# Patient Record
Sex: FEMALE | Race: WHITE | NOT HISPANIC OR LATINO | Employment: PART TIME | URBAN - METROPOLITAN AREA
[De-identification: names, ages, dates, MRNs, and addresses within clinical notes are randomized per-mention and may not be internally consistent; named-entity substitution may affect disease eponyms.]

---

## 2018-05-07 ENCOUNTER — OFFICE VISIT (OUTPATIENT)
Dept: URGENT CARE | Facility: CLINIC | Age: 63
End: 2018-05-07
Payer: COMMERCIAL

## 2018-05-07 VITALS
HEIGHT: 67 IN | WEIGHT: 162 LBS | TEMPERATURE: 98 F | DIASTOLIC BLOOD PRESSURE: 104 MMHG | OXYGEN SATURATION: 98 % | HEART RATE: 84 BPM | RESPIRATION RATE: 16 BRPM | SYSTOLIC BLOOD PRESSURE: 158 MMHG | BODY MASS INDEX: 25.43 KG/M2

## 2018-05-07 DIAGNOSIS — B00.1 HERPES SIMPLEX LABIALIS: Primary | ICD-10-CM

## 2018-05-07 PROCEDURE — 99203 OFFICE O/P NEW LOW 30 MIN: CPT | Mod: S$GLB,,, | Performed by: NURSE PRACTITIONER

## 2018-05-07 RX ORDER — ACYCLOVIR 50 MG/G
OINTMENT TOPICAL
Qty: 15 G | Refills: 0 | Status: SHIPPED | OUTPATIENT
Start: 2018-05-07 | End: 2019-05-07

## 2018-05-07 RX ORDER — METOPROLOL SUCCINATE 50 MG/1
50 TABLET, EXTENDED RELEASE ORAL
COMMUNITY
Start: 2012-04-18

## 2018-05-07 RX ORDER — ACYCLOVIR 50 MG/G
OINTMENT TOPICAL
COMMUNITY

## 2018-05-07 NOTE — PATIENT INSTRUCTIONS
Understanding Cold Sores  Cold sores are small blisters or sores on the lip or sometimes inside the mouth. Many people get them from time to time. Cold sores usually are not serious, and they usually heal in a week or two. They are caused by 2 related viruses, herpes simplex type 1 and 2. These viruses spread very easily. Many people have one or both of these viruses in their body. More than 4 in every 5 people are infected with herpes simplex type 1. Once you have the virus that causes cold sores, it stays in your body for the rest of your life. But it can be inactive for long periods.  What causes a cold sore?  Cold sores are usually caused by herpes simplex virus type 1. Less often, they are caused by herpes simplex virus type 2. Herpes simplex virus type 2 is the more common cause of genital sores. The herpes viruses can enter the body through a break in the skin such as a scrape. Or they may enter through mucous membranes such as the lips or mouth. Some ways to get the viruses include:  · Kissing someone who has a cold sore  · Sharing a drinking glass, eating utensils, or lip balm with someone who has a cold sore  · Having oral sex with someone who has a cold sore  A  baby can also get the infection at birth.  If you have a herpes virus, you can to pass it along even when you dont have a sore.  Cold sores flare up occasionally. Things that can cause an outbreak include:  · Sun exposure  · Fever  · Stress or exhaustion  · Menstruation  · Skin irritation  · Another unrelated Illness such as pneumonia, urinary infection, or cancer  What are the symptoms of a cold sore?  Symptoms can include:  · A blister-like sore or cluster of sores. These often occur at the edge of the lips but may appear inside the mouth.  · Skin redness around the sores.  · Pain or itching in the area of the outbreak. Often the pain or itching develops 12 to 24 hours before the sore become visible.  · Flu-like symptoms, including  swollen glands, headache, body ache, or fever. These typically occur only at the time of the first infection.  Cold sores may also occur on fingers. They may rarely infect the eyes, a serious possible complication.  Some people have symptoms a day or two before an outbreak. They may feel tenderness, burning, itching, or tingling before a cold sore appears. Cold sores tend to come back in the same area that they first appeared.  How are cold sores treated?  Treatment for cold sores focuses on relieving and shortening symptoms. For people with frequent outbreaks, treatment works to decrease how often future episodes.  Treatments may include:  · Prescription or over-the-counter pain medicines. These can help with discomfort, especially if sores are inside the mouth.  · Antiviral medicines. These may be pills that are taken by mouth or a cream to apply to sores. They may help shorten an outbreak and reduce the severity of symptoms.  They may be used to help prevent future outbreaks if you have disabling recurrent infections.  · Self-care such as extra rest and drinking more fluids. These may help relieve the flu-like symptoms of a first outbreak.  How are cold sores diagnosed?  Your healthcare provider makes the diagnosis mainly by looking at the sores and doing a clinical exam.  This may be confirmed by swab tests or blood tests.  How can I prevent cold sores?  You can help reduce the spread of the herpes viruses that cause cold sores. This can help both you and others avoid getting cold sores. Follow these tips:  · Do not kiss others if you have a cold sore. Also avoid kissing someone with a cold sore.  · Do not share eating utensils, lip balm, razors, or towels with someone who has a cold sore.  · Wash your hands after touching the area of a cold sore. The herpes virus can be carried from your face to your hands when you touch the area of a cold sore. When this happens, wash your hands thoroughly, for at least 20  seconds. When you cant wash with soap and water, use an alcohol-based hand .  · Disinfect things you touch often, such as phones and keyboards.    · If you feel a cold sore coming on, do the same things you would do when a cold sore is present to avoid spreading the virus.  · Use condoms to help prevent passing on the viruses through sex.  What are the possible complications of a cold sore?  Cold sores usually go away by themselves within 2 weeks. For most people cold sores are not serious. The viruses that cause cold sores can cause more serious illness, though. People who have a weak immune system may get more serious infections from herpes viruses. These include people being treated for cancer or who have HIV disease. Babies may also become very ill from a herpes infection.      When should I call my healthcare provider?  Call your healthcare provider right away if you have any of these:  · Fever of 100.4°F (38°C) or higher, or as directed  · Pain that gets worse  · You cannot eat or drink because of painful sores  · Symptoms dont get better within 5 to 7 days  · Blisters spread beyond the mouth or lip to areas on the chest, arms, face, or legs   Date Last Reviewed: 3/28/2016  © 7198-9841 [employer]. [address] [place] [address], [place] [address]. All rights reserved. This information is not intended as a substitute for professional medical care. Always follow your healthcare professional's instructions.

## 2018-05-07 NOTE — PROGRESS NOTES
"Subjective:       Patient ID: Nubia Howell is a 62 y.o. female.    Vitals:  height is 5' 7" (1.702 m) and weight is 73.5 kg (162 lb). Her temporal temperature is 98.1 °F (36.7 °C). Her blood pressure is 158/104 (abnormal) and her pulse is 84. Her respiration is 16 and oxygen saturation is 98%.     Chief Complaint: Herpes Zoster (on her chin)    Pt comes in with complaints of a herpes flare up on her face. She states that this flare up usually happens when she is out in the sun for a long period of time. She states that she has been dealing with this for about 40 years. She usually takes zovirax cream to help with the flare up, but says that she does not have a prescription at this moment. She states that the flare up is slightly itchy, but is starting to get worse.       Review of Systems   Constitution: Negative for chills and fever.   HENT: Negative for sore throat.    Respiratory: Negative for shortness of breath.    Skin: Positive for itching and rash.   Musculoskeletal: Negative for joint pain.   All other systems reviewed and are negative.      Objective:      Physical Exam   Constitutional: She is oriented to person, place, and time. She appears well-developed and well-nourished.   HENT:   Head: Normocephalic and atraumatic. Head is without abrasion, without contusion and without laceration.   Right Ear: External ear normal.   Left Ear: External ear normal.   Nose: Nose normal.   Mouth/Throat: Oropharynx is clear and moist. Oral lesions present.       Eyes: Conjunctivae, EOM and lids are normal. Pupils are equal, round, and reactive to light.   Neck: Trachea normal, full passive range of motion without pain and phonation normal. Neck supple.   Cardiovascular: Normal rate, regular rhythm and normal heart sounds.    Pulmonary/Chest: Effort normal and breath sounds normal. No stridor. No respiratory distress.   Musculoskeletal: Normal range of motion.   Neurological: She is alert and oriented to person, place, " and time.   Skin: Skin is warm, dry and intact. Capillary refill takes less than 2 seconds. No abrasion, no bruising, no burn, no ecchymosis, no laceration, no lesion and no rash noted. No erythema.   Psychiatric: She has a normal mood and affect. Her speech is normal and behavior is normal. Judgment and thought content normal. Cognition and memory are normal.   Nursing note and vitals reviewed.      Assessment:       1. Herpes simplex labialis        Plan:         Herpes simplex labialis  -     acyclovir 5% (ZOVIRAX) 5 % ointment; Apply thin layer to affected area  Dispense: 15 g; Refill: 0      Patient Instructions       Understanding Cold Sores  Cold sores are small blisters or sores on the lip or sometimes inside the mouth. Many people get them from time to time. Cold sores usually are not serious, and they usually heal in a week or two. They are caused by 2 related viruses, herpes simplex type 1 and 2. These viruses spread very easily. Many people have one or both of these viruses in their body. More than 4 in every 5 people are infected with herpes simplex type 1. Once you have the virus that causes cold sores, it stays in your body for the rest of your life. But it can be inactive for long periods.  What causes a cold sore?  Cold sores are usually caused by herpes simplex virus type 1. Less often, they are caused by herpes simplex virus type 2. Herpes simplex virus type 2 is the more common cause of genital sores. The herpes viruses can enter the body through a break in the skin such as a scrape. Or they may enter through mucous membranes such as the lips or mouth. Some ways to get the viruses include:  · Kissing someone who has a cold sore  · Sharing a drinking glass, eating utensils, or lip balm with someone who has a cold sore  · Having oral sex with someone who has a cold sore  A  baby can also get the infection at birth.  If you have a herpes virus, you can to pass it along even when you dont  have a sore.  Cold sores flare up occasionally. Things that can cause an outbreak include:  · Sun exposure  · Fever  · Stress or exhaustion  · Menstruation  · Skin irritation  · Another unrelated Illness such as pneumonia, urinary infection, or cancer  What are the symptoms of a cold sore?  Symptoms can include:  · A blister-like sore or cluster of sores. These often occur at the edge of the lips but may appear inside the mouth.  · Skin redness around the sores.  · Pain or itching in the area of the outbreak. Often the pain or itching develops 12 to 24 hours before the sore become visible.  · Flu-like symptoms, including swollen glands, headache, body ache, or fever. These typically occur only at the time of the first infection.  Cold sores may also occur on fingers. They may rarely infect the eyes, a serious possible complication.  Some people have symptoms a day or two before an outbreak. They may feel tenderness, burning, itching, or tingling before a cold sore appears. Cold sores tend to come back in the same area that they first appeared.  How are cold sores treated?  Treatment for cold sores focuses on relieving and shortening symptoms. For people with frequent outbreaks, treatment works to decrease how often future episodes.  Treatments may include:  · Prescription or over-the-counter pain medicines. These can help with discomfort, especially if sores are inside the mouth.  · Antiviral medicines. These may be pills that are taken by mouth or a cream to apply to sores. They may help shorten an outbreak and reduce the severity of symptoms.  They may be used to help prevent future outbreaks if you have disabling recurrent infections.  · Self-care such as extra rest and drinking more fluids. These may help relieve the flu-like symptoms of a first outbreak.  How are cold sores diagnosed?  Your healthcare provider makes the diagnosis mainly by looking at the sores and doing a clinical exam.  This may be confirmed  by swab tests or blood tests.  How can I prevent cold sores?  You can help reduce the spread of the herpes viruses that cause cold sores. This can help both you and others avoid getting cold sores. Follow these tips:  · Do not kiss others if you have a cold sore. Also avoid kissing someone with a cold sore.  · Do not share eating utensils, lip balm, razors, or towels with someone who has a cold sore.  · Wash your hands after touching the area of a cold sore. The herpes virus can be carried from your face to your hands when you touch the area of a cold sore. When this happens, wash your hands thoroughly, for at least 20 seconds. When you cant wash with soap and water, use an alcohol-based hand .  · Disinfect things you touch often, such as phones and keyboards.    · If you feel a cold sore coming on, do the same things you would do when a cold sore is present to avoid spreading the virus.  · Use condoms to help prevent passing on the viruses through sex.  What are the possible complications of a cold sore?  Cold sores usually go away by themselves within 2 weeks. For most people cold sores are not serious. The viruses that cause cold sores can cause more serious illness, though. People who have a weak immune system may get more serious infections from herpes viruses. These include people being treated for cancer or who have HIV disease. Babies may also become very ill from a herpes infection.      When should I call my healthcare provider?  Call your healthcare provider right away if you have any of these:  · Fever of 100.4°F (38°C) or higher, or as directed  · Pain that gets worse  · You cannot eat or drink because of painful sores  · Symptoms dont get better within 5 to 7 days  · Blisters spread beyond the mouth or lip to areas on the chest, arms, face, or legs   Date Last Reviewed: 3/28/2016  © 5674-4044 [employer]. [address] [place] [address], [place] [address]. All rights reserved. This  information is not intended as a substitute for professional medical care. Always follow your healthcare professional's instructions.

## 2023-05-23 LAB
CHOLESTEROL (MG/DL) IN SER/PLAS: 161 MG/DL (ref 0–199)
CHOLESTEROL IN HDL (MG/DL) IN SER/PLAS: 60.4 MG/DL
CHOLESTEROL/HDL RATIO: 2.7
LDL: 72 MG/DL (ref 0–99)
TRIGLYCERIDE (MG/DL) IN SER/PLAS: 143 MG/DL (ref 0–149)
VLDL: 29 MG/DL (ref 0–40)

## 2023-09-21 ENCOUNTER — OFFICE VISIT (OUTPATIENT)
Dept: PRIMARY CARE | Facility: CLINIC | Age: 68
End: 2023-09-21
Payer: MEDICARE

## 2023-09-21 VITALS
HEART RATE: 73 BPM | HEIGHT: 67 IN | DIASTOLIC BLOOD PRESSURE: 74 MMHG | SYSTOLIC BLOOD PRESSURE: 132 MMHG | WEIGHT: 170 LBS | BODY MASS INDEX: 26.68 KG/M2

## 2023-09-21 DIAGNOSIS — Z13.1 SCREENING FOR DIABETES MELLITUS: ICD-10-CM

## 2023-09-21 DIAGNOSIS — R73.9 HYPERGLYCEMIA: ICD-10-CM

## 2023-09-21 DIAGNOSIS — I10 PRIMARY HYPERTENSION: ICD-10-CM

## 2023-09-21 DIAGNOSIS — Z51.81 MEDICATION MONITORING ENCOUNTER: ICD-10-CM

## 2023-09-21 DIAGNOSIS — E78.2 MIXED HYPERLIPIDEMIA: ICD-10-CM

## 2023-09-21 DIAGNOSIS — Z12.31 SCREENING MAMMOGRAM FOR BREAST CANCER: ICD-10-CM

## 2023-09-21 DIAGNOSIS — M81.0 AGE-RELATED OSTEOPOROSIS WITHOUT CURRENT PATHOLOGICAL FRACTURE: Primary | ICD-10-CM
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
## 2023-09-21 PROBLEM — R10.13 ABDOMINAL PAIN, ACUTE, EPIGASTRIC: Status: ACTIVE | Noted: 2023-09-21

## 2023-09-21 PROBLEM — Z86.010 HISTORY OF COLON POLYPS: Status: ACTIVE | Noted: 2023-09-21

## 2023-09-21 PROBLEM — M62.838 MUSCLE SPASM: Status: RESOLVED | Noted: 2023-09-21 | Resolved: 2023-09-21

## 2023-09-21 PROBLEM — R19.7 DIARRHEA: Status: RESOLVED | Noted: 2023-09-21 | Resolved: 2023-09-21

## 2023-09-21 PROBLEM — Z86.0100 HISTORY OF COLON POLYPS: Status: ACTIVE | Noted: 2023-09-21

## 2023-09-21 PROBLEM — M54.9 BACK PAIN: Status: ACTIVE | Noted: 2023-09-21

## 2023-09-21 PROBLEM — R91.1 LUNG NODULE: Status: ACTIVE | Noted: 2023-09-21

## 2023-09-21 PROBLEM — M54.42 LUMBAGO WITH SCIATICA, LEFT SIDE: Status: RESOLVED | Noted: 2023-09-21 | Resolved: 2023-09-21

## 2023-09-21 PROBLEM — M65.30 TRIGGER FINGER OF RIGHT HAND: Status: ACTIVE | Noted: 2023-09-21

## 2023-09-21 PROBLEM — R10.13 ABDOMINAL PAIN, ACUTE, EPIGASTRIC: Status: RESOLVED | Noted: 2023-09-21 | Resolved: 2023-09-21

## 2023-09-21 PROBLEM — R07.81 RIB PAIN ON RIGHT SIDE: Status: ACTIVE | Noted: 2023-09-21

## 2023-09-21 PROBLEM — M54.42 LUMBAGO WITH SCIATICA, LEFT SIDE: Status: ACTIVE | Noted: 2023-09-21

## 2023-09-21 PROBLEM — E78.5 HYPERLIPIDEMIA: Status: ACTIVE | Noted: 2023-09-21

## 2023-09-21 PROBLEM — S22.080A COMPRESSION FRACTURE OF T11 VERTEBRA (MULTI): Status: ACTIVE | Noted: 2023-09-21

## 2023-09-21 PROBLEM — U07.1 COVID-19: Status: RESOLVED | Noted: 2023-09-21 | Resolved: 2023-09-21
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
## 2023-09-21 PROBLEM — R07.9 CHEST PAIN: Status: ACTIVE | Noted: 2023-09-21

## 2023-09-21 PROBLEM — M47.816 LUMBAR SPONDYLOSIS: Status: ACTIVE | Noted: 2023-09-21

## 2023-09-21 PROBLEM — R19.7 DIARRHEA: Status: ACTIVE | Noted: 2023-09-21

## 2023-09-21 PROBLEM — M62.838 MUSCLE SPASM: Status: ACTIVE | Noted: 2023-09-21

## 2023-09-21 PROBLEM — M54.9 BACK PAIN: Status: RESOLVED | Noted: 2023-09-21 | Resolved: 2023-09-21

## 2023-09-21 PROBLEM — I71.21 THORACIC ASCENDING AORTIC ANEURYSM (CMS-HCC): Status: ACTIVE | Noted: 2023-09-21

## 2023-09-21 PROBLEM — Z95.828 HISTORY OF ASCENDING AORTIC REPLACEMENT: Status: ACTIVE | Noted: 2023-09-21

## 2023-09-21 PROBLEM — U07.1 COVID-19: Status: ACTIVE | Noted: 2023-09-21

## 2023-09-21 PROBLEM — I25.10 CORONARY ATHEROSCLEROSIS: Status: ACTIVE | Noted: 2023-09-21

## 2023-09-21 PROBLEM — M65.30 TRIGGER FINGER OF RIGHT HAND: Status: RESOLVED | Noted: 2023-09-21 | Resolved: 2023-09-21

## 2023-09-21 PROCEDURE — 3078F DIAST BP <80 MM HG: CPT | Performed by: INTERNAL MEDICINE

## 2023-09-21 PROCEDURE — 1159F MED LIST DOCD IN RCRD: CPT | Performed by: INTERNAL MEDICINE

## 2023-09-21 PROCEDURE — 3075F SYST BP GE 130 - 139MM HG: CPT | Performed by: INTERNAL MEDICINE

## 2023-09-21 PROCEDURE — 1036F TOBACCO NON-USER: CPT | Performed by: INTERNAL MEDICINE

## 2023-09-21 PROCEDURE — 1160F RVW MEDS BY RX/DR IN RCRD: CPT | Performed by: INTERNAL MEDICINE

## 2023-09-21 PROCEDURE — 1125F AMNT PAIN NOTED PAIN PRSNT: CPT | Performed by: INTERNAL MEDICINE

## 2023-09-21 PROCEDURE — 99204 OFFICE O/P NEW MOD 45 MIN: CPT | Performed by: INTERNAL MEDICINE

## 2023-09-21 RX ORDER — METOPROLOL SUCCINATE 100 MG/1
100 TABLET, EXTENDED RELEASE ORAL DAILY
COMMUNITY
Start: 2023-08-17 | End: 2024-02-21

## 2023-09-21 RX ORDER — LOSARTAN POTASSIUM 100 MG/1
100 TABLET ORAL DAILY
COMMUNITY
Start: 2023-08-23 | End: 2024-02-21

## 2023-09-21 RX ORDER — ATORVASTATIN CALCIUM 80 MG/1
80 TABLET, FILM COATED ORAL DAILY
COMMUNITY
Start: 2023-09-17

## 2023-09-21 ASSESSMENT — ENCOUNTER SYMPTOMS
PALPITATIONS: 0
OCCASIONAL FEELINGS OF UNSTEADINESS: 0
DEPRESSION: 0
COUGH: 0
FEVER: 0
SHORTNESS OF BREATH: 0
CHILLS: 0
LOSS OF SENSATION IN FEET: 0
POLYDIPSIA: 0

## 2023-09-21 NOTE — PROGRESS NOTES
"Subjective   Patient ID: Brittny Gordon is a 68 y.o. female who presents for Establish Care.    68-year-old female presents today to establish care after a prolonged time without a PCP.  She follows with a specialist for her cardiovascular history which includes a thoracic ascending aortic aneurysm status postsurgical repair.  She is on appropriate medications for management and follows with cardiology at this time.  It has been greater than 1 year since her last mammogram.  It has been greater than 2 years since her last bone density.  She has a history of osteoporosis she was previously on alendronate for couple years but has not been on it for the last 1 year as a result of not having a PCP to follow-up with.  She has completed colon cancer screening and is due in 2028 at the 7-year follow-up.  She is in need of updated screening and maintenance blood work which I have ordered as part of today's visit.  We discussed vaccine recommendations in detail as part of today's encounter and she was provided with detailed knowledge of all available vaccines that she qualifies for and will consider obtaining them from her pharmacy which is her preference.    There are no acute issues at this time that need to be addressed on behalf of the patient.  I advised her to follow-up in 6 months         Review of Systems   Constitutional:  Negative for chills and fever.   Respiratory:  Negative for cough and shortness of breath.    Cardiovascular:  Negative for chest pain and palpitations.   Endocrine: Negative for polydipsia and polyuria.       Objective   /74 (Patient Position: Sitting)   Pulse 73   Ht 1.689 m (5' 6.5\")   Wt 77.1 kg (170 lb)   BMI 27.03 kg/m²     Physical Exam  Constitutional:       Appearance: Normal appearance.   HENT:      Head: Normocephalic and atraumatic.   Eyes:      Extraocular Movements: Extraocular movements intact.      Pupils: Pupils are equal, round, and reactive to light.   Neck:      " Thyroid: No thyroid mass or thyromegaly.      Vascular: No carotid bruit.   Cardiovascular:      Rate and Rhythm: Normal rate and regular rhythm.      Heart sounds: No murmur heard.     No friction rub. No gallop.   Pulmonary:      Effort: No respiratory distress.      Breath sounds: No wheezing, rhonchi or rales.   Musculoskeletal:      Cervical back: Neck supple.      Right lower leg: No edema.      Left lower leg: No edema.   Lymphadenopathy:      Cervical: No cervical adenopathy.   Neurological:      Mental Status: She is alert.         Assessment/Plan   Problem List Items Addressed This Visit       Hyperlipidemia    Relevant Orders    Vitamin D 25-Hydroxy,Total (for eval of Vitamin D levels)    Lipid Panel    CBC    Comprehensive Metabolic Panel    Hemoglobin A1C    Hypertension    Relevant Orders    Vitamin D 25-Hydroxy,Total (for eval of Vitamin D levels)    Lipid Panel    CBC    Comprehensive Metabolic Panel    Hemoglobin A1C    Osteoporosis - Primary    Relevant Orders    XR DEXA bone density    Vitamin D 25-Hydroxy,Total (for eval of Vitamin D levels)    Lipid Panel    CBC    Comprehensive Metabolic Panel    Hemoglobin A1C     Other Visit Diagnoses       Screening mammogram for breast cancer        Relevant Orders    BI mammo bilateral screening tomosynthesis    Medication monitoring encounter        Relevant Orders    Vitamin D 25-Hydroxy,Total (for eval of Vitamin D levels)    Lipid Panel    CBC    Comprehensive Metabolic Panel    Hemoglobin A1C    Screening for diabetes mellitus        Relevant Orders    Vitamin D 25-Hydroxy,Total (for eval of Vitamin D levels)    Lipid Panel    CBC    Comprehensive Metabolic Panel    Hemoglobin A1C    Hyperglycemia        Relevant Orders    Hemoglobin A1C

## 2023-09-21 NOTE — PATIENT INSTRUCTIONS
Please consider the following vaccine updates: Prevnar 20- pneumonia. Abrexvy- RSV, Flu shot, and COVID 19 Booster.

## 2023-09-27 ENCOUNTER — LAB (OUTPATIENT)
Dept: LAB | Facility: LAB | Age: 68
End: 2023-09-27
Payer: MEDICARE

## 2023-09-27 DIAGNOSIS — M81.0 AGE-RELATED OSTEOPOROSIS WITHOUT CURRENT PATHOLOGICAL FRACTURE: ICD-10-CM

## 2023-09-27 DIAGNOSIS — Z13.1 SCREENING FOR DIABETES MELLITUS: ICD-10-CM

## 2023-09-27 DIAGNOSIS — Z51.81 MEDICATION MONITORING ENCOUNTER: ICD-10-CM

## 2023-09-27 DIAGNOSIS — E78.2 MIXED HYPERLIPIDEMIA: ICD-10-CM

## 2023-09-27 DIAGNOSIS — R73.9 HYPERGLYCEMIA: ICD-10-CM

## 2023-09-27 DIAGNOSIS — I10 PRIMARY HYPERTENSION: ICD-10-CM

## 2023-09-27 LAB
ALANINE AMINOTRANSFERASE (SGPT) (U/L) IN SER/PLAS: 25 U/L (ref 7–45)
ALBUMIN (G/DL) IN SER/PLAS: 3.9 G/DL (ref 3.4–5)
ALKALINE PHOSPHATASE (U/L) IN SER/PLAS: 48 U/L (ref 33–136)
ANION GAP IN SER/PLAS: 10 MMOL/L (ref 10–20)
ASPARTATE AMINOTRANSFERASE (SGOT) (U/L) IN SER/PLAS: 22 U/L (ref 9–39)
BILIRUBIN TOTAL (MG/DL) IN SER/PLAS: 0.7 MG/DL (ref 0–1.2)
CALCIDIOL (25 OH VITAMIN D3) (NG/ML) IN SER/PLAS: 52 NG/ML
CALCIUM (MG/DL) IN SER/PLAS: 8.6 MG/DL (ref 8.6–10.3)
CARBON DIOXIDE, TOTAL (MMOL/L) IN SER/PLAS: 27 MMOL/L (ref 21–32)
CHLORIDE (MMOL/L) IN SER/PLAS: 106 MMOL/L (ref 98–107)
CHOLESTEROL (MG/DL) IN SER/PLAS: 180 MG/DL (ref 0–199)
CHOLESTEROL IN HDL (MG/DL) IN SER/PLAS: 63.1 MG/DL
CHOLESTEROL/HDL RATIO: 2.9
CREATININE (MG/DL) IN SER/PLAS: 0.64 MG/DL (ref 0.5–1.05)
ERYTHROCYTE DISTRIBUTION WIDTH (RATIO) BY AUTOMATED COUNT: 12.7 % (ref 11.5–14.5)
ERYTHROCYTE MEAN CORPUSCULAR HEMOGLOBIN CONCENTRATION (G/DL) BY AUTOMATED: 32.3 G/DL (ref 32–36)
ERYTHROCYTE MEAN CORPUSCULAR VOLUME (FL) BY AUTOMATED COUNT: 93 FL (ref 80–100)
ERYTHROCYTES (10*6/UL) IN BLOOD BY AUTOMATED COUNT: 4.58 X10E12/L (ref 4–5.2)
GFR FEMALE: >90 ML/MIN/1.73M2
GLUCOSE (MG/DL) IN SER/PLAS: 97 MG/DL (ref 74–99)
HEMATOCRIT (%) IN BLOOD BY AUTOMATED COUNT: 42.4 % (ref 36–46)
HEMOGLOBIN (G/DL) IN BLOOD: 13.7 G/DL (ref 12–16)
LDL: 91 MG/DL (ref 0–99)
LEUKOCYTES (10*3/UL) IN BLOOD BY AUTOMATED COUNT: 4.7 X10E9/L (ref 4.4–11.3)
PLATELETS (10*3/UL) IN BLOOD AUTOMATED COUNT: 166 X10E9/L (ref 150–450)
POTASSIUM (MMOL/L) IN SER/PLAS: 4 MMOL/L (ref 3.5–5.3)
PROTEIN TOTAL: 6.2 G/DL (ref 6.4–8.2)
SODIUM (MMOL/L) IN SER/PLAS: 139 MMOL/L (ref 136–145)
TRIGLYCERIDE (MG/DL) IN SER/PLAS: 132 MG/DL (ref 0–149)
UREA NITROGEN (MG/DL) IN SER/PLAS: 13 MG/DL (ref 6–23)
VLDL: 26 MG/DL (ref 0–40)

## 2023-09-27 PROCEDURE — 83036 HEMOGLOBIN GLYCOSYLATED A1C: CPT

## 2023-09-27 PROCEDURE — 85027 COMPLETE CBC AUTOMATED: CPT

## 2023-09-27 PROCEDURE — 82306 VITAMIN D 25 HYDROXY: CPT

## 2023-09-27 PROCEDURE — 80053 COMPREHEN METABOLIC PANEL: CPT

## 2023-09-27 PROCEDURE — 80061 LIPID PANEL: CPT

## 2023-09-27 PROCEDURE — 36415 COLL VENOUS BLD VENIPUNCTURE: CPT

## 2023-09-28 LAB
ESTIMATED AVERAGE GLUCOSE FOR HBA1C: 117 MG/DL
HEMOGLOBIN A1C/HEMOGLOBIN TOTAL IN BLOOD: 5.7 %

## 2023-10-31 ENCOUNTER — ANCILLARY PROCEDURE (OUTPATIENT)
Dept: RADIOLOGY | Facility: CLINIC | Age: 68
End: 2023-10-31
Payer: MEDICARE

## 2023-10-31 DIAGNOSIS — M81.0 AGE-RELATED OSTEOPOROSIS WITHOUT CURRENT PATHOLOGICAL FRACTURE: ICD-10-CM

## 2023-10-31 DIAGNOSIS — Z12.31 SCREENING MAMMOGRAM FOR BREAST CANCER: ICD-10-CM

## 2023-10-31 PROCEDURE — 77067 SCR MAMMO BI INCL CAD: CPT

## 2023-10-31 PROCEDURE — 77080 DXA BONE DENSITY AXIAL: CPT

## 2023-10-31 PROCEDURE — 77080 DXA BONE DENSITY AXIAL: CPT | Performed by: RADIOLOGY

## 2023-10-31 PROCEDURE — 77067 SCR MAMMO BI INCL CAD: CPT | Mod: BILATERAL PROCEDURE

## 2023-10-31 PROCEDURE — 77063 BREAST TOMOSYNTHESIS BI: CPT | Mod: BILATERAL PROCEDURE

## 2023-11-02 NOTE — RESULT ENCOUNTER NOTE
Patient bone density shows good results with overall significant improvement in her bone density from prior evaluation.  She has improved from the osteoporotic to the osteopenia range across-the-board.  Based on this information I would advise she continue to take calcium supplements daily and recheck a bone density again in 2 years for monitoring of progression but I do not think we need to start any medication at this time.

## 2023-11-13 ENCOUNTER — APPOINTMENT (OUTPATIENT)
Dept: RADIOLOGY | Facility: CLINIC | Age: 68
End: 2023-11-13
Payer: MEDICARE

## 2023-12-19 ENCOUNTER — OFFICE VISIT (OUTPATIENT)
Dept: CARDIOLOGY | Facility: HOSPITAL | Age: 68
End: 2023-12-19
Payer: MEDICARE

## 2023-12-19 VITALS
WEIGHT: 180 LBS | DIASTOLIC BLOOD PRESSURE: 70 MMHG | HEART RATE: 76 BPM | BODY MASS INDEX: 28.62 KG/M2 | SYSTOLIC BLOOD PRESSURE: 132 MMHG

## 2023-12-19 DIAGNOSIS — R06.09 DYSPNEA ON EXERTION: ICD-10-CM

## 2023-12-19 DIAGNOSIS — R07.9 CHEST PAIN, UNSPECIFIED TYPE: ICD-10-CM

## 2023-12-19 DIAGNOSIS — I10 HYPERTENSION, UNSPECIFIED TYPE: ICD-10-CM

## 2023-12-19 DIAGNOSIS — I25.10 ATHEROSCLEROSIS OF CORONARY ARTERY, UNSPECIFIED VESSEL OR LESION TYPE, UNSPECIFIED WHETHER ANGINA PRESENT, UNSPECIFIED WHETHER NATIVE OR TRANSPLANTED HEART: Primary | ICD-10-CM

## 2023-12-19 DIAGNOSIS — E78.5 HYPERLIPIDEMIA, UNSPECIFIED HYPERLIPIDEMIA TYPE: ICD-10-CM

## 2023-12-19 PROBLEM — M65.341 TRIGGER RING FINGER OF RIGHT HAND: Status: ACTIVE | Noted: 2017-09-29

## 2023-12-19 PROBLEM — M19.90 ARTHRITIS: Status: ACTIVE | Noted: 2023-12-19

## 2023-12-19 PROBLEM — I77.810 ASCENDING AORTA DILATION (CMS-HCC): Status: ACTIVE | Noted: 2023-12-19

## 2023-12-19 PROBLEM — M25.641 STIFFNESS OF RIGHT HAND JOINT: Status: ACTIVE | Noted: 2017-10-30

## 2023-12-19 PROBLEM — M24.541 CONTRACTURE OF FINGER JOINT, RIGHT: Status: ACTIVE | Noted: 2017-09-29

## 2023-12-19 PROBLEM — F41.9 ANXIETY: Status: ACTIVE | Noted: 2023-12-19

## 2023-12-19 PROCEDURE — 93005 ELECTROCARDIOGRAM TRACING: CPT | Performed by: NURSE PRACTITIONER

## 2023-12-19 PROCEDURE — 1036F TOBACCO NON-USER: CPT | Performed by: NURSE PRACTITIONER

## 2023-12-19 PROCEDURE — 3078F DIAST BP <80 MM HG: CPT | Performed by: NURSE PRACTITIONER

## 2023-12-19 PROCEDURE — 3075F SYST BP GE 130 - 139MM HG: CPT | Performed by: NURSE PRACTITIONER

## 2023-12-19 PROCEDURE — 99214 OFFICE O/P EST MOD 30 MIN: CPT | Mod: 25 | Performed by: NURSE PRACTITIONER

## 2023-12-19 PROCEDURE — 1160F RVW MEDS BY RX/DR IN RCRD: CPT | Performed by: NURSE PRACTITIONER

## 2023-12-19 PROCEDURE — 99214 OFFICE O/P EST MOD 30 MIN: CPT | Performed by: NURSE PRACTITIONER

## 2023-12-19 PROCEDURE — 1125F AMNT PAIN NOTED PAIN PRSNT: CPT | Performed by: NURSE PRACTITIONER

## 2023-12-19 PROCEDURE — 1159F MED LIST DOCD IN RCRD: CPT | Performed by: NURSE PRACTITIONER

## 2023-12-19 PROCEDURE — 93010 ELECTROCARDIOGRAM REPORT: CPT | Performed by: INTERNAL MEDICINE

## 2023-12-19 NOTE — PROGRESS NOTES
"Primary Care Physician: Jose Rafael Hart MD  Date of Visit: 12/19/2023  2:30 PM EST  Location of visit: [facility]     Chief Complaint:   Chief Complaint   Patient presents with    Follow-up        HPI / Summary:   Brittny Gordon is a 68 y.o. female presents for followup. Seen in collaboration with Dr. Buckley. She reports one month ago while blowing her leaves she developed an anterior chest tightness. She cannot describe the sensation entirely other than stating \"felt like something was not right.\" Discomfort lasted less than 5 minutes. Does not radiate. No associated symptoms including nausea, vomiting, diaphoresis, or dyspnea. Resolves spontaneously. She stopped blowing leaves and walked around slowly with resolution of symptoms. No further episodes. This was different than her prior chronic chest pain syndrome. She does continue to have a burning sensation in her chest that she feels is indigestion and will take an antacid to relieve symptoms. This has been occurring once a week and has been more noticeable. She has dyspnea on exertion that has been more noticeable when carrying 40 pound bags of salt down her basement stairs. She has been able to walk her dog 1 mile daily without symptoms. She does admit to gaining weight over the past 6 months. Denies orthopnea, pnd, lightheadedness, dizziness, syncope, palpitations, lower extremity edema, or bleeding issues.                Past Medical History:  Past Medical History:   Diagnosis Date    Wedge compression fracture of t11-T12 vertebra, sequela 07/30/2020    Compression fracture of T11 vertebra, sequela        Past Surgical History:  Past Surgical History:   Procedure Laterality Date    OTHER SURGICAL HISTORY  01/28/2013    Thoracic aortic aneurysm repair    OTHER SURGICAL HISTORY  02/23/2021    Colonoscopy    OTHER SURGICAL HISTORY  07/16/2020    Meredith tooth extraction          Social History:   reports that she quit smoking about 10 years ago. Her " smoking use included cigarettes. She has never used smokeless tobacco. She reports current alcohol use. She reports that she does not use drugs.     Family History:  family history includes Lymphoma in her father; Polycythemia in her father.      Allergies:  Allergies   Allergen Reactions    Epinephrine Palpitations and Nausea/vomiting       Outpatient Medications:  Current Outpatient Medications   Medication Instructions    atorvastatin (Lipitor) 80 mg tablet Take 1 tablet (80 mg) by mouth once daily.    losartan (COZAAR) 100 mg, oral, Daily    metoprolol succinate XL (TOPROL-XL) 100 mg, oral, Daily       Physical Exam:  Vitals:    12/19/23 1429   BP: 137/87   BP Location: Right arm   Patient Position: Sitting   Pulse: 76   Weight: 81.6 kg (180 lb)     Wt Readings from Last 5 Encounters:   12/19/23 81.6 kg (180 lb)   09/21/23 77.1 kg (170 lb)   06/20/23 75.3 kg (166 lb)   05/30/23 77.5 kg (170 lb 13.7 oz)   02/21/23 77.1 kg (170 lb 0.3 oz)     Body mass index is 28.62 kg/m².     GENERAL: alert, cooperative, pleasant, in no acute distress  SKIN: warm and dry  NECK: Normal JVD, negative HJR  CARDIAC: Regular rate and rhythm with no rubs, murmurs, or gallops  CHEST: Normal respiratory efforts, lungs clear to auscultation bilaterally.  ABDOMEN: soft, nontender, nondistended  EXTREMITIES: no edema, +2 palpable RP and DP pulses bilaterally       Last Labs:  Recent Labs     09/27/23  1052 11/08/21  1517 08/14/20  0916   WBC 4.7 7.7 6.6   HGB 13.7 14.0 14.2   HCT 42.4 43.5 43.8    222 212   MCV 93 95 92     Recent Labs     09/27/23  1052 09/01/22  1136 03/10/22  0959    137 141   K 4.0 3.6 4.0    101 104   BUN 13 14 14   CREATININE 0.64 0.65 0.65     CMP -  Lab Results   Component Value Date    CALCIUM 8.6 09/27/2023    PROT 6.2 (L) 09/27/2023    ALBUMIN 3.9 09/27/2023    AST 22 09/27/2023    ALT 25 09/27/2023    ALKPHOS 48 09/27/2023    BILITOT 0.7 09/27/2023       LIPID PANEL -   Lab Results    Component Value Date    CHOL 180 09/27/2023    HDL 63.1 09/27/2023    LDLF 91 09/27/2023    TRIG 132 09/27/2023       Lab Results   Component Value Date    HGBA1C 5.7 (A) 09/27/2023       Last Cardiology Tests:  ECG:  Obtained and reviewed EKG normal sinus rhythm HR 72, possible prior anterior infarct    Stress test: 2/6/ 2020  IMPRESSION:  * Normal stress myocardial perfusion imaging without definite  evidence of ischemia or prior infarct.  *Well-maintained left ventricular function with an ejection fraction  of greater than 65%.  *Normal left ventricular size.        Assessment/Plan   Diagnoses and all orders for this visit:  Atherosclerosis of coronary artery, unspecified vessel or lesion type, unspecified whether angina present, unspecified whether native or transplanted heart  -     ECG 12 lead (Clinic Performed)  -     TSH with reflex to Free T4 if abnormal; Future  -     Lipid panel; Future  -     Nuclear Stress Test; Future  Hyperlipidemia, unspecified hyperlipidemia type  -     TSH with reflex to Free T4 if abnormal; Future  -     Lipid panel; Future  Hypertension, unspecified type  -     TSH with reflex to Free T4 if abnormal; Future  Chest pain, unspecified type  -     Nuclear Stress Test; Future  Dyspnea on exertion  -     Nuclear Stress Test; Future  In summary Ms. Gordon is a pleasant 68 year-old white female with a past medical history significant for ascending aortic aneurysm status post replacement of her ascending aorta with resuspension of her aortic valve in 2012 with residual mild aortic root dilation and stable arch aneurysm, hypertension, hyperlipidemia, mild coronary atherosclerosis on CT, and prior tobacco use. She reports one month ago having a vague chest discomfort lasting less than 5 minutes while blowing leaves. Resolved when she stopped blowing leaves and walked around slowly. She also has noted dyspnea on exertion to be more noticeable when carrying 40 pound salt bags down her  stairs. Given the vague chest pain and more noticeable dyspnea on exertion I did order an exercise nuclear stress test to further risk stratify as she has not had a recent ischemic evaluation. I suspect her dyspnea is likely related to weight gain of 14 pounds over the past 6 months. I have ordered a TSH as well. Her recent lipid panel is not at goal. She would like to modify diet, increase physical activity, and lose weight before adding additional medication. I have ordered repeat lipid panel to be done in 3 months. She will continue current cardiovascular medications. She will follow up in 6 months.             Orders:  No orders of the defined types were placed in this encounter.     Followup Appts:  Future Appointments   Date Time Provider Department Center   3/21/2024  1:40 PM Jose Rafael Hart MD [id number] [facility]   5/31/2024  2:00 PM GEA CT 1 [facility]    6/11/2024  3:00 PM Tommy Hammonds MD SCCGEATHOS [facility]           ____________________________________________________________  Kay Mendosa, APRN-CNP  [facility] [employer]  [facility]

## 2023-12-19 NOTE — PATIENT INSTRUCTIONS
Continue current meds  Exercise nuclear stress test  Follow up in 6 months  Check blood work TSH   Encourage weight loss and increase activity  Repeat lipid panel in 3 months

## 2024-01-08 ENCOUNTER — HOSPITAL ENCOUNTER (OUTPATIENT)
Dept: RADIOLOGY | Facility: HOSPITAL | Age: 69
Discharge: HOME | End: 2024-01-08
Payer: MEDICARE

## 2024-01-08 ENCOUNTER — HOSPITAL ENCOUNTER (OUTPATIENT)
Dept: CARDIOLOGY | Facility: HOSPITAL | Age: 69
Discharge: HOME | End: 2024-01-08
Payer: MEDICARE

## 2024-01-08 DIAGNOSIS — I25.10 ATHEROSCLEROSIS OF CORONARY ARTERY, UNSPECIFIED VESSEL OR LESION TYPE, UNSPECIFIED WHETHER ANGINA PRESENT, UNSPECIFIED WHETHER NATIVE OR TRANSPLANTED HEART: ICD-10-CM

## 2024-01-08 DIAGNOSIS — R07.9 CHEST PAIN, UNSPECIFIED TYPE: ICD-10-CM

## 2024-01-08 DIAGNOSIS — R06.09 DYSPNEA ON EXERTION: ICD-10-CM

## 2024-01-08 PROCEDURE — 78452 HT MUSCLE IMAGE SPECT MULT: CPT

## 2024-01-08 PROCEDURE — 3430000001 HC RX 343 DIAGNOSTIC RADIOPHARMACEUTICALS: Performed by: NURSE PRACTITIONER

## 2024-01-08 PROCEDURE — 93016 CV STRESS TEST SUPVJ ONLY: CPT | Performed by: INTERNAL MEDICINE

## 2024-01-08 PROCEDURE — 78452 HT MUSCLE IMAGE SPECT MULT: CPT | Performed by: STUDENT IN AN ORGANIZED HEALTH CARE EDUCATION/TRAINING PROGRAM

## 2024-01-08 PROCEDURE — 93017 CV STRESS TEST TRACING ONLY: CPT

## 2024-01-08 PROCEDURE — A9502 TC99M TETROFOSMIN: HCPCS | Performed by: NURSE PRACTITIONER

## 2024-01-08 PROCEDURE — 93017 CV STRESS TEST TRACING ONLY: CPT | Mod: MUE

## 2024-01-08 RX ADMIN — TETROFOSMIN 10 MILLICURIE: 0.23 INJECTION, POWDER, LYOPHILIZED, FOR SOLUTION INTRAVENOUS at 12:11

## 2024-01-08 RX ADMIN — TETROFOSMIN 30 MILLICURIE: 0.23 INJECTION, POWDER, LYOPHILIZED, FOR SOLUTION INTRAVENOUS at 13:41

## 2024-02-18 LAB
ATRIAL RATE: 72 BPM
P AXIS: 47 DEGREES
P OFFSET: 193 MS
P ONSET: 132 MS
PR INTERVAL: 188 MS
Q ONSET: 226 MS
QRS COUNT: 12 BEATS
QRS DURATION: 78 MS
QT INTERVAL: 402 MS
QTC CALCULATION(BAZETT): 440 MS
QTC FREDERICIA: 427 MS
R AXIS: 41 DEGREES
T AXIS: 77 DEGREES
T OFFSET: 427 MS
VENTRICULAR RATE: 72 BPM

## 2024-02-21 DIAGNOSIS — I25.10 ATHEROSCLEROSIS OF CORONARY ARTERY, UNSPECIFIED VESSEL OR LESION TYPE, UNSPECIFIED WHETHER ANGINA PRESENT, UNSPECIFIED WHETHER NATIVE OR TRANSPLANTED HEART: ICD-10-CM

## 2024-02-21 DIAGNOSIS — I10 ESSENTIAL (PRIMARY) HYPERTENSION: Primary | ICD-10-CM

## 2024-02-21 RX ORDER — LOSARTAN POTASSIUM 100 MG/1
100 TABLET ORAL DAILY
Qty: 90 TABLET | Refills: 3 | Status: SHIPPED | OUTPATIENT
Start: 2024-02-21

## 2024-02-21 RX ORDER — METOPROLOL SUCCINATE 100 MG/1
100 TABLET, EXTENDED RELEASE ORAL DAILY
Qty: 90 TABLET | Refills: 3 | Status: SHIPPED | OUTPATIENT
Start: 2024-02-21

## 2024-03-11 ENCOUNTER — LAB (OUTPATIENT)
Dept: LAB | Facility: LAB | Age: 69
End: 2024-03-11
Payer: MEDICARE

## 2024-03-11 DIAGNOSIS — E78.5 HYPERLIPIDEMIA, UNSPECIFIED HYPERLIPIDEMIA TYPE: ICD-10-CM

## 2024-03-11 DIAGNOSIS — I10 HYPERTENSION, UNSPECIFIED TYPE: ICD-10-CM

## 2024-03-11 DIAGNOSIS — I25.10 ATHEROSCLEROSIS OF CORONARY ARTERY, UNSPECIFIED VESSEL OR LESION TYPE, UNSPECIFIED WHETHER ANGINA PRESENT, UNSPECIFIED WHETHER NATIVE OR TRANSPLANTED HEART: ICD-10-CM

## 2024-03-11 LAB
CHOLEST SERPL-MCNC: 163 MG/DL (ref 0–199)
CHOLESTEROL/HDL RATIO: 2.6
HDLC SERPL-MCNC: 63.4 MG/DL
LDLC SERPL CALC-MCNC: 69 MG/DL
NON HDL CHOLESTEROL: 100 MG/DL (ref 0–149)
TRIGL SERPL-MCNC: 153 MG/DL (ref 0–149)
TSH SERPL-ACNC: 1.38 MIU/L (ref 0.44–3.98)
VLDL: 31 MG/DL (ref 0–40)

## 2024-03-11 PROCEDURE — 36415 COLL VENOUS BLD VENIPUNCTURE: CPT

## 2024-03-11 PROCEDURE — 84443 ASSAY THYROID STIM HORMONE: CPT

## 2024-03-11 PROCEDURE — 80061 LIPID PANEL: CPT

## 2024-03-21 ENCOUNTER — PHARMACY VISIT (OUTPATIENT)
Dept: PHARMACY | Facility: CLINIC | Age: 69
End: 2024-03-21
Payer: COMMERCIAL

## 2024-03-21 ENCOUNTER — OFFICE VISIT (OUTPATIENT)
Dept: PRIMARY CARE | Facility: CLINIC | Age: 69
End: 2024-03-21
Payer: MEDICARE

## 2024-03-21 VITALS
DIASTOLIC BLOOD PRESSURE: 78 MMHG | HEART RATE: 74 BPM | BODY MASS INDEX: 28.27 KG/M2 | SYSTOLIC BLOOD PRESSURE: 128 MMHG | WEIGHT: 177.8 LBS

## 2024-03-21 DIAGNOSIS — R19.7 DIARRHEA IN ADULT PATIENT: ICD-10-CM

## 2024-03-21 DIAGNOSIS — M85.89 OSTEOPENIA OF MULTIPLE SITES: Primary | ICD-10-CM

## 2024-03-21 DIAGNOSIS — M21.611 BUNION OF RIGHT FOOT: ICD-10-CM

## 2024-03-21 DIAGNOSIS — I10 PRIMARY HYPERTENSION: ICD-10-CM

## 2024-03-21 DIAGNOSIS — Z13.1 SCREENING FOR DIABETES MELLITUS: ICD-10-CM

## 2024-03-21 DIAGNOSIS — Z23 NEED FOR PNEUMOCOCCAL 20-VALENT CONJUGATE VACCINATION: ICD-10-CM

## 2024-03-21 DIAGNOSIS — R73.9 HYPERGLYCEMIA: ICD-10-CM

## 2024-03-21 DIAGNOSIS — Z29.11 NEED FOR RSV IMMUNIZATION: ICD-10-CM

## 2024-03-21 DIAGNOSIS — K52.9 CHRONIC DIARRHEA: ICD-10-CM

## 2024-03-21 LAB — POC HEMOGLOBIN A1C: 5.6 % (ref 4.2–6.5)

## 2024-03-21 PROCEDURE — 99214 OFFICE O/P EST MOD 30 MIN: CPT | Performed by: INTERNAL MEDICINE

## 2024-03-21 PROCEDURE — 90677 PCV20 VACCINE IM: CPT | Performed by: INTERNAL MEDICINE

## 2024-03-21 PROCEDURE — 3074F SYST BP LT 130 MM HG: CPT | Performed by: INTERNAL MEDICINE

## 2024-03-21 PROCEDURE — 1036F TOBACCO NON-USER: CPT | Performed by: INTERNAL MEDICINE

## 2024-03-21 PROCEDURE — 1160F RVW MEDS BY RX/DR IN RCRD: CPT | Performed by: INTERNAL MEDICINE

## 2024-03-21 PROCEDURE — 3078F DIAST BP <80 MM HG: CPT | Performed by: INTERNAL MEDICINE

## 2024-03-21 PROCEDURE — 1159F MED LIST DOCD IN RCRD: CPT | Performed by: INTERNAL MEDICINE

## 2024-03-21 PROCEDURE — G0009 ADMIN PNEUMOCOCCAL VACCINE: HCPCS | Performed by: INTERNAL MEDICINE

## 2024-03-21 PROCEDURE — 83036 HEMOGLOBIN GLYCOSYLATED A1C: CPT | Performed by: INTERNAL MEDICINE

## 2024-03-21 PROCEDURE — RXMED WILLOW AMBULATORY MEDICATION CHARGE

## 2024-03-21 PROCEDURE — 1126F AMNT PAIN NOTED NONE PRSNT: CPT | Performed by: INTERNAL MEDICINE

## 2024-03-21 RX ORDER — IBANDRONATE SODIUM 150 MG/1
150 TABLET, FILM COATED ORAL
Qty: 3 TABLET | Refills: 3 | Status: SHIPPED | OUTPATIENT
Start: 2024-03-21 | End: 2025-03-21

## 2024-03-21 RX ORDER — CHOLESTYRAMINE 4 G/9G
1 POWDER, FOR SUSPENSION ORAL
Qty: 60 PACKET | Refills: 3 | Status: SHIPPED | OUTPATIENT
Start: 2024-03-21 | End: 2024-07-19

## 2024-03-21 ASSESSMENT — ENCOUNTER SYMPTOMS
POLYDIPSIA: 0
COUGH: 0
CHILLS: 0
PALPITATIONS: 0
SHORTNESS OF BREATH: 0
FEVER: 0

## 2024-03-21 ASSESSMENT — PAIN SCALES - GENERAL: PAINLEVEL: 0-NO PAIN

## 2024-03-21 NOTE — PROGRESS NOTES
Subjective   Patient ID: Brittny Gordon is a 68 y.o. female who presents for Follow-up (6 month ).    68-year-old female presents today for routine follow-up.  She has a history of osteopenia and osteoporosis chronically since she was in her 40s that she had early onset menopause.  She has been on and off bisphosphonates throughout her life.  She is currently been off of 1 for over 2 years.  We discussed resuming a bisphosphonate for her osteopenia at this time as well as explaining all the other medication options currently available it would be reasonable at this time including Prolia and Evista.  At the conclusion of this discussion we elected to Mirman enrique on bisphosphonate therapy given its long-term benefits and tolerance in the past and a prescription was sent to her preferred pharmacy.    She also has a history of chronic diarrhea reporting it to last for years.  2-3 episodes of bowel movements per day typically with urgency after going and eating a meal.  It is loose stool sometimes watery but always loose stool.  No severe cramping or abdominal pain between meals typically has a warning of cramping at the time of the meal which tells her she is about to have a bowel movement issue.  It is bothersome to the point that she has to plan her day around these things and avoid meals at certain times to prevent issues.  She does live in an area that has exclusively well water.         Review of Systems   Constitutional:  Negative for chills and fever.   Respiratory:  Negative for cough and shortness of breath.    Cardiovascular:  Negative for chest pain and palpitations.   Endocrine: Negative for polydipsia and polyuria.       Objective   /78   Pulse 74   Wt 80.6 kg (177 lb 12.8 oz)   BMI 28.27 kg/m²     Physical Exam  Constitutional:       Appearance: Normal appearance.   HENT:      Head: Normocephalic and atraumatic.   Eyes:      Extraocular Movements: Extraocular movements intact.      Pupils:  Pupils are equal, round, and reactive to light.   Neck:      Thyroid: No thyroid mass or thyromegaly.      Vascular: No carotid bruit.   Cardiovascular:      Rate and Rhythm: Normal rate and regular rhythm.      Heart sounds: No murmur heard.     No friction rub. No gallop.   Pulmonary:      Effort: No respiratory distress.      Breath sounds: No wheezing, rhonchi or rales.   Musculoskeletal:      Cervical back: Neck supple.      Right lower leg: No edema.      Left lower leg: No edema.   Lymphadenopathy:      Cervical: No cervical adenopathy.   Neurological:      Mental Status: She is alert.         Assessment/Plan   Problem List Items Addressed This Visit             ICD-10-CM    Hypertension I10     Other Visit Diagnoses         Codes    Osteopenia of multiple sites    -  Primary M85.89    Relevant Medications    ibandronate (Boniva) 150 mg tablet    Chronic diarrhea     K52.9    Relevant Medications    cholestyramine (Questran) 4 gram packet    Other Relevant Orders    Ova/Para + Giardia/Cryptosporidium Antigen    Lactoferrin, Fecal, Quantitative    Pancreatic Elastase, Fecal    Stool Pathogen Panel, PCR    Referral to Gastroenterology    Screening for diabetes mellitus     Z13.1    Hyperglycemia     R73.9    Relevant Orders    POCT glycosylated hemoglobin (Hb A1C) manually resulted (Completed)    Need for pneumococcal 20-valent conjugate vaccination     Z23    Relevant Orders    Pneumococcal conjugate vaccine, 20-valent (PREVNAR 20)    Need for RSV immunization     Z29.11    Relevant Medications    respiratory syncytial virus, RSV, vaccine, adjuvanted, age 60y+ (Arexvy) 120 mcg/0.5 mL suspension for reconstitution    Bunion of right foot     M21.611    Relevant Orders    Referral to Podiatry    Diarrhea in adult patient     R19.7    Relevant Orders    Stool Pathogen Panel, PCR

## 2024-03-28 ENCOUNTER — LAB (OUTPATIENT)
Dept: LAB | Facility: LAB | Age: 69
End: 2024-03-28
Payer: MEDICARE

## 2024-03-28 DIAGNOSIS — K52.9 CHRONIC DIARRHEA: ICD-10-CM

## 2024-03-28 PROCEDURE — 83630 LACTOFERRIN FECAL (QUAL): CPT

## 2024-03-28 PROCEDURE — 87328 CRYPTOSPORIDIUM AG IA: CPT

## 2024-03-28 PROCEDURE — 82653 EL-1 FECAL QUANTITATIVE: CPT

## 2024-03-28 PROCEDURE — 87329 GIARDIA AG IA: CPT

## 2024-03-28 PROCEDURE — 87506 IADNA-DNA/RNA PROBE TQ 6-11: CPT

## 2024-03-29 LAB

## 2024-03-30 LAB — LACTOFERRIN STL QL IA: NEGATIVE

## 2024-03-31 LAB
CRYPTOSP AG STL QL IA: NEGATIVE
G LAMBLIA AG STL QL IA: NEGATIVE

## 2024-04-01 LAB
ELASTASE PANC STL-MCNT: >800 UG/G
O+P STL MICRO: NEGATIVE

## 2024-04-02 ENCOUNTER — TELEPHONE (OUTPATIENT)
Dept: PRIMARY CARE | Facility: CLINIC | Age: 69
End: 2024-04-02
Payer: MEDICARE

## 2024-04-02 NOTE — TELEPHONE ENCOUNTER
----- Message from Jose Rafael Hart MD sent at 4/1/2024  4:32 PM EDT -----  Stool testing is normal in all regards.  Foot  Is Dacia Adams

## 2024-04-24 ENCOUNTER — HOSPITAL ENCOUNTER (OUTPATIENT)
Dept: RADIOLOGY | Facility: CLINIC | Age: 69
Discharge: HOME | End: 2024-04-24
Payer: MEDICARE

## 2024-04-24 ENCOUNTER — OFFICE VISIT (OUTPATIENT)
Dept: PODIATRY | Facility: CLINIC | Age: 69
End: 2024-04-24
Payer: MEDICARE

## 2024-04-24 DIAGNOSIS — M21.611 BUNION OF RIGHT FOOT: ICD-10-CM

## 2024-04-24 DIAGNOSIS — M79.672 FOOT PAIN, BILATERAL: ICD-10-CM

## 2024-04-24 DIAGNOSIS — M79.671 FOOT PAIN, BILATERAL: ICD-10-CM

## 2024-04-24 DIAGNOSIS — M21.611 BUNION OF RIGHT FOOT: Primary | ICD-10-CM

## 2024-04-24 DIAGNOSIS — M19.079 1ST MTP ARTHRITIS: ICD-10-CM

## 2024-04-24 PROCEDURE — 73630 X-RAY EXAM OF FOOT: CPT | Mod: 50

## 2024-04-24 PROCEDURE — 1159F MED LIST DOCD IN RCRD: CPT | Performed by: PODIATRIST

## 2024-04-24 PROCEDURE — 73630 X-RAY EXAM OF FOOT: CPT | Mod: BILATERAL PROCEDURE | Performed by: RADIOLOGY

## 2024-04-24 PROCEDURE — 1160F RVW MEDS BY RX/DR IN RCRD: CPT | Performed by: PODIATRIST

## 2024-04-24 PROCEDURE — 99204 OFFICE O/P NEW MOD 45 MIN: CPT | Performed by: PODIATRIST

## 2024-04-24 PROCEDURE — 1036F TOBACCO NON-USER: CPT | Performed by: PODIATRIST

## 2024-04-24 NOTE — PROGRESS NOTES
History of Present Illness:   Patient states they are here for foot exam  Has bunions  Would like to consider surgery  Denies trauma  No other pedal complaints        Past Medical History  Past Medical History:   Diagnosis Date    Wedge compression fracture of t11-T12 vertebra, sequela 07/30/2020    Compression fracture of T11 vertebra, sequela       Medications and Allergies have been reviewed.    Review Of Systems:  GENERAL: No weight loss, malaise or fevers.  HEENT: Negative for frequent or significant headaches,   RESPIRATORY: Negative for cough, wheezing or shortness of breath.  CARDIOVASCULAR: Negative for chest pain, leg swelling or palpitations.    Examination of Both Lower Extremities:   Objective:   Vasc: DP and PT pulses are palpable bilateral.  CFT is less than 3 seconds bilateral.  Skin temperature is warm to cool proximal to distal bilateral.      Neuro: Vibratory, light touch and proprioception are intact bilateral.      Derm: Nails 1-5 bilateral are intact.  Skin is supple with normal texture and turgor noted.  Webspaces are clean, dry and intact bilateral.  There are no hyperkeratoses, ulcerations, verruca or other lesions noted.      Ortho: Muscle strength is 5/5 for all pedal groups tested.  Lateral deviation of 1st MPTJ. Decreased ROM 1st MTPJ. NO edema, erythema or ecchymosis noted  1. 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 Foot pain, bilateral  Custom Orthotics    Referral to Orthopaedic Surgery    XR foot 3+ views bilateral        Patient exam and eval  Discussed cons and surgical options  Pt wants to consider surgery  Will place W B xrays in system  Refer to [facility]  Did recommend stiff shoes, shoes that do not twist or bend  Recommend powersteps  Gave rx for custom inserts, list of  vendors  Ok to fu prn  Patient was in agreement to this plan. All questions answered.      Dacia Adams DPM  [phone]  Option 2  Fax: [phone]

## 2024-05-31 ENCOUNTER — APPOINTMENT (OUTPATIENT)
Dept: RADIOLOGY | Facility: HOSPITAL | Age: 69
End: 2024-05-31
Payer: MEDICARE

## 2024-06-07 ENCOUNTER — HOSPITAL ENCOUNTER (OUTPATIENT)
Dept: RADIOLOGY | Facility: HOSPITAL | Age: 69
Discharge: HOME | End: 2024-06-07
Payer: MEDICARE

## 2024-06-07 DIAGNOSIS — R91.1 SOLITARY PULMONARY NODULE: ICD-10-CM

## 2024-06-07 PROCEDURE — 71250 CT THORAX DX C-: CPT | Performed by: STUDENT IN AN ORGANIZED HEALTH CARE EDUCATION/TRAINING PROGRAM

## 2024-06-07 PROCEDURE — 71250 CT THORAX DX C-: CPT

## 2024-06-11 ENCOUNTER — APPOINTMENT (OUTPATIENT)
Dept: SURGERY | Facility: CLINIC | Age: 69
End: 2024-06-11
Payer: MEDICARE

## 2024-06-18 ENCOUNTER — APPOINTMENT (OUTPATIENT)
Dept: CARDIAC SURGERY | Facility: CLINIC | Age: 69
End: 2024-06-18
Payer: MEDICARE

## 2024-06-18 ENCOUNTER — OFFICE VISIT (OUTPATIENT)
Dept: CARDIOLOGY | Facility: HOSPITAL | Age: 69
End: 2024-06-18
Payer: MEDICARE

## 2024-06-18 VITALS
OXYGEN SATURATION: 95 % | BODY MASS INDEX: 27.14 KG/M2 | HEIGHT: 67 IN | WEIGHT: 172.9 LBS | DIASTOLIC BLOOD PRESSURE: 70 MMHG | HEART RATE: 72 BPM | SYSTOLIC BLOOD PRESSURE: 130 MMHG

## 2024-06-18 DIAGNOSIS — Z95.828 HISTORY OF ASCENDING AORTIC REPLACEMENT: ICD-10-CM

## 2024-06-18 DIAGNOSIS — I25.10 ATHEROSCLEROSIS OF CORONARY ARTERY, UNSPECIFIED VESSEL OR LESION TYPE, UNSPECIFIED WHETHER ANGINA PRESENT, UNSPECIFIED WHETHER NATIVE OR TRANSPLANTED HEART: Primary | ICD-10-CM

## 2024-06-18 DIAGNOSIS — E78.5 HYPERLIPIDEMIA, UNSPECIFIED HYPERLIPIDEMIA TYPE: ICD-10-CM

## 2024-06-18 DIAGNOSIS — I10 PRIMARY HYPERTENSION: ICD-10-CM

## 2024-06-18 LAB
ATRIAL RATE: 72 BPM
P AXIS: 17 DEGREES
P OFFSET: 196 MS
P ONSET: 128 MS
PR INTERVAL: 192 MS
Q ONSET: 224 MS
QRS COUNT: 11 BEATS
QRS DURATION: 80 MS
QT INTERVAL: 402 MS
QTC CALCULATION(BAZETT): 440 MS
QTC FREDERICIA: 427 MS
R AXIS: 12 DEGREES
T AXIS: 67 DEGREES
T OFFSET: 425 MS
VENTRICULAR RATE: 72 BPM

## 2024-06-18 PROCEDURE — 93005 ELECTROCARDIOGRAM TRACING: CPT | Performed by: NURSE PRACTITIONER

## 2024-06-18 PROCEDURE — 99214 OFFICE O/P EST MOD 30 MIN: CPT | Performed by: NURSE PRACTITIONER

## 2024-06-18 PROCEDURE — 1160F RVW MEDS BY RX/DR IN RCRD: CPT | Performed by: NURSE PRACTITIONER

## 2024-06-18 PROCEDURE — 3078F DIAST BP <80 MM HG: CPT | Performed by: NURSE PRACTITIONER

## 2024-06-18 PROCEDURE — 3075F SYST BP GE 130 - 139MM HG: CPT | Performed by: NURSE PRACTITIONER

## 2024-06-18 PROCEDURE — G2211 COMPLEX E/M VISIT ADD ON: HCPCS | Performed by: NURSE PRACTITIONER

## 2024-06-18 PROCEDURE — 1036F TOBACCO NON-USER: CPT | Performed by: NURSE PRACTITIONER

## 2024-06-18 PROCEDURE — 1159F MED LIST DOCD IN RCRD: CPT | Performed by: NURSE PRACTITIONER

## 2024-06-18 RX ORDER — EZETIMIBE 10 MG/1
10 TABLET ORAL DAILY
Qty: 90 TABLET | Refills: 3 | Status: SHIPPED | OUTPATIENT
Start: 2024-06-18 | End: 2025-06-18

## 2024-06-18 ASSESSMENT — ENCOUNTER SYMPTOMS
DEPRESSION: 0
OCCASIONAL FEELINGS OF UNSTEADINESS: 0
LOSS OF SENSATION IN FEET: 0

## 2024-06-18 NOTE — PATIENT INSTRUCTIONS
Start Ezetimibe 10 mg once a day  Continue all other cardiovascular medications  Check blood work in 3 months fasting lipid panel  Continue physical activity  Follow up in February with Dr. Buckley

## 2024-06-18 NOTE — PROGRESS NOTES
Primary Care Physician: Jose Rafael Hart MD  Date of Visit: 06/18/2024  2:30 PM EDT  Location of visit: [facility]     Chief Complaint:   Chief Complaint   Patient presents with    Follow-up        HPI / Summary:   Brittny Gordon is a 68 y.o. female presents for followup. Seen in collaboration with Dr. Buckley. She has no particular complaints. She has been working in her garden and more physically active over the past 2 months without chest pain or dyspnea. She had not been active since Friday due to back pain. She has not further chest pain. Denies chest pain, dyspnea, orthopnea, pnd, lightheadedness, dizziness, syncope, palpitations, lower extremity edema, or bleeding issues.                Past Medical History:  Past Medical History:   Diagnosis Date    Wedge compression fracture of t11-T12 vertebra, sequela 07/30/2020    Compression fracture of T11 vertebra, sequela        Past Surgical History:  Past Surgical History:   Procedure Laterality Date    OTHER SURGICAL HISTORY  01/28/2013    Thoracic aortic aneurysm repair    OTHER SURGICAL HISTORY  02/23/2021    Colonoscopy    OTHER SURGICAL HISTORY  07/16/2020    [place] tooth extraction          Social History:   reports that she quit smoking about 11 years ago. Her smoking use included cigarettes. She has never used smokeless tobacco. She reports current alcohol use. She reports that she does not use drugs.     Family History:  family history includes Lymphoma in her father; Polycythemia in her father.      Allergies:  Allergies   Allergen Reactions    Epinephrine Nausea/vomiting and Palpitations       Outpatient Medications:  Current Outpatient Medications   Medication Instructions    atorvastatin (Lipitor) 80 mg tablet Take 1 tablet (80 mg) by mouth once daily.    cholestyramine (Questran) 4 gram packet 4 g, oral, 2 times daily before meals    ibandronate (BONIVA) 150 mg, oral, Every 30 days, Take in morning with full glass of water on an empty stomach.  "No food, drink, meds, or lying down for 60 minutes after.    losartan (COZAAR) 100 mg, oral, Daily    metoprolol succinate XL (TOPROL-XL) 100 mg, oral, Daily       Physical Exam:  Vitals:    06/18/24 1431   BP: (!) 157/100   BP Location: Left arm   Pulse: 72   SpO2: 95%   Weight: 78.4 kg (172 lb 14.4 oz)   Height: 1.702 m (5' 7\")     Wt Readings from Last 5 Encounters:   06/18/24 78.4 kg (172 lb 14.4 oz)   03/21/24 80.6 kg (177 lb 12.8 oz)   12/19/23 81.6 kg (180 lb)   09/21/23 77.1 kg (170 lb)   06/20/23 75.3 kg (166 lb)     Body mass index is 27.08 kg/m².     GENERAL: alert, cooperative, pleasant, in no acute distress  SKIN: warm and dry  NECK: Normal JVD, negative HJR  CARDIAC: Regular rate and rhythm with no rubs, murmurs, or gallops  CHEST: Normal respiratory efforts, lungs clear to auscultation bilaterally.  ABDOMEN: soft, nontender, nondistended  EXTREMITIES: no edema, +2 palpable RP and DP pulses bilaterally       Last Labs:  Recent Labs     09/27/23  1052 11/08/21  1517 08/14/20  0916   WBC 4.7 7.7 6.6   HGB 13.7 14.0 14.2   HCT 42.4 43.5 43.8    222 212   MCV 93 95 92     Recent Labs     09/27/23  1052 09/01/22  1136 03/10/22  0959    137 141   K 4.0 3.6 4.0    101 104   BUN 13 14 14   CREATININE 0.64 0.65 0.65     CMP -  Lab Results   Component Value Date    CALCIUM 8.6 09/27/2023    PROT 6.2 (L) 09/27/2023    ALBUMIN 3.9 09/27/2023    AST 22 09/27/2023    ALT 25 09/27/2023    ALKPHOS 48 09/27/2023    BILITOT 0.7 09/27/2023       LIPID PANEL -   Lab Results   Component Value Date    CHOL 163 03/11/2024    HDL 63.4 03/11/2024    LDLF 91 09/27/2023    TRIG 153 (H) 03/11/2024   LDL 69 3/11/24    Lab Results   Component Value Date    HGBA1C 5.6 03/21/2024       Last Cardiology Tests:  ECG:  Obtained and reviewed EKG normal sinus rhythm HR 72, possible prior anterior infarct    Nuclear exercise stress test 1/8/24  Summary:   1. Adequate level of stress achieved.   2. No electrocardiographic " evidence for ischemia at a maximal workload.   3. Nuclear image results are reported separately.    IMPRESSION:  1.  Negative myocardial perfusion study without evidence of inducible  myocardial ischemia or prior infarction.  2. The left ventricle is normal in size.  3. Normal LV wall motion with a post-stress LV EF estimated greater  than 65%.    Stress test: 2/6/ 2020  IMPRESSION:  * Normal stress myocardial perfusion imaging without definite  evidence of ischemia or prior infarct.  *Well-maintained left ventricular function with an ejection fraction  of greater than 65%.  *Normal left ventricular size.    CT of chest 6/7/24  Narrative & Impression   Interpreted By:  Marshal Dorman,   STUDY:  CT CHEST WO IV CONTRAST;  6/7/2024 2:03 pm      INDICATION:  Follow-up lung nodules.      COMPARISON:  CT chest 05/15/2023.      ACCESSION NUMBER(S):  [id number]      ORDERING CLINICIAN:  ELTON DOUGLAS      TECHNIQUE:  Helical data acquisition of the chest was obtained  without IV  contrast material.  Images were reformatted in axial, coronal, and  sagittal planes.      FINDINGS:  LUNGS AND AIRWAYS:  The trachea and central airways are patent. No endobronchial lesion.      Interval increase in size of a spiculated nodule in the right upper  lobe posteriorly measuring 1.1 x 0.7 cm (image 78 of series 205),  previously 0.8 x 0.5 cm on CT 05/15/2023, and less than 0.5 cm in  both dimensions on more remote priors.      There are multiple new pulmonary nodules measuring 0.4 cm or smaller  within both lower lobes. Most notably this includes:      A 0.3 cm nodule in the left lower lobe posterolaterally is new  compared to prior CT (image 156). A 0.4 cm nodule in the left lower  lobe posteromedially (image 137).  0.4 cm nodule in the left lower lobe (image 222).  0.4 cm nodule in the right lower lobe posteromedially (image 211).  A 0.4 cm nodule in the right lower lobe near the major fissure (image  123).      A 1.4 x 1.4  cm subpleural nodule in the left upper lobe  apicoposterior segment is similar compared to prior CT (image 68),  and did not demonstrate suspicious features on FDG PET-CT in 2019.      No focal consolidation, pleural effusion, or pneumothorax.      MEDIASTINUM AND ZEN, LOWER NECK AND AXILLA:  The visualized thyroid gland is within normal limits.      No evidence of thoracic lymphadenopathy by CT criteria. Multiple  prominent yet non-enlarged mediastinal lymph nodes are similar  compared to prior studies, most likely benign/reactive.      Esophagus appears within normal limits as seen.      HEART AND VESSELS:  Patient is status post replacement of the ascending aorta. Similar  dilatation the distal ascending/proximal arch measuring up to 4.5 cm  (see saved PACS image). Remainder of the aortic arch and proximal to  mid descending thoracic aorta are ectatic and demonstrate severe  atherosclerotic calcifications.      Main pulmonary artery and its branches are normal in caliber.      Mild coronary artery calcifications are seen. The study is not  optimized for evaluation of coronary arteries.      The cardiac chambers are not enlarged. Similar calcifications within  the right atrial appendage compared to multiple prior studies.      No evidence of pericardial effusion.      UPPER ABDOMEN:  The visualized subdiaphragmatic structures demonstrate no remarkable  findings.      CHEST WALL AND OSSEOUS STRUCTURES:  Status post median sternotomy. No suspicious osseous lesions or acute  osseous abnormality. Similar mild height loss of the T7 vertebral  body centrally, T11 vertebral body anteriorly, T12 vertebral body  centrally, and L1 vertebral body anteriorly compared to prior studies  which is either degenerative or sequela of prior remote compression  fracture.      IMPRESSION:  1.  Interval increase in size of a spiculated 1.1 cm nodule in the  right upper lobe, concerning for primary lung cancer. Further  evaluation with  FDG PET-CT or tissue sampling is recommended.  2. Multiple new nodules in both lower lobes measuring 0.4 cm or  smaller, nonspecific and possibly benign given multiplicity although  with metastatic disease not excluded. Attention on follow-up studies  is recommended.  3. Status post replacement of the proximal ascending aorta. Similar  dilatation of the distal ascending/proximal arch measuring up to 4.5  cm.  4. Multiple additional chronic/incidental findings are similar  compared to prior studies as detailed above.       Assessment/Plan   Brittny was seen today for follow-up.  Diagnoses and all orders for this visit:  Atherosclerosis of coronary artery, unspecified vessel or lesion type, unspecified whether angina present, unspecified whether native or transplanted heart (Primary)  -     ECG 12 Lead  -     ezetimibe (Zetia) 10 mg tablet; Take 1 tablet (10 mg) by mouth once daily.  -     Lipid Panel; Future  Hyperlipidemia, unspecified hyperlipidemia type  -     ezetimibe (Zetia) 10 mg tablet; Take 1 tablet (10 mg) by mouth once daily.  -     Lipid Panel; Future  Primary hypertension  History of ascending aortic replacement      In summary Ms. Gordon is a pleasant 68 year-old white female with a past medical history significant for ascending aortic aneurysm status post replacement of her ascending aorta with resuspension of her aortic valve in 2012 with residual mild aortic root dilation and stable arch aneurysm, hypertension, hyperlipidemia, mild coronary atherosclerosis on CT, and prior tobacco use. No further chest pain. She had been physically active working in her garden without chest pain or dyspnea. Her blood pressure is acceptable. Given her severe atherosclerosis of aorta and mild coronary atherosclerosis I did add Ezetimibe 10 mg once a day. I have ordered lipid panel in 3 months. I have encouraged her to continue physical activity once back pain improves. She will follow up with thoracic surgery regarding  CT results next week. She will continue current cardiovascular medications. She will follow up in 6 months.             Orders:  Orders Placed This Encounter   Procedures    ECG 12 Lead      Followup Appts:  Future Appointments   Date Time Provider Department Center   6/25/2024 11:30 AM Tommy Hammonds MD [id number] [facility]   6/27/2024  1:00 PM Emerald Carter MD PhD [id number] [facility]   8/13/2024  1:40 PM Jose Rafael Block DO [id number] [facility]           ____________________________________________________________  BOWEN Bhardwaj-CNP  [facility]  [facility]

## 2024-06-25 ENCOUNTER — OFFICE VISIT (OUTPATIENT)
Dept: CARDIAC SURGERY | Facility: CLINIC | Age: 69
End: 2024-06-25
Payer: MEDICARE

## 2024-06-25 VITALS
OXYGEN SATURATION: 95 % | HEART RATE: 66 BPM | BODY MASS INDEX: 27.36 KG/M2 | RESPIRATION RATE: 18 BRPM | SYSTOLIC BLOOD PRESSURE: 165 MMHG | DIASTOLIC BLOOD PRESSURE: 96 MMHG | WEIGHT: 174.31 LBS | HEIGHT: 67 IN

## 2024-06-25 DIAGNOSIS — D38.1 NEOPLASM OF UNCERTAIN BEHAVIOR OF LUNG: ICD-10-CM

## 2024-06-25 DIAGNOSIS — R91.8 LUNG NODULES: Primary | ICD-10-CM

## 2024-06-25 PROCEDURE — 1125F AMNT PAIN NOTED PAIN PRSNT: CPT | Performed by: THORACIC SURGERY (CARDIOTHORACIC VASCULAR SURGERY)

## 2024-06-25 PROCEDURE — 1036F TOBACCO NON-USER: CPT | Performed by: THORACIC SURGERY (CARDIOTHORACIC VASCULAR SURGERY)

## 2024-06-25 PROCEDURE — 1159F MED LIST DOCD IN RCRD: CPT | Performed by: THORACIC SURGERY (CARDIOTHORACIC VASCULAR SURGERY)

## 2024-06-25 PROCEDURE — 3080F DIAST BP >= 90 MM HG: CPT | Performed by: THORACIC SURGERY (CARDIOTHORACIC VASCULAR SURGERY)

## 2024-06-25 PROCEDURE — 99215 OFFICE O/P EST HI 40 MIN: CPT | Performed by: THORACIC SURGERY (CARDIOTHORACIC VASCULAR SURGERY)

## 2024-06-25 PROCEDURE — 3077F SYST BP >= 140 MM HG: CPT | Performed by: THORACIC SURGERY (CARDIOTHORACIC VASCULAR SURGERY)

## 2024-06-25 ASSESSMENT — ENCOUNTER SYMPTOMS
ABDOMINAL PAIN: 0
DIAPHORESIS: 0
CONSTIPATION: 0
ENDOCRINE NEGATIVE: 1
CHILLS: 0
CHEST TIGHTNESS: 0
PSYCHIATRIC NEGATIVE: 1
MUSCULOSKELETAL NEGATIVE: 1
FEVER: 0
LOSS OF SENSATION IN FEET: 0
NAUSEA: 0
CHOKING: 0
OCCASIONAL FEELINGS OF UNSTEADINESS: 0
COUGH: 0
VOMITING: 0
DEPRESSION: 0
SHORTNESS OF BREATH: 0
FATIGUE: 0
UNEXPECTED WEIGHT CHANGE: 0
DIARRHEA: 0
EYES NEGATIVE: 1
WHEEZING: 0
STRIDOR: 0
ABDOMINAL DISTENTION: 0
HEMATOLOGIC/LYMPHATIC NEGATIVE: 1
NEUROLOGICAL NEGATIVE: 1
ALLERGIC/IMMUNOLOGIC NEGATIVE: 1
PALPITATIONS: 0
APPETITE CHANGE: 0

## 2024-06-25 ASSESSMENT — LIFESTYLE VARIABLES
HOW MANY STANDARD DRINKS CONTAINING ALCOHOL DO YOU HAVE ON A TYPICAL DAY: 1 OR 2
HOW OFTEN DO YOU HAVE SIX OR MORE DRINKS ON ONE OCCASION: NEVER
SKIP TO QUESTIONS 9-10: 1
HOW OFTEN DO YOU HAVE A DRINK CONTAINING ALCOHOL: MONTHLY OR LESS
AUDIT-C TOTAL SCORE: 1

## 2024-06-25 ASSESSMENT — PATIENT HEALTH QUESTIONNAIRE - PHQ9
1. LITTLE INTEREST OR PLEASURE IN DOING THINGS: NOT AT ALL
2. FEELING DOWN, DEPRESSED OR HOPELESS: NOT AT ALL
SUM OF ALL RESPONSES TO PHQ9 QUESTIONS 1 AND 2: 0

## 2024-06-25 ASSESSMENT — PAIN SCALES - GENERAL: PAINLEVEL: 4

## 2024-06-25 NOTE — PROGRESS NOTES
Subjective   Patient ID: Brittny Gordon is a 68 y.o. female who presents for Follow-up (12 month).  HPI    Ms. Brittny Gordon is a 64 year old female referred by Dr. Gayle for a left upper lobe lung nodule.  She states she felt something in her left chest in the spring of . She went with her cardiologist in 2018 and obtain a chest CT that showed the left upper lobe lung nodule. I bronchoscopy and transbronchial biopsy were performed upper negative for malignancy at [facility]. She consulted Dr. Ursula Gayle [facility]. She ordered a chest CT was performed on 2019 that shows a left upper lobe lung nodule that measures 0.9 cm that is right next to the thoracic aorta. I can see another apical 4 mm round nodule in the left upper lobe. A PET/CT was ordered and performed on 2019 that showed mild hypermetabolic uptake with an SUV of 1.4 in the left upper lobe lung nodule. There is no evidence of abnormal hypermetabolic uptake anywhere else. Pulmonary function tests from 2019 shows an FVC of 3.11 L, and an FEV1 of 2.36 L we do DLCL with 98%. She is here for evaluation of the left upper lobe lung nodule. At the moment she is inserted asymptomatic.  Her sister  of an aortic dissection rupture. She smoked from 17 to 51 years of age, half-pack per day. Past medical history in  she underwent an trans-sternal aortic aneurysm trans-sternal repair. She has no allergies no transfusions and no other surgical history.  Today I reviewed her chest CT performed 2020 that shows a stable URMILA lung nodule and postop changes after the aortic arch repair. Stable calcifications.  I will see her for follow up in 6 months with a chest CT to assess stability.      Follow-up appointment 2021  She is asymptomatic. Her chest CT performed 2021 shows a stable appearance compared to her previous imaging. She has a stable 11 x 14 mm left upper lobe nodule  adjacent to the aorta.   I will see her for follow-up in 6 months with a chest CT.      Follow up appointment 8/17/21  She is again asymptomatic. Her chest CT performed 8/3/21 shows a stable appearance compared to her previous imaging. She has a stable 11 x 14 mm left upper lobe nodule adjacent to the aorta.   I will see her for follow-up in 6 months with a chest CT. She has no additional concerns today.      Follow-up appointment February 15, 2022  She is still asymptomatic.  Her chest CT performed February 8, 2022 and compared to prior on August 3, 2021 shows a stable 14 mm left upper lobe lung nodule. I will see her for follow-up in 9 months with a chest CT.     Update 11/8/2022  She has been doing well no issues. CT scan showing stable lung nodule. We will meet again in 6 months with CT chest.     Update 5/30/2023  She has been doing well CT scan showing stable lung nodules. They have been stable over time so we will move to a CAT scan every year.     Update 6/25/2024  Doing well no big issues.  Her new CT scan shows stable nodules but 1 nodule in the right upper lobe increasing in size.  I discussion with her about the options.  I think due to this change in the nodule obtaining a PET scan will help us characterize it better.  I will see her again with results.    Review of Systems   Constitutional:  Negative for appetite change, chills, diaphoresis, fatigue, fever and unexpected weight change.   HENT: Negative.     Eyes: Negative.    Respiratory:  Negative for cough, choking, chest tightness, shortness of breath, wheezing and stridor.    Cardiovascular:  Negative for chest pain, palpitations and leg swelling.   Gastrointestinal:  Negative for abdominal distention, abdominal pain, constipation, diarrhea, nausea and vomiting.   Endocrine: Negative.    Genitourinary: Negative.    Musculoskeletal: Negative.    Skin: Negative.    Allergic/Immunologic: Negative.    Neurological: Negative.    Hematological: Negative.     Psychiatric/Behavioral: Negative.     All other systems reviewed and are negative.      Objective   Physical Exam  Constitutional:       Appearance: Normal appearance.   HENT:      Head: Normocephalic and atraumatic.      Nose: Nose normal.      Mouth/Throat:      Mouth: Mucous membranes are moist.      Pharynx: Oropharynx is clear.   Eyes:      Extraocular Movements: Extraocular movements intact.      Conjunctiva/sclera: Conjunctivae normal.      Pupils: Pupils are equal, round, and reactive to light.   Cardiovascular:      Rate and Rhythm: Normal rate and regular rhythm.      Pulses: Normal pulses.      Heart sounds: Normal heart sounds.   Pulmonary:      Effort: Pulmonary effort is normal. No respiratory distress.      Breath sounds: Normal breath sounds. No stridor. No wheezing, rhonchi or rales.   Chest:      Chest wall: No tenderness.   Abdominal:      General: Abdomen is flat. Bowel sounds are normal.      Palpations: Abdomen is soft.   Musculoskeletal:         General: Normal range of motion.      Cervical back: Normal range of motion and neck supple.   Skin:     General: Skin is warm and dry.      Capillary Refill: Capillary refill takes less than 2 seconds.   Neurological:      General: No focal deficit present.      Mental Status: She is alert and oriented to person, place, and time.         Assessment/Plan   Diagnoses and all orders for this visit:  Lung nodules  Obtain PET scan follow-up with results.       Tommy Hammonds MD 06/25/24 12:05 PM

## 2024-06-27 ENCOUNTER — APPOINTMENT (OUTPATIENT)
Dept: PAIN MEDICINE | Facility: CLINIC | Age: 69
End: 2024-06-27
Payer: MEDICARE

## 2024-06-28 ENCOUNTER — HOSPITAL ENCOUNTER (OUTPATIENT)
Dept: RADIOLOGY | Facility: HOSPITAL | Age: 69
Discharge: HOME | End: 2024-06-28
Payer: MEDICARE

## 2024-06-28 ENCOUNTER — APPOINTMENT (OUTPATIENT)
Dept: PAIN MEDICINE | Facility: HOSPITAL | Age: 69
End: 2024-06-28
Payer: MEDICARE

## 2024-06-28 DIAGNOSIS — M62.838 MUSCLE SPASM: ICD-10-CM

## 2024-06-28 DIAGNOSIS — M47.817 LUMBOSACRAL SPONDYLOSIS WITHOUT MYELOPATHY: Primary | ICD-10-CM

## 2024-06-28 DIAGNOSIS — M47.817 LUMBOSACRAL SPONDYLOSIS WITHOUT MYELOPATHY: ICD-10-CM

## 2024-06-28 PROCEDURE — 99213 OFFICE O/P EST LOW 20 MIN: CPT | Performed by: PAIN MEDICINE

## 2024-06-28 PROCEDURE — 72120 X-RAY BEND ONLY L-S SPINE: CPT

## 2024-06-28 RX ORDER — TIZANIDINE 2 MG/1
2 TABLET ORAL NIGHTLY PRN
Qty: 30 TABLET | Refills: 0 | Status: SHIPPED | OUTPATIENT
Start: 2024-06-28 | End: 2024-07-28

## 2024-06-28 NOTE — PROGRESS NOTES
History Of Present Illness  Brittny Gordon is a 68 y.o. female presenting as a new patient visit for back pain.  Patient was previously a patient of Dr. Carter's most recently seen in clinic in 2022 for L3-L4-L5 RFA.  Patient had gotten 1 year of relief RFA done in 2021, however, patient stated that may have achieved no relief with this most recent RFA.  Patient attempted to treat her pain with at home exercise regimen, which patient states improved her back pain for some time.  However, about 2 weeks ago patient was bending over and noticed a tearing pain to her mid and lower back.  She has attempted to treat this pain with ice and Aleve which provided her with some pain relief.  Patient describes the pain as a sharp pain beginning in her mid and lower back with radiation into her bilateral gluteus. She states that the pain is worse with activity such as walking and that she has difficulty bending over at the waist. She denies any weakness, numbness, or tingling. She states that she has had urinary frequency over the last few weeks with rare incontinence. She denies bowel incontinence or saddle anesthesia. Patient states that she has completed physical therapy in the past and that she diligently follows a home exercise program.     Patient had an MRI lumbar spine in 2021 which demonstrate a posterior disc bulge at L2-L3 with mild bilateral neural foraminal narrowing at L2-L3 and L3-L4 without spinal canal stenosis. There is also varying levels of bilateral facet arthrosis at L2-L3 and L3-L5.      Past Medical History  Past Medical History:   Diagnosis Date    [place] compression fracture of t11-T12 vertebra, sequela 07/30/2020    Compression fracture of T11 vertebra, sequela       Surgical History  Past Surgical History:   Procedure Laterality Date    OTHER SURGICAL HISTORY  01/28/2013    Thoracic aortic aneurysm repair    OTHER SURGICAL HISTORY  02/23/2021    Colonoscopy    OTHER SURGICAL HISTORY  07/16/2020     [place] tooth extraction        Social History  She reports that she quit smoking about 11 years ago. Her smoking use included cigarettes. She has never used smokeless tobacco. She reports current alcohol use. She reports that she does not use drugs.    Family History  Family History   Problem Relation Name Age of Onset    Lymphoma Father      Polycythemia Father      Breast cancer Neg Hx      Colon cancer Neg Hx          Allergies  Epinephrine    Review of Systems   13 point ROS done and negative except for the above.   Physical Exam    PHYSICAL EXAM  Vitals signs reviewed  Constitutional:    General: Not in acute distress   Appearance: Normal appearance. Not ill-appearing.  HENT:   Head: Normocephalic and atraumatic  Eyes:   Conjunctiva/sclera normal  Cardiovascular:  No jugular venous distention bilaterally  No gross edema in lower extremities  Pulmonary:   Effort: No respiratory distress  Abdominal:  Abdomen appears nondistended  Musculoskeletal:   Moves all extremities equally. Negative bilateral straight leg raise. Negative Gaenslen's and thigh thrust bilaterally. Positive LEISA's bilaterally. Unable to reproduce pain upon palpation of paraspinal muscles or over SI joints bilaterally.  Skin:   General: Skin is warm and dry  Neurological:   General: No focal deficit present  Psychiatric:    Mood and Affect: Mood normal    Behavior: Behavior normal    Last Recorded Vitals  There were no vitals taken for this visit.    Relevant Results        ASSESSMENT:  Assessment/Plan   Diagnoses and all orders for this visit:  Muscle spasm  Lumbosacral spondylosis without myelopathy  -     XR lumbar spine 4+ views w flexion extension; Future      Brittny Gordon is a 68 y.o. female presenting as a new patient visit for back pain.  Patient's primary complaint is back pain throughout the lumbar spine with radiation into the gluteus. MRI lumbar spine in 2021 demonstrated a posterior disc bulge at L2-L3 with mild bilateral neural  "foraminal narrowing at L2-L3 and L3-L4 without spinal canal stenosis. There is also varying levels of bilateral facet arthrosis at L2-L3 and L3-L5. Physical exam was negative for signs of lumbosacral nerve root irritation, sacroiliitis, or facet joint pain. Given these findings and the \"tearing\" pain that came after flexing her back, patient's pain is likely myofascial in nature. Recommend that patient continue taking aleve, but will also prescribe Tizanidine for concern for muscle spasm. In addition, given radiation of back pain into gluteus, will order lumbar XR to rule out spondylolisthesis. Patient was instructed to return to clinic if she has any worsening of her pain or new or concerning symptoms.    PLAN:  - Lumbar XR to rule out spondylolisthesis  - Tizanidine 2mg at bedtime  - Follow-up in clinic as needed    The plan was discussed with the patient and they were invited to contact us back anytime with any questions or concerns and follow-up with us in the office as needed.    Hilario Lee MD  Anesthesiology PGY-1      "

## 2024-07-01 DIAGNOSIS — S32.000A LUMBAR COMPRESSION FRACTURE, CLOSED, INITIAL ENCOUNTER (MULTI): ICD-10-CM

## 2024-07-02 ENCOUNTER — HOSPITAL ENCOUNTER (OUTPATIENT)
Dept: RADIOLOGY | Facility: CLINIC | Age: 69
Discharge: HOME | End: 2024-07-02
Payer: MEDICARE

## 2024-07-02 DIAGNOSIS — S32.000A LUMBAR COMPRESSION FRACTURE, CLOSED, INITIAL ENCOUNTER (MULTI): ICD-10-CM

## 2024-07-02 PROCEDURE — 72131 CT LUMBAR SPINE W/O DYE: CPT

## 2024-07-02 PROCEDURE — 72131 CT LUMBAR SPINE W/O DYE: CPT | Performed by: RADIOLOGY

## 2024-07-05 ENCOUNTER — OFFICE VISIT (OUTPATIENT)
Dept: PAIN MEDICINE | Facility: HOSPITAL | Age: 69
End: 2024-07-05
Payer: MEDICARE

## 2024-07-05 DIAGNOSIS — S22.080A COMPRESSION FRACTURE OF T11 VERTEBRA, INITIAL ENCOUNTER (MULTI): ICD-10-CM

## 2024-07-05 DIAGNOSIS — S32.030A CLOSED COMPRESSION FRACTURE OF L3 LUMBAR VERTEBRA, INITIAL ENCOUNTER (MULTI): Primary | ICD-10-CM

## 2024-07-05 PROCEDURE — 99213 OFFICE O/P EST LOW 20 MIN: CPT | Performed by: PAIN MEDICINE

## 2024-07-05 NOTE — PROGRESS NOTES
"Subjective   Patient ID: Brittny Gordon is a 68 y.o. female with history of osteoporosis presents for follow-up visit regarding her CT scan.      HPI:   Pt is a 68 year old female with acute on chronic back pain who has had prior RFA of her lumbar spine (L3-4-5). Acutely, she had a tearing sensation in her back in June and CT scan showed evidence of an acute superior endplate compression fracture at L3.     Pt states her pain is a 6/10, compared to a 9/10 previously, and it hurts when she \"moves\". She has been careful not to bend or lift anything heavy. She has been taking tizanidine nightly and aspirin prn. She still endorses pain that radiates to both buttocks (L > R). She would like formal PT as she has tried it in the past but it was not intensive enough for her and she feels she is ill-equipped to do it on her own.    Her PCP recently restarted her bisphosphonate and she takes Vit D as well.    Physical Therapy: The patient has not done physical therapy within the past six months  Other Conservative Measures she has tried: Heating Pad and Ice  Classes of medications tried in the past: Acetaminophen, NSAIDs, and Muscle Relaxants    Review of Systems   13-point ROS done and negative except for HPI.     Current Outpatient Medications   Medication Instructions    atorvastatin (Lipitor) 80 mg tablet Take 1 tablet (80 mg) by mouth once daily.    cholestyramine (Questran) 4 gram packet 4 g, oral, 2 times daily before meals    ezetimibe (ZETIA) 10 mg, oral, Daily    ibandronate (BONIVA) 150 mg, oral, Every 30 days, Take in morning with full glass of water on an empty stomach. No food, drink, meds, or lying down for 60 minutes after.    losartan (COZAAR) 100 mg, oral, Daily    metoprolol succinate XL (TOPROL-XL) 100 mg, oral, Daily    tiZANidine (ZANAFLEX) 2 mg, oral, Nightly PRN       Past Medical History:   Diagnosis Date    Wedge compression fracture of t11-T12 vertebra, sequela 07/30/2020    Compression fracture " "of T11 vertebra, sequela        Past Surgical History:   Procedure Laterality Date    OTHER SURGICAL HISTORY  01/28/2013    Thoracic aortic aneurysm repair    OTHER SURGICAL HISTORY  02/23/2021    Colonoscopy    OTHER SURGICAL HISTORY  07/16/2020    [place] tooth extraction        Family History   Problem Relation Name Age of Onset    Lymphoma Father      Polycythemia Father      Breast cancer Neg Hx      Colon cancer Neg Hx          Allergies   Allergen Reactions    Epinephrine Nausea/vomiting and Palpitations        Objective     There were no vitals filed for this visit.     Physical Exam  General: NAD, well groomed, well nourished  Eyes: Non-icteric sclera, EOMI  Ears, Nose, Mouth, and Throat: External ears and nose appear to be without deformity or rash. No lesions or masses noted. Hearing is grossly intact.   Neck: Trachea midline  Respiratory: Nonlabored breathing   Cardiovascular: no peripheral edema   Skin: No rashes or open lesions/ulcers identified on skin.    Back:   Palpation: Mild  tenderness to palpation over left lumbar paraspinous muscles.     Cranial nerves grossly intact.   Strength 5/5 and symmetric plantar/dorsiflexion   Sensation: Normal to light touch throughout, pinprick  intact throughout.    Gait: normal  Assistive Devices used: none    Psychiatric: Alert, orientation to person, place, and time. Cooperative.    Imaging personally reviewed and independently interpreted:   CT lumbar spine (7/24): \"Diffusely decreased bone mineralization, mildly limiting assessment. There is suggestion of acute L3 osteoporotic compression fracture  involving the superior endplate with superimposed central Schmorl's node deformity component as well resulting in up to 40-50% vertebral body height loss centrally with trace osseous retropulsion. There is  trace retrolisthesis of L2 on L3 with slight subluxation of the left-greater-than-right facet joints. No evidence of spinal canal stenosis or large epidural " "hematoma.  Additionally, there is age-indeterminate, possibly acute, mild anterior superior endplate compression deformity of L1. Suspected subtle, possibly acute, impaction injury involving the posterior aspect of the superior endplate of L4 with no measurable height loss. Additional chronic osteoporotic appearing compression fractures of  the remainder of the thoracic and lumbar spine as detailed above.\"    Assessment/Plan   68 year old female with PMHx of osteoporosis and chronic back pain here for acute flare of back pain corroborated by CT scan with evidence of an acute compression fracture at L3 and chronic, fan fractures at T11, L1, and L2.    Plan:  Ordered back brace. Pt educated on how to facilitate her healing process, I.e no bending, jumping, heavy lifting   Vit D and Calcium supplementation  Pt to f/up in two to four weeks and obtain f/up x-rays at that time      Follow up:  2-4 weeks    The patient was invited to contact us back anytime with any questions or concerns and follow-up with us in the office as needed.     Diagnoses and all orders for this visit:  Compression fracture of T11 vertebra, initial encounter (Multi)  -     The brace should be worn whenever you are up and out of bed or not supported in a chair.  -     Back brace      This note was generated with the aid of dictation software, there may be typos despite my attempts at proofreading.    "

## 2024-07-11 ENCOUNTER — HOSPITAL ENCOUNTER (OUTPATIENT)
Dept: RADIOLOGY | Facility: HOSPITAL | Age: 69
Discharge: HOME | End: 2024-07-11
Payer: MEDICARE

## 2024-07-11 DIAGNOSIS — D38.1 NEOPLASM OF UNCERTAIN BEHAVIOR OF LUNG: ICD-10-CM

## 2024-07-11 PROCEDURE — 78815 PET IMAGE W/CT SKULL-THIGH: CPT | Mod: PI

## 2024-07-11 PROCEDURE — 3430000001 HC RX 343 DIAGNOSTIC RADIOPHARMACEUTICALS: Performed by: THORACIC SURGERY (CARDIOTHORACIC VASCULAR SURGERY)

## 2024-07-11 PROCEDURE — A9552 F18 FDG: HCPCS | Performed by: THORACIC SURGERY (CARDIOTHORACIC VASCULAR SURGERY)

## 2024-07-11 RX ORDER — FLUDEOXYGLUCOSE F 18 200 MCI/ML
12.3 INJECTION, SOLUTION INTRAVENOUS
Status: COMPLETED | OUTPATIENT
Start: 2024-07-11 | End: 2024-07-11

## 2024-07-16 ENCOUNTER — OFFICE VISIT (OUTPATIENT)
Dept: CARDIAC SURGERY | Facility: CLINIC | Age: 69
End: 2024-07-16
Payer: MEDICARE

## 2024-07-16 VITALS
RESPIRATION RATE: 18 BRPM | DIASTOLIC BLOOD PRESSURE: 96 MMHG | BODY MASS INDEX: 27.31 KG/M2 | OXYGEN SATURATION: 97 % | HEIGHT: 67 IN | HEART RATE: 80 BPM | SYSTOLIC BLOOD PRESSURE: 166 MMHG | WEIGHT: 174 LBS

## 2024-07-16 DIAGNOSIS — R91.1 LUNG NODULE: ICD-10-CM

## 2024-07-16 DIAGNOSIS — R91.1 LUNG NODULE: Primary | ICD-10-CM

## 2024-07-16 PROCEDURE — 3077F SYST BP >= 140 MM HG: CPT | Performed by: THORACIC SURGERY (CARDIOTHORACIC VASCULAR SURGERY)

## 2024-07-16 PROCEDURE — 1036F TOBACCO NON-USER: CPT | Performed by: THORACIC SURGERY (CARDIOTHORACIC VASCULAR SURGERY)

## 2024-07-16 PROCEDURE — 1126F AMNT PAIN NOTED NONE PRSNT: CPT | Performed by: THORACIC SURGERY (CARDIOTHORACIC VASCULAR SURGERY)

## 2024-07-16 PROCEDURE — 3080F DIAST BP >= 90 MM HG: CPT | Performed by: THORACIC SURGERY (CARDIOTHORACIC VASCULAR SURGERY)

## 2024-07-16 PROCEDURE — 1159F MED LIST DOCD IN RCRD: CPT | Performed by: THORACIC SURGERY (CARDIOTHORACIC VASCULAR SURGERY)

## 2024-07-16 PROCEDURE — 99215 OFFICE O/P EST HI 40 MIN: CPT | Performed by: THORACIC SURGERY (CARDIOTHORACIC VASCULAR SURGERY)

## 2024-07-16 ASSESSMENT — ENCOUNTER SYMPTOMS
FATIGUE: 0
MUSCULOSKELETAL NEGATIVE: 1
SHORTNESS OF BREATH: 0
DEPRESSION: 0
CHEST TIGHTNESS: 0
CHOKING: 0
STRIDOR: 0
LOSS OF SENSATION IN FEET: 0
VOMITING: 0
ABDOMINAL DISTENTION: 0
CHILLS: 0
ENDOCRINE NEGATIVE: 1
APPETITE CHANGE: 0
PSYCHIATRIC NEGATIVE: 1
NEUROLOGICAL NEGATIVE: 1
OCCASIONAL FEELINGS OF UNSTEADINESS: 0
UNEXPECTED WEIGHT CHANGE: 0
COUGH: 0
PALPITATIONS: 0
CONSTIPATION: 0
NAUSEA: 0
FEVER: 0
WHEEZING: 0
HEMATOLOGIC/LYMPHATIC NEGATIVE: 1
ABDOMINAL PAIN: 0
ALLERGIC/IMMUNOLOGIC NEGATIVE: 1
DIAPHORESIS: 0
EYES NEGATIVE: 1
DIARRHEA: 0

## 2024-07-16 ASSESSMENT — PATIENT HEALTH QUESTIONNAIRE - PHQ9
SUM OF ALL RESPONSES TO PHQ9 QUESTIONS 1 AND 2: 0
1. LITTLE INTEREST OR PLEASURE IN DOING THINGS: NOT AT ALL
2. FEELING DOWN, DEPRESSED OR HOPELESS: NOT AT ALL

## 2024-07-16 ASSESSMENT — LIFESTYLE VARIABLES
HOW MANY STANDARD DRINKS CONTAINING ALCOHOL DO YOU HAVE ON A TYPICAL DAY: 1 OR 2
HOW OFTEN DO YOU HAVE SIX OR MORE DRINKS ON ONE OCCASION: NEVER
AUDIT-C TOTAL SCORE: 1
SKIP TO QUESTIONS 9-10: 1
HOW OFTEN DO YOU HAVE A DRINK CONTAINING ALCOHOL: MONTHLY OR LESS

## 2024-07-16 ASSESSMENT — PAIN SCALES - GENERAL: PAINLEVEL: 0-NO PAIN

## 2024-07-16 NOTE — PATIENT INSTRUCTIONS
"New Consults:  Medical Oncology Dr. Camila Chaudhary   [place] [facility] [address] [facility] [place] [address] [phone]  Radiation Oncology Dr. Nagi Ospina  [place] [address] [facility] 3 [phone]  Interventional Pulmonology at [facility] for lung biopsy (also known as \"EBUS + Navigational Bronchoscopy\")    All three will contact you but if you do not hear from them by Friday 7/19/24 please call our office at [phone]  "

## 2024-07-16 NOTE — PROGRESS NOTES
Subjective   Patient ID: Brittny Gordon is a 68 y.o. female who presents for Follow-up (PET results).  HPI    Ms. Brittny Gordon is a 64 year old female referred by Dr. Gayle for a left upper lobe lung nodule.  She states she felt something in her left chest in the spring of . She went with her cardiologist in 2018 and obtain a chest CT that showed the left upper lobe lung nodule. I bronchoscopy and transbronchial biopsy were performed upper negative for malignancy at [facility]. She consulted Dr. Ursula Gayle [facility]. She ordered a chest CT was performed on 2019 that shows a left upper lobe lung nodule that measures 0.9 cm that is right next to the thoracic aorta. I can see another apical 4 mm round nodule in the left upper lobe. A PET/CT was ordered and performed on 2019 that showed mild hypermetabolic uptake with an SUV of 1.4 in the left upper lobe lung nodule. There is no evidence of abnormal hypermetabolic uptake anywhere else. Pulmonary function tests from 2019 shows an FVC of 3.11 L, and an FEV1 of 2.36 L we do DLCL with 98%. She is here for evaluation of the left upper lobe lung nodule. At the moment she is inserted asymptomatic.  Her sister  of an aortic dissection rupture. She smoked from 17 to 51 years of age, half-pack per day. Past medical history in  she underwent an trans-sternal aortic aneurysm trans-sternal repair. She has no allergies no transfusions and no other surgical history.  Today I reviewed her chest CT performed 2020 that shows a stable URMILA lung nodule and postop changes after the aortic arch repair. Stable calcifications.  I will see her for follow up in 6 months with a chest CT to assess stability.      Follow-up appointment 2021  She is asymptomatic. Her chest CT performed 2021 shows a stable appearance compared to her previous imaging. She has a stable 11 x 14 mm left upper lobe nodule  adjacent to the aorta.   I will see her for follow-up in 6 months with a chest CT.      Follow up appointment 8/17/21  She is again asymptomatic. Her chest CT performed 8/3/21 shows a stable appearance compared to her previous imaging. She has a stable 11 x 14 mm left upper lobe nodule adjacent to the aorta.   I will see her for follow-up in 6 months with a chest CT. She has no additional concerns today.      Follow-up appointment February 15, 2022  She is still asymptomatic.  Her chest CT performed February 8, 2022 and compared to prior on August 3, 2021 shows a stable 14 mm left upper lobe lung nodule. I will see her for follow-up in 9 months with a chest CT.     Update 11/8/2022  She has been doing well no issues. CT scan showing stable lung nodule. We will meet again in 6 months with CT chest.     Update 5/30/2023  She has been doing well CT scan showing stable lung nodules. They have been stable over time so we will move to a CAT scan every year.     Update 6/25/2024  Doing well no big issues.  Her new CT scan shows stable nodules but 1 nodule in the right upper lobe increasing in size.  I discussion with her about the options.  I think due to this change in the nodule obtaining a PET scan will help us characterize it better.  I will see her again with results.    Update 7/16/2024  She underwent a PET scan which shows that these new right upper lobe nodule had an SUV max of 5.  Fortunately there is also FDG avid mediastinal and hilar nodes suspicious for primary lung neoplasm.  There are also some bone lesions suspicious for metastases.  All the other lung nodules are stable.  Hay referred to interventional pulmonology for EBUS biopsy of this mediastinal lymph nodes and navigational bronchoscopy of this right upper lobe lung nodule.  Meanwhile we will refer her as well to medical oncology and radiation oncology.    Review of Systems   Constitutional:  Negative for appetite change, chills, diaphoresis,  fatigue, fever and unexpected weight change.   HENT: Negative.     Eyes: Negative.    Respiratory:  Negative for cough, choking, chest tightness, shortness of breath, wheezing and stridor.    Cardiovascular:  Negative for chest pain, palpitations and leg swelling.   Gastrointestinal:  Negative for abdominal distention, abdominal pain, constipation, diarrhea, nausea and vomiting.   Endocrine: Negative.    Genitourinary: Negative.    Musculoskeletal: Negative.    Skin: Negative.    Allergic/Immunologic: Negative.    Neurological: Negative.    Hematological: Negative.    Psychiatric/Behavioral: Negative.     All other systems reviewed and are negative.      Objective   Physical Exam  Constitutional:       Appearance: Normal appearance.   HENT:      Head: Normocephalic and atraumatic.      Nose: Nose normal.      Mouth/Throat:      Mouth: Mucous membranes are moist.      Pharynx: Oropharynx is clear.   Eyes:      Extraocular Movements: Extraocular movements intact.      Conjunctiva/sclera: Conjunctivae normal.      Pupils: Pupils are equal, round, and reactive to light.   Cardiovascular:      Rate and Rhythm: Normal rate and regular rhythm.      Pulses: Normal pulses.      Heart sounds: Normal heart sounds.   Pulmonary:      Effort: Pulmonary effort is normal. No respiratory distress.      Breath sounds: Normal breath sounds. No stridor. No wheezing, rhonchi or rales.   Chest:      Chest wall: No tenderness.   Abdominal:      General: Abdomen is flat. Bowel sounds are normal.      Palpations: Abdomen is soft.   Musculoskeletal:         General: Normal range of motion.      Cervical back: Normal range of motion and neck supple.   Skin:     General: Skin is warm and dry.      Capillary Refill: Capillary refill takes less than 2 seconds.   Neurological:      General: No focal deficit present.      Mental Status: She is alert and oriented to person, place, and time.         Assessment/Plan   Diagnoses and all orders for  this visit:  Lung nodules  Referral to interventional pulmonology for EBUS biopsy of mediastinal lymph nodes and navigational bronchoscopy biopsy of right upper lobe lung nodule referral to medical oncology and radiation oncology.       Tommy Hammonds MD 07/16/24 3:01 PM

## 2024-07-18 DIAGNOSIS — Z01.818 PRE-OP TESTING: ICD-10-CM

## 2024-07-18 DIAGNOSIS — R91.1 LUNG NODULE: Primary | ICD-10-CM

## 2024-07-18 NOTE — PROGRESS NOTES
Bronchoscopy Scheduling Request    Pre-bronchoscopy visit: New patient visit with Bronchoscopy group provider  Please schedule procedure: Next available    Cytology on-site:  Yes  Location:  [facility]  Performing physician:  Advanced diagnostic bronchoscopist  Referring physician:  Tommy Barajas MD, Jose Rafael Hart MD  Indication:  RUL Nodule, Abnormal PET, history of smoking  Sedation / Anesthesia:  GA  Procedure:  TBNA, TBBx, Navigational bronchoscopy, Radial EBUS, Staging EBUS  Time:  Tier 3  Fluorscopy:   Yes  Imaging needed:  CT Jacob - same day as procedure  Labs:  CBC, BMP  Meds:  None  Special Considerations:  PAT, Cardiology clearance  Reviewed by:  Hansa Gong MD

## 2024-07-19 ENCOUNTER — OFFICE VISIT (OUTPATIENT)
Dept: HEMATOLOGY/ONCOLOGY | Facility: HOSPITAL | Age: 69
End: 2024-07-19
Payer: MEDICARE

## 2024-07-19 ENCOUNTER — LAB (OUTPATIENT)
Dept: LAB | Facility: HOSPITAL | Age: 69
End: 2024-07-19
Payer: MEDICARE

## 2024-07-19 ENCOUNTER — HOSPITAL ENCOUNTER (OUTPATIENT)
Dept: RADIOLOGY | Facility: CLINIC | Age: 69
Discharge: HOME | End: 2024-07-19
Payer: MEDICARE

## 2024-07-19 VITALS
HEART RATE: 78 BPM | RESPIRATION RATE: 15 BRPM | BODY MASS INDEX: 21.49 KG/M2 | OXYGEN SATURATION: 95 % | DIASTOLIC BLOOD PRESSURE: 84 MMHG | SYSTOLIC BLOOD PRESSURE: 148 MMHG | TEMPERATURE: 97.2 F | WEIGHT: 137.2 LBS

## 2024-07-19 DIAGNOSIS — R91.1 LUNG NODULE: ICD-10-CM

## 2024-07-19 DIAGNOSIS — Z01.818 PRE-OP TESTING: ICD-10-CM

## 2024-07-19 DIAGNOSIS — C34.91 PRIMARY MALIGNANT NEOPLASM OF RIGHT LUNG METASTATIC TO OTHER SITE (MULTI): Primary | ICD-10-CM

## 2024-07-19 DIAGNOSIS — C34.91 PRIMARY MALIGNANT NEOPLASM OF RIGHT LUNG METASTATIC TO OTHER SITE (MULTI): ICD-10-CM

## 2024-07-19 LAB
ANION GAP SERPL CALC-SCNC: 16 MMOL/L (ref 10–20)
BUN SERPL-MCNC: 10 MG/DL (ref 6–23)
CALCIUM SERPL-MCNC: 9.5 MG/DL (ref 8.6–10.3)
CHLORIDE SERPL-SCNC: 103 MMOL/L (ref 98–107)
CO2 SERPL-SCNC: 25 MMOL/L (ref 21–32)
CREAT SERPL-MCNC: 0.62 MG/DL (ref 0.5–1.05)
EGFRCR SERPLBLD CKD-EPI 2021: >90 ML/MIN/1.73M*2
ERYTHROCYTE [DISTWIDTH] IN BLOOD BY AUTOMATED COUNT: 12.9 % (ref 11.5–14.5)
GLUCOSE SERPL-MCNC: 101 MG/DL (ref 74–99)
HCT VFR BLD AUTO: 45.7 % (ref 36–46)
HGB BLD-MCNC: 15.3 G/DL (ref 12–16)
MCH RBC QN AUTO: 29.9 PG (ref 26–34)
MCHC RBC AUTO-ENTMCNC: 33.5 G/DL (ref 32–36)
MCV RBC AUTO: 89 FL (ref 80–100)
NRBC BLD-RTO: 0 /100 WBCS (ref 0–0)
PLATELET # BLD AUTO: 200 X10*3/UL (ref 150–450)
POTASSIUM SERPL-SCNC: 3.8 MMOL/L (ref 3.5–5.3)
RBC # BLD AUTO: 5.11 X10*6/UL (ref 4–5.2)
SODIUM SERPL-SCNC: 140 MMOL/L (ref 136–145)
WBC # BLD AUTO: 7.6 X10*3/UL (ref 4.4–11.3)

## 2024-07-19 PROCEDURE — 3077F SYST BP >= 140 MM HG: CPT | Performed by: STUDENT IN AN ORGANIZED HEALTH CARE EDUCATION/TRAINING PROGRAM

## 2024-07-19 PROCEDURE — A9575 INJ GADOTERATE MEGLUMI 0.1ML: HCPCS | Performed by: STUDENT IN AN ORGANIZED HEALTH CARE EDUCATION/TRAINING PROGRAM

## 2024-07-19 PROCEDURE — 85027 COMPLETE CBC AUTOMATED: CPT

## 2024-07-19 PROCEDURE — 99215 OFFICE O/P EST HI 40 MIN: CPT | Performed by: STUDENT IN AN ORGANIZED HEALTH CARE EDUCATION/TRAINING PROGRAM

## 2024-07-19 PROCEDURE — 3079F DIAST BP 80-89 MM HG: CPT | Performed by: STUDENT IN AN ORGANIZED HEALTH CARE EDUCATION/TRAINING PROGRAM

## 2024-07-19 PROCEDURE — 1126F AMNT PAIN NOTED NONE PRSNT: CPT | Performed by: STUDENT IN AN ORGANIZED HEALTH CARE EDUCATION/TRAINING PROGRAM

## 2024-07-19 PROCEDURE — 36415 COLL VENOUS BLD VENIPUNCTURE: CPT

## 2024-07-19 PROCEDURE — 1159F MED LIST DOCD IN RCRD: CPT | Performed by: STUDENT IN AN ORGANIZED HEALTH CARE EDUCATION/TRAINING PROGRAM

## 2024-07-19 PROCEDURE — 2550000001 HC RX 255 CONTRASTS: Performed by: STUDENT IN AN ORGANIZED HEALTH CARE EDUCATION/TRAINING PROGRAM

## 2024-07-19 PROCEDURE — 70553 MRI BRAIN STEM W/O & W/DYE: CPT

## 2024-07-19 PROCEDURE — 99205 OFFICE O/P NEW HI 60 MIN: CPT | Performed by: STUDENT IN AN ORGANIZED HEALTH CARE EDUCATION/TRAINING PROGRAM

## 2024-07-19 PROCEDURE — 82374 ASSAY BLOOD CARBON DIOXIDE: CPT

## 2024-07-19 RX ORDER — GADOTERATE MEGLUMINE 376.9 MG/ML
13 INJECTION INTRAVENOUS
Status: COMPLETED | OUTPATIENT
Start: 2024-07-19 | End: 2024-07-19

## 2024-07-19 ASSESSMENT — PAIN SCALES - GENERAL: PAINLEVEL: 0-NO PAIN

## 2024-07-19 NOTE — PROGRESS NOTES
Subjective:   Patient Name: Brittny Gordon    : 1955     MRN: [id number]     Age: 68 y.o.     Gender: female  Referring Provider:   Dr. Naun Hammonds (Thoracic Surgery)    CC: Management of newly diagnosed elie lung cancer    Presenting History (2024): At time of initial presentation a 68 y.o. female, former smoker (started at age 17, 0.5 pack per day, quitted at age 56) with a past medical history of HTN, HLD and osteoporosis, aortic aneurysm s/p repairment in  referred for management of newly diagnosed lung cancer.     2019: CT chest (+): Noncalcified nodule in the left upper lobe adjacent to the aortic arch, nodule measuring 1.3 x 1.3 x 0.8cm.    2019: Pet/CT: 1. Mild hypermetabolic tracer activity corresponding to the known posteromedial left upper lobe lung nodule.    2021: CT chest (-): stable URMILA nodule 1.1x1.0cm. Again stable in 2021, 2022, 2022.     However on the CT chest on 2023 the URMILA nodule measured 1.6x1.4cm, which has been growing slowly compared to before, There is also a irregular shape solid-appearing nodule in the RUL measuring up to 0.8cm.     2024: CT chest (-) showed interval increase of the RUL nodule measuring 1.1cm. Multiple new nodules in both lower lobes measuring 0.4cm or smaller.     2024: Pet/CT: Enlarging FDG avid 1.1 cm spiculated nodule in the right upper lobe, as well as FDG avid mediastinal and bilateral hilar nodes is suspicious for a primary lung neoplasm with teresa metastases. Mid FDG avid left upper lobe paravertebral nodule, grossly stable in comparison to prior CT in 2019, favored to be benign. Multiple FDG avid bone lesions throughout the axial and appendicular skeleton, likely representing widespread bone metastases. FDG avidity within the right ribs as well as lower thoracic and lumbar vertebral bodies as described above, could represent pathologic fractures in the setting of metastatic disease.    She  presents for a consultation with her brother. She has been having back pain in June when she was doing gardening. She was then found to have  acute L3 osteoporotic compression fracture. She is still having back pain and she could not bend. No numbness or weakness of the lower extremities. No urinary or fecal incontinence. No CP or cough. Her appetite has been low since June. No significant weight loss. No n/v. She has been having intermittent diarrhea for 5 years. No HA. No vision change.     PMHx: HTN, HLD, and osteoporosis  Family history: father had lymphoma. Bother and sister passed away at early 40s. Mother had skin cancer (rare type but unknown type); brother has prostate cancer.   Shx: Occasional drinker. CEPA.        Treatment Summary:  - N/A    Interval History (7/19/2024):  As above.  Initial Consultation.      ROS:  Patient denies the below review of systems, except for changes as noted in the interval history.    General:  Fatigue, anorexia, fevers, sweats, chills, heat/cold intolerance, weight changes.  HEENT:  Icterus, congestion, sore throat, rhinorrhea, taste change, voice changes.  Neurology:  Headache, dizziness, vision changes, hearing changes, other motor/sensory loss, gait instability, neuropathies.  Pulmonology:  Cough, wheezing, hemoptysis, dyspnea at rest, dyspnea on exertion, pleuritic chest pain.  Cardiology:  Chest pain, palpitations, orthopnea, paroxysmal nocturnal dyspnea.  Gastroenterology:  Nausea, vomiting, reflux symptoms, abdominal pain, constipation, diarrhea, melena, bright red blood per rectum.  Genitourinary:  Dysuria, polyuria, urine color change, urine smell change, hematuria.   Musculoskeletal:  Arthralgias, myalgias, joint swelling, focal weakness.  Skin:  Rash, pruritis, jaundice, petechiae, nail changes, skin nodules/growth.  Psychology:  Anxiety, depression, suicidal ideation, homicidal ideation.  Heme:  Easy bleeding or bruising.  Others:   All other systems reviewed and  are negative.    Medical History:   Past Medical History:   Diagnosis Date    Wedge compression fracture of t11-T12 vertebra, sequela 07/30/2020    Compression fracture of T11 vertebra, sequela       Surgical History:   Past Surgical History:   Procedure Laterality Date    OTHER SURGICAL HISTORY  01/28/2013    Thoracic aortic aneurysm repair    OTHER SURGICAL HISTORY  02/23/2021    Colonoscopy    OTHER SURGICAL HISTORY  07/16/2020    [place] tooth extraction       Family History:  Family History   Problem Relation Name Age of Onset    Lymphoma Father      Polycythemia Father      Breast cancer Neg Hx      Colon cancer Neg Hx         Allergies:  Allergies   Allergen Reactions    Epinephrine Nausea/vomiting and Palpitations       Meds (Current):    Current Outpatient Medications:     atorvastatin (Lipitor) 80 mg tablet, Take 1 tablet (80 mg) by mouth once daily., Disp: , Rfl:     cholestyramine (Questran) 4 gram packet, Take 1 packet (4 g) by mouth 2 times a day before meals. (Patient not taking: Reported on 6/25/2024), Disp: 60 packet, Rfl: 3    ezetimibe (Zetia) 10 mg tablet, Take 1 tablet (10 mg) by mouth once daily., Disp: 90 tablet, Rfl: 3    ibandronate (Boniva) 150 mg tablet, Take 1 tablet (150 mg) by mouth every 30 (thirty) days. Take in morning with full glass of water on an empty stomach. No food, drink, meds, or lying down for 60 minutes after., Disp: 3 tablet, Rfl: 3    losartan (Cozaar) 100 mg tablet, TAKE 1 TABLET BY MOUTH EVERY DAY, Disp: 90 tablet, Rfl: 3    metoprolol succinate XL (Toprol-XL) 100 mg 24 hr tablet, TAKE 1 TABLET BY MOUTH EVERY DAY, Disp: 90 tablet, Rfl: 3    tiZANidine (Zanaflex) 2 mg tablet, Take 1 tablet (2 mg) by mouth as needed at bedtime for muscle spasms., Disp: 30 tablet, Rfl: 0    Pain Assessment:  Aches in the back     Performance Status (ECOG): 0         ECOG Definition  0 Fully active; no performance restrictions.  1 Strenuous physical activity restricted; fully ambulatory and  able to carry out light work.  2 Capable of all self-care but unable to carry out any work activities. Up and about >50% of waking hours.  3 Capable of only limited self-care; confined to bed or chair >50% of waking hours.  4 Completely disabled; cannot carry out any self-care; totally confined to bed or chair.      Exam:  There were no vitals taken for this visit.  General: Well appearing, no acute distress  Neurology: Alert and oriented x3, CN II-XII grossly intact  Psychology: Affect appropriate  Eyes: PERRL, EOMI  ENT: Mucus membranes moist and intact, no ulcers  Lymphatics: No palpable adenopathy  Neck: Supple, no masses  Respiratory: Lungs clear bilaterally, no wheezes, no rales, no rhonchi  Cardiovascular: Normal rate and rhythm, no murmur  Abdomen: Soft, non-tender, non-distended, normoactive, no hepatosplenomegaly, no ascites  Musculoskeletal: Normal gait and stance  Extremities: No clubbing, no cyanosis, no edema  Skin: No rash  Genitourinary: Deferred  Rectal: Deferred   Pelvic: Deferred  Breasts: Deferred    Labs:             Assessment/Plan:   68 y.o. female with past medical history as stated referred for management of newly diagnosed lung cancer    The patient's records and imaging have been reviewed in detail.  I have discussed with the patient the natural history of their disease at length.  The following has also been discussed with the patient:    # Cancer:  Working stage IV ([id number]) lung cancer. We discussed that this likely represents a metastatic lung cancer. We will need tissue diagnosis to determine the treatment plan. Will also order Brain MRI to complete staging. She is meeting radiation oncology next week. Will also refer her to IR to get bone biopsy.      - Interval Imagin2024: Pet/CT: Enlarging FDG avid 1.1 cm spiculated nodule in the right upper lobe, as well as FDG avid mediastinal and bilateral hilar nodes is suspicious for a primary lung neoplasm with teresa metastases.  Mid FDG avid left upper lobe paravertebral nodule, grossly stable in comparison to prior CT in 2019, favored to be benign. Multiple FDG avid bone lesions throughout the axial and appendicular skeleton, likely representing widespread bone metastases. FDG avidity within the right ribs as well as lower thoracic and lumbar vertebral bodies as described above, could represent pathologic fractures in the setting of metastatic disease. Brain MRI NOW!    # Clinical Trials:  None currently.     # Access: Peripheral veins.    # Pain: Aches in back and L hip - not requiring any pain mets    # Bone Health: Diffuse bone lesion, likely metastases. Pending biopsy.     # Psychosocial: Coping well, no acute issues.  Good support from family & friends.  Social work support offered.    # GI/CINV: No acute issues.  Eating and voiding well.  Nutrition consult offered.    # Smoking: N/A, former smoker.    # Fertility: N/A.  Oncofertility support available as needed.      # Heme/ESAs: N/A.    # GOC: Patient is aware of palliative intent goals of care.    # Language: English.    # Interdisciplinary Patient/Family Education Record:  Learner:  Patient.  Learning Needs Reviewed:  Treatments, Medications, Disease Process, Diagnostic Tests.  Barriers: None.  Teaching Method: Discussion, Handout(s).  Comprehension:  Verbalized Understanding.  Follow-up Plan:  Return to office as per MD.    # Dispo: RTC in 3 weeks.

## 2024-07-22 NOTE — PROGRESS NOTES
Patient: Brittny Gordon    [id number]  : 1955 -- AGE 68 y.o.    Provider: PARVIN Alvares     Location [facility]   Service Date: 2024       Department of Medicine  Division of Pulmonary, Critical Care, and Sleep Medicine       [facility]  New Visit Note    Virtual or Telephone Consent  A telephone visit (audio only) between the patient (at the originating site) and the provider (at the distant site) was utilized to provide this telehealth service.   Verbal consent was requested and obtained from Brittny Gordon on this date, 24 for a telehealth visit.     HISTORY OF PRESENT ILLNESS     The patient's referring provider is: Tommy Barrios*    PCP: Dr. Jose Rafael Walker  Med Onc: Dr. Camila Chaudhary  Rad Onc: Dr. Nagi Ospina  Thoracic: Dr. Barajas  Cardiology: Dr. Buckley    HISTORY OF PRESENT ILLNESS   Brittny Gordon is a 68 y.o. female who presents to a [facility] for an evaluation with concerns of lung nodule.  I have independently interviewed and examined the patient in the office and reviewed available records.    Current History  Patient presents to pulmonary clinic with concern for right upper lobe lung nodule.  Most recent CT chest from 2024 shows interval increase in size of a spiculated 1.1 cm nodule in the right upper lobe concerning for primary lung cancer.  A PET/CT was completed on 2024 which showed an FDG avid 1.1 cm spiculated nodule right upper lobe as well as FDG avid mediastinal and bilateral hilar lymph nodes suspicious for primary lung neoplasm with teresa metastasis.  Patient was referred to interventional pulmonology for further assessment and tissue biopsy.    On today's visit, the patient reports no SOB at rest or HINSON. Denies chronic cough.  No hemoptysis. Denies wheezing. No current or prior inhaler use. No fever, sweats, chills. Has lost some weight;  about 5 lbs (decreased appetite). Denies orthopnea. No lower leg swelling. Denies CP, palpitations. Occasional heartburn. Denies rhinitis/sinus issues; will use Zyrtec PRN.     Denies premature birth. No childhood pulmonary issues growing up. No pulmonary hospitalizations. Has never been on home oxygen therapy before.    Has never completed a sleep study before. Occasional snoring. No notable fatigue.      Prior Notes & History   Med Onc Note 7/19/24  Presenting History (07/19/2024): At time of initial presentation a 68 y.o. female, former smoker (started at age 17, 0.5 pack per day, quitted at age 56) with a past medical history of HTN, HLD and osteoporosis, aortic aneurysm s/p repairment in 2012 referred for management of newly diagnosed lung cancer.      09/11/2019: CT chest (+): Noncalcified nodule in the left upper lobe adjacent to the aortic arch, nodule measuring 1.3 x 1.3 x 0.8cm.     09/30/2019: Pet/CT: 1. Mild hypermetabolic tracer activity corresponding to the known posteromedial left upper lobe lung nodule.     01/28/2021: CT chest (-): stable URMILA nodule 1.1x1.0cm. Again stable in 8/2021, 02/2022, 11/2022.      However on the CT chest on 5/16/2023 the URMILA nodule measured 1.6x1.4cm, which has been growing slowly compared to before, There is also a irregular shape solid-appearing nodule in the RUL measuring up to 0.8cm.      6/4/2024: CT chest (-) showed interval increase of the RUL nodule measuring 1.1cm. Multiple new nodules in both lower lobes measuring 0.4cm or smaller.      7/11/2024: Pet/CT: Enlarging FDG avid 1.1 cm spiculated nodule in the right upper lobe, as well as FDG avid mediastinal and bilateral hilar nodes is suspicious for a primary lung neoplasm with teresa metastases. Mid FDG avid left upper lobe paravertebral nodule, grossly stable in comparison to prior CT in 2019, favored to be benign. Multiple FDG avid bone lesions throughout the axial and appendicular skeleton, likely representing  widespread bone metastases. FDG avidity within the right ribs as well as lower thoracic and lumbar vertebral bodies as described above, could represent pathologic fractures in the setting of metastatic disease.     She presents for a consultation with her brother. She has been having back pain in June when she was doing gardening. She was then found to have  acute L3 osteoporotic compression fracture. She is still having back pain and she could not bend. No numbness or weakness of the lower extremities. No urinary or fecal incontinence. No CP or cough. Her appetite has been low since June. No significant weight loss. No n/v. She has been having intermittent diarrhea for 5 years. No HA. No vision change.      PMHx: HTN, HLD, and osteoporosis  Family history: father had lymphoma. Bother and sister passed away at early 40s. Mother had skin cancer (rare type but unknown type); brother has prostate cancer.   Shx: Occasional drinker. CEPA.      REVIEW OF SYSTEMS     REVIEW OF SYSTEMS  Review of Systems   Constitutional:  Positive for appetite change. Negative for activity change, chills, fatigue, fever and unexpected weight change.   HENT:  Negative for congestion, postnasal drip, rhinorrhea, sinus pressure, sinus pain, sneezing, sore throat, trouble swallowing and voice change.         Denies throat clearing   Eyes:  Negative for redness and itching.   Respiratory:  Negative for cough, chest tightness, shortness of breath, wheezing and stridor.    Cardiovascular:  Negative for chest pain, palpitations and leg swelling.        Denies orthopnea   Gastrointestinal:  Negative for abdominal pain, diarrhea, nausea and vomiting.        Denies acid reflux   Musculoskeletal:  Positive for back pain. Negative for arthralgias, joint swelling and myalgias.   Skin:  Negative for rash.   Allergic/Immunologic: Negative for immunocompromised state.   Neurological:  Negative for dizziness, tremors, weakness and headaches.    Hematological:  Does not bruise/bleed easily.   Psychiatric/Behavioral:  Negative for agitation and sleep disturbance. The patient is nervous/anxious.         +depression   All other systems reviewed and are negative.      ALLERGIES AND MEDICATIONS     ALLERGIES  Allergies   Allergen Reactions    Epinephrine Nausea/vomiting and Palpitations       MEDICATIONS  Current Outpatient Medications   Medication Sig Dispense Refill    atorvastatin (Lipitor) 80 mg tablet Take 1 tablet (80 mg) by mouth once daily.      cholecalciferol (Vitamin D3) 25 MCG (1000 UT) tablet Take 1 tablet (1,000 Units) by mouth once daily.      ibandronate (Boniva) 150 mg tablet Take 1 tablet (150 mg) by mouth every 30 (thirty) days. Take in morning with full glass of water on an empty stomach. No food, drink, meds, or lying down for 60 minutes after. 3 tablet 3    losartan (Cozaar) 100 mg tablet TAKE 1 TABLET BY MOUTH EVERY DAY 90 tablet 3    metoprolol succinate XL (Toprol-XL) 100 mg 24 hr tablet TAKE 1 TABLET BY MOUTH EVERY DAY 90 tablet 3    tiZANidine (Zanaflex) 2 mg tablet Take 1 tablet (2 mg) by mouth as needed at bedtime for muscle spasms. 30 tablet 0    vit A/vit C/vit E/zinc/copper (PRESERVISION AREDS ORAL) Take 1 tablet by mouth early in the morning..      cholestyramine (Questran) 4 gram packet Take 1 packet (4 g) by mouth 2 times a day before meals. (Patient not taking: Reported on 6/25/2024) 60 packet 3     No current facility-administered medications for this visit.       PAST HISTORY     PAST MEDICAL HISTORY  She  has a past medical history of Hypercholesteremia, Hypertension, and Wedge compression fracture of t11-T12 vertebra, sequela (07/30/2020).    PAST SURGICAL HISTORY  Past Surgical History:   Procedure Laterality Date    OTHER SURGICAL HISTORY  01/28/2013    Thoracic aortic aneurysm repair    OTHER SURGICAL HISTORY  02/23/2021    Colonoscopy    OTHER SURGICAL HISTORY  07/16/2020    Lyons tooth extraction    RADIOFREQUENCY  ABLATION      L3,4,5       IMMUNIZATION HISTORY  Immunization History   Administered Date(s) Administered    Flu vaccine (IIV4), preservative free *Check age/dose* 09/08/2020    Flu vaccine, quadrivalent, high-dose, preservative free, age 65y+ (FLUZONE) 09/30/2022    Influenza, Seasonal, Quadrivalent, Adjuvanted 11/09/2021, 09/24/2023    Influenza, seasonal, injectable 09/08/2020    Moderna SARS-CoV-2 Vaccination 03/10/2021, 04/08/2021    Pfizer COVID-19 vaccine, Fall 2023, 12 years and older, (30mcg/0.3mL) 09/24/2023    Pfizer COVID-19 vaccine, bivalent, age 12 years and older (30 mcg/0.3 mL) 09/30/2022    Pfizer Purple Cap SARS-CoV-2 11/09/2021    Pneumococcal conjugate vaccine, 20-valent (PREVNAR 20) 03/21/2024    RSV, 60 Years And Older (AREXVY) 03/21/2024    Zoster vaccine, recombinant, adult (SHINGRIX) 03/29/2023, 07/11/2023       SOCIAL HISTORY  She  reports that she quit smoking about 11 years ago. Her smoking use included cigarettes. She started smoking about 51 years ago. She has a 20 pack-year smoking history. She has never used smokeless tobacco. She reports current alcohol use. She reports that she does not currently use drugs.     FAMILY HISTORY  Family History   Problem Relation Name Age of Onset    Lymphoma Father      Polycythemia Father      Breast cancer Neg Hx      Colon cancer Neg Hx         PHYSICAL EXAM       PREVIOUS WEIGHTS:  Wt Readings from Last 3 Encounters:   07/25/24 64.5 kg (142 lb 4.9 oz)   07/19/24 62.2 kg (137 lb 3.2 oz)   07/16/24 78.9 kg (174 lb)       Physical Exam  No physical exam performed; virtual visit.    RESULTS/DATA     Pulmonary Function Test Results     No PFT on record    Chest Radiograph     No results found for this or any previous visit from the past 365 days.      Chest CT Scan   PET CT  7/11/24: IMPRESSION: 1. Enlarging FDG avid 1.1 cm spiculated nodule in the right upper lobe, as well as FDG avid mediastinal and bilateral hilar nodes is suspicious for a primary  lung neoplasm with teresa metastases 2. Mild FDG avid left upper lobe paravertebral nodule, grossly stable in comparison to prior CT in 2019, favored to be benign. 3. Multiple FDG avid bone lesions throughout the axial and appendicular skeleton, likely representing widespread bone metastases. FDG avidity within the right ribs as well as lower thoracic and lumbar vertebral bodies as described above, could represent pathologic fractures in the setting of metastatic disease.    CT Chest  6/7/24: IMPRESSION: 1. Interval increase in size of a spiculated 1.1 cm nodule in the right upper lobe, concerning for primary lung cancer. Further evaluation with FDG PET-CT or tissue sampling is recommended. 2. Multiple new nodules in both lower lobes measuring 0.4 cm or smaller, nonspecific and possibly benign given multiplicity although with metastatic disease not excluded. Attention on follow-up studies is recommended. 3. Status post replacement of the proximal ascending aorta. Similar dilatation of the distal ascending/proximal arch measuring up to 4.5 cm. 4. Multiple additional chronic/incidental findings are similar compared to prior studies as detailed above.  5/15/23: Irregular shape solid nodules involving the bilateral upper lobes are overall stable since July 2020. No thoracic lymphadenopathy is demonstrated today. No new nodule or mass on today's exam.  11/8/22: IMPRESSION: 1. Solid 1.2 cm left upper lobe pulmonary nodule is stable dating back to CT chest 09/09/2019. 2. Postoperative changes compatible with proximal ascending thoracic aorta repair with unchanged aortic root and distal ascending thoracic aorta aneurysmal dilatation.  Other studies in the EMR      Echocardiogram & Cardiac Studies   Cardiac Stress Test  1/8/24: IMPRESSION: 1. Negative myocardial perfusion study without evidence of inducible myocardial ischemia or prior infarction. 2. The left ventricle is normal in size. 3. Normal LV wall motion with a  "post-stress LV EF estimated greater than 65%.    Labwork & Pathology   Complete Blood Count  Lab Results   Component Value Date    WBC 7.6 07/19/2024    HGB 15.3 07/19/2024    HCT 45.7 07/19/2024    MCV 89 07/19/2024     07/19/2024       Peripheral Eosinophil Count:   Eosinophils Absolute (x10E9/L)   Date Value   11/08/2021 0.21   09/09/2019 0.25       Immunocap IgE  No results found for: \"ICIGE\"    Bronchoscopy & Pathology/Cultures       ASSESSMENT/PLAN     Ms. Gordon is a 68 y.o. female; was referred to the [facility] for evaluation of lung nodule.    Problem List and Orders  Diagnoses and all orders for this visit:  Lung nodule  -     Referral to Interventional Pulmonology      Assessment and Plan / Recommendations:  Patient's visit was converted to a virtual visit.    1. RUL lung nodule: Most recent CT chest from 6/7/2024 shows interval increase in size of a spiculated 1.1 cm nodule in the right upper lobe concerning for primary lung cancer.  A PET/CT was completed on 7/11/2024 which showed an FDG avid 1.1 cm spiculated nodule right upper lobe as well as FDG avid mediastinal and bilateral hilar lymph nodes suspicious for primary lung neoplasm with teresa metastasis.  Patient was referred to interventional pulmonology for further assessment and tissue biopsy.  - Plan for bronchoscopy with biopsy. TBNA, TBBx, Navigational bronchoscopy, Radial EBUS, Staging EBUS. CT navigational prior.  - Lab work prior to procedure; completed 7/19/24   - Not on any anticoagulation   - Special Considerations:  PAT (PAT scheduled 7/31/24), Cardiology clearance ([facility] has clearance form sent out; awaiting response at time of this appointment)    I explained the procedure to the patient. We discussed that the bronchoscopy will be performed by a member of the Interventional Pulmonary Team; depending on scheduling and provider availability. We also discussed that the IP providers function " as a team and not infrequently may have to fill in for one another if there are emergent issues that need attention at the same time. The patient / family expressed understanding and agreed to proceed. All questions were answered.     Patient's visit was converted to a virtual visit. Spoke with the patient via phone for 11 minutes.    Prep: 10 minutes  Phone/Video: 11 minutes  Total: 21 minutes

## 2024-07-25 ENCOUNTER — TELEMEDICINE (OUTPATIENT)
Dept: PULMONOLOGY | Facility: HOSPITAL | Age: 69
End: 2024-07-25
Payer: MEDICARE

## 2024-07-25 ENCOUNTER — HOSPITAL ENCOUNTER (OUTPATIENT)
Dept: RADIATION ONCOLOGY | Facility: CLINIC | Age: 69
Setting detail: RADIATION/ONCOLOGY SERIES
Discharge: HOME | End: 2024-07-25
Payer: MEDICARE

## 2024-07-25 VITALS
BODY MASS INDEX: 22.29 KG/M2 | OXYGEN SATURATION: 94 % | HEART RATE: 76 BPM | RESPIRATION RATE: 16 BRPM | DIASTOLIC BLOOD PRESSURE: 91 MMHG | WEIGHT: 142.31 LBS | SYSTOLIC BLOOD PRESSURE: 145 MMHG | TEMPERATURE: 97.9 F

## 2024-07-25 DIAGNOSIS — R91.1 LUNG NODULE: ICD-10-CM

## 2024-07-25 DIAGNOSIS — R91.1 LUNG NODULE: Primary | ICD-10-CM

## 2024-07-25 DIAGNOSIS — M54.9 SPINE PAIN: Primary | ICD-10-CM

## 2024-07-25 PROCEDURE — 99205 OFFICE O/P NEW HI 60 MIN: CPT | Performed by: STUDENT IN AN ORGANIZED HEALTH CARE EDUCATION/TRAINING PROGRAM

## 2024-07-25 PROCEDURE — 99215 OFFICE O/P EST HI 40 MIN: CPT | Performed by: STUDENT IN AN ORGANIZED HEALTH CARE EDUCATION/TRAINING PROGRAM

## 2024-07-25 PROCEDURE — 99443 PR PHYS/QHP TELEPHONE EVALUATION 21-30 MIN: CPT | Performed by: NURSE PRACTITIONER

## 2024-07-25 PROCEDURE — 1036F TOBACCO NON-USER: CPT | Performed by: NURSE PRACTITIONER

## 2024-07-25 PROCEDURE — 1160F RVW MEDS BY RX/DR IN RCRD: CPT | Performed by: NURSE PRACTITIONER

## 2024-07-25 PROCEDURE — 1159F MED LIST DOCD IN RCRD: CPT | Performed by: NURSE PRACTITIONER

## 2024-07-25 RX ORDER — CHOLECALCIFEROL (VITAMIN D3) 25 MCG
1000 TABLET ORAL DAILY
COMMUNITY

## 2024-07-25 RX ORDER — MELOXICAM 7.5 MG/1
7.5 TABLET ORAL DAILY
Qty: 30 TABLET | Refills: 1 | Status: SHIPPED | OUTPATIENT
Start: 2024-07-25 | End: 2024-09-23

## 2024-07-25 SDOH — ECONOMIC STABILITY: FOOD INSECURITY: WITHIN THE PAST 12 MONTHS, THE FOOD YOU BOUGHT JUST DIDN'T LAST AND YOU DIDN'T HAVE MONEY TO GET MORE.: NEVER TRUE

## 2024-07-25 SDOH — ECONOMIC STABILITY: INCOME INSECURITY: IN THE LAST 12 MONTHS, WAS THERE A TIME WHEN YOU WERE NOT ABLE TO PAY THE MORTGAGE OR RENT ON TIME?: NO

## 2024-07-25 SDOH — ECONOMIC STABILITY: TRANSPORTATION INSECURITY
IN THE PAST 12 MONTHS, HAS THE LACK OF TRANSPORTATION KEPT YOU FROM MEDICAL APPOINTMENTS OR FROM GETTING MEDICATIONS?: NO

## 2024-07-25 SDOH — ECONOMIC STABILITY: TRANSPORTATION INSECURITY
IN THE PAST 12 MONTHS, HAS LACK OF TRANSPORTATION KEPT YOU FROM MEETINGS, WORK, OR FROM GETTING THINGS NEEDED FOR DAILY LIVING?: NO

## 2024-07-25 SDOH — ECONOMIC STABILITY: FOOD INSECURITY: WITHIN THE PAST 12 MONTHS, YOU WORRIED THAT YOUR FOOD WOULD RUN OUT BEFORE YOU GOT MONEY TO BUY MORE.: NEVER TRUE

## 2024-07-25 ASSESSMENT — ENCOUNTER SYMPTOMS
SINUS PRESSURE: 0
HEADACHES: 0
RHINORRHEA: 0
OCCASIONAL FEELINGS OF UNSTEADINESS: 0
WHEEZING: 0
COUGH: 0
BRUISES/BLEEDS EASILY: 0
EYE REDNESS: 0
SINUS PAIN: 0
ABDOMINAL PAIN: 0
LOSS OF SENSATION IN FEET: 0
CHEST TIGHTNESS: 0
FEVER: 0
PALPITATIONS: 0
BACK PAIN: 1
MYALGIAS: 0
APPETITE CHANGE: 1
VOICE CHANGE: 0
ARTHRALGIAS: 0
SHORTNESS OF BREATH: 0
UNEXPECTED WEIGHT CHANGE: 0
TREMORS: 0
VOMITING: 0
SLEEP DISTURBANCE: 0
ROS GI COMMENTS: DENIES ACID REFLUX
STRIDOR: 0
FATIGUE: 0
DEPRESSION: 0
TROUBLE SWALLOWING: 0
JOINT SWELLING: 0
ACTIVITY CHANGE: 0
DIARRHEA: 0
SORE THROAT: 0
NAUSEA: 0
NERVOUS/ANXIOUS: 1
AGITATION: 0
WEAKNESS: 0
CHILLS: 0
EYE ITCHING: 0
DIZZINESS: 0

## 2024-07-25 ASSESSMENT — LIFESTYLE VARIABLES
HOW OFTEN DO YOU HAVE SIX OR MORE DRINKS ON ONE OCCASION: NEVER
HOW MANY STANDARD DRINKS CONTAINING ALCOHOL DO YOU HAVE ON A TYPICAL DAY: 1 OR 2
AUDIT-C TOTAL SCORE: 2
HOW OFTEN DO YOU HAVE A DRINK CONTAINING ALCOHOL: 2-4 TIMES A MONTH
SKIP TO QUESTIONS 9-10: 1

## 2024-07-25 ASSESSMENT — PATIENT HEALTH QUESTIONNAIRE - PHQ9
SUM OF ALL RESPONSES TO PHQ9 QUESTIONS 1 & 2: 2
10. IF YOU CHECKED OFF ANY PROBLEMS, HOW DIFFICULT HAVE THESE PROBLEMS MADE IT FOR YOU TO DO YOUR WORK, TAKE CARE OF THINGS AT HOME, OR GET ALONG WITH OTHER PEOPLE: NOT DIFFICULT AT ALL
2. FEELING DOWN, DEPRESSED OR HOPELESS: SEVERAL DAYS
1. LITTLE INTEREST OR PLEASURE IN DOING THINGS: SEVERAL DAYS

## 2024-07-25 ASSESSMENT — SOCIAL DETERMINANTS OF HEALTH (SDOH)
WITHIN THE LAST YEAR, HAVE YOU BEEN AFRAID OF YOUR PARTNER OR EX-PARTNER?: NO
HOW HARD IS IT FOR YOU TO PAY FOR THE VERY BASICS LIKE FOOD, HOUSING, MEDICAL CARE, AND HEATING?: NOT HARD AT ALL
WITHIN THE LAST YEAR, HAVE YOU BEEN HUMILIATED OR EMOTIONALLY ABUSED IN OTHER WAYS BY YOUR PARTNER OR EX-PARTNER?: NO
WITHIN THE LAST YEAR, HAVE YOU BEEN KICKED, HIT, SLAPPED, OR OTHERWISE PHYSICALLY HURT BY YOUR PARTNER OR EX-PARTNER?: NO

## 2024-07-25 ASSESSMENT — COLUMBIA-SUICIDE SEVERITY RATING SCALE - C-SSRS
6. HAVE YOU EVER DONE ANYTHING, STARTED TO DO ANYTHING, OR PREPARED TO DO ANYTHING TO END YOUR LIFE?: NO
1. IN THE PAST MONTH, HAVE YOU WISHED YOU WERE DEAD OR WISHED YOU COULD GO TO SLEEP AND NOT WAKE UP?: NO
2. HAVE YOU ACTUALLY HAD ANY THOUGHTS OF KILLING YOURSELF?: NO

## 2024-07-25 ASSESSMENT — PAIN DESCRIPTION - DESCRIPTORS: DESCRIPTORS: ACHING;DULL

## 2024-07-25 ASSESSMENT — PAIN SCALES - GENERAL
PAINLEVEL_OUTOF10: 6
PAINLEVEL: 6

## 2024-07-25 ASSESSMENT — PAIN - FUNCTIONAL ASSESSMENT: PAIN_FUNCTIONAL_ASSESSMENT: 0-10

## 2024-07-25 NOTE — PROGRESS NOTES
Radiation Oncology Nursing Note    Prior Radiotherapy:  No  Radiation Treatments       No radiation treatments to show. (Treatments may have been administered in another system.)          Current Systemic Treatment:  No     Presence of Pacemaker or ICD:  No    History of Autoimmune or Connective Tissue Disorders:  No    Pain: The patient's current pain level was assessed.  They report currently having a pain of 6 out of 10.  They feel their pain is not under control with the use of pain medications.    Review of Systems:  Review of Systems - Oncology

## 2024-07-25 NOTE — PROGRESS NOTES
Radiation Oncology Outpatient Consult    Patient Name:  Brittny Gordon  MRN:  [id number]  :  1955    Referring Provider: Tommy Barrios*  Primary Care Provider: Jose Rafael Hart MD  Care Team: Patient Care Team:  Jose Rafael Hart MD as PCP - General (Internal Medicine)  Jose Rafael Hart MD as PCP - [facility] Attributed Provider  Camila Chaudhary MD MPH as Consulting Physician (Hematology and Oncology)    Date of Service: 2024     SUBJECTIVE  History of Present Illness:  Brittny Gordon is a 68 y.o. female who was referred by Tommy Barrios*, for a consultation to the [facility] Department of Radiation Oncology.  She is presenting for evaluation and management of suspicious findings concerning for metastatic lung cancer.     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 Gordon is a female with a history of aortic aneurysm replacement in  and was followed since 2019 with a left upper lobe nodule.  The patient had CT chest on 2019 which showed a noncalcified nodule in the left upper lobe adjacent to the aortic arch measuring 1.3 x 1.3 x 0.8 cm.  PET/CT completed on 2019 showed mild hypermetabolic activity.  The patient has had stable CT chest imaging until 2023.  A CT chest without contrast on 5/15/2023 showed increase in the size of the right upper lobe measuring 1.1 cm, new pulmonary nodules involving the left lower lobe, and right lower lobe.  There was noted to be vertebral compression fractures of multiple thoracic and lumbar vertebral body.  The patient underwent PET/CT on 2024 which revealed left upper lobe hypermetabolic region, spiculated right upper lobe lesion with max SUV 5.0, no avidity of subcentimeter nodules in the lower lobes and mediastinal and hilar lymphadenopathy with hypermetabolic right paratracheal lymph node with max SUV 5.0, right  hilar lymph node with hypermetabolic activity max SUV 5.0 and left hilar lymph node hypermetabolic with max SUV 4.0.  There were numerous osseous hypermetabolic lesions of the axial and appendicular skeleton.  The patient underwent brain MRI with and without contrast on 2024 which showed a enhancing nodule abutting the left lateral pontomedullary junction favoring saccular aneurysm arising likely from AICA.    The patient has met with thoracic surgery and medical oncology.  The patient is scheduled for EBUS/interventional navigational bronchoscopy on 2024 for tissue diagnosis.  Medical oncology is planning to refer the patient for bone biopsy to confirm metastatic disease.    The patient notes pain in her mid lumbar spine and bilateral hips with radiation to the front.    Prior Radiotherapy:  No radiation treatments to show. (Treatments may have been administered in another system.)       Past Medical History:    Past Medical History:   Diagnosis Date    Hypercholesteremia     Hypertension     [place] compression fracture of t11-T12 vertebra, sequela 2020    Compression fracture of T11 vertebra, sequela        Past Surgical History:    Past Surgical History:   Procedure Laterality Date    OTHER SURGICAL HISTORY  2013    Thoracic aortic aneurysm repair    OTHER SURGICAL HISTORY  2021    Colonoscopy    OTHER SURGICAL HISTORY  2020    [place] tooth extraction    RADIOFREQUENCY ABLATION      L3,4,5        Family History:  Cancer-related family history includes Lymphoma in her father. There is no history of Breast cancer or Colon cancer.    Social History:    Social History     Tobacco Use    Smoking status: Former     Current packs/day: 0.00     Average packs/day: 0.5 packs/day for 40.0 years (20.0 ttl pk-yrs)     Types: Cigarettes     Start date:      Quit date: 2013     Years since quittin.5    Smokeless tobacco: Never   Vaping Use    Vaping status: Never Used   Substance Use  Topics    Alcohol use: Yes     Comment: social    Drug use: Not Currently       Allergies:    Allergies   Allergen Reactions    Epinephrine Nausea/vomiting and Palpitations        Medications:    Current Outpatient Medications:     atorvastatin (Lipitor) 80 mg tablet, Take 1 tablet (80 mg) by mouth once daily., Disp: , Rfl:     cholecalciferol (Vitamin D3) 25 MCG (1000 UT) tablet, Take 1 tablet (1,000 Units) by mouth once daily., Disp: , Rfl:     cholestyramine (Questran) 4 gram packet, Take 1 packet (4 g) by mouth 2 times a day before meals. (Patient not taking: Reported on 6/25/2024), Disp: 60 packet, Rfl: 3    ibandronate (Boniva) 150 mg tablet, Take 1 tablet (150 mg) by mouth every 30 (thirty) days. Take in morning with full glass of water on an empty stomach. No food, drink, meds, or lying down for 60 minutes after., Disp: 3 tablet, Rfl: 3    losartan (Cozaar) 100 mg tablet, TAKE 1 TABLET BY MOUTH EVERY DAY, Disp: 90 tablet, Rfl: 3    meloxicam (Mobic) 7.5 mg tablet, Take 1 tablet (7.5 mg) by mouth once daily., Disp: 30 tablet, Rfl: 1    metoprolol succinate XL (Toprol-XL) 100 mg 24 hr tablet, TAKE 1 TABLET BY MOUTH EVERY DAY, Disp: 90 tablet, Rfl: 3    tiZANidine (Zanaflex) 2 mg tablet, Take 1 tablet (2 mg) by mouth as needed at bedtime for muscle spasms., Disp: 30 tablet, Rfl: 0    vit A/vit C/vit E/zinc/copper (PRESERVISION AREDS ORAL), Take 1 tablet by mouth early in the morning.., Disp: , Rfl:       Performance Status:  The Karnofsky performance scale today is 80, Normal activity with effort; some signs or symptoms of disease (ECOG equivalent 1).        OBJECTIVE  BP (!) 145/91 (BP Location: Left arm, Patient Position: Sitting, BP Cuff Size: Adult long)   Pulse 76   Temp 36.6 °C (97.9 °F) (Temporal)   Resp 16   Wt 64.5 kg (142 lb 4.9 oz)   SpO2 94%   BMI 22.29 kg/m²    Physical Exam  Constitutional:       Appearance: Normal appearance.   Eyes:      Extraocular Movements: Extraocular movements intact.    Pulmonary:      Effort: Pulmonary effort is normal.   Musculoskeletal:         General: Normal range of motion.   Neurological:      General: No focal deficit present.      Mental Status: She is alert.          Laboratory Review:  There are no laboratory contraindications to radiation therapy.    The pertinent lab results were reviewed and discussed with the patient.  Notably,     7/19/2024-BMP: Glucose 101 (H), creatinine 0.62  7/19/2024-CBC: Within normal limits    Pathology Review:  The pertinent pathology results were reviewed and discussed with the patient.  Notably,     8/5/2024 -navigational bronchoscopy/EBUS: Pending    Disease Associated Genomics:  Pending    Disease Tumor Markers:  xF Liquid Biopsy - 105 Genes: pending     Imaging:  The pertinent imaging results were reviewed and discussed with the patient.  Notably,     7/19/2024-MRI brain with and without contrast: There is a 7 x 6 mm enhancing nodule cranial to the left vertebral artery and abutting the left lateral pontomedullary junction, imaging favoring saccular aneurysm of AICA.  No evidence of additional abnormal intraparenchymal metastatic disease.    7/11/2024-PET/CT: No evidence of cervical lymphadenopathy.  Stable left upper lobe paravertebral nodule measuring 1.4 cm with max SUV 3.3.  Spiculated right upper lobe nodule measuring 1.1 cm with max SUV 5.0.  Additional subcentimeter nodules within the lower lobes demonstrate no avidity.  There are noted mediastinal and hilar lymph nodes with hypermetabolic activity including right lower paratracheal lymph node with max SUV 5.0, right hilar lymph node with max SUV 5.0 and left hilar lymph node with max SUV 4.0.  Noted prior median sternotomy and ascending aorta replacement with aneurysmal dilatation of the ascending thoracic aorta of 4.5 cm.  No evidence of abdominal pelvic hypermetabolic lymphadenopathy.  There are multiple hypermetabolic bone lesions throughout the axial and appendicular  skeleton including left clavicle with max SUV 3.8, distal left clavicle with max SUV 9.4, right fifth posterior rib with max SUV 6.1, right posterior 11th rib with max SUV 4.6, T12 vertebral body with max SUV 9.4, L1 vertebral body with max SUV 9.7, L3 vertebral body with max SUV 10.7, anterior L4 vertebral body with max SUV 6.9 and bilateral hypermetabolic lesions within the iliac bones with max SUV 7 and 6 to the left and right respectively.  Lytic lesion within the left inferior pubic ramus with max SUV 7.7, lytic lesion of the proximal right femur with max SUV 9.1 and avidity of the scar now representing postsurgical changes.    6/7/2024-CT chest without contrast: Interval increase of spiculated nodule in the right upper lobe measuring 1.1 cm previously 0.8 cm, multiple new pulmonary nodules including 3 mm nodule in the left lower lobe which is new, 4 mm nodule in the left lower lobe, 4 mm nodule in the left lower lobe, 4 mm nodule on the right lower lobe posterior medially and 4 mm nodule in the left lower lobe near the major fissure.  A 1.4 cm subpleural nodule in the left upper lobe similar to prior CT.  Status post median sternotomy and ascending aorta replacement, height loss of T7 vertebral body, T11 anteriorly, T12 centrally and L4 new compared to prior studies.    5/15/2023-CT chest without contrast: Multifocal bilateral linear atelectasis, solid-appearing nodule of the posterior right upper lobe measuring 8 mm previously measuring 6 mm in 2020, irregular nodule of the left upper lobe measuring 1.6 cm, anterior medial left upper lobe 5 mm nodule stable from 2020.  Calcified granuloma of the posterior lateral left upper lobe.  Stable postsurgical changes of the ascending thoracic aorta.  No evidence of mediastinal lymphadenopathy.     Prior Systemic Therapies:  None    Prior Surgeries:  None    Prior Radiation Therapy:   None    ASSESSMENT:   Brittny Gordon is a 68 y.o. female with suspected metastatic  right upper lobe lung cancer to the bone, cT1b cN3 cM1c.      I discussed with the patient regarding her clinical presentation, imaging and additional workup required to confirm diagnosis of suspected right upper lobe lung cancer with metastases to the hilum, mediastinum and bone.  I discussed with the patient regarding the suspicious PET findings that correlate to her low back pain and hip pain.  I discussed PET findings of concerning PET uptake in the right femur; however, the patient denies any functional pain with ambulation.    I discussed the role of palliative radiation therapy to sites of pain or possible compromise of neurological function or stability of bone.  I discussed with the patient regarding treatment of radiation therapy over the course of 1 to 2 weeks.  The patient is currently not taking any medications for her pain, and states ibuprofen has not helped.  I discussed alternative treatments for her pain while undergoing biopsy including initiation of prescription NSAID with possibility of progression to tramadol or oxycodone if pain persist.    The patient will undergo EBUS/navigation bronchoscopy for tissue biopsy.  Medical oncology is planning to refer the patient for bone biopsy to prove metastatic disease.  I discussed with the patient regarding the importance of continued medical oncology follow-up and systemic therapy dictated to histology and tumor genomic profiling.    The patient was found to have incidental saccular aneurysm on MRI brain with contrast.  The patient will be recommended for follow-up MRI brain in 6 months to document stability.  If the saccular aneurysms continues to grow, the patient will be referred to neurosurgery for discussion of options for aneurysm management.    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    Nagi Ospina MD  [facility] [place]  [facility] clinical  - Department of Radiation Oncology  Phone: [phone]  Fax: [phone]  Epic secure chat preferred / Pager [phone]    Note: This was transcribed using Dragon voice recognition software. Attempts were made to correct any errors; however, errors or omissions may be present.     NCCN Guidelines were applicable to guide this patients treatment plan.

## 2024-07-25 NOTE — PROGRESS NOTES
Patient seen for consult for possible lung cancer with bone mets. Patient reports back and hip pain for which she sees pain management. Compression fracture of T11 vertebra found 7.5.24 for which she is wearing a back brace. According to patient a possible left lung nodule was first identified in 2017 with [employer]. Patient released by Dr. Ospina without plan for treatment at this time, will return for follow up on 8.12.24.

## 2024-07-27 LAB
DNA RANGE(S) EXAMINED NAR: NORMAL
GENE DIS ANL INTERP-IMP: POSITIVE
GENE DIS ASSESSED: NORMAL
REASON FOR STUDY: NORMAL
TEMPUS BLOOD TUMOR MUTATIONAL BURDEN: 4.3 M/MB
TEMPUS LCA: NORMAL
TEMPUS MSI NOTE: NORMAL
TEMPUS PORTAL: NORMAL
TEMPUS TREATMENT IMPLICATIONS NOTE: NORMAL
TEMPUS TRIALCOUNT: 3
TEMPUS TRIALMATCHES1: NORMAL
TEMPUS TRIALMATCHES2: NORMAL
TEMPUS TRIALMATCHES3: NORMAL

## 2024-07-29 ENCOUNTER — TELEPHONE (OUTPATIENT)
Dept: ADMISSION | Facility: HOSPITAL | Age: 69
End: 2024-07-29
Payer: MEDICARE

## 2024-07-29 DIAGNOSIS — M62.838 MUSCLE SPASM: ICD-10-CM

## 2024-07-29 DIAGNOSIS — M47.817 LUMBOSACRAL SPONDYLOSIS WITHOUT MYELOPATHY: ICD-10-CM

## 2024-07-29 RX ORDER — TIZANIDINE 2 MG/1
2 TABLET ORAL NIGHTLY PRN
Qty: 30 TABLET | Refills: 0 | Status: SHIPPED | OUTPATIENT
Start: 2024-07-29 | End: 2024-08-02 | Stop reason: HOSPADM

## 2024-07-29 NOTE — TELEPHONE ENCOUNTER
Patient states she went to the ED last night for severe back pain.due to a compresssion fracture.  She says she was given norco but she wanted to make the office aware because she was only supplied three days worth. Patient has not yet picked up her prescription yet and therefore does not know if it is fully effective. Informed patient to call us for a refill if needed or if medication is ineffective and not helping with pain. Patient said labs and imaging can be requested from [facility] in [place]. Verbalized understanding and agreed to plan of care.

## 2024-07-31 ENCOUNTER — NURSE TRIAGE (OUTPATIENT)
Dept: HEMATOLOGY/ONCOLOGY | Facility: HOSPITAL | Age: 69
End: 2024-07-31
Payer: MEDICARE

## 2024-07-31 ENCOUNTER — APPOINTMENT (OUTPATIENT)
Dept: RADIOLOGY | Facility: HOSPITAL | Age: 69
End: 2024-07-31
Payer: MEDICARE

## 2024-07-31 ENCOUNTER — HOSPITAL ENCOUNTER (OUTPATIENT)
Facility: HOSPITAL | Age: 69
Setting detail: OBSERVATION
Discharge: HOME | End: 2024-08-02
Attending: INTERNAL MEDICINE | Admitting: INTERNAL MEDICINE
Payer: MEDICARE

## 2024-07-31 ENCOUNTER — APPOINTMENT (OUTPATIENT)
Dept: CARDIOLOGY | Facility: HOSPITAL | Age: 69
End: 2024-07-31
Payer: MEDICARE

## 2024-07-31 DIAGNOSIS — R52 INTRACTABLE PAIN: ICD-10-CM

## 2024-07-31 DIAGNOSIS — S22.080A COMPRESSION FRACTURE OF T11 VERTEBRA, INITIAL ENCOUNTER (MULTI): ICD-10-CM

## 2024-07-31 DIAGNOSIS — M54.50 BACK PAIN AT L4-L5 LEVEL: ICD-10-CM

## 2024-07-31 DIAGNOSIS — S32.000A COMPRESSION FRACTURE OF LUMBAR VERTEBRA, UNSPECIFIED LUMBAR VERTEBRAL LEVEL, INITIAL ENCOUNTER (MULTI): Primary | ICD-10-CM

## 2024-07-31 DIAGNOSIS — R91.1 LUNG NODULE: Primary | ICD-10-CM

## 2024-07-31 LAB
ALBUMIN SERPL BCP-MCNC: 4.1 G/DL (ref 3.4–5)
ALP SERPL-CCNC: 83 U/L (ref 33–136)
ALT SERPL W P-5'-P-CCNC: 22 U/L (ref 7–45)
ANION GAP SERPL CALC-SCNC: 12 MMOL/L (ref 10–20)
APPEARANCE UR: CLEAR
AST SERPL W P-5'-P-CCNC: 20 U/L (ref 9–39)
BASOPHILS # BLD AUTO: 0.04 X10*3/UL (ref 0–0.1)
BASOPHILS NFR BLD AUTO: 0.5 %
BILIRUB SERPL-MCNC: 0.7 MG/DL (ref 0–1.2)
BILIRUB UR STRIP.AUTO-MCNC: NEGATIVE MG/DL
BUN SERPL-MCNC: 11 MG/DL (ref 6–23)
CALCIUM SERPL-MCNC: 9.7 MG/DL (ref 8.6–10.3)
CHLORIDE SERPL-SCNC: 99 MMOL/L (ref 98–107)
CO2 SERPL-SCNC: 29 MMOL/L (ref 21–32)
COLOR UR: COLORLESS
CREAT SERPL-MCNC: 0.8 MG/DL (ref 0.5–1.05)
EGFRCR SERPLBLD CKD-EPI 2021: 81 ML/MIN/1.73M*2
EOSINOPHIL # BLD AUTO: 0.16 X10*3/UL (ref 0–0.7)
EOSINOPHIL NFR BLD AUTO: 2 %
ERYTHROCYTE [DISTWIDTH] IN BLOOD BY AUTOMATED COUNT: 12.4 % (ref 11.5–14.5)
GLUCOSE SERPL-MCNC: 103 MG/DL (ref 74–99)
GLUCOSE UR STRIP.AUTO-MCNC: NORMAL MG/DL
HCT VFR BLD AUTO: 43.2 % (ref 36–46)
HGB BLD-MCNC: 14.5 G/DL (ref 12–16)
IMM GRANULOCYTES # BLD AUTO: 0.02 X10*3/UL (ref 0–0.7)
IMM GRANULOCYTES NFR BLD AUTO: 0.3 % (ref 0–0.9)
KETONES UR STRIP.AUTO-MCNC: NEGATIVE MG/DL
LEUKOCYTE ESTERASE UR QL STRIP.AUTO: NEGATIVE
LIPASE SERPL-CCNC: 15 U/L (ref 9–82)
LYMPHOCYTES # BLD AUTO: 1.53 X10*3/UL (ref 1.2–4.8)
LYMPHOCYTES NFR BLD AUTO: 19.6 %
MCH RBC QN AUTO: 30.4 PG (ref 26–34)
MCHC RBC AUTO-ENTMCNC: 33.6 G/DL (ref 32–36)
MCV RBC AUTO: 91 FL (ref 80–100)
MONOCYTES # BLD AUTO: 0.63 X10*3/UL (ref 0.1–1)
MONOCYTES NFR BLD AUTO: 8.1 %
NEUTROPHILS # BLD AUTO: 5.43 X10*3/UL (ref 1.2–7.7)
NEUTROPHILS NFR BLD AUTO: 69.5 %
NITRITE UR QL STRIP.AUTO: NEGATIVE
NRBC BLD-RTO: 0 /100 WBCS (ref 0–0)
PH UR STRIP.AUTO: 6 [PH]
PLATELET # BLD AUTO: 193 X10*3/UL (ref 150–450)
POTASSIUM SERPL-SCNC: 3.9 MMOL/L (ref 3.5–5.3)
PROT SERPL-MCNC: 7.1 G/DL (ref 6.4–8.2)
PROT UR STRIP.AUTO-MCNC: NEGATIVE MG/DL
RBC # BLD AUTO: 4.77 X10*6/UL (ref 4–5.2)
RBC # UR STRIP.AUTO: ABNORMAL /UL
RBC #/AREA URNS AUTO: NORMAL /HPF
SODIUM SERPL-SCNC: 136 MMOL/L (ref 136–145)
SP GR UR STRIP.AUTO: 1
UROBILINOGEN UR STRIP.AUTO-MCNC: NORMAL MG/DL
WBC # BLD AUTO: 7.8 X10*3/UL (ref 4.4–11.3)
WBC #/AREA URNS AUTO: NORMAL /HPF

## 2024-07-31 PROCEDURE — 81001 URINALYSIS AUTO W/SCOPE: CPT | Performed by: PHYSICIAN ASSISTANT

## 2024-07-31 PROCEDURE — 80053 COMPREHEN METABOLIC PANEL: CPT | Performed by: INTERNAL MEDICINE

## 2024-07-31 PROCEDURE — G0378 HOSPITAL OBSERVATION PER HR: HCPCS

## 2024-07-31 PROCEDURE — 85025 COMPLETE CBC W/AUTO DIFF WBC: CPT | Performed by: INTERNAL MEDICINE

## 2024-07-31 PROCEDURE — 74177 CT ABD & PELVIS W/CONTRAST: CPT

## 2024-07-31 PROCEDURE — 2500000004 HC RX 250 GENERAL PHARMACY W/ HCPCS (ALT 636 FOR OP/ED): Performed by: STUDENT IN AN ORGANIZED HEALTH CARE EDUCATION/TRAINING PROGRAM

## 2024-07-31 PROCEDURE — 96374 THER/PROPH/DIAG INJ IV PUSH: CPT | Mod: 59

## 2024-07-31 PROCEDURE — 72131 CT LUMBAR SPINE W/O DYE: CPT | Performed by: RADIOLOGY

## 2024-07-31 PROCEDURE — 72131 CT LUMBAR SPINE W/O DYE: CPT

## 2024-07-31 PROCEDURE — 2500000004 HC RX 250 GENERAL PHARMACY W/ HCPCS (ALT 636 FOR OP/ED): Performed by: NURSE PRACTITIONER

## 2024-07-31 PROCEDURE — 96376 TX/PRO/DX INJ SAME DRUG ADON: CPT

## 2024-07-31 PROCEDURE — 2550000001 HC RX 255 CONTRASTS: Performed by: STUDENT IN AN ORGANIZED HEALTH CARE EDUCATION/TRAINING PROGRAM

## 2024-07-31 PROCEDURE — 96375 TX/PRO/DX INJ NEW DRUG ADDON: CPT

## 2024-07-31 PROCEDURE — 93005 ELECTROCARDIOGRAM TRACING: CPT

## 2024-07-31 PROCEDURE — 2500000001 HC RX 250 WO HCPCS SELF ADMINISTERED DRUGS (ALT 637 FOR MEDICARE OP): Performed by: NURSE PRACTITIONER

## 2024-07-31 PROCEDURE — 36415 COLL VENOUS BLD VENIPUNCTURE: CPT | Performed by: PHYSICIAN ASSISTANT

## 2024-07-31 PROCEDURE — 99285 EMERGENCY DEPT VISIT HI MDM: CPT

## 2024-07-31 PROCEDURE — 85025 COMPLETE CBC W/AUTO DIFF WBC: CPT | Performed by: PHYSICIAN ASSISTANT

## 2024-07-31 PROCEDURE — 87086 URINE CULTURE/COLONY COUNT: CPT | Mod: AHULAB | Performed by: PHYSICIAN ASSISTANT

## 2024-07-31 PROCEDURE — 99222 1ST HOSP IP/OBS MODERATE 55: CPT | Performed by: NURSE PRACTITIONER

## 2024-07-31 PROCEDURE — 81001 URINALYSIS AUTO W/SCOPE: CPT | Performed by: INTERNAL MEDICINE

## 2024-07-31 PROCEDURE — 83690 ASSAY OF LIPASE: CPT | Performed by: PHYSICIAN ASSISTANT

## 2024-07-31 PROCEDURE — 80053 COMPREHEN METABOLIC PANEL: CPT | Performed by: PHYSICIAN ASSISTANT

## 2024-07-31 PROCEDURE — 74177 CT ABD & PELVIS W/CONTRAST: CPT | Performed by: RADIOLOGY

## 2024-07-31 RX ORDER — ACETAMINOPHEN 325 MG/1
650 TABLET ORAL EVERY 4 HOURS PRN
Status: DISCONTINUED | OUTPATIENT
Start: 2024-07-31 | End: 2024-08-02 | Stop reason: HOSPADM

## 2024-07-31 RX ORDER — ENOXAPARIN SODIUM 100 MG/ML
40 INJECTION SUBCUTANEOUS EVERY 24 HOURS
Status: DISCONTINUED | OUTPATIENT
Start: 2024-07-31 | End: 2024-08-02 | Stop reason: HOSPADM

## 2024-07-31 RX ORDER — METHOCARBAMOL 500 MG/1
500 TABLET, FILM COATED ORAL EVERY 8 HOURS PRN
Status: DISCONTINUED | OUTPATIENT
Start: 2024-07-31 | End: 2024-08-01

## 2024-07-31 RX ORDER — ONDANSETRON HYDROCHLORIDE 2 MG/ML
4 INJECTION, SOLUTION INTRAVENOUS EVERY 8 HOURS PRN
Status: DISCONTINUED | OUTPATIENT
Start: 2024-07-31 | End: 2024-08-02 | Stop reason: HOSPADM

## 2024-07-31 RX ORDER — ORPHENADRINE CITRATE 30 MG/ML
30 INJECTION INTRAMUSCULAR; INTRAVENOUS ONCE
Status: COMPLETED | OUTPATIENT
Start: 2024-07-31 | End: 2024-07-31

## 2024-07-31 RX ORDER — ONDANSETRON 4 MG/1
4 TABLET, FILM COATED ORAL EVERY 8 HOURS PRN
Status: DISCONTINUED | OUTPATIENT
Start: 2024-07-31 | End: 2024-08-02 | Stop reason: HOSPADM

## 2024-07-31 RX ORDER — LOSARTAN POTASSIUM 50 MG/1
100 TABLET ORAL DAILY
Status: DISCONTINUED | OUTPATIENT
Start: 2024-07-31 | End: 2024-08-02 | Stop reason: HOSPADM

## 2024-07-31 RX ORDER — TRAMADOL HYDROCHLORIDE 50 MG/1
50 TABLET ORAL EVERY 8 HOURS PRN
Status: DISCONTINUED | OUTPATIENT
Start: 2024-07-31 | End: 2024-08-01

## 2024-07-31 RX ORDER — MORPHINE SULFATE 4 MG/ML
4 INJECTION, SOLUTION INTRAMUSCULAR; INTRAVENOUS ONCE
Status: COMPLETED | OUTPATIENT
Start: 2024-07-31 | End: 2024-07-31

## 2024-07-31 RX ORDER — ACETAMINOPHEN 325 MG/1
650 TABLET ORAL ONCE
Status: COMPLETED | OUTPATIENT
Start: 2024-07-31 | End: 2024-07-31

## 2024-07-31 RX ORDER — HYDROMORPHONE HYDROCHLORIDE 0.2 MG/ML
0.2 INJECTION INTRAMUSCULAR; INTRAVENOUS; SUBCUTANEOUS EVERY 4 HOURS PRN
Status: DISCONTINUED | OUTPATIENT
Start: 2024-07-31 | End: 2024-08-01

## 2024-07-31 RX ORDER — CETIRIZINE HYDROCHLORIDE 10 MG/1
10 TABLET ORAL DAILY PRN
COMMUNITY

## 2024-07-31 RX ORDER — ATORVASTATIN CALCIUM 80 MG/1
80 TABLET, FILM COATED ORAL DAILY
Status: DISCONTINUED | OUTPATIENT
Start: 2024-07-31 | End: 2024-08-02 | Stop reason: HOSPADM

## 2024-07-31 RX ORDER — ACETAMINOPHEN 160 MG/5ML
650 SOLUTION ORAL EVERY 4 HOURS PRN
Status: DISCONTINUED | OUTPATIENT
Start: 2024-07-31 | End: 2024-08-02 | Stop reason: HOSPADM

## 2024-07-31 RX ORDER — POLYETHYLENE GLYCOL 3350 17 G/17G
17 POWDER, FOR SOLUTION ORAL DAILY PRN
Status: DISCONTINUED | OUTPATIENT
Start: 2024-07-31 | End: 2024-08-02 | Stop reason: HOSPADM

## 2024-07-31 RX ORDER — PANTOPRAZOLE SODIUM 40 MG/1
40 TABLET, DELAYED RELEASE ORAL
Status: DISCONTINUED | OUTPATIENT
Start: 2024-08-01 | End: 2024-08-02 | Stop reason: HOSPADM

## 2024-07-31 RX ORDER — ONDANSETRON HYDROCHLORIDE 2 MG/ML
4 INJECTION, SOLUTION INTRAVENOUS ONCE
Status: COMPLETED | OUTPATIENT
Start: 2024-07-31 | End: 2024-07-31

## 2024-07-31 RX ORDER — METOPROLOL SUCCINATE 50 MG/1
100 TABLET, EXTENDED RELEASE ORAL DAILY
Status: DISCONTINUED | OUTPATIENT
Start: 2024-07-31 | End: 2024-08-02 | Stop reason: HOSPADM

## 2024-07-31 RX ORDER — ACETAMINOPHEN 650 MG/1
650 SUPPOSITORY RECTAL EVERY 4 HOURS PRN
Status: DISCONTINUED | OUTPATIENT
Start: 2024-07-31 | End: 2024-08-02 | Stop reason: HOSPADM

## 2024-07-31 RX ORDER — DOCUSATE SODIUM 100 MG/1
100 CAPSULE, LIQUID FILLED ORAL 2 TIMES DAILY
Status: DISCONTINUED | OUTPATIENT
Start: 2024-07-31 | End: 2024-08-02 | Stop reason: HOSPADM

## 2024-07-31 SDOH — SOCIAL STABILITY: SOCIAL INSECURITY: ABUSE: ADULT

## 2024-07-31 SDOH — SOCIAL STABILITY: SOCIAL INSECURITY: HAS ANYONE EVER THREATENED TO HURT YOUR FAMILY OR YOUR PETS?: NO

## 2024-07-31 SDOH — SOCIAL STABILITY: SOCIAL INSECURITY: DO YOU FEEL UNSAFE GOING BACK TO THE PLACE WHERE YOU ARE LIVING?: NO

## 2024-07-31 SDOH — SOCIAL STABILITY: SOCIAL INSECURITY: DO YOU FEEL ANYONE HAS EXPLOITED OR TAKEN ADVANTAGE OF YOU FINANCIALLY OR OF YOUR PERSONAL PROPERTY?: NO

## 2024-07-31 SDOH — SOCIAL STABILITY: SOCIAL INSECURITY: HAVE YOU HAD THOUGHTS OF HARMING ANYONE ELSE?: NO

## 2024-07-31 SDOH — SOCIAL STABILITY: SOCIAL INSECURITY: HAVE YOU HAD ANY THOUGHTS OF HARMING ANYONE ELSE?: NO

## 2024-07-31 SDOH — SOCIAL STABILITY: SOCIAL INSECURITY: ARE YOU OR HAVE YOU BEEN THREATENED OR ABUSED PHYSICALLY, EMOTIONALLY, OR SEXUALLY BY ANYONE?: NO

## 2024-07-31 SDOH — SOCIAL STABILITY: SOCIAL INSECURITY: ARE THERE ANY APPARENT SIGNS OF INJURIES/BEHAVIORS THAT COULD BE RELATED TO ABUSE/NEGLECT?: NO

## 2024-07-31 SDOH — SOCIAL STABILITY: SOCIAL INSECURITY: DOES ANYONE TRY TO KEEP YOU FROM HAVING/CONTACTING OTHER FRIENDS OR DOING THINGS OUTSIDE YOUR HOME?: NO

## 2024-07-31 SDOH — SOCIAL STABILITY: SOCIAL INSECURITY: WERE YOU ABLE TO COMPLETE ALL THE BEHAVIORAL HEALTH SCREENINGS?: YES

## 2024-07-31 ASSESSMENT — PAIN DESCRIPTION - ORIENTATION: ORIENTATION: LEFT;LOWER

## 2024-07-31 ASSESSMENT — ACTIVITIES OF DAILY LIVING (ADL)
PATIENT'S MEMORY ADEQUATE TO SAFELY COMPLETE DAILY ACTIVITIES?: YES
LACK_OF_TRANSPORTATION: NO
DRESSING YOURSELF: INDEPENDENT
WALKS IN HOME: NEEDS ASSISTANCE
JUDGMENT_ADEQUATE_SAFELY_COMPLETE_DAILY_ACTIVITIES: YES
HEARING - LEFT EAR: FUNCTIONAL
HEARING - RIGHT EAR: FUNCTIONAL
TOILETING: NEEDS ASSISTANCE
ADEQUATE_TO_COMPLETE_ADL: YES
GROOMING: INDEPENDENT
FEEDING YOURSELF: INDEPENDENT
BATHING: NEEDS ASSISTANCE

## 2024-07-31 ASSESSMENT — LIFESTYLE VARIABLES
HOW OFTEN DO YOU HAVE A DRINK CONTAINING ALCOHOL: MONTHLY OR LESS
AUDIT-C TOTAL SCORE: 1
HOW MANY STANDARD DRINKS CONTAINING ALCOHOL DO YOU HAVE ON A TYPICAL DAY: 1 OR 2
SKIP TO QUESTIONS 9-10: 1
AUDIT-C TOTAL SCORE: 1
HOW OFTEN DO YOU HAVE 6 OR MORE DRINKS ON ONE OCCASION: NEVER

## 2024-07-31 ASSESSMENT — COLUMBIA-SUICIDE SEVERITY RATING SCALE - C-SSRS
1. IN THE PAST MONTH, HAVE YOU WISHED YOU WERE DEAD OR WISHED YOU COULD GO TO SLEEP AND NOT WAKE UP?: NO
6. HAVE YOU EVER DONE ANYTHING, STARTED TO DO ANYTHING, OR PREPARED TO DO ANYTHING TO END YOUR LIFE?: NO
2. HAVE YOU ACTUALLY HAD ANY THOUGHTS OF KILLING YOURSELF?: NO

## 2024-07-31 ASSESSMENT — COGNITIVE AND FUNCTIONAL STATUS - GENERAL
MOBILITY SCORE: 18
WALKING IN HOSPITAL ROOM: A LITTLE
TURNING FROM BACK TO SIDE WHILE IN FLAT BAD: A LITTLE
PERSONAL GROOMING: A LITTLE
CLIMB 3 TO 5 STEPS WITH RAILING: A LITTLE
TOILETING: A LITTLE
HELP NEEDED FOR BATHING: A LITTLE
DRESSING REGULAR UPPER BODY CLOTHING: A LITTLE
STANDING UP FROM CHAIR USING ARMS: A LITTLE
MOVING FROM LYING ON BACK TO SITTING ON SIDE OF FLAT BED WITH BEDRAILS: A LITTLE
MOVING TO AND FROM BED TO CHAIR: A LITTLE
PATIENT BASELINE BEDBOUND: NO
EATING MEALS: A LITTLE
DRESSING REGULAR LOWER BODY CLOTHING: A LITTLE
DAILY ACTIVITIY SCORE: 18

## 2024-07-31 ASSESSMENT — PAIN SCALES - GENERAL
PAINLEVEL_OUTOF10: 8
PAINLEVEL_OUTOF10: 6
PAINLEVEL_OUTOF10: 7
PAINLEVEL_OUTOF10: 9
PAINLEVEL_OUTOF10: 8
PAINLEVEL_OUTOF10: 7

## 2024-07-31 ASSESSMENT — PAIN - FUNCTIONAL ASSESSMENT
PAIN_FUNCTIONAL_ASSESSMENT: 0-10

## 2024-07-31 ASSESSMENT — ENCOUNTER SYMPTOMS
BACK PAIN: 1
APPETITE CHANGE: 1

## 2024-07-31 ASSESSMENT — PATIENT HEALTH QUESTIONNAIRE - PHQ9
2. FEELING DOWN, DEPRESSED OR HOPELESS: NOT AT ALL
SUM OF ALL RESPONSES TO PHQ9 QUESTIONS 1 & 2: 0
1. LITTLE INTEREST OR PLEASURE IN DOING THINGS: NOT AT ALL

## 2024-07-31 ASSESSMENT — PAIN DESCRIPTION - PAIN TYPE: TYPE: CHRONIC PAIN

## 2024-07-31 ASSESSMENT — PAIN SCALES - WONG BAKER: WONGBAKER_NUMERICALRESPONSE: HURTS LITTLE MORE

## 2024-07-31 ASSESSMENT — PAIN DESCRIPTION - LOCATION
LOCATION: BACK
LOCATION: BACK

## 2024-07-31 NOTE — TELEPHONE ENCOUNTER
"Additional Information   Commented on: What else do you want to tell me about this problem?     Brittny calls today to report severe lower back pain that she rates currently as a \"15/10.\" She went to [facility] ED two days ago with this pain and was told that she has compression fractures and discharged to home with Norco. Pt states that the Norco helps somewhat with the pain but she also does not like how it makes her feel. She reports the pain as sharp and stabbing, non radiating and constant. Without pain medication it is a \"30/10\" and after taking Norco it is a \"15/10.\" She denies leg weakness, bowel or bladder incontinence, fever/chills, SOB or chest pain. She notes that she is still waiting to receive an official lung cancer with bone metastasis diagnosis and has a bronchoscopy scheduled for 8/5. She has met with both Dr. Chaudhary and Bhavesh Malloy (Radiation Oncology).    Protocols used: Pain    Patient states that she feels that \"something is very wrong\" and wants further workup at a  Facility. Pt asks if she can go to [facility] ED. Advised that she should go to the ED with pain this severe and unrelenting. She endorses increased difficulty walking. She will get a ride to [facility] this morning. Understanding verbalized with teach back.    [facility] ED notified via EMR.    Message sent to Dr. Chaudhary and Dr. Ospina.  "

## 2024-07-31 NOTE — PROGRESS NOTES
Pharmacy Medication History Review    Per patient . Was given Norco but made her sick and caused constipation . Doesn't want to take.     Brittny Gordon is a 68 y.o. female admitted for Back pain at L4-L5 level. Pharmacy reviewed the patient's [id number] medications and allergies for accuracy.    The list below reflectives the updated PTA list. Please review each medication in order reconciliation for additional clarification and justification.       The list below reflectives the updated allergy list. Please review each documented allergy for additional clarification and justification.  Allergies  Reviewed by Dea Newman PA-C on 7/31/2024        Severity Reactions Comments    Epinephrine Low Nausea/vomiting, Palpitations             Below are additional concerns with the patient's PTA list.  Prior to Admission Medications   Prescriptions Last Dose Informant   atorvastatin (Lipitor) 80 mg tablet 7/30/2024    Sig: Take 1 tablet (80 mg) by mouth once daily.   cetirizine (ZyrTEC) 10 mg tablet     Sig: Take 1 tablet (10 mg) by mouth once daily as needed for allergies.   cholecalciferol (Vitamin D3) 25 MCG (1000 UT) tablet 7/30/2024    Sig: Take 1 tablet (1,000 Units) by mouth once daily.              ibandronate (Boniva) 150 mg tablet 7/15/2024    Sig: Take 1 tablet (150 mg) by mouth every 30 (thirty) days. Take in morning with full glass of water on an empty stomach. No food, drink, meds, or lying down for 60 minutes after.   losartan (Cozaar) 100 mg tablet 7/30/2024    Sig: TAKE 1 TABLET BY MOUTH EVERY DAY              metoprolol succinate XL (Toprol-XL) 100 mg 24 hr tablet 7/30/2024    Sig: TAKE 1 TABLET BY MOUTH EVERY DAY   tiZANidine (Zanaflex) 2 mg tablet     Sig: TAKE 1 TABLET (2 MG) BY MOUTH AS NEEDED AT BEDTIME FOR MUSCLE SPASMS.   vit A/vit C/vit E/zinc/copper (PRESERVISION AREDS ORAL) 7/30/2024    Sig: Take 1 tablet by mouth early in the morning..      Facility-Administered Medications: None         Lrona Parmar

## 2024-07-31 NOTE — ED TRIAGE NOTES
Patient is a cancer patient who is not yet in treatment who presents to ED with pain. Patients back hurts badly, was seen recently given norco for pain is not tolerating meds well. Patient had x ray lower back and CT abdomen that were negative on 7/29. Is looking for a reason for the pain.

## 2024-07-31 NOTE — ED TRIAGE NOTES
TRIAGE NOTE   I saw the patient as the Clinician in Triage and performed a brief history and physical exam, established acuity, and ordered appropriate tests to develop basic plan of care. Patient will be seen by an [facility], resident and/or physician who will independently evaluate the patient. Please see subsequent provider notes for further details and disposition.     Brief HPI: In brief, Brittny Gordon is a 68 y.o. female that presents for left lateral abdominal wall pain that started on Sunday with no known injury.  Patient admits that she has diffuse bone cancer that is still undergoing workup before treatment is initiated.  She states she has no known cancer in this particular area.  She noticed the pain on Sunday and ultimately went to the ER on 7/29 and had a CT abdomen and pelvis which was negative.  She was given Norco to go home with but states that she is not tolerating this that she is very nauseous and generally feels unwell.  She also still has severe pain with no acute changes in symptoms.    Focused Physical exam:   Tender to palpation over the left mid abdomen in the mid axillary line with no overlying skin changes, crepitus, or deformity.  Not significantly tender over ribs.  Not worse with deep inspiration.  Vital signs normal.    Plan/MDM:   Patient CT scan showed diverticulosis with some mild distention but no diverticulitis.  Will plan to repeat today since pain has not improved as well as basic labs.    Please see subsequent provider note for further details and disposition     Dea Newman PA-C

## 2024-07-31 NOTE — ED PROVIDER NOTES
HPI   Chief Complaint   Patient presents with    Back Pain     Lower back pain prior injury, pain is on left side       Patient is a 68-year-old female with pertinent past medical history of metastatic cancer to the bone with multiple lumbar compression fractures and areas of bony insufficiency in the pelvis who presents with worsening back pain.  Patient states that she initially went to an outside emergency department, had completed x-rays which did not show any worsening and was provided Norco.  Since then she has been having worsening of her pain despite the Norco and has been taking it which has caused her to be constipated and is now having some abdominal discomfort.  She denies any numbness tingling or weakness, denies any change in her urination or bowel movements, notes she has not had any bladder or bowel incontinence.  Denies any inability to walk secondary to weakness.  She is having significant pain in her lower back though.  Denies any pain radiating into her chest, or into her lower legs.  Denies any sciatic type symptoms.  Denies any recent falls or trauma.  Denies any fevers, chills, cough congestion or rhinorrhea.  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    Wedge compression fracture of t11-T12 vertebra, sequela 07/30/2020    Compression fracture of T11 vertebra, sequela     Past Surgical History:   Procedure Laterality Date    ARTERIAL ANEURYSM REPAIR      Thoracic aortic aneurysm repair    COLONOSCOPY      RADIOFREQUENCY ABLATION      L3,4,5    WISDOM TOOTH EXTRACTION       Family History   Problem Relation Name Age of Onset    Lymphoma Father      Polycythemia Father      Breast cancer Neg Hx       Colon cancer Neg Hx       Social History     Tobacco Use    Smoking status: Former     Current packs/day: 0.00     Average packs/day: 0.5 packs/day for 40.0 years (20.0 ttl pk-yrs)     Types: Cigarettes     Start date:      Quit date: 2013     Years since quittin.5    Smokeless tobacco: Never   Vaping Use    Vaping status: Never Used   Substance Use Topics    Alcohol use: Yes     Comment: social    Drug use: Not Currently       Physical Exam   ED Triage Vitals   Temperature Heart Rate Respirations BP   24 0952 24 0952 24 0952 24 0952   37 °C (98.6 °F) 76 16 151/89      Pulse Ox Temp src Heart Rate Source Patient Position   24 0952 -- 24 1300 --   98 %  Monitor       BP Location FiO2 (%)     24 09 --     Left arm        Physical Exam  Vitals and nursing note reviewed.   Constitutional:       Appearance: She is not ill-appearing.   HENT:      Head: Atraumatic.      Nose: Nose normal.      Mouth/Throat:      Mouth: Mucous membranes are moist.      Pharynx: Oropharynx is clear.   Eyes:      Pupils: Pupils are equal, round, and reactive to light.   Cardiovascular:      Rate and Rhythm: Normal rate and regular rhythm.      Pulses: Normal pulses.   Pulmonary:      Effort: Pulmonary effort is normal.   Abdominal:      General: Abdomen is flat.      Palpations: Abdomen is soft.      Tenderness: There is no abdominal tenderness.   Musculoskeletal:         General: Tenderness (Appears uncomfortable midline lumbar spine as well as along the right SI joint and pelvis brim) present. No deformity.      Right lower leg: No edema.      Left lower leg: No edema.   Skin:     General: Skin is warm and dry.      Capillary Refill: Capillary refill takes less than 2 seconds.      Findings: No bruising.   Neurological:      General: No focal deficit present.      Mental Status: She is alert and oriented to person, place, and time.      Sensory: No sensory deficit.      Motor: No  weakness.           ED Course & MDM   ED Course as of 07/31/24 2012 Wed Jul 31, 2024   0793 Patient is a 68-year-old female with pertinent past medical history of metastatic cancer to the bone with multiple lumbar compression fractures and areas of bony insufficiency in the pelvis who presents with worsening back pain.  Patient states that she initially went to an outside emergency department, had completed x-rays which did not show any worsening and was provided Norco.  Since then she has been having worsening of her pain despite the Norco and has been taking it which has caused her to be constipated and is now having some abdominal discomfort.  She denies any numbness tingling or weakness, denies any change in her urination or bowel movements, notes she has not had any bladder or bowel incontinence.  Denies any inability to walk secondary to weakness.  She is having significant pain in her lower back though.  Denies any pain radiating into her chest, or into her lower legs.  Denies any sciatic type symptoms.  Denies any recent falls or trauma.  Denies any fevers, chills, cough congestion or rhinorrhea.  Denies any dysuria or hematuria    On exam, patient is resting comfortably no acute distress.  She is tender in the midline lumbar spine as well as along the left iliac crest.  Negative straight leg raise bilaterally, full strength and sensation to bilateral lower extremities.  Abdomen protuberant but otherwise soft and nontender.  Heart lungs otherwise clear to auscultation.  Not tachypneic or tachycardic.    Differential diagnosis includes but not limited to likely insufficiency fractures related to her metastatic disease process.  Could also consider nephrolithiasis with resulting hydro, UTI, pyelonephritis especially with radiation to the left side of her flank.  Will complete CT imaging to assess for worsening compression fractures and above pathology.  Will also complete basic blood work including  urinalysis to assess for underlying infection/Reese/ureterolithiasis. [KK]   1456 CT abdomen pelvis w IV contrast [KK]   1456 CT lumbar spine wo IV contrast  IMPRESSION:  2.6 cm cystic possible metastatic implant in the left lower quadrant.  Small soft tissue implant in the right lower quadrant unchanged from  PET-CT 07/11/2024.      Slight heterogeneity of both kidneys may be related to phase of  contrast however pyelonephritis in the proper clinical setting could  have a similar appearance.      Too small to characterize focal hypodensities in both kidneys and the  liver.      Sclerotic presumed metastatic lesions in both iliac bones. Multilevel  lumbar vertebral compression deformities and heterogeneity including  areas of sclerosis similar to 07/02/2024. More complete evaluation  for metastatic disease and pathologic fracture using MRI as  clinically warranted.      Nonspecific small subcutaneous nodule in the left lower back.      Additional findings as described above.   [KK]   1457 Leukocyte Esterase, Urine: NEGATIVE [KK]   1457 Nitrite, Urine: NEGATIVE [KK]   1457 WBC, Urine: 1-5 [KK]   1457 CT imaging showing no new acute fractures, suspect this is likely related to her compression deformities from previous causing worsening pain and medication nonadherence secondary to side effects [KK]   1533 Patient notes improvement of her symptoms and her back pain with medications, notes that the pain returned after being moved to and from the CAT scan her, she is concerned about going home at this time secondary to the pain.  With her CT imaging at her baseline, suspect this is just chronic pain from her compression deformities.  Will reach out in regards to possible TLSO bracing. [KK]   1622 Discussed the case with the observation team, Dr. Wild accepted the patient for observation for pain management [KK]      ED Course User Index  [KK] Sohail Guerrier,          Diagnoses as of 07/31/24 2012   Compression  fracture of lumbar vertebra, unspecified lumbar vertebral level, initial encounter (Multi)   Intractable pain                       Bartlett Coma Scale Score: 15                        Medical Decision Making  Patient presented with back pain with known metastatic cancer.  Intra-abdominal pathology as well as worsening metastases or insufficiency fractures completed.  Workup largely unremarkable.  Patient requiring opiates for pain management.  Did not realize she had TLSO bracing.  Due to her pain that is difficult to control outpatient and her nonadherence with TLSO bracing, patient admitted to observation for further pain management and TLSO fitting and bracing as indicated.        Procedure  Procedures     Sohail Guerrier DO  07/31/24 2012

## 2024-07-31 NOTE — H&P
History Of Present Illness  Brittny Gordon is a 68 y.o. female presenting with Back pain. She does have known compression fracture of T11 to which she saw pain management on 7/5/24 and was recommended a TSLO brace, conservative therapy was recommended. She denies actually receiving the TSLO brace. She endorses having mild back pain over the past 3-4 years. However in June he was gardening when she believes she developed the compression fracture. It was then during that time they diagnosed the lung cancer. Ever since Sunday she reports she has been having worsening back pain and severe muscle spasms. She reports she was given norco however she has not been taking it as it has made her very nauseous. She has been having a decreased appetite due to the nausea. Due to worsening pain and muscle spasms she presented to the ED.    PMHX: HTN, HLD, aortic aneurysm s/p repairment in 2012, newly diagnosed lung cancer with teresa mets and suspected bone mets, CAD.     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 Her smoking use included cigarettes. She started smoking about 51 years ago. She has a 20 pack-year smoking history. She has never used smokeless tobacco. She reports current alcohol use. She reports that she does not currently use drugs.    Family  "History  Family History   Problem Relation Name Age of Onset    Lymphoma Father      Polycythemia Father      Breast cancer Neg Hx      Colon cancer Neg Hx          Allergies  Epinephrine    Review of Systems   Constitutional:  Positive for appetite change.   Musculoskeletal:  Positive for back pain.   All other systems reviewed and are negative.       Physical Exam  Constitutional:       Appearance: Normal appearance.   Cardiovascular:      Rate and Rhythm: Normal rate and regular rhythm.      Pulses: Normal pulses.      Heart sounds: Normal heart sounds. No murmur heard.     No gallop.   Pulmonary:      Effort: Pulmonary effort is normal. No respiratory distress.      Breath sounds: Normal breath sounds. No wheezing or rhonchi.   Abdominal:      General: Abdomen is flat. Bowel sounds are normal. There is no distension.      Palpations: Abdomen is soft.      Tenderness: There is no abdominal tenderness. There is no guarding.   Musculoskeletal:         General: Normal range of motion.   Skin:     General: Skin is warm.      Capillary Refill: Capillary refill takes less than 2 seconds.   Neurological:      Mental Status: She is alert and oriented to person, place, and time.   Psychiatric:         Mood and Affect: Mood normal.         Thought Content: Thought content normal.         Judgment: Judgment normal.          Last Recorded Vitals  Blood pressure 139/86, pulse 70, temperature 37 °C (98.6 °F), resp. rate 15, height 1.676 m (5' 6\"), weight 77.1 kg (170 lb), SpO2 94%.    Relevant Results  Scheduled medications    Continuous medications    PRN medications    Results for orders placed or performed during the hospital encounter of 07/31/24 (from the past 24 hour(s))   CBC and Auto Differential   Result Value Ref Range    WBC 7.8 4.4 - 11.3 x10*3/uL    nRBC 0.0 0.0 - 0.0 /100 WBCs    RBC 4.77 4.00 - 5.20 x10*6/uL    Hemoglobin 14.5 12.0 - 16.0 g/dL    Hematocrit 43.2 36.0 - 46.0 %    MCV 91 80 - 100 fL    MCH 30.4 " 26.0 - 34.0 pg    MCHC 33.6 32.0 - 36.0 g/dL    RDW 12.4 11.5 - 14.5 %    Platelets 193 150 - 450 x10*3/uL    Neutrophils % 69.5 40.0 - 80.0 %    Immature Granulocytes %, Automated 0.3 0.0 - 0.9 %    Lymphocytes % 19.6 13.0 - 44.0 %    Monocytes % 8.1 2.0 - 10.0 %    Eosinophils % 2.0 0.0 - 6.0 %    Basophils % 0.5 0.0 - 2.0 %    Neutrophils Absolute 5.43 1.20 - 7.70 x10*3/uL    Immature Granulocytes Absolute, Automated 0.02 0.00 - 0.70 x10*3/uL    Lymphocytes Absolute 1.53 1.20 - 4.80 x10*3/uL    Monocytes Absolute 0.63 0.10 - 1.00 x10*3/uL    Eosinophils Absolute 0.16 0.00 - 0.70 x10*3/uL    Basophils Absolute 0.04 0.00 - 0.10 x10*3/uL   Comprehensive metabolic panel   Result Value Ref Range    Glucose 103 (H) 74 - 99 mg/dL    Sodium 136 136 - 145 mmol/L    Potassium 3.9 3.5 - 5.3 mmol/L    Chloride 99 98 - 107 mmol/L    Bicarbonate 29 21 - 32 mmol/L    Anion Gap 12 10 - 20 mmol/L    Urea Nitrogen 11 6 - 23 mg/dL    Creatinine 0.80 0.50 - 1.05 mg/dL    eGFR 81 >60 mL/min/1.73m*2    Calcium 9.7 8.6 - 10.3 mg/dL    Albumin 4.1 3.4 - 5.0 g/dL    Alkaline Phosphatase 83 33 - 136 U/L    Total Protein 7.1 6.4 - 8.2 g/dL    AST 20 9 - 39 U/L    Bilirubin, Total 0.7 0.0 - 1.2 mg/dL    ALT 22 7 - 45 U/L   Lipase   Result Value Ref Range    Lipase 15 9 - 82 U/L   Urinalysis with Reflex Culture and Microscopic   Result Value Ref Range    Color, Urine Colorless (N) Light-Yellow, Yellow, Dark-Yellow    Appearance, Urine Clear Clear    Specific Gravity, Urine 1.003 (N) 1.005 - 1.035    pH, Urine 6.0 5.0, 5.5, 6.0, 6.5, 7.0, 7.5, 8.0    Protein, Urine NEGATIVE NEGATIVE, 10 (TRACE), 20 (TRACE) mg/dL    Glucose, Urine Normal Normal mg/dL    Blood, Urine 0.03 (TRACE) (A) NEGATIVE    Ketones, Urine NEGATIVE NEGATIVE mg/dL    Bilirubin, Urine NEGATIVE NEGATIVE    Urobilinogen, Urine Normal Normal mg/dL    Nitrite, Urine NEGATIVE NEGATIVE    Leukocyte Esterase, Urine NEGATIVE NEGATIVE   Urinalysis Microscopic   Result Value Ref Range     WBC, Urine 1-5 1-5, NONE /HPF    RBC, Urine NONE NONE, 1-2, 3-5 /HPF     \CT abdomen pelvis w IV contrast    Result Date: 7/31/2024  Interpreted By:  Ruthie Thacker, STUDY: CT ABDOMEN PELVIS W IV CONTRAST; CT LUMBAR SPINE WO IV CONTRAST; 7/31/2024 2:24 pm   INDICATION: Signs/Symptoms:left sided abdominal pain; Signs/Symptoms:back pain hx of metastatic ca to bone.   COMPARISON: PET-CT 07/11/2024, CT lumbar spine 07/02/2024   ACCESSION NUMBER(S): [id number]; [id number]   ORDERING CLINICIAN: CATARINO TRIANA   TECHNIQUE: CT of the abdomen and pelvis was performed. Sagittal and coronal reconstructions were generated. Axial, sagittal and coronal images of the lumbar spine were generated. 75 ML; N/A Omnipaque 350; N/A intravenous contrast given for the exam.   FINDINGS:     ABDOMINAL ORGANS:   LIVER: 2 subcentimeter hypodense lesions in the right lobe.   GALL BLADDER AND BILIARY TREE: No calcified gallstone. No significant biliary dilatation.   SPLEEN: No focal lesion   PANCREAS: No focal lesion   ADRENALS: No adrenal mass   KIDNEYS AND URETERS: Slight heterogeneity of both kidneys may be related to phase of contrast. More focal/defined subcentimeter hypodensities in the left midpole and right inferior pole. No hydronephrosis.   BOWEL: No abnormally dilated large or small bowel loops. Moderate fecal debris in the right colon. Distal colon diverticulosis.   PERITONEUM, RETROPERITONEUM, NODES: No significant free fluid. No free air. No significant retroperitoneal lymphadenopathy. Slightly increased definition of 11 mm nodule in the right lower quadrant image 92/164. 2.6 x 2.3 cm low-density/cystic lesion contiguous with decompressed bowel loops in the left lower quadrant not definitively seen on the previous exam.   VESSELS:  Moderate atherosclerotic calcifications. No abdominal aortic aneurysm.   PELVIS: Urinary bladder is partially distended without focal wall thickening. The uterus is grossly normal in  contour. Mild soft tissue fullness and small dense calcification or surgical material in the left adnexa.   ABDOMINAL WALL: No sizable abdominal wall hernia.   BONES: Ill-defined sclerotic lesions in both iliac bones. Lumbar spine findings detailed below.   LOWER CHEST: 7 mm subcutaneous nodule in the left lower back image 19/164. Linear and dependent densities in both lung bases, left-greater-than-right.   LUMBAR SPINE:   ALIGNMENT: No significant spondylolisthesis.   VERTEBRAE AND DISC SPACES: Diffuse osteopenia. No acute compression deformity or acute displaced fracture. No newly apparent lytic or blastic lesion. Multiple findings similar to the prior exam include the following. Mild inferior endplate compression at T11. Superior endplate compression deformity with sclerotic margins/Schmorl's node at T12. Focal mild sclerosis at the superior endplate of L1. Mild compression deformity and smooth superior endplate depression with adjacent faint sclerosis at L2. Smooth superior L3 endplate compression deformity with adjacent heterogeneous and sclerosis. Subcentimeter sclerotic lesion in the posterior body of L3. Faint sclerotic lesion in the anterior body of L4.       2.6 cm cystic possible metastatic implant in the left lower quadrant. Small soft tissue implant in the right lower quadrant unchanged from PET-CT 07/11/2024.   Slight heterogeneity of both kidneys may be related to phase of contrast however pyelonephritis in the proper clinical setting could have a similar appearance.   Too small to characterize focal hypodensities in both kidneys and the liver.   Sclerotic presumed metastatic lesions in both iliac bones. Multilevel lumbar vertebral compression deformities and heterogeneity including areas of sclerosis similar to 07/02/2024. More complete evaluation for metastatic disease and pathologic fracture using MRI as clinically warranted.   Nonspecific small subcutaneous nodule in the left lower back.    Additional findings as described above.   MACRO: None.   Signed by: Ruthie Thacker 7/31/2024 2:51 PM Dictation workstation:   [id number]    CT lumbar spine wo IV contrast    Result Date: 7/31/2024  Interpreted By:  Ruthie Thacker, STUDY: CT ABDOMEN PELVIS W IV CONTRAST; CT LUMBAR SPINE WO IV CONTRAST; 7/31/2024 2:24 pm   INDICATION: Signs/Symptoms:left sided abdominal pain; Signs/Symptoms:back pain hx of metastatic ca to bone.   COMPARISON: PET-CT 07/11/2024, CT lumbar spine 07/02/2024   ACCESSION NUMBER(S): [id number]; [id number]   ORDERING CLINICIAN: CATARINO TRIANA   TECHNIQUE: CT of the abdomen and pelvis was performed. Sagittal and coronal reconstructions were generated. Axial, sagittal and coronal images of the lumbar spine were generated. 75 ML; N/A Omnipaque 350; N/A intravenous contrast given for the exam.   FINDINGS:     ABDOMINAL ORGANS:   LIVER: 2 subcentimeter hypodense lesions in the right lobe.   GALL BLADDER AND BILIARY TREE: No calcified gallstone. No significant biliary dilatation.   SPLEEN: No focal lesion   PANCREAS: No focal lesion   ADRENALS: No adrenal mass   KIDNEYS AND URETERS: Slight heterogeneity of both kidneys may be related to phase of contrast. More focal/defined subcentimeter hypodensities in the left midpole and right inferior pole. No hydronephrosis.   BOWEL: No abnormally dilated large or small bowel loops. Moderate fecal debris in the right colon. Distal colon diverticulosis.   PERITONEUM, RETROPERITONEUM, NODES: No significant free fluid. No free air. No significant retroperitoneal lymphadenopathy. Slightly increased definition of 11 mm nodule in the right lower quadrant image 92/164. 2.6 x 2.3 cm low-density/cystic lesion contiguous with decompressed bowel loops in the left lower quadrant not definitively seen on the previous exam.   VESSELS:  Moderate atherosclerotic calcifications. No abdominal aortic aneurysm.   PELVIS: Urinary bladder is partially distended  without focal wall thickening. The uterus is grossly normal in contour. Mild soft tissue fullness and small dense calcification or surgical material in the left adnexa.   ABDOMINAL WALL: No sizable abdominal wall hernia.   BONES: Ill-defined sclerotic lesions in both iliac bones. Lumbar spine findings detailed below.   LOWER CHEST: 7 mm subcutaneous nodule in the left lower back image 19/164. Linear and dependent densities in both lung bases, left-greater-than-right.   LUMBAR SPINE:   ALIGNMENT: No significant spondylolisthesis.   VERTEBRAE AND DISC SPACES: Diffuse osteopenia. No acute compression deformity or acute displaced fracture. No newly apparent lytic or blastic lesion. Multiple findings similar to the prior exam include the following. Mild inferior endplate compression at T11. Superior endplate compression deformity with sclerotic margins/Schmorl's node at T12. Focal mild sclerosis at the superior endplate of L1. Mild compression deformity and smooth superior endplate depression with adjacent faint sclerosis at L2. Smooth superior L3 endplate compression deformity with adjacent heterogeneous and sclerosis. Subcentimeter sclerotic lesion in the posterior body of L3. Faint sclerotic lesion in the anterior body of L4.       2.6 cm cystic possible metastatic implant in the left lower quadrant. Small soft tissue implant in the right lower quadrant unchanged from PET-CT 07/11/2024.   Slight heterogeneity of both kidneys may be related to phase of contrast however pyelonephritis in the proper clinical setting could have a similar appearance.   Too small to characterize focal hypodensities in both kidneys and the liver.   Sclerotic presumed metastatic lesions in both iliac bones. Multilevel lumbar vertebral compression deformities and heterogeneity including areas of sclerosis similar to 07/02/2024. More complete evaluation for metastatic disease and pathologic fracture using MRI as clinically warranted.    Nonspecific small subcutaneous nodule in the left lower back.   Additional findings as described above.   MACRO: None.   Signed by: Ruthie Thacker 7/31/2024 2:51 PM Dictation workstation:   [id number]    CT abdomen pelvis wo IV contrast    Result Date: 7/29/2024  * * *Final Report* * * DATE OF EXAM: Jul 29 2024  8:01AM   [facility]   [id number]  -  CT ABD/PEL WO IVCON  / ACCESSION #  [id number] PROCEDURE REASON: Nephrolithiasis, uncomplicated      * * * * Physician Interpretation * * * *  EXAMINATION:  CT ABDOMEN AND PELVIS WITHOUT IV CONTRAST CLINICAL HISTORY: Nephrolithiasis, uncomplicated. TECHNIQUE: Non-IV contrast imaging of the abdomen and pelvis was performed using standard technique, scanning from just above the dome of the diaphragm to the symphysis pubis.  Unenhanced imaging is limited for the evaluation of some intra-abdominal and pelvic pathology. MQ:  CTAPWO_3 Contrast: IV: None : ml of CT Radiation dose: Integrated Dose-length product (DLP) for this visit =   501.55 mGy*cm.  CT Dose Reduction Employed: Automated exposure control(AEC) and iterative recon COMPARISON: None. RESULT: Abdomen / Pelvis: Liver: Indeterminate 8 mm hypodensity inferiorly in the right lobe of the liver. Biliary: No calcified gallstones or biliary ductal dilatation. Spleen: No splenomegaly. Pancreas: Unremarkable. Adrenals: No mass. Kidneys: No calculus, hydronephrosis or finding to suggest a cyst or mass in the unenhanced kidney. GI Tract: The cecum is air-filled and distended with air and fecal material. No evidence of obstruction..  Diverticulosis. Lymph Nodes: No lymphadenopathy. Mesentery/peritoneum: No ascites. Retroperitoneum: No mass. Vasculature: Diffuse atherosclerotic vascular calcification. No aneurysm. Pelvis: No mass or ascites. Bones/Soft Tissues: The bones are osteopenic. Multiple compression deformities are noted with associated Schmorl's node formation and including T11-L3. For the most part, these appear chronic. The L3  fracture is probably subacute. The maximum height loss centrally is about 30%. There is no retropulsion. Multilevel degenerative disc disease with slight retrolisthesis of L2 on L3. Multilevel spinous process degenerative change. Multiple vertebral body hemangiomas. Nonspecific sclerotic lesions within the posterior ilium bilaterally measuring 2.7 cm on the left side and 1.0 cm on the right side. There is no associated soft tissue mass. Lower thorax: Minimal dependent atelectasis. Small hiatal hernia. Localizer images: No additional findings.    IMPRESSION: No acute intra-abdominal findings. No renal or ureteral calculi. Diverticulosis without evidence of diverticulitis. The cecum extends into the right midabdomen and is moderately distended with air and fecal material but no evidence of obstruction. Multiple lower thoracic and lumbar spine compression deformities with associated Schmorl's node formation. The L3 compression deformity is probably subacute. There is no retropulsion. Nonspecific sclerotic lesions within the posterior ilium bilaterally possibly areas of bone infarction. If there is clinical concern for metastatic disease consider MRI examination for further evaluation.. : ALYX   Transcribe Date/Time: Jul 29 2024  8:08A Dictated by : KATIA AMBRIZ MD This examination was interpreted and the report reviewed and electronically signed by: KATIA AMBRIZ MD on Jul 29 2024  8:28AM  EST    XR lumbar spine 2-3 views    Result Date: 7/29/2024  * * *Final Report* * * DATE OF EXAM: Jul 29 2024  4:17AM   [facility]   [id number]  -  XR LUMBAR 3V AP/LAT/L5-S1  / ACCESSION #  [id number] PROCEDURE REASON: Fracture      * * * * Physician Interpretation * * * *  EXAMINATION:  XR LUMBAR 3V AP/LAT/L5-S1 CLINICAL HISTORY: Fracture, Back pain Technique:   XR LUMBAR 3V AP/LAT/L5-S1 Comparison: None. RESULT: Dilated air-filled loops of bowel in the RIGHT upper abdomen concerning for cecal bascule.  CT could be performed to  further evaluate if clinically indicated.  Overall paucity of bowel gas throughout the remainder of the abdomen.    IMPRESSION: See result. : PSCGEOVANI   Transcribe Date/Time: Jul 29 2024  4:44A Dictated by : FOREIGN CUMMINGS MD This examination was interpreted and the report reviewed and electronically signed by: FOREIGN CUMMINGS MD on Jul 29 2024  4:58AM  EST    MR brain w and wo IV contrast    Result Date: 7/19/2024  Interpreted By:  Jacki Gray, STUDY: MR BRAIN W AND WO IV CONTRAST;  7/19/2024 7:50 pm   INDICATION: Signs/Symptoms:newly diagnosed lung cancer to r/o brain mets.   COMPARISON: None.   ACCESSION NUMBER(S): [id number]   ORDERING CLINICIAN: BARB GAMBOA   TECHNIQUE: Axial T2, FLAIR, DWI, gradient echo T2 and sagittal and coronal T1 weighted images of brain were acquired. Post contrast T1 weighted images were acquired after administration of 13 ML Dotarem gadolinium based intravenous contrast.   FINDINGS: CSF Spaces: The ventricles, sulci and basal cisterns enlarged, concordant with parenchymal volume loss.   Parenchyma: There is no diffusion restriction abnormality to suggest acute infarct.  Prominent perivascular spaces as well as probable remote lacunar infarcts. Mild-to-moderate non-specific white matter changes present. Patchy white-matter changes present within the regi. Generalized parenchymal volume loss present. No susceptibility artifact noted on gradient echo imaging. There is no mass effect or midline shift.   There is a 7 by 6 mm homogeneous enhancing nodule, cranial to the left intradural vertebral artery with arterial phase opacifications which abuts the left lateral pontomedullary junction. Otherwise no abnormal parenchymal   Paranasal Sinuses and Mastoids: Mild mucosal thickening in the ethmoid air cells and polyp or mucous retention cyst in the left maxillary sinus.       7 mm avidly enhancing extra-axial appearing nodule along the left pontine medullary junction adjacent to the  left vertebral artery. 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 Clinical correlation and attention on follow-up suggested. No additional abnormal enhancement identified.   Nonspecific white matter changes, remote lacunar infarcts and parenchymal volume loss.   MACRO: None   Signed by: Jacki Gray 7/19/2024 9:41 PM Dictation workstation:   [id number]    NM PET CT lung CA initial diagnosis    Result Date: 7/13/2024  Interpreted By:  Renay Lizarraga and Bera Kaustav STUDY: NM PET CT LUNG CA  INITIAL DIAGNOSIS;  7/11/2024 10:57 am   INDICATION: Signs/Symptoms:lung nodule.   COMPARISON: CT chest without contrast on 06/07/2024, 05/15/2023 PET-CT on 09/30/2019   ACCESSION NUMBER(S): [id number]   ORDERING CLINICIAN: ELTON DOUGLAS   TECHNIQUE: DIVISION OF NUCLEAR MEDICINE POSITRON EMISSION TOMOGRAPHY (PET-CT)   The patient received an intravenous dose of 12.3 mCi of Fluorine-18 fluorodeoxyglucose (FDG).  Positron emission tomographic (PET) images from mid thigh to skull base were then acquired after a one hour delay. Also acquired was a contemporaneous low dose non-contrast CT scan performed for attenuation correction of PET images and anatomic localization.  The PET and CT images were digitally fused for display.  All images were acquired on a combined PET-CT scanner unit. Some areas of FDG accumulation may be described in standardized uptake value (SUV) units.   CODING:   Subsequent Treatment Strategy (PS)   CALIBRATION: Dose Injection-to-Scan Interval (mins): 58 min Mediastinal bloodpool SUV (normal 1.5-2.5): 3.4 Blood glucose: 101 mg/dL   FINDINGS: HEAD AND NECK: *No evidence of focal FDG avid lesion in the partially visualized brain parenchyma, noting that evaluation is limited because of the expected physiologic diffuse FDG uptake in the brain. *No FDG avid cervical lymphadenopathy is present. * No paranasal sinus diease. * Thyroid gland is unremarkable.    CHEST: *There has been stable size of a left upper lobe paravertebral nodule measuring up to 1.4 cm, with mild FDG avidity with SUV max of 3.3. A 1.1 cm spiculated nodule within the right upper lobe has been enlarging, which previously measured up to 0.8 cm on CT from 2023 and demonstrates FDG avidity with SUV max of 5.0. additional subcentimeter nodules especially within the lower lobes do not demonstrate FDG avidity. *There are FDG avid mediastinal and hilar lymph nodes. For instance, right lower paratracheal lymph node with SUV max of 5.0, right hilar lymph node with SUV max of 5.0, left hilar lymph node with SUV max of 4.0. *Prior median sternotomy. *Patient is status post ascending aorta replacement. Aneurysmal dilatation of the ascending thoracic aorta measuring up to 4.5 cm is similar to prior.     ABDOMEN AND PELVIS: *No FDG avid lymphadenopathy. *Bilateral adrenal glands are unremarkable. *Physiologic radiotracer uptake is present in the liver and spleen with excretion into the bowel loops and the genitourinary tract.   MUSCULOSKELETAL: There are multiple FDG avid bone lesions throughout axial and appendicular skeleton, for example,  FDG avid focus within the proximal left clavicle with SUV max of 3.8, distal left clavicle with SUV max of 9.4 right 5th posterior rib with SUV max of 6.1, corresponding to lytic lesion, right posterior 11th rib with SUV max of 4.6, corresponding to heterogeneous lytic sclerotic lesion. There is focal FDG avidity within the T12 vertebral body with SUV max of 9.4, rightward aspect of the L1 vertebral body with SUV max of 9.7, L3 vertebral body extending to the right lamina with SUV max of 10.7, anterior aspect of the L4 vertebral body with SUV max of 6.9. There are bilateral hypermetabolic lesions within the iliac bones, corresponding to sclerotic lesions with SUV max of 7 on the left and 6 on the right. There is a lytic lesion within the left inferior pubic ramus with SUV max  of 7.7, lytic lesion within the proximal right femur with SUV max of 9.1. Mild FDG avidity within the sternum, likely from median sternotomy.       1. Enlarging FDG avid 1.1 cm spiculated nodule in the right upper lobe, as well as FDG avid mediastinal and bilateral hilar nodes is suspicious for a primary lung neoplasm with teresa metastases 2. Mild FDG avid left upper lobe paravertebral nodule, grossly stable in comparison to prior CT in 2019, favored to be benign. 3. Multiple FDG avid bone lesions throughout the axial and appendicular skeleton, likely representing widespread bone metastases. FDG avidity within the right ribs as well as lower thoracic and lumbar vertebral bodies as described above, could represent pathologic fractures in the setting of metastatic disease.   I personally reviewed the image(s) / study and agree with the findings and interpretation as stated. This study was interpreted at [facility].   Signed by: Renay Lizarraga 7/13/2024 2:05 PM Dictation workstation:   [id number]    CT lumbar spine wo IV contrast    Result Date: 7/2/2024  Interpreted By:  Mor Ludwig, STUDY: CT LUMBAR SPINE WO IV CONTRAST  7/2/2024 8:17 am   INDICATION: Signs/Symptoms:vertebral compression fracture   COMPARISON: Lumbar spine radiographs dated 06/20/2024. MRI lumbar spine dated 08/09/2021.   ACCESSION NUMBER(S): [id number]   ORDERING CLINICIAN: GERARD MAHONEY   TECHNIQUE: Axial CT images of the lumbar spine are obtained. Axial, coronal and sagittal reconstructions are provided for review.   FINDINGS: Normal segmentation. Suspected rudimentary rib arising from the leftward aspect of L1.   Diffusely decreased bone mineralization. There are multiple remote osteoporotic appearing compression fractures of the thoracic and lumbar spine.   There is anterior wedging and mild height loss of T11 estimated at up to 35% with no osseous retropulsion. There is mild anterior wedging of T12 estimated  at 25% with no osseous retropulsion. There is also Schmorl's node deformity involving the superior endplate of T12.   There is age-indeterminate, possibly acute, anterior superior endplate compression deformity of L1 with element of sclerosis versus trabecular impaction with minimal height loss and no osseous retropulsion.   There is remote appearing compression fracture of L2 as well as moderate to large Schmorl's node deformity involving the superior endplate with mild height loss and anterior wedging without osseous retropulsion.   There is suspected acute superior endplate compression fracture deformity of L3 with fragmentation and irregularity as well as possible central Schmorl's node deformity versus superior endplate depression. Centrally there is up to 40-50% vertebral body height loss. There is trace osseous retropulsion (1-2 mm).   There is possible subtle impaction injury involving the posterior aspect of the superior endplate of L4 (series 205, image 58) with mild irregularity and sclerosis. There is otherwise no significant height loss or traumatic abnormality of L4 apparent.   The L5 vertebral body is normal in height with no definite fracture component.   Suspected mild remote sacral insufficiency fracture with transverse component extending across S3.   Estimated 2 mm retrolisthesis of L2 on L3. There is slight subluxation of the left-greater-than-right facet joints. Alignment is otherwise maintained. Aforementioned multilevel Schmorl's node deformities. Mild degenerative disc height loss at L2-L3.   There is no CT apparent spinal canal stenosis or large epidural hematoma. Disc bulge and ligamentum flavum thickening is most pronounced at the L3-L4 level. There is mild-to-moderate neural foraminal narrowing involving the left-greater-than-right neural foramina at the L2-L3 level as well as mild left-greater-than-right neural foraminal narrowing suggested at L3-L4.   Element of mild fatty  atrophy/infiltration of the posterior paraspinal musculature. Severe atherosclerotic vascular calcification with normal caliber abdominal aorta. Colonic diverticulosis.       Diffusely decreased bone mineralization, mildly limiting assessment. There is suggestion of acute L3 osteoporotic compression fracture involving the superior endplate with superimposed central Schmorl's node deformity component as well resulting in up to 40-50% vertebral body height loss centrally with trace osseous retropulsion. There is trace retrolisthesis of L2 on L3 with slight subluxation of the left-greater-than-right facet joints. No evidence of spinal canal stenosis or large epidural hematoma.   Additionally, there is age-indeterminate, possibly acute, mild anterior superior endplate compression deformity of L1.   Suspected subtle, possibly acute, impaction injury involving the posterior aspect of the superior endplate of L4 with no measurable height loss.   Additional chronic osteoporotic appearing compression fractures of the remainder of the thoracic and lumbar spine as detailed above.   MACRO: None   Signed by: Mor Ludwig 7/2/2024 2:15 PM Dictation workstation:   [id number]        Assessment/Plan   Principal Problem:    Back pain at L4-L5 level    Brittny Gordon is a 68 y.o. female presenting with Back pain. She does have known compression fracture of T11 to which she saw pain management on 7/5/24 and was recommended a TSLO brace, conservative therapy was recommended. She denies actually receiving the TSLO brace. She endorses having mild back pain over the past 3-4 years. However in June he was gardening when she believes she developed the compression fracture. It was then during that time they diagnosed the lung cancer. Ever since Sunday she reports she has been having worsening back pain and severe muscle spasms. She reports she was given norco however she has not been taking it as it has made her very nauseous. She has  been having a decreased appetite due to the nausea. Due to worsening pain and muscle spasms she presented to the ED.    PMHX: HTN, HLD, aortic aneurysm s/p repairment in 2012, newly diagnosed lung cancer with teresa mets and suspected bone mets, CAD.     Back pain  -2/2 to bone mets  -saw radiation oncology 7/25/24: If pain persist despite medical management and radiation therapy, structural relief with the form of kyphoplasty/vertebroplasty may benefit given vertebral compression   -was Rx norco on 7/29/24 but reports it has not been helping but also causing nausea  -trial ultram and robaxin, dilaudid for breakthrough   -consider pain management consult  -CT showing mets that were seen on previous imaging-> not new  -labs unremarkable  -UA neg   -call in AM for TSLO brace     HTN/HLD  -resume home meds    DVT prophylaxis:  Lovenox, SCD    DC plan:  DC home when medically stable      I spent 45 minutes in the professional and overall care of this patient.      Mag Mosqueda, BOWEN-CNP

## 2024-08-01 ENCOUNTER — PRE-ADMISSION TESTING (OUTPATIENT)
Dept: PREADMISSION TESTING | Facility: HOSPITAL | Age: 69
End: 2024-08-01
Payer: MEDICARE

## 2024-08-01 LAB
ANION GAP SERPL CALC-SCNC: 11 MMOL/L (ref 10–20)
ATRIAL RATE: 74 BPM
BACTERIA UR CULT: NORMAL
BASOPHILS # BLD AUTO: 0.04 X10*3/UL (ref 0–0.1)
BASOPHILS NFR BLD AUTO: 0.5 %
BUN SERPL-MCNC: 14 MG/DL (ref 6–23)
CALCIUM SERPL-MCNC: 9.1 MG/DL (ref 8.6–10.3)
CHLORIDE SERPL-SCNC: 102 MMOL/L (ref 98–107)
CO2 SERPL-SCNC: 31 MMOL/L (ref 21–32)
CREAT SERPL-MCNC: 0.78 MG/DL (ref 0.5–1.05)
EGFRCR SERPLBLD CKD-EPI 2021: 83 ML/MIN/1.73M*2
EOSINOPHIL # BLD AUTO: 0.33 X10*3/UL (ref 0–0.7)
EOSINOPHIL NFR BLD AUTO: 4 %
ERYTHROCYTE [DISTWIDTH] IN BLOOD BY AUTOMATED COUNT: 12.4 % (ref 11.5–14.5)
GLUCOSE SERPL-MCNC: 102 MG/DL (ref 74–99)
HCT VFR BLD AUTO: 41.6 % (ref 36–46)
HGB BLD-MCNC: 13.5 G/DL (ref 12–16)
IMM GRANULOCYTES # BLD AUTO: 0.04 X10*3/UL (ref 0–0.7)
IMM GRANULOCYTES NFR BLD AUTO: 0.5 % (ref 0–0.9)
LYMPHOCYTES # BLD AUTO: 2.49 X10*3/UL (ref 1.2–4.8)
LYMPHOCYTES NFR BLD AUTO: 30.5 %
MCH RBC QN AUTO: 29.6 PG (ref 26–34)
MCHC RBC AUTO-ENTMCNC: 32.5 G/DL (ref 32–36)
MCV RBC AUTO: 91 FL (ref 80–100)
MONOCYTES # BLD AUTO: 0.96 X10*3/UL (ref 0.1–1)
MONOCYTES NFR BLD AUTO: 11.8 %
NEUTROPHILS # BLD AUTO: 4.31 X10*3/UL (ref 1.2–7.7)
NEUTROPHILS NFR BLD AUTO: 52.7 %
NRBC BLD-RTO: 0 /100 WBCS (ref 0–0)
P AXIS: 54 DEGREES
P OFFSET: 199 MS
P ONSET: 137 MS
PLATELET # BLD AUTO: 189 X10*3/UL (ref 150–450)
POTASSIUM SERPL-SCNC: 4.5 MMOL/L (ref 3.5–5.3)
PR INTERVAL: 176 MS
Q ONSET: 225 MS
QRS COUNT: 12 BEATS
QRS DURATION: 82 MS
QT INTERVAL: 380 MS
QTC CALCULATION(BAZETT): 421 MS
QTC FREDERICIA: 407 MS
R AXIS: 53 DEGREES
RBC # BLD AUTO: 4.56 X10*6/UL (ref 4–5.2)
SODIUM SERPL-SCNC: 139 MMOL/L (ref 136–145)
T AXIS: 93 DEGREES
T OFFSET: 415 MS
VENTRICULAR RATE: 74 BPM
WBC # BLD AUTO: 8.2 X10*3/UL (ref 4.4–11.3)

## 2024-08-01 PROCEDURE — 96376 TX/PRO/DX INJ SAME DRUG ADON: CPT

## 2024-08-01 PROCEDURE — G0378 HOSPITAL OBSERVATION PER HR: HCPCS

## 2024-08-01 PROCEDURE — 2500000004 HC RX 250 GENERAL PHARMACY W/ HCPCS (ALT 636 FOR OP/ED): Performed by: NURSE PRACTITIONER

## 2024-08-01 PROCEDURE — 99232 SBSQ HOSP IP/OBS MODERATE 35: CPT | Performed by: NURSE PRACTITIONER

## 2024-08-01 PROCEDURE — 82565 ASSAY OF CREATININE: CPT | Performed by: NURSE PRACTITIONER

## 2024-08-01 PROCEDURE — 96372 THER/PROPH/DIAG INJ SC/IM: CPT | Performed by: NURSE PRACTITIONER

## 2024-08-01 PROCEDURE — 2500000001 HC RX 250 WO HCPCS SELF ADMINISTERED DRUGS (ALT 637 FOR MEDICARE OP): Performed by: NURSE PRACTITIONER

## 2024-08-01 PROCEDURE — 85025 COMPLETE CBC W/AUTO DIFF WBC: CPT | Performed by: NURSE PRACTITIONER

## 2024-08-01 PROCEDURE — 2500000001 HC RX 250 WO HCPCS SELF ADMINISTERED DRUGS (ALT 637 FOR MEDICARE OP): Performed by: INTERNAL MEDICINE

## 2024-08-01 PROCEDURE — 2500000005 HC RX 250 GENERAL PHARMACY W/O HCPCS: Performed by: HOSPITALIST

## 2024-08-01 PROCEDURE — 2500000005 HC RX 250 GENERAL PHARMACY W/O HCPCS: Performed by: NURSE PRACTITIONER

## 2024-08-01 PROCEDURE — 36415 COLL VENOUS BLD VENIPUNCTURE: CPT | Performed by: NURSE PRACTITIONER

## 2024-08-01 RX ORDER — ACETAMINOPHEN AND CODEINE PHOSPHATE 300; 30 MG/1; MG/1
1 TABLET ORAL EVERY 8 HOURS PRN
Status: DISCONTINUED | OUTPATIENT
Start: 2024-08-01 | End: 2024-08-02 | Stop reason: HOSPADM

## 2024-08-01 RX ORDER — CYCLOBENZAPRINE HCL 5 MG
5 TABLET ORAL ONCE
Status: DISCONTINUED | OUTPATIENT
Start: 2024-08-01 | End: 2024-08-01

## 2024-08-01 RX ORDER — CYCLOBENZAPRINE HCL 5 MG
5 TABLET ORAL 3 TIMES DAILY
Status: DISCONTINUED | OUTPATIENT
Start: 2024-08-01 | End: 2024-08-01

## 2024-08-01 RX ORDER — ACETAMINOPHEN AND CODEINE PHOSPHATE 300; 30 MG/1; MG/1
1 TABLET ORAL EVERY 4 HOURS PRN
Status: DISCONTINUED | OUTPATIENT
Start: 2024-08-01 | End: 2024-08-01

## 2024-08-01 RX ORDER — CYCLOBENZAPRINE HCL 5 MG
5 TABLET ORAL 3 TIMES DAILY PRN
Status: DISCONTINUED | OUTPATIENT
Start: 2024-08-01 | End: 2024-08-02 | Stop reason: HOSPADM

## 2024-08-01 RX ORDER — OXYCODONE HYDROCHLORIDE 5 MG/1
5 TABLET ORAL EVERY 6 HOURS PRN
Status: DISCONTINUED | OUTPATIENT
Start: 2024-08-01 | End: 2024-08-01

## 2024-08-01 ASSESSMENT — PAIN SCALES - PAIN ASSESSMENT IN ADVANCED DEMENTIA (PAINAD): TOTALSCORE: MEDICATION (SEE MAR);COLD APPLIED

## 2024-08-01 ASSESSMENT — COGNITIVE AND FUNCTIONAL STATUS - GENERAL
MOBILITY SCORE: 20
DAILY ACTIVITIY SCORE: 20
HELP NEEDED FOR BATHING: A LITTLE
CLIMB 3 TO 5 STEPS WITH RAILING: A LITTLE
DRESSING REGULAR LOWER BODY CLOTHING: A LITTLE
DRESSING REGULAR UPPER BODY CLOTHING: A LITTLE
TOILETING: A LITTLE
STANDING UP FROM CHAIR USING ARMS: A LITTLE
WALKING IN HOSPITAL ROOM: A LITTLE
MOVING TO AND FROM BED TO CHAIR: A LITTLE

## 2024-08-01 ASSESSMENT — PAIN SCALES - GENERAL
PAINLEVEL_OUTOF10: 10 - WORST POSSIBLE PAIN
PAINLEVEL_OUTOF10: 7
PAINLEVEL_OUTOF10: 10 - WORST POSSIBLE PAIN
PAINLEVEL_OUTOF10: 7
PAINLEVEL_OUTOF10: 6
PAINLEVEL_OUTOF10: 8
PAINLEVEL_OUTOF10: 0 - NO PAIN
PAINLEVEL_OUTOF10: 6
PAINLEVEL_OUTOF10: 10 - WORST POSSIBLE PAIN

## 2024-08-01 ASSESSMENT — PAIN - FUNCTIONAL ASSESSMENT
PAIN_FUNCTIONAL_ASSESSMENT: 0-10

## 2024-08-01 ASSESSMENT — PAIN DESCRIPTION - LOCATION
LOCATION: BACK
LOCATION: BACK

## 2024-08-01 ASSESSMENT — PAIN SCALES - WONG BAKER: WONGBAKER_NUMERICALRESPONSE: HURTS LITTLE MORE

## 2024-08-01 ASSESSMENT — PAIN DESCRIPTION - ORIENTATION: ORIENTATION: LEFT;LOWER

## 2024-08-01 NOTE — CARE PLAN
The patient's goals for the shift include  get better with pain meds    The clinical goals for the shift include to decrease back pain    Over the shift, the patient did not make progress toward the following goals. Barriers to progression include NA. Recommendations to address these barriers include NA.

## 2024-08-01 NOTE — PROGRESS NOTES
08/01/24 1521   Discharge Planning   Type of Residence Private residence     Met with patient at bedside--patient was supposed to have preadmission testing for a bronchoscopy on Monday but missed it since she is admitted.  Patient was told in the ER by a man that she could definitely get that testing done while she was in obs.  Patient is not sure what testing is needed but thinks its cardiology clearance.  Our cardiology dept said they do not do cardiac clearance for outpatient procedures.    Since it was promised in the ER, we are trying to get the patient the testing [facility] needs.  Mag LUDWIG and I went to the [facility] to see if they could help.  They called [facility] but was not able to get a lot of information.  They were able to give us the name of the medical director to contact.  VANI Hathaway is attempting to get a hold of them.  I updated patient with the information we have learned so far.  There is a cardiac clearance note in her chart from a NP.  I gave patient a copy of that note.  Patient is pretty upset that she was promised  something and we have not been able to deliver.

## 2024-08-01 NOTE — CARE PLAN
The patient's goals for the shift include      Problem: Pain - Adult  Goal: Verbalizes/displays adequate comfort level or baseline comfort level  Outcome: Progressing     Problem: Discharge Planning  Goal: Discharge to home or other facility with appropriate resources  Outcome: Progressing       The clinical goals for the shift include    Problem: Safety - Adult  Goal: Free from fall injury  Outcome: Progressing     Problem: Chronic Conditions and Co-morbidities  Goal: Patient's chronic conditions and co-morbidity symptoms are monitored and maintained or improved  Outcome: Progressing     Problem: Pain  Goal: Free from opioid side effects throughout the shift  Outcome: Progressing     Problem: Pain  Goal: Free from acute confusion related to pain meds throughout the shift  Outcome: Progressing

## 2024-08-01 NOTE — PROGRESS NOTES
"Brittny Gordon is a 68 y.o. female on day 0 of admission presenting with Back pain at L4-L5 level.    Subjective   Awake in bed. Still endorsing pain. Reports the ultram did not touch her pain.        Objective     Physical Exam  Constitutional:       Appearance: Normal appearance.   Cardiovascular:      Rate and Rhythm: Normal rate and regular rhythm.      Pulses: Normal pulses.      Heart sounds: Normal heart sounds. No murmur heard.     No gallop.   Pulmonary:      Effort: Pulmonary effort is normal. No respiratory distress.      Breath sounds: Normal breath sounds. No wheezing or rhonchi.   Abdominal:      General: Abdomen is flat. Bowel sounds are normal. There is no distension.      Palpations: Abdomen is soft.      Tenderness: There is no abdominal tenderness. There is no guarding.   Musculoskeletal:         General: Normal range of motion.   Skin:     General: Skin is warm.      Capillary Refill: Capillary refill takes less than 2 seconds.   Neurological:      Mental Status: She is alert and oriented to person, place, and time.   Psychiatric:         Mood and Affect: Mood normal.         Thought Content: Thought content normal.         Judgment: Judgment normal.         Last Recorded Vitals  Blood pressure 118/72, pulse 63, temperature 36 °C (96.8 °F), temperature source Temporal, resp. rate 16, height 1.676 m (5' 6\"), weight 77.1 kg (170 lb), SpO2 90%.  Intake/Output last 3 Shifts:  No intake/output data recorded.    Relevant Results  Scheduled medications  atorvastatin, 80 mg, oral, Daily  docusate sodium, 100 mg, oral, BID  enoxaparin, 40 mg, subcutaneous, q24h  losartan, 100 mg, oral, Daily  metoprolol succinate XL, 100 mg, oral, Daily  pantoprazole, 40 mg, oral, Daily before breakfast      Continuous medications     PRN medications  PRN medications: acetaminophen **OR** acetaminophen **OR** acetaminophen, HYDROmorphone, lubricating eye drops, methocarbamol, ondansetron **OR** ondansetron, polyethylene " glycol, traMADol    Results for orders placed or performed during the hospital encounter of 07/31/24 (from the past 24 hour(s))   ECG 12 Lead   Result Value Ref Range    Ventricular Rate 74 BPM    Atrial Rate 74 BPM    AZ Interval 176 ms    QRS Duration 82 ms    QT Interval 380 ms    QTC Calculation(Bazett) 421 ms    P Axis 54 degrees    R Axis 53 degrees    T Axis 93 degrees    QRS Count 12 beats    Q Onset 225 ms    P Onset 137 ms    P Offset 199 ms    T Offset 415 ms    QTC Fredericia 407 ms   CBC and Auto Differential   Result Value Ref Range    WBC 7.8 4.4 - 11.3 x10*3/uL    nRBC 0.0 0.0 - 0.0 /100 WBCs    RBC 4.77 4.00 - 5.20 x10*6/uL    Hemoglobin 14.5 12.0 - 16.0 g/dL    Hematocrit 43.2 36.0 - 46.0 %    MCV 91 80 - 100 fL    MCH 30.4 26.0 - 34.0 pg    MCHC 33.6 32.0 - 36.0 g/dL    RDW 12.4 11.5 - 14.5 %    Platelets 193 150 - 450 x10*3/uL    Neutrophils % 69.5 40.0 - 80.0 %    Immature Granulocytes %, Automated 0.3 0.0 - 0.9 %    Lymphocytes % 19.6 13.0 - 44.0 %    Monocytes % 8.1 2.0 - 10.0 %    Eosinophils % 2.0 0.0 - 6.0 %    Basophils % 0.5 0.0 - 2.0 %    Neutrophils Absolute 5.43 1.20 - 7.70 x10*3/uL    Immature Granulocytes Absolute, Automated 0.02 0.00 - 0.70 x10*3/uL    Lymphocytes Absolute 1.53 1.20 - 4.80 x10*3/uL    Monocytes Absolute 0.63 0.10 - 1.00 x10*3/uL    Eosinophils Absolute 0.16 0.00 - 0.70 x10*3/uL    Basophils Absolute 0.04 0.00 - 0.10 x10*3/uL   Comprehensive metabolic panel   Result Value Ref Range    Glucose 103 (H) 74 - 99 mg/dL    Sodium 136 136 - 145 mmol/L    Potassium 3.9 3.5 - 5.3 mmol/L    Chloride 99 98 - 107 mmol/L    Bicarbonate 29 21 - 32 mmol/L    Anion Gap 12 10 - 20 mmol/L    Urea Nitrogen 11 6 - 23 mg/dL    Creatinine 0.80 0.50 - 1.05 mg/dL    eGFR 81 >60 mL/min/1.73m*2    Calcium 9.7 8.6 - 10.3 mg/dL    Albumin 4.1 3.4 - 5.0 g/dL    Alkaline Phosphatase 83 33 - 136 U/L    Total Protein 7.1 6.4 - 8.2 g/dL    AST 20 9 - 39 U/L    Bilirubin, Total 0.7 0.0 - 1.2 mg/dL     ALT 22 7 - 45 U/L   Lipase   Result Value Ref Range    Lipase 15 9 - 82 U/L   Urinalysis with Reflex Culture and Microscopic   Result Value Ref Range    Color, Urine Colorless (N) Light-Yellow, Yellow, Dark-Yellow    Appearance, Urine Clear Clear    Specific Gravity, Urine 1.003 (N) 1.005 - 1.035    pH, Urine 6.0 5.0, 5.5, 6.0, 6.5, 7.0, 7.5, 8.0    Protein, Urine NEGATIVE NEGATIVE, 10 (TRACE), 20 (TRACE) mg/dL    Glucose, Urine Normal Normal mg/dL    Blood, Urine 0.03 (TRACE) (A) NEGATIVE    Ketones, Urine NEGATIVE NEGATIVE mg/dL    Bilirubin, Urine NEGATIVE NEGATIVE    Urobilinogen, Urine Normal Normal mg/dL    Nitrite, Urine NEGATIVE NEGATIVE    Leukocyte Esterase, Urine NEGATIVE NEGATIVE   Urinalysis Microscopic   Result Value Ref Range    WBC, Urine 1-5 1-5, NONE /HPF    RBC, Urine NONE NONE, 1-2, 3-5 /HPF   Basic metabolic panel   Result Value Ref Range    Glucose 102 (H) 74 - 99 mg/dL    Sodium 139 136 - 145 mmol/L    Potassium 4.5 3.5 - 5.3 mmol/L    Chloride 102 98 - 107 mmol/L    Bicarbonate 31 21 - 32 mmol/L    Anion Gap 11 10 - 20 mmol/L    Urea Nitrogen 14 6 - 23 mg/dL    Creatinine 0.78 0.50 - 1.05 mg/dL    eGFR 83 >60 mL/min/1.73m*2    Calcium 9.1 8.6 - 10.3 mg/dL   CBC and Auto Differential   Result Value Ref Range    WBC 8.2 4.4 - 11.3 x10*3/uL    nRBC 0.0 0.0 - 0.0 /100 WBCs    RBC 4.56 4.00 - 5.20 x10*6/uL    Hemoglobin 13.5 12.0 - 16.0 g/dL    Hematocrit 41.6 36.0 - 46.0 %    MCV 91 80 - 100 fL    MCH 29.6 26.0 - 34.0 pg    MCHC 32.5 32.0 - 36.0 g/dL    RDW 12.4 11.5 - 14.5 %    Platelets 189 150 - 450 x10*3/uL    Neutrophils % 52.7 40.0 - 80.0 %    Immature Granulocytes %, Automated 0.5 0.0 - 0.9 %    Lymphocytes % 30.5 13.0 - 44.0 %    Monocytes % 11.8 2.0 - 10.0 %    Eosinophils % 4.0 0.0 - 6.0 %    Basophils % 0.5 0.0 - 2.0 %    Neutrophils Absolute 4.31 1.20 - 7.70 x10*3/uL    Immature Granulocytes Absolute, Automated 0.04 0.00 - 0.70 x10*3/uL    Lymphocytes Absolute 2.49 1.20 - 4.80  x10*3/uL    Monocytes Absolute 0.96 0.10 - 1.00 x10*3/uL    Eosinophils Absolute 0.33 0.00 - 0.70 x10*3/uL    Basophils Absolute 0.04 0.00 - 0.10 x10*3/uL     ECG 12 Lead    Result Date: 8/1/2024  Normal sinus rhythm Nonspecific ST and T wave abnormality Abnormal ECG When compared with ECG of 18-JUN-2024 14:30, No significant change was found    CT abdomen pelvis w IV contrast    Result Date: 7/31/2024  Interpreted By:  Ruthie Thacker, STUDY: CT ABDOMEN PELVIS W IV CONTRAST; CT LUMBAR SPINE WO IV CONTRAST; 7/31/2024 2:24 pm   INDICATION: Signs/Symptoms:left sided abdominal pain; Signs/Symptoms:back pain hx of metastatic ca to bone.   COMPARISON: PET-CT 07/11/2024, CT lumbar spine 07/02/2024   ACCESSION NUMBER(S): [id number]; [id number]   ORDERING CLINICIAN: CATARINO TRIANA   TECHNIQUE: CT of the abdomen and pelvis was performed. Sagittal and coronal reconstructions were generated. Axial, sagittal and coronal images of the lumbar spine were generated. 75 ML; N/A Omnipaque 350; N/A intravenous contrast given for the exam.   FINDINGS:     ABDOMINAL ORGANS:   LIVER: 2 subcentimeter hypodense lesions in the right lobe.   GALL BLADDER AND BILIARY TREE: No calcified gallstone. No significant biliary dilatation.   SPLEEN: No focal lesion   PANCREAS: No focal lesion   ADRENALS: No adrenal mass   KIDNEYS AND URETERS: Slight heterogeneity of both kidneys may be related to phase of contrast. More focal/defined subcentimeter hypodensities in the left midpole and right inferior pole. No hydronephrosis.   BOWEL: No abnormally dilated large or small bowel loops. Moderate fecal debris in the right colon. Distal colon diverticulosis.   PERITONEUM, RETROPERITONEUM, NODES: No significant free fluid. No free air. No significant retroperitoneal lymphadenopathy. Slightly increased definition of 11 mm nodule in the right lower quadrant image 92/164. 2.6 x 2.3 cm low-density/cystic lesion contiguous with decompressed bowel  loops in the left lower quadrant not definitively seen on the previous exam.   VESSELS:  Moderate atherosclerotic calcifications. No abdominal aortic aneurysm.   PELVIS: Urinary bladder is partially distended without focal wall thickening. The uterus is grossly normal in contour. Mild soft tissue fullness and small dense calcification or surgical material in the left adnexa.   ABDOMINAL WALL: No sizable abdominal wall hernia.   BONES: Ill-defined sclerotic lesions in both iliac bones. Lumbar spine findings detailed below.   LOWER CHEST: 7 mm subcutaneous nodule in the left lower back image 19/164. Linear and dependent densities in both lung bases, left-greater-than-right.   LUMBAR SPINE:   ALIGNMENT: No significant spondylolisthesis.   VERTEBRAE AND DISC SPACES: Diffuse osteopenia. No acute compression deformity or acute displaced fracture. No newly apparent lytic or blastic lesion. Multiple findings similar to the prior exam include the following. Mild inferior endplate compression at T11. Superior endplate compression deformity with sclerotic margins/Schmorl's node at T12. Focal mild sclerosis at the superior endplate of L1. Mild compression deformity and smooth superior endplate depression with adjacent faint sclerosis at L2. Smooth superior L3 endplate compression deformity with adjacent heterogeneous and sclerosis. Subcentimeter sclerotic lesion in the posterior body of L3. Faint sclerotic lesion in the anterior body of L4.       2.6 cm cystic possible metastatic implant in the left lower quadrant. Small soft tissue implant in the right lower quadrant unchanged from PET-CT 07/11/2024.   Slight heterogeneity of both kidneys may be related to phase of contrast however pyelonephritis in the proper clinical setting could have a similar appearance.   Too small to characterize focal hypodensities in both kidneys and the liver.   Sclerotic presumed metastatic lesions in both iliac bones. Multilevel lumbar vertebral  compression deformities and heterogeneity including areas of sclerosis similar to 07/02/2024. More complete evaluation for metastatic disease and pathologic fracture using MRI as clinically warranted.   Nonspecific small subcutaneous nodule in the left lower back.   Additional findings as described above.   MACRO: None.   Signed by: Ruthie Thacker 7/31/2024 2:51 PM Dictation workstation:   [id number]    CT lumbar spine wo IV contrast    Result Date: 7/31/2024  Interpreted By:  Ruthie Thacker, STUDY: CT ABDOMEN PELVIS W IV CONTRAST; CT LUMBAR SPINE WO IV CONTRAST; 7/31/2024 2:24 pm   INDICATION: Signs/Symptoms:left sided abdominal pain; Signs/Symptoms:back pain hx of metastatic ca to bone.   COMPARISON: PET-CT 07/11/2024, CT lumbar spine 07/02/2024   ACCESSION NUMBER(S): [id number]; [id number]   ORDERING CLINICIAN: CATARINO TRIANA   TECHNIQUE: CT of the abdomen and pelvis was performed. Sagittal and coronal reconstructions were generated. Axial, sagittal and coronal images of the lumbar spine were generated. 75 ML; N/A Omnipaque 350; N/A intravenous contrast given for the exam.   FINDINGS:     ABDOMINAL ORGANS:   LIVER: 2 subcentimeter hypodense lesions in the right lobe.   GALL BLADDER AND BILIARY TREE: No calcified gallstone. No significant biliary dilatation.   SPLEEN: No focal lesion   PANCREAS: No focal lesion   ADRENALS: No adrenal mass   KIDNEYS AND URETERS: Slight heterogeneity of both kidneys may be related to phase of contrast. More focal/defined subcentimeter hypodensities in the left midpole and right inferior pole. No hydronephrosis.   BOWEL: No abnormally dilated large or small bowel loops. Moderate fecal debris in the right colon. Distal colon diverticulosis.   PERITONEUM, RETROPERITONEUM, NODES: No significant free fluid. No free air. No significant retroperitoneal lymphadenopathy. Slightly increased definition of 11 mm nodule in the right lower quadrant image 92/164. 2.6 x 2.3 cm  low-density/cystic lesion contiguous with decompressed bowel loops in the left lower quadrant not definitively seen on the previous exam.   VESSELS:  Moderate atherosclerotic calcifications. No abdominal aortic aneurysm.   PELVIS: Urinary bladder is partially distended without focal wall thickening. The uterus is grossly normal in contour. Mild soft tissue fullness and small dense calcification or surgical material in the left adnexa.   ABDOMINAL WALL: No sizable abdominal wall hernia.   BONES: Ill-defined sclerotic lesions in both iliac bones. Lumbar spine findings detailed below.   LOWER CHEST: 7 mm subcutaneous nodule in the left lower back image 19/164. Linear and dependent densities in both lung bases, left-greater-than-right.   LUMBAR SPINE:   ALIGNMENT: No significant spondylolisthesis.   VERTEBRAE AND DISC SPACES: Diffuse osteopenia. No acute compression deformity or acute displaced fracture. No newly apparent lytic or blastic lesion. Multiple findings similar to the prior exam include the following. Mild inferior endplate compression at T11. Superior endplate compression deformity with sclerotic margins/Schmorl's node at T12. Focal mild sclerosis at the superior endplate of L1. Mild compression deformity and smooth superior endplate depression with adjacent faint sclerosis at L2. Smooth superior L3 endplate compression deformity with adjacent heterogeneous and sclerosis. Subcentimeter sclerotic lesion in the posterior body of L3. Faint sclerotic lesion in the anterior body of L4.       2.6 cm cystic possible metastatic implant in the left lower quadrant. Small soft tissue implant in the right lower quadrant unchanged from PET-CT 07/11/2024.   Slight heterogeneity of both kidneys may be related to phase of contrast however pyelonephritis in the proper clinical setting could have a similar appearance.   Too small to characterize focal hypodensities in both kidneys and the liver.   Sclerotic presumed  metastatic lesions in both iliac bones. Multilevel lumbar vertebral compression deformities and heterogeneity including areas of sclerosis similar to 07/02/2024. More complete evaluation for metastatic disease and pathologic fracture using MRI as clinically warranted.   Nonspecific small subcutaneous nodule in the left lower back.   Additional findings as described above.   MACRO: None.   Signed by: Ruthie Thacker 7/31/2024 2:51 PM Dictation workstation:   [id number]    CT abdomen pelvis wo IV contrast    Result Date: 7/29/2024  * * *Final Report* * * DATE OF EXAM: Jul 29 2024  8:01AM   [facility]   [id number]  -  CT ABD/PEL WO IVCON  / ACCESSION #  [id number] PROCEDURE REASON: Nephrolithiasis, uncomplicated      * * * * Physician Interpretation * * * *  EXAMINATION:  CT ABDOMEN AND PELVIS WITHOUT IV CONTRAST CLINICAL HISTORY: Nephrolithiasis, uncomplicated. TECHNIQUE: Non-IV contrast imaging of the abdomen and pelvis was performed using standard technique, scanning from just above the dome of the diaphragm to the symphysis pubis.  Unenhanced imaging is limited for the evaluation of some intra-abdominal and pelvic pathology. MQ:  CTAPWO_3 Contrast: IV: None : ml of CT Radiation dose: Integrated Dose-length product (DLP) for this visit =   501.55 mGy*cm.  CT Dose Reduction Employed: Automated exposure control(AEC) and iterative recon COMPARISON: None. RESULT: Abdomen / Pelvis: Liver: Indeterminate 8 mm hypodensity inferiorly in the right lobe of the liver. Biliary: No calcified gallstones or biliary ductal dilatation. Spleen: No splenomegaly. Pancreas: Unremarkable. Adrenals: No mass. Kidneys: No calculus, hydronephrosis or finding to suggest a cyst or mass in the unenhanced kidney. GI Tract: The cecum is air-filled and distended with air and fecal material. No evidence of obstruction..  Diverticulosis. Lymph Nodes: No lymphadenopathy. Mesentery/peritoneum: No ascites. Retroperitoneum: No mass. Vasculature: Diffuse atherosclerotic  vascular calcification. No aneurysm. Pelvis: No mass or ascites. Bones/Soft Tissues: The bones are osteopenic. Multiple compression deformities are noted with associated Schmorl's node formation and including T11-L3. For the most part, these appear chronic. The L3 fracture is probably subacute. The maximum height loss centrally is about 30%. There is no retropulsion. Multilevel degenerative disc disease with slight retrolisthesis of L2 on L3. Multilevel spinous process degenerative change. Multiple vertebral body hemangiomas. Nonspecific sclerotic lesions within the posterior ilium bilaterally measuring 2.7 cm on the left side and 1.0 cm on the right side. There is no associated soft tissue mass. Lower thorax: Minimal dependent atelectasis. Small hiatal hernia. Localizer images: No additional findings.    IMPRESSION: No acute intra-abdominal findings. No renal or ureteral calculi. Diverticulosis without evidence of diverticulitis. The cecum extends into the right midabdomen and is moderately distended with air and fecal material but no evidence of obstruction. Multiple lower thoracic and lumbar spine compression deformities with associated Schmorl's node formation. The L3 compression deformity is probably subacute. There is no retropulsion. Nonspecific sclerotic lesions within the posterior ilium bilaterally possibly areas of bone infarction. If there is clinical concern for metastatic disease consider MRI examination for further evaluation.. : ALYX   Transcribe Date/Time: Jul 29 2024  8:08A Dictated by : KATIA AMBRIZ MD This examination was interpreted and the report reviewed and electronically signed by: KATIA AMBRIZ MD on Jul 29 2024  8:28AM  EST    XR lumbar spine 2-3 views    Result Date: 7/29/2024  * * *Final Report* * * DATE OF EXAM: Jul 29 2024  4:17AM   [facility]   [id number]  -  XR LUMBAR 3V AP/LAT/L5-S1  / ACCESSION #  [id number] PROCEDURE REASON: Fracture      * * * * Physician Interpretation * *  * *  EXAMINATION:  XR LUMBAR 3V AP/LAT/L5-S1 CLINICAL HISTORY: Fracture, Back pain Technique:   XR LUMBAR 3V AP/LAT/L5-S1 Comparison: None. RESULT: Dilated air-filled loops of bowel in the RIGHT upper abdomen concerning for cecal bascule.  CT could be performed to further evaluate if clinically indicated.  Overall paucity of bowel gas throughout the remainder of the abdomen.    IMPRESSION: See result. : PSCB   Transcribe Date/Time: Jul 29 2024  4:44A Dictated by : FOREIGN CUMMINGS MD This examination was interpreted and the report reviewed and electronically signed by: FOREIGN CUMMINGS MD on Jul 29 2024  4:58AM  EST    MR brain w and wo IV contrast    Result Date: 7/19/2024  Interpreted By:  Jacki Gray, STUDY: MR BRAIN W AND WO IV CONTRAST;  7/19/2024 7:50 pm   INDICATION: Signs/Symptoms:newly diagnosed lung cancer to r/o brain mets.   COMPARISON: None.   ACCESSION NUMBER(S): [id number]   ORDERING CLINICIAN: BARB GAMBOA   TECHNIQUE: Axial T2, FLAIR, DWI, gradient echo T2 and sagittal and coronal T1 weighted images of brain were acquired. Post contrast T1 weighted images were acquired after administration of 13 ML Dotarem gadolinium based intravenous contrast.   FINDINGS: CSF Spaces: The ventricles, sulci and basal cisterns enlarged, concordant with parenchymal volume loss.   Parenchyma: There is no diffusion restriction abnormality to suggest acute infarct.  Prominent perivascular spaces as well as probable remote lacunar infarcts. Mild-to-moderate non-specific white matter changes present. Patchy white-matter changes present within the regi. Generalized parenchymal volume loss present. No susceptibility artifact noted on gradient echo imaging. There is no mass effect or midline shift.   There is a 7 by 6 mm homogeneous enhancing nodule, cranial to the left intradural vertebral artery with arterial phase opacifications which abuts the left lateral pontomedullary junction. Otherwise no abnormal  parenchymal   Paranasal Sinuses and Mastoids: Mild mucosal thickening in the ethmoid air cells and polyp or mucous retention cyst in the left maxillary sinus.       7 mm avidly enhancing extra-axial appearing nodule along the left pontine medullary junction adjacent to the left vertebral artery. 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 Clinical correlation and attention on follow-up suggested. No additional abnormal enhancement identified.   Nonspecific white matter changes, remote lacunar infarcts and parenchymal volume loss.   MACRO: None   Signed by: Jacki Gray 7/19/2024 9:41 PM Dictation workstation:   [id number]    NM PET CT lung CA initial diagnosis    Result Date: 7/13/2024  Interpreted By:  Renay Lizarraga and Bera Kaustav STUDY: NM PET CT LUNG CA  INITIAL DIAGNOSIS;  7/11/2024 10:57 am   INDICATION: Signs/Symptoms:lung nodule.   COMPARISON: CT chest without contrast on 06/07/2024, 05/15/2023 PET-CT on 09/30/2019   ACCESSION NUMBER(S): [id number]   ORDERING CLINICIAN: ELTON DOUGLAS   TECHNIQUE: DIVISION OF NUCLEAR MEDICINE POSITRON EMISSION TOMOGRAPHY (PET-CT)   The patient received an intravenous dose of 12.3 mCi of Fluorine-18 fluorodeoxyglucose (FDG).  Positron emission tomographic (PET) images from mid thigh to skull base were then acquired after a one hour delay. Also acquired was a contemporaneous low dose non-contrast CT scan performed for attenuation correction of PET images and anatomic localization.  The PET and CT images were digitally fused for display.  All images were acquired on a combined PET-CT scanner unit. Some areas of FDG accumulation may be described in standardized uptake value (SUV) units.   CODING:   Subsequent Treatment Strategy (PS)   CALIBRATION: Dose Injection-to-Scan Interval (mins): 58 min Mediastinal bloodpool SUV (normal 1.5-2.5): 3.4 Blood glucose: 101 mg/dL   FINDINGS: HEAD AND NECK: *No evidence of focal FDG avid  lesion in the partially visualized brain parenchyma, noting that evaluation is limited because of the expected physiologic diffuse FDG uptake in the brain. *No FDG avid cervical lymphadenopathy is present. * No paranasal sinus diease. * Thyroid gland is unremarkable.   CHEST: *There has been stable size of a left upper lobe paravertebral nodule measuring up to 1.4 cm, with mild FDG avidity with SUV max of 3.3. A 1.1 cm spiculated nodule within the right upper lobe has been enlarging, which previously measured up to 0.8 cm on CT from 2023 and demonstrates FDG avidity with SUV max of 5.0. additional subcentimeter nodules especially within the lower lobes do not demonstrate FDG avidity. *There are FDG avid mediastinal and hilar lymph nodes. For instance, right lower paratracheal lymph node with SUV max of 5.0, right hilar lymph node with SUV max of 5.0, left hilar lymph node with SUV max of 4.0. *Prior median sternotomy. *Patient is status post ascending aorta replacement. Aneurysmal dilatation of the ascending thoracic aorta measuring up to 4.5 cm is similar to prior.     ABDOMEN AND PELVIS: *No FDG avid lymphadenopathy. *Bilateral adrenal glands are unremarkable. *Physiologic radiotracer uptake is present in the liver and spleen with excretion into the bowel loops and the genitourinary tract.   MUSCULOSKELETAL: There are multiple FDG avid bone lesions throughout axial and appendicular skeleton, for example,  FDG avid focus within the proximal left clavicle with SUV max of 3.8, distal left clavicle with SUV max of 9.4 right 5th posterior rib with SUV max of 6.1, corresponding to lytic lesion, right posterior 11th rib with SUV max of 4.6, corresponding to heterogeneous lytic sclerotic lesion. There is focal FDG avidity within the T12 vertebral body with SUV max of 9.4, rightward aspect of the L1 vertebral body with SUV max of 9.7, L3 vertebral body extending to the right lamina with SUV max of 10.7, anterior aspect of  the L4 vertebral body with SUV max of 6.9. There are bilateral hypermetabolic lesions within the iliac bones, corresponding to sclerotic lesions with SUV max of 7 on the left and 6 on the right. There is a lytic lesion within the left inferior pubic ramus with SUV max of 7.7, lytic lesion within the proximal right femur with SUV max of 9.1. Mild FDG avidity within the sternum, likely from median sternotomy.       1. Enlarging FDG avid 1.1 cm spiculated nodule in the right upper lobe, as well as FDG avid mediastinal and bilateral hilar nodes is suspicious for a primary lung neoplasm with teresa metastases 2. Mild FDG avid left upper lobe paravertebral nodule, grossly stable in comparison to prior CT in 2019, favored to be benign. 3. Multiple FDG avid bone lesions throughout the axial and appendicular skeleton, likely representing widespread bone metastases. FDG avidity within the right ribs as well as lower thoracic and lumbar vertebral bodies as described above, could represent pathologic fractures in the setting of metastatic disease.   I personally reviewed the image(s) / study and agree with the findings and interpretation as stated. This study was interpreted at [facility].   Signed by: Renay Lizarraga 7/13/2024 2:05 PM Dictation workstation:   [id number]    CT lumbar spine wo IV contrast    Result Date: 7/2/2024  Interpreted By:  Mor Ludwig, STUDY: CT LUMBAR SPINE WO IV CONTRAST  7/2/2024 8:17 am   INDICATION: Signs/Symptoms:vertebral compression fracture   COMPARISON: Lumbar spine radiographs dated 06/20/2024. MRI lumbar spine dated 08/09/2021.   ACCESSION NUMBER(S): [id number]   ORDERING CLINICIAN: GERARD MAHONEY   TECHNIQUE: Axial CT images of the lumbar spine are obtained. Axial, coronal and sagittal reconstructions are provided for review.   FINDINGS: Normal segmentation. Suspected rudimentary rib arising from the leftward aspect of L1.   Diffusely decreased bone  mineralization. There are multiple remote osteoporotic appearing compression fractures of the thoracic and lumbar spine.   There is anterior wedging and mild height loss of T11 estimated at up to 35% with no osseous retropulsion. There is mild anterior wedging of T12 estimated at 25% with no osseous retropulsion. There is also Schmorl's node deformity involving the superior endplate of T12.   There is age-indeterminate, possibly acute, anterior superior endplate compression deformity of L1 with element of sclerosis versus trabecular impaction with minimal height loss and no osseous retropulsion.   There is remote appearing compression fracture of L2 as well as moderate to large Schmorl's node deformity involving the superior endplate with mild height loss and anterior wedging without osseous retropulsion.   There is suspected acute superior endplate compression fracture deformity of L3 with fragmentation and irregularity as well as possible central Schmorl's node deformity versus superior endplate depression. Centrally there is up to 40-50% vertebral body height loss. There is trace osseous retropulsion (1-2 mm).   There is possible subtle impaction injury involving the posterior aspect of the superior endplate of L4 (series 205, image 58) with mild irregularity and sclerosis. There is otherwise no significant height loss or traumatic abnormality of L4 apparent.   The L5 vertebral body is normal in height with no definite fracture component.   Suspected mild remote sacral insufficiency fracture with transverse component extending across S3.   Estimated 2 mm retrolisthesis of L2 on L3. There is slight subluxation of the left-greater-than-right facet joints. Alignment is otherwise maintained. Aforementioned multilevel Schmorl's node deformities. Mild degenerative disc height loss at L2-L3.   There is no CT apparent spinal canal stenosis or large epidural hematoma. Disc bulge and ligamentum flavum thickening is most  pronounced at the L3-L4 level. There is mild-to-moderate neural foraminal narrowing involving the left-greater-than-right neural foramina at the L2-L3 level as well as mild left-greater-than-right neural foraminal narrowing suggested at L3-L4.   Element of mild fatty atrophy/infiltration of the posterior paraspinal musculature. Severe atherosclerotic vascular calcification with normal caliber abdominal aorta. Colonic diverticulosis.       Diffusely decreased bone mineralization, mildly limiting assessment. There is suggestion of acute L3 osteoporotic compression fracture involving the superior endplate with superimposed central Schmorl's node deformity component as well resulting in up to 40-50% vertebral body height loss centrally with trace osseous retropulsion. There is trace retrolisthesis of L2 on L3 with slight subluxation of the left-greater-than-right facet joints. No evidence of spinal canal stenosis or large epidural hematoma.   Additionally, there is age-indeterminate, possibly acute, mild anterior superior endplate compression deformity of L1.   Suspected subtle, possibly acute, impaction injury involving the posterior aspect of the superior endplate of L4 with no measurable height loss.   Additional chronic osteoporotic appearing compression fractures of the remainder of the thoracic and lumbar spine as detailed above.   MACRO: None   Signed by: Mor Ludwig 7/2/2024 2:15 PM Dictation workstation:   [id number]                Assessment/Plan   Principal Problem:    Back pain at L4-L5 level    Brittny Gordon is a 68 y.o. female presenting with Back pain. She does have known compression fracture of T11 to which she saw pain management on 7/5/24 and was recommended a TSLO brace, conservative therapy was recommended. She denies actually receiving the TSLO brace. She endorses having mild back pain over the past 3-4 years. However in June he was gardening when she believes she developed the compression  fracture. It was then during that time they diagnosed the lung cancer. Ever since Sunday she reports she has been having worsening back pain and severe muscle spasms. She reports she was given norco however she has not been taking it as it has made her very nauseous. She has been having a decreased appetite due to the nausea. Due to worsening pain and muscle spasms she presented to the ED.     PMHX: HTN, HLD, aortic aneurysm s/p repairment in 2012, newly diagnosed lung cancer with teresa mets and suspected bone mets, CAD.    Back pain  -2/2 to bone mets  -saw radiation oncology 7/25/24: If pain persist despite medical management and radiation therapy, structural relief with the form of kyphoplasty/vertebroplasty may benefit given vertebral compression   -was Rx norco on 7/29/24 but reports it has not been helping but also causing nausea  -consider pain management consult  -CT showing mets that were seen on previous imaging-> not new  -labs unremarkable  -UA neg   -did reach out to pain management who originally recommended the TSLO brace to clarify if they will like hard or soft brace. Did call and place order  -declining to try norco again due to nausea. Reports no relief from ultram and robaxin. Will try tylenol with codeine. If tylenol with codeine doesn't work, unsure what else to give her as the norco makes her nauseous she likely will not tolerate oxycodone. Reports no relief with ultram. Can consider adding gabapentin      HTN/HLD  -resume home meds     DVT prophylaxis:  Lovenox, SCD     DC plan:  DC home when medically stable    Labs/Testing reviewed    Interdisciplinary team rounding completed with hospitalist, nurse, TCC    NP discussed plan and lab/testing results with Dr. Wild       1600 Patient anxious that she will not be able to go to [facility] on Monday due to missing APT appt. Did speak to PAT team here at [facility] who called [facility] PAT who unfortunately was not able to assist on what is needed proceed  with PAT. Did reach out to Dr. Stein- As long as she is cleared by cardiology she should be able to proceed with bronch on Monday-> there is an uploaded form signed by cardiology under media tab from 7/26/24 clearing her from cardiac standpoint.          1630 seen at bedside with Dr. iWld. She has bee seen ambulating the halls of the unit independently with her friend with her back brace on with a steady gait. She has not had any pain meds since 4 am this morning. However she is stating she cannot be discharged as she is In excruciating pain and is requesting morphine. She declined the tylenol with codeine. Attempted to explain to patient that we need to figure out PO regimen for discharge as cannot just be on IV morphine as we need to figure out regimen for DC that will be therapeutic for patient. Also she has not gotten pain meds in over 12 hours so she should take oral meds as they will last longer. She reports she gets nauseous with the norco and is declining to try again. She is stating both the ultram and robaxin did not touch her pain and is declining to continue to take. However she has not tried the tylenol with codeine yet and declined to take it earlier. Again it has been over 12 hours and she has not gotten anything, encouraged to take PO pain meds. She is still requesting IV morphine and declining discharge tonight. After further discussion with patient, self, and Dr. Wild she is agreeable to try the tylenol with codeine.     1830 stating the tylenol with codeine did nothing for her pain. Will switch to oxycodone and see how she does    I spent 45 minutes in the professional and overall care of this patient.      Mag Mosqueda, APRN-CNP

## 2024-08-02 ENCOUNTER — PHARMACY VISIT (OUTPATIENT)
Dept: PHARMACY | Facility: CLINIC | Age: 69
End: 2024-08-02
Payer: COMMERCIAL

## 2024-08-02 VITALS
HEART RATE: 63 BPM | BODY MASS INDEX: 27.32 KG/M2 | RESPIRATION RATE: 18 BRPM | TEMPERATURE: 98.1 F | DIASTOLIC BLOOD PRESSURE: 67 MMHG | WEIGHT: 170 LBS | HEIGHT: 66 IN | OXYGEN SATURATION: 93 % | SYSTOLIC BLOOD PRESSURE: 118 MMHG

## 2024-08-02 LAB
ANION GAP SERPL CALC-SCNC: 12 MMOL/L (ref 10–20)
BASOPHILS # BLD AUTO: 0.03 X10*3/UL (ref 0–0.1)
BASOPHILS NFR BLD AUTO: 0.4 %
BUN SERPL-MCNC: 11 MG/DL (ref 6–23)
CALCIUM SERPL-MCNC: 8.8 MG/DL (ref 8.6–10.3)
CHLORIDE SERPL-SCNC: 101 MMOL/L (ref 98–107)
CO2 SERPL-SCNC: 29 MMOL/L (ref 21–32)
CREAT SERPL-MCNC: 0.74 MG/DL (ref 0.5–1.05)
EGFRCR SERPLBLD CKD-EPI 2021: 89 ML/MIN/1.73M*2
EOSINOPHIL # BLD AUTO: 0.29 X10*3/UL (ref 0–0.7)
EOSINOPHIL NFR BLD AUTO: 4 %
ERYTHROCYTE [DISTWIDTH] IN BLOOD BY AUTOMATED COUNT: 12.5 % (ref 11.5–14.5)
GLUCOSE SERPL-MCNC: 85 MG/DL (ref 74–99)
HCT VFR BLD AUTO: 41.5 % (ref 36–46)
HGB BLD-MCNC: 13.6 G/DL (ref 12–16)
IMM GRANULOCYTES # BLD AUTO: 0.02 X10*3/UL (ref 0–0.7)
IMM GRANULOCYTES NFR BLD AUTO: 0.3 % (ref 0–0.9)
LYMPHOCYTES # BLD AUTO: 2.76 X10*3/UL (ref 1.2–4.8)
LYMPHOCYTES NFR BLD AUTO: 38.1 %
MCH RBC QN AUTO: 30.2 PG (ref 26–34)
MCHC RBC AUTO-ENTMCNC: 32.8 G/DL (ref 32–36)
MCV RBC AUTO: 92 FL (ref 80–100)
MONOCYTES # BLD AUTO: 0.82 X10*3/UL (ref 0.1–1)
MONOCYTES NFR BLD AUTO: 11.3 %
NEUTROPHILS # BLD AUTO: 3.33 X10*3/UL (ref 1.2–7.7)
NEUTROPHILS NFR BLD AUTO: 45.9 %
NRBC BLD-RTO: 0 /100 WBCS (ref 0–0)
PLATELET # BLD AUTO: 182 X10*3/UL (ref 150–450)
POTASSIUM SERPL-SCNC: 3.7 MMOL/L (ref 3.5–5.3)
RBC # BLD AUTO: 4.51 X10*6/UL (ref 4–5.2)
SODIUM SERPL-SCNC: 138 MMOL/L (ref 136–145)
WBC # BLD AUTO: 7.3 X10*3/UL (ref 4.4–11.3)

## 2024-08-02 PROCEDURE — RXMED WILLOW AMBULATORY MEDICATION CHARGE

## 2024-08-02 PROCEDURE — 85025 COMPLETE CBC W/AUTO DIFF WBC: CPT | Performed by: NURSE PRACTITIONER

## 2024-08-02 PROCEDURE — 2500000001 HC RX 250 WO HCPCS SELF ADMINISTERED DRUGS (ALT 637 FOR MEDICARE OP): Performed by: NURSE PRACTITIONER

## 2024-08-02 PROCEDURE — 2500000001 HC RX 250 WO HCPCS SELF ADMINISTERED DRUGS (ALT 637 FOR MEDICARE OP): Performed by: INTERNAL MEDICINE

## 2024-08-02 PROCEDURE — 96376 TX/PRO/DX INJ SAME DRUG ADON: CPT

## 2024-08-02 PROCEDURE — 2500000004 HC RX 250 GENERAL PHARMACY W/ HCPCS (ALT 636 FOR OP/ED): Performed by: NURSE PRACTITIONER

## 2024-08-02 PROCEDURE — G0378 HOSPITAL OBSERVATION PER HR: HCPCS

## 2024-08-02 PROCEDURE — 36415 COLL VENOUS BLD VENIPUNCTURE: CPT | Performed by: NURSE PRACTITIONER

## 2024-08-02 PROCEDURE — 84132 ASSAY OF SERUM POTASSIUM: CPT | Performed by: NURSE PRACTITIONER

## 2024-08-02 PROCEDURE — 99239 HOSP IP/OBS DSCHRG MGMT >30: CPT | Performed by: INTERNAL MEDICINE

## 2024-08-02 RX ORDER — ACETAMINOPHEN AND CODEINE PHOSPHATE 300; 30 MG/1; MG/1
1 TABLET ORAL EVERY 8 HOURS PRN
Qty: 18 TABLET | Refills: 0 | Status: SHIPPED | OUTPATIENT
Start: 2024-08-02 | End: 2024-08-02

## 2024-08-02 RX ORDER — DOCUSATE SODIUM 100 MG/1
100 CAPSULE, LIQUID FILLED ORAL 2 TIMES DAILY
Qty: 60 CAPSULE | Refills: 0 | Status: SHIPPED | OUTPATIENT
Start: 2024-08-02 | End: 2024-09-01

## 2024-08-02 RX ORDER — CYCLOBENZAPRINE HCL 5 MG
5 TABLET ORAL 3 TIMES DAILY PRN
Qty: 20 TABLET | Refills: 0 | Status: SHIPPED | OUTPATIENT
Start: 2024-08-02 | End: 2024-08-02

## 2024-08-02 RX ORDER — BISACODYL 5 MG
5 TABLET, DELAYED RELEASE (ENTERIC COATED) ORAL DAILY PRN
Qty: 30 TABLET | Refills: 0 | Status: SHIPPED | OUTPATIENT
Start: 2024-08-02

## 2024-08-02 RX ORDER — ONDANSETRON HYDROCHLORIDE 8 MG/1
8 TABLET, FILM COATED ORAL EVERY 8 HOURS PRN
Qty: 20 TABLET | Refills: 0 | Status: SHIPPED | OUTPATIENT
Start: 2024-08-02 | End: 2024-08-02

## 2024-08-02 RX ORDER — POLYETHYLENE GLYCOL 3350 17 G/17G
17 POWDER, FOR SOLUTION ORAL DAILY
Qty: 238 G | Refills: 0 | Status: SHIPPED | OUTPATIENT
Start: 2024-08-02

## 2024-08-02 RX ORDER — CYCLOBENZAPRINE HCL 5 MG
5 TABLET ORAL 3 TIMES DAILY PRN
Qty: 20 TABLET | Refills: 0 | Status: SHIPPED | OUTPATIENT
Start: 2024-08-02 | End: 2024-08-06 | Stop reason: SDUPTHER

## 2024-08-02 RX ORDER — ONDANSETRON HYDROCHLORIDE 8 MG/1
8 TABLET, FILM COATED ORAL EVERY 8 HOURS PRN
Qty: 20 TABLET | Refills: 0 | Status: SHIPPED | OUTPATIENT
Start: 2024-08-02 | End: 2024-08-06 | Stop reason: SDUPTHER

## 2024-08-02 RX ORDER — ACETAMINOPHEN AND CODEINE PHOSPHATE 300; 30 MG/1; MG/1
1 TABLET ORAL EVERY 8 HOURS PRN
Qty: 18 TABLET | Refills: 0 | Status: SHIPPED | OUTPATIENT
Start: 2024-08-02 | End: 2024-08-06 | Stop reason: SDUPTHER

## 2024-08-02 ASSESSMENT — COGNITIVE AND FUNCTIONAL STATUS - GENERAL
CLIMB 3 TO 5 STEPS WITH RAILING: A LITTLE
MOBILITY SCORE: 23
DRESSING REGULAR LOWER BODY CLOTHING: A LITTLE
DAILY ACTIVITIY SCORE: 22
DRESSING REGULAR UPPER BODY CLOTHING: A LITTLE

## 2024-08-02 ASSESSMENT — PAIN SCALES - GENERAL
PAINLEVEL_OUTOF10: 5 - MODERATE PAIN
PAINLEVEL_OUTOF10: 3
PAINLEVEL_OUTOF10: 8
PAINLEVEL_OUTOF10: 8

## 2024-08-02 ASSESSMENT — PAIN DESCRIPTION - LOCATION: LOCATION: BACK

## 2024-08-02 ASSESSMENT — PAIN SCALES - PAIN ASSESSMENT IN ADVANCED DEMENTIA (PAINAD): TOTALSCORE: MEDICATION (SEE MAR);COLD APPLIED

## 2024-08-02 NOTE — CARE PLAN
The clinical goals for the shift include decrease level of pain to at least 3/10    Problem: Pain - Adult  Goal: Verbalizes/displays adequate comfort level or baseline comfort level  Outcome: Progressing     Problem: Safety - Adult  Goal: Free from fall injury  Outcome: Progressing     Problem: Discharge Planning  Goal: Discharge to home or other facility with appropriate resources  Outcome: Progressing     Problem: Chronic Conditions and Co-morbidities  Goal: Patient's chronic conditions and co-morbidity symptoms are monitored and maintained or improved  Outcome: Progressing     Problem: Pain  Goal: Takes deep breaths with improved pain control throughout the shift  Outcome: Progressing  Goal: Turns in bed with improved pain control throughout the shift  Outcome: Progressing  Goal: Walks with improved pain control throughout the shift  Outcome: Progressing  Goal: Performs ADL's with improved pain control throughout shift  Outcome: Progressing  Goal: Participates in PT with improved pain control throughout the shift  Outcome: Progressing  Goal: Free from opioid side effects throughout the shift  Outcome: Progressing  Goal: Free from acute confusion related to pain meds throughout the shift  Outcome: Progressing

## 2024-08-02 NOTE — CARE PLAN
The patient's goals for the shift include      Problem: Pain - Adult  Goal: Verbalizes/displays adequate comfort level or baseline comfort level  Outcome: Progressing     Problem: Discharge Planning  Goal: Discharge to home or other facility with appropriate resources  Outcome: Progressing       The clinical goals for the shift include    Problem: Safety - Adult  Goal: Free from fall injury  Outcome: Progressing     Problem: Chronic Conditions and Co-morbidities  Goal: Patient's chronic conditions and co-morbidity symptoms are monitored and maintained or improved  Outcome: Progressing     Problem: Pain  Goal: Performs ADL's with improved pain control throughout shift  Outcome: Progressing     Problem: Pain  Goal: Free from opioid side effects throughout the shift  Outcome: Progressing     Problem: Pain  Goal: Free from acute confusion related to pain meds throughout the shift  Outcome: Progressing

## 2024-08-02 NOTE — DISCHARGE SUMMARY
"Admitting Provider: Mamadou Wild MD  Discharge Provider: Mamadou Wild MD  Primary Care Physician at Discharge: Camila Chaudhary MD [phone]   Admission Date: 7/31/2024     Discharge Date: 8/2/2024  Current Planned Discharge Disposition: home    Discharge Diagnoses  Principal Problem:    Back pain at L4-L5 level  Active Problems:    Compression fracture of T11 vertebra (Multi)    Lung nodule      Hospital Course  68 yoF with uncontrolled back pain.    Pain likely related to her cancer. She was trialed with several pain pills. She felt the codeine worked well. Discharged home in stable condition.       Test Results Pending At Discharge  Pending Labs       Order Current Status    Extra Urine Gray Tube Collected (07/31/24 1012)    Extra Urine Gray Tube Collected (07/31/24 1220)    Urinalysis with Reflex Culture and Microscopic In process    Urinalysis with Reflex Culture and Microscopic In process            Pertinent Physical Exam At Time of Discharge  /63 (BP Location: Left arm, Patient Position: Lying)   Pulse 62   Temp 36.3 °C (97.3 °F) (Temporal)   Resp 18   Ht 1.676 m (5' 6\")   Wt 77.1 kg (170 lb)   SpO2 91%   BMI 27.44 kg/m²   Physical Exam  Cardiovascular:      Rate and Rhythm: Normal rate and regular rhythm.      Heart sounds: Normal heart sounds.   Pulmonary:      Breath sounds: Normal breath sounds.   Abdominal:      General: Bowel sounds are normal.      Palpations: Abdomen is soft.   Musculoskeletal:         General: Normal range of motion.   Neurological:      General: No focal deficit present.      Mental Status: She is alert and oriented to person, place, and time.   Psychiatric:         Mood and Affect: Mood normal.         Home Medications     Medication List      START taking these medications     acetaminophen-codeine 300-30 mg tablet; Commonly known as: Tylenol w/   Codeine #3; Take 1 tablet by mouth every 8 hours if needed for severe pain   (7 - 10) for up to 6 days.   " cyclobenzaprine 5 mg tablet; Commonly known as: Flexeril; Take 1 tablet   (5 mg) by mouth 3 times a day as needed for muscle spasms.   ondansetron 8 mg tablet; Commonly known as: Zofran; Take 1 tablet (8 mg)   by mouth every 8 hours if needed for nausea or vomiting.     CONTINUE taking these medications     atorvastatin 80 mg tablet; Commonly known as: Lipitor   cetirizine 10 mg tablet; Commonly known as: ZyrTEC   ibandronate 150 mg tablet; Commonly known as: Boniva; Take 1 tablet (150   mg) by mouth every 30 (thirty) days. Take in morning with full glass of   water on an empty stomach. No food, drink, meds, or lying down for 60   minutes after.   losartan 100 mg tablet; Commonly known as: Cozaar; TAKE 1 TABLET BY   MOUTH EVERY DAY   metoprolol succinate  mg 24 hr tablet; Commonly known as:   Toprol-XL; TAKE 1 TABLET BY MOUTH EVERY DAY   PRESERVISION AREDS ORAL   Vitamin D3 25 MCG (1000 UT) tablet; Generic drug: cholecalciferol     STOP taking these medications     cholestyramine 4 gram packet; Commonly known as: Questran   meloxicam 7.5 mg tablet; Commonly known as: Mobic   tiZANidine 2 mg tablet; Commonly known as: Zanaflex       Outpatient Follow-Up  Future Appointments   Date Time Provider Department Center   8/5/2024  7:00 AM [facility]   8/5/2024  8:30 AM Romulo Stein MD [id number]   8/9/2024  8:40 AM Camila Chaudhary MD MPH [id number] [facility]   8/12/2024  3:30 PM Nagi Ospina MD [id number]   2/18/2025  2:40 PM Cayden Buckley MD [address]       Mamadou Wild MD    I spent more than 30 min coordinating this patient's discharge.

## 2024-08-02 NOTE — NURSING NOTE
Discharge instructions provided using teach back method. Pt's health related  risk factors discussed with pt. pt educated to look for any worsening sign and symptoms. Pt educated to seek medical attention if experience any medical emergency. Pt aware to follow up with outpatient clinics as scheduled. Home going meds reviewed with pt. Pt verbalized understanding of disposition and discharge instructions. All questions answered to patient's satisfaction and within nursing scope of practice. Vitals stable, IV removed. Pt states she would like miralax and colace for her bowels at AL and that she isn't comfortable applying her TSLO brace.  Reached out to PT for assistance in teaching application of brace.  They need PT/OT orders placed.  Alerted Dr Wild via secure chat regarding needs for orders for medications being ordered and sent to [facility] and also need for PT/OT orders.

## 2024-08-02 NOTE — PROGRESS NOTES
Physical Therapy                 Therapy Communication Note    Patient Name: Brittny Gordon  MRN: [id number]  Today's Date: 8/2/2024     Discipline: Physical Therapy    Comment: Called down to observation room 120 to educate patient on donning/doffing TLSO brace. Pt educated on the above with demonstration and teach back. Pt verablizes understanding of education. Pt up in room ambulating independently without a device. No further skilled acute PT needs are identified at this time. Will plan to discharge PT orders.

## 2024-08-05 ENCOUNTER — ANESTHESIA EVENT (OUTPATIENT)
Dept: GASTROENTEROLOGY | Facility: HOSPITAL | Age: 69
End: 2024-08-05
Payer: MEDICARE

## 2024-08-05 ENCOUNTER — HOSPITAL ENCOUNTER (OUTPATIENT)
Dept: GASTROENTEROLOGY | Facility: HOSPITAL | Age: 69
Discharge: HOME | End: 2024-08-05
Payer: MEDICARE

## 2024-08-05 ENCOUNTER — ANESTHESIA (OUTPATIENT)
Dept: GASTROENTEROLOGY | Facility: HOSPITAL | Age: 69
End: 2024-08-05
Payer: MEDICARE

## 2024-08-05 ENCOUNTER — HOSPITAL ENCOUNTER (OUTPATIENT)
Dept: RADIOLOGY | Facility: HOSPITAL | Age: 69
Discharge: HOME | End: 2024-08-05
Payer: MEDICARE

## 2024-08-05 VITALS
BODY MASS INDEX: 27.32 KG/M2 | SYSTOLIC BLOOD PRESSURE: 149 MMHG | HEIGHT: 66 IN | DIASTOLIC BLOOD PRESSURE: 88 MMHG | TEMPERATURE: 98.1 F | HEART RATE: 79 BPM | OXYGEN SATURATION: 96 % | RESPIRATION RATE: 19 BRPM | WEIGHT: 170 LBS

## 2024-08-05 DIAGNOSIS — R91.1 LUNG NODULE: ICD-10-CM

## 2024-08-05 PROCEDURE — 31653 BRONCH EBUS SAMPLNG 3/> NODE: CPT | Performed by: INTERNAL MEDICINE

## 2024-08-05 PROCEDURE — 31623 DX BRONCHOSCOPE/BRUSH: CPT | Performed by: INTERNAL MEDICINE

## 2024-08-05 PROCEDURE — 7100000009 HC PHASE TWO TIME - INITIAL BASE CHARGE

## 2024-08-05 PROCEDURE — A31622 PR BRONCHOSCOPY,DIAGNOSTIC

## 2024-08-05 PROCEDURE — 2500000001 HC RX 250 WO HCPCS SELF ADMINISTERED DRUGS (ALT 637 FOR MEDICARE OP): Performed by: ANESTHESIOLOGY

## 2024-08-05 PROCEDURE — 3700000002 HC GENERAL ANESTHESIA TIME - EACH INCREMENTAL 1 MINUTE

## 2024-08-05 PROCEDURE — 31628 BRONCHOSCOPY/LUNG BX EACH: CPT | Performed by: INTERNAL MEDICINE

## 2024-08-05 PROCEDURE — 2720000007 HC OR 272 NO HCPCS

## 2024-08-05 PROCEDURE — 2500000004 HC RX 250 GENERAL PHARMACY W/ HCPCS (ALT 636 FOR OP/ED): Performed by: ANESTHESIOLOGY

## 2024-08-05 PROCEDURE — 7100000010 HC PHASE TWO TIME - EACH INCREMENTAL 1 MINUTE

## 2024-08-05 PROCEDURE — 7100000001 HC RECOVERY ROOM TIME - INITIAL BASE CHARGE

## 2024-08-05 PROCEDURE — 31654 BRONCH EBUS IVNTJ PERPH LES: CPT | Performed by: INTERNAL MEDICINE

## 2024-08-05 PROCEDURE — 7100000002 HC RECOVERY ROOM TIME - EACH INCREMENTAL 1 MINUTE

## 2024-08-05 PROCEDURE — 2500000005 HC RX 250 GENERAL PHARMACY W/O HCPCS

## 2024-08-05 PROCEDURE — 2500000004 HC RX 250 GENERAL PHARMACY W/ HCPCS (ALT 636 FOR OP/ED)

## 2024-08-05 PROCEDURE — 71250 CT THORAX DX C-: CPT

## 2024-08-05 PROCEDURE — 71250 CT THORAX DX C-: CPT | Performed by: RADIOLOGY

## 2024-08-05 PROCEDURE — A31622 PR BRONCHOSCOPY,DIAGNOSTIC: Performed by: ANESTHESIOLOGY

## 2024-08-05 PROCEDURE — 3700000001 HC GENERAL ANESTHESIA TIME - INITIAL BASE CHARGE

## 2024-08-05 PROCEDURE — 31629 BRONCHOSCOPY/NEEDLE BX EACH: CPT | Performed by: INTERNAL MEDICINE

## 2024-08-05 RX ORDER — DIPHENHYDRAMINE HYDROCHLORIDE 50 MG/ML
12.5 INJECTION INTRAMUSCULAR; INTRAVENOUS ONCE AS NEEDED
Status: DISCONTINUED | OUTPATIENT
Start: 2024-08-05 | End: 2024-08-06 | Stop reason: HOSPADM

## 2024-08-05 RX ORDER — METOCLOPRAMIDE HYDROCHLORIDE 5 MG/ML
10 INJECTION INTRAMUSCULAR; INTRAVENOUS ONCE AS NEEDED
Status: DISCONTINUED | OUTPATIENT
Start: 2024-08-05 | End: 2024-08-06 | Stop reason: HOSPADM

## 2024-08-05 RX ORDER — PHENYLEPHRINE HYDROCHLORIDE 10 MG/ML
INJECTION INTRAVENOUS AS NEEDED
Status: DISCONTINUED | OUTPATIENT
Start: 2024-08-05 | End: 2024-08-05

## 2024-08-05 RX ORDER — ONDANSETRON HYDROCHLORIDE 2 MG/ML
INJECTION, SOLUTION INTRAVENOUS AS NEEDED
Status: DISCONTINUED | OUTPATIENT
Start: 2024-08-05 | End: 2024-08-05

## 2024-08-05 RX ORDER — ALBUTEROL SULFATE 0.83 MG/ML
2.5 SOLUTION RESPIRATORY (INHALATION) ONCE AS NEEDED
Status: DISCONTINUED | OUTPATIENT
Start: 2024-08-05 | End: 2024-08-06 | Stop reason: HOSPADM

## 2024-08-05 RX ORDER — PROPOFOL 10 MG/ML
INJECTION, EMULSION INTRAVENOUS AS NEEDED
Status: DISCONTINUED | OUTPATIENT
Start: 2024-08-05 | End: 2024-08-05

## 2024-08-05 RX ORDER — LIDOCAINE HCL/PF 100 MG/5ML
SYRINGE (ML) INTRAVENOUS AS NEEDED
Status: DISCONTINUED | OUTPATIENT
Start: 2024-08-05 | End: 2024-08-05

## 2024-08-05 RX ORDER — ACETAMINOPHEN 325 MG/1
650 TABLET ORAL EVERY 4 HOURS PRN
Status: DISCONTINUED | OUTPATIENT
Start: 2024-08-05 | End: 2024-08-06 | Stop reason: HOSPADM

## 2024-08-05 RX ORDER — SODIUM CHLORIDE, SODIUM LACTATE, POTASSIUM CHLORIDE, CALCIUM CHLORIDE 600; 310; 30; 20 MG/100ML; MG/100ML; MG/100ML; MG/100ML
100 INJECTION, SOLUTION INTRAVENOUS CONTINUOUS
Status: DISCONTINUED | OUTPATIENT
Start: 2024-08-05 | End: 2024-08-06 | Stop reason: HOSPADM

## 2024-08-05 RX ORDER — HYDRALAZINE HYDROCHLORIDE 20 MG/ML
5 INJECTION INTRAMUSCULAR; INTRAVENOUS EVERY 30 MIN PRN
Status: DISCONTINUED | OUTPATIENT
Start: 2024-08-05 | End: 2024-08-06 | Stop reason: HOSPADM

## 2024-08-05 RX ORDER — LABETALOL HYDROCHLORIDE 5 MG/ML
5 INJECTION, SOLUTION INTRAVENOUS ONCE AS NEEDED
Status: DISCONTINUED | OUTPATIENT
Start: 2024-08-05 | End: 2024-08-06 | Stop reason: HOSPADM

## 2024-08-05 RX ORDER — HYDROMORPHONE HYDROCHLORIDE 1 MG/ML
0.5 INJECTION, SOLUTION INTRAMUSCULAR; INTRAVENOUS; SUBCUTANEOUS EVERY 5 MIN PRN
Status: DISCONTINUED | OUTPATIENT
Start: 2024-08-05 | End: 2024-08-06 | Stop reason: HOSPADM

## 2024-08-05 RX ORDER — HYDROMORPHONE HYDROCHLORIDE 1 MG/ML
0.2 INJECTION, SOLUTION INTRAMUSCULAR; INTRAVENOUS; SUBCUTANEOUS EVERY 5 MIN PRN
Status: DISCONTINUED | OUTPATIENT
Start: 2024-08-05 | End: 2024-08-06 | Stop reason: HOSPADM

## 2024-08-05 RX ORDER — OXYCODONE HYDROCHLORIDE 5 MG/1
10 TABLET ORAL EVERY 4 HOURS PRN
Status: DISCONTINUED | OUTPATIENT
Start: 2024-08-05 | End: 2024-08-06 | Stop reason: HOSPADM

## 2024-08-05 RX ORDER — OXYCODONE HYDROCHLORIDE 5 MG/1
5 TABLET ORAL EVERY 4 HOURS PRN
Status: DISCONTINUED | OUTPATIENT
Start: 2024-08-05 | End: 2024-08-06 | Stop reason: HOSPADM

## 2024-08-05 RX ORDER — ROCURONIUM BROMIDE 10 MG/ML
INJECTION, SOLUTION INTRAVENOUS AS NEEDED
Status: DISCONTINUED | OUTPATIENT
Start: 2024-08-05 | End: 2024-08-05

## 2024-08-05 RX ORDER — LIDOCAINE HYDROCHLORIDE 10 MG/ML
0.1 INJECTION, SOLUTION EPIDURAL; INFILTRATION; INTRACAUDAL; PERINEURAL ONCE
Status: DISCONTINUED | OUTPATIENT
Start: 2024-08-05 | End: 2024-08-06 | Stop reason: HOSPADM

## 2024-08-05 RX ORDER — MIDAZOLAM HYDROCHLORIDE 1 MG/ML
INJECTION INTRAMUSCULAR; INTRAVENOUS AS NEEDED
Status: DISCONTINUED | OUTPATIENT
Start: 2024-08-05 | End: 2024-08-05

## 2024-08-05 RX ORDER — ONDANSETRON HYDROCHLORIDE 2 MG/ML
4 INJECTION, SOLUTION INTRAVENOUS ONCE AS NEEDED
Status: DISCONTINUED | OUTPATIENT
Start: 2024-08-05 | End: 2024-08-06 | Stop reason: HOSPADM

## 2024-08-05 RX ORDER — METHOCARBAMOL 100 MG/ML
1000 INJECTION, SOLUTION INTRAMUSCULAR; INTRAVENOUS ONCE
Status: COMPLETED | OUTPATIENT
Start: 2024-08-05 | End: 2024-08-05

## 2024-08-05 SDOH — HEALTH STABILITY: MENTAL HEALTH: CURRENT SMOKER: 0

## 2024-08-05 ASSESSMENT — COLUMBIA-SUICIDE SEVERITY RATING SCALE - C-SSRS
6. HAVE YOU EVER DONE ANYTHING, STARTED TO DO ANYTHING, OR PREPARED TO DO ANYTHING TO END YOUR LIFE?: NO
2. HAVE YOU ACTUALLY HAD ANY THOUGHTS OF KILLING YOURSELF?: NO
1. IN THE PAST MONTH, HAVE YOU WISHED YOU WERE DEAD OR WISHED YOU COULD GO TO SLEEP AND NOT WAKE UP?: NO

## 2024-08-05 ASSESSMENT — PAIN SCALES - GENERAL
PAINLEVEL_OUTOF10: 8
PAINLEVEL_OUTOF10: 5 - MODERATE PAIN
PAINLEVEL_OUTOF10: 0 - NO PAIN
PAINLEVEL_OUTOF10: 8
PAINLEVEL_OUTOF10: 8
PAINLEVEL_OUTOF10: 5 - MODERATE PAIN
PAINLEVEL_OUTOF10: 8

## 2024-08-05 ASSESSMENT — PAIN - FUNCTIONAL ASSESSMENT
PAIN_FUNCTIONAL_ASSESSMENT: 0-10

## 2024-08-05 NOTE — ANESTHESIA PREPROCEDURE EVALUATION
Patient: Brittny Gordon    Procedure Information       Date/Time: 08/05/24 0830    Scheduled providers: Romulo Stein MD    Procedure: BRONCHOSCOPY    Location: [facility]        Indication for surgery: pre-syncope, familial h/o sudden cardiac death/ aor dissection w/ 4.9cm Asc Aortic aneurysm  PMHx: Asc aortic dilation, HTN, 20 pk yr tobacco (quit 2/12), arthritis, anxiety, ETOH (18 beers/wk).   Preop: LVEF: 65%, ECG: SR 60 Cors: LDL: 107 Cards: Plana  Surgery: 3/12/2012 : Replacement of the ascending aorta, tailoring of the sinotubular junction, and re-suspension of the aortic valve. OR/CVICU: uneventful RNF POD: 1  Important events:   Post-op echo: S/P ascending aorta replacement,tailoring of the ST junction, and re-suspension AV done 03/12/2012. Trivial AI.  Postop CTA: Interval replacement of the ascending aorta with the supracoronary graft, which appears intact.      Relevant Problems   Anesthesia (within normal limits)   (-) Family history of malignant hyperthermia      Cardiac   (+) Chest pain   (+) Coronary atherosclerosis   (+) Hyperlipidemia   (+) Hypertension   (+) Rib pain on right side   (+) Thoracic ascending aortic aneurysm (CMS-HCC)      Pulmonary  Lung nodule       Neuro   (+) Anxiety      GI (within normal limits)      /Renal (within normal limits)      Liver (within normal limits)      Endocrine (within normal limits)      Hematology (within normal limits)      Musculoskeletal   (+) Lumbar spondylosis      HEENT (within normal limits)     Stress test 2024    Impression   1.  Negative myocardial perfusion study without evidence of inducible  myocardial ischemia or prior infarction.  2. The left ventricle is normal in size.  3. Normal LV wall motion with a post-stress LV EF estimated greater  than 65%.       Chemistry    Lab Results   Component Value Date/Time     08/02/2024 0418    K 3.7 08/02/2024 0418     08/02/2024 0418    CO2 29 08/02/2024 0418    BUN 11  "08/02/2024 0418    CREATININE 0.74 08/02/2024 0418    Lab Results   Component Value Date/Time    CALCIUM 8.8 08/02/2024 0418    ALKPHOS 83 07/31/2024 1012    AST 20 07/31/2024 1012    ALT 22 07/31/2024 1012    BILITOT 0.7 07/31/2024 1012          Lab Results   Component Value Date/Time    WBC 7.3 08/02/2024 0417    HGB 13.6 08/02/2024 0417    HCT 41.5 08/02/2024 0417     08/02/2024 0417     No results found for: \"PROTIME\", \"PTT\", \"INR\"  Encounter Date: 07/31/24   ECG 12 Lead   Result Value    Ventricular Rate 74    Atrial Rate 74    AZ Interval 176    QRS Duration 82    QT Interval 380    QTC Calculation(Bazett) 421    P Axis 54    R Axis 53    T Axis 93    QRS Count 12    Q Onset 225    P Onset 137    P Offset 199    T Offset 415    QTC Fredericia 407    Narrative    Normal sinus rhythm  Nonspecific ST and T wave abnormality  Abnormal ECG  When compared with ECG of 18-JUN-2024 14:30,  No significant change was found  See ED provider note for full interpretation and clinical correlation  Confirmed by Celina Guillory ([id number]) on 8/1/2024 10:23:57 AM     No results found for this or any previous visit from the past 1095 days.   Physical Exam    Airway  Mallampati: II  TM distance: >3 FB  Neck ROM: full     Cardiovascular    Dental    Pulmonary    Abdominal            Anesthesia Plan    History of general anesthesia?: yes  History of complications of general anesthesia?: no    ASA 3     general     The patient is not a current smoker.    Anesthetic plan and risks discussed with patient.  Use of blood products discussed with patient who consented to blood products.    Plan discussed with CRNA and attending.      "

## 2024-08-05 NOTE — ANESTHESIA PROCEDURE NOTES
Airway  Date/Time: 8/5/2024 8:28 AM  Urgency: elective    Airway not difficult    Staffing  Performed: CRNA   Authorized by: Ritu Middleton MD    Performed by: BOWEN Churchill-ALYSA  Patient location during procedure: OR    Indications and Patient Condition  Indications for airway management: anesthesia  Spontaneous Ventilation: absent  Sedation level: deep  Preoxygenated: yes  Patient position: sniffing  Mask difficulty assessment: 1 - vent by mask  Planned trial extubation    Final Airway Details  Final airway type: supraglottic airway      Successful airway: Supraglottic airway: igel.  Size 4     Number of attempts at approach: 1

## 2024-08-05 NOTE — ANESTHESIA PROCEDURE NOTES
Airway  Date/Time: 8/5/2024 9:40 AM  Urgency: elective      Staffing  Performed: CRNA   Authorized by: Ritu Middleton MD    Performed by: BOWEN Churchill-CRNA  Patient location during procedure: OR    Indications and Patient Condition  Indications for airway management: anesthesia  Spontaneous Ventilation: absent  Sedation level: deep  Preoxygenated: yes  Patient position: sniffing  Mask difficulty assessment: 0 - not attempted    Final Airway Details  Final airway type: endotracheal airway      Successful airway: ETT  Cuffed: yes   Successful intubation technique: direct laryngoscopy  Endotracheal tube insertion site: oral  Blade: Brenda  Blade size: #4  ETT size (mm): 8.5  Cormack-Lehane Classification: grade I - full view of glottis  Placement verified by: bronchoscopy and capnometry   Measured from: lips  ETT to lips (cm): 23  Number of attempts at approach: 1    Additional Comments  Requested by surgeon.

## 2024-08-05 NOTE — Clinical Note
Refer to anesthesia flowsheets for vitals,medication administration  and assessment intraoperative.

## 2024-08-05 NOTE — DISCHARGE INSTRUCTIONS
The anesthetics, sedatives or narcotics which were given to you today will be acting in your body for the next 24 hours, so you might feel a little sleepy or groggy. This feeling should slowly wear off.   Carefully read and follow the instructions below:   You received sedation today.   Do not drive or operate machinery or power tools of any kind.   No alcoholic beverages today, not even beer or wine.   No over the counter medications that contain alcohol or may cause drowsiness.   Do not make important decisions or sign legal documents.     Do not use Aspirin containing products or non-steroidal medications for the next 24 hours.  (Examples of these types of medications include: Advil, Aleve, Ecotrin, Ibuprofen, Motrin or Naprosyn.  This list is not all-inclusive.  Check with your physician or pharmacist before resuming these medications.)  Tylenol, cough medicine, cough drops or throat lozenges may be used when you are allowed to resume eating and drinking.     Call your physician if any of these symptoms occur:   High fever over 101 degrees or chills (a low grade fever is common for 24 hours)   Rash or hives   Persistent nausea or vomiting   Inability to urinate within 8 hours after the procedure  Go directly to the emergency room if you notice any of the following:   Shortness of breath   Chest pain  Coughing up large amounts of bright red blood greater than a teaspoonful of blood clots (about a teaspoonful for the next 24-48 hours is normal, especially if you had a biopsy)  Resume all normal medications unless directed otherwise by your doctor.       Follow up with your referring physician as previously scheduled.    If you experience any problems or have any questions following discharge, please call:   Before 5 pm: [phone]   After 5pm and on weekends: [phone] / [phone] and ask for the Pulmonary Fellow on-call (Pager Number: [phone])

## 2024-08-05 NOTE — LETTER
Dear, Brittny Gordon       7/19/2024                                                                                        INFORMATION FOR YOUR PROCEDURE    INSTRUCTIONS MUST BE FOLLOWED OR YOU RUN THE RISK OF YOUR CASE BEING CANCELED    Information is attached to this e-mail for your upcoming procedure. (Look for the paperclip in your email to access this information)  Lab requisitions (if needed), are also included as well as procedural instructions.       You are scheduled for your Bronchoscopy on  8/5/24 , with Dr. Romulo Stein [facility] [address] [place] [address]    07:00 AM    - CT  Scan  [facility]   2nd Floor Radiology  [address]    When finished with the CT scan,   please make your way to [facility] Room 1300.  This is right around the corner from [facility].  Located on the first floor.  There are information desks there for your convenience and if you have questions.    Check In will be at  7:30  AM.  This allows us to prepare you for the actual procedure.                                        Bronchoscopy   Location:  [address] [facility]                                         Bronchoscopy / Endoscopy Suite    NPO (No food or drink) after midnight the night before your procedure. This includes coffee, water, and soda, hard candy, gum or mints.  If taking your morning medications, a small sip of water is allowed to get the medication down.    For your safety, you must have a responsible, adult  accompany you to your procedure.  You will not be permitted to drive yourself home if you have received any type of anesthesia or sedation.    Automated calls about your upcoming scheduled appointments can be quite confusing for patients.    The times may vary depending on what you have scheduled for procedure day. Follow the time/s I have given you on your information sheet so there is no confusion.  Be aware you may receive  conflicting times from different departments.      Blood work will need to be done prior to your procedure, preferably at a  Facility.  There are no restrictions for testing. I have attached a requisition for you.    PAT (Pre Admission Testing) Will need to be done prior to your procedure.  They will reach out to you to schedule your appointment directly    Our Nurse Practitioner, Michael Burt CNP, will call you on 7/25/24, @2:20 PM    This is a phone visit to go over your medical history prior to your procedure, and is a necessary part of your medical workup.  The patient must be present at this visit.      Please reach out to me with any questions you may have  Arleen  [phone] or Dimitry @ [phone]    Have a nice day!    Arleen Loredo    Bronchoscopy   Interventional Pulmonology    MD Chava Bernabe MD Sameer Avasarala, MD Catalina Teba, MD Andrew Dunatchik, MD      [facility]  Pulmonary, Critical Care and Sleep Medicine  [address] [place] [address] [phone]  - [phone]  [email or web address]

## 2024-08-05 NOTE — ANESTHESIA POSTPROCEDURE EVALUATION
Patient: Brittny Gordon    Procedure Summary       Date: 08/05/24 Room / Location: [facility]    Anesthesia Start: 0821 Anesthesia Stop: 1049    Procedure: BRONCHOSCOPY Diagnosis: Lung nodule    Scheduled Providers: Romulo Stein MD Responsible Provider: Ritu Middleton MD    Anesthesia Type: general ASA Status: 3            Anesthesia Type: general    Vitals Value Taken Time   /86 08/05/24 1105   Temp 36.7 °C (98.1 °F) 08/05/24 1049   Pulse 78 08/05/24 1105   Resp 17 08/05/24 1105   SpO2 93 % 08/05/24 1105       Anesthesia Post Evaluation    Patient location during evaluation: PACU  Patient participation: complete - patient participated  Level of consciousness: awake and awake and alert  Pain management: adequate  Airway patency: patent  Cardiovascular status: hemodynamically stable  Respiratory status: acceptable  Hydration status: acceptable  Postoperative Nausea and Vomiting: none    No notable events documented.

## 2024-08-06 ENCOUNTER — NURSE TRIAGE (OUTPATIENT)
Dept: ADMISSION | Facility: HOSPITAL | Age: 69
End: 2024-08-06
Payer: MEDICARE

## 2024-08-06 DIAGNOSIS — S22.080A COMPRESSION FRACTURE OF T11 VERTEBRA, INITIAL ENCOUNTER (MULTI): ICD-10-CM

## 2024-08-06 DIAGNOSIS — S32.000A COMPRESSION FRACTURE OF LUMBAR VERTEBRA, UNSPECIFIED LUMBAR VERTEBRAL LEVEL, INITIAL ENCOUNTER (MULTI): ICD-10-CM

## 2024-08-06 RX ORDER — ONDANSETRON HYDROCHLORIDE 8 MG/1
8 TABLET, FILM COATED ORAL EVERY 8 HOURS PRN
Qty: 20 TABLET | Refills: 0 | Status: SHIPPED | OUTPATIENT
Start: 2024-08-06

## 2024-08-06 RX ORDER — CYCLOBENZAPRINE HCL 5 MG
5 TABLET ORAL 3 TIMES DAILY PRN
Qty: 20 TABLET | Refills: 0 | Status: SHIPPED | OUTPATIENT
Start: 2024-08-06

## 2024-08-06 RX ORDER — ACETAMINOPHEN AND CODEINE PHOSPHATE 300; 30 MG/1; MG/1
1 TABLET ORAL EVERY 8 HOURS PRN
Qty: 18 TABLET | Refills: 0 | Status: SHIPPED | OUTPATIENT
Start: 2024-08-06 | End: 2024-08-13

## 2024-08-06 NOTE — ADDENDUM NOTE
Encounter addended by: Lynnette Au RN on: 8/6/2024 10:18 AM   Actions taken: Contacts section saved, Flowsheet accepted

## 2024-08-06 NOTE — TELEPHONE ENCOUNTER
The patient called in with multiple complaints, states that she went to ER at the end of July (a few times) for pain flare ups with she was found to have multiple compression fractures. She was placed on different medications through her stay. She currently takes Tylenol #3 (every 8 hours), Flexeril in between, and Zofran in the morning. Also taking Miralax, docusate sodium, and Dulcolax as prescribed.  She also is wearing a back brace    She reports not having a bowel movement since 7/27. States that she is passing gas, denies heavy bloating however starting to get uncomfortable and losing her appetite. She reports urinating OK, did not want to report this severe constipation because she did not want this to interfere with her bronchoscopy procedure.    Needs to get refills of Tylenol #3, Flexeril, Zofran   Also needs to know what to do about the severe constipation    She has a follow up Friday 8/9.      Additional Information   Commented on: When was your last bowel movement?     July 27th    Protocols used: Constipation

## 2024-08-06 NOTE — TELEPHONE ENCOUNTER
The patient made aware of the recommendation per Dr. Chaudhary to try two scoops of miralax BID and can also try milk of magnesium OTC to assist with the constipation. She also is aware of her medications being refilled, will call us back if constipation worsens or she cannot resolve it in the next day or so. Encouraged to increase oral hydration. Denied further questions, aware that the Tylenol #3 will be refilled once Dr Chaudhary gets to a computer.

## 2024-08-07 ENCOUNTER — TELEPHONE (OUTPATIENT)
Dept: HEMATOLOGY/ONCOLOGY | Facility: HOSPITAL | Age: 69
End: 2024-08-07
Payer: MEDICARE

## 2024-08-07 NOTE — TELEPHONE ENCOUNTER
Pt left a voicemail on the RN triage line that she has two pharmacies on file and is trying to locate some recent prescriptions.    Call returned, no answer.  Left message on pt's identified voicemail informing her that 3 prescriptions were sent to [facility] in [place] yesterday.

## 2024-08-08 ENCOUNTER — OFFICE VISIT (OUTPATIENT)
Dept: PRIMARY CARE | Facility: CLINIC | Age: 69
End: 2024-08-08
Payer: MEDICARE

## 2024-08-08 VITALS
SYSTOLIC BLOOD PRESSURE: 119 MMHG | BODY MASS INDEX: 26.5 KG/M2 | WEIGHT: 164.2 LBS | DIASTOLIC BLOOD PRESSURE: 67 MMHG | HEART RATE: 67 BPM

## 2024-08-08 DIAGNOSIS — G89.3 CANCER ASSOCIATED PAIN: ICD-10-CM

## 2024-08-08 DIAGNOSIS — Z79.891 CHRONIC PRESCRIPTION OPIATE USE: ICD-10-CM

## 2024-08-08 DIAGNOSIS — K59.03 DRUG-INDUCED CONSTIPATION: ICD-10-CM

## 2024-08-08 DIAGNOSIS — K59.03 DRUG-INDUCED CONSTIPATION: Primary | ICD-10-CM

## 2024-08-08 PROCEDURE — 1036F TOBACCO NON-USER: CPT | Performed by: INTERNAL MEDICINE

## 2024-08-08 PROCEDURE — 3078F DIAST BP <80 MM HG: CPT | Performed by: INTERNAL MEDICINE

## 2024-08-08 PROCEDURE — 1125F AMNT PAIN NOTED PAIN PRSNT: CPT | Performed by: INTERNAL MEDICINE

## 2024-08-08 PROCEDURE — 3074F SYST BP LT 130 MM HG: CPT | Performed by: INTERNAL MEDICINE

## 2024-08-08 PROCEDURE — 1159F MED LIST DOCD IN RCRD: CPT | Performed by: INTERNAL MEDICINE

## 2024-08-08 PROCEDURE — 1160F RVW MEDS BY RX/DR IN RCRD: CPT | Performed by: INTERNAL MEDICINE

## 2024-08-08 PROCEDURE — 99214 OFFICE O/P EST MOD 30 MIN: CPT | Performed by: INTERNAL MEDICINE

## 2024-08-08 RX ORDER — HYDROCODONE BITARTRATE AND ACETAMINOPHEN 5; 325 MG/1; MG/1
TABLET ORAL
COMMUNITY
Start: 2024-07-29 | End: 2024-08-08 | Stop reason: ALTCHOICE

## 2024-08-08 ASSESSMENT — ENCOUNTER SYMPTOMS
CHILLS: 0
SHORTNESS OF BREATH: 0
FEVER: 0
POLYDIPSIA: 0
COUGH: 0
PALPITATIONS: 0

## 2024-08-08 ASSESSMENT — PAIN SCALES - GENERAL: PAINLEVEL: 8

## 2024-08-08 NOTE — PROGRESS NOTES
Subjective   Patient ID: Brittny Gordon is a 68 y.o. female who presents for Follow-up.    Pt with recent onset constipation approximately June 27 to the time of hospitalization for unmanageable pain.  She was diagnosed this year with metastatic cancer and is currently experiencing complications from metastasis to bone with compression fractures and chronic pain.  During her initial inpatient visit she was given IV pain medications transition to oral and has maintained on oral medication at first with Vicodin and now Tylenol 3 with codeine.  Since initiating these medications inpatient and then transition to outpatient she has experienced consistent symptoms of constipation.  Stool softener such as docusate did nothing for her.  She transition to MiraLAX and in typical dosing for MiraLAX once a day did nothing for her.  Recently she has increased MiraLAX to 4 times a day and only started to experience mild watery bowel movements.  She continues to experience complications of abdominal discomfort and bloating.  Her last bowel movement until the last 24 hours with mild movement was 1 week ago.  She has been advised about senna and has used it as directed but the addition of the senna has not created a consistent control over her bowel habits and constipation still persists.  At this time she has also completed a CT abdomen pelvis that did not reflect changes consistent with obstructive constipation.  Evidence supports a diagnosis of pain medication induced constipation failure of stimulant therapy by senna, and multiple stool softeners by themselves and in combination.  Advised the patient transition to Movantik assisted by MiraLAX.          Review of Systems   Constitutional:  Negative for chills and fever.   Respiratory:  Negative for cough and shortness of breath.    Cardiovascular:  Negative for chest pain and palpitations.   Endocrine: Negative for polydipsia and polyuria.       Objective   /67   Pulse  67   Wt 74.5 kg (164 lb 3.2 oz)   BMI 26.50 kg/m²     Physical Exam  Constitutional:       Appearance: Normal appearance.   HENT:      Head: Normocephalic and atraumatic.   Eyes:      Extraocular Movements: Extraocular movements intact.      Pupils: Pupils are equal, round, and reactive to light.   Neck:      Thyroid: No thyroid mass or thyromegaly.      Vascular: No carotid bruit.   Cardiovascular:      Rate and Rhythm: Normal rate and regular rhythm.      Heart sounds: No murmur heard.     No friction rub. No gallop.   Pulmonary:      Effort: No respiratory distress.      Breath sounds: No wheezing, rhonchi or rales.   Musculoskeletal:      Cervical back: Neck supple.      Right lower leg: No edema.      Left lower leg: No edema.   Lymphadenopathy:      Cervical: No cervical adenopathy.   Neurological:      Mental Status: She is alert.         Assessment/Plan   Problem List Items Addressed This Visit    None  Visit Diagnoses         Codes    Drug-induced constipation    -  Primary K59.03    Relevant Medications    naloxegol oxalate (Movantik) 12.5 mg tablet    Cancer associated pain     G89.3    Relevant Medications    naloxegol oxalate (Movantik) 12.5 mg tablet    Chronic prescription opiate use     Z79.891

## 2024-08-08 NOTE — PATIENT INSTRUCTIONS
OK to use miralax with the movantik, advise once daily use to start which can be adjusted if needed.     Until movantik is available, use senna every evening with miralax 2-3 times a day.

## 2024-08-08 NOTE — PROGRESS NOTES
Subjective:   Patient Name: Brittny Gordon    : 1955     MRN: [id number]     Age: 68 y.o.     Gender: female  Referring Provider:   Dr. Naun Hammonds (Thoracic Surgery)    CC: Management of newly diagnosed likley lung cancer. Liquid biopsy at diagnosis 2024 positive for KRAS G12V, TP53 M243T, JAK2 and KEAP 1, PDL-1 TPS 55%    Presenting History (2024): At time of initial presentation a 68 y.o. female, former smoker (started at age 17, 0.5 pack per day, quitted at age 56) with a past medical history of HTN, HLD and osteoporosis, aortic aneurysm s/p repairment in  referred for management of newly diagnosed lung cancer.     2019: CT chest (+): Noncalcified nodule in the left upper lobe adjacent to the aortic arch, nodule measuring 1.3 x 1.3 x 0.8cm.    2019: Pet/CT: 1. Mild hypermetabolic tracer activity corresponding to the known posteromedial left upper lobe lung nodule.    2021: CT chest (-): stable URMILA nodule 1.1x1.0cm. Again stable in 2021, 2022, 2022.     However on the CT chest on 2023 the URMILA nodule measured 1.6x1.4cm, which has been growing slowly compared to before, There is also a irregular shape solid-appearing nodule in the RUL measuring up to 0.8cm.     2024: CT chest (-) showed interval increase of the RUL nodule measuring 1.1cm. Multiple new nodules in both lower lobes measuring 0.4cm or smaller.     2024: Pet/CT: Enlarging FDG avid 1.1 cm spiculated nodule in the right upper lobe, as well as FDG avid mediastinal and bilateral hilar nodes is suspicious for a primary lung neoplasm with teresa metastases. Mid FDG avid left upper lobe paravertebral nodule, grossly stable in comparison to prior CT in 2019, favored to be benign. Multiple FDG avid bone lesions throughout the axial and appendicular skeleton, likely representing widespread bone metastases. FDG avidity within the right ribs as well as lower thoracic and lumbar vertebral bodies as  described above, could represent pathologic fractures in the setting of metastatic disease.      7/19/2024: MRI brain (+/-):  7 mm avidly enhancing extra-axial appearing nodule along the left pontine medullary junction adjacent to the left vertebral artery. 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 Clinical correlation and attention on follow-up suggested. No additional abnormal enhancement identified.    8/5/2024: Underwent EBUS with [facility]: path showed non small cell carcinoma. IHC showed positive for AE1/AE3, CK7 and negative for TTF1, p40 and INSM1. PDL-1 TPS 55%. Additional immunostain show the tumor cells are negative for TRPS1, PAX8, and CDX2. SMARCA4 immunostain shows retained nuclear expression. these findings argue against breast, GYN, and colorectal primaries, respectively. Overall, findings could be compatible with a lung primary non-small cell carcinoma in appropriate clinical and radiologic settings.     She presents for a consultation with her brother. She has been having back pain in June when she was doing gardening. She was then found to have  acute L3 osteoporotic compression fracture. She is still having back pain and she could not bend. No numbness or weakness of the lower extremities. No urinary or fecal incontinence. No CP or cough. Her appetite has been low since June. No significant weight loss. No n/v. She has been having intermittent diarrhea for 5 years. No HA. No vision change.     PMHx: HTN, HLD, and osteoporosis  Family history: father had lymphoma. Bother and sister passed away at early 40s. Mother had skin cancer (rare type but unknown type); brother has prostate cancer.   Shx: Occasional drinker. CEPA.        Treatment Summary:  - N/A    Interval History (8/8/2024):  She presents for a follow up visit accompanied by her brother. She continues to have worsening back pain, band like. She has upcoming CT sim and palliative RT to the spine next  week with Dr. Ospina. Otherwise she does not have any other pain, CP, SOB, fever, chills, n/v.       ROS:  Patient denies the below review of systems, except for changes as noted in the interval history.    General:  Fatigue, anorexia, fevers, sweats, chills, heat/cold intolerance, weight changes.  HEENT:  Icterus, congestion, sore throat, rhinorrhea, taste change, voice changes.  Neurology:  Headache, dizziness, vision changes, hearing changes, other motor/sensory loss, gait instability, neuropathies.  Pulmonology:  Cough, wheezing, hemoptysis, dyspnea at rest, dyspnea on exertion, pleuritic chest pain.  Cardiology:  Chest pain, palpitations, orthopnea, paroxysmal nocturnal dyspnea.  Gastroenterology:  Nausea, vomiting, reflux symptoms, abdominal pain, constipation, diarrhea, melena, bright red blood per rectum.  Genitourinary:  Dysuria, polyuria, urine color change, urine smell change, hematuria.   Musculoskeletal:  Arthralgias, myalgias, joint swelling, focal weakness.  Skin:  Rash, pruritis, jaundice, petechiae, nail changes, skin nodules/growth.  Psychology:  Anxiety, depression, suicidal ideation, homicidal ideation.  Heme:  Easy bleeding or bruising.  Others:   All other systems reviewed and are negative.    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    Wedge compression fracture of t11-T12 vertebra, sequela 07/30/2020    Compression fracture of T11 vertebra, sequela       Surgical History:   Past Surgical History:   Procedure Laterality Date    ARTERIAL ANEURYSM REPAIR      Thoracic aortic aneurysm repair    COLONOSCOPY      RADIOFREQUENCY ABLATION      L3,4,5    WISDOM TOOTH EXTRACTION         Family History:  Family History   Problem Relation Name Age of  Onset    Lymphoma Father      Polycythemia Father      Breast cancer Neg Hx      Colon cancer Neg Hx         Allergies:  Allergies   Allergen Reactions    Epinephrine Nausea/vomiting and Palpitations       Meds (Current):    Current Outpatient Medications:     acetaminophen-codeine (Tylenol w/ Codeine #3) 300-30 mg tablet, Take 1 tablet by mouth every 8 hours if needed for severe pain (7 - 10) for up to 7 days., Disp: 18 tablet, Rfl: 0    atorvastatin (Lipitor) 80 mg tablet, Take 1 tablet (80 mg) by mouth once daily., Disp: , Rfl:     bisacodyl (Dulcolax) 5 mg EC tablet, Take 1 tablet (5 mg) by mouth once daily as needed for constipation. Do not crush, chew, or split., Disp: 30 tablet, Rfl: 0    cetirizine (ZyrTEC) 10 mg tablet, Take 1 tablet (10 mg) by mouth once daily as needed for allergies., Disp: , Rfl:     cholecalciferol (Vitamin D3) 25 MCG (1000 UT) tablet, Take 1 tablet (1,000 Units) by mouth once daily., Disp: , Rfl:     cyclobenzaprine (Flexeril) 5 mg tablet, Take 1 tablet (5 mg) by mouth 3 times a day as needed for muscle spasms., Disp: 20 tablet, Rfl: 0    docusate sodium (Colace) 100 mg capsule, Take 1 capsule (100 mg) by mouth 2 times a day., Disp: 60 capsule, Rfl: 0    ibandronate (Boniva) 150 mg tablet, Take 1 tablet (150 mg) by mouth every 30 (thirty) days. Take in morning with full glass of water on an empty stomach. No food, drink, meds, or lying down for 60 minutes after., Disp: 3 tablet, Rfl: 3    losartan (Cozaar) 100 mg tablet, TAKE 1 TABLET BY MOUTH EVERY DAY, Disp: 90 tablet, Rfl: 3    metoprolol succinate XL (Toprol-XL) 100 mg 24 hr tablet, TAKE 1 TABLET BY MOUTH EVERY DAY, Disp: 90 tablet, Rfl: 3    naloxegol oxalate (Movantik) 12.5 mg tablet, Take 25 mg by mouth once daily., Disp: 30 tablet, Rfl: 1    ondansetron (Zofran) 8 mg tablet, Take 1 tablet (8 mg) by mouth every 8 hours if needed for nausea or vomiting., Disp: 20 tablet, Rfl: 0    polyethylene glycol (Glycolax, Miralax) 17  gram/dose powder, Mix 17 g of powder with 8 oz of water and drink by mouth once daily., Disp: 238 g, Rfl: 0    vit A/vit C/vit E/zinc/copper (PRESERVISION AREDS ORAL), Take 1 tablet by mouth early in the morning.., Disp: , Rfl:     Pain Assessment:  Aches in the back     Performance Status (ECOG): 0         ECOG Definition  0 Fully active; no performance restrictions.  1 Strenuous physical activity restricted; fully ambulatory and able to carry out light work.  2 Capable of all self-care but unable to carry out any work activities. Up and about >50% of waking hours.  3 Capable of only limited self-care; confined to bed or chair >50% of waking hours.  4 Completely disabled; cannot carry out any self-care; totally confined to bed or chair.      Exam:  There were no vitals taken for this visit.  General: Well appearing, no acute distress  Neurology: Alert and oriented x3, CN II-XII grossly intact  Psychology: Affect appropriate  Eyes: PERRL, EOMI  ENT: Mucus membranes moist and intact, no ulcers  Lymphatics: No palpable adenopathy  Neck: Supple, no masses  Respiratory: Lungs clear bilaterally, no wheezes, no rales, no rhonchi  Cardiovascular: Normal rate and rhythm, no murmur  Abdomen: Soft, non-tender, non-distended, normoactive, no hepatosplenomegaly, no ascites  Musculoskeletal: Normal gait and stance  Extremities: No clubbing, no cyanosis, no edema  Skin: No rash  Genitourinary: Deferred  Rectal: Deferred   Pelvic: Deferred  Breasts: Deferred    Labs:  Results from last 7 days   Lab Units 08/02/24  0417   WBC AUTO x10*3/uL 7.3   HEMOGLOBIN g/dL 13.6   HEMATOCRIT % 41.5   PLATELETS AUTO x10*3/uL 182   NEUTROS ABS x10*3/uL 3.33   LYMPHS ABS AUTO x10*3/uL 2.76   MONOS ABS AUTO x10*3/uL 0.82   EOS ABS AUTO x10*3/uL 0.29   NEUTROS PCT AUTO % 45.9   LYMPHS PCT AUTO % 38.1   MONOS PCT AUTO % 11.3   EOS PCT AUTO % 4.0      Results from last 7 days   Lab Units 08/02/24  0418   GLUCOSE mg/dL 85   SODIUM mmol/L 138    POTASSIUM mmol/L 3.7   CHLORIDE mmol/L 101   CO2 mmol/L 29   BUN mg/dL 11   CREATININE mg/dL 0.74   EGFR mL/min/1.73m*2 89   CALCIUM mg/dL 8.8       Assessment/Plan:   68 y.o. female with past medical history as stated referred for management of newly diagnosed lung cancer    The patient's records and imaging have been reviewed in detail.  I have discussed with the patient the natural history of their disease at length.  The following has also been discussed with the patient:    # Cancer:  Working stage IV ([id number]) cancer likely lung primary. IHC positive for CK7 but negative for TTF1 or p40. Liquid biopsy at diagnosis showed  KRAS G12V, TP53 M243T, JAK2 and KEAP 1. Reached out to pathology - will need more stains to r/o breast primary. PD-L1 55%. We d We discussed that this likely represents a metastatic lung cancer. We discussed treatment options with either standard care single agent pembrolizumab+/- carboplatin/paclitaxel vs clinical trial options. She is scheduled to start palliatve RT to spine lesions. Will refer her to phase I and lung trial. Awaiting for additional IHC stains and tissue NGS.      - Interval Imagin2024: Pet/CT: Enlarging FDG avid 1.1 cm spiculated nodule in the right upper lobe, as well as FDG avid mediastinal and bilateral hilar nodes is suspicious for a primary lung neoplasm with teresa metastases. Mid FDG avid left upper lobe paravertebral nodule, grossly stable in comparison to prior CT in 2019, favored to be benign. Multiple FDG avid bone lesions throughout the axial and appendicular skeleton, likely representing widespread bone metastases. FDG avidity within the right ribs as well as lower thoracic and lumbar vertebral bodies as described above, could represent pathologic fractures in the setting of metastatic disease. 2024: MRI brain (+/-):  7 mm avidly enhancing extra-axial appearing nodule along the left pontine medullary junction adjacent to the left vertebral  artery. 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 Clinical correlation and attention on follow-up suggested. No additional abnormal enhancement identified.      # Clinical Trials:  None currently.     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 Also referred to NSG.     # Access: Peripheral veins.    # Pain: Aches in back and L hip - not requiring any pain mets. Refilled her pain meds today.    # Bone Health: Diffuse bone lesion, likely metastases. Pending palliative RT.     # Psychosocial: Coping well, no acute issues.  Good support from family & friends.  Social work support offered.    # GI/CINV: No acute issues.  Eating and voiding well.  Nutrition consult offered.    # Smoking: N/A, former smoker.    # Fertility: N/A.  Oncofertility support available as needed.      # Heme/ESAs: N/A.    # GOC: Patient is aware of palliative intent goals of care.    # Language: English.    # Interdisciplinary Patient/Family Education Record:  Learner:  Patient.  Learning Needs Reviewed:  Treatments, Medications, Disease Process, Diagnostic Tests.  Barriers: None.  Teaching Method: Discussion, Handout(s).  Comprehension:  Verbalized Understanding.  Follow-up Plan:  Return to office as per MD.    # Dispo: RTC in 1-2 weeks to start treatment.

## 2024-08-09 ENCOUNTER — OFFICE VISIT (OUTPATIENT)
Dept: HEMATOLOGY/ONCOLOGY | Facility: HOSPITAL | Age: 69
End: 2024-08-09
Payer: MEDICARE

## 2024-08-09 VITALS
BODY MASS INDEX: 26.97 KG/M2 | OXYGEN SATURATION: 98 % | RESPIRATION RATE: 18 BRPM | HEART RATE: 66 BPM | DIASTOLIC BLOOD PRESSURE: 79 MMHG | TEMPERATURE: 96.6 F | WEIGHT: 167.11 LBS | SYSTOLIC BLOOD PRESSURE: 175 MMHG

## 2024-08-09 DIAGNOSIS — C79.51 MALIGNANT NEOPLASM OF LUNG METASTATIC TO BONE (MULTI): ICD-10-CM

## 2024-08-09 DIAGNOSIS — C34.90 MALIGNANT NEOPLASM OF LUNG METASTATIC TO BONE (MULTI): ICD-10-CM

## 2024-08-09 DIAGNOSIS — G89.3 CANCER RELATED PAIN: ICD-10-CM

## 2024-08-09 DIAGNOSIS — C34.91 PRIMARY MALIGNANT NEOPLASM OF RIGHT LUNG METASTATIC TO OTHER SITE (MULTI): Primary | ICD-10-CM

## 2024-08-09 LAB
LAB AP ASR DISCLAIMER: NORMAL
LAB AP ASR DISCLAIMER: NORMAL
LABORATORY COMMENT REPORT: NORMAL
PATH REPORT.ADDENDUM SPEC: NORMAL
PATH REPORT.ADDENDUM SPEC: NORMAL
PATH REPORT.COMMENTS IMP SPEC: NORMAL
PATH REPORT.FINAL DX SPEC: NORMAL
PATH REPORT.FINAL DX SPEC: NORMAL
PATH REPORT.GROSS SPEC: NORMAL
PATH REPORT.GROSS SPEC: NORMAL
PATH REPORT.INTRAOP OBS SPEC DOC: NORMAL
PATH REPORT.TOTAL CANCER: NORMAL
PATH REPORT.TOTAL CANCER: NORMAL

## 2024-08-09 PROCEDURE — 3078F DIAST BP <80 MM HG: CPT | Performed by: STUDENT IN AN ORGANIZED HEALTH CARE EDUCATION/TRAINING PROGRAM

## 2024-08-09 PROCEDURE — 99215 OFFICE O/P EST HI 40 MIN: CPT | Performed by: STUDENT IN AN ORGANIZED HEALTH CARE EDUCATION/TRAINING PROGRAM

## 2024-08-09 PROCEDURE — 1125F AMNT PAIN NOTED PAIN PRSNT: CPT | Performed by: STUDENT IN AN ORGANIZED HEALTH CARE EDUCATION/TRAINING PROGRAM

## 2024-08-09 PROCEDURE — 3077F SYST BP >= 140 MM HG: CPT | Performed by: STUDENT IN AN ORGANIZED HEALTH CARE EDUCATION/TRAINING PROGRAM

## 2024-08-09 PROCEDURE — 1159F MED LIST DOCD IN RCRD: CPT | Performed by: STUDENT IN AN ORGANIZED HEALTH CARE EDUCATION/TRAINING PROGRAM

## 2024-08-09 RX ORDER — HEPARIN SODIUM,PORCINE/PF 10 UNIT/ML
50 SYRINGE (ML) INTRAVENOUS AS NEEDED
OUTPATIENT
Start: 2024-08-09

## 2024-08-09 RX ORDER — HEPARIN 100 UNIT/ML
500 SYRINGE INTRAVENOUS AS NEEDED
OUTPATIENT
Start: 2024-08-09

## 2024-08-09 ASSESSMENT — PAIN SCALES - GENERAL: PAINLEVEL: 10-WORST PAIN EVER

## 2024-08-12 ENCOUNTER — HOSPITAL ENCOUNTER (INPATIENT)
Facility: HOSPITAL | Age: 69
DRG: 948 | End: 2024-08-12
Attending: EMERGENCY MEDICINE | Admitting: INTERNAL MEDICINE
Payer: MEDICARE

## 2024-08-12 ENCOUNTER — APPOINTMENT (OUTPATIENT)
Dept: RADIOLOGY | Facility: HOSPITAL | Age: 69
DRG: 948 | End: 2024-08-12
Payer: MEDICARE

## 2024-08-12 ENCOUNTER — APPOINTMENT (OUTPATIENT)
Dept: RADIATION ONCOLOGY | Facility: CLINIC | Age: 69
End: 2024-08-12
Payer: MEDICARE

## 2024-08-12 ENCOUNTER — NURSE TRIAGE (OUTPATIENT)
Dept: ADMISSION | Facility: HOSPITAL | Age: 69
End: 2024-08-12
Payer: MEDICARE

## 2024-08-12 DIAGNOSIS — M54.50 BACK PAIN AT L4-L5 LEVEL: ICD-10-CM

## 2024-08-12 DIAGNOSIS — S22.080A COMPRESSION FRACTURE OF T11 VERTEBRA, INITIAL ENCOUNTER (MULTI): ICD-10-CM

## 2024-08-12 DIAGNOSIS — C34.91 PRIMARY MALIGNANT NEOPLASM OF RIGHT LUNG METASTATIC TO OTHER SITE (MULTI): ICD-10-CM

## 2024-08-12 DIAGNOSIS — S22.000A COMPRESSION FRACTURE OF BODY OF THORACIC VERTEBRA (MULTI): Primary | ICD-10-CM

## 2024-08-12 LAB
ALBUMIN SERPL BCP-MCNC: 3.9 G/DL (ref 3.4–5)
ALP SERPL-CCNC: 68 U/L (ref 33–136)
ALT SERPL W P-5'-P-CCNC: 18 U/L (ref 7–45)
ANION GAP SERPL CALC-SCNC: 13 MMOL/L (ref 10–20)
AST SERPL W P-5'-P-CCNC: 20 U/L (ref 9–39)
BASOPHILS # BLD AUTO: 0.04 X10*3/UL (ref 0–0.1)
BASOPHILS NFR BLD AUTO: 0.4 %
BILIRUB SERPL-MCNC: 0.6 MG/DL (ref 0–1.2)
BUN SERPL-MCNC: 8 MG/DL (ref 6–23)
CALCIUM SERPL-MCNC: 9.6 MG/DL (ref 8.6–10.6)
CHLORIDE SERPL-SCNC: 97 MMOL/L (ref 98–107)
CO2 SERPL-SCNC: 30 MMOL/L (ref 21–32)
CREAT SERPL-MCNC: 0.81 MG/DL (ref 0.5–1.05)
EGFRCR SERPLBLD CKD-EPI 2021: 79 ML/MIN/1.73M*2
EOSINOPHIL # BLD AUTO: 0.28 X10*3/UL (ref 0–0.7)
EOSINOPHIL NFR BLD AUTO: 3.1 %
ERYTHROCYTE [DISTWIDTH] IN BLOOD BY AUTOMATED COUNT: 12.5 % (ref 11.5–14.5)
GLUCOSE SERPL-MCNC: 89 MG/DL (ref 74–99)
HCT VFR BLD AUTO: 40.4 % (ref 36–46)
HGB BLD-MCNC: 13.7 G/DL (ref 12–16)
IMM GRANULOCYTES # BLD AUTO: 0.03 X10*3/UL (ref 0–0.7)
IMM GRANULOCYTES NFR BLD AUTO: 0.3 % (ref 0–0.9)
LYMPHOCYTES # BLD AUTO: 2.43 X10*3/UL (ref 1.2–4.8)
LYMPHOCYTES NFR BLD AUTO: 27.2 %
MCH RBC QN AUTO: 29.7 PG (ref 26–34)
MCHC RBC AUTO-ENTMCNC: 33.9 G/DL (ref 32–36)
MCV RBC AUTO: 88 FL (ref 80–100)
MONOCYTES # BLD AUTO: 0.83 X10*3/UL (ref 0.1–1)
MONOCYTES NFR BLD AUTO: 9.3 %
NEUTROPHILS # BLD AUTO: 5.32 X10*3/UL (ref 1.2–7.7)
NEUTROPHILS NFR BLD AUTO: 59.7 %
NRBC BLD-RTO: 0 /100 WBCS (ref 0–0)
PLATELET # BLD AUTO: 178 X10*3/UL (ref 150–450)
POTASSIUM SERPL-SCNC: 3.9 MMOL/L (ref 3.5–5.3)
PROT SERPL-MCNC: 6.7 G/DL (ref 6.4–8.2)
RBC # BLD AUTO: 4.61 X10*6/UL (ref 4–5.2)
SODIUM SERPL-SCNC: 136 MMOL/L (ref 136–145)
WBC # BLD AUTO: 8.9 X10*3/UL (ref 4.4–11.3)

## 2024-08-12 PROCEDURE — 80053 COMPREHEN METABOLIC PANEL: CPT | Performed by: EMERGENCY MEDICINE

## 2024-08-12 PROCEDURE — 99223 1ST HOSP IP/OBS HIGH 75: CPT | Performed by: INTERNAL MEDICINE

## 2024-08-12 PROCEDURE — 36415 COLL VENOUS BLD VENIPUNCTURE: CPT | Performed by: EMERGENCY MEDICINE

## 2024-08-12 PROCEDURE — 99285 EMERGENCY DEPT VISIT HI MDM: CPT | Performed by: EMERGENCY MEDICINE

## 2024-08-12 PROCEDURE — 85025 COMPLETE CBC W/AUTO DIFF WBC: CPT | Performed by: EMERGENCY MEDICINE

## 2024-08-12 PROCEDURE — 96374 THER/PROPH/DIAG INJ IV PUSH: CPT

## 2024-08-12 PROCEDURE — A9575 INJ GADOTERATE MEGLUMI 0.1ML: HCPCS | Performed by: EMERGENCY MEDICINE

## 2024-08-12 PROCEDURE — 2550000001 HC RX 255 CONTRASTS: Performed by: EMERGENCY MEDICINE

## 2024-08-12 PROCEDURE — 72156 MRI NECK SPINE W/O & W/DYE: CPT | Performed by: STUDENT IN AN ORGANIZED HEALTH CARE EDUCATION/TRAINING PROGRAM

## 2024-08-12 PROCEDURE — 72158 MRI LUMBAR SPINE W/O & W/DYE: CPT | Performed by: STUDENT IN AN ORGANIZED HEALTH CARE EDUCATION/TRAINING PROGRAM

## 2024-08-12 PROCEDURE — 2500000004 HC RX 250 GENERAL PHARMACY W/ HCPCS (ALT 636 FOR OP/ED)

## 2024-08-12 PROCEDURE — 72157 MRI CHEST SPINE W/O & W/DYE: CPT | Performed by: STUDENT IN AN ORGANIZED HEALTH CARE EDUCATION/TRAINING PROGRAM

## 2024-08-12 PROCEDURE — 99285 EMERGENCY DEPT VISIT HI MDM: CPT | Mod: 25

## 2024-08-12 PROCEDURE — 72156 MRI NECK SPINE W/O & W/DYE: CPT

## 2024-08-12 RX ORDER — HYDROMORPHONE HYDROCHLORIDE 1 MG/ML
0.2 INJECTION, SOLUTION INTRAMUSCULAR; INTRAVENOUS; SUBCUTANEOUS ONCE
Status: COMPLETED | OUTPATIENT
Start: 2024-08-12 | End: 2024-08-12

## 2024-08-12 RX ORDER — GADOTERATE MEGLUMINE 376.9 MG/ML
14 INJECTION INTRAVENOUS
Status: COMPLETED | OUTPATIENT
Start: 2024-08-12 | End: 2024-08-12

## 2024-08-12 ASSESSMENT — COGNITIVE AND FUNCTIONAL STATUS - GENERAL
DAILY ACTIVITIY SCORE: 24
MOBILITY SCORE: 23
CLIMB 3 TO 5 STEPS WITH RAILING: A LITTLE

## 2024-08-12 ASSESSMENT — PAIN SCALES - GENERAL
PAINLEVEL_OUTOF10: 5 - MODERATE PAIN
PAINLEVEL_OUTOF10: 8
PAINLEVEL_OUTOF10: 6

## 2024-08-12 ASSESSMENT — LIFESTYLE VARIABLES
HAVE YOU EVER FELT YOU SHOULD CUT DOWN ON YOUR DRINKING: NO
HAVE PEOPLE ANNOYED YOU BY CRITICIZING YOUR DRINKING: NO
EVER HAD A DRINK FIRST THING IN THE MORNING TO STEADY YOUR NERVES TO GET RID OF A HANGOVER: NO
TOTAL SCORE: 0
EVER FELT BAD OR GUILTY ABOUT YOUR DRINKING: NO

## 2024-08-12 ASSESSMENT — PAIN DESCRIPTION - LOCATION: LOCATION: BACK

## 2024-08-12 ASSESSMENT — PAIN - FUNCTIONAL ASSESSMENT
PAIN_FUNCTIONAL_ASSESSMENT: 0-10

## 2024-08-12 ASSESSMENT — PAIN DESCRIPTION - ORIENTATION: ORIENTATION: LOWER;LEFT

## 2024-08-12 NOTE — TELEPHONE ENCOUNTER
I called Brittny back and Dr. Chaudhary did confirm she needs to go to [facility]. Brittny agreed to plan and I will notify [facility] of her estimated arrival time.

## 2024-08-12 NOTE — TELEPHONE ENCOUNTER
Received a secure chat back from Dr. Chaudhary and would like patient to go to [facility] ER if can and may need an MRI and possibly surgery after seeing MRI results. I told Brittny Chaudhary's suggestion and she said she will talk to her cousin about a ride and I will call her back in a few minutes.

## 2024-08-12 NOTE — TELEPHONE ENCOUNTER
I called Brittny back and she can get to ER at 2:30 today with a ride at main [place]. I will confirm with Dr. Chaudhary this is good or something else is needed. Secured chat Dr. Chaudhary with update.

## 2024-08-12 NOTE — TELEPHONE ENCOUNTER
Patient called to update  team she is currently at [facility]

## 2024-08-12 NOTE — TELEPHONE ENCOUNTER
Additional Information   Are there daily activities that you're having trouble with such as getting dressed or making meals?     Hard to do daily activities due to pain when she walks.   Commented on: Where is the pain? Is there more than one place where you're having pain?     Lower back pain. Standing pain is 9/10. Laying down 7/10. This pain is worsening pain but same areas. Most pain left lower back but Dr. Chaudhary thinks referred pain. Hard to stand up for more than a few minutes. She also said the Tylenol with Codeine upsets her stomach but denies any vomiting. She is taking Zofran as scheduled.   Commented on: When did these problems start?     Same area of pain she has had but seems to worsen. Denies any weakness.   Commented on: How long have they been going on?     June 24. Couple weeks pain is more constant and worsening.   Commented on: What helps these problems?     Tylenol and Codeine it takes edge off and only for a few hours. Ice helps.   Commented on: What makes these problems worse?     Stand or walk pain is worse.   Commented on: Is there a time of day when the pain is better or worse?     Better at night when laying down.   Commented on: Do you have any of these problems? (Assess for signs/symptoms of spinal cord compression)     Denies all of these symptoms.   Commented on: Review EMR for h/o immunotherapy. If positive, ask if patient is having these symptoms (they may be signs of immune-related hepatitis or colitis):     Pt has some stomach discomfort. She is trying to eat when takes pain meds but since pain is bad she isn't eating as much.    Protocols used: Pain

## 2024-08-12 NOTE — TELEPHONE ENCOUNTER
Brittny called and said she has had lower back pain that radiates to the left side lower back area. She said Dr. Chaudhary is aware of the pain. She said this is a continued problem but 8/10 pain if walks. Laying down helps with pain and ice. She said the pain seems to be worsening and can only walk a few minutes at a time. Denies any weakness or pain anywhere else. She said she is taking Tylenol with Codeine but makes her stomach upset since isn't eating as much. She wanted to know if there is some other RX that can be placed instead of this for pain? She is taking Zofran as prescribed. She denies any vomiting, fever, chills, SOB, chest pain. If another RX can be placed for pain can you place it through [facility] in [place]. Messaged team and secured chat team.

## 2024-08-13 ENCOUNTER — HOSPITAL ENCOUNTER (OUTPATIENT)
Dept: RADIOLOGY | Facility: EXTERNAL LOCATION | Age: 69
Discharge: HOME | End: 2024-08-13

## 2024-08-13 ENCOUNTER — TELEPHONE (OUTPATIENT)
Dept: RADIATION ONCOLOGY | Facility: CLINIC | Age: 69
End: 2024-08-13
Payer: MEDICARE

## 2024-08-13 ENCOUNTER — APPOINTMENT (OUTPATIENT)
Dept: GASTROENTEROLOGY | Facility: CLINIC | Age: 69
End: 2024-08-13
Payer: MEDICARE

## 2024-08-13 ENCOUNTER — HOSPITAL ENCOUNTER (OUTPATIENT)
Dept: RADIATION ONCOLOGY | Facility: HOSPITAL | Age: 69
Setting detail: RADIATION/ONCOLOGY SERIES
Discharge: HOME | End: 2024-08-13
Payer: MEDICARE

## 2024-08-13 DIAGNOSIS — C79.51 METASTATIC BONE TUMOR (MULTI): Primary | ICD-10-CM

## 2024-08-13 DIAGNOSIS — C79.51 METASTATIC BONE TUMOR (MULTI): ICD-10-CM

## 2024-08-13 PROBLEM — S22.000A COMPRESSION FRACTURE OF BODY OF THORACIC VERTEBRA (MULTI): Status: ACTIVE | Noted: 2024-08-13

## 2024-08-13 LAB
ALBUMIN SERPL BCP-MCNC: 3.4 G/DL (ref 3.4–5)
ANION GAP SERPL CALC-SCNC: 13 MMOL/L (ref 10–20)
BASOPHILS # BLD AUTO: 0.03 X10*3/UL (ref 0–0.1)
BASOPHILS NFR BLD AUTO: 0.4 %
BUN SERPL-MCNC: 7 MG/DL (ref 6–23)
CALCIUM SERPL-MCNC: 8.9 MG/DL (ref 8.6–10.6)
CHLORIDE SERPL-SCNC: 100 MMOL/L (ref 98–107)
CO2 SERPL-SCNC: 30 MMOL/L (ref 21–32)
CREAT SERPL-MCNC: 0.71 MG/DL (ref 0.5–1.05)
EGFRCR SERPLBLD CKD-EPI 2021: >90 ML/MIN/1.73M*2
EOSINOPHIL # BLD AUTO: 0.31 X10*3/UL (ref 0–0.7)
EOSINOPHIL NFR BLD AUTO: 3.9 %
ERYTHROCYTE [DISTWIDTH] IN BLOOD BY AUTOMATED COUNT: 12.5 % (ref 11.5–14.5)
GLUCOSE SERPL-MCNC: 87 MG/DL (ref 74–99)
HCT VFR BLD AUTO: 38.7 % (ref 36–46)
HGB BLD-MCNC: 13.2 G/DL (ref 12–16)
IMM GRANULOCYTES # BLD AUTO: 0.05 X10*3/UL (ref 0–0.7)
IMM GRANULOCYTES NFR BLD AUTO: 0.6 % (ref 0–0.9)
LYMPHOCYTES # BLD AUTO: 2.41 X10*3/UL (ref 1.2–4.8)
LYMPHOCYTES NFR BLD AUTO: 30.2 %
MAGNESIUM SERPL-MCNC: 1.8 MG/DL (ref 1.6–2.4)
MCH RBC QN AUTO: 29.2 PG (ref 26–34)
MCHC RBC AUTO-ENTMCNC: 34.1 G/DL (ref 32–36)
MCV RBC AUTO: 86 FL (ref 80–100)
MONOCYTES # BLD AUTO: 0.84 X10*3/UL (ref 0.1–1)
MONOCYTES NFR BLD AUTO: 10.5 %
NEUTROPHILS # BLD AUTO: 4.33 X10*3/UL (ref 1.2–7.7)
NEUTROPHILS NFR BLD AUTO: 54.4 %
NRBC BLD-RTO: 0 /100 WBCS (ref 0–0)
PHOSPHATE SERPL-MCNC: 4 MG/DL (ref 2.5–4.9)
PLATELET # BLD AUTO: 193 X10*3/UL (ref 150–450)
POTASSIUM SERPL-SCNC: 3.8 MMOL/L (ref 3.5–5.3)
RBC # BLD AUTO: 4.52 X10*6/UL (ref 4–5.2)
SODIUM SERPL-SCNC: 139 MMOL/L (ref 136–145)
WBC # BLD AUTO: 8 X10*3/UL (ref 4.4–11.3)

## 2024-08-13 PROCEDURE — 2500000001 HC RX 250 WO HCPCS SELF ADMINISTERED DRUGS (ALT 637 FOR MEDICARE OP)

## 2024-08-13 PROCEDURE — 2500000004 HC RX 250 GENERAL PHARMACY W/ HCPCS (ALT 636 FOR OP/ED)

## 2024-08-13 PROCEDURE — 83735 ASSAY OF MAGNESIUM: CPT

## 2024-08-13 PROCEDURE — 99233 SBSQ HOSP IP/OBS HIGH 50: CPT

## 2024-08-13 PROCEDURE — 85025 COMPLETE CBC W/AUTO DIFF WBC: CPT

## 2024-08-13 PROCEDURE — 99223 1ST HOSP IP/OBS HIGH 75: CPT | Performed by: NURSE PRACTITIONER

## 2024-08-13 PROCEDURE — 99221 1ST HOSP IP/OBS SF/LOW 40: CPT | Performed by: STUDENT IN AN ORGANIZED HEALTH CARE EDUCATION/TRAINING PROGRAM

## 2024-08-13 PROCEDURE — 1170000001 HC PRIVATE ONCOLOGY ROOM DAILY

## 2024-08-13 PROCEDURE — 99222 1ST HOSP IP/OBS MODERATE 55: CPT | Performed by: PHYSICIAN ASSISTANT

## 2024-08-13 PROCEDURE — 82565 ASSAY OF CREATININE: CPT

## 2024-08-13 PROCEDURE — 36415 COLL VENOUS BLD VENIPUNCTURE: CPT

## 2024-08-13 RX ORDER — ACETAMINOPHEN 650 MG/1
650 SUPPOSITORY RECTAL EVERY 4 HOURS PRN
Status: DISCONTINUED | OUTPATIENT
Start: 2024-08-13 | End: 2024-08-13

## 2024-08-13 RX ORDER — LOSARTAN POTASSIUM 100 MG/1
100 TABLET ORAL DAILY
Status: DISCONTINUED | OUTPATIENT
Start: 2024-08-13 | End: 2024-08-19 | Stop reason: HOSPADM

## 2024-08-13 RX ORDER — IBUPROFEN 400 MG/1
400 TABLET ORAL EVERY 4 HOURS PRN
Status: DISCONTINUED | OUTPATIENT
Start: 2024-08-13 | End: 2024-08-13

## 2024-08-13 RX ORDER — BISACODYL 5 MG
5 TABLET, DELAYED RELEASE (ENTERIC COATED) ORAL DAILY PRN
Status: DISCONTINUED | OUTPATIENT
Start: 2024-08-13 | End: 2024-08-19 | Stop reason: HOSPADM

## 2024-08-13 RX ORDER — CYCLOBENZAPRINE HCL 10 MG
5 TABLET ORAL EVERY 8 HOURS
Status: DISCONTINUED | OUTPATIENT
Start: 2024-08-13 | End: 2024-08-14

## 2024-08-13 RX ORDER — CALCITONIN SALMON 200 [IU]/.09ML
1 SPRAY, METERED NASAL DAILY
Status: DISCONTINUED | OUTPATIENT
Start: 2024-08-13 | End: 2024-08-19 | Stop reason: HOSPADM

## 2024-08-13 RX ORDER — DEXAMETHASONE 4 MG/1
4 TABLET ORAL EVERY MORNING
Status: DISCONTINUED | OUTPATIENT
Start: 2024-08-14 | End: 2024-08-14

## 2024-08-13 RX ORDER — GABAPENTIN 300 MG/1
300 CAPSULE ORAL NIGHTLY
Status: DISCONTINUED | OUTPATIENT
Start: 2024-08-13 | End: 2024-08-19 | Stop reason: HOSPADM

## 2024-08-13 RX ORDER — HYDROMORPHONE HYDROCHLORIDE 1 MG/ML
0.2 INJECTION, SOLUTION INTRAMUSCULAR; INTRAVENOUS; SUBCUTANEOUS EVERY 4 HOURS PRN
Status: DISCONTINUED | OUTPATIENT
Start: 2024-08-13 | End: 2024-08-13

## 2024-08-13 RX ORDER — ACETAMINOPHEN 160 MG/5ML
650 SOLUTION ORAL EVERY 4 HOURS PRN
Status: DISCONTINUED | OUTPATIENT
Start: 2024-08-13 | End: 2024-08-13

## 2024-08-13 RX ORDER — OXYCODONE HYDROCHLORIDE 5 MG/1
5 TABLET ORAL EVERY 6 HOURS PRN
Status: DISCONTINUED | OUTPATIENT
Start: 2024-08-13 | End: 2024-08-14

## 2024-08-13 RX ORDER — ONDANSETRON HYDROCHLORIDE 8 MG/1
8 TABLET, FILM COATED ORAL EVERY 8 HOURS PRN
Status: DISCONTINUED | OUTPATIENT
Start: 2024-08-13 | End: 2024-08-19 | Stop reason: HOSPADM

## 2024-08-13 RX ORDER — HYDROMORPHONE HYDROCHLORIDE 1 MG/ML
0.4 INJECTION, SOLUTION INTRAMUSCULAR; INTRAVENOUS; SUBCUTANEOUS EVERY 4 HOURS PRN
Status: DISCONTINUED | OUTPATIENT
Start: 2024-08-13 | End: 2024-08-19 | Stop reason: HOSPADM

## 2024-08-13 RX ORDER — POLYETHYLENE GLYCOL 3350 17 G/17G
17 POWDER, FOR SOLUTION ORAL DAILY
Status: DISCONTINUED | OUTPATIENT
Start: 2024-08-13 | End: 2024-08-19 | Stop reason: HOSPADM

## 2024-08-13 RX ORDER — CETIRIZINE HYDROCHLORIDE 10 MG/1
10 TABLET ORAL DAILY PRN
Status: DISCONTINUED | OUTPATIENT
Start: 2024-08-13 | End: 2024-08-19 | Stop reason: HOSPADM

## 2024-08-13 RX ORDER — HYDROMORPHONE HYDROCHLORIDE 1 MG/ML
0.4 INJECTION, SOLUTION INTRAMUSCULAR; INTRAVENOUS; SUBCUTANEOUS EVERY 4 HOURS PRN
Status: DISCONTINUED | OUTPATIENT
Start: 2024-08-13 | End: 2024-08-13

## 2024-08-13 RX ORDER — DOCUSATE SODIUM 100 MG/1
100 CAPSULE, LIQUID FILLED ORAL 2 TIMES DAILY
Status: DISCONTINUED | OUTPATIENT
Start: 2024-08-13 | End: 2024-08-19 | Stop reason: HOSPADM

## 2024-08-13 RX ORDER — METOPROLOL SUCCINATE 100 MG/1
100 TABLET, EXTENDED RELEASE ORAL DAILY
Status: DISCONTINUED | OUTPATIENT
Start: 2024-08-13 | End: 2024-08-19 | Stop reason: HOSPADM

## 2024-08-13 RX ORDER — ATORVASTATIN CALCIUM 80 MG/1
80 TABLET, FILM COATED ORAL DAILY
Status: DISCONTINUED | OUTPATIENT
Start: 2024-08-13 | End: 2024-08-19 | Stop reason: HOSPADM

## 2024-08-13 RX ORDER — CHOLECALCIFEROL (VITAMIN D3) 25 MCG
1000 TABLET ORAL DAILY
Status: DISCONTINUED | OUTPATIENT
Start: 2024-08-13 | End: 2024-08-19 | Stop reason: HOSPADM

## 2024-08-13 RX ORDER — CYCLOBENZAPRINE HCL 10 MG
5 TABLET ORAL 3 TIMES DAILY PRN
Status: DISCONTINUED | OUTPATIENT
Start: 2024-08-13 | End: 2024-08-13

## 2024-08-13 RX ORDER — ACETAMINOPHEN 325 MG/1
650 TABLET ORAL EVERY 4 HOURS PRN
Status: DISCONTINUED | OUTPATIENT
Start: 2024-08-13 | End: 2024-08-13

## 2024-08-13 RX ORDER — IBUPROFEN 400 MG/1
400 TABLET ORAL EVERY 4 HOURS PRN
Status: DISCONTINUED | OUTPATIENT
Start: 2024-08-13 | End: 2024-08-17

## 2024-08-13 RX ORDER — ENOXAPARIN SODIUM 100 MG/ML
40 INJECTION SUBCUTANEOUS EVERY 24 HOURS
Status: DISCONTINUED | OUTPATIENT
Start: 2024-08-13 | End: 2024-08-19 | Stop reason: HOSPADM

## 2024-08-13 ASSESSMENT — PAIN SCALES - GENERAL
PAINLEVEL_OUTOF10: 5 - MODERATE PAIN
PAINLEVEL_OUTOF10: 8

## 2024-08-13 ASSESSMENT — PAIN DESCRIPTION - ORIENTATION: ORIENTATION: LEFT;LOWER

## 2024-08-13 ASSESSMENT — PAIN - FUNCTIONAL ASSESSMENT
PAIN_FUNCTIONAL_ASSESSMENT: 0-10
PAIN_FUNCTIONAL_ASSESSMENT: 0-10

## 2024-08-13 ASSESSMENT — PAIN DESCRIPTION - LOCATION: LOCATION: BACK

## 2024-08-13 NOTE — PROGRESS NOTES
Pharmacy Admission Order Reconciliation Review    Brittny Gordon is a 69 y.o. female admitted for Compression fracture of body of thoracic vertebra (Multi). Pharmacy reviewed the patient's unreconciled admission medications.    Prior to admission medications that were reviewed and acted on by the pharmacist include:  Icy Hot  These medications have been reconciled.     Any other unreconcilied medications have been addressed and will be ordered or held by the patient's medical team. Medications addressed by the pharmacist may be added or changed by the patient's medical team at any time.    Chhaya Ruiz, PharmD  Transitions of Care Pharmacist  [facility]  Please reach out via Secure Chat for questions

## 2024-08-13 NOTE — PROGRESS NOTES
Pharmacy Medication History Review    Brittny Gordon is a 69 y.o. female admitted for Compression fracture of body of thoracic vertebra (Multi). Pharmacy reviewed the patient's [id number] medications and allergies for accuracy.    Medications ADDED:  Icy Hot  Medications CHANGED:  None  Medications REMOVED:   None    The list below reflects the updated PTA list.   Prior to Admission Medications   Prescriptions Last Dose Informant   acetaminophen-codeine (Tylenol w/ Codeine #3) 300-30 mg tablet 8/12/2024 Self   Sig: Take 1 tablet by mouth every 8 hours if needed for severe pain (7 - 10) for up to 7 days.   atorvastatin (Lipitor) 80 mg tablet  Self   Sig: Take 1 tablet (80 mg) by mouth once daily.   bisacodyl (Dulcolax) 5 mg EC tablet  Self   Sig: Take 1 tablet (5 mg) by mouth once daily as needed for constipation. Do not crush, chew, or split.   cetirizine (ZyrTEC) 10 mg tablet  Self   Sig: Take 1 tablet (10 mg) by mouth once daily as needed for allergies.   cholecalciferol (Vitamin D3) 25 MCG (1000 UT) tablet  Self   Sig: Take 1 tablet (1,000 Units) by mouth once daily.   cyclobenzaprine (Flexeril) 5 mg tablet  Self   Sig: Take 1 tablet (5 mg) by mouth 3 times a day as needed for muscle spasms.   docusate sodium (Colace) 100 mg capsule  Self   Sig: Take 1 capsule (100 mg) by mouth 2 times a day.   ibandronate (Boniva) 150 mg tablet  Self   Sig: Take 1 tablet (150 mg) by mouth every 30 (thirty) days. Take in morning with full glass of water on an empty stomach. No food, drink, meds, or lying down for 60 minutes after.   losartan (Cozaar) 100 mg tablet  Self   Sig: TAKE 1 TABLET BY MOUTH EVERY DAY   menthol (ICY HOT ADVANCED RELIEF PATCH TOP)  Self   Sig: Apply 1 patch topically every 12 hours if needed.   metoprolol succinate XL (Toprol-XL) 100 mg 24 hr tablet  Self   Sig: TAKE 1 TABLET BY MOUTH EVERY DAY   naloxegol oxalate (Movantik) 25 mg Not taking Self   Sig: Take 1 tablet (25 mg) by mouth once daily.  "  ondansetron (Zofran) 8 mg tablet  Self   Sig: Take 1 tablet (8 mg) by mouth every 8 hours if needed for nausea or vomiting.   polyethylene glycol (Glycolax, Miralax) 17 gram/dose powder  Self   Sig: Mix 17 g of powder with 8 oz of water and drink by mouth once daily.   vit A/vit C/vit E/zinc/copper (PRESERVISION AREDS ORAL)  Self   Sig: Take 1 tablet by mouth early in the morning..      Facility-Administered Medications: None        The list below reflects the updated allergy list. Please review each documented allergy for additional clarification and justification.  Allergies  Reviewed by Maria T Rodríguez on 8/13/2024        Severity Reactions Comments    Epinephrine Low Nausea/vomiting, Palpitations             Patient accepts M2B at discharge.     Sources:   Patient interview  Dispense history  OARRS    Additional Comments:  Patient is a moderate historian. Patient was able to recall some medications but had to look at her MyChart to verify most medications.  Patient reports that she does not like any medications with acetaminophen as it causes stomach upset and does not work well for her pain.  Patient reports not taking Movantik due to price which she quoted it is about $400.        MARIA T RODRÍGUEZ  PGY-1 Pharmacy Resident, [facility]   08/13/24     Secure Chat preferred   If no response call [phone] or [employer] \"Med Rec\"   "

## 2024-08-13 NOTE — PROGRESS NOTES
Daily Progress Note  Brittny Gordon is a 69 y.o. female on day 0 of admission presenting with Compression fracture of body of thoracic vertebra (Multi).    Brittny Gordon is a 69 y.o. female with a past history of presumed stage IV metastatic lung cancer presenting with severe back pain in the setting of acute T12 compression fracture secondary to bony mets.     Subjective   Ms. Gordon is reporting pain in her hips, flank and back. She noticed about a month prior while gardening. She received biopsy via EBUS 8/5 which revealed NSCLC. She has osseous mets in spine previously known L3 compression fracture and is presenting with new T12 compression fracture. She notes nausea with codeine in the past and describes her current IV dilaudid regimen to assist with her pain.      Objective     Physical Exam  Constitutional:       Appearance: Normal appearance.   HENT:      Mouth/Throat:      Mouth: Mucous membranes are moist.      Pharynx: Oropharynx is clear.   Eyes:      Pupils: Pupils are equal, round, and reactive to light.   Cardiovascular:      Rate and Rhythm: Normal rate and regular rhythm.      Heart sounds: Normal heart sounds.   Pulmonary:      Effort: Pulmonary effort is normal. No respiratory distress.      Breath sounds: Normal breath sounds. No wheezing.   Abdominal:      General: Abdomen is flat. There is no distension.      Palpations: Abdomen is soft.      Tenderness: There is no abdominal tenderness.   Musculoskeletal:      Right lower leg: No edema.      Left lower leg: No edema.   Skin:     General: Skin is warm and dry.   Neurological:      General: No focal deficit present.      Mental Status: She is alert and oriented to person, place, and time. Mental status is at baseline.      Sensory: No sensory deficit.      Motor: No weakness.   Psychiatric:         Mood and Affect: Mood normal.         Behavior: Behavior normal.     Last Recorded Vitals  Blood pressure 120/77, pulse 67, temperature 36.8  "°C (98.2 °F), temperature source Oral, resp. rate 16, height 1.702 m (5' 7\"), weight 74.8 kg (165 lb), SpO2 95%.  Intake/Output last 3 Shifts:  No intake/output data recorded.    Relevant Results  Scheduled medications  atorvastatin, 80 mg, oral, Daily  calcitonin (salmon), 1 spray, One Nostril, Daily  cholecalciferol, 1,000 Units, oral, Daily  docusate sodium, 100 mg, oral, BID  enoxaparin, 40 mg, subcutaneous, q24h  losartan, 100 mg, oral, Daily  metoprolol succinate XL, 100 mg, oral, Daily  polyethylene glycol, 17 g, oral, Daily    Continuous medications     PRN medications  PRN medications: bisacodyl, cetirizine, cyclobenzaprine, HYDROmorphone, ibuprofen, ondansetron  Results for orders placed or performed during the hospital encounter of 08/12/24 (from the past 24 hour(s))   CBC and Auto Differential   Result Value Ref Range    WBC 8.9 4.4 - 11.3 x10*3/uL    nRBC 0.0 0.0 - 0.0 /100 WBCs    RBC 4.61 4.00 - 5.20 x10*6/uL    Hemoglobin 13.7 12.0 - 16.0 g/dL    Hematocrit 40.4 36.0 - 46.0 %    MCV 88 80 - 100 fL    MCH 29.7 26.0 - 34.0 pg    MCHC 33.9 32.0 - 36.0 g/dL    RDW 12.5 11.5 - 14.5 %    Platelets 178 150 - 450 x10*3/uL    Neutrophils % 59.7 40.0 - 80.0 %    Immature Granulocytes %, Automated 0.3 0.0 - 0.9 %    Lymphocytes % 27.2 13.0 - 44.0 %    Monocytes % 9.3 2.0 - 10.0 %    Eosinophils % 3.1 0.0 - 6.0 %    Basophils % 0.4 0.0 - 2.0 %    Neutrophils Absolute 5.32 1.20 - 7.70 x10*3/uL    Immature Granulocytes Absolute, Automated 0.03 0.00 - 0.70 x10*3/uL    Lymphocytes Absolute 2.43 1.20 - 4.80 x10*3/uL    Monocytes Absolute 0.83 0.10 - 1.00 x10*3/uL    Eosinophils Absolute 0.28 0.00 - 0.70 x10*3/uL    Basophils Absolute 0.04 0.00 - 0.10 x10*3/uL   Comprehensive metabolic panel   Result Value Ref Range    Glucose 89 74 - 99 mg/dL    Sodium 136 136 - 145 mmol/L    Potassium 3.9 3.5 - 5.3 mmol/L    Chloride 97 (L) 98 - 107 mmol/L    Bicarbonate 30 21 - 32 mmol/L    Anion Gap 13 10 - 20 mmol/L    Urea " Nitrogen 8 6 - 23 mg/dL    Creatinine 0.81 0.50 - 1.05 mg/dL    eGFR 79 >60 mL/min/1.73m*2    Calcium 9.6 8.6 - 10.6 mg/dL    Albumin 3.9 3.4 - 5.0 g/dL    Alkaline Phosphatase 68 33 - 136 U/L    Total Protein 6.7 6.4 - 8.2 g/dL    AST 20 9 - 39 U/L    Bilirubin, Total 0.6 0.0 - 1.2 mg/dL    ALT 18 7 - 45 U/L   CBC and Auto Differential   Result Value Ref Range    WBC 8.0 4.4 - 11.3 x10*3/uL    nRBC 0.0 0.0 - 0.0 /100 WBCs    RBC 4.52 4.00 - 5.20 x10*6/uL    Hemoglobin 13.2 12.0 - 16.0 g/dL    Hematocrit 38.7 36.0 - 46.0 %    MCV 86 80 - 100 fL    MCH 29.2 26.0 - 34.0 pg    MCHC 34.1 32.0 - 36.0 g/dL    RDW 12.5 11.5 - 14.5 %    Platelets 193 150 - 450 x10*3/uL    Neutrophils % 54.4 40.0 - 80.0 %    Immature Granulocytes %, Automated 0.6 0.0 - 0.9 %    Lymphocytes % 30.2 13.0 - 44.0 %    Monocytes % 10.5 2.0 - 10.0 %    Eosinophils % 3.9 0.0 - 6.0 %    Basophils % 0.4 0.0 - 2.0 %    Neutrophils Absolute 4.33 1.20 - 7.70 x10*3/uL    Immature Granulocytes Absolute, Automated 0.05 0.00 - 0.70 x10*3/uL    Lymphocytes Absolute 2.41 1.20 - 4.80 x10*3/uL    Monocytes Absolute 0.84 0.10 - 1.00 x10*3/uL    Eosinophils Absolute 0.31 0.00 - 0.70 x10*3/uL    Basophils Absolute 0.03 0.00 - 0.10 x10*3/uL   Renal Function Panel   Result Value Ref Range    Glucose 87 74 - 99 mg/dL    Sodium 139 136 - 145 mmol/L    Potassium 3.8 3.5 - 5.3 mmol/L    Chloride 100 98 - 107 mmol/L    Bicarbonate 30 21 - 32 mmol/L    Anion Gap 13 10 - 20 mmol/L    Urea Nitrogen 7 6 - 23 mg/dL    Creatinine 0.71 0.50 - 1.05 mg/dL    eGFR >90 >60 mL/min/1.73m*2    Calcium 8.9 8.6 - 10.6 mg/dL    Phosphorus 4.0 2.5 - 4.9 mg/dL    Albumin 3.4 3.4 - 5.0 g/dL   Magnesium   Result Value Ref Range    Magnesium 1.80 1.60 - 2.40 mg/dL     MR spine complete cancer cord compression limited w and wo iv contrast    Result Date: 8/12/2024  1.  Acute to early subacute pathologic compression fracture of T12 vertebral body, with upwards of 50% height loss and slight bony  retropulsion posterior into the spinal canal with some effacement of the anterior subarachnoid space, but without evidence of significant spinal canal stenosis. There appears to be mild increase in the height loss of T12 along the superior endplate compared to prior CT of the lumbar spine on 07/31/2024 and CT chest dated 08/05/2024. Likely late subacute or early chronic pathologic compression fractures also present along the superior endplate of L3 with mild associated STIR hyperintense edema. 2. Multilevel degenerative changes of the cervical, thoracic and lumbar spine with mild spinal canal narrowing present at the cervical spine due to combination of disc osteophyte complexes, ligamentum flavum thickening hypertrophic facet changes, without evidence of high-grade spinal canal stenosis or cord compression. Varying degrees of neural foraminal stenosis are detailed above. 3. Several additional pathologic metastatic lesions are present in the thoracic and lumbar spine and in the pelvis, some of which are described above. 4. T2 hyperintense, enhancing lesion is present in the lungs, posterior to the aortic arch, corresponding to pulmonary nodule is seen on previous CT of the chest dated 08/05/2024. 5. Bilateral pleural effusions, slightly greater on the right.   MACRO: None   Signed by: Perlita Williamson 8/12/2024 10:17 PM Dictation workstation:   [id number]    Bronchoscopy Tier 3; Navigational, w EBUS, w Transbronch Bx    Result Date: 8/5/2024   Guided bronchoscopy with radial probe EBUS to localize the peripheral RUL lesion was performed. A transbronchial needle aspiration was performed in the RUL. A transbronchial brushing was performed in the RUL. Rapid On-Site Evaluation (JONO): Preliminary cytology of the lesion in the RUL was suggestive of atypical cell suspicious for malignancy (final results are pending). A transbronchial biopsy was performed in the RUL. Linear EBUS was used to evaluate the lymph node  station 4R, 10R, 11Rs and EBUS-TBNA was performed. Rapid On-Site Evaluation (JONO): Preliminary cytology was suggestive of lymphoid tissue in node level 4R, 10R, 11Rs (final results are pending). RECOMMENDATION: The patient will be observed post-procedure, until all discharge criteria are met. Await cytology, histopathology results. Follow-up with referring physician.    CT chest wo IV contrast for [facility] planning    Result Date: 8/5/2024  1.  Again seen spiculated right upper lobe nodule measuring 1.1 x 0.8 cm corresponding to hypermetabolic lesion seen on recent PET-CT and concerning for primary lung malignancy. Consider correlation with tissue sampling. 2. Dominant posteromedial left upper lobe nodule measuring 1.4 x 1.4 cm, stable compared to multiple priors. This demonstrate mild metabolic activity on PET-CT. Continued attention on follow-up study is recommended. 3. Additional chronic findings as described above   MACRO: None   Signed by: Tami Delatorre 8/5/2024 8:21 AM Dictation workstation:   [id number]    CT abdomen pelvis w IV contrast    Result Date: 7/31/2024  2.6 cm cystic possible metastatic implant in the left lower quadrant. Small soft tissue implant in the right lower quadrant unchanged from PET-CT 07/11/2024.   Slight heterogeneity of both kidneys may be related to phase of contrast however pyelonephritis in the proper clinical setting could have a similar appearance.   Too small to characterize focal hypodensities in both kidneys and the liver.   Sclerotic presumed metastatic lesions in both iliac bones. Multilevel lumbar vertebral compression deformities and heterogeneity including areas of sclerosis similar to 07/02/2024. More complete evaluation for metastatic disease and pathologic fracture using MRI as clinically warranted.   Nonspecific small subcutaneous nodule in the left lower back.   Additional findings as described above.   MACRO: None.   Signed by: Ruthie Thacker 7/31/2024  2:51 PM Dictation workstation:   [id number]    CT lumbar spine wo IV contrast    Result Date: 7/31/2024  2.6 cm cystic possible metastatic implant in the left lower quadrant. Small soft tissue implant in the right lower quadrant unchanged from PET-CT 07/11/2024.   Slight heterogeneity of both kidneys may be related to phase of contrast however pyelonephritis in the proper clinical setting could have a similar appearance.   Too small to characterize focal hypodensities in both kidneys and the liver.   Sclerotic presumed metastatic lesions in both iliac bones. Multilevel lumbar vertebral compression deformities and heterogeneity including areas of sclerosis similar to 07/02/2024. More complete evaluation for metastatic disease and pathologic fracture using MRI as clinically warranted.   Nonspecific small subcutaneous nodule in the left lower back.   Additional findings as described above.   MACRO: None.   Signed by: Ruthie Thacker 7/31/2024 2:51 PM Dictation workstation:   [id number]    CT abdomen pelvis wo IV contrast    Result Date: 7/29/2024  IMPRESSION: No acute intra-abdominal findings. No renal or ureteral calculi. Diverticulosis without evidence of diverticulitis. The cecum extends into the right midabdomen and is moderately distended with air and fecal material but no evidence of obstruction. Multiple lower thoracic and lumbar spine compression deformities with associated Schmorl's node formation. The L3 compression deformity is probably subacute. There is no retropulsion. Nonspecific sclerotic lesions within the posterior ilium bilaterally possibly areas of bone infarction. If there is clinical concern for metastatic disease consider MRI examination for further evaluation.. : ALYX   Transcribe Date/Time: Jul 29 2024  8:08A Dictated by : KATIA AMBRIZ MD This examination was interpreted and the report reviewed and electronically signed by: KATIA AMBRIZ MD on Jul 29 2024  8:28AM  EST    XR  lumbar spine 2-3 views    Result Date: 7/29/2024  IMPRESSION: See result. : PSCB   Transcribe Date/Time: Jul 29 2024  4:44A Dictated by : FOREIGN CUMMINGS MD This examination was interpreted and the report reviewed and electronically signed by: FOREIGN CUMMINGS MD on Jul 29 2024  4:58AM  EST    MR brain w and wo IV contrast    Result Date: 7/19/2024  7 mm avidly enhancing extra-axial appearing nodule along the left pontine medullary junction adjacent to the left vertebral artery. 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 Clinical correlation and attention on follow-up suggested. No additional abnormal enhancement identified.   Nonspecific white matter changes, remote lacunar infarcts and parenchymal volume loss.   MACRO: None   Signed by: Jacki Gray 7/19/2024 9:41 PM Dictation workstation:   [id number]        Assessment/Plan   Principal Problem:    Compression fracture of body of thoracic vertebra (Multi)    Brittny Gordon is a 69 y.o. female with a past history of presumed stage IV metastatic lung cancer presenting with severe back pain in the setting of acute T12 compression fracture secondary to bony mets.     Updates 8/13  - Rad onc consulted - CT sim 8/13  - Neurosurgery consulted regarding new T12 compression fracture  - Supportive onc consulted for assistance with pain regimen      #T12 compression fracture  ::In the setting of bony mets from lung cancer  ::PET scan on 7/11/2024 showing multiple avid bone lesions throughout axial and appendicular skeletons  ::Did not find relief from tramadol, did not tolerate acetaminophen-codeine or acetaminophen-hydrocodone due to nausea  Plan:  -Ibuprofen, flexeril, Dilaudid PRN  -Calcitonin 200mg nasal spray daily  -Rad onc, supportive onc, and NSGY consulted  -Per Rad Onc note on 7/25, if pain persists despite medical management and radiation therapy, structural relief with the form of  kyphoplasty/vertebroplasty may benefit given vertebral compression  -On ibandronate 150mg monthly, holding here  -Bowel regimen with Miralax, docusate, bisacodyl PRN. Naloxegel is non-formulary, holding here     #NSCLC, stage IV  ::RUL primary with bony metastases seen on PET scan  ::Was scheduled for palliative RT for spine lesions and referral for clinical trial  ::Still awaiting full path results when last seen  ::Follows with Dr. Camila Chaudhary as an outpatient  Plan:  -Rad Onc consulted, plan for CT sim 8/13  -Minimal pleural effusions seen on MRI, patient asymptomatic, no acute interventions     #Hypertension  #Hyperlipidemia  #Ascending aortic aneurysm s/p repair  ::Open repair in 2012, has not needed redo operations  -C/w losartan 100mg daily, Toprol 100mg daily  -C/w atorvastatin 80mg daily     Code status: FULL CODE  DVT ppx: Lovenox  GI ppx: None  Oxygen: RA  Abx: None  Access: PIV  NOK: Lance (brother) [phone]

## 2024-08-13 NOTE — CONSULTS
SUPPORTIVE AND PALLIATIVE ONCOLOGY CONSULT    Inpatient consult to [facility] Adult Supportive Oncology  Consult performed by: Jamilah Magdaleno, APRN-CNP, DNP  Consult ordered by: Sb Viramontes MD      SERVICE DATE: 2024      PALLIATIVE MEDICINE OUTPATIENT PROVIDER:  None  CURRENT ATTENDING PROVIDER: Sb Viramontes MD;Дмитрий*     Medical Oncologist: Camila Chaudhary MD MPH   Radiation Oncologist: No care team member to display  Primary Physician: Camila Chaudhary  [phone]    REASON FOR CONSULT/CHIEF CONSULT COMPLAINT: pain management    Subjective   HISTORY OF PRESENT ILLNESS: Brittny Gordon is a 69 y.o. female diagnosed with likely metastatic NSCLC (lymph nodes, bone with pathologic fracture of L2 L3) s/p bronch/EBUS  not yet started on systemic therapy pending final pathology, plan for palliative RT to spine. PMH significant for HTN, HLD, osteporosis, AAA s/p repair, former smoker. Admitted 2024 from ED for further evaluation and management of worsening lumbar pain and new thoracic pain, imaging shows new pathologic fracture T12. Neurosurgery and Radiation Oncology consulted. Supportive and Palliative Oncology is consulted for pain management.     Recently prescribed Acetaminophen/Hydrocodone and Tylenol with Codeine did not tolerate due to dyspepsia she believes was from Acetaminophen and does not want to take it again. Was recently taking Meloxicam as well.  Pain is severe and uncontrolled, affecting her quality of life and mobility.  Reports Hydromorphone 0.4 mg IV provided good relief. Flexeril has also been helpful. Discussed pain management medications.  Transport arrive to take patient to CT simulation in Rad Onc at the end of my visit.    Pain Assessment:  Onset:  Months  Location:  lower and mid back, left ribs  Duration: Constant and intermittent  Characteristics:   Ratin   Descriptors: sharp and stabbing   Aggravating: movement, walking, bending, sitting, and standing    Relieving: Analgesics and  Modifying activity   Intolerances:Brittny Gordon is allergic to epinephrine.   Personal Pain Goal: 4    Interference with Function: Very Much  Including: walking, activity, sleep, mood, and enjoyment of life   Coping Strategies: relaxation   Emotional Response: None   Barriers to Pain Management: Fear of addiction    Opioid Use  Past 24 h opioid use:   Hydromorphone 0.2-0.4 mg IV x 3 doses = 1 mg = 12.5 OME  Total 24h OME use:  12.5    Note: OME calculations based on equianalgesic table below. Please note this table is based on best available evidence but conversions are still approximate. These are NOT opioid DOSES for individual patient use; this is equivalency information.  Drug Parenteral Enteral   Morphine 10 25   Oxycodone N/A 20   Hydromorphone 2 5   Fentanyl 0.15 N/A   Tramadol N/A 120   Citation: Darnell RIVAS. Demystifying opioid conversion calculations: A guide for effective dosing, Second edition. [place]: American Society of Health-System Pharmacists, 2018.    OARRS/PDMP reviewed 8/13/24 no aberrant behavior noted.    Symptom Assessment:  Pain:very much   Headache: none  Dizziness:none  Difficulty sleeping: somewhat  Worrying: a little  Anxiety: none  Depression: none  Pain in mouth/swallowing: none  Dry mouth: none  Taste changes: none  Shortness of breath: a little  Lack of appetite: somewhat   Nausea: none  Vomiting: none  Constipation: a little  Diarrhea: none  Sore muscles: somewhat  Numbness or tingling in hands/feet/other: none  Weight loss: a little  Other: none        Information obtained from: chart review, interview of patient, discussion with RN, and discussion with primary team  ______________________________________________________________________     Oncology History   Primary malignant neoplasm of right lung metastatic to other site (Multi)   8/9/2024 Initial Diagnosis    Primary malignant neoplasm of right lung metastatic to other site (Multi)     8/23/2024 -  Chemotherapy     Pembrolizumab + PACLitaxel / CARBOplatin, 21 Day Cycles         Past Medical History:   Diagnosis Date    Hypercholesteremia     Hypertension     Lung nodule seen on imaging study     lung nodules concerning for metastatic lung cancer to the bone    Saccular aneurysm ([facility])     Saccular aneurysm of left AICA, 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, 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    Wedge compression fracture of t11-T12 vertebra, sequela 07/30/2020    Compression fracture of T11 vertebra, sequela     Past Surgical History:   Procedure Laterality Date    ARTERIAL ANEURYSM REPAIR      Thoracic aortic aneurysm repair    COLONOSCOPY      RADIOFREQUENCY ABLATION      L3,4,5    WISDOM TOOTH EXTRACTION       Family History   Problem Relation Name Age of Onset    Lymphoma Father      Polycythemia Father      Breast cancer Neg Hx      Colon cancer Neg Hx          SOCIAL HISTORY:  Marital Status single and Support system Brother Lance   Social History:  reports that she quit smoking about 11 years ago. Her smoking use included cigarettes. She started smoking about 51 years ago. She has a 20 pack-year smoking history. She has never used smokeless tobacco. She reports current alcohol use. She reports that she does not currently use drugs.      REVIEW OF SYSTEMS:  Review of systems negative unless noted in HPI.       Objective       Lab Results   Component Value Date    WBC 8.0 08/13/2024    HGB 13.2 08/13/2024    HCT 38.7 08/13/2024    MCV 86 08/13/2024     08/13/2024      Lab Results   Component Value Date    GLUCOSE 87 08/13/2024    CALCIUM 8.9 08/13/2024     08/13/2024    K 3.8 08/13/2024    CO2 30 08/13/2024     08/13/2024    BUN 7 08/13/2024    CREATININE 0.71 08/13/2024     Lab Results   Component Value Date    ALT 18 08/12/2024    AST 20 08/12/2024    ALKPHOS 68 08/12/2024    BILITOT 0.6 08/12/2024     Estimated Creatinine Clearance: 80.1 mL/min (by C-G formula based on SCr of 0.71 mg/dL).     Encounter Date: 07/31/24   ECG 12 Lead    Result Value    Ventricular Rate 74    Atrial Rate 74    MA Interval 176    QRS Duration 82    QT Interval 380    QTC Calculation(Bazett) 421    P Axis 54    R Axis 53    T Axis 93    QRS Count 12    Q Onset 225    P Onset 137    P Offset 199    T Offset 415    QTC Fredericia 407    Narrative    Normal sinus rhythm  Nonspecific ST and T wave abnormality  Abnormal ECG  When compared with ECG of 18-JUN-2024 14:30,  No significant change was found  See ED provider note for full interpretation and clinical correlation  Confirmed by Celina Guillory ([id number]) on 8/1/2024 10:23:57 AM     Wt Readings from Last 5 Encounters:   08/12/24 74.8 kg (165 lb)   08/09/24 75.8 kg (167 lb 1.7 oz)   08/08/24 74.5 kg (164 lb 3.2 oz)   08/05/24 77.1 kg (170 lb)   07/31/24 77.1 kg (170 lb)       Current Outpatient Medications   Medication Instructions    acetaminophen-codeine (Tylenol w/ Codeine #3) 300-30 mg tablet 1 tablet, oral, Every 8 hours PRN    atorvastatin (Lipitor) 80 mg tablet Take 1 tablet (80 mg) by mouth once daily.    bisacodyl (DULCOLAX) 5 mg, oral, Daily PRN, Do not crush, chew, or split.    cetirizine (ZYRTEC) 10 mg, oral, Daily PRN    cholecalciferol (VITAMIN D3) 1,000 Units, oral, Daily    cyclobenzaprine (FLEXERIL) 5 mg, oral, 3 times daily PRN    docusate sodium (COLACE) 100 mg, oral, 2 times daily    ibandronate (BONIVA) 150 mg, oral, Every 30 days, Take in morning with full glass of water on an empty stomach. No food, drink, meds, or lying down for 60 minutes after.    losartan (COZAAR) 100 mg, oral, Daily    metoprolol succinate XL (TOPROL-XL) 100 mg, oral, Daily    naloxegol oxalate (MOVANTIK) 25 mg, oral, Daily    ondansetron (ZOFRAN) 8 mg, oral, Every 8 hours PRN    polyethylene glycol (Glycolax, Miralax) 17 gram/dose powder Mix 17 g of powder with 8 oz of water and drink by mouth once daily.    vit A/vit C/vit E/zinc/copper (PRESERVISION AREDS ORAL) 1 tablet, oral, Daily     Scheduled medications   atorvastatin,  80 mg, oral, Daily  calcitonin (salmon), 1 spray, One Nostril, Daily  cholecalciferol, 1,000 Units, oral, Daily  docusate sodium, 100 mg, oral, BID  enoxaparin, 40 mg, subcutaneous, q24h  losartan, 100 mg, oral, Daily  metoprolol succinate XL, 100 mg, oral, Daily  polyethylene glycol, 17 g, oral, Daily      Continuous medications     PRN medications  bisacodyl, 5 mg, Daily PRN  cetirizine, 10 mg, Daily PRN  cyclobenzaprine, 5 mg, TID PRN  HYDROmorphone, 0.4 mg, q4h PRN  ibuprofen, 400 mg, q4h PRN  ondansetron, 8 mg, q8h PRN         Allergies:   Allergies   Allergen Reactions    Epinephrine Nausea/vomiting and Palpitations                PHYSICAL EXAMINATION:  Vital Signs:   Vital signs reviewed  Vitals:    08/13/24 0750   BP: 154/89   Pulse: 63   Resp: 16   Temp:    SpO2: (!) 93%     Pain Score: 5 - Moderate pain     Physical Exam  Vitals reviewed.   Constitutional:       General: She is not in acute distress.  HENT:      Head: Normocephalic and atraumatic.      Mouth/Throat:      Mouth: Mucous membranes are moist.   Eyes:      Conjunctiva/sclera: Conjunctivae normal.   Pulmonary:      Effort: Pulmonary effort is normal. No respiratory distress.   Abdominal:      General: There is no distension.   Musculoskeletal:         General: Normal range of motion.   Neurological:      General: No focal deficit present.      Mental Status: She is alert and oriented to person, place, and time.   Psychiatric:         Mood and Affect: Mood normal.         Behavior: Behavior normal.         Thought Content: Thought content normal.         Judgment: Judgment normal.         ASSESSMENT/PLAN:  Brittny Gordon is a 69 y.o. female diagnosed with likely metastatic NSCLC (lymph nodes, bone with pathologic fracture of L2 L3) s/p bronch/EBUS 8/5 not yet started on systemic therapy pending final pathology, plan for palliative RT to spine. PMH significant for HTN, HLD, osteporosis, AAA s/p repair, former smoker. Admitted 8/12/2024 from ED  for further evaluation and management of worsening lumbar pain and new thoracic pain, imaging shows new pathologic fracture T12. Neurosurgery and Radiation Oncology consulted. Supportive and Palliative Oncology is consulted for pain management.     Cancer Related Pain:  Mid-lower back, rib pain related to metastatic NSCLC with bone mets, compression fractures L2 L3 T12  Pain is: cancer related pain  Type: somatic and neuropathic  Pain control: sub-optimally controlled  Home regimen:  Muscle Relaxers Flexeril TID and Tylenol with Codeine  Intolerances/previously tried: Acetaminophen  Personalized pain goal: 4  Total OME usage for the past 24 hours:  12.5  Recommend a 5 day course of Dexamethasone 4 mg PO qAM x 5 days, recommend to give a one time dose of 4 mg IV today   Start oxycodone 5 mg PO q4h prn moderate to severe pain  Change Dilaudid 0.4 mg IV to q4h prn breakthrough pain  Change Flexeril to 5 mg PO q8h scheduled  Start gabapentin 300 mg PO qhs   Continue to monitor pain scores and administer PRN medications as appropriate  Continue/initiate nonpharmacologic pain management strategies including ice/heat therapy, distraction techniques, deep breathing/relaxation techniques, calming music, and repositioning  Continue to monitor for signs of opioid efficacy (pain scores, improved functionality) and toxicity (pinpoint pupils, excess sedation/drowsiness/confusion, respiratory depression, etc.)    Nausea:  At risk for nausea with vomiting related to opioids   Home regimen:  Ondansetron   Denies  Continue Ondansetron 8 mg PO q8h prn nausea first line     Constipation  At risk for constipation related to opioids, currently not constipated  Usual bowel pattern: every day  Home regimen: Miralax 17 gm daily and Bisacodyl 5 mg daily  LBM 8/12  Monitor BM frequency, adjust regimen as needed  Goal to have BM without straining q48-72h  Continue Bisacodyl 5 mg PO daily  Continue Miralax 17 gm PO daily     Sleeping  Difficulty:  Impaired sleep related to pain  Home regimen:  none  Pain management as above    Decreased appetite:  Appetite loss related to malignancy and pain  Weight loss 10 pounds  Home regimen:  none  Pain management as above    Medical Decision Making/Goals of Care/Advance Care Planning:  Patient's current clinical condition, including diagnosis, prognosis, and management plan, and goals of care were discussed.   Life limiting disease: metastatic malignancy  Family: Supportive brother  Performance status: Major  limitations due to pain  Joys/meaning/strength: [place]  Understanding of health: Demonstrates good understanding of disease process, understands plan for RT, that her cancer is incurable  Information:Wants full disclosure  Prognosis: cancer treatment is palliative intent  Goals: symptom control and cancer directed therapy Pt understands her cancer is incurable, she is hoping to have her symptoms better managed so that she can participate in life and have as much normalcy as possible.   Worries and fears now and future: ongoing symptoms and having a poor quality of life    Minimum acceptable outcome/QOL:  independence in iADLs  Code status discussion:  Full    Advance Directives  Existence of Advance Directives:Unknown  Decision maker: Surrogate decision maker is brother Lance  Code Status: Full code    Introduction to Supportive and Palliative Oncology:  Spoke with patient at bedside  Introduced the role and philosophy of Supportive and Palliative oncology in the evaluation and management of symptoms during cancer treatment  Palliative care was introduced as a service for patients with serious illness to help with symptoms, assist with goals of care conversations, navigate complex decision making, improve quality of life for patients, and provide support both patients and families.  Patient seemed to appreciate the extra layer of support.     Supportive Interventions: Interventions: SPO  Spiritual Care: offered    Disposition:  Please  start the process of having prior authorization with meds to beds deliver medications to patient prior to discharge via [facility] pharmacy. Prescriptions will need to be sent 48-72 hours prior to discharge so that a prior authorization can be completed.     Discharge date: unknown pending acute issues  Will assess if patient needs an appointment with Outpatient Supportive Oncology as appropriate      Signature and billing:  Thank you for allowing us to participate in the care of this patient. Recommendations will be communicated back to the consulting service by way of shared electronic medical record or face-to-face.    Medical complexity was high level due to due to complexity of problems, extensive data review, and high risk of management/treatment.      DATA   Diagnostic tests and information reviewed for today's visit:  Conversation with primary team, Conversation with RN, Most recent labs and imaging results, Most recent EKG, Medications       Plan of Care discussed with: Provider and Patient    Thank you for asking Supportive and Palliative Oncology to assist with care of this patient.  Recommendations will be communicated back to the consulting service by way of shared electronic medical record/secure chat/email or face-to-face.   We will continue to follow.  Please contact us for additional questions or concerns.      SIGNATURE: Jamilah Magdaleno, APRN-CNP, DNP  PAGER/CONTACT:  Contact information:  Supportive and Palliative Oncology  Monday-Friday 8 AM-5 PM  Epic Secure chat or pager [phone].  After hours and weekends:  pager [phone]

## 2024-08-13 NOTE — H&P
"Subjective    History Of Present Illness  Brittny Gordon is a 69 y.o. female with a past history of HTN, HLD, ascending aortic aneurysm s/p repair, presumed stage IV metastatic lung cancer presenting with Compression fracture of body of thoracic vertebra (Multi)    Patient has had back pain for months related to suspected bony mets and L3 compression fracture in the setting of presumed metastatic lung cancer. She states that over the past two weeks the pain has been worsening. She describes knifing pain in the bilateral hips and flanks as well as multiple areas of her back. She states she has twinges in her legs occasionally but denies shooting pains into her legs. Denies fevers, chills, numbness or weakness of the lower extremities, denies bowel/bladder incontinence. She had been started on acetaminophen-codeine for her pain but she was not able to tolerate this as the medication made her nauseous. She presented to the ED due to the persistence of her pain.    Upon arrival to the ED, vitals were WNL. MRI of the spine performed to rule out cord compression. MRI showed acute to subacute compression fracture of T12 as well as the known fracture of L3, no evidence of acute cord compression. Patient given one dose of Dilaudid 0.2mg which she states \"took the edge off\" but failed to truly relieve her pain. She says this wore off before the MRI was completed.    She states that currently when she is lying in bed her pain is at a 7/10 which is tolerable, however she is bed bound due to pain currently. She states the minimum she would like is to be able to ambulate to go to the bathroom herself. She states she has had cousins at home who have been helping her as her pain is worsening.     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    Home " Medications  Current Outpatient Medications   Medication Instructions    acetaminophen-codeine (Tylenol w/ Codeine #3) 300-30 mg tablet 1 tablet, oral, Every 8 hours PRN    atorvastatin (Lipitor) 80 mg tablet Take 1 tablet (80 mg) by mouth once daily.    bisacodyl (DULCOLAX) 5 mg, oral, Daily PRN, Do not crush, chew, or split.    cetirizine (ZYRTEC) 10 mg, oral, Daily PRN    cholecalciferol (VITAMIN D3) 1,000 Units, oral, Daily    cyclobenzaprine (FLEXERIL) 5 mg, oral, 3 times daily PRN    docusate sodium (COLACE) 100 mg, oral, 2 times daily    ibandronate (BONIVA) 150 mg, oral, Every 30 days, Take in morning with full glass of water on an empty stomach. No food, drink, meds, or lying down for 60 minutes after.    losartan (COZAAR) 100 mg, oral, Daily    metoprolol succinate XL (TOPROL-XL) 100 mg, oral, Daily    naloxegol oxalate (MOVANTIK) 25 mg, oral, Daily    ondansetron (ZOFRAN) 8 mg, oral, Every 8 hours PRN    polyethylene glycol (Glycolax, Miralax) 17 gram/dose powder Mix 17 g of powder with 8 oz of water and drink by mouth once daily.    vit A/vit C/vit E/zinc/copper (PRESERVISION AREDS ORAL) 1 tablet, oral, Daily        Surgical History   has a past surgical history that includes Radiofrequency ablation; Colonoscopy; Selmer tooth extraction; and Arterial aneurysm repair.     Social History   reports that she quit smoking about 11 years ago. Her smoking use included cigarettes. She started smoking about 51 years ago. She has a 20 pack-year smoking history. She has never used smokeless tobacco. She reports current alcohol use. She reports that she does not currently use drugs.    Family History  Family History   Problem Relation Name Age of Onset    Lymphoma Father      Polycythemia Father      Breast cancer Neg Hx      Colon cancer Neg Hx          Allergies  Epinephrine          Objective     Last Recorded Vitals  Blood pressure 118/72, pulse 76, temperature 36.8 °C (98.2 °F), temperature source Oral, resp.  "rate 14, height 1.702 m (5' 7\"), weight 74.8 kg (165 lb), SpO2 95%.    Physical Exam  Constitutional:       General: She is not in acute distress.     Appearance: Normal appearance.   HENT:      Head: Normocephalic and atraumatic.   Eyes:      Extraocular Movements: Extraocular movements intact.      Conjunctiva/sclera: Conjunctivae normal.      Pupils: Pupils are equal, round, and reactive to light.   Cardiovascular:      Rate and Rhythm: Normal rate and regular rhythm.      Pulses: Normal pulses.      Heart sounds: Normal heart sounds.   Pulmonary:      Effort: Pulmonary effort is normal.      Comments: Reduced breath sounds in the bases  Abdominal:      General: Abdomen is flat. There is no distension.      Palpations: Abdomen is soft.   Musculoskeletal:      Right lower leg: No edema.      Left lower leg: No edema.   Skin:     General: Skin is warm and dry.   Neurological:      General: No focal deficit present.      Mental Status: She is alert and oriented to person, place, and time.         Relevant Results  Results from last 7 days   Lab Units 08/12/24  1623   WBC AUTO x10*3/uL 8.9   HEMOGLOBIN g/dL 13.7   HEMATOCRIT % 40.4   PLATELETS AUTO x10*3/uL 178       Results from last 7 days   Lab Units 08/12/24  1623   SODIUM mmol/L 136   POTASSIUM mmol/L 3.9   CHLORIDE mmol/L 97*   CO2 mmol/L 30   BUN mg/dL 8   CREATININE mg/dL 0.81   CALCIUM mg/dL 9.6   PROTEIN TOTAL g/dL 6.7   BILIRUBIN TOTAL mg/dL 0.6   ALK PHOS U/L 68   ALT U/L 18   AST U/L 20   GLUCOSE mg/dL 89         MR spine complete cancer cord compression limited w and wo iv contrast    Result Date: 8/12/2024     1.  Acute to early subacute pathologic compression fracture of T12 vertebral body, with upwards of 50% height loss and slight bony retropulsion posterior into the spinal canal with some effacement of the anterior subarachnoid space, but without evidence of significant spinal canal stenosis. There appears to be mild increase in the height loss of " T12 along the superior endplate compared to prior CT of the lumbar spine on 07/31/2024 and CT chest dated 08/05/2024. Likely late subacute or early chronic pathologic compression fractures also present along the superior endplate of L3 with mild associated STIR hyperintense edema. 2. Multilevel degenerative changes of the cervical, thoracic and lumbar spine with mild spinal canal narrowing present at the cervical spine due to combination of disc osteophyte complexes, ligamentum flavum thickening hypertrophic facet changes, without evidence of high-grade spinal canal stenosis or cord compression. Varying degrees of neural foraminal stenosis are detailed above. 3. Several additional pathologic metastatic lesions are present in the thoracic and lumbar spine and in the pelvis, some of which are described above. 4. T2 hyperintense, enhancing lesion is present in the lungs, posterior to the aortic arch, corresponding to pulmonary nodule is seen on previous CT of the chest dated 08/05/2024. 5. Bilateral pleural effusions, slightly greater on the right.     Bronchoscopy Tier 3; Navigational, w EBUS, w Transbronch Bx    Result Date: 8/5/2024     Guided bronchoscopy with radial probe EBUS to localize the peripheral RUL lesion was performed. A transbronchial needle aspiration was performed in the RUL. A transbronchial brushing was performed in the RUL. Rapid On-Site Evaluation (JONO): Preliminary cytology of the lesion in the RUL was suggestive of atypical cell suspicious for malignancy (final results are pending). A transbronchial biopsy was performed in the RUL. Linear EBUS was used to evaluate the lymph node station 4R, 10R, 11Rs and EBUS-TBNA was performed. Rapid On-Site Evaluation (JONO): Preliminary cytology was suggestive of lymphoid tissue in node level 4R, 10R, 11Rs (final results are pending). RECOMMENDATION: The patient will be observed post-procedure, until all discharge criteria are met. Await cytology,  histopathology results. Follow-up with referring physician.    CT chest wo IV contrast for [facility] planning    Result Date: 8/5/2024    1.  Again seen spiculated right upper lobe nodule measuring 1.1 x 0.8 cm corresponding to hypermetabolic lesion seen on recent PET-CT and concerning for primary lung malignancy. Consider correlation with tissue sampling. 2. Dominant posteromedial left upper lobe nodule measuring 1.4 x 1.4 cm, stable compared to multiple priors. This demonstrate mild metabolic activity on PET-CT. Continued attention on follow-up study is recommended. 3. Additional chronic findings as described above    MR brain w and wo IV contrast    Result Date: 7/19/2024     7 mm avidly enhancing extra-axial appearing nodule along the left pontine medullary junction adjacent to the left vertebral artery. 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 Clinical correlation and attention on follow-up suggested. No additional abnormal enhancement identified.   Nonspecific white matter changes, remote lacunar infarcts and parenchymal volume loss.     Oncologic history:    09/11/2019: CT chest (+): Noncalcified nodule in the left upper lobe adjacent to the aortic arch, nodule measuring 1.3 x 1.3 x 0.8cm.     09/30/2019: Pet/CT: 1. Mild hypermetabolic tracer activity corresponding to the known posteromedial left upper lobe lung nodule.     01/28/2021: CT chest (-): stable URMILA nodule 1.1x1.0cm. Again stable in 8/2021, 02/2022, 11/2022.      However on the CT chest on 5/16/2023 the URMILA nodule measured 1.6x1.4cm, which has been growing slowly compared to before, There is also a irregular shape solid-appearing nodule in the RUL measuring up to 0.8cm.      6/4/2024: CT chest (-) showed interval increase of the RUL nodule measuring 1.1cm. Multiple new nodules in both lower lobes measuring 0.4cm or smaller.      7/11/2024: Pet/CT: Enlarging FDG avid 1.1 cm spiculated nodule in the right  upper lobe, as well as FDG avid mediastinal and bilateral hilar nodes is suspicious for a primary lung neoplasm with teresa metastases. Mid FDG avid left upper lobe paravertebral nodule, grossly stable in comparison to prior CT in 2019, favored to be benign. Multiple FDG avid bone lesions throughout the axial and appendicular skeleton, likely representing widespread bone metastases. FDG avidity within the right ribs as well as lower thoracic and lumbar vertebral bodies as described above, could represent pathologic fractures in the setting of metastatic disease.     8/5/2024: Underwent EBUS with FNA: path showed non small cell carcinoma. IHC showed positive for AE1/AE3, CK7 and negative for TTF1, p40 and INSM1. PDL-1 TPS 55%. Additional immunostain show the tumor cells are negative for TRPS1, PAX8, and CDX2. SMARCA4 immunostain shows retained nuclear expression. these findings argue against breast, GYN, and colorectal primaries, respectively. Overall, findings could be compatible with a lung primary non-small cell carcinoma in appropriate clinical and radiologic settings.     8/9/2024: Discussed treatment options with either standard care single agent pembrolizumab+/- carboplatin/paclitaxel vs clinical trial options. She is scheduled to start palliatve RT to spine lesions. Will refer her to phase I and lung trial. Awaiting for additional IHC stains and tissue NGS.            Assessment/Plan   Brittny Gordon is a 69 y.o. female with a past history of presumed stage IV metastatic lung cancer presenting with severe back pain in the setting of acute T12 compression fracture secondary to bony mets. Will treat her pain and she would benefit from Supportive Onc and Rad Onc consults.    #T12 compression fracture  ::In the setting of bony mets from lung cancer  ::PET scan on 7/11/2024 showing multiple avid bone lesions throughout axial and appendicular skeletons  ::Did not find relief from tramadol, did not tolerate  acetaminophen-codeine or acetaminophen-hydrocodone due to nausea  Plan:  -Ibuprofen, flexeril, Dilaudid PRN  -Calcitonin 200mg nasal spray daily  -Supportive Onc consult in AM  -Consider Rad Onc consult in AM as well  -Per Rad Onc note on 7/25, if pain persists despite medical management and radiation therapy, structural relief with the form of kyphoplasty/vertebroplasty may benefit given vertebral compression  -On ibandronate 150mg monthly, holding here  -Bowel regimen with Miralax, docusate, bisacodyl PRN. Naloxegel is non-formulary, holding here    #NSCLC, stage IV  ::RUL primary with bony metastases seen on PET scan  ::Was scheduled for palliative RT for spine lesions and referral for clinical trial  ::Still awaiting full path results when last seen  ::Follows with Dr. Camila Chaudhary as an outpatient  Plan:  -Rad Onc consult as above  -Minimal pleural effusions seen on MRI, patient asymptomatic, no acute interventions    #Hypertension  #Hyperlipidemia  #Ascending aortic aneurysm s/p repair  ::Open repair in 2012, has not needed redo operations  -C/w losartan 100mg daily, Toprol 100mg daily  -C/w atorvastatin 80mg daily    Code status: FULL CODE  DVT ppx: Lovenox  GI ppx: None  Oxygen: RA  Abx: None  Access: PIV  NOK: Lance (brother) [phone]          Kenton Albert MD  PGY-2 Internal Medicine    Patient to be staffed with attending in the morning

## 2024-08-13 NOTE — CONSULTS
Radiation Oncology Inpatient Consult    Patient Name:  Brittny Gordon  MRN:  [id number]  :  1955    Referring Provider: Dr. Kwadwo MD  Primary Care Provider: Camila Chaudhary MD MPH  Care Team: Patient Care Team:  Camila Chaudhary MD MPH as PCP - General (Hematology and Oncology)  Jose Rafael Hart MD [facility] PCP [facility]  Camila Chaudhary MD MPH as Consulting Physician (Hematology and Oncology)    Date of Service: 2024    SUBJECTIVE  History of Present Illness:  This is a 68-year-old female with a history of pulmonary nodules, who was recently diagnosed with metastatic NSCLC, after presenting with progressive back pain and pathologic fracture of L2 and L3.  PET CT  showed an enlarging, avid, 1.1 cm RUL nodule, avid b/l mediastinal lymph nodes and multiple avid bone lesions within the right ribs and lower thoracic and lumbar vertebral bodies.  She was evaluated by Dr. Ospina on  with tentative plans to proceed with palliative radiotherapy to the lumbar spine, pending pathologic diagnosis.  Bronchoscopy with EBUS and biopsy were completed on .  The patient presents to the ED with progressive lumbar and new thoracic back pain.  Spine MRI  demonstrates acute to early subacute pathologic compression fracture of T12 vertebral body without evidence of significant spinal canal stenosis.  Several additional pathologic metastatic lesions are present within the thoracic and lumbar spine.  The patient has been offered admission for pain management.  Radiation oncology has been asked to assist with care coordination.    Today, the patient is found to be resting comfortably in the emergency department.  Her pain is notably improved with rest and IV Dilaudid.  She denies experiencing any neurologic changes.  No weakness, numbness or paresthesia.  No changes in bowel or urinary function.    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 There is no history of Breast cancer or Colon cancer.    Social History:    Social History     Tobacco Use    Smoking status: Former     Current packs/day: 0.00     Average packs/day: 0.5 packs/day for 40.0 years (20.0 ttl pk-yrs)     Types: Cigarettes     Start date:      Quit date:      Years since quittin.6    Smokeless tobacco: Never   Vaping Use    Vaping status: Never Used   Substance Use Topics    Alcohol use: Yes     Comment: social    Drug use: Not Currently       Allergies:    Allergies   Allergen Reactions    Epinephrine Nausea/vomiting and Palpitations        Medications:    Current Facility-Administered Medications:     atorvastatin (Lipitor) tablet 80 mg, 80 mg, oral, Daily, Kenton Albert MD, 80 mg at 24 08    bisacodyl (Dulcolax) EC tablet 5 mg, 5 mg, oral, Daily PRN, Kenton Albert MD    calcitonin (salmon) (Miacalcin) nasal spray 1 spray, 1 spray, One Nostril, Daily, Kenton Albert MD, 1 spray at 24 08    cetirizine (ZyrTEC) tablet 10 mg, 10 mg, oral, Daily PRN, Kenton Albert MD    cholecalciferol (Vitamin D-3) tablet 1,000 Units, 1,000 Units, oral, Daily, Kenton Albert MD, 1,000 Units at 24 0826    cyclobenzaprine  (Flexeril) tablet 5 mg, 5 mg, oral, q8h, Rusty Lowry MD, 5 mg at 08/13/24 1605    [START ON 8/14/2024] dexAMETHasone (Decadron) tablet 4 mg, 4 mg, oral, q AM, Rusty Lowry MD    docusate sodium (Colace) capsule 100 mg, 100 mg, oral, BID, Kenton Albert MD, 100 mg at 08/13/24 0826    enoxaparin (Lovenox) syringe 40 mg, 40 mg, subcutaneous, q24h, Kenton Albert MD, 40 mg at 08/13/24 0135    gabapentin (Neurontin) capsule 300 mg, 300 mg, oral, Nightly, Rusty Lowry MD    HYDROmorphone (Dilaudid) injection 0.4 mg, 0.4 mg, intravenous, q4h PRN, Rusty Lowry MD    ibuprofen tablet 400 mg, 400 mg, oral, q4h PRN, Kenton Albert MD, 400 mg at 08/13/24 1611    losartan (Cozaar) tablet 100 mg, 100 mg, oral, Daily, Kenton Albert MD, 100 mg at 08/13/24 0826    methyl salicylate-menthol (Bengay) 15-10 % greaseless cream 1 Application, 1 Application, Topical, TID PRN, Rusty Lowry MD    metoprolol succinate XL (Toprol-XL) 24 hr tablet 100 mg, 100 mg, oral, Daily, Kenton Albert MD, 100 mg at 08/13/24 0826    ondansetron (Zofran) tablet 8 mg, 8 mg, oral, q8h PRN, Kenton Albert MD    oxyCODONE (Roxicodone) immediate release tablet 5 mg, 5 mg, oral, q6h PRN, Rusty Lowry MD    polyethylene glycol (Glycolax, Miralax) packet 17 g, 17 g, oral, Daily, Kenton Albert MD    Current Outpatient Medications:     acetaminophen-codeine (Tylenol w/ Codeine #3) 300-30 mg tablet, Take 1 tablet by mouth every 8 hours if needed for severe pain (7 - 10) for up to 7 days., Disp: 18 tablet, Rfl: 0    atorvastatin (Lipitor) 80 mg tablet, Take 1 tablet (80 mg) by mouth once daily., Disp: , Rfl:     bisacodyl (Dulcolax) 5 mg EC tablet, Take 1 tablet (5 mg) by mouth once daily as needed for constipation. Do not crush, chew, or split., Disp: 30 tablet, Rfl: 0    cetirizine (ZyrTEC) 10 mg tablet, Take 1 tablet (10 mg) by mouth once daily as needed for allergies., Disp: , Rfl:     cholecalciferol (Vitamin D3) 25 MCG (1000  "UT) tablet, Take 1 tablet (1,000 Units) by mouth once daily., Disp: , Rfl:     cyclobenzaprine (Flexeril) 5 mg tablet, Take 1 tablet (5 mg) by mouth 3 times a day as needed for muscle spasms., Disp: 20 tablet, Rfl: 0    docusate sodium (Colace) 100 mg capsule, Take 1 capsule (100 mg) by mouth 2 times a day., Disp: 60 capsule, Rfl: 0    ibandronate (Boniva) 150 mg tablet, Take 1 tablet (150 mg) by mouth every 30 (thirty) days. Take in morning with full glass of water on an empty stomach. No food, drink, meds, or lying down for 60 minutes after., Disp: 3 tablet, Rfl: 3    losartan (Cozaar) 100 mg tablet, TAKE 1 TABLET BY MOUTH EVERY DAY, Disp: 90 tablet, Rfl: 3    menthol (ICY HOT ADVANCED RELIEF PATCH TOP), Apply 1 patch topically every 12 hours if needed., Disp: , Rfl:     metoprolol succinate XL (Toprol-XL) 100 mg 24 hr tablet, TAKE 1 TABLET BY MOUTH EVERY DAY, Disp: 90 tablet, Rfl: 3    naloxegol oxalate (Movantik) 25 mg, Take 1 tablet (25 mg) by mouth once daily., Disp: 30 tablet, Rfl: 3    ondansetron (Zofran) 8 mg tablet, Take 1 tablet (8 mg) by mouth every 8 hours if needed for nausea or vomiting., Disp: 20 tablet, Rfl: 0    polyethylene glycol (Glycolax, Miralax) 17 gram/dose powder, Mix 17 g of powder with 8 oz of water and drink by mouth once daily., Disp: 238 g, Rfl: 0    vit A/vit C/vit E/zinc/copper (PRESERVISION AREDS ORAL), Take 1 tablet by mouth early in the morning.., Disp: , Rfl:       Review of Systems:    The patient denies having chest discomfort or difficulty with breathing.  No headaches or vision changes.  No syncope or palpitation.  No abnormal bleeding.    Performance Status:  The Karnofsky performance scale today is 80, Normal activity with effort; some signs or symptoms of disease (ECOG equivalent 1).        OBJECTIVE  Physical Exam:  /76   Pulse 73   Temp 36.4 °C (97.5 °F)   Resp 18   Ht 1.702 m (5' 7\")   Wt 74.8 kg (165 lb)   SpO2 97%   BMI 25.84 kg/m²    General: awake, " alert, resting comfortably, well developed and well nourished in appearance  HEENT: pupils equal and round, no scleral icterus  Pulmonary: Breathing comfortably at rest   Cardiac: regular rate  Abdomen: soft, nondistended, non tender to palpation   EXT: no peripheral edema, no asymmetry noted  MSK: mild TTP in the lower thoracic and lumbar spine  Neuro: A&O x 3, cranial nerves II through XII grossly intact, sensation and strength intact  Psych: Normal affect     Laboratory Review:    08/13/24 0843  CBC and Auto Differential  Collected: 08/13/24 0747  Final result  Specimen: Blood, Venous    WBC 8.0 x10*3/uL Immature Granulocytes %, Automated 0.6 %    nRBC 0.0 /100 WBCs Lymphocytes % 30.2 %   RBC 4.52 x10*6/uL Monocytes % 10.5 %   Hemoglobin 13.2 g/dL Eosinophils % 3.9 %   Hematocrit 38.7 % Basophils % 0.4 %   MCV 86 fL Neutrophils Absolute 4.33 x10*3/uL    MCH 29.2 pg Immature Granulocytes Absolute, Automated 0.05 x10*3/uL   MCHC 34.1 g/dL Lymphocytes Absolute 2.41 x10*3/uL   RDW 12.5 % Monocytes Absolute 0.84 x10*3/uL   Platelets 193 x10*3/uL Eosinophils Absolute 0.31 x10*3/uL   Neutrophils % 54.4 % Basophils Absolute 0.03 x10*3/uL        8/12/24 1738  Comprehensive metabolic panel  Collected: 08/12/24 1623  Final result  Specimen: Blood, Venous    Glucose 89 mg/dL eGFR 79 mL/min/1.73m*2    Sodium 136 mmol/L Calcium 9.6 mg/dL   Potassium 3.9 mmol/L  Albumin 3.9 g/dL   Chloride 97 Low  mmol/L Alkaline Phosphatase 68 U/L   Bicarbonate 30 mmol/L Total Protein 6.7 g/dL   Anion Gap 13 mmol/L AST 20 U/L    Urea Nitrogen 8 mg/dL Bilirubin, Total 0.6 mg/dL   Creatinine 0.81 mg/dL ALT 18 U/L             Pathology Review:   Surgical Pathology Exam: [id number]  Order: [id number]   Collected 8/5/2024 10:25       Status: Edited Result - FINAL       Visible to patient: Yes (not seen)       Dx: Lung nodule    0 Result Notes      Component    FINAL DIAGNOSIS      Right lung, upper lobe, transbronchial biopsy:  -- Small fragment  of non small cell carcinoma; see note.  -- Immunohistochemical stains performed on formalin-fixed, paraffin embedded sections demonstrate neoplastic cells to be positive for AE1/AE3 and CK7, and negative for TTF1 (8G7G3 clone), p40 and INSM1.     Note: Additional immunostain for PD-L1 will be performed and reported in an addendum. Next generation sequencing (NGS) will be performed and reported separately.        Electronically signed by Ayah Wells MD on 8/9/2024 at 0844        By the signature on this report, the individual or group listed as making the Final Interpretation/Diagnosis certifies that they have reviewed this case.    Addendum   PD-L1 22C3  by Immunohistochemistry with Interpretation, pembrolizumab  (KEYTRUDA)     Block used:  A1     Interpretation: High Expression     Tumor Proportion Score (TPS): 55%            Imaging:       MRI SPINE 8/12  IMPRESSION:  1.  Acute to early subacute pathologic compression fracture of T12  vertebral body, with upwards of 50% height loss and slight bony  retropulsion posterior into the spinal canal with some effacement of  the anterior subarachnoid space, but without evidence of significant  spinal canal stenosis. There appears to be mild increase in the  height loss of T12 along the superior endplate compared to prior CT  of the lumbar spine on 07/31/2024 and CT chest dated 08/05/2024.  Likely late subacute or early chronic pathologic compression  fractures also present along the superior endplate of L3 with mild  associated STIR hyperintense edema.  2. Multilevel degenerative changes of the cervical, thoracic and  lumbar spine with mild spinal canal narrowing present at the cervical  spine due to combination of disc osteophyte complexes, ligamentum  flavum thickening hypertrophic facet changes, without evidence of  high-grade spinal canal stenosis or cord compression. Varying degrees  of neural foraminal stenosis are detailed above.  3. Several additional  pathologic metastatic lesions are present in  the thoracic and lumbar spine and in the pelvis, some of which are  described above.  4. T2 hyperintense, enhancing lesion is present in the lungs,  posterior to the aortic arch, corresponding to pulmonary nodule is  seen on previous CT of the chest dated 08/05/2024.  5. Bilateral pleural effusions, slightly greater on the right.    ASSESSMENT:  This is a 68-year-old female with a history of pulmonary nodules, who was recently diagnosed with metastatic NSCLC, after presenting with progressive back pain and pathologic fracture of L2 and L3.  PET CT 7/11 showed an enlarging, avid, 1.1 cm RUL nodule, avid b/l mediastinal lymph nodes and multiple avid bone lesions within the right ribs and lower thoracic and lumbar vertebral bodies.  She was evaluated by Dr. Ospina on 7/25 with tentative plans to proceed with palliative radiotherapy to the lumbar spine, pending pathologic diagnosis.  Bronchoscopy with EBUS and biopsy were completed on 8/5.  The patient presents to the ED with progressive lumbar and new thoracic back pain.  Spine MRI 8/12 demonstrates acute to early subacute pathologic compression fracture of T12 vertebral body without evidence of significant spinal canal stenosis.  Several additional pathologic metastatic lesions are present within the thoracic and lumbar spine.  The patient has been offered admission for pain management.  Radiation oncology has been asked to assist with care coordination.    PLAN:    The patient was discussed with my attending physicians, Dr. Ospina at  [place], and Dr. Tapia at [facility].    Imaging results were reviewed with the patient, along with diagnosis and indications for palliative radiotherapy.      We recommend proceeding with CT simulation for palliative RT to thoracic and lumbar metastases, to assist with pain management.  The associated risks and benefits were discussed.  The patient has provided informed consent to  proceed.    -Palliative RT to thoracic and lumbar metastases, 30Gy in 10 fractions  -CT SIM 8/13, and possibly begin treatment 8/14 as an inpatient vs. in follow up at [facility], pending dispo        I spent 60 minutes in the professional and overall care of this patient.

## 2024-08-13 NOTE — CONSULTS
Date of Service:  8/13/2024 Attending Provider:  Theo Burkett MD     Reason for Consultation:  Brittny Gordon is being seen today for a consult requested by Theo Burkett MD for compression fx.      Subjective   History of Present Illness:  Brittny is a 69 y.o. female with h/o HTN, HLD, ascending aortic aneurysm s/p 2012 repair, stage IV lung cancer s/p EBUS 8/5/24 path non-small cell, osteoporosis p/w back painx1, MRI C/T/LS T12 pathologic fx w min height loss, mild retropulsion w/o compression, L3 compression fx     Patient noticed back pain back in June while doing a lot of gardening. She attributed this pain to old age. Over the past two weeks she noticed worsening back and L side pain. She states the lung nodules were found during her AAA repair work up and new R lesion was seen on annual CT chest. She recently got a bronchoscopy at the beginning of the month and was told likely lung cancer. Denies dizziness, confusion, headache, double vision, blurry vision, n/v, numbness, weakness, tingling, imbalance.     Review of Systems   10 point ROS is obtained and negative except the ones mentioned in the HPI    Objective     Vitals:  Vitals:    08/13/24 2029   BP: (!) 166/95   Pulse: 66   Resp: 18   Temp: 36.1 °C (97 °F)   SpO2: 96%         Exam:  Constitutional: No acute distress  Resp: breathing comfortably  Cardio: well perfused  GI: nondistended  MSK: full range of motion  Neuro: No acute distress, A&Ox3  Cranial Nerves II-XII: PERRL, EOMI, Face symmetric, Facial SILT, Palate/Tongue midline and symmetric, shoulder shrugs symmetric, hearing intact to finger rubs bilaterally  Motor:   RUE D5 B5 T5 HG/IO 5  RLE HF5 KE5 DF/PF 5  LUE D5 B5 T5 HG/IO 5  LLE HF5 KE5 DF/PF 5  B/l ferris, no clonus  Sensation: SILT throughout all extremities  Psych: appropriate  Skin: no obvious lesions    Medical History  Past Medical History:   Diagnosis Date    Hypercholesteremia     Hypertension     Lung nodule seen on  imaging study     lung nodules concerning for metastatic lung cancer to the bone    Saccular aneurysm (HHS-HCC)     Saccular aneurysm of left AICA, 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, 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    Wedge compression fracture of t11-T12 vertebra, sequela 07/30/2020    Compression fracture of T11 vertebra, sequela       Surgical History  Past Surgical History:   Procedure Laterality Date    ARTERIAL ANEURYSM REPAIR      Thoracic aortic aneurysm repair    COLONOSCOPY      RADIOFREQUENCY ABLATION      L3,4,5    WISDOM TOOTH EXTRACTION          Medications  Current Outpatient Medications   Medication Instructions    acetaminophen-codeine (Tylenol w/ Codeine #3) 300-30 mg tablet 1 tablet, oral, Every 8 hours PRN    atorvastatin (Lipitor) 80 mg tablet Take 1 tablet (80 mg) by mouth once daily.    bisacodyl (DULCOLAX) 5 mg, oral, Daily PRN, Do not crush, chew, or split.    cetirizine (ZYRTEC) 10 mg, oral, Daily PRN    cholecalciferol (VITAMIN D3) 1,000 Units, oral, Daily    cyclobenzaprine (FLEXERIL) 5 mg, oral, 3 times daily PRN    docusate sodium (COLACE) 100 mg, oral, 2 times daily    ibandronate (BONIVA) 150 mg, oral, Every 30 days, Take in morning with full glass of water on an empty stomach. No food, drink, meds, or lying down for 60 minutes after.    losartan (COZAAR) 100 mg, oral, Daily    menthol (ICY HOT ADVANCED RELIEF PATCH TOP) 1 patch, topical (top), Every 12 hours PRN    metoprolol succinate XL (TOPROL-XL) 100 mg, oral, Daily    naloxegol oxalate (MOVANTIK) 25 mg, oral, Daily    ondansetron (ZOFRAN) 8 mg, oral, Every 8 hours PRN    polyethylene glycol (Glycolax, Miralax) 17 gram/dose powder Mix 17 g of powder with 8 oz of water and drink by mouth once daily.    vit A/vit C/vit E/zinc/copper (PRESERVISION AREDS ORAL) 1 tablet, oral, Daily        Diagnostic Results:    Lab Results   Component Value Date    WBC 8.0 08/13/2024    HGB 13.2 08/13/2024    HCT 38.7 08/13/2024    MCV 86 08/13/2024     08/13/2024     Lab  "Results   Component Value Date    CREATININE 0.71 08/13/2024    BUN 7 08/13/2024     08/13/2024    K 3.8 08/13/2024     08/13/2024    CO2 30 08/13/2024     No results found for: \"INR\", \"PROTIME\"    === 08/13/24 ===    RAD ONC CT SIM IMAGES ONLY  === 08/12/24 ===    MR SPINE COMPLETE CANCER CORD COMPRESSION LIMITED W WO IV CONTRAST    - Impression -  1.  Acute to early subacute pathologic compression fracture of T12  vertebral body, with upwards of 50% height loss and slight bony  retropulsion posterior into the spinal canal with some effacement of  the anterior subarachnoid space, but without evidence of significant  spinal canal stenosis. There appears to be mild increase in the  height loss of T12 along the superior endplate compared to prior CT  of the lumbar spine on 07/31/2024 and CT chest dated 08/05/2024.  Likely late subacute or early chronic pathologic compression  fractures also present along the superior endplate of L3 with mild  associated STIR hyperintense edema.  2. Multilevel degenerative changes of the cervical, thoracic and  lumbar spine with mild spinal canal narrowing present at the cervical  spine due to combination of disc osteophyte complexes, ligamentum  flavum thickening hypertrophic facet changes, without evidence of  high-grade spinal canal stenosis or cord compression. Varying degrees  of neural foraminal stenosis are detailed above.  3. Several additional pathologic metastatic lesions are present in  the thoracic and lumbar spine and in the pelvis, some of which are  described above.  4. T2 hyperintense, enhancing lesion is present in the lungs,  posterior to the aortic arch, corresponding to pulmonary nodule is  seen on previous CT of the chest dated 08/05/2024.  5. Bilateral pleural effusions, slightly greater on the right.    MACRO:  None    Signed by: Perlita Williamson 8/12/2024 10:17 PM  Dictation workstation:   [id number]      Assessment/Plan   Assessment:  Brittny is " a 69 y.o. female w/ h/o HTN, HLD, ascending aortic aneurysm s/p 2012 repair, stage IV lung cancer s/p EBUS 8/5/24 path non-small cell, osteoporosis p/w back painx1, MRI C/T/LS T12 pathologic fx w min height loss, mild retropulsion w/o compression, L3 compression fx     Plan:  Heme onc primary  No acute neurosurgical intervention  Recommend radiation oncology evaluation  Recommend brace for comfort, no need for T/LS precautions, patient full strength on exam   Please document prognosis  Can consider MRI brain w/wo contrast and CT CAP to complete metastatic work up if c/f worsening metastatic burden  Dex per primary    Gen Estrada MD  Plan not finalized until note signed by attending      Staff Note    Pathologic Fractures but no cord compression, JACKIE ~ 50%     Would recommend pain control  Rad onc for urgent radiation evaluation  No surgery  Brace can be ordered for comfort  Needs PT/OT eval  Decadron may be helpful for pain control        I saw and evaluated the patient.  I personally obtained the key and critical portions of the history and physical exam or was physically present for key and critical portions performed by the Resident/Fellow. I reviewed the documentation and discussed the patient with the Resident/Fellow.  I agree with the Resident/Fellow’s medical decision making as documented in the note.     I have reviewed all prior documentation and reviewed the electronic medical record since admission. I have personally have reviewed all advanced imaging not just the reports and used my interpretation as documented as the relevant findings. I have reviewed the risks and benefits of all treatment recommendations listed in this note with the patient and family. I spent a total of 45 minutes in service to this patient's care during this date of service.      Remi Carey MD, [facility]  Spine , [facility]  Jean Wolf and Nathalia Wolf Chair in Spinal  Neurosurgery  Neurosurgery , [facility] and [facility]  Complex Spine Surgery Fellowship Director   of Neurological Surgery  [employer]  Office: [phone]  Fax: [phone]

## 2024-08-13 NOTE — PROGRESS NOTES
Emergency Medicine Transition of Care Note.    I received Brittny Gordon in signout from Dr. Hunter.  Please see the previous ED provider note for all HPI, PE and MDM up to the time of signout at 1900. This is in addition to the primary record.    In brief Brittny Gordon is an 69 y.o. female with a past medical history of metastatic lung cancer hypertension hyperlipidemia who presents today for worsening of back pain.  Patient's workup significant for MRI C/T/L-spine with concern for cord compression.  Patient was noted to have new bony metastasis.  Patient presentation was discussed with oncology team.    Chief Complaint   Patient presents with    Back Pain     At the time of signout we were awaiting: Admission to oncology    ED Course as of 08/15/24 2143   Mon Aug 12, 2024   2017 ED Attending Documentation: This patient was seen by the resident physician.  I have seen and examined the patient, agree with the workup, evaluation, management and diagnosis. I reviewed and edited the above documentation where necessary. On my evaluation of the patient: 69-year-old female with metastatic lung cancer who presents for an MRI.  The patient does not have any focal neurologic deficits but does have some wraparound pain in the T6-T8 region.  5 out of 5 strength in the bilateral lower extremities but back pain in the setting of cancer so we will obtain an MRI she was sent in for.    Sb Viramontes MD  EM Attending Physician   [RG]      ED Course User Index  [RG] Sb Viramontes MD         Diagnoses as of 08/15/24 2143   Compression fracture of body of thoracic vertebra (Multi)       Medical Decision Making  Oncology accepted the patient.  Discussed ED findings and admission with the patient.  Patient stated understanding and agreement with the plan.  All questions were answered.    Final diagnoses:   [S22.000A] Compression fracture of body of thoracic vertebra (Multi)           Procedure  Procedures    Patient  presentation and plan discussed with attending physician.    Hilario Davis MD

## 2024-08-13 NOTE — TELEPHONE ENCOUNTER
RN called patient about CT/Simulation appointment 8.14.24 at [facility], patient advised she is being admitted downtown and they plan to begin RT while she is inpatient. Dr. Ospina aware.

## 2024-08-14 ENCOUNTER — APPOINTMENT (OUTPATIENT)
Dept: RADIATION ONCOLOGY | Facility: CLINIC | Age: 69
End: 2024-08-14
Payer: MEDICARE

## 2024-08-14 DIAGNOSIS — S22.080A COMPRESSION FRACTURE OF T11 VERTEBRA, INITIAL ENCOUNTER (MULTI): ICD-10-CM

## 2024-08-14 DIAGNOSIS — C34.91 PRIMARY MALIGNANT NEOPLASM OF RIGHT LUNG METASTATIC TO OTHER SITE (MULTI): Primary | ICD-10-CM

## 2024-08-14 LAB
ALBUMIN SERPL BCP-MCNC: 3.5 G/DL (ref 3.4–5)
ANION GAP SERPL CALC-SCNC: 14 MMOL/L (ref 10–20)
BASOPHILS # BLD AUTO: 0.05 X10*3/UL (ref 0–0.1)
BASOPHILS NFR BLD AUTO: 0.6 %
BUN SERPL-MCNC: 12 MG/DL (ref 6–23)
CALCIUM SERPL-MCNC: 8.9 MG/DL (ref 8.6–10.6)
CHLORIDE SERPL-SCNC: 103 MMOL/L (ref 98–107)
CO2 SERPL-SCNC: 27 MMOL/L (ref 21–32)
CREAT SERPL-MCNC: 0.66 MG/DL (ref 0.5–1.05)
EGFRCR SERPLBLD CKD-EPI 2021: >90 ML/MIN/1.73M*2
EOSINOPHIL # BLD AUTO: 0.27 X10*3/UL (ref 0–0.7)
EOSINOPHIL NFR BLD AUTO: 3.2 %
ERYTHROCYTE [DISTWIDTH] IN BLOOD BY AUTOMATED COUNT: 12.8 % (ref 11.5–14.5)
GLUCOSE SERPL-MCNC: 88 MG/DL (ref 74–99)
HCT VFR BLD AUTO: 41.9 % (ref 36–46)
HGB BLD-MCNC: 13.6 G/DL (ref 12–16)
IMM GRANULOCYTES # BLD AUTO: 0.03 X10*3/UL (ref 0–0.7)
IMM GRANULOCYTES NFR BLD AUTO: 0.4 % (ref 0–0.9)
LYMPHOCYTES # BLD AUTO: 2.11 X10*3/UL (ref 1.2–4.8)
LYMPHOCYTES NFR BLD AUTO: 25.1 %
MCH RBC QN AUTO: 29.2 PG (ref 26–34)
MCHC RBC AUTO-ENTMCNC: 32.5 G/DL (ref 32–36)
MCV RBC AUTO: 90 FL (ref 80–100)
MONOCYTES # BLD AUTO: 0.94 X10*3/UL (ref 0.1–1)
MONOCYTES NFR BLD AUTO: 11.2 %
NEUTROPHILS # BLD AUTO: 5.01 X10*3/UL (ref 1.2–7.7)
NEUTROPHILS NFR BLD AUTO: 59.5 %
NRBC BLD-RTO: 0 /100 WBCS (ref 0–0)
PHOSPHATE SERPL-MCNC: 3.5 MG/DL (ref 2.5–4.9)
PLATELET # BLD AUTO: 194 X10*3/UL (ref 150–450)
POTASSIUM SERPL-SCNC: 3.6 MMOL/L (ref 3.5–5.3)
RBC # BLD AUTO: 4.66 X10*6/UL (ref 4–5.2)
SODIUM SERPL-SCNC: 140 MMOL/L (ref 136–145)
WBC # BLD AUTO: 8.4 X10*3/UL (ref 4.4–11.3)

## 2024-08-14 PROCEDURE — 97165 OT EVAL LOW COMPLEX 30 MIN: CPT | Mod: GO

## 2024-08-14 PROCEDURE — 85025 COMPLETE CBC W/AUTO DIFF WBC: CPT

## 2024-08-14 PROCEDURE — 2500000004 HC RX 250 GENERAL PHARMACY W/ HCPCS (ALT 636 FOR OP/ED)

## 2024-08-14 PROCEDURE — 2500000001 HC RX 250 WO HCPCS SELF ADMINISTERED DRUGS (ALT 637 FOR MEDICARE OP)

## 2024-08-14 PROCEDURE — 36415 COLL VENOUS BLD VENIPUNCTURE: CPT

## 2024-08-14 PROCEDURE — 97535 SELF CARE MNGMENT TRAINING: CPT | Mod: GO

## 2024-08-14 PROCEDURE — 99233 SBSQ HOSP IP/OBS HIGH 50: CPT | Performed by: NURSE PRACTITIONER

## 2024-08-14 PROCEDURE — 97161 PT EVAL LOW COMPLEX 20 MIN: CPT | Mod: GP | Performed by: STUDENT IN AN ORGANIZED HEALTH CARE EDUCATION/TRAINING PROGRAM

## 2024-08-14 PROCEDURE — 97530 THERAPEUTIC ACTIVITIES: CPT | Mod: GO

## 2024-08-14 PROCEDURE — 84100 ASSAY OF PHOSPHORUS: CPT

## 2024-08-14 PROCEDURE — 99233 SBSQ HOSP IP/OBS HIGH 50: CPT | Performed by: INTERNAL MEDICINE

## 2024-08-14 PROCEDURE — 1170000001 HC PRIVATE ONCOLOGY ROOM DAILY

## 2024-08-14 PROCEDURE — 2500000002 HC RX 250 W HCPCS SELF ADMINISTERED DRUGS (ALT 637 FOR MEDICARE OP, ALT 636 FOR OP/ED)

## 2024-08-14 RX ORDER — OXYCODONE HYDROCHLORIDE 10 MG/1
10 TABLET ORAL EVERY 4 HOURS PRN
Status: DISCONTINUED | OUTPATIENT
Start: 2024-08-14 | End: 2024-08-19

## 2024-08-14 RX ORDER — FAMOTIDINE 20 MG/1
20 TABLET, FILM COATED ORAL 2 TIMES DAILY
Status: DISCONTINUED | OUTPATIENT
Start: 2024-08-14 | End: 2024-08-19 | Stop reason: HOSPADM

## 2024-08-14 RX ORDER — IBUPROFEN 400 MG/1
400 TABLET ORAL EVERY 4 HOURS PRN
Status: CANCELLED | OUTPATIENT
Start: 2024-08-14

## 2024-08-14 RX ORDER — DEXAMETHASONE 4 MG/1
4 TABLET ORAL EVERY MORNING
Status: COMPLETED | OUTPATIENT
Start: 2024-08-15 | End: 2024-08-18

## 2024-08-14 RX ORDER — OXYCODONE HYDROCHLORIDE 5 MG/1
5 TABLET ORAL EVERY 4 HOURS PRN
Status: DISCONTINUED | OUTPATIENT
Start: 2024-08-14 | End: 2024-08-17

## 2024-08-14 RX ORDER — CYCLOBENZAPRINE HCL 10 MG
10 TABLET ORAL EVERY 8 HOURS
Status: DISCONTINUED | OUTPATIENT
Start: 2024-08-14 | End: 2024-08-19 | Stop reason: HOSPADM

## 2024-08-14 RX ORDER — POTASSIUM CHLORIDE 20 MEQ/1
40 TABLET, EXTENDED RELEASE ORAL ONCE
Status: COMPLETED | OUTPATIENT
Start: 2024-08-14 | End: 2024-08-14

## 2024-08-14 SDOH — ECONOMIC STABILITY: INCOME INSECURITY: HOW HARD IS IT FOR YOU TO PAY FOR THE VERY BASICS LIKE FOOD, HOUSING, MEDICAL CARE, AND HEATING?: NOT VERY HARD

## 2024-08-14 SDOH — SOCIAL STABILITY: SOCIAL INSECURITY: HAVE YOU HAD THOUGHTS OF HARMING ANYONE ELSE?: NO

## 2024-08-14 SDOH — ECONOMIC STABILITY: INCOME INSECURITY: IN THE LAST 12 MONTHS, WAS THERE A TIME WHEN YOU WERE NOT ABLE TO PAY THE MORTGAGE OR RENT ON TIME?: NO

## 2024-08-14 SDOH — SOCIAL STABILITY: SOCIAL INSECURITY: DO YOU FEEL ANYONE HAS EXPLOITED OR TAKEN ADVANTAGE OF YOU FINANCIALLY OR OF YOUR PERSONAL PROPERTY?: NO

## 2024-08-14 SDOH — SOCIAL STABILITY: SOCIAL INSECURITY: WERE YOU ABLE TO COMPLETE ALL THE BEHAVIORAL HEALTH SCREENINGS?: YES

## 2024-08-14 SDOH — SOCIAL STABILITY: SOCIAL INSECURITY: DOES ANYONE TRY TO KEEP YOU FROM HAVING/CONTACTING OTHER FRIENDS OR DOING THINGS OUTSIDE YOUR HOME?: NO

## 2024-08-14 SDOH — ECONOMIC STABILITY: HOUSING INSECURITY: IN THE PAST 12 MONTHS, HOW MANY TIMES HAVE YOU MOVED WHERE YOU WERE LIVING?: 1

## 2024-08-14 SDOH — ECONOMIC STABILITY: HOUSING INSECURITY: AT ANY TIME IN THE PAST 12 MONTHS, WERE YOU HOMELESS OR LIVING IN A SHELTER (INCLUDING NOW)?: NO

## 2024-08-14 SDOH — SOCIAL STABILITY: SOCIAL INSECURITY: HAVE YOU HAD ANY THOUGHTS OF HARMING ANYONE ELSE?: NO

## 2024-08-14 SDOH — SOCIAL STABILITY: SOCIAL INSECURITY: ARE THERE ANY APPARENT SIGNS OF INJURIES/BEHAVIORS THAT COULD BE RELATED TO ABUSE/NEGLECT?: NO

## 2024-08-14 SDOH — SOCIAL STABILITY: SOCIAL INSECURITY: HAS ANYONE EVER THREATENED TO HURT YOUR FAMILY OR YOUR PETS?: NO

## 2024-08-14 SDOH — SOCIAL STABILITY: SOCIAL INSECURITY: ARE YOU OR HAVE YOU BEEN THREATENED OR ABUSED PHYSICALLY, EMOTIONALLY, OR SEXUALLY BY ANYONE?: NO

## 2024-08-14 SDOH — SOCIAL STABILITY: SOCIAL INSECURITY: ABUSE: ADULT

## 2024-08-14 SDOH — SOCIAL STABILITY: SOCIAL INSECURITY: DO YOU FEEL UNSAFE GOING BACK TO THE PLACE WHERE YOU ARE LIVING?: NO

## 2024-08-14 SDOH — HEALTH STABILITY: PHYSICAL HEALTH: ON AVERAGE, HOW MANY DAYS PER WEEK DO YOU ENGAGE IN MODERATE TO STRENUOUS EXERCISE (LIKE A BRISK WALK)?: 0 DAYS

## 2024-08-14 ASSESSMENT — PAIN - FUNCTIONAL ASSESSMENT
PAIN_FUNCTIONAL_ASSESSMENT: 0-10

## 2024-08-14 ASSESSMENT — ACTIVITIES OF DAILY LIVING (ADL)
HOME_MANAGEMENT_TIME_ENTRY: 10
HEARING - RIGHT EAR: FUNCTIONAL
ADEQUATE_TO_COMPLETE_ADL: YES
GROOMING: INDEPENDENT
WALKS IN HOME: INDEPENDENT
PATIENT'S MEMORY ADEQUATE TO SAFELY COMPLETE DAILY ACTIVITIES?: YES
JUDGMENT_ADEQUATE_SAFELY_COMPLETE_DAILY_ACTIVITIES: YES
HEARING - LEFT EAR: FUNCTIONAL
BATHING_ASSISTANCE: MODIFIED INDEPENDENT (DEVICE)
TOILETING: NEEDS ASSISTANCE
ADL_ASSISTANCE: INDEPENDENT
FEEDING YOURSELF: INDEPENDENT
BATHING: NEEDS ASSISTANCE
DRESSING YOURSELF: INDEPENDENT

## 2024-08-14 ASSESSMENT — COGNITIVE AND FUNCTIONAL STATUS - GENERAL
MOBILITY SCORE: 24
DAILY ACTIVITIY SCORE: 24
DAILY ACTIVITIY SCORE: 24
MOBILITY SCORE: 23
DAILY ACTIVITIY SCORE: 24
CLIMB 3 TO 5 STEPS WITH RAILING: A LITTLE
MOBILITY SCORE: 22
MOBILITY SCORE: 22
DAILY ACTIVITIY SCORE: 24
CLIMB 3 TO 5 STEPS WITH RAILING: A LITTLE
STANDING UP FROM CHAIR USING ARMS: A LITTLE
STANDING UP FROM CHAIR USING ARMS: A LITTLE
DAILY ACTIVITIY SCORE: 24
MOBILITY SCORE: 24
CLIMB 3 TO 5 STEPS WITH RAILING: A LITTLE
PATIENT BASELINE BEDBOUND: NO

## 2024-08-14 ASSESSMENT — LIFESTYLE VARIABLES
HOW OFTEN DO YOU HAVE 6 OR MORE DRINKS ON ONE OCCASION: NEVER
SKIP TO QUESTIONS 9-10: 1
HOW MANY STANDARD DRINKS CONTAINING ALCOHOL DO YOU HAVE ON A TYPICAL DAY: 1 OR 2
AUDIT-C TOTAL SCORE: 1
HOW OFTEN DO YOU HAVE A DRINK CONTAINING ALCOHOL: MONTHLY OR LESS
AUDIT-C TOTAL SCORE: 1

## 2024-08-14 ASSESSMENT — PAIN SCALES - GENERAL
PAINLEVEL_OUTOF10: 6
PAINLEVEL_OUTOF10: 8
PAINLEVEL_OUTOF10: 7
PAINLEVEL_OUTOF10: 5 - MODERATE PAIN
PAINLEVEL_OUTOF10: 7
PAINLEVEL_OUTOF10: 5 - MODERATE PAIN
PAINLEVEL_OUTOF10: 3
PAINLEVEL_OUTOF10: 7
PAINLEVEL_OUTOF10: 7
PAINLEVEL_OUTOF10: 8

## 2024-08-14 ASSESSMENT — PATIENT HEALTH QUESTIONNAIRE - PHQ9
1. LITTLE INTEREST OR PLEASURE IN DOING THINGS: NOT AT ALL
SUM OF ALL RESPONSES TO PHQ9 QUESTIONS 1 & 2: 0
2. FEELING DOWN, DEPRESSED OR HOPELESS: NOT AT ALL

## 2024-08-14 ASSESSMENT — PAIN DESCRIPTION - DESCRIPTORS
DESCRIPTORS: ACHING

## 2024-08-14 ASSESSMENT — PAIN DESCRIPTION - ORIENTATION: ORIENTATION: LOWER

## 2024-08-14 ASSESSMENT — PAIN DESCRIPTION - LOCATION: LOCATION: BACK

## 2024-08-14 NOTE — PROGRESS NOTES
"Daily Progress Note  Brittny Gordon is a 69 y.o. female on day 1 of admission presenting with Compression fracture of body of thoracic vertebra (Multi).    Brittny Gordon is a 69 y.o. female with a past history of presumed stage IV metastatic lung cancer presenting with severe back pain in the setting of acute T12 compression fracture secondary to bony mets.     Subjective   Overnight: No acute events  Ms. Gordon is feeling some improvement in her pain, but it is still not fully controlled. She notes difficulty with laying flat for long times as she had difficulty with pain during her CT sim yesterday. She denies other symptoms     Objective     Physical Exam  Constitutional:       Appearance: Normal appearance.   Cardiovascular:      Rate and Rhythm: Normal rate and regular rhythm.      Pulses: Normal pulses.      Heart sounds: Normal heart sounds.   Pulmonary:      Effort: Pulmonary effort is normal. No respiratory distress.      Breath sounds: Normal breath sounds.   Abdominal:      General: Abdomen is flat. There is no distension.      Palpations: Abdomen is soft.      Tenderness: There is no abdominal tenderness.   Musculoskeletal:      Right lower leg: No edema.      Left lower leg: No edema.   Skin:     General: Skin is warm and dry.   Neurological:      Mental Status: She is alert. Mental status is at baseline.      Sensory: No sensory deficit.      Motor: No weakness.   Psychiatric:         Mood and Affect: Mood normal.         Behavior: Behavior normal.       Last Recorded Vitals  Blood pressure 118/72, pulse 66, temperature 36.1 °C (97 °F), temperature source Temporal, resp. rate 18, height 1.702 m (5' 7\"), weight 74.8 kg (165 lb), SpO2 92%.  Intake/Output last 3 Shifts:  No intake/output data recorded.    Relevant Results  Scheduled medications  atorvastatin, 80 mg, oral, Daily  calcitonin (salmon), 1 spray, One Nostril, Daily  cholecalciferol, 1,000 Units, oral, Daily  cyclobenzaprine, 10 mg, " oral, q8h  [START ON 8/15/2024] dexAMETHasone, 4 mg, oral, q AM  docusate sodium, 100 mg, oral, BID  enoxaparin, 40 mg, subcutaneous, q24h  famotidine, 20 mg, oral, BID  gabapentin, 300 mg, oral, Nightly  losartan, 100 mg, oral, Daily  metoprolol succinate XL, 100 mg, oral, Daily  polyethylene glycol, 17 g, oral, Daily    Continuous medications     PRN medications  PRN medications: bisacodyl, cetirizine, HYDROmorphone, ibuprofen, methyl salicylate-menthol, ondansetron, oxyCODONE, oxyCODONE        Assessment/Plan   Principal Problem:    Compression fracture of body of thoracic vertebra (Multi)    Brittny Gordon is a 69 y.o. female with a past history of presumed stage IV metastatic lung cancer presenting with severe back pain in the setting of acute T12 compression fracture secondary to bony mets.      Updates 8/14  - Pain improved but regimen still requires further modification  - Supportive oncology recommended updated regimen of oxycodone 5, oxycodone 10 PRN for moderate and severe, flexeril 10 Q8h, dexamethasone 4 QAM    #T12 compression fracture  ::In the setting of bony mets from lung cancer  ::PET scan on 7/11/2024 showing multiple avid bone lesions throughout axial and appendicular skeletons  ::Did not find relief from tramadol, did not tolerate acetaminophen-codeine or acetaminophen-hydrocodone due to nausea  Plan:  -oxycodone, flexeril, Dilaudid PRN  -Calcitonin 200mg nasal spray daily  -Rad onc, supportive onc, and NSGY consulted  -Per Rad Onc note on 7/25, if pain persists despite medical management and radiation therapy, structural relief with the form of kyphoplasty/vertebroplasty may benefit given vertebral compression  -On ibandronate 150mg monthly, holding here  -Bowel regimen with Miralax, docusate, bisacodyl PRN. Naloxegel is non-formulary, holding here     #NSCLC, stage IV  ::RUL primary with bony metastases seen on PET scan  ::Was scheduled for palliative RT for spine lesions and referral for  clinical trial  ::Still awaiting full path results when last seen  ::Follows with Dr. Camila Chaudhary as an outpatient  Plan:  -Rad Onc consulted, plan for CT sim 8/13  -Minimal pleural effusions seen on MRI, patient asymptomatic, no acute interventions     #Hypertension  #Hyperlipidemia  #Ascending aortic aneurysm s/p repair  ::Open repair in 2012, has not needed redo operations  -C/w losartan 100mg daily, Toprol 100mg daily  -C/w atorvastatin 80mg daily     Code status: FULL CODE  DVT ppx: Lovenox  GI ppx: None  Oxygen: RA  Abx: None  Access: PIV  NOK: Lance (brother) [phone]

## 2024-08-14 NOTE — PROGRESS NOTES
"  SUPPORTIVE AND PALLIATIVE ONCOLOGY INPATIENT FOLLOW-UP      SERVICE DATE: 24     SUBJECTIVE:  Interval Events:  Admitted to [facility]  Gabapentin, Oxycodone prn, and scheduled Flexeril started yesterday, Dexamethasone 4 mg PO started this morning  CT sim for palliative RT done yesterday.    No acute surgical intervention per Neurosurgery, recommend TLS brace for comfort.    Pt reports CT sim was excruciating despite Hydromorphone 0.4 mg IV prior.   Pain improved but suboptimally controlled. Oxycodone 5 mg only \"takes the edge off\" and not lasting the 6 hours as ordered, she needed IV Hydromorphone for breakthrough pain. Reports very happy with gabapentin, she slept very well.     Reviewed her Advance directives she recently completed.    Pain Assessment:  Location:  lower and mid back, left ribs  Duration: Constant  Characteristics:   Ratin   Descriptors: cramping, sharp, and stabbing   Aggravating: movement, walking, bending, and lying down    Relieving: Analgesics  , Positioning, and Modifying activity   Interference with Function: Very Much    Opioid Use  Past 24 h prn opioid use: Hydromorphone 0.4 mg IV x 2 doses = 0.8 mg = 10 OME  Oxycodone IR 5 mg PO x 2 doses = 10 mg = 12.5 OME  Total 24h OME use:  22.5    Note: OME calculations based on equianalgesic table below. Please note this table is based on best available evidence but conversions are still approximate. These are NOT opioid DOSES for individual patient use; this is equivalency information.  Drug Parenteral Enteral   Morphine 10 25   Oxycodone N/A 20   Hydromorphone 2 5   Fentanyl 0.15 N/A   Tramadol N/A 120   Citation: Darnell RIVAS. Demystifying opioid conversion calculations: A guide for effective dosing, Second edition. [place]: [employer] of Health-System Pharmacists, 2018.    Symptom Assessment:  Nausea none  Constipation none LBM     Information obtained from: chart review, interview of patient, discussion with RN, and " discussion with primary team  ______________________________________________________________________        OBJECTIVE:    Lab Results   Component Value Date    WBC 8.4 08/14/2024    HGB 13.6 08/14/2024    HCT 41.9 08/14/2024    MCV 90 08/14/2024     08/14/2024      Lab Results   Component Value Date    GLUCOSE 88 08/14/2024    CALCIUM 8.9 08/14/2024     08/14/2024    K 3.6 08/14/2024    CO2 27 08/14/2024     08/14/2024    BUN 12 08/14/2024    CREATININE 0.66 08/14/2024     Lab Results   Component Value Date    ALT 18 08/12/2024    AST 20 08/12/2024    ALKPHOS 68 08/12/2024    BILITOT 0.6 08/12/2024     Estimated Creatinine Clearance: 86.2 mL/min (by C-G formula based on SCr of 0.66 mg/dL).     Scheduled medications  atorvastatin, 80 mg, oral, Daily  calcitonin (salmon), 1 spray, One Nostril, Daily  cholecalciferol, 1,000 Units, oral, Daily  cyclobenzaprine, 5 mg, oral, q8h  dexAMETHasone, 4 mg, oral, q AM  docusate sodium, 100 mg, oral, BID  enoxaparin, 40 mg, subcutaneous, q24h  gabapentin, 300 mg, oral, Nightly  losartan, 100 mg, oral, Daily  metoprolol succinate XL, 100 mg, oral, Daily  polyethylene glycol, 17 g, oral, Daily  potassium chloride CR, 40 mEq, oral, Once      Continuous medications     PRN medications  bisacodyl, 5 mg, Daily PRN  cetirizine, 10 mg, Daily PRN  HYDROmorphone, 0.4 mg, q4h PRN  ibuprofen, 400 mg, q4h PRN  methyl salicylate-menthol, 1 Application, TID PRN  ondansetron, 8 mg, q8h PRN  oxyCODONE, 5 mg, q6h PRN      }     PHYSICAL EXAMINATION:    Vital Signs:   Vital signs reviewed  Visit Vitals  /79   Pulse 65   Temp 36.7 °C (98.1 °F) (Temporal)   Resp 18        0-10 (Numeric) Pain Score: 8       Physical Exam  Vitals reviewed.   Constitutional:       General: She is not in acute distress.     Comments: Discomfort with movement   HENT:      Head: Normocephalic.      Mouth/Throat:      Mouth: Mucous membranes are moist.   Eyes:      Conjunctiva/sclera: Conjunctivae  normal.   Pulmonary:      Effort: Pulmonary effort is normal. No respiratory distress.   Abdominal:      General: There is no distension.   Neurological:      Mental Status: She is alert and oriented to person, place, and time.   Psychiatric:         Mood and Affect: Mood normal.         Behavior: Behavior normal.         Thought Content: Thought content normal.         Cognition and Memory: Cognition normal.         Judgment: Judgment normal.          ASSESSMENT/PLAN:  Brittny Gordon is a 69 y.o. female diagnosed with likely metastatic NSCLC (lymph nodes, bone with pathologic fracture of L2 L3) s/p bronch/EBUS 8/5 not yet started on systemic therapy pending final pathology, plan for palliative RT to spine. PMH significant for HTN, HLD, osteporosis, AAA s/p repair, former smoker. Admitted 8/12/2024 from ED for further evaluation and management of worsening lumbar pain and new thoracic pain, imaging shows new pathologic fracture T12. Neurosurgery and Radiation Oncology consulted. Supportive and Palliative Oncology is consulted for pain management.      Cancer Related Pain:  Mid-lower back, rib pain related to metastatic NSCLC with bone mets, compression fractures L2 L3 T12  Pain is: cancer related pain  Type: somatic and neuropathic  Pain control: sub-optimally controlled  Home regimen:  Muscle Relaxers Flexeril TID and Tylenol with Codeine  Intolerances/previously tried: Acetaminophen  Personalized pain goal: 4  Total OME usage for the past 24 hours:  22.5  Recommend a 5 day course of Dexamethasone 4 mg PO qAM x 5 days 8/14-8/18  Change oxycodone 5 mg PO to q4h prn moderate pain  Start Oxycodone IR 10 mg PO q4 h prn severe pain   Continue Dilaudid 0.4 mg IV to q4h prn breakthrough pain  Start Hydromorphone 1 mg IV once daily prior to RT. Discussed needing to find PO opioid dose that will enable her to tolerate RT once discharged  Increase Flexeril to 10 mg PO q8h scheduled  Continue gabapentin 300 mg PO qhs  (started )  Recommend holding Ibuprofen while on dexamethasone due to risk for GI upset  Reports she has a TLS brace made recently and it was too painful to use due to sensitivity of her muscles and skin, discussed retrying once pain improves with RT  Continue to monitor pain scores and administer PRN medications as appropriate  Continue/initiate nonpharmacologic pain management strategies including ice/heat therapy, distraction techniques, deep breathing/relaxation techniques, calming music, and repositioning  Continue to monitor for signs of opioid efficacy (pain scores, improved functionality) and toxicity (pinpoint pupils, excess sedation/drowsiness/confusion, respiratory depression, etc.)     Nausea:  At risk for nausea with vomiting related to opioids   Home regimen:  Ondansetron   Denies  Continue Ondansetron 8 mg PO q8h prn nausea first line   Start Famotidine 20 mg daily while on dexamethasone     Constipation  At risk for constipation related to opioids, currently not constipated  Usual bowel pattern: every day  Home regimen: Miralax 17 gm daily and Bisacodyl 5 mg daily  LBM   Monitor BM frequency, adjust regimen as needed  Goal to have BM without straining q48-72h  Continue Bisacodyl 5 mg PO daily prn  Continue Miralax 17 gm PO daily      Sleeping Difficulty:  Impaired sleep related to pain, improved with addition of gabapentin  Home regimen:  none  Pain management as above     Decreased appetite:  Appetite loss related to malignancy and pain  Weight loss 10 pounds  Home regimen:  none  Pain management as above     Medical Decision Making/Goals of Care/Advance Care [place]2024 ELLY Magdaleno CNP  Patient's current clinical condition, including diagnosis, prognosis, and management plan, and goals of care were discussed.   Life limiting disease: metastatic malignancy  Family: Supportive brother  Performance status: Major  limitations due to pain  Joys/meaning/strength: [place]  Understanding  of health: Demonstrates good understanding of disease process, understands plan for RT, that her cancer is incurable  Information:Wants full disclosure  Prognosis: cancer treatment is palliative intent  Goals: symptom control and cancer directed therapy Pt understands her cancer is incurable, she is hoping to have her symptoms better managed so that she can participate in life and have as much normalcy as possible.   Worries and fears now and future: ongoing symptoms and having a poor quality of life    Minimum acceptable outcome/QOL:  independence in iADLs  Code status discussion:  Full     8/14/2024:  PT prefers to start RT urgently inpatient instead of waiting to be established at [facility], she prefers to come to main campus. She would also like to establish a pain regimen that allows her to tolerate RT.    Advance Directives  Existence of Advance Directives: Completed, pt provided copies today of HCPOA and Living Will to place in chart  Decision maker: HCPOA is brother Lance Love  Code Status: Full code     Supportive and Palliative Oncology encounter:  Spoke with patient at bedside  Emotional support provided  Coordination of care      Supportive Interventions: Interventions: SPO Spiritual Care: offered     Disposition:  Please  start the process of having prior authorization with meds to beds deliver medications to patient prior to discharge via [facility] pharmacy. Prescriptions will need to be sent 48-72 hours prior to discharge so that a prior authorization can be completed.      Discharge date: unknown pending acute issues  Patient has an appointment with Outpatient Supportive Oncology scheduled for 8/23/2024 with Susan Leroy CNP    Signature and billing:  Thank you for allowing us to participate in the care of this patient. Recommendations will be communicated back to the consulting service by way of shared electronic medical record or face-to-face.    Medical complexity was high level due to due  to complexity of problems, extensive data review, and high risk of management/treatment.      Data:   Diagnostic tests and information reviewed for today's visit:  Conversation with primary team, Conversation with RN, Most recent labs, Medications       Some elements copied from my note on 8/13/2024, the elements have been updated and all reflect current decision making from today, 08/14/24       Plan of Care discussed with: Provider, RN, Patient    Thank you for asking Supportive and Palliative Oncology to assist with care of this patient.  Recommendations will be communicated back to the consulting service by way of shared electronic medical record/secure chat/email or face-to-face.   We will continue to follow  Please contact us for additional questions or concerns.      SIGNATURE: BOWEN Martinez-CNP, DNP   PAGER/CONTACT:  Contact information:  Supportive and Palliative Oncology  Monday-Friday 8 AM-5 PM  Epic Secure chat or pager [phone].  After hours and weekends:  pager [phone]

## 2024-08-14 NOTE — CARE PLAN
The patient's goals for the shift include      The clinical goals for the shift include Pain Management    Problem: Pain - Adult  Goal: Verbalizes/displays adequate comfort level or baseline comfort level  Outcome: Progressing     Problem: Safety - Adult  Goal: Free from fall injury  Outcome: Progressing     Problem: Discharge Planning  Goal: Discharge to home or other facility with appropriate resources  Outcome: Progressing     Problem: Chronic Conditions and Co-morbidities  Goal: Patient's chronic conditions and co-morbidity symptoms are monitored and maintained or improved  Outcome: Progressing     Problem: Pain  Goal: Takes deep breaths with improved pain control throughout the shift  Outcome: Progressing  Goal: Turns in bed with improved pain control throughout the shift  Outcome: Progressing  Goal: Walks with improved pain control throughout the shift  Outcome: Progressing  Goal: Performs ADL's with improved pain control throughout shift  Outcome: Progressing  Goal: Participates in PT with improved pain control throughout the shift  Outcome: Progressing  Goal: Free from opioid side effects throughout the shift  Outcome: Progressing  Goal: Free from acute confusion related to pain meds throughout the shift  Outcome: Progressing

## 2024-08-14 NOTE — CARE PLAN
Problem: Pain - Adult  Goal: Verbalizes/displays adequate comfort level or baseline comfort level  Outcome: Progressing     Problem: Safety - Adult  Goal: Free from fall injury  Outcome: Progressing     Problem: Discharge Planning  Goal: Discharge to home or other facility with appropriate resources  Outcome: Progressing     Problem: Chronic Conditions and Co-morbidities  Goal: Patient's chronic conditions and co-morbidity symptoms are monitored and maintained or improved  Outcome: Progressing       The patient's goals for the shift include  Rest    The clinical goals for the shift include  Pain Management    Over the shift, the patient did make progress toward the following goals.

## 2024-08-14 NOTE — PROGRESS NOTES
Physical Therapy    Physical Therapy Evaluation    Patient Name: Brittny Gordon  MRN: [id number]  Room: [address] [place] [address]  Today's Date: 8/14/2024   Time Calculation  Start Time: 0955  Stop Time: 1010  Time Calculation (min): 15 min    Assessment/Plan   PT Assessment  Barriers to Discharge: medical acuity  End of Session Communication: Bedside nurse  End of Session Patient Position: Up in chair  IP OR SWING BED PT PLAN  Inpatient or Swing Bed: Inpatient  PT Plan  PT Plan: PT Eval only  PT Eval Only Reason: No acute PT needs identified  PT Frequency: PT eval only  PT Discharge Recommendations: No further acute PT  PT Recommended Transfer Status: Independent  PT - OK to Discharge: Yes  Reason for Referral: T3/T12 compression fx  Past Medical History Relevant to Rehab: past history of presumed stage IV metastatic lung cancer  Prior to Session Communication: Bedside nurse  Patient Position Received: Bed, 3 rail up, Alarm off, not on at start of session    Subjective     General Visit Information:  Subjective: Patient is alert, agreeable to PT.  Spontaneous fx 2/2 mets.  Has tried spinal brace and felt uncomfortable, less pain without.  Reason for Referral: T3/T12 compression fx  Past Medical History Relevant to Rehab: past history of presumed stage IV metastatic lung cancer  Prior to Session Communication: Bedside nurse  Patient Position Received: Bed, 3 rail up, Alarm off, not on at start of session   Home Living:  Home Living  Type of Home: House  Lives With:  (Family/cousins)  Home Adaptive Equipment: None  Home Layout: One level  Home Access: No concerns  Prior Level of Function:  Prior Function Per Pt/Caregiver Report  Level of [place]: Independent with ADLs and functional transfers  Prior Function Comments: Does not reach to items on floor or above head 2/2 pain  Precautions:  Precautions  Medical Precautions: Fall precautions  Vital Signs:     Medical Gas Therapy: None (Room air)    Lines/Tubes/Drains:     Medical  Gas Therapy: None (Room air)  Continuous Medications/Drips:       Objective   Pain:  Pain Assessment  0-10 (Numeric) Pain Score: 7 (post 7)  Pain Type: Acute pain  Pain Location: Back    Cognition:  Cognition  Overall Cognitive Status: Within Functional Limits    General Assessments:  Extremity/Trunk Assessments:  Tone: No abnormalities noted  Sensation  Light Touch: No apparent deficits  Sensation Comment: equally BUE/BLE  Coordination  Movements are Fluid and Coordinated: Yes  Heel to Shin: Intact  Upper Extremity  ROM: WNL  Strength:BUE 5/5 equally  Lower Extremity  ROM: WFL  Strength: BLE 5/5 equally without radicular symptoms   Sitting Static Balance Normal  Sitting Dynamic Balance Normal  Standing Static Balance Normal  Standing Dynamic Balance Normal    Functional Assessments:  Bed Mobility  Bed Mobility: Yes  Bed Mobility 1  Bed Mobility 1: Supine to sitting  Level of Assistance 1: Independent    Transfers  Transfer: Yes  Transfer 1  Transfer From 1: Sit to  Transfer to 1: Stand  Transfer Level of Assistance 1: Independent  Transfers 2  Transfer From 2: Stand to  Transfer to 2: Sit  Transfer Level of Assistance 2: Independent  Transfers 3  Transfer From 3: Bed to  Transfer to 3: Chair with arms  Transfer Level of Assistance 3: Independent  Transfers 4  Transfer From 4: Chair with arms to  Transfer to 4: Chair with arms  Transfer Level of Assistance 4: Independent    Ambulation/Gait Training  Ambulation/Gait Training Performed: Yes  Ambulation/Gait Training 1  Surface 1: Level tile  Device 1: No device  Assistance 1: Independent  Quality of Gait 1:  (WFL)  Comments/Distance (ft) 1: 20, 150              Outcome Measures:  [facility] Basic Mobility  Turning from your back to your side while in a flat bed without using bedrails: None  Moving from lying on your back to sitting on the side of a flat bed without using bedrails: None  Moving to and from bed to chair (including a wheelchair): None  Standing up from a  chair using your arms (e.g. wheelchair or bedside chair): None  To walk in hospital room: None  Climbing 3-5 steps with railing: None  Basic Mobility - Total Score: 24                 [place]  Sitting Balance: Steady, safe  Arises: Able, uses arms to help  Attempts to Arise: Able to arise, one attempt  Immediate Standing Balance (First 5 Seconds): Steady without walker or other support  Standing Balance: Narrow stance without support  Nudged: Steady without walker or other support  Eyes Closed: Steady  Turned 360 Degrees: Steadiness: Steady  Turned 360 Degrees: Continuity of Steps: Continuous  Sitting Down: Uses arms or not a smooth motion  Balance Score: 14  Initiation of Gait: No hesitancy  Step Height: R Swing Foot: Right foot complete clears floor  Step Length: R Swing Foot: Passes left stance foot  Step Height: L Swing Foot: Left foot complete clears floor  Step Length: L Swing Foot: Passes right stance foot  Step Symmetry: Right and left step appear equal  Step Continuity: Steps appear continuous  Path: Straight without walking aid  Trunk: No sway, no flexion, no use of arms, no walking aid  Walking Time: Heels almost touching while walking  Gait Score: 12  Total Score: 26  Timed Up and Go Test  How many seconds did it take to complete the 5 tasks?: 11 seconds       Encounter Problems       Encounter Problems (Active)       Pain - Adult            Assessment: Patient currently independent for mobility with no functional deficits noted.  Limited by acute back pain 2/2 fractures.  Demonstrated appropriate log rolling for comfort and good safety awareness.  No further acute PT warranted at this time.  Please continue mobility during acute stay with nursing staff.  PT to sign off with no further needs.      Education Documentation  Precautions, taught by Elijah Santos PT at 8/14/2024 10:51 AM.  Learner: Patient  Readiness: Acceptance  Method: Explanation  Response: Verbalizes Understanding, Demonstrated  Understanding    Body Mechanics, taught by Elijah Santos PT at 8/14/2024 10:51 AM.  Learner: Patient  Readiness: Acceptance  Method: Explanation  Response: Verbalizes Understanding, Demonstrated Understanding    Mobility Training, taught by Elijah Santos PT at 8/14/2024 10:51 AM.  Learner: Patient  Readiness: Acceptance  Method: Explanation  Response: Verbalizes Understanding, Demonstrated Understanding    Education Comments  No comments found.          08/14/24 at 10:51 AM   Elijah Santos PT   Rehab Office: [phone]

## 2024-08-14 NOTE — PROGRESS NOTES
Occupational Therapy    Evaluation and Treatment    Patient Name: Brittny Gordon  MRN: [id number]  Today's Date: 8/14/2024  Room: [address] [place] [address]  Time Calculation  Start Time: 1018  Stop Time: 1100  Time Calculation (min): 42 min    Assessment  IP OT Assessment  OT Assessment: Pt presents near reported baseline demonstrating IND-SBA with mobility, transfers, ADLs, and simulated IADLs. Pt was educated on T/L-spine mobility precautions to assist with pain management but educated that she does not have to adhere to these precautions but are more so for avoidance of pain. Patient able to demonstrate understanding use of reacher for LB dressing. Pt verbalized understanding of all education. Pt is functioning near her reported baseline and has no further skilled OT needs.  Prognosis: Excellent  Barriers to Discharge: None  Evaluation/Treatment Tolerance: Patient tolerated treatment well  Medical Staff Made Aware: Yes  End of Session Communication: Bedside nurse  End of Session Patient Position: Bed, 3 rail up, Alarm off, not on at start of session (sitting EOB)  Plan:  Inpatient Plan  No Skilled OT: At baseline function  OT Frequency: OT eval only  OT Discharge Recommendations: No further acute OT, No OT needed after discharge  OT Recommended Transfer Status: Independent  OT - OK to Discharge: Yes  OT Assessment  Prognosis: Excellent  Barriers to Discharge: None  Evaluation/Treatment Tolerance: Patient tolerated treatment well  Medical Staff Made Aware: Yes  Strengths: Ability to acquire knowledge, Attitude of self, Capable of completing ADLs semi/independent, Premorbid level of function, Support of Caregivers  Barriers to Participation: Comorbidities    Subjective   Current Problem:  1. Compression fracture of body of thoracic vertebra (Multi)          General:  Reason for Referral: T3/T12 compression fx  Past Medical History Relevant to Rehab: past history of presumed stage IV metastatic lung cancer  Prior to Session  Communication: Bedside nurse  Patient Position Received: Bed, 3 rail up, Alarm off, not on at start of session  General Comment: Pt supine in bed upon arrival. Pleasant and agreeable to OT eval and Tx. Pt appreciative of education and training.   Precautions:  Medical Precautions: Fall precautions    Pain:  Pain Assessment  Pain Assessment: 0-10  0-10 (Numeric) Pain Score: 7  Pain Type: Acute pain  Pain Location: Back  Pain Radiating Towards: hips  Pain Interventions: Repositioned, Ambulation/increased activity, Relaxation technique, Rest, Therapeutic touch  Response to Interventions: unchanged- RN aware    Objective   Cognition:  Overall Cognitive Status: Within Functional Limits  Orientation Level: Oriented X4  Insight: Within function limits  Impulsive: Within functional limits           Home Living:  Type of Home: House  Lives With: Other (Comment) (Family/cousins children- 21 yo, 20 yo (both work during the day))  Home Adaptive Equipment: Cane, Walker rolling or standard, Other (Comment) (rollator- all AD from her mother so may be safety risk)  Home Layout: Two level, Work area in basement, Able to live on main level with bedroom/bathroom, Stairs to alternate level with rails  Alternate Level Stairs-Rails:  (1)  Alternate Level Stairs-Number of Steps: 3+6  Home Access: Level entry, No concerns  Bathroom Shower/Tub: Tub/shower unit, Walk-in shower  Bathroom Toilet: Handicapped height  Bathroom Equipment: Grab bars in shower, Shower chair with back  Bathroom Accessibility: primarily uses WIS   Prior Function:  Level of [place]: Independent with ADLs and functional transfers, Independent with homemaking with ambulation  Receives Help From: Family (live in family able to assist PRN)  ADL Assistance: Independent  Homemaking Assistance: Independent  Ambulatory Assistance: Independent  Hand Dominance: Right  IADL History:  Homemaking Responsibilities: Yes  Homemaking Comments: Pt is primary homemaker at  baseline  Current License: Yes  Mode of Transportation: Car  Occupation: Retired  Type of Occupation:   Leisure and Hobbies: gardening, dinners out, walking  ADL:  Eating Assistance: Independent (Anticipated)  Grooming Assistance: Independent (Anticipated)  Bathing Assistance: Modified independent (Device) (Anticipated)  UE Dressing Assistance: Independent (Anticipated)  LE Dressing Assistance: Modified independent (Device) (Anticipated)  Toileting Assistance with Device: Independent (As simulated on toilet and standing)  Activity Tolerance:  Endurance: Tolerates 30+ min exercise without fatigue  Balance:  Dynamic Sitting Balance  Dynamic Sitting-Comments: IND  Dynamic Standing Balance  Dynamic Standing-Comments: IND  Static Sitting Balance  Static Sitting-Comment/Number of Minutes: IND  Static Standing Balance  Static Standing-Comment/Number of Minutes: IND  Bed Mobility/Transfers: Bed Mobility/Transfers: Bed Mobility  Bed Mobility: Yes  Bed Mobility 1  Bed Mobility 1: Supine to sitting  Level of Assistance 1: Close supervision, Minimal verbal cues  Bed Mobility Comments 1: HOB elevated, use of hand rails, educated on log roll tech.  Functional Mobility  Functional Mobility Performed: Yes  Functional Mobility 1  Comments 1: Pt ambulated MOD household distance in room using no AD and with SBA for safety   and Transfers  Transfer: Yes  Transfer 1  Transfer From 1: Bed to, Stand to  Transfer to 1: Stand, Bed  Technique 1: Sit to stand, Stand to sit  Transfer Level of Assistance 1: Independent  Trials/Comments 1: x4 trials total  Transfers 2  Transfer From 2: Stand to, Toilet to  Transfer to 2: Toilet, Stand  Technique 2: Stand to sit, Sit to stand  Transfer Level of Assistance 2: Independent  Transfers 3  Transfer From 3: Stand to, Chair with arms to  Transfer to 3: Chair with arms, Stand  Technique 3: Stand to sit, Sit to stand  Transfer Level of Assistance 3: Independent  Transfers 4  Transfer From 4:  Stand to  Transfer to 4: Bed  Technique 4: Stand to sit  Transfer Level of Assistance 4: Independent  IADL's:   Homemaking Responsibilities: Yes  Homemaking Comments: Pt is primary homemaker at baseline  Current License: Yes  Mode of Transportation: Car  Occupation: Retired  Type of Occupation:   Leisure and Hobbies: gardening, dinners out, walking  Vision: Vision - Basic Assessment  Current Vision: Wears glasses all the time   and Vision - Complex Assessment  Ocular Range of Motion: Within Functional Limits  Sensation:  Light Touch: No apparent deficits (BUE)  Strength:  Strength Comments: BUE WFL  Perception:     Coordination:  Movements are Fluid and Coordinated: Yes   Hand Function:  Hand Function  Gross Grasp: Functional  Coordination: Functional  Extremities:   RUE   RUE : Within Functional Limits (slightly limited external rotation although still WFL), LUE   LUE: Within Functional Limits (slightly limited external rotation although still WFL),  , and        Outcome Measures: [facility] Daily Activity  Putting on and taking off regular lower body clothing: None  Bathing (including washing, rinsing, drying): None  Putting on and taking off regular upper body clothing: None  Toileting, which includes using toilet, bedpan or urinal: None  Taking care of personal grooming such as brushing teeth: None  Eating Meals: None  Daily Activity - Total Score: 24         ,     OT Adult Other Outcome Measures  4AT: -    Education Documentation  Handouts, taught by Zeke White OT at 8/14/2024  2:29 PM.  Learner: Patient  Readiness: Acceptance  Method: Explanation  Response: Verbalizes Understanding  Comment: log roll and T/L spine precautions handouts provided with education that precautions are not necessary for mobility, but may be used as pain management    Body Mechanics, taught by Zeke White OT at 8/14/2024  2:28 PM.  Learner: Patient  Readiness: Acceptance  Method: Explanation,  Demonstration  Response: Verbalizes Understanding, Demonstrated Understanding  Comment: Pt educated on log roll, energy conservation, compensatory mobility techniques, pain reducation/avoidance during ADL, IADL, and other daily task management    Precautions, taught by Zeke White OT at 8/14/2024  2:28 PM.  Learner: Patient  Readiness: Acceptance  Method: Explanation, Demonstration  Response: Verbalizes Understanding, Demonstrated Understanding  Comment: Pt educated on log roll, energy conservation, compensatory mobility techniques, pain reducation/avoidance during ADL, IADL, and other daily task management    ADL Training, taught by Zeke White OT at 8/14/2024  2:28 PM.  Learner: Patient  Readiness: Acceptance  Method: Explanation, Demonstration  Response: Verbalizes Understanding, Demonstrated Understanding  Comment: Pt educated on log roll, energy conservation, compensatory mobility techniques, pain reducation/avoidance during ADL, IADL, and other daily task management    Education Comments  No comments found.      Treatment Completed on Evaluation    Activities of Daily Living:      LE Dressing  LE Dressing: Yes  LE Dressing Adaptive Equipment: Reacher  Pants Level of Assistance: Close supervision, Minimal verbal cues  LE Dressing Where Assessed: Edge of bed  LE Dressing Comments: Pt educated on use of reacher to don pants- pt demonstrated understanding with SBA and Min VCs for improved strategy  Toileting  Toileting Comments: Pt educated on compensatory mobility technique to reduce twisting- educated to stand to perform hygiene and she demonstrated understanding via simulation with verbal education on technique    Therapy/Activity:     Therapeutic Activity  Therapeutic Activity Performed: Yes  Therapeutic Activity 1: Education and training on compensatory mobility techniques during daily task management- L/T spinal precautions to reduce pain (educated on precautions only being for pain  reduction and avoidance of pain- verbalized understanding)  Therapeutic Activity 2: Education and training on use of compensatory techniques and reacher for LB dressing, kitchen management, laundry management, and other daily tasks  Therapeutic Activity 3: Educated on log roll technique to reduce pain during bed mobility  Therapeutic Activity 4: Educated on energy conservation techniques for ADL/IADL management    08/14/24 at 2:32 PM   Zeke White, OT   Rehab Office: [phone]

## 2024-08-14 NOTE — PROGRESS NOTES
08/14/24 1318   Discharge Planning   Living Arrangements Family members   Support Systems Family members   Assistance Needed none   Type of Residence Private residence   Who is requesting discharge planning? Provider   Home or Post Acute Services None   Expected Discharge Disposition Home   Does the patient need discharge transport arranged? No     Pt is a 69 y.o. female diagnosed with likely metastatic NSCLC (lymph nodes, bone with pathologic fracture of L2 L3) s/p bronch/EBUS 8/5 not yet started on systemic therapy pending final pathology, plan for palliative RT to spine. ADOD 8/19.     This TCC met with pt at bedside to introduce self and role.  Per the medical team, this patient has no anticipated discharge needs; full assessment deferred at this time. Pt states she lives at home with her cousins two children who have moved in to help her. She denies DME or transportation needs at this time. Please advise TCC if discharge planning needs arise. Susan Escobar RN TCC

## 2024-08-14 NOTE — SIGNIFICANT EVENT
Would recommend:  pain control  Rad onc for urgent radiation evaluation  No surgery  Brace can be ordered for comfort  Needs PT/OT eval  Decadron may be helpful for pain control    No acute neurosurgical intervention or additional neuroimaging needed at this time. Patient needs follow up in 2 weeks, we will arrange. Thank you for allowing us to participate in the care of this patient. Will sign off at this time. Please page [phone] with any questions or concerns.      Adolph Horn MD  PGY-1 Neurosurgery  12:24 PM

## 2024-08-14 NOTE — ED PROVIDER NOTES
History of Present Illness     History provided by: Patient  Limitations to History: None  External Records Reviewed with Brief Summary:  Outpatient notes from Dr. Chaudhary's office    HPI:  Brittny Gordon is a 69 y.o. female with a past medical history of metastatic lung cancer hypertension hyperlipidemia who presents today for worsening of back pain.  Patient states that she has known compression fractures in her L4-5, however, this pain has been getting worse over the last few days.  She also endorses that she has been having pain that radiates along the left side of her rib cage for the past few weeks.  She denies any numbness weakness or tingling in her legs, no changes in her bowel or bladder.  She denies any headaches, changes in her vision or changes in her hearing.  She denies any fever, cough, shortness of breath.  She denies any nausea vomiting diarrhea abdominal pain.  She denies any new rashes.  She states that when she told Dr. Chaudhary about her symptoms, Dr. Chaudhary told her that she needed to come to the emergency department for an MRI immediately.    Physical Exam   Triage vitals:  T 36.2 °C (97.2 °F)  HR 61  /82  RR 16  O2 96 % None (Room air)    Physical Exam  Constitutional:       General: She is not in acute distress.     Appearance: Normal appearance. She is not ill-appearing or diaphoretic.   HENT:      Head: Normocephalic and atraumatic.      Mouth/Throat:      Mouth: Mucous membranes are moist.      Pharynx: Oropharynx is clear. No oropharyngeal exudate or posterior oropharyngeal erythema.   Eyes:      General: No scleral icterus.     Extraocular Movements: Extraocular movements intact.      Pupils: Pupils are equal, round, and reactive to light.   Cardiovascular:      Rate and Rhythm: Normal rate and regular rhythm.      Pulses: Normal pulses.      Heart sounds: Normal heart sounds. No murmur heard.     No gallop.   Pulmonary:      Effort: Pulmonary effort is normal. No respiratory  distress.      Breath sounds: Normal breath sounds. No stridor. No wheezing, rhonchi or rales.   Abdominal:      General: Bowel sounds are normal. There is no distension.      Palpations: Abdomen is soft. There is no mass.      Tenderness: There is no abdominal tenderness.      Hernia: No hernia is present.      Comments: Left flank and left upper quadrant tender to palpation, no palpable masses   Musculoskeletal:         General: No swelling, deformity or signs of injury. Normal range of motion.      Cervical back: Normal range of motion and neck supple. No tenderness.      Comments: 5 out of 5 strength in handgrip elbow flexion extension dorsiflexion plantarflexion bilaterally   Skin:     General: Skin is warm.      Capillary Refill: Capillary refill takes less than 2 seconds.      Coloration: Skin is not jaundiced or pale.      Findings: No erythema, lesion or rash.   Neurological:      General: No focal deficit present.      Mental Status: She is alert and oriented to person, place, and time. Mental status is at baseline.      Cranial Nerves: No cranial nerve deficit.      Sensory: No sensory deficit.   Psychiatric:         Mood and Affect: Mood normal.         Behavior: Behavior normal.          Medical Decision Making & ED Course   Medical Decision Makin y.o. female past medical history of metastatic lung cancer not currently undergoing treatment hypertension hyperlipidemia who presents today for back pain.  Patient does not have any overlying skin changes of be concerning for possible zoster, her labs are at baseline.  However, given the fact that she has known metastatic lung cancer with bone metastasis, my concern would be either pathologic fractures, worsening of known fractures, or possible spinal cord compression.  Given this, we will get an MRI of her CT and L-spine to evaluate for possible cord compression.  Patient's MRI does demonstrate evidence of new bony metastasis.  I did discuss the  patient's results with her, which she was understanding of.  I did discuss with her that there is the potential option for her to go home with pain meds versus stay to have expedited treatment given her worsening course.  The patient did opt to stay for expedited treatment and likely induction therapy.  I did provide her with doses of Dilaudid 0.5 x 2 for her pain.  Patient was signed out to the oncoming team pending acceptance to the oncology team.  ----      Differential diagnoses considered include but are not limited to: Pathologic fracture, spinal hematoma, foraminal compression, zoster, cauda equina syndrome     Social Determinants of Health which Significantly Impact Care: None identified     EKG Independent Interpretation: EKG not obtained    Independent Result Review and Interpretation: Relevant laboratory and radiographic results were reviewed and independently interpreted by myself.  As necessary, they are commented on in the ED Course.    Chronic conditions affecting the patient's care: As documented above in [facility]    The patient was discussed with the following consultants/services: None    Care Considerations: As documented above in [facility]    ED Course:  ED Course as of 08/13/24 2035   Mon Aug 12, 2024   2017 ED Attending Documentation: This patient was seen by the resident physician.  I have seen and examined the patient, agree with the workup, evaluation, management and diagnosis. I reviewed and edited the above documentation where necessary. On my evaluation of the patient: 69-year-old female with metastatic lung cancer who presents for an MRI.  The patient does not have any focal neurologic deficits but does have some wraparound pain in the T6-T8 region.  5 out of 5 strength in the bilateral lower extremities but back pain in the setting of cancer so we will obtain an MRI she was sent in for.    Sb Viramontes MD  EM Attending Physician   [RG]      ED Course User Index  [RG] Sb Viramontes MD          Diagnoses as of 08/13/24 2035   Compression fracture of body of thoracic vertebra (Multi)     Disposition   Patient was signed out to Dr. Davis at 2300 pending completion of their work-up.  Please see the next provider's transition of care note for the remainder of the patient's care.     Procedures   Procedures        Adolph Hunter MD  Emergency Medicine       Adolph Hunter MD  Resident  08/13/24 2041

## 2024-08-15 ENCOUNTER — APPOINTMENT (OUTPATIENT)
Dept: RADIATION ONCOLOGY | Facility: HOSPITAL | Age: 69
DRG: 175 | End: 2024-08-15
Payer: MEDICARE

## 2024-08-15 LAB
ALBUMIN SERPL BCP-MCNC: 3.5 G/DL (ref 3.4–5)
ANION GAP SERPL CALC-SCNC: 14 MMOL/L (ref 10–20)
BASOPHILS # BLD AUTO: 0.02 X10*3/UL (ref 0–0.1)
BASOPHILS NFR BLD AUTO: 0.2 %
BUN SERPL-MCNC: 11 MG/DL (ref 6–23)
CALCIUM SERPL-MCNC: 8.7 MG/DL (ref 8.6–10.6)
CHLORIDE SERPL-SCNC: 103 MMOL/L (ref 98–107)
CO2 SERPL-SCNC: 26 MMOL/L (ref 21–32)
CREAT SERPL-MCNC: 0.56 MG/DL (ref 0.5–1.05)
EGFRCR SERPLBLD CKD-EPI 2021: >90 ML/MIN/1.73M*2
EOSINOPHIL # BLD AUTO: 0.1 X10*3/UL (ref 0–0.7)
EOSINOPHIL NFR BLD AUTO: 1 %
ERYTHROCYTE [DISTWIDTH] IN BLOOD BY AUTOMATED COUNT: 12.6 % (ref 11.5–14.5)
GLUCOSE SERPL-MCNC: 95 MG/DL (ref 74–99)
HCT VFR BLD AUTO: 37.8 % (ref 36–46)
HGB BLD-MCNC: 12.6 G/DL (ref 12–16)
IMM GRANULOCYTES # BLD AUTO: 0.04 X10*3/UL (ref 0–0.7)
IMM GRANULOCYTES NFR BLD AUTO: 0.4 % (ref 0–0.9)
LYMPHOCYTES # BLD AUTO: 2.39 X10*3/UL (ref 1.2–4.8)
LYMPHOCYTES NFR BLD AUTO: 23.2 %
MCH RBC QN AUTO: 29.4 PG (ref 26–34)
MCHC RBC AUTO-ENTMCNC: 33.3 G/DL (ref 32–36)
MCV RBC AUTO: 88 FL (ref 80–100)
MONOCYTES # BLD AUTO: 0.99 X10*3/UL (ref 0.1–1)
MONOCYTES NFR BLD AUTO: 9.6 %
NEUTROPHILS # BLD AUTO: 6.77 X10*3/UL (ref 1.2–7.7)
NEUTROPHILS NFR BLD AUTO: 65.6 %
NRBC BLD-RTO: 0 /100 WBCS (ref 0–0)
PHOSPHATE SERPL-MCNC: 2.4 MG/DL (ref 2.5–4.9)
PLATELET # BLD AUTO: 206 X10*3/UL (ref 150–450)
POTASSIUM SERPL-SCNC: 3.6 MMOL/L (ref 3.5–5.3)
RBC # BLD AUTO: 4.29 X10*6/UL (ref 4–5.2)
SODIUM SERPL-SCNC: 139 MMOL/L (ref 136–145)
WBC # BLD AUTO: 10.3 X10*3/UL (ref 4.4–11.3)

## 2024-08-15 PROCEDURE — 77295 3-D RADIOTHERAPY PLAN: CPT | Performed by: STUDENT IN AN ORGANIZED HEALTH CARE EDUCATION/TRAINING PROGRAM

## 2024-08-15 PROCEDURE — 2500000004 HC RX 250 GENERAL PHARMACY W/ HCPCS (ALT 636 FOR OP/ED)

## 2024-08-15 PROCEDURE — 36415 COLL VENOUS BLD VENIPUNCTURE: CPT

## 2024-08-15 PROCEDURE — 2500000005 HC RX 250 GENERAL PHARMACY W/O HCPCS

## 2024-08-15 PROCEDURE — 2500000001 HC RX 250 WO HCPCS SELF ADMINISTERED DRUGS (ALT 637 FOR MEDICARE OP)

## 2024-08-15 PROCEDURE — 1170000001 HC PRIVATE ONCOLOGY ROOM DAILY

## 2024-08-15 PROCEDURE — 77334 RADIATION TREATMENT AID(S): CPT | Performed by: STUDENT IN AN ORGANIZED HEALTH CARE EDUCATION/TRAINING PROGRAM

## 2024-08-15 PROCEDURE — 85025 COMPLETE CBC W/AUTO DIFF WBC: CPT

## 2024-08-15 PROCEDURE — 99233 SBSQ HOSP IP/OBS HIGH 50: CPT | Performed by: INTERNAL MEDICINE

## 2024-08-15 PROCEDURE — 99231 SBSQ HOSP IP/OBS SF/LOW 25: CPT | Performed by: PHYSICIAN ASSISTANT

## 2024-08-15 PROCEDURE — 99233 SBSQ HOSP IP/OBS HIGH 50: CPT

## 2024-08-15 PROCEDURE — 77300 RADIATION THERAPY DOSE PLAN: CPT | Performed by: STUDENT IN AN ORGANIZED HEALTH CARE EDUCATION/TRAINING PROGRAM

## 2024-08-15 PROCEDURE — 80069 RENAL FUNCTION PANEL: CPT

## 2024-08-15 RX ORDER — OXYCODONE HYDROCHLORIDE 5 MG/1
5 TABLET ORAL EVERY 4 HOURS PRN
Qty: 15 TABLET | Refills: 0 | Status: CANCELLED | OUTPATIENT
Start: 2024-08-15

## 2024-08-15 RX ORDER — HYDROMORPHONE HYDROCHLORIDE 1 MG/ML
1 INJECTION, SOLUTION INTRAMUSCULAR; INTRAVENOUS; SUBCUTANEOUS ONCE AS NEEDED
Status: DISCONTINUED | OUTPATIENT
Start: 2024-08-15 | End: 2024-08-15

## 2024-08-15 RX ORDER — DEXAMETHASONE 4 MG/1
4 TABLET ORAL EVERY MORNING
Qty: 3 TABLET | Refills: 0 | Status: CANCELLED | OUTPATIENT
Start: 2024-08-16 | End: 2024-08-19

## 2024-08-15 RX ORDER — IBUPROFEN 400 MG/1
400 TABLET ORAL EVERY 4 HOURS PRN
Qty: 30 TABLET | Refills: 0 | Status: CANCELLED | OUTPATIENT
Start: 2024-08-15 | End: 2024-09-14

## 2024-08-15 ASSESSMENT — COGNITIVE AND FUNCTIONAL STATUS - GENERAL
MOBILITY SCORE: 24
DAILY ACTIVITIY SCORE: 24

## 2024-08-15 ASSESSMENT — PAIN SCALES - GENERAL
PAINLEVEL_OUTOF10: 4
PAINLEVEL_OUTOF10: 5 - MODERATE PAIN
PAINLEVEL_OUTOF10: 7

## 2024-08-15 ASSESSMENT — PAIN - FUNCTIONAL ASSESSMENT
PAIN_FUNCTIONAL_ASSESSMENT: 0-10
PAIN_FUNCTIONAL_ASSESSMENT: 0-10

## 2024-08-15 NOTE — PROGRESS NOTES
"Daily Progress Note  Brittny Gordon is a 69 y.o. female on day 2 of admission presenting with Compression fracture of body of thoracic vertebra (Multi).    Brittny Gordon is a 69 y.o. female with a past history of presumed stage IV metastatic lung cancer presenting with severe back pain in the setting of acute T12 compression fracture secondary to bony mets.     Subjective   Overnight: no acute events   Ms. Gordon is feeling improved this morning. Her pain is better controlled, but she fears requiring an extensive amount of pain medicine and future tolerance. Otherwise she denies other symptoms. She is aware of palliative intent of radiation. She describes laying flat was tolerated but getting up after was what induced her pain and muscle spasm.     Objective     Physical Exam  Constitutional:       Appearance: Normal appearance.   HENT:      Mouth/Throat:      Mouth: Mucous membranes are moist.      Pharynx: Oropharynx is clear.   Eyes:      Pupils: Pupils are equal, round, and reactive to light.   Cardiovascular:      Rate and Rhythm: Normal rate and regular rhythm.      Heart sounds: Normal heart sounds.   Pulmonary:      Effort: Pulmonary effort is normal. No respiratory distress.      Breath sounds: Normal breath sounds.   Abdominal:      General: Abdomen is flat. There is no distension.      Palpations: Abdomen is soft.      Tenderness: There is no abdominal tenderness.   Musculoskeletal:      Right lower leg: No edema.      Left lower leg: No edema.   Skin:     General: Skin is warm and dry.   Neurological:      Mental Status: She is alert.      Sensory: No sensory deficit.      Motor: No weakness.   Psychiatric:         Mood and Affect: Mood normal.         Behavior: Behavior normal.         Last Recorded Vitals  Blood pressure 127/67, pulse 67, temperature 36.3 °C (97.3 °F), temperature source Temporal, resp. rate 17, height 1.702 m (5' 7\"), weight 74.8 kg (165 lb), SpO2 95%.  Intake/Output last 3 " Shifts:  I/O last 3 completed shifts:  In: 900 (12 mL/kg) [P.O.:900]  Out: 0 (0 mL/kg)   Weight: 74.8 kg     Relevant Results  Scheduled medications  atorvastatin, 80 mg, oral, Daily  calcitonin (salmon), 1 spray, One Nostril, Daily  cholecalciferol, 1,000 Units, oral, Daily  cyclobenzaprine, 10 mg, oral, q8h  dexAMETHasone, 4 mg, oral, q AM  docusate sodium, 100 mg, oral, BID  enoxaparin, 40 mg, subcutaneous, q24h  famotidine, 20 mg, oral, BID  gabapentin, 300 mg, oral, Nightly  losartan, 100 mg, oral, Daily  metoprolol succinate XL, 100 mg, oral, Daily  polyethylene glycol, 17 g, oral, Daily  potassium phosphate, 21 mmol, intravenous, Once      Continuous medications     PRN medications  PRN medications: bisacodyl, cetirizine, HYDROmorphone, ibuprofen, methyl salicylate-menthol, ondansetron, oxyCODONE, oxyCODONE  Results for orders placed or performed during the hospital encounter of 08/12/24 (from the past 24 hour(s))   Renal function panel   Result Value Ref Range    Glucose 95 74 - 99 mg/dL    Sodium 139 136 - 145 mmol/L    Potassium 3.6 3.5 - 5.3 mmol/L    Chloride 103 98 - 107 mmol/L    Bicarbonate 26 21 - 32 mmol/L    Anion Gap 14 10 - 20 mmol/L    Urea Nitrogen 11 6 - 23 mg/dL    Creatinine 0.56 0.50 - 1.05 mg/dL    eGFR >90 >60 mL/min/1.73m*2    Calcium 8.7 8.6 - 10.6 mg/dL    Phosphorus 2.4 (L) 2.5 - 4.9 mg/dL    Albumin 3.5 3.4 - 5.0 g/dL   CBC and Auto Differential   Result Value Ref Range    WBC 10.3 4.4 - 11.3 x10*3/uL    nRBC 0.0 0.0 - 0.0 /100 WBCs    RBC 4.29 4.00 - 5.20 x10*6/uL    Hemoglobin 12.6 12.0 - 16.0 g/dL    Hematocrit 37.8 36.0 - 46.0 %    MCV 88 80 - 100 fL    MCH 29.4 26.0 - 34.0 pg    MCHC 33.3 32.0 - 36.0 g/dL    RDW 12.6 11.5 - 14.5 %    Platelets 206 150 - 450 x10*3/uL    Neutrophils % 65.6 40.0 - 80.0 %    Immature Granulocytes %, Automated 0.4 0.0 - 0.9 %    Lymphocytes % 23.2 13.0 - 44.0 %    Monocytes % 9.6 2.0 - 10.0 %    Eosinophils % 1.0 0.0 - 6.0 %    Basophils % 0.2 0.0 -  2.0 %    Neutrophils Absolute 6.77 1.20 - 7.70 x10*3/uL    Immature Granulocytes Absolute, Automated 0.04 0.00 - 0.70 x10*3/uL    Lymphocytes Absolute 2.39 1.20 - 4.80 x10*3/uL    Monocytes Absolute 0.99 0.10 - 1.00 x10*3/uL    Eosinophils Absolute 0.10 0.00 - 0.70 x10*3/uL    Basophils Absolute 0.02 0.00 - 0.10 x10*3/uL     MR spine complete cancer cord compression limited w and wo iv contrast    Result Date: 8/12/2024  1.  Acute to early subacute pathologic compression fracture of T12 vertebral body, with upwards of 50% height loss and slight bony retropulsion posterior into the spinal canal with some effacement of the anterior subarachnoid space, but without evidence of significant spinal canal stenosis. There appears to be mild increase in the height loss of T12 along the superior endplate compared to prior CT of the lumbar spine on 07/31/2024 and CT chest dated 08/05/2024. Likely late subacute or early chronic pathologic compression fractures also present along the superior endplate of L3 with mild associated STIR hyperintense edema. 2. Multilevel degenerative changes of the cervical, thoracic and lumbar spine with mild spinal canal narrowing present at the cervical spine due to combination of disc osteophyte complexes, ligamentum flavum thickening hypertrophic facet changes, without evidence of high-grade spinal canal stenosis or cord compression. Varying degrees of neural foraminal stenosis are detailed above. 3. Several additional pathologic metastatic lesions are present in the thoracic and lumbar spine and in the pelvis, some of which are described above. 4. T2 hyperintense, enhancing lesion is present in the lungs, posterior to the aortic arch, corresponding to pulmonary nodule is seen on previous CT of the chest dated 08/05/2024. 5. Bilateral pleural effusions, slightly greater on the right.   MACRO: None   Signed by: Perlita Williamson 8/12/2024 10:17 PM Dictation workstation:    [id number]    Bronchoscopy Tier 3; Navigational, w EBUS, w Transbronch Bx    Result Date: 8/5/2024   Guided bronchoscopy with radial probe EBUS to localize the peripheral RUL lesion was performed. A transbronchial needle aspiration was performed in the RUL. A transbronchial brushing was performed in the RUL. Rapid On-Site Evaluation (JONO): Preliminary cytology of the lesion in the RUL was suggestive of atypical cell suspicious for malignancy (final results are pending). A transbronchial biopsy was performed in the RUL. Linear EBUS was used to evaluate the lymph node station 4R, 10R, 11Rs and EBUS-TBNA was performed. Rapid On-Site Evaluation (JONO): Preliminary cytology was suggestive of lymphoid tissue in node level 4R, 10R, 11Rs (final results are pending). RECOMMENDATION: The patient will be observed post-procedure, until all discharge criteria are met. Await cytology, histopathology results. Follow-up with referring physician.    CT chest wo IV contrast for [facility] planning    Result Date: 8/5/2024  1.  Again seen spiculated right upper lobe nodule measuring 1.1 x 0.8 cm corresponding to hypermetabolic lesion seen on recent PET-CT and concerning for primary lung malignancy. Consider correlation with tissue sampling. 2. Dominant posteromedial left upper lobe nodule measuring 1.4 x 1.4 cm, stable compared to multiple priors. This demonstrate mild metabolic activity on PET-CT. Continued attention on follow-up study is recommended. 3. Additional chronic findings as described above   MACRO: None   Signed by: Tami Delatorre 8/5/2024 8:21 AM Dictation workstation:   [id number]    CT abdomen pelvis w IV contrast    Result Date: 7/31/2024  2.6 cm cystic possible metastatic implant in the left lower quadrant. Small soft tissue implant in the right lower quadrant unchanged from PET-CT 07/11/2024.   Slight heterogeneity of both kidneys may be related to phase of contrast however pyelonephritis in the proper  clinical setting could have a similar appearance.   Too small to characterize focal hypodensities in both kidneys and the liver.   Sclerotic presumed metastatic lesions in both iliac bones. Multilevel lumbar vertebral compression deformities and heterogeneity including areas of sclerosis similar to 07/02/2024. More complete evaluation for metastatic disease and pathologic fracture using MRI as clinically warranted.   Nonspecific small subcutaneous nodule in the left lower back.   Additional findings as described above.   MACRO: None.   Signed by: Ruthie Thacker 7/31/2024 2:51 PM Dictation workstation:   [id number]    CT lumbar spine wo IV contrast    Result Date: 7/31/2024  2.6 cm cystic possible metastatic implant in the left lower quadrant. Small soft tissue implant in the right lower quadrant unchanged from PET-CT 07/11/2024.   Slight heterogeneity of both kidneys may be related to phase of contrast however pyelonephritis in the proper clinical setting could have a similar appearance.   Too small to characterize focal hypodensities in both kidneys and the liver.   Sclerotic presumed metastatic lesions in both iliac bones. Multilevel lumbar vertebral compression deformities and heterogeneity including areas of sclerosis similar to 07/02/2024. More complete evaluation for metastatic disease and pathologic fracture using MRI as clinically warranted.   Nonspecific small subcutaneous nodule in the left lower back.   Additional findings as described above.   MACRO: None.   Signed by: Ruthie Thacker 7/31/2024 2:51 PM Dictation workstation:   [id number]    CT abdomen pelvis wo IV contrast    Result Date: 7/29/2024  IMPRESSION: No acute intra-abdominal findings. No renal or ureteral calculi. Diverticulosis without evidence of diverticulitis. The cecum extends into the right midabdomen and is moderately distended with air and fecal material but no evidence of obstruction. Multiple lower thoracic and lumbar spine  compression deformities with associated Schmorl's node formation. The L3 compression deformity is probably subacute. There is no retropulsion. Nonspecific sclerotic lesions within the posterior ilium bilaterally possibly areas of bone infarction. If there is clinical concern for metastatic disease consider MRI examination for further evaluation.. : [facility]   Transcribe Date/Time: Jul 29 2024  8:08A Dictated by : KATIA AMBRIZ MD This examination was interpreted and the report reviewed and electronically signed by: KATIA AMBRIZ MD on Jul 29 2024  8:28AM  EST    XR lumbar spine 2-3 views    Result Date: 7/29/2024  IMPRESSION: See result. : [facility]   Transcribe Date/Time: Jul 29 2024  4:44A Dictated by : FOREIGN CUMMINGS MD This examination was interpreted and the report reviewed and electronically signed by: FOREIGN CUMMINGS MD on Jul 29 2024  4:58AM  EST    MR brain w and wo IV contrast    Result Date: 7/19/2024  7 mm avidly enhancing extra-axial appearing nodule along the left pontine medullary junction adjacent to the left vertebral artery. 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 Clinical correlation and attention on follow-up suggested. No additional abnormal enhancement identified.   Nonspecific white matter changes, remote lacunar infarcts and parenchymal volume loss.   MACRO: None   Signed by: Jacki Gray 7/19/2024 9:41 PM Dictation workstation:   [id number]     Assessment/Plan   Assessment & Plan  Compression fracture of body of thoracic vertebra (Multi)    Brittny Gordon is a 69 y.o. female with a past history of presumed stage IV metastatic lung cancer presenting with severe back pain in the setting of acute T12 compression fracture secondary to bony mets.      Updates 8/15  - Pain continues to have better control but remains without full resolution of back pain   - Appreciate supportive oncology recommendations and managing pain  induced by laying flat for radiation  - Appreciate radiation oncology input and assistance with starting palliative radiation      #T12 compression fracture  ::In the setting of bony mets from lung cancer  ::PET scan on 7/11/2024 showing multiple avid bone lesions throughout axial and appendicular skeletons  ::Did not find relief from tramadol, did not tolerate acetaminophen-codeine or acetaminophen-hydrocodone due to nausea  Plan:  -oxycodone, flexeril, Dilaudid PRN  -Calcitonin 200mg nasal spray daily  -Rad onc, supportive onc, and NSGY consulted  -Per Rad Onc note on 7/25, if pain persists despite medical management and radiation therapy, structural relief with the form of kyphoplasty/vertebroplasty may benefit given vertebral compression  -On ibandronate 150mg monthly, holding here  -Bowel regimen with Miralax, docusate, bisacodyl PRN. Naloxegel is non-formulary, holding here     #NSCLC, stage IV  ::RUL primary with bony metastases seen on PET scan  ::Was scheduled for palliative RT for spine lesions and referral for clinical trial  ::Still awaiting full path results when last seen  ::Follows with Dr. Camila Chaudhary as an outpatient  Plan:  -Rad Onc consulted, plan for CT sim 8/13  -Minimal pleural effusions seen on MRI, patient asymptomatic, no acute interventions     #Hypertension  #Hyperlipidemia  #Ascending aortic aneurysm s/p repair  ::Open repair in 2012, has not needed redo operations  -C/w losartan 100mg daily, Toprol 100mg daily  -C/w atorvastatin 80mg daily     Code status: FULL CODE  DVT ppx: Lovenox  GI ppx: None  Oxygen: RA  Abx: None  Access: PIV  NOK: Lance (brother) [phone]

## 2024-08-15 NOTE — PROGRESS NOTES
Brittny Gordon is a 69 y.o. female on day 2 of admission presenting with Compression fracture of body of thoracic vertebra (Multi).      Subjective   Significant amount of back pain with positioning for CT SIM on 8/13.  Currently reporting an overall improving in discomfort following med modifications.        Objective     Last Recorded Vitals  /82   Pulse 72   Temp 37.2 °C (99 °F) (Temporal)   Resp 18   Wt 74.8 kg (165 lb)   SpO2 93%     Image Results  Rad Onc CT Sim Images  These images are not reportable by radiology and will not be interpreted   by  Radiologists.      Physical Exam  General: awake, alert, resting comfortably, well developed and well nourished in appearance  HEENT: pupils equal and round, no scleral icterus  Pulmonary: Breathing comfortably at rest   Neuro: A&O x 3, cranial nerves II through XII grossly intact, sensation and strength intact  Psych: Normal affect     Relevant Results  Lab Results   Component Value Date    WBC 10.3 08/15/2024    HGB 12.6 08/15/2024    HCT 37.8 08/15/2024    MCV 88 08/15/2024     08/15/2024     Lab Results   Component Value Date    GLUCOSE 95 08/15/2024    CALCIUM 8.7 08/15/2024     08/15/2024    K 3.6 08/15/2024    CO2 26 08/15/2024     08/15/2024    BUN 11 08/15/2024    CREATININE 0.56 08/15/2024                Assessment/Plan   This is a 69-year-old female recently diagnosed with metastatic NSCLC, complicated by painful spinal metastases, who has been admitted for pain management optimization.      CT simulation for palliative radiotherapy was completed on 8/13.    The patient experienced a significant amount of discomfort during positioning for treatment planning.  Overall, her pain has gradually improved with medication modifications.  She remains neurologically intact.        PLAN:    The patient was discussed with my attending physician, Dr. Ospina.    There are tentative plans to initiate inpatient palliative radiation.   Provided the patient is tolerating treatment, with adequate pain control, she can then be discharged with plans to complete RT at [facility].         I spent __25___ minutes with this patient. Greater than 50% of this time was spent in the counseling and/or coordination of care of this patient.    Sekou Wheatley PA-C

## 2024-08-15 NOTE — CARE PLAN
Problem: Pain - Adult  Goal: Verbalizes/displays adequate comfort level or baseline comfort level  Outcome: Progressing     Problem: Safety - Adult  Goal: Free from fall injury  Outcome: Progressing     Problem: Discharge Planning  Goal: Discharge to home or other facility with appropriate resources  Outcome: Progressing     Problem: Chronic Conditions and Co-morbidities  Goal: Patient's chronic conditions and co-morbidity symptoms are monitored and maintained or improved  Outcome: Progressing     Problem: Pain  Goal: Takes deep breaths with improved pain control throughout the shift  Outcome: Progressing  Goal: Turns in bed with improved pain control throughout the shift  Outcome: Progressing  Goal: Walks with improved pain control throughout the shift  Outcome: Progressing  Goal: Performs ADL's with improved pain control throughout shift  Outcome: Progressing  Goal: Participates in PT with improved pain control throughout the shift  Outcome: Progressing  Goal: Free from opioid side effects throughout the shift  Outcome: Progressing  Goal: Free from acute confusion related to pain meds throughout the shift  Outcome: Progressing   The patient's goals for the shift include  pt to rate pain <7/10 by end of shift    The clinical goals for the shift include Pain Management

## 2024-08-15 NOTE — DISCHARGE INSTRUCTIONS
Dear Ms. Gordon,    You were admitted to [facility] for uncontrolled hip, back, and flank pain. While here you were evaluated by our neurosurgery and radiation oncology teams and were not recommended any neurosurgical intervention. The MR imaging of the spine showed a new T12 compression fracture in addition to the existing L3 fracture. These fractures are a result of the bony metastasis of the new lung cancer and are the primary  of your pain. While here we worked with supportive oncology to optimize your pain regimen and began palliative radiation therapy.     Medication changes:  Oxycodone 10 mg tablets take 10-15 (1-1.5 tablets) mg every 4 hours as needed for 10 days  May take Oxycodone 15 mg 1 hr prior to daily radiation (total of 10 fractions)  Gabapentin 300 mg every evening for 30 days  Lorazepam 0.5 mg daily prior to radiation therapy as needed for 10 days  Flexeril 10 mg every 8 hours as needed for 30 days  Bisacodyl 5 mg daily as needed  Senna 2 tablets twice daily as needed  Miralax 17 gm daily   Colace 100 mg twice a day per patient preference  Ondansetron 4 mg every 6 hours as needed for nausea    Appointments/follow-up:  [ ]  Dr. Chaudhary appointment 8/23  [ ]  Palliative radiation therapy 10 fractions beginning 8/19    It was a pleasure taking care of you,    Your  Care Team

## 2024-08-15 NOTE — HOSPITAL COURSE
Brittny Gordon is a 69 y.o. female with a past history of presumed stage IV metastatic lung cancer presenting with severe back pain in the setting of acute T12 compression fracture secondary to bony mets. Ms. Gordon had severe L flank, hip pain, and back pain. Neurosurgery was consulted and evaluated Ms. Gordon who was determined to have no concern for cord compression and required no neurosurgical intervention. Additionally, she was also evaluated by radiation oncology. The MR imaging of the spine showed a new T12 compression fracture in addition to the existing L3 fracture. For the spinal metastasis palliative radiation to the spine was planned via CT sim and the first fraction was initiated on 8/19/2024.  Plan is for Ms. Gordon to receive 10 fractions. While here she also worked with supportive oncology to assist in her pain management and was started onto a new regimen which includes oxycodone, gabapentin, and flexeril as well as lorazepam prior to XRT. With this new pain management she additionally received a bowel regimen including miralax, senna, and bisacodyl.  She will finish her 10 fractions of palliative radiation to the spine and plan for follow up appointment with Dr. Chaudhary on 8/23 for further chemotherapy planning.    Follow up:   [ ]  Dr. Chaudhary appointment 8/23 - for chemotherapy discussion and planning  [ ]  Palliative radiation therapy to spine 10 fractions beginning 8/19

## 2024-08-15 NOTE — CARE PLAN
Problem: Pain - Adult  Goal: Verbalizes/displays adequate comfort level or baseline comfort level  Outcome: Progressing     Problem: Safety - Adult  Goal: Free from fall injury  Outcome: Progressing     Problem: Discharge Planning  Goal: Discharge to home or other facility with appropriate resources  Outcome: Progressing     Problem: Chronic Conditions and Co-morbidities  Goal: Patient's chronic conditions and co-morbidity symptoms are monitored and maintained or improved  Outcome: Progressing     Problem: Pain  Goal: Takes deep breaths with improved pain control throughout the shift  Outcome: Progressing  Goal: Turns in bed with improved pain control throughout the shift  Outcome: Progressing  Goal: Walks with improved pain control throughout the shift  Outcome: Progressing  Goal: Performs ADL's with improved pain control throughout shift  Outcome: Progressing  Goal: Participates in PT with improved pain control throughout the shift  Outcome: Progressing  Goal: Free from opioid side effects throughout the shift  Outcome: Progressing  Goal: Free from acute confusion related to pain meds throughout the shift  Outcome: Progressing       The patient's goals for the shift include  Pain Management    The clinical goals for the shift include Pain management    Over the shift, the patient did make progress toward the following goals.

## 2024-08-15 NOTE — PROGRESS NOTES
SUPPORTIVE AND PALLIATIVE ONCOLOGY INPATIENT FOLLOW-UP      SERVICE DATE: 08/15/24     SUBJECTIVE:  Interval Events:  Completed CT Sim for Rad Onc Imaging on ; plan is to begin 10 fractions of palliative Rt to thoracic and lumbar mets possibly as inpatient vs as outpatient at  [facility] pending dispo (see EMILIANA Magdaleno's note below under medical decision making/goals of care from : pt wishes to begin as inpatient and to come to [facility] for [place] and NOT [facility])  Reports pain as improved on current regimen today but found the IV 1 mg of Hydromorphone to not be effective as it did not last; requesting to have an oral medication to take prior to RT  Reports having slept well    Pain Assessment:  Location:  lower and mid back, left ribs  Duration: Constant  Characteristics:   Ratin   Descriptors: cramping, sharp, and stabbing   Aggravating: movement, walking, bending, and lying down    Relieving: Analgesics  , Positioning, and Modifying activity   Interference with Function: Very Much    Opioid Use  Past 24 h prn opioid use: Hydromorphone 0.4 mg IV x 1 doses = 0.4 mg = 5 OME  Oxycodone IR 10 mg PO x 1 doses = 10 mg = 12.5 OME  Total 24h OME use:  17.5 OME    Note: OME calculations based on equianalgesic table below. Please note this table is based on best available evidence but conversions are still approximate. These are NOT opioid DOSES for individual patient use; this is equivalency information.  Drug Parenteral Enteral   Morphine 10 25   Oxycodone N/A 20   Hydromorphone 2 5   Fentanyl 0.15 N/A   Tramadol N/A 120   Citation: Darnell RIVAS. Demystifying opioid conversion calculations: A guide for effective dosing, Second edition. [place]: [employer] of [employer]-System Pharmacists, 2018.    Symptom Assessment:  Nausea none  Constipation none LBM     Information obtained from: chart review, interview of patient, discussion with RN, and discussion with primary  team  ______________________________________________________________________        OBJECTIVE:    Lab Results   Component Value Date    WBC 10.3 08/15/2024    HGB 12.6 08/15/2024    HCT 37.8 08/15/2024    MCV 88 08/15/2024     08/15/2024      Lab Results   Component Value Date    GLUCOSE 95 08/15/2024    CALCIUM 8.7 08/15/2024     08/15/2024    K 3.6 08/15/2024    CO2 26 08/15/2024     08/15/2024    BUN 11 08/15/2024    CREATININE 0.56 08/15/2024     Lab Results   Component Value Date    ALT 18 08/12/2024    AST 20 08/12/2024    ALKPHOS 68 08/12/2024    BILITOT 0.6 08/12/2024     Estimated Creatinine Clearance: 101.5 mL/min (by C-G formula based on SCr of 0.56 mg/dL).     Scheduled medications  atorvastatin, 80 mg, oral, Daily  calcitonin (salmon), 1 spray, One Nostril, Daily  cholecalciferol, 1,000 Units, oral, Daily  cyclobenzaprine, 10 mg, oral, q8h  dexAMETHasone, 4 mg, oral, q AM  docusate sodium, 100 mg, oral, BID  enoxaparin, 40 mg, subcutaneous, q24h  famotidine, 20 mg, oral, BID  gabapentin, 300 mg, oral, Nightly  losartan, 100 mg, oral, Daily  metoprolol succinate XL, 100 mg, oral, Daily  polyethylene glycol, 17 g, oral, Daily  potassium phosphate, 21 mmol, intravenous, Once      Continuous medications     PRN medications  bisacodyl, 5 mg, Daily PRN  cetirizine, 10 mg, Daily PRN  HYDROmorphone, 0.4 mg, q4h PRN  ibuprofen, 400 mg, q4h PRN  methyl salicylate-menthol, 1 Application, TID PRN  ondansetron, 8 mg, q8h PRN  oxyCODONE, 10 mg, q4h PRN  oxyCODONE, 5 mg, q4h PRN      }     PHYSICAL EXAMINATION:    Vital Signs:   Vital signs reviewed  Visit Vitals  /67 (BP Location: Left arm, Patient Position: Lying)   Pulse 67   Temp 36.3 °C (97.3 °F) (Temporal)   Resp 17        0-10 (Numeric) Pain Score: 4       Physical Exam  Vitals reviewed.   Constitutional:       General: She is not in acute distress.     Comments: Mild discomfort with movement   HENT:      Head: Normocephalic.       Mouth/Throat:      Mouth: Mucous membranes are moist.   Eyes:      Conjunctiva/sclera: Conjunctivae normal.   Cardiovascular:      Pulses: Normal pulses.   Pulmonary:      Effort: Pulmonary effort is normal. No respiratory distress.   Abdominal:      General: There is no distension.   Musculoskeletal:      Right lower leg: No edema.      Left lower leg: No edema.   Skin:     General: Skin is warm and dry.      Capillary Refill: Capillary refill takes less than 2 seconds.   Neurological:      General: No focal deficit present.      Mental Status: She is alert and oriented to person, place, and time.   Psychiatric:         Mood and Affect: Mood normal.         Behavior: Behavior normal.         Thought Content: Thought content normal.         Cognition and Memory: Cognition normal.         Judgment: Judgment normal.          ASSESSMENT/PLAN:  Brittny Gordon is a 69 y.o. female diagnosed with likely metastatic NSCLC (lymph nodes, bone with pathologic fracture of L2 L3) s/p bronch/EBUS 8/5 not yet started on systemic therapy pending final pathology, plan for palliative RT to spine. PMH significant for HTN, HLD, osteporosis, AAA s/p repair, former smoker. Admitted 8/12/2024 from ED for further evaluation and management of worsening lumbar pain and new thoracic pain, imaging shows new pathologic fracture T12. Neurosurgery and Radiation Oncology consulted. Supportive and Palliative Oncology is consulted for pain management.      Cancer Related Pain:  Mid-lower back, rib pain related to metastatic NSCLC with bone mets, compression fractures L2 L3 T12  Pain is: cancer related pain  Type: somatic and neuropathic  Pain control: improved and well controlled  Home regimen:  Muscle Relaxers Flexeril TID and Tylenol with Codeine  Intolerances/previously tried: Acetaminophen  Personalized pain goal: 4  Total OME usage for the past 24 hours:  17.5  Recommend a 5 day course of Dexamethasone 4 mg PO qAM x 5 days 8/14-8/18  Continue  oxycodone 5 mg PO to q4h prn moderate pain  Continue Oxycodone IR 10 mg PO q4 h prn severe pain   Continue Dilaudid 0.4 mg IV to q4h prn breakthrough pain  Please order 15 mg Oxycodone po once daily prior to RT. Discussed needing to find PO opioid dose that will enable her to tolerate RT once discharged  Continue Flexeril 10 mg PO q8h scheduled  Continue gabapentin 300 mg PO qhs (started 8/13)  Recommend holding Ibuprofen while on dexamethasone due to risk for GI upset  Reports she has a TLS brace made recently and it was too painful to use due to sensitivity of her muscles and skin, discussed retrying once pain improves with RT  Continue to monitor pain scores and administer PRN medications as appropriate  Continue/initiate nonpharmacologic pain management strategies including ice/heat therapy, distraction techniques, deep breathing/relaxation techniques, calming music, and repositioning  Continue to monitor for signs of opioid efficacy (pain scores, improved functionality) and toxicity (pinpoint pupils, excess sedation/drowsiness/confusion, respiratory depression, etc.)     Nausea:  At risk for nausea with vomiting related to opioids   Home regimen:  Ondansetron   Denies  Continue Ondansetron 8 mg PO q8h prn nausea first line   Continue Famotidine 20 mg daily while on dexamethasone     Constipation  At risk for constipation related to opioids, currently not constipated  Usual bowel pattern: every day  Home regimen: Miralax 17 gm daily and Bisacodyl 5 mg daily  LBM 8/14  Monitor BM frequency, adjust regimen as needed  Goal to have BM without straining q48-72h  Continue Bisacodyl 5 mg PO daily prn  Continue Miralax 17 gm PO daily      Sleeping Difficulty:  Impaired sleep related to pain, improved with addition of gabapentin  Home regimen:  none  Pain management as above     Decreased appetite:  Appetite loss related to malignancy and pain  Weight loss 10 pounds  Home regimen:  none  Pain management as above      Medical Decision Making/Goals of Care/Advance Care Plannin2024 ELLY Magdaleno CAROLYN  Patient's current clinical condition, including diagnosis, prognosis, and management plan, and goals of care were discussed.   Life limiting disease: metastatic malignancy  Family: Supportive brother  Performance status: Major  limitations due to pain  Joys/meaning/strength: [place]  Understanding of health: Demonstrates good understanding of disease process, understands plan for RT, that her cancer is incurable  Information:Wants full disclosure  Prognosis: cancer treatment is palliative intent  Goals: symptom control and cancer directed therapy Pt understands her cancer is incurable, she is hoping to have her symptoms better managed so that she can participate in life and have as much normalcy as possible.   Worries and fears now and future: ongoing symptoms and having a poor quality of life    Minimum acceptable outcome/QOL:  independence in iADLs  Code status discussion:  Full     2024:  PT prefers to start RT urgently inpatient instead of waiting to be established at [facility], she prefers to come to main campus. She would also like to establish a pain regimen that allows her to tolerate RT.    Advance Directives  Existence of Advance Directives: Completed, pt provided copies today of HCPOA and Living Will to place in chart  Decision maker: HCPOA is brothlorenzo Love  Code Status: Full code     Supportive and Palliative Oncology encounter:  Spoke with patient at bedside  Emotional support provided  Coordination of care      Supportive Interventions: Interventions: SPO Spiritual Care: offered     Disposition:  Please  start the process of having prior authorization with meds to beds deliver medications to patient prior to discharge via [facility] pharmacy. Prescriptions will need to be sent 48-72 hours prior to discharge so that a prior authorization can be completed.      Med recs for discharge: Please provide  enough to last pt until outpatient appt with supp onc on 8/23/24  Oxy 5-10 mg q 4 hrs prn  Oxy 15 mgj po 1 hr prior to daily RT (total of 5 fractions)  Gabapentin 300 mg po q hs  Flexeril 10 mg po q 8 hrs (scheduled)  Dexamethasone 4 mg po bid (0800 and 1200 daily) STOP ON 8/18 AFTER LAST DOSE  Famotidine 20 mg po daily (while on Dex)   Bisacodyl 5 mg PO daily prn    Discharge date: unknown pending acute issues  Patient has an appointment with Outpatient Supportive Oncology scheduled for 8/23/2024 with Susan Leroy CNP    Signature and billing:  Thank you for allowing us to participate in the care of this patient. Recommendations will be communicated back to the consulting service by way of shared electronic medical record or face-to-face.    Medical complexity was high level due to due to complexity of problems, extensive data review, and high risk of management/treatment.      Data:   Diagnostic tests and information reviewed for today's visit:  Conversation with primary team, Conversation with RN, Most recent labs, Medications    Some elements copied from EMILIANA Magdaleno CNP's on 8/14/2024, the elements have been updated and all reflect current decision making from today, 08/15/24     Plan of Care discussed with: Provider, RN, Patient    Thank you for asking Supportive and Palliative Oncology to assist with care of this patient.  Recommendations will be communicated back to the consulting service by way of shared electronic medical record/secure chat/email or face-to-face.   We will continue to follow  Please contact us for additional questions or concerns.      SIGNATURE: PARVIN Enriquez   PAGER/CONTACT:  Contact information:  Supportive and Palliative Oncology  Monday-Friday 8 AM-5 PM  Epic Secure chat or pager [phone].  After hours and weekends:  pager [phone]

## 2024-08-16 LAB
ALBUMIN SERPL BCP-MCNC: 3.5 G/DL (ref 3.4–5)
ANION GAP SERPL CALC-SCNC: 14 MMOL/L (ref 10–20)
BASOPHILS # BLD AUTO: 0.04 X10*3/UL (ref 0–0.1)
BASOPHILS NFR BLD AUTO: 0.4 %
BUN SERPL-MCNC: 14 MG/DL (ref 6–23)
CALCIUM SERPL-MCNC: 8.4 MG/DL (ref 8.6–10.6)
CHLORIDE SERPL-SCNC: 106 MMOL/L (ref 98–107)
CO2 SERPL-SCNC: 25 MMOL/L (ref 21–32)
CREAT SERPL-MCNC: 0.67 MG/DL (ref 0.5–1.05)
EGFRCR SERPLBLD CKD-EPI 2021: >90 ML/MIN/1.73M*2
ELECTRONICALLY SIGNED BY: NORMAL
EOSINOPHIL # BLD AUTO: 0.14 X10*3/UL (ref 0–0.7)
EOSINOPHIL NFR BLD AUTO: 1.3 %
ERYTHROCYTE [DISTWIDTH] IN BLOOD BY AUTOMATED COUNT: 12.8 % (ref 11.5–14.5)
FOCUSED SOLID TUMOR DNA/RNA RESULTS: NORMAL
GLUCOSE SERPL-MCNC: 94 MG/DL (ref 74–99)
HCT VFR BLD AUTO: 38.3 % (ref 36–46)
HGB BLD-MCNC: 12.5 G/DL (ref 12–16)
IMM GRANULOCYTES # BLD AUTO: 0.03 X10*3/UL (ref 0–0.7)
IMM GRANULOCYTES NFR BLD AUTO: 0.3 % (ref 0–0.9)
LYMPHOCYTES # BLD AUTO: 2.77 X10*3/UL (ref 1.2–4.8)
LYMPHOCYTES NFR BLD AUTO: 24.9 %
MAGNESIUM SERPL-MCNC: 2.01 MG/DL (ref 1.6–2.4)
MCH RBC QN AUTO: 29.1 PG (ref 26–34)
MCHC RBC AUTO-ENTMCNC: 32.6 G/DL (ref 32–36)
MCV RBC AUTO: 89 FL (ref 80–100)
MONOCYTES # BLD AUTO: 1.08 X10*3/UL (ref 0.1–1)
MONOCYTES NFR BLD AUTO: 9.7 %
NEUTROPHILS # BLD AUTO: 7.08 X10*3/UL (ref 1.2–7.7)
NEUTROPHILS NFR BLD AUTO: 63.4 %
NRBC BLD-RTO: 0 /100 WBCS (ref 0–0)
PHOSPHATE SERPL-MCNC: 2.9 MG/DL (ref 2.5–4.9)
PLATELET # BLD AUTO: 203 X10*3/UL (ref 150–450)
POTASSIUM SERPL-SCNC: 3.8 MMOL/L (ref 3.5–5.3)
RBC # BLD AUTO: 4.29 X10*6/UL (ref 4–5.2)
SODIUM SERPL-SCNC: 141 MMOL/L (ref 136–145)
WBC # BLD AUTO: 11.1 X10*3/UL (ref 4.4–11.3)

## 2024-08-16 PROCEDURE — 2500000004 HC RX 250 GENERAL PHARMACY W/ HCPCS (ALT 636 FOR OP/ED)

## 2024-08-16 PROCEDURE — 80069 RENAL FUNCTION PANEL: CPT

## 2024-08-16 PROCEDURE — 99233 SBSQ HOSP IP/OBS HIGH 50: CPT | Performed by: NURSE PRACTITIONER

## 2024-08-16 PROCEDURE — 83735 ASSAY OF MAGNESIUM: CPT

## 2024-08-16 PROCEDURE — 2500000001 HC RX 250 WO HCPCS SELF ADMINISTERED DRUGS (ALT 637 FOR MEDICARE OP)

## 2024-08-16 PROCEDURE — 99231 SBSQ HOSP IP/OBS SF/LOW 25: CPT | Performed by: PHYSICIAN ASSISTANT

## 2024-08-16 PROCEDURE — 99239 HOSP IP/OBS DSCHRG MGMT >30: CPT | Performed by: INTERNAL MEDICINE

## 2024-08-16 PROCEDURE — 2500000005 HC RX 250 GENERAL PHARMACY W/O HCPCS

## 2024-08-16 PROCEDURE — 85025 COMPLETE CBC W/AUTO DIFF WBC: CPT

## 2024-08-16 PROCEDURE — 36415 COLL VENOUS BLD VENIPUNCTURE: CPT

## 2024-08-16 PROCEDURE — 1170000001 HC PRIVATE ONCOLOGY ROOM DAILY

## 2024-08-16 RX ORDER — SENNOSIDES 8.6 MG/1
1 TABLET ORAL DAILY PRN
Status: DISCONTINUED | OUTPATIENT
Start: 2024-08-16 | End: 2024-08-19 | Stop reason: HOSPADM

## 2024-08-16 RX ORDER — LIDOCAINE 560 MG/1
2 PATCH PERCUTANEOUS; TOPICAL; TRANSDERMAL DAILY
Status: DISCONTINUED | OUTPATIENT
Start: 2024-08-16 | End: 2024-08-19 | Stop reason: HOSPADM

## 2024-08-16 ASSESSMENT — COGNITIVE AND FUNCTIONAL STATUS - GENERAL
MOBILITY SCORE: 24
DAILY ACTIVITIY SCORE: 24
MOBILITY SCORE: 24
DAILY ACTIVITIY SCORE: 24

## 2024-08-16 ASSESSMENT — PAIN DESCRIPTION - LOCATION
LOCATION: BACK

## 2024-08-16 ASSESSMENT — PAIN - FUNCTIONAL ASSESSMENT
PAIN_FUNCTIONAL_ASSESSMENT: 0-10

## 2024-08-16 ASSESSMENT — PAIN DESCRIPTION - DESCRIPTORS: DESCRIPTORS: SHARP;STABBING

## 2024-08-16 ASSESSMENT — PAIN DESCRIPTION - ORIENTATION
ORIENTATION: MID;LOWER
ORIENTATION: UPPER;LOWER
ORIENTATION: MID;LOWER

## 2024-08-16 ASSESSMENT — PAIN SCALES - GENERAL
PAINLEVEL_OUTOF10: 7
PAINLEVEL_OUTOF10: 5 - MODERATE PAIN
PAINLEVEL_OUTOF10: 7
PAINLEVEL_OUTOF10: 6
PAINLEVEL_OUTOF10: 7
PAINLEVEL_OUTOF10: 8
PAINLEVEL_OUTOF10: 8
PAINLEVEL_OUTOF10: 9

## 2024-08-16 NOTE — CARE PLAN
The clinical goals for the shift include pain to be <7/10 by end of shift      Problem: Pain - Adult  Goal: Verbalizes/displays adequate comfort level or baseline comfort level  Outcome: Progressing     Problem: Safety - Adult  Goal: Free from fall injury  Outcome: Progressing     Problem: Discharge Planning  Goal: Discharge to home or other facility with appropriate resources  Outcome: Progressing     Problem: Chronic Conditions and Co-morbidities  Goal: Patient's chronic conditions and co-morbidity symptoms are monitored and maintained or improved  Outcome: Progressing     Problem: Pain  Goal: Takes deep breaths with improved pain control throughout the shift  Outcome: Progressing  Goal: Turns in bed with improved pain control throughout the shift  Outcome: Progressing  Goal: Walks with improved pain control throughout the shift  Outcome: Progressing  Goal: Performs ADL's with improved pain control throughout shift  Outcome: Progressing  Goal: Participates in PT with improved pain control throughout the shift  Outcome: Progressing  Goal: Free from opioid side effects throughout the shift  Outcome: Progressing  Goal: Free from acute confusion related to pain meds throughout the shift  Outcome: Progressing

## 2024-08-16 NOTE — PROGRESS NOTES
"Daily Progress Note  Brittny Gordon is a 69 y.o. female on day 3 of admission presenting with Compression fracture of body of thoracic vertebra (Multi).    Subjective   Overnight: no acute events  Ms. Gordon notes her pain is under better control. She describes her most relief is from the oxycodone 10 mg and that the dilaudid she does not prefer as it affects her mental state and is not long lasting. She is anxious about starting radiation and is awaiting her first fraction. She is having bowel movements and appetite is appropriate.      Objective     Physical Exam  Constitutional:       Appearance: Normal appearance.   HENT:      Mouth/Throat:      Mouth: Mucous membranes are moist.      Pharynx: Oropharynx is clear.   Eyes:      Pupils: Pupils are equal, round, and reactive to light.   Cardiovascular:      Rate and Rhythm: Normal rate and regular rhythm.      Heart sounds: Normal heart sounds.   Pulmonary:      Effort: Pulmonary effort is normal. No respiratory distress.      Breath sounds: Normal breath sounds.   Abdominal:      General: Abdomen is flat. There is no distension.      Palpations: Abdomen is soft.      Tenderness: There is no abdominal tenderness.   Musculoskeletal:      Right lower leg: No edema.      Left lower leg: No edema.   Skin:     General: Skin is warm and dry.   Neurological:      General: No focal deficit present.      Mental Status: She is alert.      Sensory: No sensory deficit.      Motor: No weakness.   Psychiatric:         Mood and Affect: Mood normal.         Behavior: Behavior normal.       Last Recorded Vitals  Blood pressure 113/67, pulse 77, temperature 37.2 °C (99 °F), temperature source Temporal, resp. rate 18, height 1.702 m (5' 7\"), weight 74.8 kg (165 lb), SpO2 94%.  Intake/Output last 3 Shifts:  I/O last 3 completed shifts:  In: 1317 (17.6 mL/kg) [P.O.:1060; IV Piggyback:257]  Out: 0 (0 mL/kg)   Weight: 74.8 kg     Relevant Results  Scheduled medications  atorvastatin, " 80 mg, oral, Daily  calcitonin (salmon), 1 spray, One Nostril, Daily  cholecalciferol, 1,000 Units, oral, Daily  cyclobenzaprine, 10 mg, oral, q8h  dexAMETHasone, 4 mg, oral, q AM  docusate sodium, 100 mg, oral, BID  enoxaparin, 40 mg, subcutaneous, q24h  famotidine, 20 mg, oral, BID  gabapentin, 300 mg, oral, Nightly  lidocaine, 2 patch, transdermal, Daily  losartan, 100 mg, oral, Daily  metoprolol succinate XL, 100 mg, oral, Daily  polyethylene glycol, 17 g, oral, Daily      Continuous medications     PRN medications  PRN medications: bisacodyl, cetirizine, HYDROmorphone, ibuprofen, methyl salicylate-menthol, ondansetron, oxyCODONE, oxyCODONE, sennosides  Results for orders placed or performed during the hospital encounter of 08/12/24 (from the past 24 hour(s))   Renal function panel   Result Value Ref Range    Glucose 94 74 - 99 mg/dL    Sodium 141 136 - 145 mmol/L    Potassium 3.8 3.5 - 5.3 mmol/L    Chloride 106 98 - 107 mmol/L    Bicarbonate 25 21 - 32 mmol/L    Anion Gap 14 10 - 20 mmol/L    Urea Nitrogen 14 6 - 23 mg/dL    Creatinine 0.67 0.50 - 1.05 mg/dL    eGFR >90 >60 mL/min/1.73m*2    Calcium 8.4 (L) 8.6 - 10.6 mg/dL    Phosphorus 2.9 2.5 - 4.9 mg/dL    Albumin 3.5 3.4 - 5.0 g/dL   CBC and Auto Differential   Result Value Ref Range    WBC 11.1 4.4 - 11.3 x10*3/uL    nRBC 0.0 0.0 - 0.0 /100 WBCs    RBC 4.29 4.00 - 5.20 x10*6/uL    Hemoglobin 12.5 12.0 - 16.0 g/dL    Hematocrit 38.3 36.0 - 46.0 %    MCV 89 80 - 100 fL    MCH 29.1 26.0 - 34.0 pg    MCHC 32.6 32.0 - 36.0 g/dL    RDW 12.8 11.5 - 14.5 %    Platelets 203 150 - 450 x10*3/uL    Neutrophils % 63.4 40.0 - 80.0 %    Immature Granulocytes %, Automated 0.3 0.0 - 0.9 %    Lymphocytes % 24.9 13.0 - 44.0 %    Monocytes % 9.7 2.0 - 10.0 %    Eosinophils % 1.3 0.0 - 6.0 %    Basophils % 0.4 0.0 - 2.0 %    Neutrophils Absolute 7.08 1.20 - 7.70 x10*3/uL    Immature Granulocytes Absolute, Automated 0.03 0.00 - 0.70 x10*3/uL    Lymphocytes Absolute 2.77 1.20  - 4.80 x10*3/uL    Monocytes Absolute 1.08 (H) 0.10 - 1.00 x10*3/uL    Eosinophils Absolute 0.14 0.00 - 0.70 x10*3/uL    Basophils Absolute 0.04 0.00 - 0.10 x10*3/uL   Magnesium   Result Value Ref Range    Magnesium 2.01 1.60 - 2.40 mg/dL     MR spine complete cancer cord compression limited w and wo iv contrast    Result Date: 8/12/2024  1.  Acute to early subacute pathologic compression fracture of T12 vertebral body, with upwards of 50% height loss and slight bony retropulsion posterior into the spinal canal with some effacement of the anterior subarachnoid space, but without evidence of significant spinal canal stenosis. There appears to be mild increase in the height loss of T12 along the superior endplate compared to prior CT of the lumbar spine on 07/31/2024 and CT chest dated 08/05/2024. Likely late subacute or early chronic pathologic compression fractures also present along the superior endplate of L3 with mild associated STIR hyperintense edema. 2. Multilevel degenerative changes of the cervical, thoracic and lumbar spine with mild spinal canal narrowing present at the cervical spine due to combination of disc osteophyte complexes, ligamentum flavum thickening hypertrophic facet changes, without evidence of high-grade spinal canal stenosis or cord compression. Varying degrees of neural foraminal stenosis are detailed above. 3. Several additional pathologic metastatic lesions are present in the thoracic and lumbar spine and in the pelvis, some of which are described above. 4. T2 hyperintense, enhancing lesion is present in the lungs, posterior to the aortic arch, corresponding to pulmonary nodule is seen on previous CT of the chest dated 08/05/2024. 5. Bilateral pleural effusions, slightly greater on the right.   MACRO: None   Signed by: Perlita Williamson 8/12/2024 10:17 PM Dictation workstation:   [id number]    Bronchoscopy Tier 3; Navigational, w EBUS, w Transbronch Bx    Result Date: 8/5/2024    Guided bronchoscopy with radial probe EBUS to localize the peripheral RUL lesion was performed. A transbronchial needle aspiration was performed in the RUL. A transbronchial brushing was performed in the RUL. Rapid On-Site Evaluation (JONO): Preliminary cytology of the lesion in the RUL was suggestive of atypical cell suspicious for malignancy (final results are pending). A transbronchial biopsy was performed in the RUL. Linear EBUS was used to evaluate the lymph node station 4R, 10R, 11Rs and EBUS-TBNA was performed. Rapid On-Site Evaluation (JONO): Preliminary cytology was suggestive of lymphoid tissue in node level 4R, 10R, 11Rs (final results are pending). RECOMMENDATION: The patient will be observed post-procedure, until all discharge criteria are met. Await cytology, histopathology results. Follow-up with referring physician.    CT chest wo IV contrast for [facility] planning    Result Date: 8/5/2024  1.  Again seen spiculated right upper lobe nodule measuring 1.1 x 0.8 cm corresponding to hypermetabolic lesion seen on recent PET-CT and concerning for primary lung malignancy. Consider correlation with tissue sampling. 2. Dominant posteromedial left upper lobe nodule measuring 1.4 x 1.4 cm, stable compared to multiple priors. This demonstrate mild metabolic activity on PET-CT. Continued attention on follow-up study is recommended. 3. Additional chronic findings as described above   MACRO: None   Signed by: Tami Delatorre 8/5/2024 8:21 AM Dictation workstation:   [id number]    CT abdomen pelvis w IV contrast    Result Date: 7/31/2024  2.6 cm cystic possible metastatic implant in the left lower quadrant. Small soft tissue implant in the right lower quadrant unchanged from PET-CT 07/11/2024.   Slight heterogeneity of both kidneys may be related to phase of contrast however pyelonephritis in the proper clinical setting could have a similar appearance.   Too small to characterize focal hypodensities in  both kidneys and the liver.   Sclerotic presumed metastatic lesions in both iliac bones. Multilevel lumbar vertebral compression deformities and heterogeneity including areas of sclerosis similar to 07/02/2024. More complete evaluation for metastatic disease and pathologic fracture using MRI as clinically warranted.   Nonspecific small subcutaneous nodule in the left lower back.   Additional findings as described above.   MACRO: None.   Signed by: Ruthie Thacker 7/31/2024 2:51 PM Dictation workstation:   [id number]    CT lumbar spine wo IV contrast    Result Date: 7/31/2024  2.6 cm cystic possible metastatic implant in the left lower quadrant. Small soft tissue implant in the right lower quadrant unchanged from PET-CT 07/11/2024.   Slight heterogeneity of both kidneys may be related to phase of contrast however pyelonephritis in the proper clinical setting could have a similar appearance.   Too small to characterize focal hypodensities in both kidneys and the liver.   Sclerotic presumed metastatic lesions in both iliac bones. Multilevel lumbar vertebral compression deformities and heterogeneity including areas of sclerosis similar to 07/02/2024. More complete evaluation for metastatic disease and pathologic fracture using MRI as clinically warranted.   Nonspecific small subcutaneous nodule in the left lower back.   Additional findings as described above.   MACRO: None.   Signed by: Ruthie Thacker 7/31/2024 2:51 PM Dictation workstation:   [id number]    CT abdomen pelvis wo IV contrast    Result Date: 7/29/2024  IMPRESSION: No acute intra-abdominal findings. No renal or ureteral calculi. Diverticulosis without evidence of diverticulitis. The cecum extends into the right midabdomen and is moderately distended with air and fecal material but no evidence of obstruction. Multiple lower thoracic and lumbar spine compression deformities with associated Schmorl's node formation. The L3 compression deformity is probably  subacute. There is no retropulsion. Nonspecific sclerotic lesions within the posterior ilium bilaterally possibly areas of bone infarction. If there is clinical concern for metastatic disease consider MRI examination for further evaluation.. : [facility]   Transcribe Date/Time: Jul 29 2024  8:08A Dictated by : KATIA AMBRIZ MD This examination was interpreted and the report reviewed and electronically signed by: KATIA AMBRIZ MD on Jul 29 2024  8:28AM  EST    XR lumbar spine 2-3 views    Result Date: 7/29/2024  IMPRESSION: See result. : [facility]   Transcribe Date/Time: Jul 29 2024  4:44A Dictated by : FOREIGN CUMMINGS MD This examination was interpreted and the report reviewed and electronically signed by: FOREIGN CUMMINGS MD on Jul 29 2024  4:58AM  EST    MR brain w and wo IV contrast    Result Date: 7/19/2024  7 mm avidly enhancing extra-axial appearing nodule along the left pontine medullary junction adjacent to the left vertebral artery. 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 Clinical correlation and attention on follow-up suggested. No additional abnormal enhancement identified.   Nonspecific white matter changes, remote lacunar infarcts and parenchymal volume loss.   MACRO: None   Signed by: Jacki Gray 7/19/2024 9:41 PM Dictation workstation:   [id number]           Assessment/Plan   Assessment & Plan  Compression fracture of body of thoracic vertebra (Multi)    Brittny Gordon is a 69 y.o. female with a past history of presumed stage IV metastatic lung cancer presenting with severe back pain in the setting of acute T12 compression fracture secondary to bony mets.      Updates 8/16  - Pain is best managed  by oxycodone 10 mg  - Appreciate supportive oncology recommendations and managing pain induced by laying flat for radiation  - Appreciate radiation oncology input and assistance with starting palliative radiation   - Plan for radiation  therapy on monday, work on pain regimen in meantime: including lidocaine patches  - Bowel regimen: senna      #T12 compression fracture  ::In the setting of bony mets from lung cancer  ::PET scan on 7/11/2024 showing multiple avid bone lesions throughout axial and appendicular skeletons  ::Did not find relief from tramadol, did not tolerate acetaminophen-codeine or acetaminophen-hydrocodone due to nausea  Plan:  -oxycodone, flexeril, Dilaudid PRN  -Calcitonin 200mg nasal spray daily  -Rad onc, supportive onc, and NSGY consulted  -Per Rad Onc note on 7/25, if pain persists despite medical management and radiation therapy, structural relief with the form of kyphoplasty/vertebroplasty may benefit given vertebral compression  -On ibandronate 150mg monthly, holding here  -Bowel regimen with Miralax, docusate, bisacodyl PRN. Naloxegel is non-formulary, holding here     #NSCLC, stage IV  ::RUL primary with bony metastases seen on PET scan  ::Was scheduled for palliative RT for spine lesions and referral for clinical trial  ::Still awaiting full path results when last seen  ::Follows with Dr. Camila Chaudhary as an outpatient  Plan:  -Rad Onc consulted, plan for CT sim 8/13  -Minimal pleural effusions seen on MRI, patient asymptomatic, no acute interventions     #Hypertension  #Hyperlipidemia  #Ascending aortic aneurysm s/p repair  ::Open repair in 2012, has not needed redo operations  -C/w losartan 100mg daily, Toprol 100mg daily  -C/w atorvastatin 80mg daily     Code status: FULL CODE  DVT ppx: Lovenox  GI ppx: None  Oxygen: RA  Abx: None  Access: PIV  NOK: Lance (brother) [phone]

## 2024-08-16 NOTE — PROGRESS NOTES
Brittny Gordon is a 69 y.o. female on day 3 of admission presenting with Compression fracture of body of thoracic vertebra (Multi).      Subjective   Pain fairly managed, improved with movement, comfortable at rest.         Objective     Last Recorded Vitals  /67 (BP Location: Left arm, Patient Position: Lying)   Pulse 77   Temp 37.2 °C (99 °F) (Temporal)   Resp 18   Wt 74.8 kg (165 lb)   SpO2 94%     Image Results      Physical Exam  General: awake, alert, resting comfortably, well developed and well nourished in appearance  HEENT: pupils equal and round, no scleral icterus  Pulmonary: Breathing comfortably at rest   Neuro: A&O x 3, cranial nerves II through XII grossly intact, sensation and strength intact  Psych: Normal affect     Relevant Results  Lab Results   Component Value Date    WBC 11.1 08/16/2024    HGB 12.5 08/16/2024    HCT 38.3 08/16/2024    MCV 89 08/16/2024     08/16/2024     Lab Results   Component Value Date    GLUCOSE 94 08/16/2024    CALCIUM 8.4 (L) 08/16/2024     08/16/2024    K 3.8 08/16/2024    CO2 25 08/16/2024     08/16/2024    BUN 14 08/16/2024    CREATININE 0.67 08/16/2024                Assessment/Plan   This is a 69-year-old female recently diagnosed with metastatic NSCLC, complicated by painful spinal metastases, who has been admitted for pain management optimization.       CT simulation for palliative radiotherapy was completed on 8/13.    The patient experienced a significant amount of discomfort during positioning for treatment planning.  Overall, her pain has gradually improved with medication modifications.  She remains neurologically intact.          PLAN:      -RT to begin Monday, 8[place]19, [place] [facility] [place]charge prn pain control               I spent __25___ minutes with this patient. Greater than 50% of this time was spent in the counseling and/or coordination of care of this patient.         Sekou Wheatley PA-C

## 2024-08-16 NOTE — PROGRESS NOTES
SUPPORTIVE AND PALLIATIVE ONCOLOGY INPATIENT FOLLOW-UP      SERVICE DATE: 24     SUBJECTIVE:  Interval Events:  Has not yet started RT, plan for Monday then discharge. Oxycodone 10 mg provides adequate relief for most of the time but she doesn't believe that it will be enough to get through RT and interested in trying 15 mg prior. She reports that oxycodone 5 mg is not effective  Reports significant anxiety re: RT, we discussed adding lorazepam daily prior to RT as needed  Requesting addition of senna. Discuss stopping colace but she would like to continue  Would like to continue to avoid Tylenol    Pain Assessment:  Location:  lower and mid back, left ribs  Duration: Constant  Characteristics:   Ratin   Descriptors: cramping, sharp, and stabbing   Aggravating: movement, walking, bending, and lying down    Relieving: Analgesics  , Positioning, and Modifying activity   Interference with Function: Very Much    Opioid Use  Past 24 h prn opioid use: Oxycodone IR 10 mg PO x 2 doses = 20 mg = 25 OME  Total 24h OME use:  25 OME    Note: OME calculations based on equianalgesic table below. Please note this table is based on best available evidence but conversions are still approximate. These are NOT opioid DOSES for individual patient use; this is equivalency information.  Drug Parenteral Enteral   Morphine 10 25   Oxycodone N/A 20   Hydromorphone 2 5   Fentanyl 0.15 N/A   Tramadol N/A 120   Citation: Darnell RIVAS. Demystifying opioid conversion calculations: A guide for effective dosing, Second edition. [place]: [employer]System Pharmac[employer], 2018.    Symptom Assessment:  Nausea none  Constipation none LBM /15    Information obtained from: chart review, interview of patient, discussion with RN, and discussion with primary team  ______________________________________________________________________        OBJECTIVE:    Lab Results   Component Value Date    WBC 11.1 2024    HGB 12.5  08/16/2024    HCT 38.3 08/16/2024    MCV 89 08/16/2024     08/16/2024      Lab Results   Component Value Date    GLUCOSE 94 08/16/2024    CALCIUM 8.4 (L) 08/16/2024     08/16/2024    K 3.8 08/16/2024    CO2 25 08/16/2024     08/16/2024    BUN 14 08/16/2024    CREATININE 0.67 08/16/2024     Lab Results   Component Value Date    ALT 18 08/12/2024    AST 20 08/12/2024    ALKPHOS 68 08/12/2024    BILITOT 0.6 08/12/2024     Estimated Creatinine Clearance: 84.9 mL/min (by C-G formula based on SCr of 0.67 mg/dL).     Scheduled medications  atorvastatin, 80 mg, oral, Daily  calcitonin (salmon), 1 spray, One Nostril, Daily  cholecalciferol, 1,000 Units, oral, Daily  cyclobenzaprine, 10 mg, oral, q8h  dexAMETHasone, 4 mg, oral, q AM  docusate sodium, 100 mg, oral, BID  enoxaparin, 40 mg, subcutaneous, q24h  famotidine, 20 mg, oral, BID  gabapentin, 300 mg, oral, Nightly  losartan, 100 mg, oral, Daily  metoprolol succinate XL, 100 mg, oral, Daily  polyethylene glycol, 17 g, oral, Daily      Continuous medications     PRN medications  bisacodyl, 5 mg, Daily PRN  cetirizine, 10 mg, Daily PRN  HYDROmorphone, 0.4 mg, q4h PRN  ibuprofen, 400 mg, q4h PRN  methyl salicylate-menthol, 1 Application, TID PRN  ondansetron, 8 mg, q8h PRN  oxyCODONE, 10 mg, q4h PRN  oxyCODONE, 5 mg, q4h PRN      }     PHYSICAL EXAMINATION:    Vital Signs:   Vital signs reviewed  Visit Vitals  /80 (BP Location: Left arm, Patient Position: Lying)   Pulse 68   Temp 36 °C (96.8 °F) (Temporal)   Resp 18        0-10 (Numeric) Pain Score: 5 - Moderate pain       Physical Exam  Vitals reviewed.   Constitutional:       General: She is not in acute distress.     Comments: Mild discomfort with movement   HENT:      Head: Normocephalic.      Mouth/Throat:      Mouth: Mucous membranes are moist.   Pulmonary:      Effort: Pulmonary effort is normal. No respiratory distress.   Abdominal:      General: There is no distension.   Musculoskeletal:       Right lower leg: No edema.      Left lower leg: No edema.   Skin:     Capillary Refill: Capillary refill takes less than 2 seconds.   Neurological:      General: No focal deficit present.      Mental Status: She is alert and oriented to person, place, and time.   Psychiatric:         Mood and Affect: Mood normal.         Behavior: Behavior normal.         Thought Content: Thought content normal.         Cognition and Memory: Cognition normal.         Judgment: Judgment normal.          ASSESSMENT/PLAN:  Brittny Gordon is a 69 y.o. female diagnosed with likely metastatic NSCLC (lymph nodes, bone with pathologic fracture of L2 L3) s/p bronch/EBUS 8/5 not yet started on systemic therapy pending final pathology, plan for palliative RT to spine. PMH significant for HTN, HLD, osteporosis, AAA s/p repair, former smoker. Admitted 8/12/2024 from ED for further evaluation and management of worsening lumbar pain and new thoracic pain, imaging shows new pathologic fracture T12. Neurosurgery and Radiation Oncology consulted. Supportive and Palliative Oncology is consulted for pain management.      Cancer Related Pain:  Mid-lower back, rib pain related to metastatic NSCLC with bone mets, compression fractures L2 L3 T12  Pain is: cancer related pain  Type: somatic and neuropathic  Pain control: improved and well controlled  Home regimen:  Muscle Relaxers Flexeril TID and Tylenol with Codeine  Intolerances/previously tried: Acetaminophen  Personalized pain goal: 4  Total OME usage for the past 24 hours:  17.5  Recommend a 5 day course of Dexamethasone 4 mg PO qAM x 5 days 8/14-8/18, can discuss with Rad Onc if more needed during completion of RT  Stop Oxycodone 5 mg--not taking, not effective  Change Oxycodone 10 mg to q4h prn moderate or severe pain  Continue Dilaudid 0.4 mg IV to q4h prn breakthrough pain  Please order 15 mg Oxycodone po once daily prior to RT. Discussed needing to find PO opioid dose that will enable her to  tolerate RT once discharged  Continue Flexeril 10 mg PO q8h scheduled  Continue gabapentin 300 mg PO qhs (started )  Recommend holding Ibuprofen while on dexamethasone due to risk for GI upset  Reports she has a TLS brace made recently and it was too painful to use due to sensitivity of her muscles and skin, discussed retrying once pain improves with RT  Continue to monitor pain scores and administer PRN medications as appropriate  Continue/initiate nonpharmacologic pain management strategies including ice/heat therapy, distraction techniques, deep breathing/relaxation techniques, calming music, and repositioning  Continue to monitor for signs of opioid efficacy (pain scores, improved functionality) and toxicity (pinpoint pupils, excess sedation/drowsiness/confusion, respiratory depression, etc.)    Anxiety:  Related to RT, health concerns, pain  Start lorazepam 0.5 mg PO daily prior to RT PRN     Nausea:  At risk for nausea with vomiting related to opioids   Home regimen:  Ondansetron   Denies  Continue Ondansetron 8 mg PO q8h prn nausea first line   Continue Famotidine 20 mg daily while on dexamethasone     Constipation  At risk for constipation related to opioids, currently not constipated  Usual bowel pattern: every day  Home regimen: Miralax 17 gm daily and Bisacodyl 5 mg daily  LBM 8/15  Monitor BM frequency, adjust regimen as needed  Goal to have BM without straining q48-72h  Continue Bisacodyl 5 mg PO daily prn  Continue Miralax 17 gm PO daily   Start Senna 2 tabs BID prn constipation  Pt would like to continue colace     Sleeping Difficulty:  Impaired sleep related to pain, improved with addition of gabapentin  Home regimen:  none  Pain management as above     Decreased appetite:  Appetite loss related to malignancy and pain  Weight loss 10 pounds  Home regimen:  none  Pain management as above     Medical Decision Making/Goals of Care/Advance Care Plannin2024 ELLY Magdaleno CNP  Patient's current  clinical condition, including diagnosis, prognosis, and management plan, and goals of care were discussed.   Life limiting disease: metastatic malignancy  Family: Supportive brother  Performance status: Major  limitations due to pain  Joys/meaning/strength: [place]  Understanding of health: Demonstrates good understanding of disease process, understands plan for RT, that her cancer is incurable  Information:Wants full disclosure  Prognosis: cancer treatment is palliative intent  Goals: symptom control and cancer directed therapy Pt understands her cancer is incurable, she is hoping to have her symptoms better managed so that she can participate in life and have as much normalcy as possible.   Worries and fears now and future: ongoing symptoms and having a poor quality of life    Minimum acceptable outcome/QOL:  independence in iADLs  Code status discussion:  Full     8/14/2024:  PT prefers to start RT urgently inpatient instead of waiting to be established at [facility], she prefers to come to main campus. She would also like to establish a pain regimen that allows her to tolerate RT.    Advance Directives  Existence of Advance Directives: Completed, pt provided copies today of HCPOA and Living Will to place in chart  Decision maker: TOMMIE is brother Lance Love  Code Status: Full code     Supportive and Palliative Oncology encounter:  Spoke with patient at bedside  Emotional support provided  Coordination of care      Supportive Interventions: Interventions: SPO Spiritual Care: offered     Disposition:  Please  start the process of having prior authorization with meds to beds deliver medications to patient prior to discharge via [facility] pharmacy. Prescriptions will need to be sent 48-72 hours prior to discharge so that a prior authorization can be completed.      Med recs for discharge:   Oxycodone 10 mg tablets take 10-15 (1-1.5 tablets) mg q 4 hrs prn #90 for 10 days  May take Oxycodone 15 mg po 1 hr  prior to daily RT (total of 10 fractions)  Gabapentin 300 mg po q hs #30 for 30 days  Lorazepam 0.5 mg PO daily prior to RT PRN #10 for 10 days  Flexeril 10 mg po q 8 hrs prn #90 for 30 days  Dexamethasone 4 mg qAM STOP ON 8/18 AFTER LAST DOSE  Famotidine 20 mg po daily (while on Dex)  Bisacodyl 5 mg PO daily prn  Senna 2 tablets twice daily as needed  Miralax 17 gm PO daily     Discharge date: unknown pending acute issues  Patient has an appointment with Outpatient Supportive Oncology scheduled for 8/23/2024 with Susan Leroy CNP    Signature and billing:  Thank you for allowing us to participate in the care of this patient. Recommendations will be communicated back to the consulting service by way of shared electronic medical record or face-to-face.    Medical complexity was high level due to due to complexity of problems, extensive data review, and high risk of management/treatment.      Data:   Diagnostic tests and information reviewed for today's visit:  Conversation with primary team, Conversation with RN, Most recent labs, Medications    Some elements copied from NEERU Thomson's CNP on 8/15/2024, the elements have been updated and all reflect current decision making from today, 08/16/24     Plan of Care discussed with: Provider, Patient    Thank you for asking Supportive and Palliative Oncology to assist with care of this patient.  Recommendations will be communicated back to the consulting service by way of shared electronic medical record/secure chat/email or face-to-face.   We will continue to follow  Please contact us for additional questions or concerns.      SIGNATURE: BOWEN Martinez-CNP, DNP   PAGER/CONTACT:  Contact information:  Supportive and Palliative Oncology  Monday-Friday 8 AM-5 PM  Epic Secure chat or pager [phone].  After hours and weekends:  pager [phone]

## 2024-08-16 NOTE — CARE PLAN
Problem: Pain - Adult  Goal: Verbalizes/displays adequate comfort level or baseline comfort level  Outcome: Progressing     Problem: Safety - Adult  Goal: Free from fall injury  Outcome: Progressing     Problem: Discharge Planning  Goal: Discharge to home or other facility with appropriate resources  Outcome: Progressing     Problem: Chronic Conditions and Co-morbidities  Goal: Patient's chronic conditions and co-morbidity symptoms are monitored and maintained or improved  Outcome: Progressing     Problem: Pain  Goal: Takes deep breaths with improved pain control throughout the shift  Outcome: Progressing  Goal: Turns in bed with improved pain control throughout the shift  Outcome: Progressing  Goal: Walks with improved pain control throughout the shift  Outcome: Progressing  Goal: Performs ADL's with improved pain control throughout shift  Outcome: Progressing  Goal: Participates in PT with improved pain control throughout the shift  Outcome: Progressing  Goal: Free from opioid side effects throughout the shift  Outcome: Progressing  Goal: Free from acute confusion related to pain meds throughout the shift  Outcome: Progressing   The patient's goals for the shift include  pain <7/10 by end of shift    The clinical goals for the shift include pt to rate pain <7/10 by end of shift

## 2024-08-17 LAB
ALBUMIN SERPL BCP-MCNC: 3.6 G/DL (ref 3.4–5)
ANION GAP SERPL CALC-SCNC: 13 MMOL/L (ref 10–20)
BASOPHILS # BLD AUTO: 0.03 X10*3/UL (ref 0–0.1)
BASOPHILS NFR BLD AUTO: 0.3 %
BUN SERPL-MCNC: 13 MG/DL (ref 6–23)
CALCIUM SERPL-MCNC: 8.7 MG/DL (ref 8.6–10.6)
CHLORIDE SERPL-SCNC: 105 MMOL/L (ref 98–107)
CO2 SERPL-SCNC: 24 MMOL/L (ref 21–32)
CREAT SERPL-MCNC: 0.56 MG/DL (ref 0.5–1.05)
EGFRCR SERPLBLD CKD-EPI 2021: >90 ML/MIN/1.73M*2
EOSINOPHIL # BLD AUTO: 0.05 X10*3/UL (ref 0–0.7)
EOSINOPHIL NFR BLD AUTO: 0.5 %
ERYTHROCYTE [DISTWIDTH] IN BLOOD BY AUTOMATED COUNT: 12.7 % (ref 11.5–14.5)
GLUCOSE SERPL-MCNC: 88 MG/DL (ref 74–99)
HCT VFR BLD AUTO: 41.4 % (ref 36–46)
HGB BLD-MCNC: 13.1 G/DL (ref 12–16)
IMM GRANULOCYTES # BLD AUTO: 0.04 X10*3/UL (ref 0–0.7)
IMM GRANULOCYTES NFR BLD AUTO: 0.4 % (ref 0–0.9)
LYMPHOCYTES # BLD AUTO: 2.57 X10*3/UL (ref 1.2–4.8)
LYMPHOCYTES NFR BLD AUTO: 24.9 %
MCH RBC QN AUTO: 29.2 PG (ref 26–34)
MCHC RBC AUTO-ENTMCNC: 31.6 G/DL (ref 32–36)
MCV RBC AUTO: 92 FL (ref 80–100)
MONOCYTES # BLD AUTO: 1.07 X10*3/UL (ref 0.1–1)
MONOCYTES NFR BLD AUTO: 10.3 %
NEUTROPHILS # BLD AUTO: 6.58 X10*3/UL (ref 1.2–7.7)
NEUTROPHILS NFR BLD AUTO: 63.6 %
NRBC BLD-RTO: 0 /100 WBCS (ref 0–0)
PHOSPHATE SERPL-MCNC: 2.7 MG/DL (ref 2.5–4.9)
PLATELET # BLD AUTO: ABNORMAL 10*3/UL
POTASSIUM SERPL-SCNC: 4 MMOL/L (ref 3.5–5.3)
RBC # BLD AUTO: 4.49 X10*6/UL (ref 4–5.2)
SODIUM SERPL-SCNC: 138 MMOL/L (ref 136–145)
WBC # BLD AUTO: 10.3 X10*3/UL (ref 4.4–11.3)

## 2024-08-17 PROCEDURE — 99233 SBSQ HOSP IP/OBS HIGH 50: CPT

## 2024-08-17 PROCEDURE — 36415 COLL VENOUS BLD VENIPUNCTURE: CPT

## 2024-08-17 PROCEDURE — 2500000004 HC RX 250 GENERAL PHARMACY W/ HCPCS (ALT 636 FOR OP/ED)

## 2024-08-17 PROCEDURE — 1170000001 HC PRIVATE ONCOLOGY ROOM DAILY

## 2024-08-17 PROCEDURE — 85025 COMPLETE CBC W/AUTO DIFF WBC: CPT

## 2024-08-17 PROCEDURE — 2500000001 HC RX 250 WO HCPCS SELF ADMINISTERED DRUGS (ALT 637 FOR MEDICARE OP)

## 2024-08-17 PROCEDURE — 2500000005 HC RX 250 GENERAL PHARMACY W/O HCPCS

## 2024-08-17 PROCEDURE — 80069 RENAL FUNCTION PANEL: CPT

## 2024-08-17 ASSESSMENT — COGNITIVE AND FUNCTIONAL STATUS - GENERAL
DAILY ACTIVITIY SCORE: 24
MOBILITY SCORE: 24

## 2024-08-17 ASSESSMENT — PAIN SCALES - GENERAL
PAINLEVEL_OUTOF10: 8
PAINLEVEL_OUTOF10: 6
PAINLEVEL_OUTOF10: 7
PAINLEVEL_OUTOF10: 5 - MODERATE PAIN
PAINLEVEL_OUTOF10: 5 - MODERATE PAIN

## 2024-08-17 ASSESSMENT — PAIN - FUNCTIONAL ASSESSMENT
PAIN_FUNCTIONAL_ASSESSMENT: 0-10

## 2024-08-17 ASSESSMENT — PAIN DESCRIPTION - DESCRIPTORS: DESCRIPTORS: ACHING

## 2024-08-17 NOTE — CARE PLAN
The clinical goals for the shift include Patient will remain free from fall/injury and HDS for entirety of shift.    Over the shift, the patient made progress toward the following goals. Barriers to progression include n/a. Recommendations to address these barriers include n/a.      Problem: Pain - Adult  Goal: Verbalizes/displays adequate comfort level or baseline comfort level  Outcome: Progressing     Problem: Safety - Adult  Goal: Free from fall injury  Outcome: Progressing     Problem: Discharge Planning  Goal: Discharge to home or other facility with appropriate resources  Outcome: Progressing     Problem: Chronic Conditions and Co-morbidities  Goal: Patient's chronic conditions and co-morbidity symptoms are monitored and maintained or improved  Outcome: Progressing     Problem: Pain  Goal: Takes deep breaths with improved pain control throughout the shift  Outcome: Progressing  Goal: Turns in bed with improved pain control throughout the shift  Outcome: Progressing  Goal: Walks with improved pain control throughout the shift  Outcome: Progressing  Goal: Performs ADL's with improved pain control throughout shift  Outcome: Progressing  Goal: Free from opioid side effects throughout the shift  Outcome: Progressing  Goal: Free from acute confusion related to pain meds throughout the shift  Outcome: Progressing

## 2024-08-17 NOTE — CARE PLAN
Problem: Pain - Adult  Goal: Verbalizes/displays adequate comfort level or baseline comfort level  Outcome: Progressing     Problem: Safety - Adult  Goal: Free from fall injury  Outcome: Progressing     Problem: Discharge Planning  Goal: Discharge to home or other facility with appropriate resources  Outcome: Progressing     Problem: Chronic Conditions and Co-morbidities  Goal: Patient's chronic conditions and co-morbidity symptoms are monitored and maintained or improved  Outcome: Progressing     Problem: Pain  Goal: Takes deep breaths with improved pain control throughout the shift  Outcome: Progressing  Goal: Turns in bed with improved pain control throughout the shift  Outcome: Progressing  Goal: Walks with improved pain control throughout the shift  Outcome: Progressing  Goal: Performs ADL's with improved pain control throughout shift  Outcome: Progressing  Goal: Participates in PT with improved pain control throughout the shift  Outcome: Progressing  Goal: Free from opioid side effects throughout the shift  Outcome: Progressing  Goal: Free from acute confusion related to pain meds throughout the shift  Outcome: Progressing       The clinical goals for the shift include patient to rate pain <6/10 by end of shift

## 2024-08-17 NOTE — PROGRESS NOTES
Brittny Gordon is a 69 y.o. female on day 4 of admission presenting with Compression fracture of body of thoracic vertebra (Multi).      Subjective   Ms. Gordon was seen and examined today.  Reports feeling well.  Pain is better controlled, planned on radiation session on M.         Objective     Last Recorded Vitals  /87   Pulse 67   Temp 36.7 °C (98.1 °F)   Resp 18   Wt 74.8 kg (165 lb)   SpO2 92%   Intake/Output last 3 Shifts:    Intake/Output Summary (Last 24 hours) at 8/17/2024 1103  Last data filed at 8/17/2024 0829  Gross per 24 hour   Intake 480 ml   Output 1275 ml   Net -795 ml       Admission Weight  Weight: 74.8 kg (165 lb) (08/12/24 1557)    Daily Weight  08/12/24 : 74.8 kg (165 lb)    Image Results  Rad Onc CT Sim Images  These images are not reportable by radiology and will not be interpreted   by  Radiologists.      Physical Exam  Constitutional:       Appearance: Normal appearance.   HENT:      Mouth/Throat:      Mouth: Mucous membranes are moist.      Pharynx: Oropharynx is clear.   Eyes:      Pupils: Pupils are equal, round, and reactive to light.   Cardiovascular:      Rate and Rhythm: Normal rate and regular rhythm.      Heart sounds: Normal heart sounds.   Pulmonary:      Effort: Pulmonary effort is normal. No respiratory distress.      Breath sounds: Normal breath sounds.   Abdominal:      General: Abdomen is flat. There is no distension.      Palpations: Abdomen is soft.      Tenderness: There is no abdominal tenderness.   Musculoskeletal:      Right lower leg: No edema.      Left lower leg: No edema.   Skin:      Relevant Results  Results for orders placed or performed during the hospital encounter of 08/12/24 (from the past 24 hour(s))   Renal function panel   Result Value Ref Range    Glucose 88 74 - 99 mg/dL    Sodium 138 136 - 145 mmol/L    Potassium 4.0 3.5 - 5.3 mmol/L    Chloride 105 98 - 107 mmol/L    Bicarbonate 24 21 - 32 mmol/L    Anion Gap 13 10 - 20 mmol/L     Urea Nitrogen 13 6 - 23 mg/dL    Creatinine 0.56 0.50 - 1.05 mg/dL    eGFR >90 >60 mL/min/1.73m*2    Calcium 8.7 8.6 - 10.6 mg/dL    Phosphorus 2.7 2.5 - 4.9 mg/dL    Albumin 3.6 3.4 - 5.0 g/dL   CBC and Auto Differential   Result Value Ref Range    WBC 10.3 4.4 - 11.3 x10*3/uL    nRBC 0.0 0.0 - 0.0 /100 WBCs    RBC 4.49 4.00 - 5.20 x10*6/uL    Hemoglobin 13.1 12.0 - 16.0 g/dL    Hematocrit 41.4 36.0 - 46.0 %    MCV 92 80 - 100 fL    MCH 29.2 26.0 - 34.0 pg    MCHC 31.6 (L) 32.0 - 36.0 g/dL    RDW 12.7 11.5 - 14.5 %    Platelets      Neutrophils % 63.6 40.0 - 80.0 %    Immature Granulocytes %, Automated 0.4 0.0 - 0.9 %    Lymphocytes % 24.9 13.0 - 44.0 %    Monocytes % 10.3 2.0 - 10.0 %    Eosinophils % 0.5 0.0 - 6.0 %    Basophils % 0.3 0.0 - 2.0 %    Neutrophils Absolute 6.58 1.20 - 7.70 x10*3/uL    Immature Granulocytes Absolute, Automated 0.04 0.00 - 0.70 x10*3/uL    Lymphocytes Absolute 2.57 1.20 - 4.80 x10*3/uL    Monocytes Absolute 1.07 (H) 0.10 - 1.00 x10*3/uL    Eosinophils Absolute 0.05 0.00 - 0.70 x10*3/uL    Basophils Absolute 0.03 0.00 - 0.10 x10*3/uL         Assessment/Plan      Brittny Gordon is a 69 y.o. female with a past history of presumed stage IV metastatic lung cancer presenting with severe back pain in the setting of acute T12 compression fracture secondary to bony mets.      Updates 8/17  - Pain is better controlled  - Radiation sessions will start on Monday  - Bowel regimen: senna      #T12 compression fracture  ::In the setting of bony mets from lung cancer  ::PET scan on 7/11/2024 showing multiple avid bone lesions throughout axial and appendicular skeletons  ::Did not find relief from tramadol, did not tolerate acetaminophen-codeine or acetaminophen-hydrocodone due to nausea  Plan:  -oxycodone, flexeril, Dilaudid PRN  -Calcitonin 200mg nasal spray daily  -Rad onc, supportive onc, and NSGY consulted  -Per Rad Onc note on 7/25, if pain persists despite medical management and radiation  therapy, structural relief with the form of kyphoplasty/vertebroplasty may benefit given vertebral compression  -On ibandronate 150mg monthly, holding here  -Bowel regimen with Miralax, docusate, bisacodyl PRN. Naloxegel is non-formulary, holding here     #NSCLC, stage IV  ::RUL primary with bony metastases seen on PET scan  ::Was scheduled for palliative RT for spine lesions and referral for clinical trial  ::Still awaiting full path results when last seen  ::Follows with Dr. Camila Chaudhary as an outpatient  Plan:  -Rad Onc consulted, plan for CT sim 8/13  -Minimal pleural effusions seen on MRI, patient asymptomatic, no acute interventions     #Hypertension  #Hyperlipidemia  #Ascending aortic aneurysm s/p repair  ::Open repair in 2012, has not needed redo operations  -C/w losartan 100mg daily, Toprol 100mg daily  -C/w atorvastatin 80mg daily     Code status: FULL CODE  DVT ppx: Lovenox  GI ppx: None  Oxygen: RA  Abx: None  Access: PIV  NOK: Lance (brother) [phone]       Michael Carbajal MD

## 2024-08-18 VITALS
RESPIRATION RATE: 16 BRPM | TEMPERATURE: 96.8 F | WEIGHT: 165 LBS | HEIGHT: 67 IN | SYSTOLIC BLOOD PRESSURE: 136 MMHG | BODY MASS INDEX: 25.9 KG/M2 | OXYGEN SATURATION: 97 % | HEART RATE: 64 BPM | DIASTOLIC BLOOD PRESSURE: 83 MMHG

## 2024-08-18 LAB
ALBUMIN SERPL BCP-MCNC: 3.6 G/DL (ref 3.4–5)
ANION GAP SERPL CALC-SCNC: 15 MMOL/L (ref 10–20)
BASOPHILS # BLD AUTO: 0.05 X10*3/UL (ref 0–0.1)
BASOPHILS NFR BLD AUTO: 0.4 %
BUN SERPL-MCNC: 15 MG/DL (ref 6–23)
CALCIUM SERPL-MCNC: 9.2 MG/DL (ref 8.6–10.6)
CHLORIDE SERPL-SCNC: 104 MMOL/L (ref 98–107)
CO2 SERPL-SCNC: 24 MMOL/L (ref 21–32)
CREAT SERPL-MCNC: 0.59 MG/DL (ref 0.5–1.05)
EGFRCR SERPLBLD CKD-EPI 2021: >90 ML/MIN/1.73M*2
EOSINOPHIL # BLD AUTO: 0.1 X10*3/UL (ref 0–0.7)
EOSINOPHIL NFR BLD AUTO: 0.8 %
ERYTHROCYTE [DISTWIDTH] IN BLOOD BY AUTOMATED COUNT: 12.7 % (ref 11.5–14.5)
GLUCOSE SERPL-MCNC: 87 MG/DL (ref 74–99)
HCT VFR BLD AUTO: 42.3 % (ref 36–46)
HGB BLD-MCNC: 13.5 G/DL (ref 12–16)
IMM GRANULOCYTES # BLD AUTO: 0.06 X10*3/UL (ref 0–0.7)
IMM GRANULOCYTES NFR BLD AUTO: 0.5 % (ref 0–0.9)
LYMPHOCYTES # BLD AUTO: 3.37 X10*3/UL (ref 1.2–4.8)
LYMPHOCYTES NFR BLD AUTO: 26.8 %
MCH RBC QN AUTO: 29.2 PG (ref 26–34)
MCHC RBC AUTO-ENTMCNC: 31.9 G/DL (ref 32–36)
MCV RBC AUTO: 92 FL (ref 80–100)
MONOCYTES # BLD AUTO: 1.09 X10*3/UL (ref 0.1–1)
MONOCYTES NFR BLD AUTO: 8.7 %
NEUTROPHILS # BLD AUTO: 7.91 X10*3/UL (ref 1.2–7.7)
NEUTROPHILS NFR BLD AUTO: 62.8 %
NRBC BLD-RTO: 0 /100 WBCS (ref 0–0)
PHOSPHATE SERPL-MCNC: 3.5 MG/DL (ref 2.5–4.9)
PLATELET # BLD AUTO: 207 X10*3/UL (ref 150–450)
POTASSIUM SERPL-SCNC: 4.1 MMOL/L (ref 3.5–5.3)
RBC # BLD AUTO: 4.62 X10*6/UL (ref 4–5.2)
SODIUM SERPL-SCNC: 139 MMOL/L (ref 136–145)
WBC # BLD AUTO: 12.6 X10*3/UL (ref 4.4–11.3)

## 2024-08-18 PROCEDURE — 2500000001 HC RX 250 WO HCPCS SELF ADMINISTERED DRUGS (ALT 637 FOR MEDICARE OP)

## 2024-08-18 PROCEDURE — 80069 RENAL FUNCTION PANEL: CPT

## 2024-08-18 PROCEDURE — 85025 COMPLETE CBC W/AUTO DIFF WBC: CPT

## 2024-08-18 PROCEDURE — 2500000004 HC RX 250 GENERAL PHARMACY W/ HCPCS (ALT 636 FOR OP/ED)

## 2024-08-18 PROCEDURE — 36415 COLL VENOUS BLD VENIPUNCTURE: CPT

## 2024-08-18 PROCEDURE — 2500000005 HC RX 250 GENERAL PHARMACY W/O HCPCS

## 2024-08-18 PROCEDURE — 1170000001 HC PRIVATE ONCOLOGY ROOM DAILY

## 2024-08-18 PROCEDURE — 99233 SBSQ HOSP IP/OBS HIGH 50: CPT

## 2024-08-18 RX ORDER — OXYCODONE HYDROCHLORIDE 10 MG/1
10 TABLET ORAL EVERY 4 HOURS PRN
Qty: 60 TABLET | Refills: 0 | Status: CANCELLED | OUTPATIENT
Start: 2024-08-18 | End: 2024-08-28

## 2024-08-18 RX ORDER — FAMOTIDINE 20 MG/1
20 TABLET, FILM COATED ORAL 2 TIMES DAILY
Qty: 60 TABLET | Refills: 0 | Status: CANCELLED | OUTPATIENT
Start: 2024-08-18 | End: 2024-09-17

## 2024-08-18 ASSESSMENT — COGNITIVE AND FUNCTIONAL STATUS - GENERAL
DAILY ACTIVITIY SCORE: 24
MOBILITY SCORE: 24

## 2024-08-18 ASSESSMENT — PAIN DESCRIPTION - LOCATION
LOCATION: BACK
LOCATION: BACK

## 2024-08-18 ASSESSMENT — PAIN SCALES - GENERAL
PAINLEVEL_OUTOF10: 5 - MODERATE PAIN
PAINLEVEL_OUTOF10: 7
PAINLEVEL_OUTOF10: 7
PAINLEVEL_OUTOF10: 5 - MODERATE PAIN
PAINLEVEL_OUTOF10: 7
PAINLEVEL_OUTOF10: 7

## 2024-08-18 ASSESSMENT — PAIN - FUNCTIONAL ASSESSMENT
PAIN_FUNCTIONAL_ASSESSMENT: 0-10

## 2024-08-18 NOTE — PROGRESS NOTES
Brittny Gordon is a 69 y.o. female on day 5 of admission presenting with Compression fracture of body of thoracic vertebra (Multi).      Subjective     Ms. Gordon was seen and examined today.  Reports feeling well.  Pain is better controlled, planned on radiation session on M.    Objective     Last Recorded Vitals  /73   Pulse 80   Temp 36.3 °C (97.3 °F)   Resp 18   Wt 74.8 kg (165 lb)   SpO2 94%   Intake/Output last 3 Shifts:    Intake/Output Summary (Last 24 hours) at 8/18/2024 1208  Last data filed at 8/18/2024 0900  Gross per 24 hour   Intake 440 ml   Output 2500 ml   Net -2060 ml       Admission Weight  Weight: 74.8 kg (165 lb) (08/12/24 1557)    Daily Weight  08/12/24 : 74.8 kg (165 lb)    Image Results  Rad Onc CT Sim Images  These images are not reportable by radiology and will not be interpreted   by  Radiologists.      Physical Exam  Constitutional:       Appearance: Normal appearance.   HENT:      Mouth/Throat:      Mouth: Mucous membranes are moist.      Pharynx: Oropharynx is clear.   Eyes:      Pupils: Pupils are equal, round, and reactive to light.   Cardiovascular:      Rate and Rhythm: Normal rate and regular rhythm.      Heart sounds: Normal heart sounds.   Pulmonary:      Effort: Pulmonary effort is normal. No respiratory distress.      Breath sounds: Normal breath sounds.   Abdominal:      General: Abdomen is flat. There is no distension.      Palpations: Abdomen is soft.      Tenderness: There is no abdominal tenderness.   Musculoskeletal:      Right lower leg: No edema.      Left lower leg: No edema.         Relevant Results  Results for orders placed or performed during the hospital encounter of 08/12/24 (from the past 24 hour(s))   Renal function panel   Result Value Ref Range    Glucose 87 74 - 99 mg/dL    Sodium 139 136 - 145 mmol/L    Potassium 4.1 3.5 - 5.3 mmol/L    Chloride 104 98 - 107 mmol/L    Bicarbonate 24 21 - 32 mmol/L    Anion Gap 15 10 - 20 mmol/L    Urea  Nitrogen 15 6 - 23 mg/dL    Creatinine 0.59 0.50 - 1.05 mg/dL    eGFR >90 >60 mL/min/1.73m*2    Calcium 9.2 8.6 - 10.6 mg/dL    Phosphorus 3.5 2.5 - 4.9 mg/dL    Albumin 3.6 3.4 - 5.0 g/dL   CBC and Auto Differential   Result Value Ref Range    WBC 12.6 (H) 4.4 - 11.3 x10*3/uL    nRBC 0.0 0.0 - 0.0 /100 WBCs    RBC 4.62 4.00 - 5.20 x10*6/uL    Hemoglobin 13.5 12.0 - 16.0 g/dL    Hematocrit 42.3 36.0 - 46.0 %    MCV 92 80 - 100 fL    MCH 29.2 26.0 - 34.0 pg    MCHC 31.9 (L) 32.0 - 36.0 g/dL    RDW 12.7 11.5 - 14.5 %    Platelets 207 150 - 450 x10*3/uL    Neutrophils % 62.8 40.0 - 80.0 %    Immature Granulocytes %, Automated 0.5 0.0 - 0.9 %    Lymphocytes % 26.8 13.0 - 44.0 %    Monocytes % 8.7 2.0 - 10.0 %    Eosinophils % 0.8 0.0 - 6.0 %    Basophils % 0.4 0.0 - 2.0 %    Neutrophils Absolute 7.91 (H) 1.20 - 7.70 x10*3/uL    Immature Granulocytes Absolute, Automated 0.06 0.00 - 0.70 x10*3/uL    Lymphocytes Absolute 3.37 1.20 - 4.80 x10*3/uL    Monocytes Absolute 1.09 (H) 0.10 - 1.00 x10*3/uL    Eosinophils Absolute 0.10 0.00 - 0.70 x10*3/uL    Basophils Absolute 0.05 0.00 - 0.10 x10*3/uL       Assessment/Plan      Brittny Gordon is a 69 y.o. female with a past history of presumed stage IV metastatic lung cancer presenting with severe back pain in the setting of acute T12 compression fracture secondary to bony mets.      Updates 8/18  - Pain is better controlled  - Radiation sessions will start on Monday  - Bowel regimen: senna      #T12 compression fracture  ::In the setting of bony mets from lung cancer  ::PET scan on 7/11/2024 showing multiple avid bone lesions throughout axial and appendicular skeletons  ::Did not find relief from tramadol, did not tolerate acetaminophen-codeine or acetaminophen-hydrocodone due to nausea  Plan:  -oxycodone, flexeril, Dilaudid PRN  -Calcitonin 200mg nasal spray daily  -Rad onc, supportive onc, and NSGY consulted  -Per Rad Onc note on 7/25, if pain persists despite medical  management and radiation therapy, structural relief with the form of kyphoplasty/vertebroplasty may benefit given vertebral compression  -On ibandronate 150mg monthly, holding here  -Bowel regimen with Miralax, docusate, bisacodyl PRN. Naloxegel is non-formulary, holding here     #NSCLC, stage IV  ::RUL primary with bony metastases seen on PET scan  ::Was scheduled for palliative RT for spine lesions and referral for clinical trial  ::Still awaiting full path results when last seen  ::Follows with Dr. Camila Chaudhary as an outpatient  Plan:  -Rad Onc consulted, plan for CT sim 8/13  -Minimal pleural effusions seen on MRI, patient asymptomatic, no acute interventions     #Hypertension  #Hyperlipidemia  #Ascending aortic aneurysm s/p repair  ::Open repair in 2012, has not needed redo operations  -C/w losartan 100mg daily, Toprol 100mg daily  -C/w atorvastatin 80mg daily     Code status: FULL CODE  DVT ppx: Lovenox  GI ppx: None  Oxygen: RA  Abx: None  Access: PIV  NOK: Lance (brother) [phone]         Michael Carbajal MD

## 2024-08-18 NOTE — CARE PLAN
Problem: Pain - Adult  Goal: Verbalizes/displays adequate comfort level or baseline comfort level  Outcome: Progressing     Problem: Safety - Adult  Goal: Free from fall injury  Outcome: Progressing     Problem: Discharge Planning  Goal: Discharge to home or other facility with appropriate resources  Outcome: Progressing     Problem: Chronic Conditions and Co-morbidities  Goal: Patient's chronic conditions and co-morbidity symptoms are monitored and maintained or improved  Outcome: Progressing     Problem: Pain  Goal: Takes deep breaths with improved pain control throughout the shift  Outcome: Progressing  Goal: Turns in bed with improved pain control throughout the shift  Outcome: Progressing  Goal: Walks with improved pain control throughout the shift  Outcome: Progressing  Goal: Performs ADL's with improved pain control throughout shift  Outcome: Progressing  Goal: Participates in PT with improved pain control throughout the shift  Outcome: Progressing  Goal: Free from opioid side effects throughout the shift  Outcome: Progressing  Goal: Free from acute confusion related to pain meds throughout the shift  Outcome: Progressing

## 2024-08-18 NOTE — CARE PLAN
The clinical goals for the shift include Patient will remain free from fall/injury and HDS for entirety of shift.    Over the shift, the patient made progress toward the following goals. Barriers to progression include n/a. Recommendations to address these barriers include n/a.      Problem: Pain - Adult  Goal: Verbalizes/displays adequate comfort level or baseline comfort level  Outcome: Progressing     Problem: Safety - Adult  Goal: Free from fall injury  Outcome: Progressing     Problem: Discharge Planning  Goal: Discharge to home or other facility with appropriate resources  Outcome: Progressing     Problem: Chronic Conditions and Co-morbidities  Goal: Patient's chronic conditions and co-morbidity symptoms are monitored and maintained or improved  Outcome: Progressing     Problem: Pain  Goal: Takes deep breaths with improved pain control throughout the shift  Outcome: Progressing  Goal: Turns in bed with improved pain control throughout the shift  Outcome: Progressing  Goal: Walks with improved pain control throughout the shift  Outcome: Progressing  Goal: Performs ADL's with improved pain control throughout shift  Outcome: Progressing  Goal: Free from acute confusion related to pain meds throughout the shift  Outcome: Progressing

## 2024-08-19 ENCOUNTER — PHARMACY VISIT (OUTPATIENT)
Dept: PHARMACY | Facility: CLINIC | Age: 69
End: 2024-08-19
Payer: COMMERCIAL

## 2024-08-19 ENCOUNTER — HOSPITAL ENCOUNTER (OUTPATIENT)
Dept: RADIATION ONCOLOGY | Facility: HOSPITAL | Age: 69
Setting detail: RADIATION/ONCOLOGY SERIES
Discharge: HOME | End: 2024-08-19
Payer: MEDICARE

## 2024-08-19 VITALS
TEMPERATURE: 99.3 F | HEART RATE: 66 BPM | OXYGEN SATURATION: 95 % | RESPIRATION RATE: 18 BRPM | BODY MASS INDEX: 25.9 KG/M2 | DIASTOLIC BLOOD PRESSURE: 72 MMHG | SYSTOLIC BLOOD PRESSURE: 111 MMHG | HEIGHT: 67 IN | WEIGHT: 165 LBS

## 2024-08-19 DIAGNOSIS — C34.11 MALIGNANT NEOPLASM OF UPPER LOBE, RIGHT BRONCHUS OR LUNG (MULTI): ICD-10-CM

## 2024-08-19 DIAGNOSIS — C79.51 SECONDARY MALIGNANT NEOPLASM OF BONE (MULTI): ICD-10-CM

## 2024-08-19 LAB
RAD ONC MSQ ACTUAL FRACTIONS DELIVERED: 1
RAD ONC MSQ ACTUAL SESSION DELIVERED DOSE: 300 CGRAY
RAD ONC MSQ ACTUAL TOTAL DOSE: 300 CGRAY
RAD ONC MSQ ELAPSED DAYS: 0
RAD ONC MSQ LAST DATE: NORMAL
RAD ONC MSQ PRESCRIBED FRACTIONAL DOSE: 300 CGRAY
RAD ONC MSQ PRESCRIBED NUMBER OF FRACTIONS: 10
RAD ONC MSQ PRESCRIBED TECHNIQUE: NORMAL
RAD ONC MSQ PRESCRIBED TOTAL DOSE: 3000 CGRAY
RAD ONC MSQ PRESCRIPTION PATTERN COMMENT: NORMAL
RAD ONC MSQ START DATE: NORMAL
RAD ONC MSQ TREATMENT COURSE NUMBER: 1
RAD ONC MSQ TREATMENT SITE: NORMAL

## 2024-08-19 PROCEDURE — 2500000004 HC RX 250 GENERAL PHARMACY W/ HCPCS (ALT 636 FOR OP/ED)

## 2024-08-19 PROCEDURE — 77280 THER RAD SIMULAJ FIELD SMPL: CPT | Performed by: RADIOLOGY

## 2024-08-19 PROCEDURE — 2500000005 HC RX 250 GENERAL PHARMACY W/O HCPCS

## 2024-08-19 PROCEDURE — DP0C1ZZ BEAM RADIATION OF OTHER BONE USING PHOTONS 1 - 10 MEV: ICD-10-PCS | Performed by: NURSE PRACTITIONER

## 2024-08-19 PROCEDURE — 2500000001 HC RX 250 WO HCPCS SELF ADMINISTERED DRUGS (ALT 637 FOR MEDICARE OP)

## 2024-08-19 PROCEDURE — 99233 SBSQ HOSP IP/OBS HIGH 50: CPT | Performed by: NURSE PRACTITIONER

## 2024-08-19 PROCEDURE — RXMED WILLOW AMBULATORY MEDICATION CHARGE

## 2024-08-19 PROCEDURE — 99239 HOSP IP/OBS DSCHRG MGMT >30: CPT | Performed by: INTERNAL MEDICINE

## 2024-08-19 PROCEDURE — 77412 RADIATION TX DELIVERY LVL 3: CPT | Performed by: RADIOLOGY

## 2024-08-19 RX ORDER — OXYCODONE HYDROCHLORIDE 5 MG/1
15 TABLET ORAL EVERY 6 HOURS PRN
Qty: 15 TABLET | Refills: 0 | Status: SHIPPED | OUTPATIENT
Start: 2024-08-19 | End: 2024-08-19 | Stop reason: HOSPADM

## 2024-08-19 RX ORDER — LORAZEPAM 0.5 MG/1
0.5 TABLET ORAL DAILY PRN
Qty: 10 TABLET | Refills: 0 | Status: SHIPPED | OUTPATIENT
Start: 2024-08-19 | End: 2024-08-29

## 2024-08-19 RX ORDER — CYCLOBENZAPRINE HCL 10 MG
10 TABLET ORAL EVERY 8 HOURS
Qty: 90 TABLET | Refills: 0 | Status: SHIPPED | OUTPATIENT
Start: 2024-08-19 | End: 2024-08-23

## 2024-08-19 RX ORDER — GABAPENTIN 300 MG/1
300 CAPSULE ORAL NIGHTLY
Qty: 30 CAPSULE | Refills: 0 | Status: SHIPPED | OUTPATIENT
Start: 2024-08-19 | End: 2024-08-23 | Stop reason: SDUPTHER

## 2024-08-19 RX ORDER — DOCUSATE SODIUM 100 MG/1
100 CAPSULE, LIQUID FILLED ORAL 2 TIMES DAILY
Qty: 30 CAPSULE | Refills: 1 | Status: SHIPPED | OUTPATIENT
Start: 2024-08-19

## 2024-08-19 RX ORDER — GABAPENTIN 300 MG/1
300 CAPSULE ORAL NIGHTLY
Start: 2024-08-19 | End: 2024-08-19 | Stop reason: HOSPADM

## 2024-08-19 RX ORDER — POLYETHYLENE GLYCOL 3350 17 G/17G
17 POWDER, FOR SOLUTION ORAL DAILY
Qty: 1700 G | Refills: 1 | Status: SHIPPED | OUTPATIENT
Start: 2024-08-19

## 2024-08-19 RX ORDER — ONDANSETRON HYDROCHLORIDE 8 MG/1
4 TABLET, FILM COATED ORAL EVERY 6 HOURS PRN
Qty: 20 TABLET | Refills: 0 | Status: SHIPPED | OUTPATIENT
Start: 2024-08-19

## 2024-08-19 RX ORDER — OXYCODONE HYDROCHLORIDE 5 MG/1
10-15 TABLET ORAL EVERY 4 HOURS PRN
Qty: 270 TABLET | Refills: 0 | Status: SHIPPED | OUTPATIENT
Start: 2024-08-19

## 2024-08-19 RX ORDER — CYCLOBENZAPRINE HCL 10 MG
10 TABLET ORAL NIGHTLY PRN
Qty: 30 TABLET | Refills: 0 | Status: SHIPPED | OUTPATIENT
Start: 2024-08-19 | End: 2024-08-23

## 2024-08-19 RX ORDER — BISACODYL 5 MG
5 TABLET, DELAYED RELEASE (ENTERIC COATED) ORAL DAILY PRN
Qty: 30 TABLET | Refills: 0 | Status: SHIPPED | OUTPATIENT
Start: 2024-08-19

## 2024-08-19 RX ORDER — OXYCODONE HYDROCHLORIDE 15 MG/1
15 TABLET, FILM COATED, EXTENDED RELEASE ORAL DAILY PRN
Qty: 9 TABLET | Refills: 0 | Status: SHIPPED | OUTPATIENT
Start: 2024-08-19 | End: 2024-08-19 | Stop reason: HOSPADM

## 2024-08-19 RX ORDER — OXYCODONE HYDROCHLORIDE 10 MG/1
10 TABLET ORAL EVERY 4 HOURS PRN
Qty: 10 TABLET | Refills: 0 | OUTPATIENT
Start: 2024-08-19

## 2024-08-19 RX ORDER — OXYCODONE HYDROCHLORIDE 5 MG/1
5 TABLET ORAL EVERY 6 HOURS PRN
Qty: 15 TABLET | Refills: 0 | OUTPATIENT
Start: 2024-08-19

## 2024-08-19 RX ORDER — ONDANSETRON 4 MG/1
4 TABLET, FILM COATED ORAL EVERY 6 HOURS PRN
Qty: 20 TABLET | Refills: 0 | Status: SHIPPED | OUTPATIENT
Start: 2024-08-19

## 2024-08-19 RX ORDER — SENNOSIDES 8.6 MG/1
1 TABLET ORAL DAILY PRN
Start: 2024-08-19 | End: 2024-09-18

## 2024-08-19 RX ORDER — OXYCODONE HCL 10 MG/1
10 TABLET, FILM COATED, EXTENDED RELEASE ORAL EVERY 4 HOURS PRN
Qty: 90 TABLET | Refills: 0 | Status: SHIPPED | OUTPATIENT
Start: 2024-08-19 | End: 2024-08-19 | Stop reason: HOSPADM

## 2024-08-19 RX ORDER — OXYCODONE HYDROCHLORIDE 10 MG/1
10 TABLET ORAL EVERY 4 HOURS PRN
Status: DISCONTINUED | OUTPATIENT
Start: 2024-08-19 | End: 2024-08-19 | Stop reason: HOSPADM

## 2024-08-19 RX ORDER — OXYCODONE HYDROCHLORIDE 5 MG/1
10 TABLET ORAL EVERY 4 HOURS PRN
Qty: 15 TABLET | Refills: 0 | Status: SHIPPED | OUTPATIENT
Start: 2024-08-19 | End: 2024-08-19

## 2024-08-19 RX ORDER — LORAZEPAM 0.5 MG/1
0.5 TABLET ORAL DAILY PRN
Status: DISCONTINUED | OUTPATIENT
Start: 2024-08-19 | End: 2024-08-19 | Stop reason: HOSPADM

## 2024-08-19 ASSESSMENT — PAIN SCALES - GENERAL
PAINLEVEL_OUTOF10: 8
PAINLEVEL_OUTOF10: 4
PAINLEVEL_OUTOF10: 8

## 2024-08-19 ASSESSMENT — COGNITIVE AND FUNCTIONAL STATUS - GENERAL
DAILY ACTIVITIY SCORE: 24
MOBILITY SCORE: 24

## 2024-08-19 ASSESSMENT — PAIN DESCRIPTION - LOCATION: LOCATION: BACK

## 2024-08-19 NOTE — DISCHARGE SUMMARY
Discharge Diagnosis  Compression fracture of body of thoracic vertebra (Multi)    Issues Requiring Follow-Up  [ ]  Dr. Chaudhary appointment 8/23 - for chemotherapy discussion and planning (pembrolizumb + paclitaxel / carboplatin)  [ ]  Palliative radiation therapy to spine 10 fractions beginning 8/19    Test Results Pending At Discharge  Pending Labs       No current pending labs.          Hospital Course  Brittny Gordon is a 69 y.o. female with a past history of presumed stage IV metastatic lung cancer presenting with severe back pain in the setting of acute T12 compression fracture secondary to bony mets. Ms. Gordon had severe L flank, hip pain, and back pain. Neurosurgery was consulted and evaluated Ms. Gordon who was determined to have no concern for cord compression and required no neurosurgical intervention. Additionally, she was also evaluated by radiation oncology. The MR imaging of the spine showed a new T12 compression fracture in addition to the existing L3 fracture. For the spinal metastasis palliative radiation to the spine was planned via CT sim and the first fraction was initiated on 8/19/2024.  Plan is for Ms. Gordon to receive 10 fractions. While here she also worked with supportive oncology to assist in her pain management and was started onto a new regimen which includes oxycodone, gabapentin, and flexeril as well as lorazepam prior to XRT. With this new pain management she additionally received a bowel regimen including miralax, senna, and bisacodyl.  She will finish her 10 fractions of palliative radiation to the spine and plan for follow up appointment with Dr. Chaudhary on 8/23 for further chemotherapy planning.    Follow up:   [ ]  Dr. Chaudhary appointment 8/23 - for chemotherapy discussion and planning  [ ]  Palliative radiation therapy to spine 10 fractions beginning 8/19    Pertinent Physical Exam At Time of Discharge  Physical Exam  Constitutional:       Appearance: Normal appearance.   HENT:       Mouth/Throat:      Mouth: Mucous membranes are moist.      Pharynx: Oropharynx is clear.   Eyes:      Pupils: Pupils are equal, round, and reactive to light.   Cardiovascular:      Rate and Rhythm: Normal rate and regular rhythm.   Pulmonary:      Effort: Pulmonary effort is normal. No respiratory distress.      Breath sounds: Normal breath sounds.   Abdominal:      General: Abdomen is flat. There is no distension.      Palpations: Abdomen is soft.      Tenderness: There is no abdominal tenderness.   Musculoskeletal:      Right lower leg: No edema.      Left lower leg: No edema.   Skin:     General: Skin is warm and dry.   Neurological:      Mental Status: She is alert.      Sensory: No sensory deficit.      Motor: No weakness.   Psychiatric:         Mood and Affect: Mood normal.         Behavior: Behavior normal.     Home Medications     Medication List      START taking these medications     gabapentin 300 mg capsule; Commonly known as: Neurontin; Take 1 capsule   (300 mg) by mouth once daily at bedtime.   LORazepam 0.5 mg tablet; Commonly known as: Ativan; Take 1 tablet (0.5   mg) by mouth once daily as needed for anxiety (PRIOR to RADIATION) for up   to 10 days.   oxyCODONE 5 mg immediate release tablet; Commonly known as: Roxicodone;   Take 2-3 tablets (10-15 mg) by mouth every 4 hours if needed for severe   pain (7 - 10) (please take 1 to 1.5 tabs. please tale 3 tabs before   radiation sessions).   sennosides 8.6 mg tablet; Commonly known as: Senokot; Take 1 tablet (8.6   mg) by mouth once daily as needed for constipation.     CHANGE how you take these medications     * bisacodyl 5 mg EC tablet; Commonly known as: Dulcolax; Take 1 tablet   (5 mg) by mouth once daily as needed for constipation. Do not crush, chew,   or split.; What changed: Another medication with the same name was added.   Make sure you understand how and when to take each.   * bisacodyl 5 mg EC tablet; Commonly known as: Dulcolax; Take 1  tablet   (5 mg) by mouth once daily as needed for constipation. Do not crush, chew,   or split.; What changed: You were already taking a medication with the   same name, and this prescription was added. Make sure you understand how   and when to take each.   * cyclobenzaprine 10 mg tablet; Commonly known as: Flexeril; Take 1   tablet (10 mg) by mouth as needed at bedtime for muscle spasms.; What   changed: You were already taking a medication with the same name, and this   prescription was added. Make sure you understand how and when to take   each.   * cyclobenzaprine 10 mg tablet; Commonly known as: Flexeril; Take 1   tablet (10 mg) by mouth every 8 hours.; What changed: medication strength,   how much to take, when to take this, reasons to take this   * docusate sodium 100 mg capsule; Commonly known as: Colace; Take 1   capsule (100 mg) by mouth 2 times a day.; What changed: Another medication   with the same name was added. Make sure you understand how and when to   take each.   * docusate sodium 100 mg capsule; Commonly known as: Colace; Take 1   capsule (100 mg) by mouth 2 times a day.; What changed: You were already   taking a medication with the same name, and this prescription was added.   Make sure you understand how and when to take each.   * ondansetron 4 mg tablet; Commonly known as: Zofran; Take 1 tablet (4   mg) by mouth every 6 hours if needed for nausea or vomiting.; What   changed: You were already taking a medication with the same name, and this   prescription was added. Make sure you understand how and when to take   each.   * ondansetron 8 mg tablet; Commonly known as: Zofran; Take 0.5 tablets   (4 mg) by mouth every 6 hours if needed for nausea or vomiting.; What   changed: how much to take, when to take this   * polyethylene glycol 17 gram/dose powder; Commonly known as: Glycolax,   Miralax; Mix 17 g of powder with 8 oz of water and drink by mouth once   daily.; What changed: Another  medication with the same name was added.   Make sure you understand how and when to take each.   * polyethylene glycol 17 gram/dose powder; Commonly known as: Glycolax,   Miralax; Take 17 g by mouth once daily.; What changed: You were already   taking a medication with the same name, and this prescription was added.   Make sure you understand how and when to take each.  * This list has 10 medication(s) that are the same as other medications   prescribed for you. Read the directions carefully, and ask your doctor or   other care provider to review them with you.     CONTINUE taking these medications     atorvastatin 80 mg tablet; Commonly known as: Lipitor   cetirizine 10 mg tablet; Commonly known as: ZyrTEC   ibandronate 150 mg tablet; Commonly known as: Boniva; Take 1 tablet (150   mg) by mouth every 30 (thirty) days. Take in morning with full glass of   water on an empty stomach. No food, drink, meds, or lying down for 60   minutes after.   ICY HOT ADVANCED RELIEF PATCH TOP   losartan 100 mg tablet; Commonly known as: Cozaar; TAKE 1 TABLET BY   MOUTH EVERY DAY   metoprolol succinate  mg 24 hr tablet; Commonly known as:   Toprol-XL; TAKE 1 TABLET BY MOUTH EVERY DAY   PRESERVISION AREDS ORAL   Vitamin D3 25 MCG (1000 UT) tablet; Generic drug: cholecalciferol     STOP taking these medications     acetaminophen-codeine 300-30 mg tablet; Commonly known as: Tylenol w/   Codeine #3   naloxegol oxalate 25 mg; Commonly known as: Movantik     Outpatient Follow-Up  Future Appointments   Date Time Provider Department Center   8/20/2024 11:00 AM [email or web address] [id number] [facility]   8/21/2024 10:45 AM [email or web address] [id number] [facility]   8/22/2024 10:45 AM [email or web address] [id number] [facility]   8/23/2024  8:30 AM Susan Leroy APRN-CNP [id number] [facility]   8/23/2024 10:00 AM Camila Chaudhary MD [facility] [id number] [facility]   8/26/2024 10:00 AM [email or web address] [id number] [facility]   8/27/2024 10:15 AM [email or web address] [id number] [facility]   8/28/2024 10:15  AM [email or web address] [id number] [facility]   8/29/2024 10:15 AM [email or web address] [id number] [facility]   8/30/2024 10:15 AM [email or web address] [id number] [facility]   9/3/2024 10:15 AM [email or web address] [id number] [facility]   9/9/2024  1:00 PM Juany Huff MD [id number] [facility]   9/27/2024  1:45 PM Nagi Ospina MD [id number] [facility]   2/18/2025  2:40 PM Cayden Buckley MD [id number] [facility]       Simba Murray, MS4

## 2024-08-19 NOTE — CARE PLAN
Problem: Pain - Adult  Goal: Verbalizes/displays adequate comfort level or baseline comfort level  Outcome: Progressing     Problem: Safety - Adult  Goal: Free from fall injury  Outcome: Progressing     Problem: Discharge Planning  Goal: Discharge to home or other facility with appropriate resources  Outcome: Progressing     Problem: Chronic Conditions and Co-morbidities  Goal: Patient's chronic conditions and co-morbidity symptoms are monitored and maintained or improved  Outcome: Progressing     Problem: Pain  Goal: Takes deep breaths with improved pain control throughout the shift  Outcome: Progressing  Goal: Turns in bed with improved pain control throughout the shift  Outcome: Progressing  Goal: Walks with improved pain control throughout the shift  Outcome: Progressing  Goal: Performs ADL's with improved pain control throughout shift  Outcome: Progressing  Goal: Participates in PT with improved pain control throughout the shift  Outcome: Progressing  Goal: Free from opioid side effects throughout the shift  Outcome: Progressing  Goal: Free from acute confusion related to pain meds throughout the shift  Outcome: Progressing       The clinical goals for the shift include Patient will remain safe; free of injuries and falls throughout shift

## 2024-08-19 NOTE — PROGRESS NOTES
SUPPORTIVE AND PALLIATIVE ONCOLOGY INPATIENT FOLLOW-UP      SERVICE DATE: 24     SUBJECTIVE:  Interval Events:  Completed 5 day course Dexamethasone yesterday  Started RT this morning, premed with lorazepam and oxycodone 15 mg, she reports she was able to tolerate RT with this regimen  Would like to continue to avoid Tylenol  We reviewed her symptom management medications in detail, provided with a printed list and ACS Pain Diary for home  She would like to add prescriptions for Colace and Zofran in case she needs is with RT    Pain Assessment:  Location:  lower and mid back, left ribs  Duration: Constant  Characteristics:   Ratin   Descriptors: cramping, sharp, and stabbing   Aggravating: movement, walking, bending, and lying down    Relieving: Analgesics  , Positioning, and Modifying activity   Interference with Function: Very Much    Opioid Use  Past 24 h prn opioid use: Oxycodone IR 10 mg PO x 2 doses = 20 mg = 25 OME  Total 24h OME use:  25 OME    Note: OME calculations based on equianalgesic table below. Please note this table is based on best available evidence but conversions are still approximate. These are NOT opioid DOSES for individual patient use; this is equivalency information.  Drug Parenteral Enteral   Morphine 10 25   Oxycodone N/A 20   Hydromorphone 2 5   Fentanyl 0.15 N/A   Tramadol N/A 120   Citation: Darnell RIVAS. Demystifying opioid conversion calculations: A guide for effective dosing, Second edition. [place]: [employer]System Pharmacists, 2018.    Symptom Assessment:  Nausea none  Constipation none LBM     Information obtained from: chart review, interview of patient, discussion with RN, and discussion with primary team  ______________________________________________________________________        OBJECTIVE:    Lab Results   Component Value Date    WBC 12.6 (H) 2024    HGB 13.5 2024    HCT 42.3 2024    MCV 92 2024      08/18/2024      Lab Results   Component Value Date    GLUCOSE 87 08/18/2024    CALCIUM 9.2 08/18/2024     08/18/2024    K 4.1 08/18/2024    CO2 24 08/18/2024     08/18/2024    BUN 15 08/18/2024    CREATININE 0.59 08/18/2024     Lab Results   Component Value Date    ALT 18 08/12/2024    AST 20 08/12/2024    ALKPHOS 68 08/12/2024    BILITOT 0.6 08/12/2024     Estimated Creatinine Clearance: 96.4 mL/min (by C-G formula based on SCr of 0.59 mg/dL).     Scheduled medications  atorvastatin, 80 mg, oral, Daily  calcitonin (salmon), 1 spray, One Nostril, Daily  cholecalciferol, 1,000 Units, oral, Daily  cyclobenzaprine, 10 mg, oral, q8h  docusate sodium, 100 mg, oral, BID  enoxaparin, 40 mg, subcutaneous, q24h  famotidine, 20 mg, oral, BID  gabapentin, 300 mg, oral, Nightly  lidocaine, 2 patch, transdermal, Daily  losartan, 100 mg, oral, Daily  metoprolol succinate XL, 100 mg, oral, Daily  polyethylene glycol, 17 g, oral, Daily      Continuous medications     PRN medications  bisacodyl, 5 mg, Daily PRN  cetirizine, 10 mg, Daily PRN  HYDROmorphone, 0.4 mg, q4h PRN  LORazepam, 0.5 mg, Daily PRN  methyl salicylate-menthol, 1 Application, TID PRN  ondansetron, 8 mg, q8h PRN  oxyCODONE, 10 mg, q4h PRN  sennosides, 1 tablet, Daily PRN      }     PHYSICAL EXAMINATION:    Vital Signs:   Vital signs reviewed  Visit Vitals  /74   Pulse 71   Temp 36.7 °C (98.1 °F)   Resp 18        0-10 (Numeric) Pain Score: 4       Physical Exam  Vitals reviewed.   Constitutional:       General: She is not in acute distress.     Comments: Mild discomfort with movement   HENT:      Mouth/Throat:      Mouth: Mucous membranes are moist.   Pulmonary:      Effort: Pulmonary effort is normal. No respiratory distress.   Abdominal:      General: There is no distension.   Skin:     Capillary Refill: Capillary refill takes less than 2 seconds.   Neurological:      Mental Status: She is alert and oriented to person, place, and time.    Psychiatric:         Mood and Affect: Mood normal.         Behavior: Behavior normal.         Thought Content: Thought content normal.         Cognition and Memory: Cognition normal.         Judgment: Judgment normal.          ASSESSMENT/PLAN:  Brittny Gordon is a 69 y.o. female diagnosed with likely metastatic NSCLC (lymph nodes, bone with pathologic fracture of L2 L3) s/p bronch/EBUS 8/5 not yet started on systemic therapy pending final pathology, plan for palliative RT to spine. PMH significant for HTN, HLD, osteporosis, AAA s/p repair, former smoker. Admitted 8/12/2024 from ED for further evaluation and management of worsening lumbar pain and new thoracic pain, imaging shows new pathologic fracture T12. Neurosurgery and Radiation Oncology consulted. Supportive and Palliative Oncology is consulted for pain management.      Cancer Related Pain:  Mid-lower back, rib pain related to metastatic NSCLC with bone mets, compression fractures L2 L3 T12  Pain is: cancer related pain  Type: somatic and neuropathic  Pain control: improved and well controlled  Home regimen:  Muscle Relaxers Flexeril TID and Tylenol with Codeine  Intolerances/previously tried: Acetaminophen  Personalized pain goal: 4  Total OME usage for the past 24 hours:  17.5  Completed a 5 day course of Dexamethasone 4 mg PO qAM x 5 days 8/14-8/18, can discuss with Rad Onc if more needed during completion of RT  Continue Oxycodone 10 mg to q4h prn moderate or severe pain  Continue Dilaudid 0.4 mg IV to q4h prn breakthrough pain  Please order 15 mg Oxycodone po once daily prior to RT while inpatient  Continue Flexeril 10 mg PO q8h scheduled  Continue gabapentin 300 mg PO qhs (started 8/13)  Reports she has a TLS brace made recently and it was too painful to use due to sensitivity of her muscles and skin, discussed retrying once pain improves with RT  Continue to monitor pain scores and administer PRN medications as appropriate  Continue/initiate  nonpharmacologic pain management strategies including ice/heat therapy, distraction techniques, deep breathing/relaxation techniques, calming music, and repositioning  Continue to monitor for signs of opioid efficacy (pain scores, improved functionality) and toxicity (pinpoint pupils, excess sedation/drowsiness/confusion, respiratory depression, etc.)    Anxiety:  Related to RT, health concerns, pain  Continue lorazepam 0.5 mg PO daily prior to RT PRN     Nausea:  At risk for nausea with vomiting related to opioids   Home regimen:  Ondansetron   Denies  Continue Ondansetron 8 mg PO q8h prn nausea first line   Continue Famotidine 20 mg daily while on dexamethasone     Constipation  At risk for constipation related to opioids, currently not constipated  Usual bowel pattern: every day  Home regimen: Miralax 17 gm daily and Bisacodyl 5 mg daily  LBM 8/15  Monitor BM frequency, adjust regimen as needed  Goal to have BM without straining q48-72h  Continue Bisacodyl 5 mg PO daily prn  Continue Miralax 17 gm PO daily   Continue Senna 1 tabs daily prn constipation  Pt would like to continue colace     Sleeping Difficulty:  Impaired sleep related to pain, improved with addition of gabapentin  Home regimen:  none  Pain management as above     Decreased appetite:  Appetite loss related to malignancy and pain  Weight loss 10 pounds  Home regimen:  none  Pain management as above     Medical Decision Making/Goals of Care/Advance Care Plannin2024 ELLY Magdaleno CNP  Patient's current clinical condition, including diagnosis, prognosis, and management plan, and goals of care were discussed.   Life limiting disease: metastatic malignancy  Family: Supportive brother  Performance status: Major  limitations due to pain  Joys/meaning/strength: [place]  Understanding of health: Demonstrates good understanding of disease process, understands plan for RT, that her cancer is incurable  Information:Wants full disclosure  Prognosis:  cancer treatment is palliative intent  Goals: symptom control and cancer directed therapy Pt understands her cancer is incurable, she is hoping to have her symptoms better managed so that she can participate in life and have as much normalcy as possible.   Worries and fears now and future: ongoing symptoms and having a poor quality of life    Minimum acceptable outcome/QOL:  independence in iADLs  Code status discussion:  Full     8/14/2024:  PT prefers to start RT urgently inpatient instead of waiting to be established at [place], she prefers to come to main campus. She would also like to establish a pain regimen that allows her to tolerate RT.    Advance Directives  Existence of Advance Directives: Completed, pt provided copies today of HCPOA and Living Will to place in chart  Decision maker: HCPOA is brother Lance Love  Code Status: Full code     Supportive and Palliative Oncology encounter:  Spoke with patient at bedside  Emotional support provided  Coordination of care      Supportive Interventions: Interventions: SPO Spiritual Care: offered     Disposition:  Please  start the process of having prior authorization with meds to beds deliver medications to patient prior to discharge via [facility] pharmacy. Prescriptions will need to be sent 48-72 hours prior to discharge so that a prior authorization can be completed.      Med recs for discharge:   Oxycodone 10 mg tablets take 10-15 (1-1.5 tablets) mg q 4 hrs prn #90 for 10 days  May take Oxycodone 15 mg po 1 hr prior to daily RT (total of 10 fractions)  Gabapentin 300 mg po q hs #30 for 30 days  Lorazepam 0.5 mg PO daily prior to RT PRN #10 for 10 days  Flexeril 10 mg po q 8 hrs prn #90 for 30 days  Dexamethasone 4 mg qAM STOP ON 8/18 AFTER LAST DOSE  Famotidine 20 mg po daily (while on Dex)  Bisacodyl 5 mg PO daily prn  Senna 2 tablets twice daily as needed  Miralax 17 gm PO daily   Please add Colace 100 mg BID per patient preference  Please add  Ondansetron 4 mg PO q6h prn nausea    Discharge date: unknown pending acute issues  Patient has an appointment with Outpatient Supportive Oncology scheduled for 8/23/2024 with Susan Leroy CNP  Pt provided with a printed list of symptom management medication plan and the ACS Pain Diary to track pain medication use, requested she bring this and medications to Supportive and Palliative Oncology outpatient visit.     Signature and billing:  Thank you for allowing us to participate in the care of this patient. Recommendations will be communicated back to the consulting service by way of shared electronic medical record or face-to-face.    Medical complexity was high level due to due to complexity of problems, extensive data review, and high risk of management/treatment.      Data:   Diagnostic tests and information reviewed for today's visit:  Conversation with primary team, Most recent labs, Medications    Some elements copied from my on 8/16/2024, the elements have been updated and all reflect current decision making from today, 08/19/24     Plan of Care discussed with: Provider, Patient    Thank you for asking Supportive and Palliative Oncology to assist with care of this patient.  Recommendations will be communicated back to the consulting service by way of shared electronic medical record/secure chat/email or face-to-face.   We will continue to follow  Please contact us for additional questions or concerns.      SIGNATURE: BOWEN Martinez-CNP, DNP   PAGER/CONTACT:  Contact information:  Supportive and Palliative Oncology  Monday-Friday 8 AM-5 PM  Epic Secure chat or pager [phone].  After hours and weekends:  pager [phone]

## 2024-08-19 NOTE — NURSING NOTE
Brittny Gordon discharged at 7:58 PM  and 08/19/24   Patient discharged home via private car .  Discharge education and teaching completed with Brittny Gordon   RN signature: cindy salazar

## 2024-08-19 NOTE — CARE PLAN
Problem: Pain - Adult  Goal: Verbalizes/displays adequate comfort level or baseline comfort level  8/19/2024 1852 by Danielle Peter RN  Outcome: Adequate for Discharge  8/19/2024 1455 by Danielle Peter RN  Outcome: Progressing     Problem: Safety - Adult  Goal: Free from fall injury  8/19/2024 1852 by Danielle Peter RN  Outcome: Adequate for Discharge  8/19/2024 1455 by Danielle Peter RN  Outcome: Progressing     Problem: Discharge Planning  Goal: Discharge to home or other facility with appropriate resources  8/19/2024 1852 by Danielle Peter RN  Outcome: Adequate for Discharge  8/19/2024 1455 by Danielle Peter RN  Outcome: Progressing     Problem: Chronic Conditions and Co-morbidities  Goal: Patient's chronic conditions and co-morbidity symptoms are monitored and maintained or improved  8/19/2024 1852 by Danielle Peter RN  Outcome: Adequate for Discharge  8/19/2024 1455 by Danielle Peter RN  Outcome: Progressing     Problem: Pain  Goal: Takes deep breaths with improved pain control throughout the shift  8/19/2024 1852 by Danielle Peter RN  Outcome: Adequate for Discharge  8/19/2024 1455 by Danielle Peter RN  Outcome: Progressing  Goal: Turns in bed with improved pain control throughout the shift  8/19/2024 1852 by Danielle Peter RN  Outcome: Adequate for Discharge  8/19/2024 1455 by Danielle Peter RN  Outcome: Progressing  Goal: Walks with improved pain control throughout the shift  8/19/2024 1852 by Danielle Peter RN  Outcome: Adequate for Discharge  8/19/2024 1455 by Danielle Peter RN  Outcome: Progressing  Goal: Performs ADL's with improved pain control throughout shift  8/19/2024 1852 by Danielle Peter RN  Outcome: Adequate for Discharge  8/19/2024 1455 by Danielle Peter RN  Outcome: Progressing  Goal: Participates in PT with improved pain control throughout the shift  8/19/2024 1852 by Danielle Peter RN  Outcome: Adequate for Discharge  8/19/2024 1455 by Danielle Peter RN  Outcome: Progressing  Goal: Free from opioid side effects throughout  the shift  8/19/2024 1852 by Danielle Peter RN  Outcome: Adequate for Discharge  8/19/2024 1455 by Danielle Peter RN  Outcome: Progressing  Goal: Free from acute confusion related to pain meds throughout the shift  8/19/2024 1852 by Danielle Peter RN  Outcome: Adequate for Discharge  8/19/2024 1455 by Danielle Peter RN  Outcome: Progressing

## 2024-08-20 ENCOUNTER — HOSPITAL ENCOUNTER (OUTPATIENT)
Dept: RADIATION ONCOLOGY | Facility: HOSPITAL | Age: 69
Setting detail: RADIATION/ONCOLOGY SERIES
Discharge: HOME | End: 2024-08-20
Payer: MEDICARE

## 2024-08-20 ENCOUNTER — RADIATION ONCOLOGY OTV (OUTPATIENT)
Dept: RADIATION ONCOLOGY | Facility: HOSPITAL | Age: 69
End: 2024-08-20
Payer: MEDICARE

## 2024-08-20 VITALS
BODY MASS INDEX: 25.36 KG/M2 | OXYGEN SATURATION: 96 % | WEIGHT: 161.9 LBS | TEMPERATURE: 97 F | DIASTOLIC BLOOD PRESSURE: 74 MMHG | SYSTOLIC BLOOD PRESSURE: 126 MMHG | RESPIRATION RATE: 18 BRPM | HEART RATE: 83 BPM

## 2024-08-20 DIAGNOSIS — Z51.0 ENCOUNTER FOR ANTINEOPLASTIC RADIATION THERAPY: ICD-10-CM

## 2024-08-20 DIAGNOSIS — C79.51 SECONDARY MALIGNANT NEOPLASM OF BONE (MULTI): ICD-10-CM

## 2024-08-20 DIAGNOSIS — C34.11 MALIGNANT NEOPLASM OF UPPER LOBE, RIGHT BRONCHUS OR LUNG (MULTI): ICD-10-CM

## 2024-08-20 LAB
RAD ONC MSQ ACTUAL FRACTIONS DELIVERED: 2
RAD ONC MSQ ACTUAL SESSION DELIVERED DOSE: 300 CGRAY
RAD ONC MSQ ACTUAL TOTAL DOSE: 600 CGRAY
RAD ONC MSQ ELAPSED DAYS: 1
RAD ONC MSQ LAST DATE: NORMAL
RAD ONC MSQ PRESCRIBED FRACTIONAL DOSE: 300 CGRAY
RAD ONC MSQ PRESCRIBED NUMBER OF FRACTIONS: 10
RAD ONC MSQ PRESCRIBED TECHNIQUE: NORMAL
RAD ONC MSQ PRESCRIBED TOTAL DOSE: 3000 CGRAY
RAD ONC MSQ PRESCRIPTION PATTERN COMMENT: NORMAL
RAD ONC MSQ START DATE: NORMAL
RAD ONC MSQ TREATMENT COURSE NUMBER: 1
RAD ONC MSQ TREATMENT SITE: NORMAL

## 2024-08-20 PROCEDURE — 77412 RADIATION TX DELIVERY LVL 3: CPT | Performed by: STUDENT IN AN ORGANIZED HEALTH CARE EDUCATION/TRAINING PROGRAM

## 2024-08-20 NOTE — PROGRESS NOTES
Radiation Oncology On Treatment Visit    Patient Name:  Brittny Gordon  MRN:  [id number]  :  1955    Referring Provider: No ref. provider found  Primary Care Provider: Camila Chaudhary MD MPH  Care Team: Patient Care Team:  Camila Chaudhary MD MPH as PCP - General (Hematology and Oncology)  Jose Rafael Hart MD as PCP - [facility] ACO Attributed Provider  Camila Chaudhary MD MPH as Consulting Physician (Hematology and Oncology)    Date of Service: 2024     Diagnosis:   Specialty Problems          Radiation Oncology Problems    Primary malignant neoplasm of right lung metastatic to other site (Multi)         Treatment Summary:  3D CRT: Not Applicable Spine    Treatment Period Technique Fraction Dose Fractions Total Dose   Course 1 2024-2024  (days elapsed: 1)         T12-L3 2024-2024 AP/ / 300 cGy 2 / 10 600 / 3,000 cGy     SUBJECTIVE: Fatigue, requiring rest. Denies nausea or vomiting. Severe pain, seeing supportive oncology.      OBJECTIVE:   Vital Signs:  /74   Pulse 83   Temp 36.1 °C (97 °F) (Temporal)   Resp 18   Wt 73.4 kg (161 lb 14.4 oz)   SpO2 96%   BMI 25.36 kg/m²    Pain Scale: The patient's current pain level was assessed.  They report currently having a pain of 8 out of 10.    Other Pertinent Findings:     Toxicity Assessment          2024    11:28   Toxicity Assessment   Adverse Events Reviewed (WDL) No (Exceptions to WDL)   Treatment Site General   Anorexia Grade 1       10-15 lbs over last few weeks   Anxiety Grade 0   Dehydration Grade 0   Depression Grade 0   Dermatitis Radiation Grade 0   Diarrhea Grade 0   Fatigue Grade 1   Fibrosis Deep Connective Tissue Grade 0   Fracture Grade 0   Nausea Grade 0   Pain Grade 3   Treatment Related Secondary Malignancy Grade 0   Tumor Pain Grade 3   Vomiting Grade 0   Alopecia Grade 0   Erythroderma Grade 0   Pain of Skin Grade 0   Pruritus Grade 0   Rash Acneiform Grade 0   Skin Hyperpigmentation Grade 0   Skin Hypopigmentation  Grade 0   Skin Induration Grade 0   Skin Ulceration Grade 0   Telangiectasia Grade 0        Concurrent systemic therapy: fosaprepitant (Emend) 150 mg in sodium chloride 0.9% 250 mL IV, 150 mg, intravenous, Once, 0 of 4 cycles        CARBOplatin (Paraplatin) in sodium chloride 0.9% 100 mL IV, , intravenous, Once, 0 of 4 cycles        PACLitaxeL (Taxol) 330 mg in dextrose 5% 305 mL IV, 175 mg/m2 = 330 mg (87.5 % of original dose 200 mg/m2), intravenous, Once, 0 of 4 cycles    Dose modification: 175 mg/m2 (original dose 200 mg/m2, Cycle 1, Reason: Dose Not Tolerated)        methylPREDNISolone sod succinate (SOLU-Medrol) 40 mg/mL injection 40 mg, 40 mg, intravenous, As needed, 0 of 4 cycles        palonosetron (Aloxi) injection 250 mcg, 250 mcg, intravenous, Once, 0 of 4 cycles        pembrolizumab (Keytruda) 200 mg in sodium chloride 0.9% 108 mL IV, 200 mg, intravenous, Once, 0 of 4 cycles      Labs:   WBC   Date Value Ref Range Status   08/18/2024 12.6 (H) 4.4 - 11.3 x10*3/uL Final   08/17/2024 10.3 4.4 - 11.3 x10*3/uL Final     Hemoglobin   Date Value Ref Range Status   08/18/2024 13.5 12.0 - 16.0 g/dL Final   08/17/2024 13.1 12.0 - 16.0 g/dL Final     Hematocrit   Date Value Ref Range Status   08/18/2024 42.3 36.0 - 46.0 % Final   08/17/2024 41.4 36.0 - 46.0 % Final     Neutrophils Absolute   Date Value Ref Range Status   08/18/2024 7.91 (H) 1.20 - 7.70 x10*3/uL Final     Comment:     Percent differential counts (%) should be interpreted in the context of the absolute cell counts (cells/uL).   08/17/2024 6.58 1.20 - 7.70 x10*3/uL Final     Comment:     Percent differential counts (%) should be interpreted in the context of the absolute cell counts (cells/uL).     Platelets   Date Value Ref Range Status   08/18/2024 207 150 - 450 x10*3/uL Final   08/17/2024   Final     Comment:     PLATELET CLUMPS PRECLUDE QUANTITATION. PLATELET ESTIMATE APPEARS ADEQUATE     Alkaline Phosphatase   Date Value Ref Range Status    08/12/2024 68 33 - 136 U/L Final   07/31/2024 83 33 - 136 U/L Final     AST   Date Value Ref Range Status   08/12/2024 20 9 - 39 U/L Final     Comment:     MILD HEMOLYSIS DETECTED. The result may be falsely elevated due to hemolysis or other interferents. Clinical correlation is recommended. Repeat testing may be considered.   07/31/2024 20 9 - 39 U/L Final     ALT   Date Value Ref Range Status   08/12/2024 18 7 - 45 U/L Final     Comment:     Patients treated with Sulfasalazine may generate falsely decreased results for ALT.   07/31/2024 22 7 - 45 U/L Final     Comment:     Patients treated with Sulfasalazine may generate falsely decreased results for ALT.     Bilirubin, Total   Date Value Ref Range Status   08/12/2024 0.6 0.0 - 1.2 mg/dL Final   07/31/2024 0.7 0.0 - 1.2 mg/dL Final     Glucose   Date Value Ref Range Status   08/18/2024 87 74 - 99 mg/dL Final   08/17/2024 88 74 - 99 mg/dL Final     Calcium   Date Value Ref Range Status   08/18/2024 9.2 8.6 - 10.6 mg/dL Final   08/17/2024 8.7 8.6 - 10.6 mg/dL Final     Sodium   Date Value Ref Range Status   08/18/2024 139 136 - 145 mmol/L Final   08/17/2024 138 136 - 145 mmol/L Final     Potassium   Date Value Ref Range Status   08/18/2024 4.1 3.5 - 5.3 mmol/L Final   08/17/2024 4.0 3.5 - 5.3 mmol/L Final     Comment:     MILD HEMOLYSIS DETECTED. The result may be falsely elevated due to hemolysis or other interferents. Clinical correlation is recommended. Repeat testing may be considered.     Bicarbonate   Date Value Ref Range Status   08/18/2024 24 21 - 32 mmol/L Final   08/17/2024 24 21 - 32 mmol/L Final     Chloride   Date Value Ref Range Status   08/18/2024 104 98 - 107 mmol/L Final   08/17/2024 105 98 - 107 mmol/L Final     Urea Nitrogen   Date Value Ref Range Status   08/18/2024 15 6 - 23 mg/dL Final   08/17/2024 13 6 - 23 mg/dL Final     Creatinine   Date Value Ref Range Status   08/18/2024 0.59 0.50 - 1.05 mg/dL Final   08/17/2024 0.56 0.50 - 1.05 mg/dL  Final         Exam: Neurologically intact.     Assessment / Plan:  The patient is tolerating radiation therapy as anticipated.  Continue per current treatment plan.       Therapies: Continue pain management with supportive oncology. Monitor for nausea or vomiting.    Side effects reviewed with patient. Images, chart and labs reviewed.    Nagi Ospina MD

## 2024-08-21 ENCOUNTER — HOSPITAL ENCOUNTER (OUTPATIENT)
Dept: RADIATION ONCOLOGY | Facility: HOSPITAL | Age: 69
Setting detail: RADIATION/ONCOLOGY SERIES
Discharge: HOME | End: 2024-08-21
Payer: MEDICARE

## 2024-08-21 ENCOUNTER — TELEPHONE (OUTPATIENT)
Dept: CARDIAC SURGERY | Facility: CLINIC | Age: 69
End: 2024-08-21
Payer: MEDICARE

## 2024-08-21 DIAGNOSIS — Z51.0 ENCOUNTER FOR ANTINEOPLASTIC RADIATION THERAPY: ICD-10-CM

## 2024-08-21 DIAGNOSIS — C34.11 MALIGNANT NEOPLASM OF UPPER LOBE, RIGHT BRONCHUS OR LUNG (MULTI): ICD-10-CM

## 2024-08-21 DIAGNOSIS — C79.51 SECONDARY MALIGNANT NEOPLASM OF BONE (MULTI): ICD-10-CM

## 2024-08-21 LAB
RAD ONC MSQ ACTUAL FRACTIONS DELIVERED: 3
RAD ONC MSQ ACTUAL SESSION DELIVERED DOSE: 300 CGRAY
RAD ONC MSQ ACTUAL TOTAL DOSE: 900 CGRAY
RAD ONC MSQ ELAPSED DAYS: 2
RAD ONC MSQ LAST DATE: NORMAL
RAD ONC MSQ PRESCRIBED FRACTIONAL DOSE: 300 CGRAY
RAD ONC MSQ PRESCRIBED NUMBER OF FRACTIONS: 10
RAD ONC MSQ PRESCRIBED TECHNIQUE: NORMAL
RAD ONC MSQ PRESCRIBED TOTAL DOSE: 3000 CGRAY
RAD ONC MSQ PRESCRIPTION PATTERN COMMENT: NORMAL
RAD ONC MSQ START DATE: NORMAL
RAD ONC MSQ TREATMENT COURSE NUMBER: 1
RAD ONC MSQ TREATMENT SITE: NORMAL

## 2024-08-21 PROCEDURE — 77412 RADIATION TX DELIVERY LVL 3: CPT | Performed by: STUDENT IN AN ORGANIZED HEALTH CARE EDUCATION/TRAINING PROGRAM

## 2024-08-21 PROCEDURE — 77336 RADIATION PHYSICS CONSULT: CPT | Performed by: STUDENT IN AN ORGANIZED HEALTH CARE EDUCATION/TRAINING PROGRAM

## 2024-08-22 ENCOUNTER — HOSPITAL ENCOUNTER (OUTPATIENT)
Dept: RADIATION ONCOLOGY | Facility: HOSPITAL | Age: 69
Setting detail: RADIATION/ONCOLOGY SERIES
Discharge: HOME | End: 2024-08-22
Payer: MEDICARE

## 2024-08-22 DIAGNOSIS — C79.51 SECONDARY MALIGNANT NEOPLASM OF BONE (MULTI): ICD-10-CM

## 2024-08-22 DIAGNOSIS — Z51.0 ENCOUNTER FOR ANTINEOPLASTIC RADIATION THERAPY: ICD-10-CM

## 2024-08-22 DIAGNOSIS — C34.11 MALIGNANT NEOPLASM OF UPPER LOBE, RIGHT BRONCHUS OR LUNG (MULTI): ICD-10-CM

## 2024-08-22 LAB
RAD ONC MSQ ACTUAL FRACTIONS DELIVERED: 4
RAD ONC MSQ ACTUAL SESSION DELIVERED DOSE: 300 CGRAY
RAD ONC MSQ ACTUAL TOTAL DOSE: 1200 CGRAY
RAD ONC MSQ ELAPSED DAYS: 3
RAD ONC MSQ LAST DATE: NORMAL
RAD ONC MSQ PRESCRIBED FRACTIONAL DOSE: 300 CGRAY
RAD ONC MSQ PRESCRIBED NUMBER OF FRACTIONS: 10
RAD ONC MSQ PRESCRIBED TECHNIQUE: NORMAL
RAD ONC MSQ PRESCRIBED TOTAL DOSE: 3000 CGRAY
RAD ONC MSQ PRESCRIPTION PATTERN COMMENT: NORMAL
RAD ONC MSQ START DATE: NORMAL
RAD ONC MSQ TREATMENT COURSE NUMBER: 1
RAD ONC MSQ TREATMENT SITE: NORMAL

## 2024-08-22 PROCEDURE — 77412 RADIATION TX DELIVERY LVL 3: CPT | Performed by: STUDENT IN AN ORGANIZED HEALTH CARE EDUCATION/TRAINING PROGRAM

## 2024-08-23 ENCOUNTER — TELEPHONE (OUTPATIENT)
Dept: PALLIATIVE MEDICINE | Facility: HOSPITAL | Age: 69
End: 2024-08-23

## 2024-08-23 ENCOUNTER — OFFICE VISIT (OUTPATIENT)
Dept: PALLIATIVE MEDICINE | Facility: HOSPITAL | Age: 69
End: 2024-08-23
Payer: MEDICARE

## 2024-08-23 ENCOUNTER — APPOINTMENT (OUTPATIENT)
Dept: RADIATION ONCOLOGY | Facility: HOSPITAL | Age: 69
DRG: 175 | End: 2024-08-23
Payer: MEDICARE

## 2024-08-23 ENCOUNTER — TELEPHONE (OUTPATIENT)
Dept: HEMATOLOGY/ONCOLOGY | Facility: HOSPITAL | Age: 69
End: 2024-08-23
Payer: MEDICARE

## 2024-08-23 ENCOUNTER — OFFICE VISIT (OUTPATIENT)
Dept: HEMATOLOGY/ONCOLOGY | Facility: HOSPITAL | Age: 69
End: 2024-08-23
Payer: MEDICARE

## 2024-08-23 VITALS
TEMPERATURE: 96.4 F | WEIGHT: 160.05 LBS | RESPIRATION RATE: 17 BRPM | BODY MASS INDEX: 25.07 KG/M2 | DIASTOLIC BLOOD PRESSURE: 80 MMHG | OXYGEN SATURATION: 94 % | SYSTOLIC BLOOD PRESSURE: 137 MMHG | HEART RATE: 94 BPM

## 2024-08-23 DIAGNOSIS — R12 HEARTBURN: ICD-10-CM

## 2024-08-23 DIAGNOSIS — S22.000A COMPRESSION FRACTURE OF BODY OF THORACIC VERTEBRA (MULTI): ICD-10-CM

## 2024-08-23 DIAGNOSIS — K59.03 CONSTIPATION DUE TO PAIN MEDICATION THERAPY: ICD-10-CM

## 2024-08-23 DIAGNOSIS — C34.91 PRIMARY MALIGNANT NEOPLASM OF RIGHT LUNG METASTATIC TO OTHER SITE (MULTI): Primary | ICD-10-CM

## 2024-08-23 DIAGNOSIS — C34.91 PRIMARY MALIGNANT NEOPLASM OF RIGHT LUNG METASTATIC TO OTHER SITE (MULTI): ICD-10-CM

## 2024-08-23 DIAGNOSIS — F41.9 ANXIETY AND DEPRESSION: ICD-10-CM

## 2024-08-23 DIAGNOSIS — R63.0 DECREASED APPETITE: ICD-10-CM

## 2024-08-23 DIAGNOSIS — F32.A ANXIETY AND DEPRESSION: ICD-10-CM

## 2024-08-23 DIAGNOSIS — G89.3 CANCER RELATED PAIN: ICD-10-CM

## 2024-08-23 DIAGNOSIS — G89.3 CANCER ASSOCIATED PAIN: Primary | ICD-10-CM

## 2024-08-23 PROCEDURE — 99214 OFFICE O/P EST MOD 30 MIN: CPT | Performed by: STUDENT IN AN ORGANIZED HEALTH CARE EDUCATION/TRAINING PROGRAM

## 2024-08-23 PROCEDURE — 1125F AMNT PAIN NOTED PAIN PRSNT: CPT | Performed by: NURSE PRACTITIONER

## 2024-08-23 PROCEDURE — 1157F ADVNC CARE PLAN IN RCRD: CPT | Performed by: NURSE PRACTITIONER

## 2024-08-23 PROCEDURE — 1157F ADVNC CARE PLAN IN RCRD: CPT | Performed by: STUDENT IN AN ORGANIZED HEALTH CARE EDUCATION/TRAINING PROGRAM

## 2024-08-23 PROCEDURE — 1111F DSCHRG MED/CURRENT MED MERGE: CPT | Performed by: NURSE PRACTITIONER

## 2024-08-23 PROCEDURE — 3075F SYST BP GE 130 - 139MM HG: CPT | Performed by: NURSE PRACTITIONER

## 2024-08-23 PROCEDURE — 1111F DSCHRG MED/CURRENT MED MERGE: CPT | Performed by: STUDENT IN AN ORGANIZED HEALTH CARE EDUCATION/TRAINING PROGRAM

## 2024-08-23 PROCEDURE — 99215 OFFICE O/P EST HI 40 MIN: CPT | Performed by: NURSE PRACTITIONER

## 2024-08-23 PROCEDURE — 3079F DIAST BP 80-89 MM HG: CPT | Performed by: NURSE PRACTITIONER

## 2024-08-23 PROCEDURE — 1159F MED LIST DOCD IN RCRD: CPT | Performed by: NURSE PRACTITIONER

## 2024-08-23 RX ORDER — PROCHLORPERAZINE MALEATE 10 MG
10 TABLET ORAL EVERY 6 HOURS PRN
Qty: 30 TABLET | Refills: 5 | Status: SHIPPED | OUTPATIENT
Start: 2024-08-23

## 2024-08-23 RX ORDER — OLANZAPINE 5 MG/1
5 TABLET ORAL NIGHTLY
Qty: 4 TABLET | Refills: 3 | Status: SHIPPED | OUTPATIENT
Start: 2024-08-23

## 2024-08-23 RX ORDER — CYCLOBENZAPRINE HCL 10 MG
10 TABLET ORAL 3 TIMES DAILY PRN
Qty: 90 TABLET | Refills: 2 | Status: SHIPPED | OUTPATIENT
Start: 2024-08-23

## 2024-08-23 RX ORDER — MORPHINE SULFATE 15 MG/1
15 TABLET, FILM COATED, EXTENDED RELEASE ORAL 2 TIMES DAILY
Qty: 30 TABLET | Refills: 0 | Status: SHIPPED | OUTPATIENT
Start: 2024-08-23 | End: 2024-09-07

## 2024-08-23 RX ORDER — ONDANSETRON HYDROCHLORIDE 8 MG/1
8 TABLET, FILM COATED ORAL EVERY 8 HOURS PRN
Qty: 30 TABLET | Refills: 5 | Status: SHIPPED | OUTPATIENT
Start: 2024-08-23

## 2024-08-23 RX ORDER — GABAPENTIN 300 MG/1
300 CAPSULE ORAL NIGHTLY
Qty: 30 CAPSULE | Refills: 2 | Status: SHIPPED | OUTPATIENT
Start: 2024-08-23

## 2024-08-23 RX ORDER — OMEPRAZOLE 20 MG/1
20 TABLET, DELAYED RELEASE ORAL DAILY
Qty: 30 TABLET | Refills: 2 | COMMUNITY
Start: 2024-08-23 | End: 2024-11-21

## 2024-08-23 RX ORDER — NALOXONE HYDROCHLORIDE 4 MG/.1ML
4 SPRAY NASAL AS NEEDED
Qty: 2 EACH | Refills: 0 | Status: SHIPPED | OUTPATIENT
Start: 2024-08-23 | End: 2025-08-23

## 2024-08-23 ASSESSMENT — PAIN SCALES - GENERAL: PAINLEVEL: 8

## 2024-08-23 NOTE — TELEPHONE ENCOUNTER
RN called patient about appointment scheduled for 8/23. RN was calling patient in an attempt to reschedule patient after noticing that insurance has not covered treatment visit. No answer at this time. RN left a message on patient's voicemail asking for a call back. Call back number was provided.

## 2024-08-23 NOTE — PROGRESS NOTES
"SUPPORTIVE AND PALLIATIVE ONCOLOGY CONSULT - OUTPATIENT FOLLOW UP      SERVICE DATE: 8/23/2024    Referred by:  Camila Chaudhary MD MPH   Medical Oncologist: Camila Chaudhary MD MPH   Radiation Oncologist: No care team member to display  Primary Physician: Camila Chaudhary  [phone]    REASON FOR CONSULT/CHIEF CONSULT COMPLAINT: Pain management and Introduction to Supportive and Palliative Oncology Services    Subjective   HISTORY OF PRESENT ILLNESS: Brittny Gordon is a 69 y.o. female who presents with  likely metastatic NSCLC (lymph nodes, bone with pathologic fracture of L2, L3, T12) s/p bronch/EBUS 8/5 not yet started on systemic therapy pending final pathology, plan for palliative RT to spine.      PMH significant for HTN, HLD, osteporosis, AAA s/p repair, former smoker.     Symptom Assessment:    Pain:very much     \"Pain moves\"   T / L spine   Sharp, stabbing intermittently  Constant deep ache    Current regimen   Oxy 10mg x5-6 doses   15 prior to radiation   Flexeril - unsure if helping   Ran out of gabapentin 300mg at bedtime     Tylenol and NSAIDs causes GI upset/heartburn - very adamant regarding this      Numbness or tingling in hands/feet/other: none  Headache: none  Lack of appetite: none  Weight loss: a little 10-12 lbs  Nausea/early satiety: none  Vomiting: none  Constipation: a little  Diarrhea: none  Lack of energy: none  Difficulty sleeping: somewhat better with oxycodone  Anxiety: somewhat  Depression: a little  Shortness of breath: none  Other: heartburn    Information obtained from: interview of patient and interview of family  ______________________________________________________________________     Oncology History   Primary malignant neoplasm of right lung metastatic to other site (Multi)   8/9/2024 Initial Diagnosis    Primary malignant neoplasm of right lung metastatic to other site (Multi)     8/23/2024 -  Chemotherapy    Pembrolizumab + PACLitaxel / CARBOplatin, 21 Day Cycles         Past Medical " History:   Diagnosis Date    Hypercholesteremia     Hypertension     Lung nodule seen on imaging study     lung nodules concerning for metastatic lung cancer to the bone    Saccular aneurysm ([facility])     Saccular aneurysm of left AICA, 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 Lives with cousin's granddaughter.   Social History:  reports that she quit smoking about 11 years ago. Her smoking use included cigarettes. She started smoking about 51 years ago. She has a 20 pack-year smoking history. She has never used smokeless tobacco. She reports current alcohol use. She reports that she does not currently use drugs.      Review of systems negative unless noted in HPI.       Objective       Current Outpatient Medications   Medication Instructions    atorvastatin (Lipitor) 80 mg tablet Take 1 tablet (80 mg) by mouth once daily.    bisacodyl (DULCOLAX) 5 mg, oral, Daily PRN, Do not crush, chew, or split.    bisacodyl (DULCOLAX) 5 mg, oral, Daily PRN, Do not crush, chew, or split.    cetirizine (ZYRTEC) 10 mg, oral, Daily PRN    cholecalciferol (VITAMIN D3) 1,000 Units, oral, Daily    cyclobenzaprine (FLEXERIL) 10 mg, oral, Nightly PRN    cyclobenzaprine (FLEXERIL) 10 mg, oral, Every 8 hours    docusate sodium (COLACE) 100 mg, oral, 2 times daily    docusate sodium (COLACE) 100 mg, oral, 2 times daily    gabapentin (NEURONTIN) 300 mg, oral, Nightly    ibandronate (BONIVA) 150 mg, oral, Every 30 days, Take in morning with  full glass of water on an empty stomach. No food, drink, meds, or lying down for 60 minutes after.    LORazepam (ATIVAN) 0.5 mg, oral, Daily PRN    losartan (COZAAR) 100 mg, oral, Daily    menthol (ICY HOT ADVANCED RELIEF PATCH TOP) 1 patch, topical (top), Every 12 hours PRN    metoprolol succinate XL (TOPROL-XL) 100 mg, oral, Daily    ondansetron (ZOFRAN) 4 mg, oral, Every 6 hours PRN    ondansetron (ZOFRAN) 4 mg, oral, Every 6 hours PRN    oxyCODONE (Roxicodone) 5 mg immediate release tablet Take 2 to 3 tablets (10-15 mg) by mouth every 4 hours if needed for severe pain (7 - 10) (please take 3 tabs before radiation sessions) for up to 15 days.    polyethylene glycol (Glycolax, Miralax) 17 gram/dose powder Mix 17 g of powder with 8 oz of water and drink by mouth once daily.    polyethylene glycol (GLYCOLAX, MIRALAX) 17 g, oral, Daily    sennosides (SENOKOT) 8.6 mg, oral, Daily PRN    vit A/vit C/vit E/zinc/copper (PRESERVISION AREDS ORAL) 1 tablet, oral, Daily       Allergies:   Allergies   Allergen Reactions    Acetaminophen GI Upset    Epinephrine Nausea/vomiting and Palpitations             Creatinine   Date Value Ref Range Status   08/18/2024 0.59 0.50 - 1.05 mg/dL Final     AST   Date Value Ref Range Status   08/12/2024 20 9 - 39 U/L Final     Comment:     MILD HEMOLYSIS DETECTED. The result may be falsely elevated due to hemolysis or other interferents. Clinical correlation is recommended. Repeat testing may be considered.     ALT   Date Value Ref Range Status   08/12/2024 18 7 - 45 U/L Final     Comment:     Patients treated with Sulfasalazine may generate falsely decreased results for ALT.     Hemoglobin   Date Value Ref Range Status   08/18/2024 13.5 12.0 - 16.0 g/dL Final     Platelets   Date Value Ref Range Status   08/18/2024 207 150 - 450 x10*3/uL Final          PHYSICAL EXAMINATION  Vital Signs:       8/18/2024     9:28 PM 8/19/2024     6:48 AM 8/19/2024     8:27 AM 8/19/2024    12:45 PM 8/19/2024      5:16 PM 8/20/2024    11:17 AM 8/23/2024     8:33 AM   Vitals   Systolic 136 121 125 119 111 126 137   Diastolic 83 75 74 86 72 74 80   Heart Rate 64 63 71 76 66 83 94   Temp 36 °C (96.8 °F) 36.3 °C (97.3 °F) 36.7 °C (98.1 °F) 36.9 °C (98.4 °F) 37.4 °C (99.3 °F) 36.1 °C (97 °F) 35.8 °C (96.4 °F)   Resp 16 16 18 18 18 18 17   Weight (lb)      161.9 160.05   BMI      25.36 kg/m2 25.07 kg/m2   BSA (m2)      1.86 m2 1.85 m2   Visit Report       Report    Report          Physical Exam  HENT:      Head: Normocephalic and atraumatic.   Eyes:      Extraocular Movements: Extraocular movements intact.      Conjunctiva/sclera: Conjunctivae normal.   Pulmonary:      Effort: Pulmonary effort is normal.   Abdominal:      General: Abdomen is flat.   Musculoskeletal:         General: Normal range of motion.   Neurological:      General: No focal deficit present.      Mental Status: She is alert.   Psychiatric:         Mood and Affect: Mood normal.         Thought Content: Thought content normal.         ASSESSMENT/PLAN    Pain  Pain is: cancer related pain  Type: somatic and neuropathic  -continue oxycodone 10mg q4 PRN (may increase to 15mg q4 PRN)   -start Mscontin 15mg BID - will likely need increase to 30mg BID  -oxycodone 15mg 1 hr prior to RT  -resume gabapentin 300mg at bedtime   -continue flexeril 10mg TID PRN  -intolerant to tylenol and NSAIDs (GI upset with both)       Opioid Use  Medication Management:   - OARRS report reviewed with no aberrant behavior; consistent with  prescriptions/records and patient history  - MED 90.  Overdose Risk Score 180.   This has been discussed with patient.   - We will continue to closely monitor the patient for signs of prescription misuse including UDS, OARRS review and subjective reports at each visit.  - Concurrent benzodiazepine use   - I am a provider who either is or has consulted and collaborated with a provider certified in Hospice and Palliative Medicine and have conducted a  face-face visit and examination for this patient.  - Routine Urine Drug Screen complete at next in person visit.   - Controlled Substance Agreement complete at next in person visit.   - Specifically discussed that controlled substance prescriptions will only be provided by our group as outlined in the completed agreement  - Prescribed naloxone Rx 8/23/24  - Red Flags: None    Constipation   At risk for constipation related to opioids  -continue senna 1 tab daily and miralax 17g daily PRN    Altered Mood  Acute anxiety related to health concerns and RT  -continue ativan 0.5mg 1 hr prior to RT    Sleeping Difficulty:  Impaired sleep related to pain   -see above     Decreased appetite  Related to  pain   -see above     Heartburn:   -start omeprazole 20mg daily 30 min prior to breakfast     Introduction to Supportive and Palliative Oncology:  Spoke with patient and brother    Introduced the role and philosophy of Supportive and Palliative oncology in the evaluation and management of symptoms during cancer treatment      Medical Decision Making/Goals of Care/Advance Care Planning:  Patient's current clinical condition, including diagnosis, prognosis, and management plan, and goals of care were discussed.   Life limiting disease: metastatic malignancy  Goals: symptom control and cancer directed therapy  She and brother understand palliative intent of treatment     Advance Directives  Existence of Advance Directives:Yes, documentation or copy in medical record  Decision maker: TOMMIE is [employer]     Next Follow-Up Visit:  Return to clinic in 2-4 weeks     Signature and billing  Thank you for allowing us to participate in the care of this patient. Recommendations will be communicated back to the consulting service by way of shared electronic medical record or face-to-face.    Medical complexity was moderate level due to due to complexity of problems, extensive data review, and high risk of  management/treatment.  Time was spent on the following: Prep Time, Time Directly with Patient/Family/Caregiver, Documentation Time. Total time spent: 45      DATA   Diagnostic tests and information reviewed for today's visit:  Most recent labs and imaging results, Medications       Some elements copied from Supportive Oncology note on 8/19/24, the elements have been updated and all reflect current decision making from today, 8/23/2024.      Plan of Care discussed with: Patient, Family/Significant Other: brother, Juice, and RN      SIGNATURE: BOWEN Peoples-CNP    Contact information:  Supportive and Palliative Oncology  Monday-Friday 8 AM-5 PM  Phone:  [phone], press option #5, then option #1.   Or Epic Secure Chat

## 2024-08-23 NOTE — PROGRESS NOTES
Subjective:   Patient Name: Brittny Gordon    : 1955     MRN: [id number]     Age: 69 y.o.     Gender: female  Referring Provider:   Dr. Naun Hammonds (Thoracic Surgery)    CC: Management of newly diagnosed stage IVB ([id number]) primary non-small cell carcinoma of RUL, involving multiple bone mets. Liquid biopsy at diagnosis 2024 positive for KRAS G12V, TP53 M243T, JAK2 and KEAP 1.    EBUS 2024 of the PDL-1 TPS 55%; NGS (in-house): KEAP1 p.G419W ([id number] c.1255G>T);   KRAS p.G12V ([id number] c.35G>T)    Presenting History (2024): At time of initial presentation a 69 y.o. female, former smoker (started at age 17, 0.5 pack per day, quitted at age 56) with a past medical history of HTN, HLD and osteoporosis, aortic aneurysm s/p repairment in  referred for management of newly diagnosed lung cancer.     2019: CT chest (+): Noncalcified nodule in the left upper lobe adjacent to the aortic arch, nodule measuring 1.3 x 1.3 x 0.8cm.    2019: Pet/CT: 1. Mild hypermetabolic tracer activity corresponding to the known posteromedial left upper lobe lung nodule.    2021: CT chest (-): stable URMILA nodule 1.1x1.0cm. Again stable in 2021, 2022, 2022.     However on the CT chest on 2023 the URMILA nodule measured 1.6x1.4cm, which has been growing slowly compared to before, There is also a irregular shape solid-appearing nodule in the RUL measuring up to 0.8cm.     2024: CT chest (-) showed interval increase of the RUL nodule measuring 1.1cm. Multiple new nodules in both lower lobes measuring 0.4cm or smaller.     2024: Pet/CT: Enlarging FDG avid 1.1 cm spiculated nodule in the right upper lobe, as well as FDG avid mediastinal and bilateral hilar nodes is suspicious for a primary lung neoplasm with teresa metastases. Mid FDG avid left upper lobe paravertebral nodule, grossly stable in comparison to prior CT in 2019, favored to be benign. Multiple FDG avid bone lesions  throughout the axial and appendicular skeleton, likely representing widespread bone metastases. FDG avidity within the right ribs as well as lower thoracic and lumbar vertebral bodies as described above, could represent pathologic fractures in the setting of metastatic disease.      7/19/2024: MRI brain (+/-):  7 mm avidly enhancing extra-axial appearing nodule along the left pontine medullary junction adjacent to the left vertebral artery. 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 Clinical correlation and attention on follow-up suggested. No additional abnormal enhancement identified.    8/5/2024: Underwent EBUS with FNA of 11Rs, 3R, 10R and RUL nodule: path showed non small cell carcinoma. IHC showed positive for AE1/AE3, CK7 and negative for TTF1, p40 and INSM1. PDL-1 TPS 55%. Additional immunostain show the tumor cells are negative for TRPS1, PAX8, and CDX2. SMARCA4 immunostain shows retained nuclear expression. these findings argue against breast, GYN, and colorectal primaries, respectively. Overall, findings could be compatible with a lung primary non-small cell carcinoma in appropriate clinical and radiologic settings. PDL-1 TPS 55%; NGS (in-house): KEAP1 p.G419W ([id number] c.1255G>T); KRAS p.G12V ([id number] c.35G>T)      She presents for a consultation with her brother. She has been having back pain in June when she was doing gardening. She was then found to have  acute L3 osteoporotic compression fracture. She is still having back pain and she could not bend. No numbness or weakness of the lower extremities. No urinary or fecal incontinence. No CP or cough. Her appetite has been low since June. No significant weight loss. No n/v. She has been having intermittent diarrhea for 5 years. No HA. No vision change.     PMHx: HTN, HLD, and osteoporosis  Family history: father had lymphoma. Bother and sister passed away at early 40s. Mother had skin cancer (rare type but  unknown type); brother has prostate cancer.   Shx: Occasional drinker. CEPA.        Treatment Summary:  - 08/19/2024 - 9/2/2024: 3000 cGy to T12-L3 spine (Dr. Ospina)    Interval History (8/23/2024):  She presents for a follow up visit accompanied by her brother. She was recently admitted for pain control and started on palliative RT to the L3 spine.  She continues to have worsening back pain, maybe a little bit worse than before. She also follows supportive oncology for pain control. No CP, SOB, fever, chills, n/v.       ROS:  Patient denies the below review of systems, except for changes as noted in the interval history.    General:  Fatigue, anorexia, fevers, sweats, chills, heat/cold intolerance, weight changes.  HEENT:  Icterus, congestion, sore throat, rhinorrhea, taste change, voice changes.  Neurology:  Headache, dizziness, vision changes, hearing changes, other motor/sensory loss, gait instability, neuropathies.  Pulmonology:  Cough, wheezing, hemoptysis, dyspnea at rest, dyspnea on exertion, pleuritic chest pain.  Cardiology:  Chest pain, palpitations, orthopnea, paroxysmal nocturnal dyspnea.  Gastroenterology:  Nausea, vomiting, reflux symptoms, abdominal pain, constipation, diarrhea, melena, bright red blood per rectum.  Genitourinary:  Dysuria, polyuria, urine color change, urine smell change, hematuria.   Musculoskeletal:  Arthralgias, myalgias, joint swelling, focal weakness.  Skin:  Rash, pruritis, jaundice, petechiae, nail changes, skin nodules/growth.  Psychology:  Anxiety, depression, suicidal ideation, homicidal ideation.  Heme:  Easy bleeding or bruising.  Others:   All other systems reviewed and are negative.    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  on Brain MRI    Wedge compression fracture of t11-T12 vertebra, sequela 07/30/2020    Compression fracture of T11 vertebra, sequela       Surgical History:   Past Surgical History:   Procedure Laterality Date    ARTERIAL ANEURYSM REPAIR      Thoracic aortic aneurysm repair    COLONOSCOPY      RADIOFREQUENCY ABLATION      L3,4,5    WISDOM TOOTH EXTRACTION         Family History:  Family History   Problem Relation Name Age of Onset    Lymphoma Father      Polycythemia Father      Breast cancer Neg Hx      Colon cancer Neg Hx         Allergies:  Allergies   Allergen Reactions    Acetaminophen GI Upset    Epinephrine Nausea/vomiting and Palpitations       Meds (Current):    Current Outpatient Medications:     atorvastatin (Lipitor) 80 mg tablet, Take 1 tablet (80 mg) by mouth once daily., Disp: , Rfl:     bisacodyl (Dulcolax) 5 mg EC tablet, Take 1 tablet (5 mg) by mouth once daily as needed for constipation. Do not crush, chew, or split., Disp: 30 tablet, Rfl: 0    bisacodyl (Dulcolax) 5 mg EC tablet, Take 1 tablet (5 mg) by mouth once daily as needed for constipation. Do not crush, chew, or split., Disp: 30 tablet, Rfl: 0    cetirizine (ZyrTEC) 10 mg tablet, Take 1 tablet (10 mg) by mouth once daily as needed for allergies., Disp: , Rfl:     cholecalciferol (Vitamin D3) 25 MCG (1000 UT) tablet, Take 1 tablet (1,000 Units) by mouth once daily., Disp: , Rfl:     cyclobenzaprine (Flexeril) 10 mg tablet, Take 1 tablet (10 mg) by mouth 3 times a day as needed for muscle spasms., Disp: 90 tablet, Rfl: 2    docusate sodium (Colace) 100 mg capsule, Take 1 capsule (100 mg) by mouth 2 times a day., Disp: 60 capsule, Rfl: 0    docusate sodium (Colace) 100 mg capsule, Take 1 capsule (100 mg) by mouth 2 times a day., Disp: 30 capsule, Rfl: 1    gabapentin (Neurontin) 300 mg capsule, Take 1 capsule (300 mg) by mouth once daily at bedtime., Disp: 30 capsule, Rfl: 2    ibandronate (Boniva) 150 mg tablet, Take 1 tablet (150 mg) by  mouth every 30 (thirty) days. Take in morning with full glass of water on an empty stomach. No food, drink, meds, or lying down for 60 minutes after., Disp: 3 tablet, Rfl: 3    LORazepam (Ativan) 0.5 mg tablet, Take 1 tablet (0.5 mg) by mouth once daily as needed for anxiety (PRIOR to RADIATION) for up to 10 days., Disp: 10 tablet, Rfl: 0    losartan (Cozaar) 100 mg tablet, TAKE 1 TABLET BY MOUTH EVERY DAY, Disp: 90 tablet, Rfl: 3    menthol (ICY HOT ADVANCED RELIEF PATCH TOP), Apply 1 patch topically every 12 hours if needed., Disp: , Rfl:     metoprolol succinate XL (Toprol-XL) 100 mg 24 hr tablet, TAKE 1 TABLET BY MOUTH EVERY DAY, Disp: 90 tablet, Rfl: 3    morphine CR (MS Contin) 15 mg 12 hr tablet, Take 1 tablet (15 mg) by mouth 2 times a day for 15 days. Do not crush, chew, or split., Disp: 30 tablet, Rfl: 0    naloxone (Narcan) 4 mg/0.1 mL nasal spray, Administer 1 spray (4 mg) into affected nostril(s) if needed for opioid reversal or respiratory depression. May repeat every 2-3 minutes if needed, alternating nostrils, until medical assistance becomes available., Disp: 2 each, Rfl: 0    OLANZapine (ZyPREXA) 5 mg tablet, Take 1 tablet (5 mg) by mouth once daily at bedtime. For 4 days starting the evening of treatment, Disp: 4 tablet, Rfl: 3    omeprazole OTC (PriLOSEC OTC) 20 mg EC tablet, Take 1 tablet (20 mg) by mouth once daily. Do not crush, chew, or split., Disp: 30 tablet, Rfl: 2    ondansetron (Zofran) 4 mg tablet, Take 1 tablet (4 mg) by mouth every 6 hours if needed for nausea or vomiting., Disp: 20 tablet, Rfl: 0    ondansetron (Zofran) 8 mg tablet, Take 0.5 tablets (4 mg) by mouth every 6 hours if needed for nausea or vomiting., Disp: 20 tablet, Rfl: 0    ondansetron (Zofran) 8 mg tablet, Take 1 tablet (8 mg) by mouth every 8 hours if needed for nausea or vomiting., Disp: 30 tablet, Rfl: 5    oxyCODONE (Roxicodone) 5 mg immediate release tablet, Take 2 to 3 tablets (10-15 mg) by mouth every 4 hours  if needed for severe pain (7 - 10) (please take 3 tabs before radiation sessions) for up to 15 days., Disp: 270 tablet, Rfl: 0    polyethylene glycol (Glycolax, Miralax) 17 gram/dose powder, Mix 17 g of powder with 8 oz of water and drink by mouth once daily., Disp: 238 g, Rfl: 0    polyethylene glycol (Glycolax, Miralax) 17 gram/dose powder, Take 17 g by mouth once daily., Disp: 1700 g, Rfl: 1    prochlorperazine (Compazine) 10 mg tablet, Take 1 tablet (10 mg) by mouth every 6 hours if needed for nausea or vomiting., Disp: 30 tablet, Rfl: 5    sennosides (Senokot) 8.6 mg tablet, Take 1 tablet (8.6 mg) by mouth once daily as needed for constipation., Disp: , Rfl:     vit A/vit C/vit E/zinc/copper (PRESERVISION AREDS ORAL), Take 1 tablet by mouth early in the morning.., Disp: , Rfl:     Pain Assessment:  Aches in the back     Performance Status (ECOG): 1         ECOG Definition  0 Fully active; no performance restrictions.  1 Strenuous physical activity restricted; fully ambulatory and able to carry out light work.  2 Capable of all self-care but unable to carry out any work activities. Up and about >50% of waking hours.  3 Capable of only limited self-care; confined to bed or chair >50% of waking hours.  4 Completely disabled; cannot carry out any self-care; totally confined to bed or chair.      Exam:  There were no vitals taken for this visit.  General: Well appearing, no acute distress  Neurology: Alert and oriented x3, CN II-XII grossly intact  Psychology: Affect appropriate  Eyes: PERRL, EOMI  ENT: Mucus membranes moist and intact, no ulcers  Lymphatics: No palpable adenopathy  Neck: Supple, no masses  Respiratory: Lungs clear bilaterally, no wheezes, no rales, no rhonchi  Cardiovascular: Normal rate and rhythm, no murmur  Abdomen: Soft, non-tender, non-distended, normoactive, no hepatosplenomegaly, no ascites  Musculoskeletal: Normal gait and stance  Extremities: No clubbing, no cyanosis, no edema  Skin: No  rash  Genitourinary: Deferred  Rectal: Deferred   Pelvic: Deferred  Breasts: Deferred    Labs:  Results from last 7 days   Lab Units 08/18/24  0700 08/17/24  0710   WBC AUTO x10*3/uL 12.6* 10.3   HEMOGLOBIN g/dL 13.5 13.1   HEMATOCRIT % 42.3 41.4   PLATELETS AUTO x10*3/uL 207  --    NEUTROS ABS x10*3/uL 7.91* 6.58   LYMPHS ABS AUTO x10*3/uL 3.37 2.57   MONOS ABS AUTO x10*3/uL 1.09* 1.07*   EOS ABS AUTO x10*3/uL 0.10 0.05   NEUTROS PCT AUTO % 62.8 63.6   LYMPHS PCT AUTO % 26.8 24.9   MONOS PCT AUTO % 8.7 10.3   EOS PCT AUTO % 0.8 0.5      Results from last 7 days   Lab Units 08/18/24  0700 08/17/24  0710   GLUCOSE mg/dL 87 88   SODIUM mmol/L 139 138   POTASSIUM mmol/L 4.1 4.0   CHLORIDE mmol/L 104 105   CO2 mmol/L 24 24   BUN mg/dL 15 13   CREATININE mg/dL 0.59 0.56   EGFR mL/min/1.73m*2 >90 >90   CALCIUM mg/dL 9.2 8.7   PHOSPHORUS mg/dL 3.5 2.7   ALBUMIN g/dL 3.6 3.6       Assessment/Plan:   69 y.o. female with past medical history as stated referred for management of newly diagnosed lung cancer    The patient's records and imaging have been reviewed in detail.  I have discussed with the patient the natural history of their disease at length.  The following has also been discussed with the patient:    # Cancer:  Working stage IV ([id number]) cancer likely lung primary. IHC positive for CK7 but negative for TTF1 or p40. Liquid biopsy at diagnosis showed  KRAS G12V, TP53 M243T, JAK2 and KEAP 1. Reached out to pathology - confirmed this is probably lung primary.  PD-L1 55%. We discussed treatment options with either standard care single agent pembrolizumab+/- carboplatin/paclitaxel vs clinical trial options. She is scheduled to start palliatve RT to spine lesions. She declined clinical trial options for now and would like to puruse with standard of care treatment. We discussed the palliative nature of all interventions. We  specifically discussed carboplatin AUC 5 IV D#1, paclitaxel 175 mg/m2 IV D#1 and pembrolizumab  (Keytruda) 200mg IV D#1 every 21 days for 4 planned cycles based on tolerance and disease response. This is based off of the KEYNOTE-407 trial (Leonidas et al, NEJM 2018; Leonidas, JTO ). The study showed that combination of pembrolizumab and chemotherapy showed improved OS (17.1 mo vs 11.6 mo; HR 0.71) and PFS (8 mo vs 5.1 mo; HR 0.57). Also showed combination therapy was associated with longer PFS to next line treatmetn or death in the updated analysis. Maintenance therapy with pembrolizumab 200 mg IV every 3 weeks thereafter if disease stable to responsive also discussed. Side effects including but not limited to pain, fatigue, anorexia, nausea, vomiting, diarrhea, stomatitis, electrolyte disturbances, anemia, neutropenia, thrombocytopenia, generalized weakness, skin rash, hair loss, neuropathies, and immune phenomena were reviewed. Written information provided. All questions answered. Consent signed.     - Interval Imagin2024: Pet/CT: Enlarging FDG avid 1.1 cm spiculated nodule in the right upper lobe, as well as FDG avid mediastinal and bilateral hilar nodes is suspicious for a primary lung neoplasm with teresa metastases. Mid FDG avid left upper lobe paravertebral nodule, grossly stable in comparison to prior CT in 2019, favored to be benign. Multiple FDG avid bone lesions throughout the axial and appendicular skeleton, likely representing widespread bone metastases. FDG avidity within the right ribs as well as lower thoracic and lumbar vertebral bodies as described above, could represent pathologic fractures in the setting of metastatic disease. 2024: MRI brain (+/-):  7 mm avidly enhancing extra-axial appearing nodule along the left pontine medullary junction adjacent to the left vertebral artery. 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 Clinical correlation and attention on follow-up suggested. No additional abnormal enhancement  identified.      # Clinical Trials:  None currently. 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 Also referred to NSG.     # Access: Peripheral veins.    # Pain: Aches in back and L hip - not requiring any pain mets. Refilled her pain meds today. Currently following with supportive oncology.     # Bone Health: Diffuse bone lesion, likely metastases. Currently getting pallative RT to T12-L3. To complete on 9/2/2024.    # Psychosocial: Coping well, no acute issues.  Good support from family & friends.  Social work support offered.    # GI/CINV: No acute issues.  Eating and voiding well.  Nutrition consult offered.    # Smoking: N/A, former smoker.    # Fertility: N/A.  Oncofertility support available as needed.      # Heme/ESAs: N/A.    # GOC: Patient is aware of palliative intent goals of care.    # Language: English.    # Interdisciplinary Patient/Family Education Record:  Learner:  Patient.  Learning Needs Reviewed:  Treatments, Medications, Disease Process, Diagnostic Tests.  Barriers: None.  Teaching Method: Discussion, Handout(s).  Comprehension:  Verbalized Understanding.  Follow-up Plan:  Return to office as per MD.    # Dispo: RTC in  2 weeks to start treatment.

## 2024-08-26 ENCOUNTER — HOSPITAL ENCOUNTER (OUTPATIENT)
Dept: RADIATION ONCOLOGY | Facility: HOSPITAL | Age: 69
Setting detail: RADIATION/ONCOLOGY SERIES
Discharge: HOME | End: 2024-08-26
Payer: MEDICARE

## 2024-08-26 ENCOUNTER — APPOINTMENT (OUTPATIENT)
Dept: NEUROSURGERY | Facility: CLINIC | Age: 69
End: 2024-08-26
Payer: MEDICARE

## 2024-08-26 DIAGNOSIS — S22.000A COMPRESSION FRACTURE OF BODY OF THORACIC VERTEBRA (MULTI): Primary | ICD-10-CM

## 2024-08-26 DIAGNOSIS — C34.91 PRIMARY MALIGNANT NEOPLASM OF RIGHT LUNG METASTATIC TO OTHER SITE (MULTI): ICD-10-CM

## 2024-08-26 DIAGNOSIS — C34.11 MALIGNANT NEOPLASM OF UPPER LOBE, RIGHT BRONCHUS OR LUNG (MULTI): ICD-10-CM

## 2024-08-26 DIAGNOSIS — Z51.0 ENCOUNTER FOR ANTINEOPLASTIC RADIATION THERAPY: ICD-10-CM

## 2024-08-26 DIAGNOSIS — C79.51 SECONDARY MALIGNANT NEOPLASM OF BONE (MULTI): ICD-10-CM

## 2024-08-26 LAB
RAD ONC MSQ ACTUAL FRACTIONS DELIVERED: 5
RAD ONC MSQ ACTUAL SESSION DELIVERED DOSE: 300 CGRAY
RAD ONC MSQ ACTUAL TOTAL DOSE: 1500 CGRAY
RAD ONC MSQ ELAPSED DAYS: 7
RAD ONC MSQ LAST DATE: NORMAL
RAD ONC MSQ PRESCRIBED FRACTIONAL DOSE: 300 CGRAY
RAD ONC MSQ PRESCRIBED NUMBER OF FRACTIONS: 10
RAD ONC MSQ PRESCRIBED TECHNIQUE: NORMAL
RAD ONC MSQ PRESCRIBED TOTAL DOSE: 3000 CGRAY
RAD ONC MSQ PRESCRIPTION PATTERN COMMENT: NORMAL
RAD ONC MSQ START DATE: NORMAL
RAD ONC MSQ TREATMENT COURSE NUMBER: 1
RAD ONC MSQ TREATMENT SITE: NORMAL

## 2024-08-26 PROCEDURE — 2500000001 HC RX 250 WO HCPCS SELF ADMINISTERED DRUGS (ALT 637 FOR MEDICARE OP)

## 2024-08-26 PROCEDURE — 77412 RADIATION TX DELIVERY LVL 3: CPT | Performed by: STUDENT IN AN ORGANIZED HEALTH CARE EDUCATION/TRAINING PROGRAM

## 2024-08-26 RX ORDER — OXYCODONE HYDROCHLORIDE 5 MG/1
TABLET ORAL
Status: DISCONTINUED
Start: 2024-08-26 | End: 2024-08-26 | Stop reason: WASHOUT

## 2024-08-26 RX ORDER — OXYCODONE HYDROCHLORIDE 5 MG/1
15 TABLET ORAL ONCE
Status: COMPLETED | OUTPATIENT
Start: 2024-08-26 | End: 2024-08-26

## 2024-08-26 RX ORDER — OXYCODONE HYDROCHLORIDE 5 MG/1
15 CAPSULE ORAL ONCE
Status: DISCONTINUED | OUTPATIENT
Start: 2024-08-26 | End: 2024-08-26

## 2024-08-26 ASSESSMENT — PAIN SCALES - GENERAL: PAINLEVEL_OUTOF10: 8

## 2024-08-26 NOTE — PROGRESS NOTES
Patient evaluated in clinic today. She currently takes 15mg oxycodone before treatment as prescribed by the palliative team and 10mg Q4H otherwise but missed her doses today and has not received oxycodone since last night. She reports significant pain in her lower back due to this missed dose without any new emergency symptoms. 15mg oxycodone delivered in clinic today.    Reviewed and approved by KAYLIE SPIVEY on 8/26/24 at 11:26 AM.

## 2024-08-27 ENCOUNTER — APPOINTMENT (OUTPATIENT)
Dept: RADIOLOGY | Facility: HOSPITAL | Age: 69
End: 2024-08-27
Payer: MEDICARE

## 2024-08-27 ENCOUNTER — CLINICAL SUPPORT (OUTPATIENT)
Dept: EMERGENCY MEDICINE | Facility: HOSPITAL | Age: 69
End: 2024-08-27
Payer: MEDICARE

## 2024-08-27 ENCOUNTER — HOSPITAL ENCOUNTER (INPATIENT)
Facility: HOSPITAL | Age: 69
Discharge: HOME | End: 2024-08-27
Attending: EMERGENCY MEDICINE | Admitting: STUDENT IN AN ORGANIZED HEALTH CARE EDUCATION/TRAINING PROGRAM
Payer: MEDICARE

## 2024-08-27 ENCOUNTER — RADIATION ONCOLOGY OTV (OUTPATIENT)
Dept: RADIATION ONCOLOGY | Facility: HOSPITAL | Age: 69
End: 2024-08-27
Payer: MEDICARE

## 2024-08-27 ENCOUNTER — TUMOR BOARD CONFERENCE (OUTPATIENT)
Dept: HEMATOLOGY/ONCOLOGY | Facility: CLINIC | Age: 69
End: 2024-08-27
Payer: MEDICARE

## 2024-08-27 ENCOUNTER — HOSPITAL ENCOUNTER (OUTPATIENT)
Dept: RADIATION ONCOLOGY | Facility: HOSPITAL | Age: 69
Setting detail: RADIATION/ONCOLOGY SERIES
Discharge: HOME | End: 2024-08-27
Payer: MEDICARE

## 2024-08-27 VITALS
OXYGEN SATURATION: 80 % | TEMPERATURE: 97.9 F | DIASTOLIC BLOOD PRESSURE: 67 MMHG | SYSTOLIC BLOOD PRESSURE: 96 MMHG | HEART RATE: 88 BPM | RESPIRATION RATE: 18 BRPM

## 2024-08-27 DIAGNOSIS — Z51.0 ENCOUNTER FOR ANTINEOPLASTIC RADIATION THERAPY: ICD-10-CM

## 2024-08-27 DIAGNOSIS — C34.11 MALIGNANT NEOPLASM OF UPPER LOBE, RIGHT BRONCHUS OR LUNG (MULTI): ICD-10-CM

## 2024-08-27 DIAGNOSIS — C79.51 SECONDARY MALIGNANT NEOPLASM OF BONE (MULTI): ICD-10-CM

## 2024-08-27 DIAGNOSIS — C34.91 PRIMARY MALIGNANT NEOPLASM OF RIGHT LUNG METASTATIC TO OTHER SITE (MULTI): ICD-10-CM

## 2024-08-27 DIAGNOSIS — S22.080A COMPRESSION FRACTURE OF T11 VERTEBRA, INITIAL ENCOUNTER (MULTI): ICD-10-CM

## 2024-08-27 DIAGNOSIS — I26.99 OTHER ACUTE PULMONARY EMBOLISM, UNSPECIFIED WHETHER ACUTE COR PULMONALE PRESENT (MULTI): Primary | ICD-10-CM

## 2024-08-27 DIAGNOSIS — R09.3 INCREASED SPUTUM PRODUCTION: ICD-10-CM

## 2024-08-27 DIAGNOSIS — R60.0 LOCALIZED EDEMA: ICD-10-CM

## 2024-08-27 DIAGNOSIS — Z13.6 ENCOUNTER FOR SCREENING FOR CARDIOVASCULAR DISORDERS: ICD-10-CM

## 2024-08-27 DIAGNOSIS — R05.8 SPUTUM PRODUCTION: ICD-10-CM

## 2024-08-27 DIAGNOSIS — M79.89 SWELLING OF LOWER LIMB: ICD-10-CM

## 2024-08-27 DIAGNOSIS — C34.91 PRIMARY MALIGNANT NEOPLASM OF RIGHT LUNG METASTATIC TO OTHER SITE (MULTI): Primary | ICD-10-CM

## 2024-08-27 LAB
ALBUMIN SERPL BCP-MCNC: 3.5 G/DL (ref 3.4–5)
ALP SERPL-CCNC: 78 U/L (ref 33–136)
ALT SERPL W P-5'-P-CCNC: 27 U/L (ref 7–45)
ANION GAP BLDV CALCULATED.4IONS-SCNC: 8 MMOL/L (ref 10–25)
ANION GAP SERPL CALC-SCNC: 19 MMOL/L (ref 10–20)
APTT PPP: 31 SECONDS (ref 27–38)
AST SERPL W P-5'-P-CCNC: 38 U/L (ref 9–39)
ATRIAL RATE: 103 BPM
BASE EXCESS BLDV CALC-SCNC: 0.5 MMOL/L (ref -2–3)
BASOPHILS # BLD AUTO: 0.05 X10*3/UL (ref 0–0.1)
BASOPHILS NFR BLD AUTO: 0.4 %
BILIRUB SERPL-MCNC: 0.8 MG/DL (ref 0–1.2)
BNP SERPL-MCNC: 69 PG/ML (ref 0–99)
BODY TEMPERATURE: 37 DEGREES CELSIUS
BUN SERPL-MCNC: 32 MG/DL (ref 6–23)
CA-I BLDV-SCNC: 1.15 MMOL/L (ref 1.1–1.33)
CALCIUM SERPL-MCNC: 8.7 MG/DL (ref 8.6–10.6)
CARDIAC TROPONIN I PNL SERPL HS: 15 NG/L (ref 0–34)
CHLORIDE BLDV-SCNC: 92 MMOL/L (ref 98–107)
CHLORIDE SERPL-SCNC: 91 MMOL/L (ref 98–107)
CHLORIDE UR-SCNC: <15 MMOL/L
CHLORIDE/CREATININE (MMOL/G) IN URINE: NORMAL
CO2 SERPL-SCNC: 21 MMOL/L (ref 21–32)
CREAT SERPL-MCNC: 1.64 MG/DL (ref 0.5–1.05)
CREAT UR-MCNC: 46 MG/DL (ref 20–320)
EGFRCR SERPLBLD CKD-EPI 2021: 34 ML/MIN/1.73M*2
EOSINOPHIL # BLD AUTO: 0.13 X10*3/UL (ref 0–0.7)
EOSINOPHIL NFR BLD AUTO: 1 %
ERYTHROCYTE [DISTWIDTH] IN BLOOD BY AUTOMATED COUNT: 12.7 % (ref 11.5–14.5)
EST. AVERAGE GLUCOSE BLD GHB EST-MCNC: 123 MG/DL
FLUAV RNA RESP QL NAA+PROBE: NOT DETECTED
FLUBV RNA RESP QL NAA+PROBE: NOT DETECTED
GLUCOSE BLDV-MCNC: 114 MG/DL (ref 74–99)
GLUCOSE SERPL-MCNC: 107 MG/DL (ref 74–99)
HBA1C MFR BLD: 5.9 %
HCO3 BLDV-SCNC: 27 MMOL/L (ref 22–26)
HCT VFR BLD AUTO: 36.5 % (ref 36–46)
HCT VFR BLD EST: 39 % (ref 36–46)
HGB BLD-MCNC: 12.3 G/DL (ref 12–16)
HGB BLDV-MCNC: 13 G/DL (ref 12–16)
HOLD SPECIMEN: NORMAL
IMM GRANULOCYTES # BLD AUTO: 0.08 X10*3/UL (ref 0–0.7)
IMM GRANULOCYTES NFR BLD AUTO: 0.6 % (ref 0–0.9)
INR PPP: 1.3 (ref 0.9–1.1)
LACTATE BLDV-SCNC: 1 MMOL/L (ref 0.4–2)
LYMPHOCYTES # BLD AUTO: 0.84 X10*3/UL (ref 1.2–4.8)
LYMPHOCYTES NFR BLD AUTO: 6.6 %
MCH RBC QN AUTO: 28.7 PG (ref 26–34)
MCHC RBC AUTO-ENTMCNC: 33.7 G/DL (ref 32–36)
MCV RBC AUTO: 85 FL (ref 80–100)
MONOCYTES # BLD AUTO: 1.2 X10*3/UL (ref 0.1–1)
MONOCYTES NFR BLD AUTO: 9.4 %
NEUTROPHILS # BLD AUTO: 10.51 X10*3/UL (ref 1.2–7.7)
NEUTROPHILS NFR BLD AUTO: 82 %
NRBC BLD-RTO: 0 /100 WBCS (ref 0–0)
OXYHGB MFR BLDV: 35.1 % (ref 45–75)
P AXIS: 70 DEGREES
P OFFSET: 198 MS
P ONSET: 133 MS
PCO2 BLDV: 50 MM HG (ref 41–51)
PH BLDV: 7.34 PH (ref 7.33–7.43)
PLATELET # BLD AUTO: 200 X10*3/UL (ref 150–450)
PO2 BLDV: 27 MM HG (ref 35–45)
POTASSIUM BLDV-SCNC: 4.4 MMOL/L (ref 3.5–5.3)
POTASSIUM SERPL-SCNC: 4.4 MMOL/L (ref 3.5–5.3)
POTASSIUM UR-SCNC: 17 MMOL/L
POTASSIUM/CREAT UR-RTO: 37 MMOL/G CREAT
PR INTERVAL: 178 MS
PROT SERPL-MCNC: 6.9 G/DL (ref 6.4–8.2)
PROTHROMBIN TIME: 14.1 SECONDS (ref 9.8–12.8)
Q ONSET: 222 MS
QRS COUNT: 17 BEATS
QRS DURATION: 82 MS
QT INTERVAL: 352 MS
QTC CALCULATION(BAZETT): 461 MS
QTC FREDERICIA: 421 MS
R AXIS: 52 DEGREES
RAD ONC MSQ ACTUAL FRACTIONS DELIVERED: 6
RAD ONC MSQ ACTUAL SESSION DELIVERED DOSE: 300 CGRAY
RAD ONC MSQ ACTUAL TOTAL DOSE: 1800 CGRAY
RAD ONC MSQ ELAPSED DAYS: 8
RAD ONC MSQ LAST DATE: NORMAL
RAD ONC MSQ PRESCRIBED FRACTIONAL DOSE: 300 CGRAY
RAD ONC MSQ PRESCRIBED NUMBER OF FRACTIONS: 10
RAD ONC MSQ PRESCRIBED TECHNIQUE: NORMAL
RAD ONC MSQ PRESCRIBED TOTAL DOSE: 3000 CGRAY
RAD ONC MSQ PRESCRIPTION PATTERN COMMENT: NORMAL
RAD ONC MSQ START DATE: NORMAL
RAD ONC MSQ TREATMENT COURSE NUMBER: 1
RAD ONC MSQ TREATMENT SITE: NORMAL
RBC # BLD AUTO: 4.29 X10*6/UL (ref 4–5.2)
SAO2 % BLDV: 36 % (ref 45–75)
SARS-COV-2 RNA RESP QL NAA+PROBE: NOT DETECTED
SODIUM BLDV-SCNC: 123 MMOL/L (ref 136–145)
SODIUM SERPL-SCNC: 127 MMOL/L (ref 136–145)
SODIUM UR-SCNC: <10 MMOL/L
SODIUM/CREAT UR-RTO: NORMAL
T AXIS: 70 DEGREES
T OFFSET: 398 MS
TSH SERPL-ACNC: 0.72 MIU/L (ref 0.44–3.98)
VENTRICULAR RATE: 103 BPM
WBC # BLD AUTO: 12.8 X10*3/UL (ref 4.4–11.3)

## 2024-08-27 PROCEDURE — 85610 PROTHROMBIN TIME: CPT | Performed by: EMERGENCY MEDICINE

## 2024-08-27 PROCEDURE — 70450 CT HEAD/BRAIN W/O DYE: CPT | Performed by: RADIOLOGY

## 2024-08-27 PROCEDURE — 84132 ASSAY OF SERUM POTASSIUM: CPT

## 2024-08-27 PROCEDURE — 82947 ASSAY GLUCOSE BLOOD QUANT: CPT | Performed by: EMERGENCY MEDICINE

## 2024-08-27 PROCEDURE — 2500000002 HC RX 250 W HCPCS SELF ADMINISTERED DRUGS (ALT 637 FOR MEDICARE OP, ALT 636 FOR OP/ED): Performed by: EMERGENCY MEDICINE

## 2024-08-27 PROCEDURE — 70450 CT HEAD/BRAIN W/O DYE: CPT

## 2024-08-27 PROCEDURE — 99291 CRITICAL CARE FIRST HOUR: CPT | Performed by: EMERGENCY MEDICINE

## 2024-08-27 PROCEDURE — 99223 1ST HOSP IP/OBS HIGH 75: CPT

## 2024-08-27 PROCEDURE — 83036 HEMOGLOBIN GLYCOSYLATED A1C: CPT

## 2024-08-27 PROCEDURE — 51798 US URINE CAPACITY MEASURE: CPT

## 2024-08-27 PROCEDURE — 72125 CT NECK SPINE W/O DYE: CPT | Performed by: RADIOLOGY

## 2024-08-27 PROCEDURE — 77417 THER RADIOLOGY PORT IMAGE(S): CPT | Performed by: STUDENT IN AN ORGANIZED HEALTH CARE EDUCATION/TRAINING PROGRAM

## 2024-08-27 PROCEDURE — 72125 CT NECK SPINE W/O DYE: CPT

## 2024-08-27 PROCEDURE — 96374 THER/PROPH/DIAG INJ IV PUSH: CPT

## 2024-08-27 PROCEDURE — 71275 CT ANGIOGRAPHY CHEST: CPT | Performed by: STUDENT IN AN ORGANIZED HEALTH CARE EDUCATION/TRAINING PROGRAM

## 2024-08-27 PROCEDURE — 71045 X-RAY EXAM CHEST 1 VIEW: CPT

## 2024-08-27 PROCEDURE — 77412 RADIATION TX DELIVERY LVL 3: CPT | Performed by: STUDENT IN AN ORGANIZED HEALTH CARE EDUCATION/TRAINING PROGRAM

## 2024-08-27 PROCEDURE — 2500000004 HC RX 250 GENERAL PHARMACY W/ HCPCS (ALT 636 FOR OP/ED)

## 2024-08-27 PROCEDURE — 99285 EMERGENCY DEPT VISIT HI MDM: CPT | Mod: 25

## 2024-08-27 PROCEDURE — 93005 ELECTROCARDIOGRAM TRACING: CPT

## 2024-08-27 PROCEDURE — 96375 TX/PRO/DX INJ NEW DRUG ADDON: CPT

## 2024-08-27 PROCEDURE — 84484 ASSAY OF TROPONIN QUANT: CPT | Performed by: EMERGENCY MEDICINE

## 2024-08-27 PROCEDURE — 82435 ASSAY OF BLOOD CHLORIDE: CPT | Performed by: EMERGENCY MEDICINE

## 2024-08-27 PROCEDURE — 80053 COMPREHEN METABOLIC PANEL: CPT

## 2024-08-27 PROCEDURE — 2550000001 HC RX 255 CONTRASTS: Performed by: EMERGENCY MEDICINE

## 2024-08-27 PROCEDURE — 82436 ASSAY OF URINE CHLORIDE: CPT

## 2024-08-27 PROCEDURE — 96365 THER/PROPH/DIAG IV INF INIT: CPT | Mod: 59

## 2024-08-27 PROCEDURE — 2500000004 HC RX 250 GENERAL PHARMACY W/ HCPCS (ALT 636 FOR OP/ED): Performed by: EMERGENCY MEDICINE

## 2024-08-27 PROCEDURE — 36415 COLL VENOUS BLD VENIPUNCTURE: CPT | Performed by: EMERGENCY MEDICINE

## 2024-08-27 PROCEDURE — 85730 THROMBOPLASTIN TIME PARTIAL: CPT | Performed by: EMERGENCY MEDICINE

## 2024-08-27 PROCEDURE — 2500000002 HC RX 250 W HCPCS SELF ADMINISTERED DRUGS (ALT 637 FOR MEDICARE OP, ALT 636 FOR OP/ED)

## 2024-08-27 PROCEDURE — 94640 AIRWAY INHALATION TREATMENT: CPT

## 2024-08-27 PROCEDURE — 84133 ASSAY OF URINE POTASSIUM: CPT

## 2024-08-27 PROCEDURE — 93010 ELECTROCARDIOGRAM REPORT: CPT | Performed by: EMERGENCY MEDICINE

## 2024-08-27 PROCEDURE — 71045 X-RAY EXAM CHEST 1 VIEW: CPT | Performed by: STUDENT IN AN ORGANIZED HEALTH CARE EDUCATION/TRAINING PROGRAM

## 2024-08-27 PROCEDURE — 81001 URINALYSIS AUTO W/SCOPE: CPT

## 2024-08-27 PROCEDURE — 82810 BLOOD GASES O2 SAT ONLY: CPT

## 2024-08-27 PROCEDURE — 85025 COMPLETE CBC W/AUTO DIFF WBC: CPT | Performed by: EMERGENCY MEDICINE

## 2024-08-27 PROCEDURE — 84443 ASSAY THYROID STIM HORMONE: CPT

## 2024-08-27 PROCEDURE — 83880 ASSAY OF NATRIURETIC PEPTIDE: CPT | Performed by: EMERGENCY MEDICINE

## 2024-08-27 PROCEDURE — 99291 CRITICAL CARE FIRST HOUR: CPT | Mod: 25 | Performed by: EMERGENCY MEDICINE

## 2024-08-27 PROCEDURE — 1200000002 HC GENERAL ROOM WITH TELEMETRY DAILY

## 2024-08-27 PROCEDURE — 71275 CT ANGIOGRAPHY CHEST: CPT

## 2024-08-27 PROCEDURE — 2500000001 HC RX 250 WO HCPCS SELF ADMINISTERED DRUGS (ALT 637 FOR MEDICARE OP)

## 2024-08-27 PROCEDURE — 87636 SARSCOV2 & INF A&B AMP PRB: CPT | Performed by: EMERGENCY MEDICINE

## 2024-08-27 PROCEDURE — 84132 ASSAY OF SERUM POTASSIUM: CPT | Performed by: EMERGENCY MEDICINE

## 2024-08-27 PROCEDURE — 96361 HYDRATE IV INFUSION ADD-ON: CPT

## 2024-08-27 RX ORDER — ATORVASTATIN CALCIUM 80 MG/1
80 TABLET, FILM COATED ORAL DAILY
Status: DISPENSED | OUTPATIENT
Start: 2024-08-27

## 2024-08-27 RX ORDER — LOSARTAN POTASSIUM 50 MG/1
100 TABLET ORAL DAILY
Status: DISCONTINUED | OUTPATIENT
Start: 2024-08-27 | End: 2024-08-29

## 2024-08-27 RX ORDER — IPRATROPIUM BROMIDE AND ALBUTEROL SULFATE 2.5; .5 MG/3ML; MG/3ML
3 SOLUTION RESPIRATORY (INHALATION) EVERY 20 MIN
Status: COMPLETED | OUTPATIENT
Start: 2024-08-27 | End: 2024-08-27

## 2024-08-27 RX ORDER — PANTOPRAZOLE SODIUM 20 MG/1
20 TABLET, DELAYED RELEASE ORAL
Status: DISPENSED | OUTPATIENT
Start: 2024-08-28

## 2024-08-27 RX ORDER — ONDANSETRON 4 MG/1
4 TABLET, FILM COATED ORAL EVERY 6 HOURS PRN
Status: DISPENSED | OUTPATIENT
Start: 2024-08-27

## 2024-08-27 RX ORDER — METHYLPREDNISOLONE SODIUM SUCCINATE 125 MG/2ML
INJECTION INTRAMUSCULAR; INTRAVENOUS
Status: COMPLETED
Start: 2024-08-27 | End: 2024-08-27

## 2024-08-27 RX ORDER — SENNOSIDES 8.6 MG/1
1 TABLET ORAL DAILY PRN
Status: DISPENSED | OUTPATIENT
Start: 2024-08-27

## 2024-08-27 RX ORDER — MAGNESIUM SULFATE HEPTAHYDRATE 40 MG/ML
INJECTION, SOLUTION INTRAVENOUS
Status: COMPLETED
Start: 2024-08-27 | End: 2024-08-27

## 2024-08-27 RX ORDER — SODIUM CHLORIDE, SODIUM LACTATE, POTASSIUM CHLORIDE, CALCIUM CHLORIDE 600; 310; 30; 20 MG/100ML; MG/100ML; MG/100ML; MG/100ML
100 INJECTION, SOLUTION INTRAVENOUS CONTINUOUS
Status: DISCONTINUED | OUTPATIENT
Start: 2024-08-27 | End: 2024-08-27

## 2024-08-27 RX ORDER — OXYCODONE HYDROCHLORIDE 5 MG/1
10 TABLET ORAL EVERY 4 HOURS PRN
Status: DISPENSED | OUTPATIENT
Start: 2024-08-27

## 2024-08-27 RX ORDER — PROCHLORPERAZINE MALEATE 10 MG
10 TABLET ORAL EVERY 6 HOURS PRN
Status: ACTIVE | OUTPATIENT
Start: 2024-08-27

## 2024-08-27 RX ORDER — IPRATROPIUM BROMIDE AND ALBUTEROL SULFATE 2.5; .5 MG/3ML; MG/3ML
SOLUTION RESPIRATORY (INHALATION)
Status: COMPLETED
Start: 2024-08-27 | End: 2024-08-27

## 2024-08-27 RX ORDER — POLYETHYLENE GLYCOL 3350 17 G/17G
17 POWDER, FOR SOLUTION ORAL DAILY
Status: DISPENSED | OUTPATIENT
Start: 2024-08-27

## 2024-08-27 RX ORDER — OLANZAPINE 5 MG/1
5 TABLET ORAL NIGHTLY
Status: DISPENSED | OUTPATIENT
Start: 2024-08-27

## 2024-08-27 RX ORDER — MAGNESIUM SULFATE HEPTAHYDRATE 40 MG/ML
2 INJECTION, SOLUTION INTRAVENOUS ONCE
Status: COMPLETED | OUTPATIENT
Start: 2024-08-27 | End: 2024-08-27

## 2024-08-27 RX ORDER — MORPHINE SULFATE 15 MG/1
15 TABLET, FILM COATED, EXTENDED RELEASE ORAL 2 TIMES DAILY
Status: DISCONTINUED | OUTPATIENT
Start: 2024-08-27 | End: 2024-08-30

## 2024-08-27 RX ORDER — GABAPENTIN 300 MG/1
300 CAPSULE ORAL NIGHTLY
Status: DISPENSED | OUTPATIENT
Start: 2024-08-27

## 2024-08-27 RX ORDER — CETIRIZINE HYDROCHLORIDE 10 MG/1
10 TABLET ORAL DAILY PRN
Status: DISPENSED | OUTPATIENT
Start: 2024-08-27

## 2024-08-27 RX ORDER — HEPARIN SODIUM 10000 [USP'U]/100ML
0-4500 INJECTION, SOLUTION INTRAVENOUS CONTINUOUS
Status: DISCONTINUED | OUTPATIENT
Start: 2024-08-27 | End: 2024-08-28

## 2024-08-27 RX ORDER — CYCLOBENZAPRINE HCL 10 MG
10 TABLET ORAL 3 TIMES DAILY PRN
Status: DISPENSED | OUTPATIENT
Start: 2024-08-27

## 2024-08-27 RX ORDER — CHOLECALCIFEROL (VITAMIN D3) 25 MCG
1000 TABLET ORAL DAILY
Status: DISPENSED | OUTPATIENT
Start: 2024-08-27

## 2024-08-27 RX ORDER — METOPROLOL SUCCINATE 50 MG/1
100 TABLET, EXTENDED RELEASE ORAL DAILY
Status: DISCONTINUED | OUTPATIENT
Start: 2024-08-27 | End: 2024-08-29

## 2024-08-27 RX ORDER — BISACODYL 5 MG
5 TABLET, DELAYED RELEASE (ENTERIC COATED) ORAL DAILY PRN
Status: DISPENSED | OUTPATIENT
Start: 2024-08-27

## 2024-08-27 ASSESSMENT — PAIN - FUNCTIONAL ASSESSMENT: PAIN_FUNCTIONAL_ASSESSMENT: 0-10

## 2024-08-27 ASSESSMENT — LIFESTYLE VARIABLES
HAVE YOU EVER FELT YOU SHOULD CUT DOWN ON YOUR DRINKING: NO
EVER HAD A DRINK FIRST THING IN THE MORNING TO STEADY YOUR NERVES TO GET RID OF A HANGOVER: NO
TOTAL SCORE: 0
EVER FELT BAD OR GUILTY ABOUT YOUR DRINKING: NO
HAVE PEOPLE ANNOYED YOU BY CRITICIZING YOUR DRINKING: NO

## 2024-08-27 ASSESSMENT — PAIN SCALES - GENERAL
PAINLEVEL_OUTOF10: 0 - NO PAIN
PAINLEVEL_OUTOF10: 5 - MODERATE PAIN
PAINLEVEL_OUTOF10: 0 - NO PAIN
PAINLEVEL_OUTOF10: 10 - WORST POSSIBLE PAIN

## 2024-08-27 ASSESSMENT — PAIN DESCRIPTION - LOCATION
LOCATION: BACK
LOCATION: BACK

## 2024-08-27 ASSESSMENT — PAIN SCALES - PAIN ASSESSMENT IN ADVANCED DEMENTIA (PAINAD): TOTALSCORE: MEDICATION (SEE MAR)

## 2024-08-27 NOTE — TUMOR BOARD NOTE
Patient Name: EUFEMIA FRAGOSO  MRN: [id number]  Physician: Jet  Date of Collection: 08-  Report Date: 8.9.2024  Primary Location of Tumor: Right Upper Lobe  Histology: Non-Small Cell Lung Cancer  Stage: IV  Location of Metastasis: Bones  PDL 1: 55%  Actionable Alteration:  None    Disease Relevant Alterations: KEAP1 p.G419W ([id number] c.1255G>T)  KRAS p.G12V ([id number] c.35G>T)      Recommendations: Standard of Care:Immunotherapy+/-Chemotherapy    Clinical Trials (First line):   [id number]([id number]):A Phase 1b, Multicenter, 2-Part, Open-Label Study of Datopotamab Deruxtecan (Data-DXd) in Combination With Durvalumab With or Without [place] Chemotherapy in Subjects With Advanced or Metastatic Non-Small Cell Lung Cancer (TROPION-Lung04)    CJPJ7U28(NCT):A Phase I/II Basket Trial of the EGF Vaccine CIMAvax in Combination With Anti-PD1 Therapy in Patients With Advanced NSCLC or Squamous Head and Neck Cancer

## 2024-08-27 NOTE — ED TRIAGE NOTES
Patient presents to the Emergency department with a chief complaint of hypotension and hypoxia. Patient was at her outpatient appointment today (spinal cancer, palliative care) and her care team noticed a blood pressure of 80/60s, 80% on room air, and being slightly lethargic. Upon arrival to the ED, patient still hypotensive and hypoxic in triage. Patient was brought to room 3 from triage as a critical activation.

## 2024-08-27 NOTE — ED PROVIDER NOTES
HPI   Chief Complaint   Patient presents with    hypoxia       HPI  69-year-old female with metastatic lung CA presents with complaint of fatigue.  She had appointment today with her radiation oncologist and had an oxygen saturation of 80% with blood pressure systolic in the 90s.  At that point, she was sent to the ED for further evaluation.  She presents today with no complaints.  In triage, her oxygen saturation was 80% with blood pressure 80s over 40s.  At that point, she was brought in the critical care bay.  Upon questioning, patient states that she feels fatigued, otherwise has no further complaints.      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, noted 24 on Brain MRI    Wedge compression fracture of t11-T12 vertebra, sequela 2020    Compression fracture of T11 vertebra, sequela     Past Surgical History:   Procedure Laterality Date    ARTERIAL ANEURYSM REPAIR      Thoracic aortic aneurysm repair    COLONOSCOPY      RADIOFREQUENCY ABLATION      L3,4,5    WISDOM TOOTH EXTRACTION       Family History   Problem Relation Name Age of Onset    Lymphoma Father      Polycythemia Father      Breast cancer Neg Hx      Colon cancer Neg Hx       Social History     Tobacco Use    Smoking status: Former     Current packs/day: 0.00     Average packs/day: 0.5 packs/day for 40.0 years (20.0 ttl pk-yrs)     Types: Cigarettes     Start date:      Quit date: 2013     Years since quittin.6    Smokeless tobacco: Never   Vaping Use    Vaping status: Never Used   Substance Use Topics    Alcohol use: Yes     Comment: social    Drug use: Not Currently       Physical Exam   ED Triage Vitals   Temperature Heart Rate Respirations BP   24 1232 24 1232 24 1232 24 1232    36.5 °C (97.7 °F) 100 16 88/56      Pulse Ox Temp Source Heart Rate Source Patient Position   08/27/24 1232 08/27/24 1232 -- --   (!) 79 % Temporal        BP Location FiO2 (%)     -- 08/27/24 1233      40 %       Physical Exam  GEN: In NAD. Well-appearing.   HEAD: normocephalic, atraumatic  NECK: Supple, no tracheal deviation  EYES: PERRL. EOMI. No injection or scleral icterus.   ENT: moist mucosa, no oropharyngeal erythema or tonsillar exudate. Uvula midline.    CARDS: RRR w/ no M/R/G. No JVD. Pulses 2+ bilateral radial, pulses 2+ bilateral femoral  PULM: non-labored, normal effort breathing.  Bilateral upper and lower wheezes and rhonchi.    GI: Soft, non-distended, and non-tender abdomen. No rebound tenderness or guarding. No organomegaly appreciated.  MSK/EXTREMITIES: Compartments soft and non-tender to compression. FROM in all extremities. No joint effusions. No cyanosis, clubbing, or edema.   NEURO: AAOx3. CN's II-XII grossly intact. Strength and sensation intact and symmetric throughout. Gait intact.  SKIN: warm and dry. No rashes, lesions, petechiae, or lacerations.  PSYCH: Interactions seem appropriate for situation. Normal affect.        ED Course & MDM   Diagnoses as of 08/27/24 1452   Other acute pulmonary embolism, unspecified whether acute cor pulmonale present (Multi)                 No data recorded                                 Medical Decision Making  Patient is chronically ill-appearing.  Initial blood pressure in low 100s, we will begin IV fluids.  Patient has bilateral lung rhonchi, so we will give DuoNebs x 3, Solu-Medrol, and magnesium.  Will check chest x-ray, CBC, BMP, troponin, BNP, EKG, and COVID.  Considering risk factors and hypoxia, will check CT angio chest for concern for pulmonary embolism.  Patient is receiving palliative radiation, however she is full code.    Blood pressure is responded well to IV fluids.  Patient receiving breathing treatments.  Will continue to monitor  closely.    EKG: Sinus tachycardia at rate 103, normal axis, negative STEMI, QTc 461, QRS 82.    CT angio chest does show right subsegmental PE.  She has no right heart strain on ultrasound.  BNP and troponin are within normal meds.  Blood pressure has responded well to IV fluids.  Current oxygen saturation is 98% on 4 L nasal cannula.  I will start her on a heparin drip  I will contact admissions team.    Patient accepted for admission to oncology for PE.  She remains hemodynamically stable at this time.    Procedure  Procedures     Romulo Loyola,   08/29/24 3850

## 2024-08-27 NOTE — H&P
Senior staffing note  - THE PATIENT WILL BE STAFFED WITH THE ATTENDING IN THE [facility]  August 27, 2024   Patient: Brittny Gordon    Medical Record: [id number]    SUBJECTIVE     Kindly refer to Intern note Dr Lisseth ALEJANDRO for more detailes     Briefly:     Brittny Gordon is a 69 y.o. 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  HTN, HLD, osteporosis, AAA s/p repair, former smoker. Referred from rad/onc clinic to the ED due to Hypoxia ( o2 sat 80s) and low BP ( SBP 90s), found to have a subsegmental PE on CTA,the patient is admitted under oncology for Telemetry monitoring, and further workup of gait instability.       Home Medications  (Not in a hospital admission)      Scheduled Medications:  PRN Medications:    atorvastatin, 80 mg, oral, Daily  cholecalciferol, 1,000 Units, oral, Daily  gabapentin, 300 mg, oral, Nightly  [Held by provider] losartan, 100 mg, oral, Daily  [Held by provider] metoprolol succinate XL, 100 mg, oral, Daily  morphine CR, 15 mg, oral, BID  OLANZapine, 5 mg, oral, Nightly  [START ON 8/28/2024] pantoprazole, 20 mg, oral, Daily before breakfast  perflutren lipid microspheres, 0.5-10 mL of dilution, intravenous, Once in imaging  perflutren protein A microsphere, 0.5 mL, intravenous, Once in imaging  polyethylene glycol, 17 g, oral, Daily  sulfur hexafluoride microsphr, 2 mL, intravenous, Once in imaging     PRN medications: bisacodyl, cetirizine, cyclobenzaprine, ondansetron, oxyCODONE, prochlorperazine, sennosides     OBJECTIVE     Vitals: I/O:   Vitals:    08/27/24 1830   BP: 115/63   Pulse: 91   Resp: 18   Temp:    SpO2: 95%        24hr Min/Max:  Temp  Min: 36.5 °C (97.7 °F)  Max: 36.6 °C (97.9 °F)  Pulse  Min: 88  Max: 104  BP  Min: 70/45  Max: 131/67  Resp  Min: 6  Max: 27  SpO2  Min: 79 %  Max: 96 % No intake or output data in the 24 hours ending 08/27/24 1912     Net IO Since  "Admission: No IO data has been entered for this period [08/27/24 1912]      Physical Exam  Gen: well appearing, A+Ox3, in no acute distress, on NC 3 L  HEENT: sclera anicteric, no conjunctival injection  Resp: CTAB, normal breath sounds, no wheezing/crackles  CV: Regular rhythm, tachycardia, normal S1/S2, no murmurs  GI: non-tender, non-distended, no rebound or guarding  Extremities/MSK: warm and well perfused, no edema bilateral  Neuro: A+Ox3, no focal deficits,   Skin: warm and dry, no lesions, no rashes,  strength and motor function 5/5 bilaterally of UE and LE. CN intact 2-12  Skin: warm and dry, no lesions, no rashes     LABS:  CBC RFP   Lab Results   Component Value Date    WBC 12.8 (H) 08/27/2024    HGB 12.3 08/27/2024    HCT 36.5 08/27/2024    MCV 85 08/27/2024     08/27/2024    NEUTROABS 10.51 (H) 08/27/2024    Lab Results   Component Value Date     (L) 08/27/2024    K 4.4 08/27/2024    CL 91 (L) 08/27/2024    CO2 21 08/27/2024    BUN 32 (H) 08/27/2024    CREATININE 1.64 (H) 08/27/2024    CREATININE 0.59 08/18/2024     Lab Results   Component Value Date    MG 2.01 08/16/2024    PHOS 3.5 08/18/2024    CALCIUM 8.7 08/27/2024         Hepatic Function ABG/VBG   Lab Results   Component Value Date    ALT 27 08/27/2024    AST 38 08/27/2024    ALKPHOS 78 08/27/2024     No results found for: \"TBILIHCVFS\", \"BILIDIR\"   Lab Results   Component Value Date    PROTIME 14.1 (H) 08/27/2024    APTT 31 08/27/2024    INR 1.3 (H) 08/27/2024    No results found for: \"NONUHFIRE\", \"LACTATE\"         Imaging:     === 08/27/24 ===    CT ANGIO CHEST FOR PULMONARY EMBOLISM    - Impression -  1. Few subsegmental acute pulmonary emboli within right lower lobe.  There is no evidence of right heart strain.  2. New patchy ground-glass and early consolidative opacities within  posterior right upper lobe, partially obscuring previously noted  right upper lobe solid pulmonary nodule, likely representing therapy  related " changes/pneumonitis.  3. New extensive secretions within central and paracentral airways  along with obliteration of multiple smaller bronchi within bilateral  lower lobes. Resultant partial atelectasis of bilateral lower lobes,  left more than right. Pulmonary hygiene is recommended.  4. Interval increase in size of left upper lobe solid pulmonary  nodule, now measuring up to 1.9 cm, most consistent with enlarging  malignancy/metastasis.  5. Prominent to enlarged right mediastinal and right hilar lymph  nodes, which were hypermetabolic on recent PET-CT, highly suspicious  for teresa metastatic disease.  6. Osseous metastatic disease is similar to prior examination and  better assessed on PET-CT 07/11/2024, including stable pathological  compression fractures of T7 and T12 vertebral bodies.  7. Additional chronic findings as above.      ASSESSMENT / PLAN       Brittny Gordon is a 69 y.o. female with stage IVB ([id number]) primary non-small cell carcinoma of RUL, involving multiple bone mets. Receiving pall radiation therapy for severe back pain,  HTN, HLD, osteporosis, AAA s/p repair, former smoker.   Referred from rad/onc clinic to the ED due to Hypoxia ( o2 sat 80s) and low BP ( SBP 90s), found to have a subsegmental PE on CTA, with no evidence of RV strain on CTA, normal Bnp and trop. POCUS in the ED is non revealing for rt Heart Strain as well. She was started on Heparin, further hx taking elicited that the patient also suffered a fall with head trauma at home yesterday due to feeling unstable on her feet CTH ordered in the ED, and hep drip held. Plan to resume after CTH clearance.  In regard to the instability CTH will help in R/O stroke, physical exam was non focal, and  no red flags for back pain elicited on history taking or exam (urine/fecal incontinence or retention, no saddle anaesthesia, no motor or sensory lower limb deficits). Differential can include orthostatic hypotension ( iso of decrease oral  intake, ZAC, Hypotension),   the patient is admitted for Telemetry monitoring, and further workup of gait instability, ZAC and hyponatremia.    # Subsegmental PE iso of known lung cancer, NO imaging or Biomarker's evidence of RV strain,   # Initial Hypotension, Fluid responsive   # Hypoxia  # CT findings of extensive secretions within central and paracentral airways  along with obliteration of multiple smaller bronchi within bilateral  lower lobes. Resultant partial atelectasis of bilateral lower lobes,    ;; norm Troponin and BNP  ;; no RV strain on CTA  ;; S/p Methylpred 125 mg once, magnesium IV, and duo nebs in the ED,     Plan:  - Telemetry  - follow CTH, if no acute finding of hmg stroke or bleeding, plan to Continuing Heparin.  - follow ECHO  - Follow LE doppler tp R/O DVT  - Switch to LMWH Tmw, iso continued HDS of the patient  (preferred due to ? recurrent VTE in cancer)  - RT for pulmonary hygiene    # Dizziness/ feeling unstable when walking for the past week  :: physical exam was non focal, and  no red flags for back pain elicited on history taking or exam (urine/fecal incontinence or retention, no saddle anaesthesia, no motor or sensory lower limb deficits).   - The patient is unable to completely describe her symptoms. She denies palpitation.   - she is unsure if she had syncope,  - s/p 1 L LR in the ED and was started on 100 ml/hr fluid. Held on admission.     Plan:   - Telemetry  - Follow ECHO  - TSH, Vit b12, folate, vit-d , HBA1c  - orthostatic vitals when able    # ZAC  # Hyponatremia  Baseline cr 0.5- 0.8  Likely prerenal iso of decrease oral intake, ZAC, Hypotension  Will CTM RFP  Follow urine lytres  - s/p 1 L LR in the ED and was started on 100 ml/hr fluid. Held on admission.     #  newly diagnosed stage IVB ([id number]) primary non-small cell carcinoma of RUL, involving multiple bone mets.   - Primary oncologist: Camila Chaudhary MD     - Received palliative RT ( started 8/19, last seassion was  8/26) to spine lesions.   - Per clinic notes: Discussed in the clinic the palliative nature of all interventions.     ;;Current outpatient pain regimen by supportive onc: continue inpatient.      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    #  HTN, HLD, osteporosis  Holding home medication for HTN at this time     Code Status: Full code  Contact: Juice (brother): [phone]     Dora Duron, PGY2 Internal Medicine

## 2024-08-27 NOTE — PROGRESS NOTES
Radiation Oncology On Treatment Visit    Patient Name:  Brittny Gordon  MRN:  [id number]  :  1955    Referring Provider: No ref. provider found  Primary Care Provider: Camila Chaudhary MD MPH  Care Team: Patient Care Team:  Camila Chaudhary MD MPH as PCP - General (Hematology and Oncology)  Jose Rafael Hart MD as PCP - MSSP ACO Attributed Provider  Camila Chaudhary MD MPH as Consulting Physician (Hematology and Oncology)    Date of Service: 2024     Diagnosis:   Specialty Problems          Radiation Oncology Problems    Primary malignant neoplasm of right lung metastatic to other site (Multi)         Treatment Summary:  3D CRT: Not Applicable Spine    Treatment Period Technique Fraction Dose Fractions Total Dose   Course 1 2024-2024  (days elapsed: 8)         T12-L3 2024-2024 AP/ / 300 cGy 6 / 10 1800 / 3,000 cGy     SUBJECTIVE: Severe pains, taking oxycodone 15 mg daily prn for pain. MS Contin 15 mg twice daily. Taking gabapentin, ativan and flexeril following with supportive oncology.  Poor appetite per family.     OBJECTIVE:   Vital Signs:  BP 96/67   Pulse 88   Temp 36.6 °C (97.9 °F) (Skin)   Resp 18   SpO2 (!) 80%    Pain Scale: The patient's current pain level was assessed.  They report currently having a pain of 8 out of 10.    Other Pertinent Findings:     Toxicity Assessment          2024    11:28 2024    16:09   Toxicity Assessment   Adverse Events Reviewed (WDL) No (Exceptions to WDL) Yes (Within Defined Limits)   Treatment Site General General   Anorexia Grade 1       10-15 lbs over last few weeks Grade 2   Anxiety Grade 0 Grade 1   Dehydration Grade 0 Grade 2   Depression Grade 0 Grade 0   Dermatitis Radiation Grade 0 Grade 0   Diarrhea Grade 0 Grade 0   Fatigue Grade 1 Grade 1   Fibrosis Deep Connective Tissue Grade 0 Grade 0   Fracture Grade 0 Grade 0   Nausea Grade 0 Grade 0   Pain Grade 3 Grade 2   Treatment Related Secondary Malignancy Grade 0 Grade 0   Tumor Pain  Grade 3 Grade 2   Vomiting Grade 0 Grade 0   Alopecia Grade 0 Grade 0   Erythroderma Grade 0 Grade 0   Pain of Skin Grade 0 Grade 0   Pruritus Grade 0 Grade 0   Rash Acneiform Grade 0 Grade 0   Skin Hyperpigmentation Grade 0 Grade 0   Skin Hypopigmentation Grade 0 Grade 0   Skin Induration Grade 0 Grade 0   Skin Ulceration Grade 0 Grade 0   Telangiectasia Grade 0 Grade 0        Concurrent systemic therapy: fosaprepitant (Emend) 150 mg in sodium chloride 0.9% 250 mL IV, 150 mg, intravenous, Once, 0 of 4 cycles        CARBOplatin (Paraplatin) in sodium chloride 0.9% 100 mL IV, , intravenous, Once, 0 of 4 cycles        PACLitaxeL (Taxol) 330 mg in dextrose 5% 305 mL IV, 175 mg/m2 = 330 mg (87.5 % of original dose 200 mg/m2), intravenous, Once, 0 of 4 cycles    Dose modification: 175 mg/m2 (original dose 200 mg/m2, Cycle 1, Reason: Dose Not Tolerated)        methylPREDNISolone sod succinate (SOLU-Medrol) 40 mg/mL injection 40 mg, 40 mg, intravenous, As needed, 0 of 4 cycles        palonosetron (Aloxi) injection 250 mcg, 250 mcg, intravenous, Once, 0 of 4 cycles        pembrolizumab (Keytruda) 200 mg in sodium chloride 0.9% 108 mL IV, 200 mg, intravenous, Once, 0 of 4 cycles      Labs:   WBC   Date Value Ref Range Status   08/27/2024 12.8 (H) 4.4 - 11.3 x10*3/uL Final   08/18/2024 12.6 (H) 4.4 - 11.3 x10*3/uL Final     Hemoglobin   Date Value Ref Range Status   08/27/2024 12.3 12.0 - 16.0 g/dL Final   08/18/2024 13.5 12.0 - 16.0 g/dL Final     Hematocrit   Date Value Ref Range Status   08/27/2024 36.5 36.0 - 46.0 % Final   08/18/2024 42.3 36.0 - 46.0 % Final     Neutrophils Absolute   Date Value Ref Range Status   08/27/2024 10.51 (H) 1.20 - 7.70 x10*3/uL Final     Comment:     Percent differential counts (%) should be interpreted in the context of the absolute cell counts (cells/uL).   08/18/2024 7.91 (H) 1.20 - 7.70 x10*3/uL Final     Comment:     Percent differential counts (%) should be interpreted in the context of  the absolute cell counts (cells/uL).     Platelets   Date Value Ref Range Status   08/27/2024 200 150 - 450 x10*3/uL Final   08/18/2024 207 150 - 450 x10*3/uL Final     Alkaline Phosphatase   Date Value Ref Range Status   08/27/2024 78 33 - 136 U/L Final   08/12/2024 68 33 - 136 U/L Final     AST   Date Value Ref Range Status   08/27/2024 38 9 - 39 U/L Final   08/12/2024 20 9 - 39 U/L Final     Comment:     MILD HEMOLYSIS DETECTED. The result may be falsely elevated due to hemolysis or other interferents. Clinical correlation is recommended. Repeat testing may be considered.     ALT   Date Value Ref Range Status   08/27/2024 27 7 - 45 U/L Final     Comment:     Patients treated with Sulfasalazine may generate falsely decreased results for ALT.   08/12/2024 18 7 - 45 U/L Final     Comment:     Patients treated with Sulfasalazine may generate falsely decreased results for ALT.     Bilirubin, Total   Date Value Ref Range Status   08/27/2024 0.8 0.0 - 1.2 mg/dL Final   08/12/2024 0.6 0.0 - 1.2 mg/dL Final     Glucose   Date Value Ref Range Status   08/27/2024 107 (H) 74 - 99 mg/dL Final   08/18/2024 87 74 - 99 mg/dL Final     Calcium   Date Value Ref Range Status   08/27/2024 8.7 8.6 - 10.6 mg/dL Final   08/18/2024 9.2 8.6 - 10.6 mg/dL Final     Sodium   Date Value Ref Range Status   08/27/2024 127 (L) 136 - 145 mmol/L Final   08/18/2024 139 136 - 145 mmol/L Final     Potassium   Date Value Ref Range Status   08/27/2024 4.4 3.5 - 5.3 mmol/L Final   08/18/2024 4.1 3.5 - 5.3 mmol/L Final     Bicarbonate   Date Value Ref Range Status   08/27/2024 21 21 - 32 mmol/L Final   08/18/2024 24 21 - 32 mmol/L Final     Chloride   Date Value Ref Range Status   08/27/2024 91 (L) 98 - 107 mmol/L Final   08/18/2024 104 98 - 107 mmol/L Final     Urea Nitrogen   Date Value Ref Range Status   08/27/2024 32 (H) 6 - 23 mg/dL Final   08/18/2024 15 6 - 23 mg/dL Final     Creatinine   Date Value Ref Range Status   08/27/2024 1.64 (H) 0.50 -  1.05 mg/dL Final   08/18/2024 0.59 0.50 - 1.05 mg/dL Final         Exam: AAOx3, but very lethargic, unable to keep concentration. Audible crackling with inspiration.    Assessment / Plan:  The patient is tolerating radiation therapy as anticipated.  Continue per current treatment plan.       Therapies: Sending the patient for urgent evaluation at the ED for hypoxia and hypotension from failure to thrive vs infectious etiology like aspiration pneumonia.      Side effects reviewed with patient. Images, chart and labs reviewed.    Nagi Ospina MD

## 2024-08-27 NOTE — ED PROCEDURE NOTE
Procedure    Performed by: Savage Reynoso DO  Authorized by: Romulo Loyola DO    Procedure: Cardiac Ultrasound    Findings:   Views: parasternal long, parasternal short and apical four  The pericardial space was visualized and was NEGATIVE for a significant pericardial effusion.  Activity: Ventricular contractions were visualized.  LV: LV systolic function was DECREASED.  RV: RV size was NORMAL.    Impression:  Cardiac: The focused cardiac ultrasound exam had ABNORMAL findings as specified.

## 2024-08-27 NOTE — PROGRESS NOTES
Emergency Medicine Transition of Care Note.    I received on this patient in signout from Dr. Loyola.  Please see the previous ED provider note for all HPI, PE and MDM up to the time of signout at 3 PM. This is in addition to the primary record.    In brief Brittny Gordon is an 69 y.o. female presenting for   Chief Complaint   Patient presents with    hypoxia     At the time of signout we were awaiting: inpatient bed availability    Diagnoses as of 08/27/24 1707   Other acute pulmonary embolism, unspecified whether acute cor pulmonale present (Multi)       Medical Decision Making  Patient was signed out to me by my colleague, Dr. Frank Loyola at 3 PM while awaiting inpatient bed availability. Patient had been found to have a few subsegmental acute pulmonary emboli within right lower lobe for which patient had been started on IV heparin by previous ED provider. Patient was already admitted to Medical Oncology service. During my shift, at around 5 PM, I was notified by ED nurse that the patient was having some vision changes. I reassessed the patient and she reported some intermittent flashes and floaters in her vision with her glasses on but resolved when her glasses were taken off. She denied any double vision or eye pain. The patient reported to me that she had fallen and hit the back of her head prior to presentation to the ED. Patient reported mild pain to the back of her head but denied any pain anywhere else. No nausea or vomiting. On my exam, the patient had cranial nerves 2-12 intact. Strength 5/5 in upper and lower extremities bilaterally. Sensation intact to light touch throughout. Given the reported fall and symptoms patient was having, I ordered stat CT head and CT c-spine to rule out traumatic injuries from her fall. I had the nurse pause the heparin until the CT were obtained. Admitting Medicine team was made aware and updated on these course of events. They report that they will be coming to evaluate  the patient. Patient remains hemodynamically stable at this time and is admitted under Medicine Oncology service. CT head and CT c-spine were obtained and showed no acute processes. No intracranial hemorrhage.    Final diagnoses:   [I26.99] Other acute pulmonary embolism, unspecified whether acute cor pulmonale present (Multi)       Labs Reviewed   CBC WITH AUTO DIFFERENTIAL - Abnormal       Result Value    WBC 12.8 (*)     nRBC 0.0      RBC 4.29      Hemoglobin 12.3      Hematocrit 36.5      MCV 85      MCH 28.7      MCHC 33.7      RDW 12.7      Platelets 200      Neutrophils % 82.0      Immature Granulocytes %, Automated 0.6      Lymphocytes % 6.6      Monocytes % 9.4      Eosinophils % 1.0      Basophils % 0.4      Neutrophils Absolute 10.51 (*)     Immature Granulocytes Absolute, Automated 0.08      Lymphocytes Absolute 0.84 (*)     Monocytes Absolute 1.20 (*)     Eosinophils Absolute 0.13      Basophils Absolute 0.05     COMPREHENSIVE METABOLIC PANEL - Abnormal    Glucose 107 (*)     Sodium 127 (*)     Potassium 4.4      Chloride 91 (*)     Bicarbonate 21      Anion Gap 19      Urea Nitrogen 32 (*)     Creatinine 1.64 (*)     eGFR 34 (*)     Calcium 8.7      Albumin 3.5      Alkaline Phosphatase 78      Total Protein 6.9      AST 38      Bilirubin, Total 0.8      ALT 27     PROTIME-INR - Abnormal    Protime 14.1 (*)     INR 1.3 (*)    BLOOD GAS VENOUS FULL PANEL UNSOLICITED - Abnormal    POCT pH, Venous 7.34      POCT pCO2, Venous 50      POCT pO2, Venous 27 (*)     POCT SO2, Venous 36 (*)     POCT Oxy Hemoglobin, Venous 35.1 (*)     POCT Hematocrit Calculated, Venous 39.0      POCT Sodium, Venous 123 (*)     POCT Potassium, Venous 4.4      POCT Chloride, Venous 92 (*)     POCT Ionized Calicum, Venous 1.15      POCT Glucose, Venous 114 (*)     POCT Lactate, Venous 1.0      POCT Base Excess, Venous 0.5      POCT HCO3 Calculated, Venous 27.0 (*)     POCT Hemoglobin, Venous 13.0      POCT Anion Gap, Venous 8.0 (*)      Patient Temperature 37.0     APTT - Normal    aPTT 31      Narrative:     The APTT is no longer used for monitoring Unfractionated Heparin Therapy. For monitoring Heparin Therapy, use the Heparin Assay.   SARS-COV-2 PCR - Normal    Coronavirus 2019, PCR Not Detected      Narrative:     This assay has received FDA Emergency Use Authorization (EUA) and is only authorized for the duration of time that circumstances exist to justify the authorization of the emergency use of in vitro diagnostic tests for the detection of SARS-CoV-2 virus and/or diagnosis of COVID-19 infection under section 564(b)(1) of the Act, 21 U.S.C. 360bbb-3(b)(1). This assay is an in vitro diagnostic nucleic acid amplification test for the qualitative detection of SARS-CoV-2 from nasopharyngeal specimens and has been validated for use at [facility]. Negative results do not preclude COVID-19 infections and should not be used as the sole basis for diagnosis, treatment, or other management decisions.     INFLUENZA A AND B PCR - Normal    Flu A Result Not Detected      Flu B Result Not Detected      Narrative:     This assay is an in vitro diagnostic multiplex nucleic acid amplification test for the detection and discrimination of Influenza A & B from nasopharyngeal specimens, and has been validated for use at [facility]. Negative results do not preclude Influenza A/B infections, and should not be used as the sole basis for diagnosis, treatment, or other management decisions. If Influenza A/B and RSV PCR results are negative, testing for Parainfluenza virus, Adenovirus and Metapneumovirus is routinely performed for [facility] pediatric oncology and intensive care inpatients, and is available on other patients by placing an add-on request.   B-TYPE NATRIURETIC PEPTIDE - Normal    BNP 69      Narrative:        <100 pg/mL - Heart failure unlikely  100-299 pg/mL - Intermediate probability of acute heart                   failure exacerbation. Correlate with clinical                  context and patient history.    >=300 pg/mL - Heart Failure likely. Correlate with clinical                  context and patient history.     Biotin interference may cause falsely decreased results. Patients taking a Biotin dose of up to 5 mg/day should refrain from taking Biotin for 24 hours before sample  collection. Providers may contact their local laboratory for further information.   TROPONIN I, HIGH SENSITIVITY - Normal    Troponin I, High Sensitivity (CMC) 15      Narrative:     Less than 99th percentile of normal range cutoff-  Female and children under 18 years old <35 ng/L; Male <54 ng/L: Negative  Repeat testing should be performed if clinically indicated.     Female and children under 18 years old  ng/L; Male  ng/L:  Consistent with possible cardiac damage and possible increased clinical   risk. Serial measurements may help to assess extent of myocardial damage.     >120 ng/L: Consistent with cardiac damage, increased clinical risk and  myocardial infarction. Serial measurements may help assess extent of   myocardial damage.      NOTE: Children less than 1 year old may have higher baseline troponin   levels and results should be interpreted in conjunction with the overall   clinical context.    NOTE: Troponin I testing is performed using a different   testing methodology at [facility] than at other   [facility]. Direct result comparisons should only   be made within the same method.     GREEN TOP    Extra Tube Hold for add-ons.     GRAY TOP    Extra Tube Hold for add-ons.     BLOOD GAS VENOUS FULL PANEL       CT angio chest for pulmonary embolism   Final Result   1. Few subsegmental acute pulmonary emboli within right lower lobe.   There is no evidence of right heart strain.   2. New patchy ground-glass and early consolidative opacities within   posterior right upper lobe, partially obscuring  previously noted   right upper lobe solid pulmonary nodule, likely representing therapy   related changes/pneumonitis.   3. New extensive secretions within central and paracentral airways   along with obliteration of multiple smaller bronchi within bilateral   lower lobes. Resultant partial atelectasis of bilateral lower lobes,   left more than right. Pulmonary hygiene is recommended.   4. Interval increase in size of left upper lobe solid pulmonary   nodule, now measuring up to 1.9 cm, most consistent with enlarging   malignancy/metastasis.   5. Prominent to enlarged right mediastinal and right hilar lymph   nodes, which were hypermetabolic on recent PET-CT, highly suspicious   for teresa metastatic disease.   6. Osseous metastatic disease is similar to prior examination and   better assessed on PET-CT 07/11/2024, including stable pathological   compression fractures of T7 and T12 vertebral bodies.   7. Additional chronic findings as above.        MACRO:   Isael Bob discussed the significance and urgency of this critical   finding by EPIC secure chat with  ONEAL FLORES on 8/27/2024 at   2:20 pm.  (**-RCF-**) Findings:  See findings.        Signed by: Jere Sigala 8/27/2024 3:16 PM   Dictation workstation:   [id number]      XR chest 1 view   Final Result   Cephalization of the pulmonary veins and increased bronchovascular   markings with a question of small left-sided pleural effusion.   Findings are suggestive of mild interstitial pulmonary edema.        I personally reviewed the images/study and I agree with the findings   as stated by Julius Hernandez MD (resident). This study was   interpreted at [facility],   [place], [place].        Signed by: Lukasz Schuler 8/27/2024 1:14 PM   Dictation workstation:   [id number]      Point of Care Ultrasound    (Results Pending)   Transthoracic Echo (TTE) Complete    (Results Pending)   CT head wo IV contrast    (Results Pending)   CT  cervical spine wo IV contrast    (Results Pending)           Procedure  Procedures    Jennifer Lizarraga MD

## 2024-08-27 NOTE — H&P
Admission note   [facility]  August 27, 2024   Patient: Brittny Gordon    Medical Record: [id number]    SUBJECTIVE     Brittny Gordon is a 69 y.o. 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  HTN, HLD, osteporosis, AAA s/p repair, former smoker presenting to the ED for hypotension and hypoxia. Patient states that she does not receive any O2 at home and was found to be hypoxic however did not feel SOB or difficulty breathing. Patient also denies feeling any chest pain or abdominal pain. Patient does admit to some lightheadedness over the past week, but no dizziness. Patient admits that before presenting to the ED, she experienced a fall in which she hit her head. Patient denies any changes in her bowel movements, urinary patterns, or any new sensory/motor deficits. She does endorse some spots in her vision.       In the ED:  Patient was started on fluids of 100 ml / hr given hypotension and put on NC (currently on 3L). Given hypoxia, CT angio of the chest was done which revealed  few subsegmental acute pulmonary emboli within right lower lobe and  no evidence of right heart strain. Patient was then started on heparin. However, given the new information of a recent fall, heparin was stopped and patient was sent for CT scan of the head.     Onc hx:  Presenting History (07/19/2024): At time of initial presentation a 69 y.o. female, former smoker (started at age 17, 0.5 pack per day, quitted at age 56) with a past medical history of HTN, HLD and osteoporosis, aortic aneurysm s/p repairment in 2012 referred for management of newly diagnosed lung cancer.      09/11/2019: CT chest (+): Noncalcified nodule in the left upper lobe adjacent to the aortic arch, nodule measuring 1.3 x 1.3 x 0.8cm.     09/30/2019: Pet/CT: 1. Mild hypermetabolic tracer activity corresponding to the known posteromedial left upper lobe lung nodule.      01/28/2021: CT chest (-): stable URMILA nodule 1.1x1.0cm. Again stable in 8/2021, 02/2022, 11/2022.      However on the CT chest on 5/16/2023 the URMILA nodule measured 1.6x1.4cm, which has been growing slowly compared to before, There is also a irregular shape solid-appearing nodule in the RUL measuring up to 0.8cm.      6/4/2024: CT chest (-) showed interval increase of the RUL nodule measuring 1.1cm. Multiple new nodules in both lower lobes measuring 0.4cm or smaller.      7/11/2024: Pet/CT: Enlarging FDG avid 1.1 cm spiculated nodule in the right upper lobe, as well as FDG avid mediastinal and bilateral hilar nodes is suspicious for a primary lung neoplasm with teresa metastases. Mid FDG avid left upper lobe paravertebral nodule, grossly stable in comparison to prior CT in 2019, favored to be benign. Multiple FDG avid bone lesions throughout the axial and appendicular skeleton, likely representing widespread bone metastases. FDG avidity within the right ribs as well as lower thoracic and lumbar vertebral bodies as described above, could represent pathologic fractures in the setting of metastatic disease.        7/19/2024: MRI brain (+/-):  7 mm avidly enhancing extra-axial appearing nodule along the left pontine medullary junction adjacent to the left vertebral artery. 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 Clinical correlation and attention on follow-up suggested. No additional abnormal enhancement identified.     8/5/2024: Underwent EBUS with FNA of 11Rs, 3R, 10R and RUL nodule: path showed non small cell carcinoma. IHC showed positive for AE1/AE3, CK7 and negative for TTF1, p40 and INSM1. PDL-1 TPS 55%. Additional immunostain show the tumor cells are negative for TRPS1, PAX8, and CDX2. SMARCA4 immunostain shows retained nuclear expression. these findings argue against breast, GYN, and colorectal primaries, respectively. Overall, findings could be compatible with a  lung primary non-small cell carcinoma in appropriate clinical and radiologic settings. 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    Wedge compression fracture of t11-T12 vertebra, sequela 07/30/2020    Compression fracture of T11 vertebra, sequela       Home Medications  (Not in a hospital admission)      Scheduled Medications:  PRN Medications:      PRN medications: heparin       Surgical History:    Past Surgical History:   Procedure Laterality Date    ARTERIAL ANEURYSM REPAIR      Thoracic aortic aneurysm repair    COLONOSCOPY      RADIOFREQUENCY ABLATION      L3,4,5    WISDOM TOOTH EXTRACTION         Allergies:    Allergies   Allergen Reactions    Acetaminophen GI Upset    Epinephrine Nausea/vomiting and Palpitations       Social History  SOCIAL HISTORY  Retired . Lives with cousin's granddaughter.   Social History:  reports that she quit smoking about 11 years ago. Her smoking use included cigarettes. She started smoking about 51 years ago. She has a 20 pack-year smoking history. She has never used smokeless tobacco. She reports current alcohol use. She reports that she does not currently use drugs    Alcohol:   Alcohol Use: Not At Risk (8/14/2024)    AUDIT-C     Frequency of Alcohol Consumption: Monthly or less     Average Number of Drinks: 1 or 2     Frequency of Binge Drinking: Never     Tobacco:   Tobacco Use: Medium Risk (8/27/2024)    Patient History     Smoking Tobacco Use: Former     Smokeless Tobacco Use: Never     Passive Exposure: Not on file      Illicit Drugs:   Social History     Substance and Sexual Activity   Drug Use Not  "Currently         Family History:    Family History   Problem Relation Name Age of Onset    Lymphoma Father      Polycythemia Father      Breast cancer Neg Hx      Colon cancer Neg Hx         OBJECTIVE     Vitals: I/O:   Vitals:    08/27/24 1700   BP: 122/71   Pulse: 100   Resp: (!) 27   Temp:    SpO2: 94%        24hr Min/Max:  Temp  Min: 36.5 °C (97.7 °F)  Max: 36.6 °C (97.9 °F)  Pulse  Min: 88  Max: 104  BP  Min: 70/45  Max: 131/67  Resp  Min: 6  Max: 27  SpO2  Min: 79 %  Max: 96 % No intake or output data in the 24 hours ending 08/27/24 1816     Net IO Since Admission: No IO data has been entered for this period [08/27/24 1816]      Physical Exam  Gen: well appearing, A+Ox3, in no acute distress  HEENT: sclera anicteric, no conjunctival injection, MMM  Resp: CTAB, normal breath sounds, no wheezing/crackles. On 3L NC  CV: normal S1/S2, slight tachycardia   GI: non-tender, non-distended, no rebound or guarding  Extremities/MSK: warm and well perfused, no edema bilateral  Neuro: A+Ox3, no focal deficits, no asterixis, strength and motor function 5/5 bilaterally of UE and LE. CN intact 2-12  Skin: warm and dry, no lesions, no rashes       LABS:  CBC RFP   Lab Results   Component Value Date    WBC 12.8 (H) 08/27/2024    HGB 12.3 08/27/2024    HCT 36.5 08/27/2024    MCV 85 08/27/2024     08/27/2024    NEUTROABS 10.51 (H) 08/27/2024    Lab Results   Component Value Date     (L) 08/27/2024    K 4.4 08/27/2024    CL 91 (L) 08/27/2024    CO2 21 08/27/2024    BUN 32 (H) 08/27/2024    CREATININE 1.64 (H) 08/27/2024    CREATININE 0.59 08/18/2024     Lab Results   Component Value Date    MG 2.01 08/16/2024    PHOS 3.5 08/18/2024    CALCIUM 8.7 08/27/2024         Hepatic Function ABG/VBG   Lab Results   Component Value Date    ALT 27 08/27/2024    AST 38 08/27/2024    ALKPHOS 78 08/27/2024     No results found for: \"TBILIHCVFS\", \"BILIDIR\"   Lab Results   Component Value Date    PROTIME 14.1 (H) 08/27/2024    APTT " "31 08/27/2024    INR 1.3 (H) 08/27/2024    No results found for: \"NONUHFIRE\", \"LACTATE\"         Imaging:     === 08/27/24 ===    CT ANGIO CHEST FOR PULMONARY EMBOLISM    - Impression -  1. Few subsegmental acute pulmonary emboli within right lower lobe.  There is no evidence of right heart strain.  2. New patchy ground-glass and early consolidative opacities within  posterior right upper lobe, partially obscuring previously noted  right upper lobe solid pulmonary nodule, likely representing therapy  related changes/pneumonitis.  3. New extensive secretions within central and paracentral airways  along with obliteration of multiple smaller bronchi within bilateral  lower lobes. Resultant partial atelectasis of bilateral lower lobes,  left more than right. Pulmonary hygiene is recommended.  4. Interval increase in size of left upper lobe solid pulmonary  nodule, now measuring up to 1.9 cm, most consistent with enlarging  malignancy/metastasis.  5. Prominent to enlarged right mediastinal and right hilar lymph  nodes, which were hypermetabolic on recent PET-CT, highly suspicious  for teresa metastatic disease.  6. Osseous metastatic disease is similar to prior examination and  better assessed on PET-CT 07/11/2024, including stable pathological  compression fractures of T7 and T12 vertebral bodies.  7. Additional chronic findings as above.    MACRO:  Isael Bob discussed the significance and urgency of this critical  finding by EPIC secure chat with  ONEAL FLORES on 8/27/2024 at  2:20 pm.  (**-RCF-**) Findings:  See findings.    Signed by: Jere Sigala 8/27/2024 3:16 PM  Dictation workstation:   [id number]    ASSESSMENT / PLAN     Brittny Gordon is a 69 y.o. female with stage IVB ([id number]) primary non-small cell carcinoma of RUL, involving multiple bone mets.  HTN, HLD, osteporosis, AAA s/p repair, former smoker admitted for for hypotension and hypoxia iso CT angio findings concerning for subsegmental pulmonary " embolism likely secondary to known malignancy.     # Subsegmental PE, NO imaging or Biomarker's evidence of RV strain,   # Initial Hypotension, Fluid responsive   # Hypoxia    Plan:   - Was receiving heparin, however given fall, it is currently being held until CT head   - Currently on 3L NC, wean down as tolerated   - Discontinue maintenance fluids   - Follow up orthostatic blood pressures   - Consider ECHO to evaluate for RV strain   - Consider duplex ultrasound of lower extremities       # ZAC  # Hyponatremia    Plan:   - Follow up serum osm, and urine lytes   - Follow up RFP     #Vision changes iso recent fall   :: Patient reported that she had a fall in which she hit her head prior to arriving to ED     Plan:  - Follow up CT head. Can resume heparin if no significant findings   - If findings concerning for bleed or stroke, consider consulting Neurology  However, recent neurological exam was not significant.            Current outpatient pain regimen by supportive onc:     Oxy 10mg x5-6 doses   15 prior to radiation   Flexeril - unsure if helping   gabapentin 300mg at bedtime   Tylenol and NSAIDs causes GI upset/heartburn         F: Replete PRN  E: Keep Mg >2, phos >3  and K >4  N: Regular   A: PIV  O2: On  DVT ppx: heparin being held   GI ppx: None   Code Status: Full code   Contact: Juice (brother): [phone]    Ayo Montanez, PGY -1

## 2024-08-28 ENCOUNTER — APPOINTMENT (OUTPATIENT)
Dept: VASCULAR MEDICINE | Facility: HOSPITAL | Age: 69
End: 2024-08-28
Payer: MEDICARE

## 2024-08-28 ENCOUNTER — APPOINTMENT (OUTPATIENT)
Dept: RADIOLOGY | Facility: HOSPITAL | Age: 69
End: 2024-08-28
Payer: MEDICARE

## 2024-08-28 ENCOUNTER — APPOINTMENT (OUTPATIENT)
Dept: RADIATION ONCOLOGY | Facility: HOSPITAL | Age: 69
DRG: 175 | End: 2024-08-28
Payer: MEDICARE

## 2024-08-28 LAB
ALBUMIN SERPL BCP-MCNC: 3.3 G/DL (ref 3.4–5)
ANION GAP SERPL CALC-SCNC: 15 MMOL/L (ref 10–20)
APPEARANCE UR: CLEAR
BASOPHILS # BLD AUTO: 0.02 X10*3/UL (ref 0–0.1)
BASOPHILS NFR BLD AUTO: 0.3 %
BILIRUB UR STRIP.AUTO-MCNC: NEGATIVE MG/DL
BUN SERPL-MCNC: 27 MG/DL (ref 6–23)
CALCIUM SERPL-MCNC: 8.9 MG/DL (ref 8.6–10.6)
CHLORIDE SERPL-SCNC: 96 MMOL/L (ref 98–107)
CO2 SERPL-SCNC: 24 MMOL/L (ref 21–32)
COLOR UR: ABNORMAL
CREAT SERPL-MCNC: 1.04 MG/DL (ref 0.5–1.05)
EGFRCR SERPLBLD CKD-EPI 2021: 59 ML/MIN/1.73M*2
EOSINOPHIL # BLD AUTO: 0 X10*3/UL (ref 0–0.7)
EOSINOPHIL NFR BLD AUTO: 0 %
ERYTHROCYTE [DISTWIDTH] IN BLOOD BY AUTOMATED COUNT: 12.4 % (ref 11.5–14.5)
GLUCOSE SERPL-MCNC: 148 MG/DL (ref 74–99)
GLUCOSE UR STRIP.AUTO-MCNC: NORMAL MG/DL
HCT VFR BLD AUTO: 33.7 % (ref 36–46)
HGB BLD-MCNC: 12.1 G/DL (ref 12–16)
IMM GRANULOCYTES # BLD AUTO: 0.04 X10*3/UL (ref 0–0.7)
IMM GRANULOCYTES NFR BLD AUTO: 0.5 % (ref 0–0.9)
KETONES UR STRIP.AUTO-MCNC: ABNORMAL MG/DL
LEUKOCYTE ESTERASE UR QL STRIP.AUTO: NEGATIVE
LYMPHOCYTES # BLD AUTO: 0.44 X10*3/UL (ref 1.2–4.8)
LYMPHOCYTES NFR BLD AUTO: 5.7 %
MAGNESIUM SERPL-MCNC: 2.52 MG/DL (ref 1.6–2.4)
MCH RBC QN AUTO: 29.4 PG (ref 26–34)
MCHC RBC AUTO-ENTMCNC: 35.9 G/DL (ref 32–36)
MCV RBC AUTO: 82 FL (ref 80–100)
MONOCYTES # BLD AUTO: 0.37 X10*3/UL (ref 0.1–1)
MONOCYTES NFR BLD AUTO: 4.8 %
MUCOUS THREADS #/AREA URNS AUTO: NORMAL /LPF
NEUTROPHILS # BLD AUTO: 6.89 X10*3/UL (ref 1.2–7.7)
NEUTROPHILS NFR BLD AUTO: 88.7 %
NITRITE UR QL STRIP.AUTO: NEGATIVE
NRBC BLD-RTO: 0 /100 WBCS (ref 0–0)
PH UR STRIP.AUTO: 5.5 [PH]
PHOSPHATE SERPL-MCNC: 2.9 MG/DL (ref 2.5–4.9)
PLATELET # BLD AUTO: 194 X10*3/UL (ref 150–450)
POTASSIUM SERPL-SCNC: 3.9 MMOL/L (ref 3.5–5.3)
PROT UR STRIP.AUTO-MCNC: NEGATIVE MG/DL
RBC # BLD AUTO: 4.11 X10*6/UL (ref 4–5.2)
RBC # UR STRIP.AUTO: ABNORMAL /UL
RBC #/AREA URNS AUTO: NORMAL /HPF
SODIUM SERPL-SCNC: 131 MMOL/L (ref 136–145)
SP GR UR STRIP.AUTO: 1.01
UFH PPP CHRO-ACNC: 0.5 IU/ML
UFH PPP CHRO-ACNC: 0.7 IU/ML
UROBILINOGEN UR STRIP.AUTO-MCNC: NORMAL MG/DL
WBC # BLD AUTO: 7.8 X10*3/UL (ref 4.4–11.3)
WBC #/AREA URNS AUTO: NORMAL /HPF

## 2024-08-28 PROCEDURE — 1210000001 HC SEMI-PRIVATE ROOM DAILY

## 2024-08-28 PROCEDURE — 2500000001 HC RX 250 WO HCPCS SELF ADMINISTERED DRUGS (ALT 637 FOR MEDICARE OP)

## 2024-08-28 PROCEDURE — 87086 URINE CULTURE/COLONY COUNT: CPT

## 2024-08-28 PROCEDURE — 70553 MRI BRAIN STEM W/O & W/DYE: CPT | Performed by: RADIOLOGY

## 2024-08-28 PROCEDURE — 2500000004 HC RX 250 GENERAL PHARMACY W/ HCPCS (ALT 636 FOR OP/ED)

## 2024-08-28 PROCEDURE — 2550000001 HC RX 255 CONTRASTS

## 2024-08-28 PROCEDURE — 36415 COLL VENOUS BLD VENIPUNCTURE: CPT

## 2024-08-28 PROCEDURE — RXMED WILLOW AMBULATORY MEDICATION CHARGE

## 2024-08-28 PROCEDURE — 84100 ASSAY OF PHOSPHORUS: CPT

## 2024-08-28 PROCEDURE — 80069 RENAL FUNCTION PANEL: CPT

## 2024-08-28 PROCEDURE — 2500000002 HC RX 250 W HCPCS SELF ADMINISTERED DRUGS (ALT 637 FOR MEDICARE OP, ALT 636 FOR OP/ED)

## 2024-08-28 PROCEDURE — 81001 URINALYSIS AUTO W/SCOPE: CPT

## 2024-08-28 PROCEDURE — 70553 MRI BRAIN STEM W/O & W/DYE: CPT

## 2024-08-28 PROCEDURE — 99233 SBSQ HOSP IP/OBS HIGH 50: CPT

## 2024-08-28 PROCEDURE — 85520 HEPARIN ASSAY: CPT

## 2024-08-28 PROCEDURE — 83735 ASSAY OF MAGNESIUM: CPT

## 2024-08-28 PROCEDURE — 85025 COMPLETE CBC W/AUTO DIFF WBC: CPT

## 2024-08-28 PROCEDURE — A9575 INJ GADOTERATE MEGLUMI 0.1ML: HCPCS

## 2024-08-28 RX ORDER — GADOTERATE MEGLUMINE 376.9 MG/ML
14 INJECTION INTRAVENOUS
Status: COMPLETED | OUTPATIENT
Start: 2024-08-28 | End: 2024-08-28

## 2024-08-28 RX ORDER — OLANZAPINE 10 MG/2ML
5 INJECTION, POWDER, FOR SOLUTION INTRAMUSCULAR ONCE
Status: COMPLETED | OUTPATIENT
Start: 2024-08-28 | End: 2024-08-28

## 2024-08-28 RX ORDER — ACETAMINOPHEN AND CODEINE PHOSPHATE 300; 30 MG/1; MG/1
1 TABLET ORAL EVERY 8 HOURS PRN
Status: ON HOLD | COMMUNITY

## 2024-08-28 RX ORDER — OLANZAPINE 5 MG/1
5 TABLET ORAL 2 TIMES DAILY PRN
Status: DISCONTINUED | OUTPATIENT
Start: 2024-08-28 | End: 2024-08-29

## 2024-08-28 RX ORDER — OLANZAPINE 10 MG/2ML
INJECTION, POWDER, FOR SOLUTION INTRAMUSCULAR
Status: COMPLETED
Start: 2024-08-28 | End: 2024-08-28

## 2024-08-28 RX ORDER — OLANZAPINE 10 MG/2ML
5 INJECTION, POWDER, FOR SOLUTION INTRAMUSCULAR 2 TIMES DAILY PRN
Status: DISCONTINUED | OUTPATIENT
Start: 2024-08-28 | End: 2024-08-29

## 2024-08-28 RX ORDER — EZETIMIBE 10 MG/1
10 TABLET ORAL DAILY
COMMUNITY
End: 2024-08-28 | Stop reason: ENTERED-IN-ERROR

## 2024-08-28 RX ORDER — HALOPERIDOL 5 MG/ML
2 INJECTION INTRAMUSCULAR EVERY 6 HOURS PRN
Status: DISCONTINUED | OUTPATIENT
Start: 2024-08-28 | End: 2024-08-29

## 2024-08-28 SDOH — HEALTH STABILITY: MENTAL HEALTH: BEHAVIORS/MOOD: ANXIOUS

## 2024-08-28 ASSESSMENT — PAIN DESCRIPTION - ORIENTATION: ORIENTATION: RIGHT;MID;LOWER

## 2024-08-28 ASSESSMENT — PAIN DESCRIPTION - LOCATION: LOCATION: BACK

## 2024-08-28 ASSESSMENT — PAIN DESCRIPTION - FREQUENCY: FREQUENCY: CONSTANT/CONTINUOUS

## 2024-08-28 ASSESSMENT — PAIN DESCRIPTION - DESCRIPTORS: DESCRIPTORS: ACHING

## 2024-08-28 ASSESSMENT — PAIN DESCRIPTION - PAIN TYPE: TYPE: ACUTE PAIN

## 2024-08-28 ASSESSMENT — PAIN - FUNCTIONAL ASSESSMENT: PAIN_FUNCTIONAL_ASSESSMENT: 0-10

## 2024-08-28 ASSESSMENT — PAIN SCALES - GENERAL
PAINLEVEL_OUTOF10: 3
PAINLEVEL_OUTOF10: 0 - NO PAIN

## 2024-08-28 NOTE — SIGNIFICANT EVENT
Significant Event Note.    Patient currently expressing some level of AMS with agitation. Primary team spoke to Brother ( Lance Love) on the phone, and he reports, patient has been having AMS for the past few days prior to presenting to the ED.  Currently no evidence that AMS being caused by metabolic derangement. MRI brain will be performed to assess for primary brain etiology. In the meantime, patients gabapentin will be held.      Currently, patient does not have capacity to refuse medical care, patient's brother (Lance Love) is surrogate decision makers.

## 2024-08-28 NOTE — PROGRESS NOTES
Internal Medicine Progress Note     Brittny Gordon is a 69 y.o. female with stage IVB ([id number]) primary non-small cell carcinoma of RUL, involving multiple bone mets.  HTN, HLD, osteporosis, AAA s/p repair, former smoker presenting to the ED for hypotension and hypoxia. Currently being followed for encephalopathy.     Subjective     Patient was seen and evaluated at bedside. Patient Lavern however has been refusing treatment, procedures, and believes that the hospital staff is out to get her. Spoke with her brother on the phone who reported that she had a significant drop in her mental status in the last few days. She has become more agitated and aggressive with family members, has been losing her balance and falling more often, and has not been able to complete daily tasks on her own as easily. Patient's brother reported this to one of the patient's doctors who claimed that this could be due to poly pharmacy. However, he is concerned given that they had a family member with lung cancer with spread to the brain.     CTH 8/27 and CT cervical spine 8/27 did not reveal any evidence of intracranial bleeding or fractures in the spine, respectively.     Medications     Medications:   Scheduled medications  atorvastatin, 80 mg, oral, Daily  cholecalciferol, 1,000 Units, oral, Daily  gabapentin, 300 mg, oral, Nightly  [Held by provider] losartan, 100 mg, oral, Daily  [Held by provider] metoprolol succinate XL, 100 mg, oral, Daily  morphine CR, 15 mg, oral, BID  OLANZapine, 5 mg, oral, Nightly  pantoprazole, 20 mg, oral, Daily before breakfast  perflutren lipid microspheres, 0.5-10 mL of dilution, intravenous, Once in imaging  perflutren protein A microsphere, 0.5 mL, intravenous, Once in imaging  polyethylene glycol, 17 g, oral, Daily  sulfur hexafluoride microsphr, 2 mL, intravenous, Once in imaging      Continuous medications  heparin, 0-4,500 Units/hr, Last Rate: 1,300 Units/hr (08/28/24 1315)      PRN  "medications  PRN medications: bisacodyl, cetirizine, cyclobenzaprine, ondansetron, oxyCODONE, prochlorperazine, sennosides     Objective     Vitals  Visit Vitals  /84 (BP Location: Right arm)   Pulse (!) 109   Temp 36.8 °C (98.2 °F) (Oral)   Resp 11   Ht 1.676 m (5' 6\")   Wt 71.2 kg (157 lb)   SpO2 95%   BMI 25.34 kg/m²   OB Status Postmenopausal   Smoking Status Former   BSA 1.82 m²       Intake/Output Summary (Last 24 hours) at 8/28/2024 1512  Last data filed at 8/28/2024 0420  Gross per 24 hour   Intake 250 ml   Output --   Net 250 ml       Physical Exam  Gen: well appearing, A+Ox3, in no acute distress  HEENT: sclera anicteric, no conjunctival injection, MMM  Resp: CTAB, normal breath sounds, no wheezing/crackles. On 3L NC  CV: normal S1/S2, slight tachycardia   GI: non-tender, non-distended, no rebound or guarding  Extremities/MSK: warm and well perfused, no edema bilateral  Neuro: A+Ox3, no focal deficits, no asterixis, strength and motor function 5/5 bilaterally of UE and LE. CN intact 2-12  Skin: warm and dry, no lesions, no rashes     Labs  Lab Results   Component Value Date    WBC 7.8 08/28/2024    HGB 12.1 08/28/2024    HCT 33.7 (L) 08/28/2024    MCV 82 08/28/2024     08/28/2024      Lab Results   Component Value Date    GLUCOSE 148 (H) 08/28/2024    CALCIUM 8.9 08/28/2024     (L) 08/28/2024    K 3.9 08/28/2024    CO2 24 08/28/2024    CL 96 (L) 08/28/2024    BUN 27 (H) 08/28/2024    CREATININE 1.04 08/28/2024      Lab Results   Component Value Date    ALT 27 08/27/2024    AST 38 08/27/2024    ALKPHOS 78 08/27/2024    BILITOT 0.8 08/27/2024        Imaging  === 08/27/24 ===    XR CHEST 1 VIEW    - Impression -  Cephalization of the pulmonary veins and increased bronchovascular  markings with a question of small left-sided pleural effusion.  Findings are suggestive of mild interstitial pulmonary edema.    I personally reviewed the images/study and I agree with the findings  as stated by " Julius Hernandez MD (resident). This study was  interpreted at [facility],  [place], [place].    Signed by: Lukasz Schuler 8/27/2024 1:14 PM  Dictation workstation:   [id number]   === 08/27/24 ===    CT CERVICAL SPINE WO IV CONTRAST    - Impression -  No evidence of an acute fracture of the cervical spine.    MACRO:  None    Signed by: Dacia Guillen 8/27/2024 5:40 PM  Dictation workstation:   [id number]         Assessment/Plan     Brittny Gordon is a 69 y.o. female with stage IVB ([id number]) primary non-small cell carcinoma of RUL, involving multiple bone mets.  HTN, HLD, osteporosis, AAA s/p repair, former smoker admitted for for hypotension and hypoxia iso CT angio findings concerning for subsegmental pulmonary embolism likely secondary to known malignancy. Currently being followed for encephalopathy.      #Encephalopathy secondary to polypharmacy vs malignancy vs metabolic causes   ::Brother expressed that she has been agitated and aggressive more since 8/25 and falling more often   :: TSH wnl   :: CTH 8/27 negative for intracranial bleed     Plan:   - MRI brain w and wo contrast to rule out brain metastasis given recent change in mental status    - Follow up Vit B12 and folate levels     # Subsegmental PE, NO imaging or Biomarker's evidence of RV strain,   # Initial Hypotension, Fluid responsive   # Hypoxia  :: Has been receiving heparin, will transition to DOAC   :: Saturating well on 3L NC   :: Patient refused duplex ultrasound of lower extremities    Plan:   - Transition from heparin to Eliquis (10 mg for 7 days, followed by 5 mg)           # Hyponatremia   Plan:   - Follow up RFP            Current outpatient pain regimen by supportive onc:     Oxy 10mg x5-6 doses   15 prior to radiation   Flexeril - unsure if helping   gabapentin 300mg at bedtime   Tylenol and NSAIDs causes GI upset/heartburn            F: Replete PRN  E: Keep Mg >2, phos >3  and K >4  N: Regular    A: PIV  O2: On  DVT ppx: Eliquis   GI ppx: None   Code Status: Full code   Contact: Juice (brother): [phone]    Patient and plan discussed with attending physician Dr. Osman .              Ayo Montanez DO  Department of Internal Medicine, PGY-1      Ayo Montanez DO

## 2024-08-28 NOTE — CONSULTS
"Inpatient consult to Psychiatry  Consult performed by: Antwon Carey MD  Consult ordered by: Felicia sOman MD  Reason for consult: Agitation / AMS for the past few days. Hx NCSCLC w/ bone mets          HISTORY OF PRESENT ILLNESS:  Brittny Gordon is a 69 y.o. 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 Psychiatry was consulted on 8/28 for agitation management in the context of MRI brain workup needed.    On chart review,   \"Patient currently expressing some level of AMS with agitation. Primary team spoke to Brother ( Lance Love) on the phone, and he reports, patient has been having AMS for the past few days prior to presenting to the ED.  Currently no evidence that AMS being caused by metabolic derangement. MRI brain will be performed to assess for primary brain etiology. In the meantime, patients gabapentin will be held.        Currently, patient does not have capacity to refuse medical care, patient's brother (Lance Love) is surrogate decision makers. \"    Per collateral from brother,    The patient was having a lot of pain a couple weeks back, had been told to come o the Ed by oncologist for admission and was \"fully lucid.\" Last week patient started radiation while at the hospital, was told she could start it earlier if she was admitted. About Sunday, 4 days ago, patient appeared more sleepy, nodding off at dinner, but still able to talk coherently without issue - was \"coughing a whole lot after she started that radiation treatment, sounded so phlegmy.\" On Monday, she appeared \"a little worse.\" On Monday night, she was taken to treatment, and she came out into the kitchen to talk to her brother after. She had all her pills and was telling brother \"I need to write in this notebook and " "get together every single medication I am on.\" She was acting out of character and appeared slightly paranoid, writing the prescription numbers, times, and individual names and serial numbers which was to brother very odd. She was then appearing paranoid on Tuesday. She seemed like she was walking \"deformed\" at this point, holding her right shoulder up at an angle (after a fall Monday described below).     Patient is living with a cousin from [place] right now who moved in, a couple other cousins upstairs but in their 20s and don't have a great idea of what is going on. Per brother he is not sure how she was sleeping but states as far as he was told by patient she had sleeping very well and even on Monday and Tuesday was sleeping late which he thought was indicative of \"sleeping much better\" but he agrees that could also be a sign of fatigue. She has been unstable the past couple days compared to just pain which was an issue before. Patient had fallen Monday night due to this. He does not believe she hit her head and that it appeared that she hit her R elbow. He does not believe she lost consciousness but she was frail and had difficulty getting up. The cousins saw patient and they denied that she hit head or lost consciousness.     Yesterday the patient said the wrong address and seemed confused about the year but answered correctly after some time which was unusual. He felt worried and that her blood pressure was too low - ~95/67 or so per brother. Patient has been taking her medications herself, brother is unsure if she has been double dosing or forgetting recently.     Pt has been recently taking Morphine about twice a day (15mg) and Oxycodone 5mg daily. Flexeril and Gabapentin were also utilized. Lorazepam was taken prior to radiation treatments at 0.5mg.     Has been eating poorly, after dx in 7/2024.     On interview,   Patient is calm and cooperative with provider when seen. She is able to give her full " "name. The date she states is early August, but then autocorrects herself as middle then late August and 8/28/2024 but just several minutes later when asked about the date formally she states it is 9/1/2024, but does have some lag (agrees when corrected againt to 8/28/2024). She believes she is at [facility] ([facility] but understands when corrected. There was some statement by primary team that pt may have been having VH of animals however pt states \"no I saw children dressed up as animals when I was in the hospital lobby.\" She denies AVH. She denies SI or HI stating \"I just want to live a good life and be able to drive and live my life normally.\" She is open to obtaining an MRI and agreeable to Olanzapine 5mg - \"I just don't want some random new medications.\"     She feels the Gabapentin was making things worse and causing dizziness as well as her fall. She actually indicates that she did hit her head but denies LOC - this is contradictory to family - she also denies any shoulder pain and moves her arms in all directions without issue. She states her dizziness/instability went away after stopping this two days ago.  She denies feeling confused but admits to being \"a little tired with all the meds.\" She agrees that she manages her medications but endorses she carefully separates day and night medications and denies thinking she could have missed medications. She admits to lower appetite and pain. She admits to drinking alcohol every other day in the past but states she has cut that out to occasional beers. She denies any other drug use other than rare marijuana use. She states that pain is a major issue but denies any other depressive or anxious symptoms currently other than appropriate grief surrounding her condition and anxiety about what is going to happen later, but she has hope and states she has \"good family.\" She denies any psychiatric history at all.    PSYCHIATRIC REVIEW OF SYSTEMS  See " "HPI.    PSYCHIATRIC HISTORY  Prior diagnoses: Denies.  Prior hospitalizations: Denies.  History of suicide attempts: Denies.  History of self-harm: Denies.  History of trauma/abuse/loss: Denies.  History of violence: Denies    Current psychiatrist: Denies.  Current mental health agency: Denies.  Current : Denies.  Current outpatient treatment: Denies.  Guardian or payee: Self.    Current psychiatric medications: Lorazepam 0.5mg daily prn for radiation treatment anxiety, Olanzapine 5mg at bedtime (sleep and nausea), Compazine (nausea). Pt stopped taking Gabapentin 2 days ago herself.  Past psychiatric medications: None.  Past psychiatric treatments: Denies.    Family psychiatric history: Brother gave this information.     - Psychiatric disorders: Denies.     - Suicide: Denies.     - Medication history: NA     - Neurologic diseases: Denies.    SUBSTANCE USE HISTORY   She reports that she quit smoking about 11 years ago. Her smoking use included cigarettes. She started smoking about 51 years ago. She has a 20 pack-year smoking history. She has never used smokeless tobacco. She reports current alcohol use. She reports that she does not currently use drugs.    Tobacco: Former smoker  Alcohol: \"Regular drinker\" per brother, quit for couple weeks per brother, 12 pack high ABV beers bought last week. Was drinking one or two beers a night.      - History of severe withdrawal: Pt and brother denies.     - Last use: Several days ago, \" a couple beers.\"   Cannabis: Rarely.  Other substances: Brother denies. As rx opiates now for bony pain.      - Last use: As rx.     - History of overdose: Denies.     - Longest period of sobriety: NA  Prior substance use disorder treatment: Denies.    SOCIAL HISTORY  Social History     Socioeconomic History    Marital status: Single   Tobacco Use    Smoking status: Former     Current packs/day: 0.00     Average packs/day: 0.5 packs/day for 40.0 years (20.0 ttl pk-yrs)     Types: " Cigarettes     Start date:      Quit date: 2013     Years since quittin.6    Smokeless tobacco: Never   Vaping Use    Vaping status: Never Used   Substance and Sexual Activity    Alcohol use: Yes     Comment: social    Drug use: Not Currently    Sexual activity: Defer     Social Determinants of Health     Financial Resource Strain: Low Risk  (2024)    Overall Financial Resource Strain (CARDIA)     Difficulty of Paying Living Expenses: Not very hard   Food Insecurity: No Food Insecurity (2024)    Hunger Vital Sign     Worried About Running Out of Food in the Last Year: Never true     Ran Out of Food in the Last Year: Never true   Transportation Needs: No Transportation Needs (2024)    PRAPARE - Transportation     Lack of Transportation (Medical): No     Lack of Transportation (Non-Medical): No   Physical Activity: Unknown (2024)    Exercise Vital Sign     Days of Exercise per Week: 0 days   Stress: Stress Concern Present (2024)    [place] [place] [employer] - Occupational Stress Questionnaire     Feeling of Stress : To some extent   Intimate Partner Violence: Unknown (2024)    Humiliation, Afraid, Rape, and Kick questionnaire     Fear of Current or Ex-Partner: No     Emotionally Abused: No     Physically Abused: No   Housing Stability: Low Risk  (2024)    Housing Stability Vital Sign     Unable to Pay for Housing in the Last Year: No     Number of Times Moved in the Last Year: 1     Homeless in the Last Year: No      Current living situation: Home with two cousins upstairs, one cousin from [place] living with pt.  Current employment/source of income: Retired.  Current stressors: Cancer, radiation tx.       Employment: Retired.  Marital status: .   Children: Denies.  Social support: Family.  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    Wedge compression fracture of t11-T12 vertebra, sequela 07/30/2020    Compression fracture of T11 vertebra, sequela          PAST SURGICAL HISTORY  Past Surgical History:   Procedure Laterality Date    ARTERIAL ANEURYSM REPAIR      Thoracic aortic aneurysm repair    COLONOSCOPY      RADIOFREQUENCY ABLATION      L3,4,5    WISDOM TOOTH EXTRACTION          FAMILY HISTORY  Family History   Problem Relation Name Age of Onset    Lymphoma Father      Polycythemia Father      Breast cancer Neg Hx      Colon cancer Neg Hx          ALLERGIES  Acetaminophen and Epinephrine    Some components of the patient's history were obtained through personal review of the patient's available medical records.    OARRS REVIEW  OARRS checked: Yes  OARRS comments: No issues. Rx surrounding cancer dx of Gabapentin, Opiates, Flexeril, Lorazepam as documented.    OBJECTIVE    VITALS      8/28/2024     6:55 AM 8/28/2024     7:00 AM 8/28/2024     8:09 AM 8/28/2024     9:30 AM 8/28/2024    10:27 AM 8/28/2024    11:00 AM 8/28/2024     3:54 PM   Vitals   Systolic 120 156 121 122 141 151 142   Diastolic 84 87 79 76 81 84 109   Heart Rate 96 105 104 108 106 109 98   Temp       36.6 °C (97.9 °F)   Resp 16 15 16 10  11 17        MENTAL STATUS EXAM  Appearance:  female, appears stated age, wearing hospital clothes, sitting comfortably in bed, NAD, has fair hygiene and dentition.   Attitude: Calm, cooperative, friendly.  Behavior: Appropriate eye contact, no agitation noted.  Motor Activity: No psychomotor agitation or retardation, no tremors noted with hands outstretched, no TD/EPS, signs of akathisia, or myoclonus noted. Gait not assessed.  Speech: Spontaneous, normal volume, rate, rhythm, tone.  Mood: \"Okay..\"  Affect: Euthymic, full range, non-labile. "   Thought Process: Organized, linear, goal-directed, no flight of ideas.  Thought Content:  Denies SI, HI. No delusions elicited.  Thought Perception: Denies AVH. No internal stimulation noted. No parnoid ideation noted.   Cognition: AxOx1.5, trouble with exact date and exact location, believes it is 9/1/2024 and at [facility], attention and concentration grossly intact, memory appears somewhat impaired.  Insight: Limited, patient understands condition with some difficulty 2/2 waxing and waning AMS.   Judgement: Limited, patient is unable to make sound decisions currently with waxing and waning AMS.          HOME MEDICATIONS  Medication Documentation Review Audit       Reviewed by Karen Zuñiga, PharmD (Pharmacist) on 08/28/24 at 1648      Medication Order Taking? Sig Documenting Provider Last Dose Status   acetaminophen-codeine (Tylenol w/ Codeine #3) 300-30 mg tablet [id number] Yes Take 1 tablet by mouth every 8 hours if needed for severe pain (7 - 10). Historical Provider, MD Past Month Active   atorvastatin (Lipitor) 80 mg tablet [id number] Yes Take 1 tablet (80 mg) by mouth once daily. Shai Provider, MD 8/27/2024 Active   bisacodyl (Dulcolax) 5 mg EC tablet [id number] Yes Take 1 tablet (5 mg) by mouth once daily as needed for constipation. Do not crush, chew, or split. Michael Carbajal MD Past Month Active   cetirizine (ZyrTEC) 10 mg tablet [id number] Yes Take 1 tablet (10 mg) by mouth once daily as needed for allergies. Historical Provider, MD Past Month Active   cholecalciferol (Vitamin D3) 25 MCG (1000 UT) tablet [id number] Yes Take 1 tablet (1,000 Units) by mouth once daily. Shai Provider, MD Past Week Monday Active   cyclobenzaprine (Flexeril) 10 mg tablet [id number] Yes Take 1 tablet (10 mg) by mouth 3 times a day as needed for muscle spasms. Susan Leroy, APRN-CNP Past Week Active   docusate sodium (Colace) 100 mg capsule [id number] Yes Take 1 capsule (100 mg) by mouth 2 times a day. Michael Carbajal MD  Past Week Active   gabapentin (Neurontin) 300 mg capsule [id number] No Take 1 capsule (300 mg) by mouth once daily at bedtime.   Patient not taking: Reported on 8/28/2024    PARVIN Peoples Not Taking Active   ibandronate (Boniva) 150 mg tablet [id number] Yes Take 1 tablet (150 mg) by mouth every 30 (thirty) days. Take in morning with full glass of water on an empty stomach. No food, drink, meds, or lying down for 60 minutes after. Jose Rafael Hart MD Past Month Active   LORazepam (Ativan) 0.5 mg tablet [id number] No Take 1 tablet (0.5 mg) by mouth once daily as needed for anxiety (PRIOR to RADIATION) for up to 10 days.   Patient not taking: Reported on 8/28/2024    Michael Carbajal MD Not Taking Active   losartan (Cozaar) 100 mg tablet [id number] Yes TAKE 1 TABLET BY MOUTH EVERY DAY Cayden Buckley MD Past Month Active   menthol (ICY HOT ADVANCED RELIEF PATCH TOP) [id number] Yes Apply 1 patch topically every 12 hours if needed. Shai Laguerre MD Past Week Active   metoprolol succinate XL (Toprol-XL) 100 mg 24 hr tablet [id number] Yes TAKE 1 TABLET BY MOUTH EVERY DAY Cayden Buckley MD Past Week Active   morphine CR (MS Contin) 15 mg 12 hr tablet [id number] No Take 1 tablet (15 mg) by mouth 2 times a day for 15 days. Do not crush, chew, or split. PARVIN Peoples Unknown Active   naloxone (Narcan) 4 mg/0.1 mL nasal spray [id number]  Administer 1 spray (4 mg) into affected nostril(s) if needed for opioid reversal or respiratory depression. May repeat every 2-3 minutes if needed, alternating nostrils, until medical assistance becomes available. PARVNI Peoples  Active   OLANZapine (ZyPREXA) 5 mg tablet [id number] No Take 1 tablet (5 mg) by mouth once daily at bedtime. For 4 days starting the evening of treatment   Patient not taking: Reported on 8/28/2024    Camila Chaudhary MD MPH Not Taking Active   omeprazole OTC (PriLOSEC OTC) 20 mg EC tablet [id number] Yes Take 1 tablet (20 mg) by mouth once  daily. Do not crush, chew, or split. Susan Leroy, APRN-CNP Past Week Active   ondansetron (Zofran) 4 mg tablet [id number] No Take 1 tablet (4 mg) by mouth every 6 hours if needed for nausea or vomiting.   Patient not taking: Reported on 8/28/2024    Michael Carbajal MD Not Taking Active   ondansetron (Zofran) 8 mg tablet [id number] No Take 0.5 tablets (4 mg) by mouth every 6 hours if needed for nausea or vomiting.   Patient not taking: Reported on 8/28/2024    Michael Carbajal MD Not Taking Active   ondansetron (Zofran) 8 mg tablet [id number] Yes Take 1 tablet (8 mg) by mouth every 8 hours if needed for nausea or vomiting. Camila Chaudhary MD MPH Past Month Active   oxyCODONE (Roxicodone) 5 mg immediate release tablet [id number] Yes Take 2 to 3 tablets (10-15 mg) by mouth every 4 hours if needed for severe pain (7 - 10) (please take 3 tabs before radiation sessions) for up to 15 days. Michael Carbajal MD Past Week Active   polyethylene glycol (Glycolax, Miralax) 17 gram/dose powder [id number] No Take 17 g by mouth once daily.   Patient not taking: Reported on 8/28/2024    Michael Carbajal MD Not Taking Active   prochlorperazine (Compazine) 10 mg tablet [id number] No Take 1 tablet (10 mg) by mouth every 6 hours if needed for nausea or vomiting. Camila Chaudhary MD MPH Unknown Active   sennosides (Senokot) 8.6 mg tablet [id number] No Take 1 tablet (8.6 mg) by mouth once daily as needed for constipation.   Patient not taking: Reported on 8/28/2024    Michael Carbajal MD Not Taking Active   vit A/vit C/vit E/zinc/copper (PRESERVISION AREDS ORAL) [id number] Yes Take 1 tablet by mouth early in the morning.. Historical Provider, MD Past Week Active                     CURRENT MEDICATIONS  Scheduled medications  apixaban, 10 mg, oral, BID   Followed by  [START ON 9/4/2024] apixaban, 5 mg, oral, BID  atorvastatin, 80 mg, oral, Daily  cholecalciferol, 1,000 Units, oral, Daily  [Held by provider] gabapentin, 300 mg, oral, Nightly  [Held by provider] losartan,  100 mg, oral, Daily  [Held by provider] metoprolol succinate XL, 100 mg, oral, Daily  morphine CR, 15 mg, oral, BID  OLANZapine, , ,   OLANZapine, 5 mg, intramuscular, Once  OLANZapine, 5 mg, oral, Nightly  pantoprazole, 20 mg, oral, Daily before breakfast  perflutren lipid microspheres, 0.5-10 mL of dilution, intravenous, Once in imaging  perflutren protein A microsphere, 0.5 mL, intravenous, Once in imaging  polyethylene glycol, 17 g, oral, Daily  sulfur hexafluoride microsphr, 2 mL, intravenous, Once in imaging        Continuous medications       PRN medications  PRN medications: bisacodyl, cetirizine, cyclobenzaprine, haloperidol lactate, OLANZapine, ondansetron, oxyCODONE, prochlorperazine, sennosides     LABS  Results for orders placed or performed during the hospital encounter of 08/27/24 (from the past 24 hour(s))   Urine electrolytes   Result Value Ref Range    Sodium, Urine Random <10 mmol/L    Sodium/Creatinine Ratio      Potassium, Urine Random 17 mmol/L    Potassium/Creatinine Ratio 37 Not established mmol/g Creat    Chloride, Urine Random <15 mmol/L    Chloride/Creatinine Ratio      Creatinine, Urine Random 46.0 20.0 - 320.0 mg/dL   Heparin Assay, UFH   Result Value Ref Range    Heparin Unfractionated 0.7 See Comment Below for Therapeutic Ranges IU/mL   CBC and Auto Differential   Result Value Ref Range    WBC 7.8 4.4 - 11.3 x10*3/uL    nRBC 0.0 0.0 - 0.0 /100 WBCs    RBC 4.11 4.00 - 5.20 x10*6/uL    Hemoglobin 12.1 12.0 - 16.0 g/dL    Hematocrit 33.7 (L) 36.0 - 46.0 %    MCV 82 80 - 100 fL    MCH 29.4 26.0 - 34.0 pg    MCHC 35.9 32.0 - 36.0 g/dL    RDW 12.4 11.5 - 14.5 %    Platelets 194 150 - 450 x10*3/uL    Neutrophils % 88.7 40.0 - 80.0 %    Immature Granulocytes %, Automated 0.5 0.0 - 0.9 %    Lymphocytes % 5.7 13.0 - 44.0 %    Monocytes % 4.8 2.0 - 10.0 %    Eosinophils % 0.0 0.0 - 6.0 %    Basophils % 0.3 0.0 - 2.0 %    Neutrophils Absolute 6.89 1.20 - 7.70 x10*3/uL    Immature Granulocytes  Absolute, Automated 0.04 0.00 - 0.70 x10*3/uL    Lymphocytes Absolute 0.44 (L) 1.20 - 4.80 x10*3/uL    Monocytes Absolute 0.37 0.10 - 1.00 x10*3/uL    Eosinophils Absolute 0.00 0.00 - 0.70 x10*3/uL    Basophils Absolute 0.02 0.00 - 0.10 x10*3/uL   Magnesium   Result Value Ref Range    Magnesium 2.52 (H) 1.60 - 2.40 mg/dL   Renal function panel   Result Value Ref Range    Glucose 148 (H) 74 - 99 mg/dL    Sodium 131 (L) 136 - 145 mmol/L    Potassium 3.9 3.5 - 5.3 mmol/L    Chloride 96 (L) 98 - 107 mmol/L    Bicarbonate 24 21 - 32 mmol/L    Anion Gap 15 10 - 20 mmol/L    Urea Nitrogen 27 (H) 6 - 23 mg/dL    Creatinine 1.04 0.50 - 1.05 mg/dL    eGFR 59 (L) >60 mL/min/1.73m*2    Calcium 8.9 8.6 - 10.6 mg/dL    Phosphorus 2.9 2.5 - 4.9 mg/dL    Albumin 3.3 (L) 3.4 - 5.0 g/dL   Heparin Assay, UFH   Result Value Ref Range    Heparin Unfractionated 0.5 See Comment Below for Therapeutic Ranges IU/mL        IMAGING  ECG 12 lead    Result Date: 8/27/2024  Sinus tachycardia with Fusion complexes Low voltage QRS Cannot rule out Anterior infarct , age undetermined Abnormal ECG When compared with ECG of 31-JUL-2024 09:54, Fusion complexes are now Present See ED provider note for full interpretation and clinical correlation Confirmed by Indira Delcid ([id number]) on 8/27/2024 9:00:16 PM    CT cervical spine wo IV contrast    Result Date: 8/27/2024  Interpreted By:  Dacia Guillen, STUDY: CT CERVICAL SPINE WO IV CONTRAST;  8/27/2024 5:27 pm   INDICATION: Signs/Symptoms:fall, hit head prior to arrival.     COMPARISON: MRI cervical spine 08/12/2024   ACCESSION NUMBER(S): [id number]   ORDERING CLINICIAN: MARICARMEN SHERWOOD   TECHNIQUE: Axial CT images of the cervical spine are obtained. Axial, coronal and sagittal reconstructions are provided for review.   FINDINGS:     Fractures: There is no evidence for an acute fracture of the cervical spine. The visualized osseous structures appear diffusely osteopenic.   Vertebral Alignment: There is  straightening of the normal cervical lordosis. There is trace retrolisthesis of C4 on C5.   Craniocervical Junction: The odontoid process and craniocervical junction are intact.   Vertebrae/Disc Spaces:  The cervical vertebral body heights are maintained. There is mild-to-moderate disc height loss at C3-C4, C4-C5 and C5-C6.   Prevertebral/Paraspinal Soft Tissues: The prevertebral and paraspinal soft tissues are unremarkable.         No evidence of an acute fracture of the cervical spine.   MACRO: None   Signed by: Dacia Guillen 8/27/2024 5:40 PM Dictation workstation:   [id number]    CT head wo IV contrast    Result Date: 8/27/2024  Interpreted By:  Dacia Guillen, STUDY: CT HEAD WO IV CONTRAST;  8/27/2024 5:27 pm   INDICATION: Signs/Symptoms:fall, hit head prior to arrival.     COMPARISON: None.   ACCESSION NUMBER(S): [id number]   ORDERING CLINICIAN: MARICARMEN SHERWOOD   TECHNIQUE: Noncontrast axial CT scan of head was performed. Angled reformats in brain and bone windows were generated. The images were reviewed in bone, brain, blood and soft tissue windows.   FINDINGS: CSF Spaces: The ventricles, sulci and basal cisterns are within normal limits. There is no extraaxial fluid collection.   Parenchyma: Mild patchy nonspecific white matter hypodensity is compatible with microangiopathy. The grey-white differentiation is intact. There is no mass effect or midline shift.  There is no intracranial hemorrhage.   Calvarium: The calvarium is unremarkable. Mild posterior scalp soft tissue swelling.   Paranasal sinuses and mastoids: Visualized paranasal sinuses and mastoids are predominant clear.       No evidence of intracranial hemorrhage or displaced skull fracture.   MACRO: None   Signed by: Dacia Guillen 8/27/2024 5:36 PM Dictation workstation:   [id number]    CT angio chest for pulmonary embolism    Result Date: 8/27/2024  Interpreted By:  Jere Sigala  and Paulino Henley right   STUDY: CT ANGIO CHEST FOR PULMONARY  EMBOLISM;  8/27/2024 1:54 pm   INDICATION: Signs/Symptoms:SOB and Hypoxia.   COMPARISON: CT chest 08/05/2024   ACCESSION NUMBER(S): [id number]   ORDERING CLINICIAN: ONEAL FLORES   TECHNIQUE: Helical data acquisition of the chest was obtained after intravenous administration of 85 mL Omnipaque 350 contrast, as per PE protocol. Images were reformatted in coronal and sagittal planes. Axial and coronal maximum intensity projection (MIP) images were created and reviewed.   FINDINGS: POTENTIAL LIMITATIONS OF THE STUDY: None   HEART AND VESSELS: There are filling defects in the subsegmental arteries of the right lower lobe (series 501, images 166, 168, 170), consistent with acute pulmonary emboli. No additional central/paracentral pulmonary emboli seen. Main pulmonary artery is mildly dilated.   Redemonstrated postsurgical changes related to ascending aortic replacement. There is similar mild dilatation of distal ascending thoracic aorta and the proximal arch measuring up to 4 cm. Rest of the thoracic aorta normal in course and caliber.There is moderate atherosclerosis present, including calcified and noncalcified plaques.Although, the study is not tailored for evaluation of aorta, there is no definite evidence of acute aortic pathology. Mild coronary artery calcifications are seen. Please note,the study is not optimized for evaluation of coronary arteries.   The cardiac chambers are not enlarged.There are no findings to suggest right heart strain. There is no pericardial effusion seen.   MEDIASTINUM AND ZEN, LOWER NECK AND AXILLA: The visualized thyroid gland is within normal limits. There is again demonstration of multiple enlarged right hilar lymph nodes measuring up to 1.5 cm on image 108 of 262, series 501 and additional prominent mediastinal lymph nodes, which were hypermetabolic on recent PET-CT and most consistent with metastatic disease. Note is made of a small hiatal hernia. Otherwise, esophagus appears  within normal limits as seen.   LUNGS AND AIRWAYS: There are extensive secretions within the central and paracentral airways resulting in moderate to severe narrowing of distal trachea (image 82, series 501 as well as bilateral principal bronchi. There is also diffuse bronchial wall thickening and obliteration of multiple smaller lower lobar bronchi. The above findings are new when compared to prior CT scan 08/05/2024.   There are new atelectatic changes within bilateral lower lobes, left more than right, which are likely secondary to central mucous plugging.   There are new patchy ground-glass and early consolidative opacities within posterior right upper lobe, partially obscuring previously noted solid posterior right upper lobe pulmonary nodule measuring a proximally 9 mm (image 80, series 501), likely representing therapy related changes/pneumonitis. Note is also made of dependent atelectasis within bilateral lower lobes, left more than right   Previously seen posteromedial left upper lobe nodule measuring 1.9 cm X 1.9 cm (axial image 62, series 504) as compared to 1.4 cm X 1.4 cm on prior CT, most consistent with enlarging malignancy/metastasis Otherwise, no new pulmonary nodules or masses.   UPPER ABDOMEN: The visualized subdiaphragmatic structures demonstrate no remarkable findings.   CHEST WALL AND OSSEOUS STRUCTURES: Status post prior median sternotomy. Otherwise, chest wall is within normal limits. Multiple osseous metastatic lesions are better assessed on recent PET-CT. Specifically, there is again demonstration of pathological compression fractures of T7 and T12 vertebral bodies, which are similar to recent CT scan 08/05/2024. Also ill-defined sclerotic lesions of right-sided ribs (for example posterior right 5th rib on axial image 80 and pathological fracture of posterior right 11th on image 230.)  fracture deformity of right humeral head/neck.       1. Few subsegmental acute pulmonary emboli within  right lower lobe. There is no evidence of right heart strain. 2. New patchy ground-glass and early consolidative opacities within posterior right upper lobe, partially obscuring previously noted right upper lobe solid pulmonary nodule, likely representing therapy related changes/pneumonitis. 3. New extensive secretions within central and paracentral airways along with obliteration of multiple smaller bronchi within bilateral lower lobes. Resultant partial atelectasis of bilateral lower lobes, left more than right. Pulmonary hygiene is recommended. 4. Interval increase in size of left upper lobe solid pulmonary nodule, now measuring up to 1.9 cm, most consistent with enlarging malignancy/metastasis. 5. Prominent to enlarged right mediastinal and right hilar lymph nodes, which were hypermetabolic on recent PET-CT, highly suspicious for teresa metastatic disease. 6. Osseous metastatic disease is similar to prior examination and better assessed on PET-CT 07/11/2024, including stable pathological compression fractures of T7 and T12 vertebral bodies. 7. Additional chronic findings as above.   MACRO: Isael Bob discussed the significance and urgency of this critical finding by EPIC secure chat with  ONEAL FLORES on 8/27/2024 at 2:20 pm.  (**-RCF-**) Findings:  See findings.   Signed by: Jere Sigala 8/27/2024 3:16 PM Dictation workstation:   [id number]    XR chest 1 view    Result Date: 8/27/2024  Interpreted By:  Lukasz Schuler and Elsamaloty Mazzin STUDY: XR CHEST 1 VIEW; 8/27/2024 12:55 pm   INDICATION: Signs/Symptoms:SOB.   COMPARISON: CTs of the chest dated 08/13/2020 and 08/05/2022.   ACCESSION NUMBER(S): [id number]   ORDERING CLINICIAN: ONEAL FLORES   TECHNIQUE: A single AP view of the chest was obtained.   FINDINGS: Sternotomy wires in place. Cardiomediastinal silhouette is within normal limits for size and configuration. There is cephalization of the pulmonary veins and increased bronchovascular  markings throughout the lungs. The left heart border is obscured suggesting a retrocardiac opacity, an underlying effusion, or atelectasis. There is no consolidation or pneumothorax seen. Visualized portions of the abdomen are unremarkable. The osseous structures are unremarkable.       Cephalization of the pulmonary veins and increased bronchovascular markings with a question of small left-sided pleural effusion. Findings are suggestive of mild interstitial pulmonary edema.   I personally reviewed the images/study and I agree with the findings as stated by Julius Hernandez MD (resident). This study was interpreted at [place], [place].   Signed by: Lukasz Schuler 8/27/2024 1:14 PM Dictation workstation:   [id number]      Past MRI:  7/19/2024: MRI brain (+/-): 7 mm avidly enhancing extra-axial appearing nodule along the left pontine medullary junction adjacent to the left vertebral artery. 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 Clinical correlation and attention on follow-up suggested. No additional abnormal enhancement identified.     EKG:  - EKG (8/27): Sinus Tachycardia - 461 qtc per Bazetts, 103 vent rate, sinus tachy -> 427 by Del Cid which is more accurate in a tachycardic patient.     PSYCHIATRIC RISK ASSESSMENT  Violence Risk Factors:  substance abuse , unemployed, lack of insight, stress/destabilizers, and delirium likely  with AMS.  Acute Risk of Harm to Others is Considered: Moderate  Suicide Risk Factors: ; /[place] native, having a disability , chronic medical illness, chronic pain, current psychiatric illness, life crisis (shame/despair), and substance abuse   Protective Factors: social support/connectedness, positive family relationships, hopefulness/future-orientation, and life satisfaction  Acute Risk of Harm to Self is Considered: Low    ASSESSMENT AND PLAN  Brittny Gordon is a  69 y.o. 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 Psychiatry was consulted on 8/28 for agitation management in the context of MRI workup.    Patient has AMS and instability with recent fall, head CT was negative, MRI of brain rec for r/o mets. Patient requires agitation recs, no prn have been given at this juncture. QTC < 450 corrected by Del Cid for tachycardia. Pt appears to wax and wane per hx and interview. Gabapentin and Lorazepam are held, have been short courses, no concern for benzo w/d. Notably, TSH wnl, CT chest showed pneumonitis but no signs of PNA per say, no ZAC, no leukocytosis, mild, chronic anemia, and no signs of ZAC. Pt has low NA with low urine sodium likely indicative of poor PO intake with cancer diagnosis. Vit B12 and folate pending. An EEG could help r/o seizures and help point to deliirum diagnosis per brain wave patterns, however may be difficult for tolerance until agitation better controlled.     Alcohol use has been about 2 beers every other day on average to nightly maximum - however pt has at least 2 weeks interrupted 2/2 hospitalizations and sobriety after diagnosis with no hx of withdrawal. No tremors seen. BP is slightly high but pt does have HTN hx, HR is around 100. Pt and brother agreeable to Olanzapine 5mg at bedtime for delirium, nausea, and sleep. An additional 5mg BID PO first line prn can be given for agitation and anxiety surrounding long procedures such as MRI or central lines. 5mg IM can be given BID for agitation not responding to PO or redirection.     IMPRESSION  #Delirium 2/2 malignancy, pain, pneumonitis  #R/o brain metastasis, consider seizures      RECOMMENDATIONS  Safety:  - Patient does not currently meet criteria for inpatient psychiatric admission.   - Patient lacks the  "capacity to leave AMA at this time and thus cannot leave AMA. Call CODE VIOLET if patient attempts to leave AMA.  - To evaluate decision-making capacity, recommend use of the Capacity Evaluation Tool. Search “[facility] Capacity Evaluation\" under SmartText unless the patient has a legal guardian, in which case all decisions per the legal guardian.  - Defer to primary team decision for 1:1 sitter.  - As with all hospitalized patients, would recommend delirium precautions, as below.  DELIRIUM RECS:   -- Minimize use of benzos, opiates, anticholinergics, as these may worsen mental status   -- Would use caution with narcotic pain medications   -- Would still adequately controlling pain, as uncontrolled pain is also a risk factor for delirium   -- Reinforce sleep hygiene; encourage patient to stay awake during the day   -- Keep curtains/blinds open during the day and closed at night.   -- Would recommend reorienting/redirecting patient as much as possible,    -- Aim for consistent staffing, familiar objects, avoiding bright lights and loud noises, etc.      Medications:  - Continue Olanzapine 5mg at bedtime for sleep and anxiety.  - Discontinue Lorazepam.  - Discontinue Gabapentin.  - Continue to treat pain judiciously with morphine, oxycodone, and flexeril as needed.     PRN:  - Olanzapine 5mg ODT PO BID first line for agitation and anxiety surrounding long medical procedures such as MRI or central lines.  - Olanzapine 5mg IM BID prn second line for agitation.  - Avoid Lorazepam due to respiratory depression risk with Olanzapine.       Work-up:  - MRI brain with and w/o contrast per primary team.  - EKG daily for qtc monitoring.  ::TSH wnl, folate and B12 pending.  -Would order syphilis, HIV, UA with reflex culture, utox for completeness sake.  - Consider EEG r/o seizures, confirm delirium.    Ancillary Services:  - Recommend holding , pet/music/art therapy consult until patient agitation is " controlled.    Follow-up:  - Follow up tbd.    ==========  - Discussed recommendations with primary team.  - Psychiatry will continue to follow.    Thank you for allowing us to participate in the care of this patient. Please page [phone] with any questions or concerns.    Patient seen and discussed with Dr. Galvan, who agrees with above plan.    Antwon Carey MD    Medication Consent  Medication Consent: n/a; consult service

## 2024-08-29 ENCOUNTER — APPOINTMENT (OUTPATIENT)
Dept: RADIOLOGY | Facility: HOSPITAL | Age: 69
End: 2024-08-29
Payer: MEDICARE

## 2024-08-29 ENCOUNTER — CLINICAL SUPPORT (OUTPATIENT)
Dept: EMERGENCY MEDICINE | Facility: HOSPITAL | Age: 69
End: 2024-08-29
Payer: MEDICARE

## 2024-08-29 ENCOUNTER — APPOINTMENT (OUTPATIENT)
Dept: CARDIOLOGY | Facility: HOSPITAL | Age: 69
End: 2024-08-29
Payer: MEDICARE

## 2024-08-29 ENCOUNTER — APPOINTMENT (OUTPATIENT)
Dept: RADIATION ONCOLOGY | Facility: HOSPITAL | Age: 69
DRG: 175 | End: 2024-08-29
Payer: MEDICARE

## 2024-08-29 ENCOUNTER — APPOINTMENT (OUTPATIENT)
Dept: VASCULAR MEDICINE | Facility: HOSPITAL | Age: 69
End: 2024-08-29
Payer: MEDICARE

## 2024-08-29 LAB
ALBUMIN SERPL BCP-MCNC: 3.4 G/DL (ref 3.4–5)
ANION GAP SERPL CALC-SCNC: 16 MMOL/L (ref 10–20)
AORTIC VALVE MEAN GRADIENT: 1.9 MMHG
AORTIC VALVE PEAK VELOCITY: 1.05 M/S
APPEARANCE UR: CLEAR
AV PEAK GRADIENT: 4.4 MMHG
AVA (PEAK VEL): 3.24 CM2
AVA (VTI): 3.74 CM2
BACTERIA UR CULT: NORMAL
BASOPHILS # BLD AUTO: 0.01 X10*3/UL (ref 0–0.1)
BASOPHILS NFR BLD AUTO: 0.1 %
BILIRUB UR STRIP.AUTO-MCNC: NEGATIVE MG/DL
BNP SERPL-MCNC: 230 PG/ML (ref 0–99)
BUN SERPL-MCNC: 22 MG/DL (ref 6–23)
CALCIUM SERPL-MCNC: 8.5 MG/DL (ref 8.6–10.6)
CARDIAC TROPONIN I PNL SERPL HS: 120 NG/L (ref 0–34)
CARDIAC TROPONIN I PNL SERPL HS: 124 NG/L (ref 0–34)
CHLORIDE SERPL-SCNC: 99 MMOL/L (ref 98–107)
CO2 SERPL-SCNC: 23 MMOL/L (ref 21–32)
COLOR UR: ABNORMAL
CREAT SERPL-MCNC: 0.71 MG/DL (ref 0.5–1.05)
EGFRCR SERPLBLD CKD-EPI 2021: >90 ML/MIN/1.73M*2
EJECTION FRACTION APICAL 4 CHAMBER: 60.6
EJECTION FRACTION: 63 %
EOSINOPHIL # BLD AUTO: 0.02 X10*3/UL (ref 0–0.7)
EOSINOPHIL NFR BLD AUTO: 0.2 %
ERYTHROCYTE [DISTWIDTH] IN BLOOD BY AUTOMATED COUNT: 12.3 % (ref 11.5–14.5)
ETHANOL SERPL-MCNC: <10 MG/DL
GLUCOSE SERPL-MCNC: 117 MG/DL (ref 74–99)
GLUCOSE UR STRIP.AUTO-MCNC: NORMAL MG/DL
HCT VFR BLD AUTO: 34.4 % (ref 36–46)
HGB BLD-MCNC: 12.2 G/DL (ref 12–16)
HOLD SPECIMEN: NORMAL
IMM GRANULOCYTES # BLD AUTO: 0.08 X10*3/UL (ref 0–0.7)
IMM GRANULOCYTES NFR BLD AUTO: 0.6 % (ref 0–0.9)
KETONES UR STRIP.AUTO-MCNC: ABNORMAL MG/DL
LACTATE SERPL-SCNC: 0.8 MMOL/L (ref 0.4–2)
LEFT ATRIUM VOLUME AREA LENGTH INDEX BSA: 20.4 ML/M2
LEFT VENTRICLE INTERNAL DIMENSION DIASTOLE: 4 CM (ref 3.5–6)
LEFT VENTRICULAR OUTFLOW TRACT DIAMETER: 2.2 CM
LEUKOCYTE ESTERASE UR QL STRIP.AUTO: ABNORMAL
LYMPHOCYTES # BLD AUTO: 0.59 X10*3/UL (ref 1.2–4.8)
LYMPHOCYTES NFR BLD AUTO: 4.6 %
MAGNESIUM SERPL-MCNC: 2.29 MG/DL (ref 1.6–2.4)
MCH RBC QN AUTO: 29.3 PG (ref 26–34)
MCHC RBC AUTO-ENTMCNC: 35.5 G/DL (ref 32–36)
MCV RBC AUTO: 83 FL (ref 80–100)
MITRAL VALVE E/A RATIO: 0.45
MONOCYTES # BLD AUTO: 1.34 X10*3/UL (ref 0.1–1)
MONOCYTES NFR BLD AUTO: 10.5 %
NEUTROPHILS # BLD AUTO: 10.67 X10*3/UL (ref 1.2–7.7)
NEUTROPHILS NFR BLD AUTO: 84 %
NITRITE UR QL STRIP.AUTO: NEGATIVE
NRBC BLD-RTO: 0 /100 WBCS (ref 0–0)
PH UR STRIP.AUTO: 6 [PH]
PHOSPHATE SERPL-MCNC: 2.7 MG/DL (ref 2.5–4.9)
PLATELET # BLD AUTO: 248 X10*3/UL (ref 150–450)
POTASSIUM SERPL-SCNC: 3.5 MMOL/L (ref 3.5–5.3)
PROCALCITONIN SERPL-MCNC: 1.15 NG/ML
PROT UR STRIP.AUTO-MCNC: NEGATIVE MG/DL
RBC # BLD AUTO: 4.17 X10*6/UL (ref 4–5.2)
RBC # UR STRIP.AUTO: NEGATIVE /UL
RBC #/AREA URNS AUTO: NORMAL /HPF
RIGHT VENTRICLE FREE WALL PEAK S': 11 CM/S
RIGHT VENTRICLE PEAK SYSTOLIC PRESSURE: 8 MMHG
SODIUM SERPL-SCNC: 134 MMOL/L (ref 136–145)
SP GR UR STRIP.AUTO: 1.01
UROBILINOGEN UR STRIP.AUTO-MCNC: NORMAL MG/DL
WBC # BLD AUTO: 12.7 X10*3/UL (ref 4.4–11.3)
WBC #/AREA URNS AUTO: NORMAL /HPF

## 2024-08-29 PROCEDURE — 93971 EXTREMITY STUDY: CPT

## 2024-08-29 PROCEDURE — 93971 EXTREMITY STUDY: CPT | Performed by: SURGERY

## 2024-08-29 PROCEDURE — 82077 ASSAY SPEC XCP UR&BREATH IA: CPT

## 2024-08-29 PROCEDURE — 71045 X-RAY EXAM CHEST 1 VIEW: CPT | Performed by: RADIOLOGY

## 2024-08-29 PROCEDURE — 83605 ASSAY OF LACTIC ACID: CPT

## 2024-08-29 PROCEDURE — 93306 TTE W/DOPPLER COMPLETE: CPT

## 2024-08-29 PROCEDURE — 85025 COMPLETE CBC W/AUTO DIFF WBC: CPT

## 2024-08-29 PROCEDURE — 87075 CULTR BACTERIA EXCEPT BLOOD: CPT

## 2024-08-29 PROCEDURE — 83735 ASSAY OF MAGNESIUM: CPT

## 2024-08-29 PROCEDURE — 2500000004 HC RX 250 GENERAL PHARMACY W/ HCPCS (ALT 636 FOR OP/ED)

## 2024-08-29 PROCEDURE — 83880 ASSAY OF NATRIURETIC PEPTIDE: CPT

## 2024-08-29 PROCEDURE — 71045 X-RAY EXAM CHEST 1 VIEW: CPT

## 2024-08-29 PROCEDURE — 84484 ASSAY OF TROPONIN QUANT: CPT

## 2024-08-29 PROCEDURE — 87040 BLOOD CULTURE FOR BACTERIA: CPT

## 2024-08-29 PROCEDURE — 1210000001 HC SEMI-PRIVATE ROOM DAILY

## 2024-08-29 PROCEDURE — 36415 COLL VENOUS BLD VENIPUNCTURE: CPT

## 2024-08-29 PROCEDURE — 84145 PROCALCITONIN (PCT): CPT

## 2024-08-29 PROCEDURE — 2500000001 HC RX 250 WO HCPCS SELF ADMINISTERED DRUGS (ALT 637 FOR MEDICARE OP)

## 2024-08-29 PROCEDURE — 2500000002 HC RX 250 W HCPCS SELF ADMINISTERED DRUGS (ALT 637 FOR MEDICARE OP, ALT 636 FOR OP/ED)

## 2024-08-29 PROCEDURE — 93005 ELECTROCARDIOGRAM TRACING: CPT

## 2024-08-29 PROCEDURE — 80069 RENAL FUNCTION PANEL: CPT

## 2024-08-29 PROCEDURE — 99223 1ST HOSP IP/OBS HIGH 75: CPT | Performed by: NURSE PRACTITIONER

## 2024-08-29 PROCEDURE — 87632 RESP VIRUS 6-11 TARGETS: CPT

## 2024-08-29 RX ORDER — METOPROLOL SUCCINATE 50 MG/1
100 TABLET, EXTENDED RELEASE ORAL DAILY
Status: DISPENSED | OUTPATIENT
Start: 2024-08-29

## 2024-08-29 RX ORDER — LOSARTAN POTASSIUM 50 MG/1
100 TABLET ORAL DAILY
Status: DISPENSED | OUTPATIENT
Start: 2024-08-29

## 2024-08-29 RX ORDER — OLANZAPINE 5 MG/1
5 TABLET, ORALLY DISINTEGRATING ORAL 2 TIMES DAILY PRN
Status: DISPENSED | OUTPATIENT
Start: 2024-08-29

## 2024-08-29 RX ORDER — OLANZAPINE 10 MG/2ML
5 INJECTION, POWDER, FOR SOLUTION INTRAMUSCULAR 2 TIMES DAILY PRN
Status: DISPENSED | OUTPATIENT
Start: 2024-08-28

## 2024-08-29 SDOH — HEALTH STABILITY: MENTAL HEALTH: HALLUCINATION: AUDITORY

## 2024-08-29 ASSESSMENT — PAIN SCALES - GENERAL
PAINLEVEL_OUTOF10: 6
PAINLEVEL_OUTOF10: 0 - NO PAIN
PAINLEVEL_OUTOF10: 5 - MODERATE PAIN
PAINLEVEL_OUTOF10: 10 - WORST POSSIBLE PAIN

## 2024-08-29 ASSESSMENT — LIFESTYLE VARIABLES
TOTAL SCORE: 4
ORIENTATION AND CLOUDING OF SENSORIUM: ORIENTED AND CAN DO SERIAL ADDITIONS
TOTAL SCORE: 2
ANXIETY: MILDLY ANXIOUS
BLOOD PRESSURE: 136/81
AUDITORY DISTURBANCES: NOT PRESENT
PULSE: 96
TREMOR: NO TREMOR
ORIENTATION AND CLOUDING OF SENSORIUM: ORIENTED AND CAN DO SERIAL ADDITIONS
HEADACHE, FULLNESS IN HEAD: NOT PRESENT
PAROXYSMAL SWEATS: NO SWEAT VISIBLE
ANXIETY: NO ANXIETY, AT EASE
VISUAL DISTURBANCES: NOT PRESENT
AGITATION: SOMEWHAT MORE THAN NORMAL ACTIVITY
PULSE: 103
PULSE: 108
ANXIETY: MILDLY ANXIOUS
BLOOD PRESSURE: 127/80
AGITATION: SOMEWHAT MORE THAN NORMAL ACTIVITY
PAROXYSMAL SWEATS: NO SWEAT VISIBLE
PAROXYSMAL SWEATS: NO SWEAT VISIBLE
ORIENTATION AND CLOUDING OF SENSORIUM: ORIENTED AND CAN DO SERIAL ADDITIONS
TREMOR: NO TREMOR
AUDITORY DISTURBANCES: NOT PRESENT
AUDITORY DISTURBANCES: NOT PRESENT
NAUSEA AND VOMITING: NO NAUSEA AND NO VOMITING
TREMOR: 2
NAUSEA AND VOMITING: NO NAUSEA AND NO VOMITING
VISUAL DISTURBANCES: NOT PRESENT
AUDITORY DISTURBANCES: NOT PRESENT
HEADACHE, FULLNESS IN HEAD: NOT PRESENT
HEADACHE, FULLNESS IN HEAD: NOT PRESENT
TOTAL SCORE: 1
NAUSEA AND VOMITING: NO NAUSEA AND NO VOMITING
ORIENTATION AND CLOUDING OF SENSORIUM: ORIENTED AND CAN DO SERIAL ADDITIONS
HEADACHE, FULLNESS IN HEAD: NOT PRESENT
TREMOR: NO TREMOR
ANXIETY: NO ANXIETY, AT EASE
VISUAL DISTURBANCES: NOT PRESENT
AGITATION: SOMEWHAT MORE THAN NORMAL ACTIVITY
PAROXYSMAL SWEATS: NO SWEAT VISIBLE
BLOOD PRESSURE: 146/94
AGITATION: SOMEWHAT MORE THAN NORMAL ACTIVITY
VISUAL DISTURBANCES: NOT PRESENT
NAUSEA AND VOMITING: NO NAUSEA AND NO VOMITING

## 2024-08-29 ASSESSMENT — PAIN - FUNCTIONAL ASSESSMENT: PAIN_FUNCTIONAL_ASSESSMENT: 0-10

## 2024-08-29 ASSESSMENT — PAIN DESCRIPTION - ORIENTATION: ORIENTATION: RIGHT

## 2024-08-29 ASSESSMENT — PAIN DESCRIPTION - PAIN TYPE: TYPE: ACUTE PAIN

## 2024-08-29 ASSESSMENT — PAIN DESCRIPTION - LOCATION: LOCATION: BACK

## 2024-08-29 NOTE — SIGNIFICANT EVENT
69 y.o. female with stage IVB ([id number]) primary non-small cell carcinoma of RUL, involving multiple bone mets. Receiving palliative radiation therapy for severe back pain, HTN, HLD, osteporosis, AAA s/p repair, former smoker. She was referred from rad/onc clinic to the ED due to Hypoxia (O2 sat 80s) and low BP ( SBP 90s), found to have a subsegmental PE on CTA, with no evidence of RV strain on CTA, normal Bnp and trop. POCUS in the ED is non revealing for rt Heart Strain as well. Chest xray showed cephalization of the pulmonary veins and increased bronchovascular markings with a question of small left-sided pleural effusion, suggestive of mild interstitial pulmonary edema. Per chart review initially managed with IVF and heparin, now transitioned to eliquis.     Appears that pressures were initially fluid responsive. Overnight received several pages for the evaluation of sinus tachycardia. As per my evaluation noted to have bladder scan initially revealing >800 cc. At this time straight cath performed with repeat bladder scan revealing ~<150 cc. Patient given 500 cc LR bolus with HR decreasing to mid 110s. During this time O2 requirements remained stable at 2L NC. At this time discussed with MICU fellow if PERT call was required given persistent tachycardia. Scans reviewed with low concern that this small subseqmental PE would cause this degree of tachycardia, recommendation to pursue other etiologies of tachycardia.     Repeat Troponin, BNP, and lactate obtained. Pt interviewed and denied fever, chills, sore throat, nausea, vomiting, decreased appetite, night sweats, dysuria, urinary frequency, cough, sputum production, chest pain, or abdominal pain. She does endorse chronic back pain for the last 2 months attributed to metastatic disease. She states this has not acutely worsened in the last week. Her pain was 5/10 and she is receiving 10 mg oxycodone q4 PRN for moderate pain On interviewing patient, speech is  tangential, but she is oriented to person, place, month and year. She was unable to tell me exactly why she was admitted to the hospital.  To look for infectious etiologies, patient has CBC, CMP, UA pending and we ordered blood culture, repeat chest xray, respiratory viral penal, procalcitonin.     Ruthie Marc MD  PGY1 Neurology   Internal Medicine Night Float Team

## 2024-08-29 NOTE — ED PROCEDURE NOTE
Procedure  Critical Care    Performed by: Romulo Loyola DO  Authorized by: Rebecca Gambino MD    Critical care provider statement:     Critical care time (minutes):  35    Critical care was necessary to treat or prevent imminent or life-threatening deterioration of the following conditions:  Shock and respiratory failure    Critical care was time spent personally by me on the following activities:  Blood draw for specimens, development of treatment plan with patient or surrogate, discussions with primary provider, evaluation of patient's response to treatment, pulse oximetry, re-evaluation of patient's condition, ordering and review of radiographic studies, ordering and review of laboratory studies, ordering and performing treatments and interventions and review of old charts               Romulo Loyola DO  08/29/24 [id number]

## 2024-08-29 NOTE — CONSULTS
SUPPORTIVE AND PALLIATIVE ONCOLOGY CONSULT    Inpatient consult to [facility] Adult Supportive Oncology  Consult performed by: PARVIN Martinez, DNP  Consult ordered by: Felicia Osman MD        SERVICE DATE: 8/29/2024      PALLIATIVE MEDICINE OUTPATIENT PROVIDER:  PARVIN Ortiz  CURRENT ATTENDING PROVIDER: Felicia Osman MD;Ho*     Medical Oncologist: Camila Chaudhary MD MPH  Felicia Osman MD   Radiation Oncologist: No care team member to display  Primary Physician: Camila Chaudhary  [phone]    REASON FOR CONSULT/CHIEF CONSULT COMPLAINT: pain management and other symptom control of agitation    Subjective   HISTORY OF PRESENT ILLNESS: Brittny Gordon is a 69 y.o. female diagnosed with metastatic NSCLC (lymph nodes, bone with pathologic fracture of L2 L3, T12) s/p bronch/EBUS 8/5, undergoing palliative RT to spine, plan for chemotherapy Taxol/Carbo/Pembro to start 9/6. PMH significant for HTN, HLD, osteporosis, AAA s/p repair, former smoker.. Admitted 8/27/2024 for further evaluation and management of hypoxia, hypotension, new AMS, found to have subsegmental acute pulmonary emboli in right lung. Course complicated by agitation/delirium. Supportive and Palliative Oncology is consulted for pain management and other symptom control of agitation.     Recently admitted 8/12-19/2024 for further evaluation and management of worsening lumbar pain and new thoracic pain from new pathologic fracture T12. Started palliative RT inpatient. Supportive and Palliative Oncology was consulted for pain management and patient was tolerating oxycodone 10-15 mg, gabapentin 300 mg, flexeril 10 mg TID, and lorazepam 0.5 mg prior to RT without any significant side effects.  Saw outpatient Supportive Oncology for new patient visit Friday 8/23 and was started on Morphine SR 15 mg BID. Patient began showing odd behavior/confusion 2 days later.  Presented for Rad Onc follow up 8/27 and found to have hypoxia 80's and hypotension  and sent to ED.   Psychiatry consulted, gabapentin and lorazepam held. Pt given Olanzapine at bedtime and injection PRN    Pt seen with sitter at bedside. She does not remember me from previous admission though we had multiple visits together and unable to give full symptom review or medication history. She currently denies pain.     Pain Assessment:  Onset:  Weeks  Location:  lower and mid back  Duration: Constant  Characteristics:   Ratin   Descriptors: sharp and stabbing   Aggravating: movement, walking, bending, sitting, and lying down    Relieving: Analgesics, Positioning, and Modifying activity   Intolerances:Brittny Gordon is allergic to acetaminophen and epinephrine.   Personal Pain Goal: 4    Interference with Function: Very Much   Coping Strategies: none   Emotional Response: None   Barriers to Pain Management: Side effects    Opioid Use  Past 24 h opioid use:   Morphine CR 15 mg PO x 2 doses = 30 mg = 30 OME  Oxycodone IR 10 mg PO x 2 doses = 20 mg = 25 OME  Total 24h OME use:  55    Note: OME calculations based on equianalgesic table below. Please note this table is based on best available evidence but conversions are still approximate. These are NOT opioid DOSES for individual patient use; this is equivalency information.  Drug Parenteral Enteral   Morphine 10 25   Oxycodone N/A 20   Hydromorphone 2 5   Fentanyl 0.15 N/A   Tramadol N/A 120   Citation: Darnell RIVAS. Demystifying opioid conversion calculations: A guide for effective dosing, Second edition. [place]: American Society of Health-System Pharmacists, 2018.    OARRS/PDMP reviewed 24 no aberrant behavior noted.    Symptom Assessment:  Unable to obtain secondary to Encephalopathy      Information obtained from: chart review and discussion with primary team  ______________________________________________________________________     Oncology History   Primary malignant neoplasm of right lung metastatic to other site (Multi)    8/9/2024 Initial Diagnosis    Primary malignant neoplasm of right lung metastatic to other site (Multi)     8/23/2024 Cancer Staged    Staging form: Lung, AJCC 8th Edition, Clinical: Stage IVB (cT1b, cN2, pM1c) - Signed by Camila Chaudhary MD MPH on 8/23/2024 9/6/2024 -  Chemotherapy    Pembrolizumab + PACLitaxel / CARBOplatin, 21 Day Cycles         Past Medical History:   Diagnosis Date    Hypercholesteremia     Hypertension     Lung nodule seen on imaging study     lung nodules concerning for metastatic lung cancer to the bone    Saccular aneurysm ([facility])     Saccular aneurysm of left AICA, 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, 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    Wedge compression fracture of t11-T12 vertebra, sequela 07/30/2020    Compression fracture of T11 vertebra, sequela     Past Surgical History:   Procedure Laterality Date    ARTERIAL ANEURYSM REPAIR      Thoracic aortic aneurysm repair    COLONOSCOPY      RADIOFREQUENCY ABLATION      L3,4,5    WISDOM TOOTH EXTRACTION       Family History   Problem Relation Name Age of Onset    Lymphoma Father      Polycythemia Father      Breast cancer Neg Hx      Colon cancer Neg Hx          SOCIAL HISTORY:  Marital Status single, Support system brother Christchanteler, niece lives with her (20's), and Employment retired    Social History:  reports that she quit smoking about 11 years ago. Her smoking use included cigarettes. She started smoking about 51 years ago. She has a 20 pack-year smoking history. She has never used smokeless tobacco. She reports current alcohol use. She reports that she does not currently use drugs.      REVIEW OF SYSTEMS:  Review of systems negative unless noted in HPI.       Objective       Lab Results   Component Value Date    WBC 12.7 (H) 08/29/2024    HGB 12.2 08/29/2024    HCT 34.4 (L) 08/29/2024    MCV 83 08/29/2024     08/29/2024      Lab Results   Component Value Date    GLUCOSE 117 (H) 08/29/2024    CALCIUM 8.5 (L) 08/29/2024     (L) 08/29/2024    K 3.5  08/29/2024    CO2 23 08/29/2024    CL 99 08/29/2024    BUN 22 08/29/2024    CREATININE 0.71 08/29/2024     Lab Results   Component Value Date    ALT 27 08/27/2024    AST 38 08/27/2024    ALKPHOS 78 08/27/2024    BILITOT 0.8 08/27/2024     Estimated Creatinine Clearance: 76.7 mL/min (by C-G formula based on SCr of 0.71 mg/dL).     MRI Brain 8/28  IMPRESSION:  1. Similar 7 mm avidly enhancing focus along the left pontomedullary  junction in keeping with possible saccular aneurysm. Vascular  follow-up may be considered. A metastatic focus is felt to be less  likely given relative stability. Otherwise, no evidence intracranial  metastatic disease.  2. No evidence of restricted diffusion, mass effect or new/additional  abnormal enhancement.    Encounter Date: 08/27/24   ECG 12 lead   Result Value    Ventricular Rate 103    Atrial Rate 103    VA Interval 178    QRS Duration 82    QT Interval 352    QTC Calculation(Bazett) 461    P Axis 70    R Axis 52    T Axis 70    QRS Count 17    Q Onset 222    P Onset 133    P Offset 198    T Offset 398    QTC Fredericia 421    Narrative    Sinus tachycardia with Fusion complexes  Low voltage QRS  Cannot rule out Anterior infarct , age undetermined  Abnormal ECG  When compared with ECG of 31-JUL-2024 09:54,  Fusion complexes are now Present    See ED provider note for full interpretation and clinical correlation  Confirmed by Indira Delcid ([id number]) on 8/27/2024 9:00:16 PM     Wt Readings from Last 5 Encounters:   08/27/24 71.2 kg (157 lb)   08/23/24 72.6 kg (160 lb 0.9 oz)   08/20/24 73.4 kg (161 lb 14.4 oz)   08/12/24 74.8 kg (165 lb)   08/09/24 75.8 kg (167 lb 1.7 oz)       Current Outpatient Medications   Medication Instructions    acetaminophen-codeine (Tylenol w/ Codeine #3) 300-30 mg tablet 1 tablet, oral, Every 8 hours PRN    apixaban (Eliquis) 5 mg (74 tabs) tablet Take 2 tablets (10 mg) by mouth 2 times a day for 7 days, then take 1 tablet (5 mg) by mouth 2 times a day.     atorvastatin (Lipitor) 80 mg tablet Take 1 tablet (80 mg) by mouth once daily.    bisacodyl (DULCOLAX) 5 mg, oral, Daily PRN, Do not crush, chew, or split.    cetirizine (ZYRTEC) 10 mg, oral, Daily PRN    cholecalciferol (VITAMIN D3) 1,000 Units, oral, Daily    cyclobenzaprine (FLEXERIL) 10 mg, oral, 3 times daily PRN    docusate sodium (COLACE) 100 mg, oral, 2 times daily    gabapentin (NEURONTIN) 300 mg, oral, Nightly    ibandronate (BONIVA) 150 mg, oral, Every 30 days, Take in morning with full glass of water on an empty stomach. No food, drink, meds, or lying down for 60 minutes after.    LORazepam (ATIVAN) 0.5 mg, oral, Daily PRN    losartan (COZAAR) 100 mg, oral, Daily    menthol (ICY HOT ADVANCED RELIEF PATCH TOP) 1 patch, topical (top), Every 12 hours PRN    metoprolol succinate XL (TOPROL-XL) 100 mg, oral, Daily    morphine CR (MS CONTIN) 15 mg, oral, 2 times daily, Do not crush, chew, or split.    naloxone (NARCAN) 4 mg, nasal, As needed, May repeat every 2-3 minutes if needed, alternating nostrils, until medical assistance becomes available.    OLANZapine (ZYPREXA) 5 mg, oral, Nightly, For 4 days starting the evening of treatment    omeprazole OTC (PRILOSEC OTC) 20 mg, oral, Daily, Do not crush, chew, or split.    ondansetron (ZOFRAN) 4 mg, oral, Every 6 hours PRN    ondansetron (ZOFRAN) 4 mg, oral, Every 6 hours PRN    ondansetron (ZOFRAN) 8 mg, oral, Every 8 hours PRN    oxyCODONE (Roxicodone) 5 mg immediate release tablet Take 2 to 3 tablets (10-15 mg) by mouth every 4 hours if needed for severe pain (7 - 10) (please take 3 tabs before radiation sessions) for up to 15 days.    polyethylene glycol (GLYCOLAX, MIRALAX) 17 g, oral, Daily    prochlorperazine (COMPAZINE) 10 mg, oral, Every 6 hours PRN    sennosides (SENOKOT) 8.6 mg, oral, Daily PRN    vit A/vit C/vit E/zinc/copper (PRESERVISION AREDS ORAL) 1 tablet, oral, Daily     Scheduled medications   apixaban, 10 mg, oral, BID   Followed by  [START ON  9/4/2024] apixaban, 5 mg, oral, BID  atorvastatin, 80 mg, oral, Daily  cholecalciferol, 1,000 Units, oral, Daily  [Held by provider] gabapentin, 300 mg, oral, Nightly  losartan, 100 mg, oral, Daily  metoprolol succinate XL, 100 mg, oral, Daily  morphine CR, 15 mg, oral, BID  OLANZapine, 5 mg, oral, Nightly  pantoprazole, 20 mg, oral, Daily before breakfast  perflutren lipid microspheres, 0.5-10 mL of dilution, intravenous, Once in imaging  perflutren protein A microsphere, 0.5 mL, intravenous, Once in imaging  polyethylene glycol, 17 g, oral, Daily  sulfur hexafluoride microsphr, 2 mL, intravenous, Once in imaging      Continuous medications     PRN medications  bisacodyl, 5 mg, Daily PRN  cetirizine, 10 mg, Daily PRN  cyclobenzaprine, 10 mg, TID PRN  OLANZapine, 5 mg, BID PRN  OLANZapine zydis, 5 mg, BID PRN  ondansetron, 4 mg, q6h PRN  oxyCODONE, 10 mg, q4h PRN  prochlorperazine, 10 mg, q6h PRN  sennosides, 1 tablet, Daily PRN         Allergies:   Allergies   Allergen Reactions    Acetaminophen GI Upset    Epinephrine Nausea/vomiting and Palpitations                PHYSICAL EXAMINATION:  Vital Signs:   Vital signs reviewed  Vitals:    08/29/24 1300   BP: 115/90   Pulse: (!) 103   Resp: 20   Temp:    SpO2: 94%     Pain Score: 6     Physical Exam  Vitals reviewed.   Constitutional:       General: She is not in acute distress.  HENT:      Head: Normocephalic and atraumatic.      Mouth/Throat:      Mouth: Mucous membranes are moist.   Eyes:      Conjunctiva/sclera: Conjunctivae normal.   Pulmonary:      Effort: Pulmonary effort is normal. No respiratory distress.   Abdominal:      General: There is no distension.   Skin:     General: Skin is warm and dry.   Neurological:      General: No focal deficit present.      Mental Status: She is alert. She is disoriented.   Psychiatric:         Attention and Perception: She is inattentive.         Mood and Affect: Affect is flat.         Behavior: Behavior is slowed.          Thought Content: Thought content is paranoid.         Cognition and Memory: Memory is impaired. She exhibits impaired recent memory.         Judgment: Judgment normal.         ASSESSMENT/PLAN:   Brittny Gordon is a 69 y.o. female diagnosed with metastatic NSCLC (lymph nodes, bone with pathologic fracture of L2 L3, T12) s/p bronch/EBUS 8/5, undergoing palliative RT to spine, plan for chemotherapy Taxol/Carbo/Pembro to start 9/6. PMH significant for HTN, HLD, osteporosis, AAA s/p repair, former smoker.. Admitted 8/27/2024 for further evaluation and management of hypoxia, hypotension, new AMS, found to have subsegmental acute pulmonary emboli in right lung. Course complicated by agitation/delirium. Supportive and Palliative Oncology is consulted for pain management and other symptom control of agitation.      Cancer Related Pain:  Mid-lower back, rib pain related to metastatic NSCLC with bone mets, compression fractures L2 L3 T12  Pain is: cancer related pain  Type: somatic and neuropathic  Pain control: improved and well controlled  Home regimen:  Oxycodone 10-15 mg q4h prn, Morphine CR 15 mg BID, Flexeril TID, gabapentin 300 mg qhs  Intolerances/previously tried: Acetaminophen  Personalized pain goal: 4  Total OME usage for the past 24 hours:  55  STOP Morphine SR, started 8/23 prior to AMS, likely cause of delirium in the absence of other cause, started 8/23 with AMS 2 days later  If pain uncontrolled off Morphine CR can start oxycodone 5 mg PO q4h scheduled Can consider OxyContin once AMS clears  Continue Oxycodone 10 mg to q4h prn moderate or severe pain  Please order 15 mg Oxycodone po once daily prior to RT if continuing RT while inpatient  Continue Flexeril 10 mg PO q8h prn muscle spasms  Currently holding Gabapentin 300 mg PO qhs due to AMS, tolerated well last admission with good efficacy for her pain, low threshold to restart  Currently undergoing palliative RT to spine. S/p 5 day course of Dexamethasone  - last admission  Continue to monitor pain scores and administer PRN medications as appropriate  Continue/initiate nonpharmacologic pain management strategies including ice/heat therapy, distraction techniques, deep breathing/relaxation techniques, calming music, and repositioning  Continue to monitor for signs of opioid efficacy (pain scores, improved functionality) and toxicity (pinpoint pupils, excess sedation/drowsiness/confusion, respiratory depression, etc.)     Anxiety/Agitation:  Related to RT, health concerns, pain  AMS likely related to Morphine CR, MRI brain negative for acute process/mets  Lorazepam 0.5 mg PO daily prior to RT PRN currently on hold  Agree with Olanzapine 5 mg PO at bedtime and prn per Psych  Stop Morphine CR as above     Nausea:  At risk for nausea with vomiting related to opioids   Home regimen:  Ondansetron   Denies  Continue Ondansetron 4 mg q6h prn nausea first line   Continue Compazine 10 mg PO q6h prn nausea      Constipation  At risk for constipation related to opioids, currently not constipated  Usual bowel pattern: every day  Home regimen: Miralax 17 gm daily and Bisacodyl 5 mg daily  LBM unknown  Monitor BM frequency, adjust regimen as needed  Goal to have BM without straining q48-72h  Continue Bisacodyl 5 mg PO daily prn  Continue Miralax 17 gm PO daily   Continue Senna 1 tabs daily prn constipation     Sleeping Difficulty:  Impaired sleep related to pain, improved with addition of gabapentin last admission  Home regimen:  none  Pain management as above     Decreased appetite:  Appetite loss related to malignancy and pain  Weight loss 10 pounds  Home regimen:  none  Pain management as above     Medical Decision Making/Goals of Care/Advance Care Plannin2024 ELLY Magdaleno CNP  Patient's current clinical condition, including diagnosis, prognosis, and management plan, and goals of care were discussed.   Life limiting disease: metastatic malignancy  Family: Supportive  brother  Performance status: Major  limitations due to pain  Joys/meaning/strength: [place]  Understanding of health: Demonstrates good understanding of disease process, understands plan for RT, that her cancer is incurable  Information:Wants full disclosure  Prognosis: cancer treatment is palliative intent  Goals: symptom control and cancer directed therapy Pt understands her cancer is incurable, she is hoping to have her symptoms better managed so that she can participate in life and have as much normalcy as possible.   Worries and fears now and future: ongoing symptoms and having a poor quality of life    Minimum acceptable outcome/QOL:  independence in iADLs  Code status discussion:  Full        Advance Directives  Existence of Advance Directives: Completed, in EMR  Decision maker: HCPOA is brother Lance Love  Code Status: Full code     Supportive and Palliative Oncology encounter:  Spoke with patient at bedside  Emotional support provided  Coordination of care       Supportive Interventions: will assess at future visit, currently with AMS    Disposition:  Please  start the process of having prior authorization with meds to beds deliver medications [facility] patient [facility]. Prescriptions will need to be sent 48-72 hours prior to discharge so that a prior authorization can be completed.     Discharge date: unknown pending acute issues  Patient has an appointment with Outpatient Supportive Oncology PARVIN Ortiz on 9/10/2024      Signature and billing:  Thank you for allowing us to participate in the care of this patient. Recommendations will be communicated back to the consulting service by way of shared electronic medical record or face-to-face.    Medical complexity was high level due to due to complexity of problems, extensive data review, and high risk of management/treatment.      DATA   Diagnostic tests and information reviewed for today's visit:   Conversation with primary team, Most recent labs and imaging results, Most recent EKG, Medications       Some elements copied from my note on 8/19/2024, the elements have been updated and all reflect current decision making from today, 8/29/2024.    Plan of Care discussed with: Provider and Patient    Thank you for asking Supportive and Palliative Oncology to assist with care of this patient.  Recommendations will be communicated back to the consulting service by way of shared electronic medical record/secure chat/email or face-to-face.   We will continue to follow.  Please contact us for additional questions or concerns.      SIGNATURE: BOWEN Martinez-CNP, DNP  PAGER/CONTACT:  Contact information:  Supportive and Palliative Oncology  Monday-Friday 8 AM-5 PM  Epic Secure chat or pager [phone].  After hours and weekends:  pager [phone]

## 2024-08-29 NOTE — PROGRESS NOTES
"  Internal Medicine Progress Note     Brittny Gordon is a 69 y.o. female with stage IVB ([id number]) primary non-small cell carcinoma of RUL, involving multiple bone mets.  HTN, HLD, osteporosis, AAA s/p repair, former smoker presenting to the ED for hypotension and hypoxia. Currently being followed for encephalopathy.     Subjective     Patient was seen and evaluated at bedside. Overnight, patient was noted to be tachycardic and hypertensive with minimal response to fluid. Overall, patient is calmer and less agitated when compared to yesterday. However, she is still having visual hallucinations, claiming to be upset that her \"cousin was not talking to her even though she was standing behind her the whole night\". Patient is now more agreeable to treatment.     Medications     Medications:   Scheduled medications  apixaban, 10 mg, oral, BID   Followed by  [START ON 9/4/2024] apixaban, 5 mg, oral, BID  atorvastatin, 80 mg, oral, Daily  cholecalciferol, 1,000 Units, oral, Daily  [Held by provider] gabapentin, 300 mg, oral, Nightly  losartan, 100 mg, oral, Daily  metoprolol succinate XL, 100 mg, oral, Daily  morphine CR, 15 mg, oral, BID  OLANZapine, 5 mg, oral, Nightly  pantoprazole, 20 mg, oral, Daily before breakfast  perflutren lipid microspheres, 0.5-10 mL of dilution, intravenous, Once in imaging  perflutren protein A microsphere, 0.5 mL, intravenous, Once in imaging  polyethylene glycol, 17 g, oral, Daily  sulfur hexafluoride microsphr, 2 mL, intravenous, Once in imaging      Continuous medications       PRN medications  PRN medications: bisacodyl, cetirizine, cyclobenzaprine, OLANZapine, OLANZapine zydis, ondansetron, oxyCODONE, prochlorperazine, sennosides     Objective     Vitals  Visit Vitals  /81   Pulse 96   Temp 36.3 °C (97.4 °F)   Resp 18   Ht 1.676 m (5' 6\")   Wt 71.2 kg (157 lb)   SpO2 95%   BMI 25.34 kg/m²   OB Status Postmenopausal   Smoking Status Former   BSA 1.82 m²       Intake/Output " Summary (Last 24 hours) at 8/29/2024 1648  Last data filed at 8/29/2024 1534  Gross per 24 hour   Intake --   Output 2350 ml   Net -2350 ml       Physical Exam  Gen: well appearing, A+Ox3, in no acute distress,   HEENT: sclera anicteric, no conjunctival injection, MMM  Resp: CTAB, normal breath sounds, no wheezing/crackles. On 3L NC  CV: normal S1/S2, slight tachycardia   GI: non-tender, non-distended, no rebound or guarding  Extremities/MSK: warm and well perfused, no edema bilateral  Neuro: A+Ox3, no focal deficits, no asterixis, strength and motor function 5/5 bilaterally of UE and LE. CN intact 2-12  Skin: warm and dry, no lesions, no rashes   Psych: Tangential speech, visual hallucinations     Labs  Lab Results   Component Value Date    WBC 12.7 (H) 08/29/2024    HGB 12.2 08/29/2024    HCT 34.4 (L) 08/29/2024    MCV 83 08/29/2024     08/29/2024      Lab Results   Component Value Date    GLUCOSE 117 (H) 08/29/2024    CALCIUM 8.5 (L) 08/29/2024     (L) 08/29/2024    K 3.5 08/29/2024    CO2 23 08/29/2024    CL 99 08/29/2024    BUN 22 08/29/2024    CREATININE 0.71 08/29/2024      Lab Results   Component Value Date    ALT 27 08/27/2024    AST 38 08/27/2024    ALKPHOS 78 08/27/2024    BILITOT 0.8 08/27/2024        Imaging  === 08/27/24 ===    XR CHEST 1 VIEW    - Impression -  Cephalization of the pulmonary veins and increased bronchovascular  markings with a question of small left-sided pleural effusion.  Findings are suggestive of mild interstitial pulmonary edema.    I personally reviewed the images/study and I agree with the findings  as stated by Julius Hernandez MD (resident). This study was  interpreted at [facility],  [place], [place].    Signed by: Lukasz Schuler 8/27/2024 1:14 PM  Dictation workstation:   [id number]   === 08/27/24 ===    CT CERVICAL SPINE WO IV CONTRAST    - Impression -  No evidence of an acute fracture of the cervical  spine.    MACRO:  None    Signed by: Dacia Guillen 8/27/2024 5:40 PM  Dictation workstation:   [id number]         Assessment/Plan     Brittny Gordon is a 69 y.o. female with stage IVB ([id number]) primary non-small cell carcinoma of RUL, involving multiple bone mets.  HTN, HLD, osteporosis, AAA s/p repair, former smoker admitted for for hypotension and hypoxia iso CT angio findings concerning for subsegmental pulmonary embolism likely secondary to known malignancy. Currently being followed for encephalopathy.      #Encephalopathy secondary to polypharmacy vs delirium  ::Brother expressed that she has been agitated and aggressive more since 8/25 and falling more often   :: TSH wnl   :: CTH 8/27 negative for intracranial bleed   :: MRI brain 8/29 negative for intracranial metastasis or edema   :: Patient has been having tangenital speech and visual hallucinations since 8/28. Thought could be secondary to alcohol withdrawal given hypertension and tachycardia overnight however less likely given it has been many days since her last drink.   :: EtOH levels < 10   :: Patient recently started on morphine, which could also contribute to new altered mental status   Plan:   - Psych following, appreciate recs   - Supportive Onc following  - Continue to monitor     # Subsegmental PE, NO imaging or Biomarker's evidence of RV strain,   # Initial Hypotension, Fluid responsive   # Hypoxia  :: Has been receiving heparin, will transition to DOAC   :: Saturating well on 3L NC   :: Patient refused duplex ultrasound of lower extremities 8/28. Agreed to test 8/29 which found that there is acute non-occlusive deep vein thrombosis visualized in the gastrocnemius veins in the calf vein.     Plan:   - Eliquis (10 mg for 7 days, followed by 5 mg)           # Hyponatremia   Plan:   - Follow up RFP            Current outpatient pain regimen by supportive onc:     Oxy 10mg x5-6 doses   15 prior to radiation   Flexeril - unsure if helping    gabapentin 300mg at bedtime   Tylenol and NSAIDs causes GI upset/heartburn            F: Replete PRN  E: Keep Mg >2, phos >3  and K >4  N: Regular   A: PIV  O2: On  DVT ppx: Eliquis   GI ppx: None   Code Status: Full code   Contact: Juice (brother): [phone]    Patient and plan discussed with attending physician Dr. Osman .              Ayo Montanez DO  Department of Internal Medicine, PGY-1      Ayo Montanez DO

## 2024-08-29 NOTE — ED NOTES
Admitting medicine doctor a bedside- per provider he is going to review the chart even though sepsis workup has been activated - Deysi he will decide if anitbiotics are needed at this time. All other sepsis orders have been completed.  Provider made aware of elevated trop and repeat was ordered and sent. No other new orders per team at this time.      Cathy Hayes RN  08/29/24 0111

## 2024-08-30 ENCOUNTER — HOSPITAL ENCOUNTER (OUTPATIENT)
Dept: RADIATION ONCOLOGY | Facility: HOSPITAL | Age: 69
Setting detail: RADIATION/ONCOLOGY SERIES
Discharge: HOME | End: 2024-08-30
Payer: MEDICARE

## 2024-08-30 DIAGNOSIS — C34.90 STAGE 4 MALIGNANT NEOPLASM OF LUNG, UNSPECIFIED LATERALITY (MULTI): ICD-10-CM

## 2024-08-30 DIAGNOSIS — G89.3 CANCER ASSOCIATED PAIN: Primary | ICD-10-CM

## 2024-08-30 PROBLEM — G93.40 ACUTE ENCEPHALOPATHY: Status: ACTIVE | Noted: 2024-08-30

## 2024-08-30 PROBLEM — R45.1 AGITATION REQUIRING SEDATION PROTOCOL: Status: ACTIVE | Noted: 2024-08-30

## 2024-08-30 PROCEDURE — 2500000001 HC RX 250 WO HCPCS SELF ADMINISTERED DRUGS (ALT 637 FOR MEDICARE OP)

## 2024-08-30 PROCEDURE — 2500000005 HC RX 250 GENERAL PHARMACY W/O HCPCS

## 2024-08-30 PROCEDURE — 2500000004 HC RX 250 GENERAL PHARMACY W/ HCPCS (ALT 636 FOR OP/ED)

## 2024-08-30 PROCEDURE — 77412 RADIATION TX DELIVERY LVL 3: CPT | Performed by: STUDENT IN AN ORGANIZED HEALTH CARE EDUCATION/TRAINING PROGRAM

## 2024-08-30 PROCEDURE — 2500000002 HC RX 250 W HCPCS SELF ADMINISTERED DRUGS (ALT 637 FOR MEDICARE OP, ALT 636 FOR OP/ED)

## 2024-08-30 PROCEDURE — 1210000001 HC SEMI-PRIVATE ROOM DAILY

## 2024-08-30 PROCEDURE — 99233 SBSQ HOSP IP/OBS HIGH 50: CPT | Performed by: NURSE PRACTITIONER

## 2024-08-30 PROCEDURE — 36415 COLL VENOUS BLD VENIPUNCTURE: CPT

## 2024-08-30 ASSESSMENT — PAIN - FUNCTIONAL ASSESSMENT: PAIN_FUNCTIONAL_ASSESSMENT: 0-10

## 2024-08-30 ASSESSMENT — PAIN SCALES - GENERAL
PAINLEVEL_OUTOF10: 7
PAINLEVEL_OUTOF10: 7

## 2024-08-30 ASSESSMENT — PAIN SCALES - PAIN ASSESSMENT IN ADVANCED DEMENTIA (PAINAD): TOTALSCORE: MEDICATION (SEE MAR)

## 2024-08-30 ASSESSMENT — PAIN DESCRIPTION - PAIN TYPE: TYPE: CHRONIC PAIN

## 2024-08-30 ASSESSMENT — PAIN DESCRIPTION - PROGRESSION: CLINICAL_PROGRESSION: NOT CHANGED

## 2024-08-30 ASSESSMENT — PAIN DESCRIPTION - DESCRIPTORS: DESCRIPTORS: ACHING

## 2024-08-30 ASSESSMENT — PAIN DESCRIPTION - LOCATION
LOCATION: BACK
LOCATION: BACK

## 2024-08-30 ASSESSMENT — PAIN DESCRIPTION - ORIENTATION: ORIENTATION: RIGHT

## 2024-08-30 NOTE — PROGRESS NOTES
"  Internal Medicine Progress Note     Brittny Gordon is a 69 y.o. female with stage IVB ([id number]) primary non-small cell carcinoma of RUL, involving multiple bone mets.  HTN, HLD, osteporosis, AAA s/p repair, former smoker presenting to the ED for hypotension and hypoxia. Currently being followed for encephalopathy.     Subjective     Patient was seen and evaluated at bedside. Patient mood much more pleasant compared to previous days, less agitated. Tangential speech improved and patient reports feeling more like her baseline. Spoke to brother about patient's mental status and he agrees that is has improved significantly in the last 24 hours. It is likely this change in mental status was secondary to recent addition of morphine given change in status started 2 days after taking morphine.     Medications     Medications:   Scheduled medications  apixaban, 10 mg, oral, BID   Followed by  [START ON 9/4/2024] apixaban, 5 mg, oral, BID  atorvastatin, 80 mg, oral, Daily  cholecalciferol, 1,000 Units, oral, Daily  [Held by provider] gabapentin, 300 mg, oral, Nightly  losartan, 100 mg, oral, Daily  metoprolol succinate XL, 100 mg, oral, Daily  morphine CR, 15 mg, oral, BID  OLANZapine, 5 mg, oral, Nightly  pantoprazole, 20 mg, oral, Daily before breakfast  perflutren lipid microspheres, 0.5-10 mL of dilution, intravenous, Once in imaging  perflutren protein A microsphere, 0.5 mL, intravenous, Once in imaging  polyethylene glycol, 17 g, oral, Daily  sulfur hexafluoride microsphr, 2 mL, intravenous, Once in imaging      Continuous medications       PRN medications  PRN medications: bisacodyl, cetirizine, cyclobenzaprine, OLANZapine, OLANZapine zydis, ondansetron, oxyCODONE, prochlorperazine, sennosides     Objective     Vitals  Visit Vitals  /82   Pulse 79   Temp 36.3 °C (97.4 °F)   Resp 18   Ht 1.676 m (5' 6\")   Wt 71.2 kg (157 lb)   SpO2 (!) 92%   BMI 25.34 kg/m²   OB Status Postmenopausal   Smoking Status " Former   BSA 1.82 m²       Intake/Output Summary (Last 24 hours) at 8/30/2024 0845  Last data filed at 8/30/2024 0707  Gross per 24 hour   Intake --   Output 1800 ml   Net -1800 ml       Physical Exam  Gen: well appearing, A+Ox3, in no acute distress,   HEENT: sclera anicteric, no conjunctival injection, MMM  Resp: CTAB, normal breath sounds, no wheezing/crackles. On 3L NC  CV: normal S1/S2, slight tachycardia   GI: non-tender, non-distended, no rebound or guarding  Extremities/MSK: warm and well perfused, no edema bilateral  Neuro: A+Ox3, no focal deficits, no asterixis, strength and motor function 5/5 bilaterally of UE and LE. CN intact 2-12  Skin: warm and dry, no lesions, no rashes   Psych: Tangential speech improved, cooperative     Labs  Lab Results   Component Value Date    WBC 12.7 (H) 08/29/2024    HGB 12.2 08/29/2024    HCT 34.4 (L) 08/29/2024    MCV 83 08/29/2024     08/29/2024      Lab Results   Component Value Date    GLUCOSE 117 (H) 08/29/2024    CALCIUM 8.5 (L) 08/29/2024     (L) 08/29/2024    K 3.5 08/29/2024    CO2 23 08/29/2024    CL 99 08/29/2024    BUN 22 08/29/2024    CREATININE 0.71 08/29/2024      Lab Results   Component Value Date    ALT 27 08/27/2024    AST 38 08/27/2024    ALKPHOS 78 08/27/2024    BILITOT 0.8 08/27/2024        Imaging  === 08/27/24 ===    XR CHEST 1 VIEW    - Impression -  Cephalization of the pulmonary veins and increased bronchovascular  markings with a question of small left-sided pleural effusion.  Findings are suggestive of mild interstitial pulmonary edema.    I personally reviewed the images/study and I agree with the findings  as stated by Julius Hernandez MD (resident). This study was  interpreted at [facility],  [place], [place].    Signed by: Lukasz Schuler 8/27/2024 1:14 PM  Dictation workstation:   [id number]   === 08/27/24 ===    CT CERVICAL SPINE WO IV CONTRAST    - Impression -  No evidence of an acute  fracture of the cervical spine.    MACRO:  None    Signed by: Dacia Guillen 8/27/2024 5:40 PM  Dictation workstation:   [id number]         Assessment/Plan     Brittny Gordon is a 69 y.o. female with stage IVB ([id number]) primary non-small cell carcinoma of RUL, involving multiple bone mets.  HTN, HLD, osteporosis, AAA s/p repair, former smoker admitted for for hypotension and hypoxia iso CT angio findings concerning for subsegmental pulmonary embolism likely secondary to known malignancy. Currently being followed for encephalopathy.      #Encephalopathy secondary to polypharmacy vs delirium  ::Brother expressed that she has been agitated and aggressive more since 8/25 and falling more often   :: TSH wnl   :: CTH 8/27 negative for intracranial bleed   :: MRI brain 8/29 negative for intracranial metastasis or edema   :: Patient has been having tangenital speech and visual hallucinations since 8/28. Thought could be secondary to alcohol withdrawal given hypertension and tachycardia overnight however less likely given it has been many days since her last drink.   :: EtOH levels < 10   :: Patient recently started on morphine, which could also contribute to new altered mental status.   :: Given patient started showing odd behavior 2 days after being started on Morphine 15 mg BID, it is possible this is the reason for her recent AMS change especially since MRI brain was negative, no major metabolic abnormalities, and her improvement on 8/30.     Plan:   - Continue to monitor   - NO MORPHINE for pain control. Also continue holding Ativan as this could also contribute to delirium     # Subsegmental PE, NO imaging or Biomarker's evidence of RV strain,   # Initial Hypotension, Fluid responsive   # Hypoxia  :: Has been receiving heparin, will transition to DOAC   :: Saturating well on 3L NC   :: Patient refused duplex ultrasound of lower extremities 8/28. Agreed to test 8/29 which found that there is acute non-occlusive deep  vein thrombosis visualized in the gastrocnemius veins in the calf vein.     Plan:   - Eliquis (10 mg for 7 days, followed by 5 mg)         # Hyponatremia   Plan:   - Follow up RFP            Current outpatient pain regimen by supportive onc:     Oxy 10mg x5-6 doses   15 prior to radiation   Flexeril - unsure if helping   gabapentin 300mg at bedtime   Tylenol and NSAIDs causes GI upset/heartburn            F: Replete PRN  E: Keep Mg >2, phos >3  and K >4  N: Regular   A: PIV  O2: On  DVT ppx: Eliquis   GI ppx: None   Code Status: Full code   Contact: Juice (brother): [phone]    Patient and plan discussed with attending physician Dr. Osman .              Ayo Montanez DO  Department of Internal Medicine, PGY-1      Ayo Montanez DO

## 2024-08-30 NOTE — PROGRESS NOTES
SUPPORTIVE AND PALLIATIVE ONCOLOGY INPATIENT FOLLOW-UP      SERVICE DATE: 08/30/24     SUBJECTIVE:  Interval Events:  Remains in ED  Morphine CR stopped.    Pt with documented improved mental status today.    Pt with another provider at my visit, information obtained from chart review. Pt resting comfortably in bed.  Used Flexeril once.    Pain Assessment:  Location:  lower and mid back  Characteristics:   Rating:  per chart review pain scores 7    Opioid Use  Past 24 h prn opioid use: Oxycodone IR 10 mg PO x 2 doses = 20 mg = 25 OME  Total 24h OME use:  25    Note: OME calculations based on equianalgesic table below. Please note this table is based on best available evidence but conversions are still approximate. These are NOT opioid DOSES for individual patient use; this is equivalency information.  Drug Parenteral Enteral   Morphine 10 25   Oxycodone N/A 20   Hydromorphone 2 5   Fentanyl 0.15 N/A   Tramadol N/A 120   Citation: Darnell RIVAS. Demystifying opioid conversion calculations: A guide for effective dosing, Second edition. [place]: [employer] of [employer]-System Pharmacists, 2018.    Symptom Assessment:  Unable to obtain secondary to with another provider    Information obtained from: chart review  ______________________________________________________________________        OBJECTIVE:    Lab Results   Component Value Date    WBC 12.7 (H) 08/29/2024    HGB 12.2 08/29/2024    HCT 34.4 (L) 08/29/2024    MCV 83 08/29/2024     08/29/2024      Lab Results   Component Value Date    GLUCOSE 117 (H) 08/29/2024    CALCIUM 8.5 (L) 08/29/2024     (L) 08/29/2024    K 3.5 08/29/2024    CO2 23 08/29/2024    CL 99 08/29/2024    BUN 22 08/29/2024    CREATININE 0.71 08/29/2024     Lab Results   Component Value Date    ALT 27 08/27/2024    AST 38 08/27/2024    ALKPHOS 78 08/27/2024    BILITOT 0.8 08/27/2024     Estimated Creatinine Clearance: 76.7 mL/min (by C-G formula based on SCr of 0.71 mg/dL).      Scheduled medications  apixaban, 10 mg, oral, BID   Followed by  [START ON 9/4/2024] apixaban, 5 mg, oral, BID  atorvastatin, 80 mg, oral, Daily  cholecalciferol, 1,000 Units, oral, Daily  [Held by provider] gabapentin, 300 mg, oral, Nightly  losartan, 100 mg, oral, Daily  metoprolol succinate XL, 100 mg, oral, Daily  OLANZapine, 5 mg, oral, Nightly  pantoprazole, 20 mg, oral, Daily before breakfast  perflutren lipid microspheres, 0.5-10 mL of dilution, intravenous, Once in imaging  perflutren protein A microsphere, 0.5 mL, intravenous, Once in imaging  polyethylene glycol, 17 g, oral, Daily  sulfur hexafluoride microsphr, 2 mL, intravenous, Once in imaging      Continuous medications     PRN medications  bisacodyl, 5 mg, Daily PRN  cetirizine, 10 mg, Daily PRN  cyclobenzaprine, 10 mg, TID PRN  OLANZapine, 5 mg, BID PRN  OLANZapine zydis, 5 mg, BID PRN  ondansetron, 4 mg, q6h PRN  oxyCODONE, 10 mg, q4h PRN  prochlorperazine, 10 mg, q6h PRN  sennosides, 1 tablet, Daily PRN      }     PHYSICAL EXAMINATION:    Vital Signs:   Vital signs reviewed  Visit Vitals  /68   Pulse 79   Temp 36.3 °C (97.4 °F)   Resp 18        0-10 (Numeric) Pain Score: 7       Physical Exam  Vitals reviewed.   Constitutional:       Appearance: She is not ill-appearing.   Pulmonary:      Effort: Pulmonary effort is normal. No respiratory distress.   Neurological:      Mental Status: She is alert.          ASSESSMENT/PLAN:  Brittny Gordon is a 69 y.o. female diagnosed with metastatic NSCLC (lymph nodes, bone with pathologic fracture of L2 L3, T12) s/p bronch/EBUS 8/5, undergoing palliative RT to spine, plan for chemotherapy Taxol/Carbo/Pembro to start 9/6. PMH significant for HTN, HLD, osteporosis, AAA s/p repair, former smoker.. Admitted 8/27/2024 for further evaluation and management of hypoxia, hypotension, new AMS, found to have subsegmental acute pulmonary emboli in right lung. Course complicated by agitation/delirium. Supportive  and Palliative Oncology is consulted for pain management and other symptom control of agitation.      Cancer Related Pain:  Mid-lower back, rib pain related to metastatic NSCLC with bone mets, compression fractures L2 L3 T12  Pain is: cancer related pain  Type: somatic and neuropathic  Pain control: improved and well controlled  Home regimen:  Oxycodone 10-15 mg q4h prn, Morphine CR 15 mg BID, Flexeril TID, gabapentin 300 mg qhs  Intolerances/previously tried: Acetaminophen  Personalized pain goal: 4  Total OME usage for the past 24 hours:  25  Avoid Morphine SR, started 8/23 prior to AMS, likely cause of delirium in the absence of other cause, started 8/23 with AMS 2 days later  If pain uncontrolled off Morphine CR can start oxycodone 5 mg PO q4h scheduled Can consider OxyContin once AMS clears if needed pending Oxycodone IR usage, would likely tolerate OxyContin 10 mg BID, would need prior auth  Continue Oxycodone 10 mg to q4h prn moderate or severe pain  Please order 15 mg Oxycodone po once daily prior to RT if continuing RT while inpatient  Continue Flexeril 10 mg PO q8h prn muscle spasms  Currently holding Gabapentin 300 mg PO qhs due to AMS, tolerated well last admission with good efficacy for her pain, low threshold to restart  Currently undergoing palliative RT to spine. S/p 5 day course of Dexamethasone 8/13-18 last admission  Continue to monitor pain scores and administer PRN medications as appropriate  Continue/initiate nonpharmacologic pain management strategies including ice/heat therapy, distraction techniques, deep breathing/relaxation techniques, calming music, and repositioning  Continue to monitor for signs of opioid efficacy (pain scores, improved functionality) and toxicity (pinpoint pupils, excess sedation/drowsiness/confusion, respiratory depression, etc.)     Anxiety/Agitation:  improving  Related to RT, health concerns, pain  AMS likely related to Morphine CR, MRI brain negative for acute  process/mets  Lorazepam 0.5 mg PO daily prior to RT PRN currently on hold  Avoid Morphine CR as above     Nausea:  At risk for nausea with vomiting related to opioids   Home regimen:  Ondansetron   Denies  Continue Ondansetron 4 mg q6h prn nausea first line   Continue Compazine 10 mg PO q6h prn nausea      Constipation  At risk for constipation related to opioids, currently not constipated  Usual bowel pattern: every day  Home regimen: Miralax 17 gm daily and Bisacodyl 5 mg daily  LBM unknown  Monitor BM frequency, adjust regimen as needed  Goal to have BM without straining q48-72h  Continue Bisacodyl 5 mg PO daily prn  Continue Miralax 17 gm PO daily   Continue Senna 1 tabs daily prn constipation     Sleeping Difficulty:  Impaired sleep related to pain, improved with addition of gabapentin last admission  Home regimen:  none  Pain management as above     Decreased appetite:  Appetite loss related to malignancy and pain  Weight loss 10 pounds  Home regimen:  none  Pain management as above     Medical Decision Making/Goals of Care/Advance Care [place]2024 ELLY Magdaleno CNP  Patient's current clinical condition, including diagnosis, prognosis, and management plan, and goals of care were discussed.   Life limiting disease: metastatic malignancy  Family: Supportive brother  Performance status: Major  limitations due to pain  Joys/meaning/strength: [place]  Understanding of health: Demonstrates good understanding of disease process, understands plan for RT, that her cancer is incurable  Information:Wants full disclosure  Prognosis: cancer treatment is palliative intent  Goals: symptom control and cancer directed therapy Pt understands her cancer is incurable, she is hoping to have her symptoms better managed so that she can participate in life and have as much normalcy as possible.   Worries and fears now and future: ongoing symptoms and having a poor quality of life    Minimum acceptable outcome/QOL:   independence in iADLs  Code status discussion:  Full       Advance Directives  Existence of Advance Directives: Completed, in EMR  Decision maker: TOMMIE is brother Lance Love  Code Status: Full code     Supportive Interventions: will assess at future visit, currently with AMS     Disposition:  Please  start the process of having prior authorization with meds to beds deliver medications to patient prior to discharge via [facility] pharmacy. Prescriptions will need to be sent 48-72 hours prior to discharge so that a prior authorization can be completed.      Discharge date: unknown pending acute issues  Patient has an appointment with Outpatient Supportive Oncology PARVIN Ortiz on 9/10/2024    Supportive and Palliative Oncology encounter:  Spoke with patient at bedside  Emotional support provided  Coordination of care     Signature and billing:  Thank you for allowing us to participate in the care of this patient. Recommendations will be communicated back to the consulting service by way of shared electronic medical record or face-to-face.    Medical complexity was high level due to due to complexity of problems, extensive data review, and high risk of management/treatment.    Data:   Diagnostic tests and information reviewed for today's visit:  Most recent labs, Medications       Some elements copied from my note on 8/29/2024, the elements have been updated and all reflect current decision making from today, 08/30/24       Thank you for asking Supportive and Palliative Oncology to assist with care of this patient.  Recommendations will be communicated back to the consulting service by way of shared electronic medical record/secure chat/email or face-to-face.   We will continue to follow  Please contact us for additional questions or concerns.      SIGNATURE: PARVIN Martinez, BOB   PAGER/CONTACT:  Contact information:  Supportive and Palliative Oncology  Monday-Friday 8 AM-5 PM  Epic Secure  chat or pager [phone].  After hours and weekends:  pager [phone]

## 2024-08-30 NOTE — PROGRESS NOTES
"PSYCHIATRY CONSULT-LIAISON PROGRESS NOTE    SUBJECTIVE  Pt seen at bedside in CDU. Pt was axox3 and states that she feels 100% better in terms of her mentation. \"I don't know what happened but I am feeling much better, it's a night and day difference\". The pt does still have some memory issues relating to how she presented to the hosp and with previous events, but mentions that morphine was likely a contributing factor to her change in mental status. Pt states that morphine \"made me not make sense and I was incoherent\". Pt feels normal and back at baseline mentally now. Denies previously experienced VH, denies AH, paranoia, delusions. Denies anxiety/depression, SI, HI. No other concerns.       OBJECTIVE  Pt did not receive olanzapine PO or IM for PRN agitation over last 24 hours.       VITALS      8/29/2024     8:00 PM 8/29/2024    11:00 PM 8/30/2024    12:00 AM 8/30/2024     2:00 AM 8/30/2024     4:00 AM 8/30/2024     6:00 AM 8/30/2024     7:54 AM   Vitals   Systolic 137 135 101 110 125 105 132   Diastolic 85 78 79 71 82 75 82   Heart Rate 91 87 86 84 89 79    Resp 17 15 16 16 16 17 18        MENTAL STATUS EXAM    Appearance: elderly  female sitting up in bed, neat and good hygiene  Attitude: Calm, cooperative, and engaged in conversation.  Behavior: Appropriate eye contact. No aggressive or agitated behavior.  Motor Activity: No psychomotor agitation or retardation. No abnormal movements, tremors or tics. No evidence of extrapyramidal symptoms or tardive dyskinesia.   Speech: Regular rate, volume, tone, and quantity. Spontaneous, clear, coherent. No pressured speech.  Mood: thinking better, night and day difference   Affect: Euthymic  Thought Process: Linear, logical, and goal-directed. No loose associations or gross thought disorganization.  Thought Content:  Does not endorse suicidal or homicidal ideation. No delusions elicited.  Thought Perception: Does not endorse auditory or visual hallucinations and " does not appear to be responding to any hallucinatory stimuli.  Cognition: Alert and grossly oriented. No deficits in attention, concentration or language. Able to answer questions appropriately throughout interview. Adequate fund of knowledge. Recent Memory impaired, but otherwise memory appeared intact in interview.   Insight: full  Judgement: Excellent     CURRENT MEDICATIONS  Scheduled medications  apixaban, 10 mg, oral, BID   Followed by  [START ON 9/4/2024] apixaban, 5 mg, oral, BID  atorvastatin, 80 mg, oral, Daily  cholecalciferol, 1,000 Units, oral, Daily  [Held by provider] gabapentin, 300 mg, oral, Nightly  losartan, 100 mg, oral, Daily  metoprolol succinate XL, 100 mg, oral, Daily  OLANZapine, 5 mg, oral, Nightly  pantoprazole, 20 mg, oral, Daily before breakfast  perflutren lipid microspheres, 0.5-10 mL of dilution, intravenous, Once in imaging  perflutren protein A microsphere, 0.5 mL, intravenous, Once in imaging  polyethylene glycol, 17 g, oral, Daily  sulfur hexafluoride microsphr, 2 mL, intravenous, Once in imaging        Continuous medications       PRN medications  PRN medications: bisacodyl, cetirizine, cyclobenzaprine, OLANZapine, OLANZapine zydis, ondansetron, oxyCODONE, prochlorperazine, sennosides     LABS  Results for orders placed or performed during the hospital encounter of 08/27/24 (from the past 24 hour(s))   Transthoracic Echo (TTE) Complete   Result Value Ref Range    AV pk belinda 1.05 m/s    AV mn grad 1.9 mmHg    LVOT diam 2.20 cm    MV E/A ratio 0.45     LA vol index A/L 20.4 ml/m2    LV EF 63 %    RV free wall pk S' 11.00 cm/s    LVIDd 4.00 cm    RVSP 8.0 mmHg    Aortic Valve Area by Continuity of VTI 3.74 cm2    Aortic Valve Area by Continuity of Peak Velocity 3.24 cm2    AV pk grad 4.4 mmHg    LV A4C EF 60.6         IMAGING  Transthoracic Echo (TTE) Complete    Result Date: 8/29/2024   [facility], [address] [place] [address]                Tel  [phone] and Fax [phone] TRANSTHORACIC ECHOCARDIOGRAM REPORT  Patient Name:      EUFEMIA FRAGOSO     Reading Physician:    [id number] Larry Amos MD Study Date:        8/29/2024            Ordering Provider:    [id number] FER FIGUEROA MRN/PID:           [id number]             Fellow: Accession#:        [id number]         Nurse: Date of Birth/Age: 1955 / 69 years Sonographer:          Jose Rafael Bryant RDCS Gender:            F                    Additional Staff: Height:            167.64 cm            Admit Date:           8/27/2024 Weight:            71.22 kg             Admission Status:     Inpatient -                                                               Routine BSA / BMI:         1.80 m2 / 25.34      Encounter#:           [id number]                    kg/m2 Blood Pressure:    158/95 mmHg          Department Location:  [facility] Non                                                               Invasive Study Type:    TRANSTHORACIC ECHO (TTE) COMPLETE Diagnosis/ICD: Encounter for screening for cardiovascular disorders-Z13.6 Indication:    PE CPT Code:      Echo Complete w Full Doppler-13418 Patient History: Pertinent History: PE and HTN. Study Detail: The following Echo studies were performed: 2D, M-Mode, Doppler and               Strain. Image quality for this study is suboptimal. Technically               challenging study due to altered mental status.  PHYSICIAN INTERPRETATION: Left Ventricle: Left ventricular ejection fraction is normal, calculated by Vela's biplane at 63%. There are no regional left ventricular wall motion abnormalities. The left ventricular cavity size is normal. The left ventricular septal wall thickness is mildly increased. There is mildly increased left ventricular posterior wall thickness. Spectral Doppler shows a normal pattern of  left ventricular diastolic filling. Left Atrium: The left atrium is normal in size. Right Ventricle: The right ventricle was not well visualized. Unable to determine right ventricular systolic function. Right Atrium: The right atrium was not well visualized. Aortic Valve: The aortic valve is trileaflet. The aortic valve dimensionless index is 0.98. There is no evidence of aortic valve regurgitation. The peak instantaneous gradient of the aortic valve is 4.4 mmHg. The mean gradient of the aortic valve is 1.9 mmHg. Mitral Valve: The mitral valve is normal in structure. There is trace mitral valve regurgitation. Tricuspid Valve: The tricuspid valve is structurally normal. There is trace tricuspid regurgitation. The right ventricular systolic pressure is unable to be estimated. Pulmonic Valve: The pulmonic valve is structurally normal. There is trace to mild pulmonic valve regurgitation. Pericardium: There is no pericardial effusion noted. There is a pericardial fat pad present. Aorta: The aortic root is abnormal. The ascending aorta was not well visualized. There is mild dilatation of the aortic root. In comparison to the previous echocardiogram(s): There are no prior studies on this patient for comparison purposes.  CONCLUSIONS:  1. Poorly visualized anatomical structures due to suboptimal image quality.  2. Left ventricular ejection fraction is normal, calculated by Vela's biplane at 63%.  3. Unable to determine right ventricular systolic function. QUANTITATIVE DATA SUMMARY: 2D MEASUREMENTS:                         Normal Ranges: IVSd:          1.34 cm  (0.6-1.1cm) LVPWd:         1.29 cm  (0.6-1.1cm) LVIDd:         4.00 cm  (3.9-5.9cm) LVIDs:         3.50 cm LV Mass Index: 105 g/m2 LVEDV Index:   50 ml/m2 LV % FS        12.6 % LA VOLUME:                               Normal Ranges: LA Vol A4C:        36.7 ml    (22+/-6mL/m2) LA Vol A2C:        32.3 ml LA Vol BP:         36.8 ml LA Vol Index A4C:  20.3ml/m2 LA Vol  Index A2C:  17.9 ml/m2 LA Vol Index BP:   20.4 ml/m2 LA Area A4C:       15.4 cm2 LA Area A2C:       13.5 cm2 LA Major Axis A4C: 5.5 cm LA Major Axis A2C: 4.8 cm LA Volume Index:   20.4 ml/m2 AORTA MEASUREMENTS:                      Normal Ranges: Ao Sinus, d: 4.30 cm (2.1-3.5cm) Asc Ao, d:   2.60 cm (2.1-3.4cm) LV SYSTOLIC FUNCTION BY 2D PLANIMETRY (MOD):                      Normal Ranges: EF-A4C View:    61 % (>=55%) EF-A2C View:    64 % EF-Biplane:     63 % LV EF Reported: 63 % LV DIASTOLIC FUNCTION:                         Normal Ranges: MV Peak E:    0.48 m/s  (0.7-1.2 m/s) MV Peak A:    1.06 m/s  (0.42-0.7 m/s) E/A Ratio:    0.45      (1.0-2.2) MV e'         0.045 m/s (>8.0) MV lateral e' 0.04 m/s MV medial e'  0.05 m/s E/e' Ratio:   10.65     (<8.0) MITRAL VALVE:                 Normal Ranges: MV DT: 118 msec (150-240msec) AORTIC VALVE:                                   Normal Ranges: AoV Vmax:                1.05 m/s (<=1.7m/s) AoV Peak P.4 mmHg (<20mmHg) AoV Mean P.9 mmHg (1.7-11.5mmHg) LVOT Max Toñito:            0.89 m/s (<=1.1m/s) AoV VTI:                 15.60 cm (18-25cm) LVOT VTI:                15.37 cm LVOT Diameter:           2.20 cm  (1.8-2.4cm) AoV Area, VTI:           3.74 cm2 (2.5-5.5cm2) AoV Area,Vmax:           3.24 cm2 (2.5-4.5cm2) AoV Dimensionless Index: 0.98  RIGHT VENTRICLE: RV s' 0.11 m/s TRICUSPID VALVE/RVSP:                            Normal Ranges: Peak TR Velocity: 1.12 m/s RV Syst Pressure: 8.0 mmHg (< 30mmHg) IVC Diam:         1.20 cm PULMONIC VALVE:                         Normal Ranges: PV Accel Time: 125 msec (>120ms) PV Max Toñito:    1.0 m/s  (0.6-0.9m/s) PV Max PG:     3.8 mmHg  [id number] Larry Amos MD Electronically signed on 2024 at 7:06:35 PM  ** Final **     Vascular US Lower Extremity Venous Duplex Left    Result Date: 2024            [place] [address]   Tel [phone] and Fax  [phone]  Vascular Lab Report [facility] [place] US LOWER EXTREMITY VENOUS DUPLEX LEFT  Patient Name:      EUFEMIA FRAGOSO    Reading Physician: [id number] Star Conley DO Study Date:        8/29/2024           Ordering           [id number] FELICIA UNGER                                        Physician:         HENRY MRN/PID:           [id number]            Technologist:      Elizabeth Cantu RVT Accession#:        [id number]        Technologist 2: Date of Birth/Age: 1955 / 69      Encounter#:        [id number]                    years Gender:            F Admission Status:  Inpatient           Location           [facility]                                        Performed:  Diagnosis/ICD: Localized (leg) edema-R60.0 CPT Codes:     98124 Peripheral venous duplex scan for DVT Limited  **CRITICAL RESULT** Critical Result: Acute DVT Notification called to Felicia Osman MD on 8/29/2024 at 10:07:42 AM by Elizabeth Cantu RVT.  CONCLUSIONS: Right Lower Venous: The right common femoral vein demonstrates normal spontaneous and respirophasic flow. Left Lower Venous: No evidence of acute deep vein thrombus visualized in the left lower extremity. There is acute non-occlusive deep vein thrombosis visualized in the gastrocnemius veins in the calf vein.  Imaging & Doppler Findings:  Right       Compressible Thrombus        Flow CFV             Yes        None   Spontaneous/Phasic FV Proximal     Yes  Left                  Compress      Thrombus              Flow Distal External Iliac                 None         Spontaneous/Phasic CFV                     Yes           None         Spontaneous/Phasic PFV                     Yes           None FV Proximal             Yes           None         Spontaneous/Phasic FV Mid                  Yes           None FV Distal               Yes           None Popliteal               Yes           None         Spontaneous/Phasic Peroneal                Yes           None PTV                     Yes            None Gastroc               Partial  Acute non-occlusive  [id number] Star Conley DO Electronically signed by [id number] Star Conley DO on 8/29/2024 at 2:05:26 PM  ** Final **     XR chest 1 view    Result Date: 8/29/2024  Interpreted By:  Jeb Ospina,  and Meg Lucas STUDY: XR CHEST 1 VIEW;  8/29/2024 5:20 am   INDICATION: Signs/Symptoms:infectious work up.   COMPARISON: Chest radiograph 08/27/2024   ACCESSION NUMBER(S): [id number]   ORDERING CLINICIAN: FER FIGUEROA   FINDINGS: Erect AP radiographs of the chest were provided.   Status post median sternotomy with intact sternal cerclage wires.   CARDIOMEDIASTINAL SILHOUETTE: Cardiomediastinal silhouette is stable in size and configuration.   LUNGS: Low lung volumes contribute to bronchovascular crowding. Platelike atelectasis within the right mid lung zone. Blunting of the left costophrenic angle. No focal consolidation or pneumothorax.   ABDOMEN: No remarkable upper abdominal findings.   BONES: No acute osseous changes.       1. Right mid lung zone opacity likely relate to atelectasis although subtle pneumonitis not excluded. 2. Possible small left pleural effusion. 3. No focal consolidation or pneumothorax.   I personally reviewed the images/study and I agree with the findings as stated by Lance Gutierrez MD (Radiology Resident). This study was interpreted at [place], [place].   MACRO: None   Signed by: Jeb Ospina 8/29/2024 9:47 AM Dictation workstation:   [id number]    MR brain w and wo IV contrast    Result Date: 8/29/2024  Interpreted By:  Jacki Gray,  and Koki Ordaz STUDY: MR BRAIN W AND WO IV CONTRAST;  8/28/2024 11:34 pm   INDICATION: Signs/Symptoms:Evaluate for brain metastasis given recent mental status.     COMPARISON: CT head 08/27/2024 MRI brain 07/19/2024.   ACCESSION NUMBER(S): [id number]   ORDERING CLINICIAN: FER FIGUEROA   TECHNIQUE: Axial T2,  FLAIR, DWI, gradient echo T2 and  T1 weighted images of brain were acquired. Post contrast T1 weighted images were acquired after administration of 14 ML Dotarem gadolinium based intravenous contrast.   FINDINGS: CSF Spaces: Diffuse prominence of the ventricles and sulci is noted. Basal cisterns are clear.   Parenchyma: There is no diffusion restriction abnormality to suggest acute infarct.  Scattered patchy and confluent T2 hyperintensities are noted throughout the periventricular and subcortical white matter as well as the regi, nonspecific, but likely represent chronic small vessel ischemic changes given patient's age. Prominent T2 hyperintensities within the bilateral basal ganglia and dentate nuclei likely prominent perivascular spaces. There is no mass effect or midline shift.   A similar 7 mm avidly enhancing focus is again noted at the left lateral pontomedullary junction (series 9, image 174), cranial to the left intradural vertebral artery (series 10, image 52/99).   Paranasal Sinuses and Mastoids: Mild mucosal thickening of the ethmoid air cells. Small mucous retention cyst or polyp is noted in the left maxillary sinus. Visualized paranasal sinuses and mastoid air cells are unremarkable.       1. Similar 7 mm avidly enhancing focus along the left pontomedullary junction in keeping with possible saccular aneurysm. Vascular follow-up may be considered. A metastatic focus is felt to be less likely given relative stability. Otherwise, no evidence intracranial metastatic disease. 2. No evidence of restricted diffusion, mass effect or new/additional abnormal enhancement.   I personally reviewed the images/study and I agree with the findings as stated by Sushma Telles MD. This study was interpreted at [facility], [place], [place].   MACRO: None   Signed by: Jacki Gray 8/29/2024 3:24 AM Dictation workstation:   [id number]      PSYCHIATRIC RISK ASSESSMENT  Acute Risk of  Harm to Others is Considered: Moderate  Acute Risk of Harm to Self is Considered: Low    ASSESSMENT AND PLAN  Brittny Gordon is a 69 y.o. 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 Psychiatry was consulted on 8/28 for agitation management in the context of MRI workup.     Patient has AMS and instability with recent fall, head CT was negative, MRI of brain rec for r/o mets. Patient requires agitation recs, no prn have been given at this juncture. QTC < 450 corrected by Del Cid for tachycardia. Pt appears to wax and wane per hx and interview. Gabapentin and Lorazepam are held, have been short courses, no concern for benzo w/d. Notably, TSH wnl, CT chest showed pneumonitis but no signs of PNA per say, no ZAC, no leukocytosis, mild, chronic anemia, and no signs of ZAC. Pt has low NA with low urine sodium likely indicative of poor PO intake with cancer diagnosis. Vit B12 and folate pending. An EEG could help r/o seizures and help point to deliirum diagnosis per brain wave patterns, however may be difficult for tolerance until agitation better controlled.      Alcohol use has been about 2 beers every other day on average to nightly maximum - however pt has at least 2 weeks interrupted 2/2 hospitalizations and sobriety after diagnosis with no hx of withdrawal. No tremors seen. BP is slightly high but pt does have HTN hx, HR is around 100. Pt and brother agreeable to Olanzapine 5mg at bedtime for delirium, nausea, and sleep. An additional 5mg BID PO first line prn can be given for agitation and anxiety surrounding long procedures such as MRI or central lines. 5mg IM can be given BID for agitation not responding to PO or redirection.      Updates 8/30: Pt has not needed olanzapine IM/PO PRN over the last 24 hours for agitation.  "Vitally more stable, no concern for benzo or alcohol withdrawal. Medically improving and delirium resolving, MRI completed 8/29 was negative for intracranial mets or edema which is reassuring. Substantial improvement in mentation overnight and pt likely back to baseline, she is axox4. AMS likely related to previous morphine admin. Psychiatry will sign off. Please avoid ativan and morphine in the future as these likely lead to change in mental status       IMPRESSION  #Delirium 2/2 malignancy, pain, pneumonitis  #R/o brain metastasis, consider seizures        RECOMMENDATIONS  Safety:  - Patient does not currently meet criteria for inpatient psychiatric admission.   - To evaluate decision-making capacity, recommend use of the Capacity Evaluation Tool. Search “[facility] Capacity Evaluation\" under SmartText unless the patient has a legal guardian, in which case all decisions per the legal guardian.  - Defer to primary team decision for 1:1 sitter.  - As with all hospitalized patients, would recommend delirium precautions, as below.    DELIRIUM RECS:   -- Minimize use of benzos, opiates, anticholinergics, as these may worsen mental status   -- Would use caution with narcotic pain medications   -- Would still adequately controlling pain, as uncontrolled pain is also a risk factor for delirium   -- Reinforce sleep hygiene; encourage patient to stay awake during the day   -- Keep curtains/blinds open during the day and closed at night.   -- Would recommend reorienting/redirecting patient as much as possible,    -- Aim for consistent staffing, familiar objects, avoiding bright lights and loud noises, etc.     Medications:  - Continue Olanzapine 5mg at bedtime for sleep and anxiety.  - Discontinue Lorazepam.  - Discontinue Gabapentin.     PRN:  - Olanzapine 5mg ODT PO BID first line for agitation and anxiety surrounding long medical procedures such as MRI or central lines.  - Olanzapine 5mg IM BID prn second line for " agitation.  - Avoid Lorazepam due to respiratory depression risk with Olanzapine.         Work-up:  - MRI brain 8/29: negative for intracranial metastasis or edema   - EKG daily for qtc monitoring.  ::TSH wnl, folate and B12 pending.  -Would order syphilis, HIV, UA with reflex culture (completed), utox for completeness sake.  - Consider EEG r/o seizures, confirm delirium.     Ancillary Services:  - Recommend holding , pet/music/art therapy consult until patient agitation is controlled.     Follow-up:  - per primary and SW     ==========  - Discussed recommendations with primary team.  - Psychiatry will sign off      Thank you for allowing us to participate in the care of this patient. Please page [phone] with any questions or concerns.     Patient staffed and discussed with Dr. Kenyon, who agrees with above plan.       Medication Consent  Medication Consent: n/a; consult service     I reviewed the resident/fellow's documentation and discussed the patient with the resident/fellow. I agree with the resident/fellow's medical decision making as documented in the note.     Jaime Kenyon MD

## 2024-08-31 ENCOUNTER — PHARMACY VISIT (OUTPATIENT)
Dept: PHARMACY | Facility: CLINIC | Age: 69
End: 2024-08-31
Payer: COMMERCIAL

## 2024-08-31 LAB
ALBUMIN SERPL BCP-MCNC: 3 G/DL (ref 3.4–5)
ANION GAP SERPL CALC-SCNC: 13 MMOL/L (ref 10–20)
BASOPHILS # BLD AUTO: 0.04 X10*3/UL (ref 0–0.1)
BASOPHILS NFR BLD AUTO: 0.5 %
BUN SERPL-MCNC: 9 MG/DL (ref 6–23)
CALCIUM SERPL-MCNC: 8.6 MG/DL (ref 8.6–10.6)
CHLORIDE SERPL-SCNC: 100 MMOL/L (ref 98–107)
CO2 SERPL-SCNC: 28 MMOL/L (ref 21–32)
CREAT SERPL-MCNC: 0.55 MG/DL (ref 0.5–1.05)
EGFRCR SERPLBLD CKD-EPI 2021: >90 ML/MIN/1.73M*2
EOSINOPHIL # BLD AUTO: 0.34 X10*3/UL (ref 0–0.7)
EOSINOPHIL NFR BLD AUTO: 4.6 %
ERYTHROCYTE [DISTWIDTH] IN BLOOD BY AUTOMATED COUNT: 12.8 % (ref 11.5–14.5)
GLUCOSE SERPL-MCNC: 111 MG/DL (ref 74–99)
HCT VFR BLD AUTO: 37.9 % (ref 36–46)
HGB BLD-MCNC: 12.4 G/DL (ref 12–16)
IMM GRANULOCYTES # BLD AUTO: 0.07 X10*3/UL (ref 0–0.7)
IMM GRANULOCYTES NFR BLD AUTO: 0.9 % (ref 0–0.9)
LYMPHOCYTES # BLD AUTO: 0.47 X10*3/UL (ref 1.2–4.8)
LYMPHOCYTES NFR BLD AUTO: 6.4 %
MAGNESIUM SERPL-MCNC: 1.8 MG/DL (ref 1.6–2.4)
MCH RBC QN AUTO: 29 PG (ref 26–34)
MCHC RBC AUTO-ENTMCNC: 32.7 G/DL (ref 32–36)
MCV RBC AUTO: 89 FL (ref 80–100)
MONOCYTES # BLD AUTO: 0.93 X10*3/UL (ref 0.1–1)
MONOCYTES NFR BLD AUTO: 12.6 %
NEUTROPHILS # BLD AUTO: 5.52 X10*3/UL (ref 1.2–7.7)
NEUTROPHILS NFR BLD AUTO: 75 %
NRBC BLD-RTO: 0 /100 WBCS (ref 0–0)
PHOSPHATE SERPL-MCNC: 3.9 MG/DL (ref 2.5–4.9)
PLATELET # BLD AUTO: 225 X10*3/UL (ref 150–450)
POTASSIUM SERPL-SCNC: 3.4 MMOL/L (ref 3.5–5.3)
RBC # BLD AUTO: 4.27 X10*6/UL (ref 4–5.2)
SODIUM SERPL-SCNC: 138 MMOL/L (ref 136–145)
WBC # BLD AUTO: 7.4 X10*3/UL (ref 4.4–11.3)

## 2024-08-31 PROCEDURE — 80069 RENAL FUNCTION PANEL: CPT

## 2024-08-31 PROCEDURE — 2500000001 HC RX 250 WO HCPCS SELF ADMINISTERED DRUGS (ALT 637 FOR MEDICARE OP)

## 2024-08-31 PROCEDURE — 1170000001 HC PRIVATE ONCOLOGY ROOM DAILY

## 2024-08-31 PROCEDURE — 99232 SBSQ HOSP IP/OBS MODERATE 35: CPT

## 2024-08-31 PROCEDURE — 2500000002 HC RX 250 W HCPCS SELF ADMINISTERED DRUGS (ALT 637 FOR MEDICARE OP, ALT 636 FOR OP/ED)

## 2024-08-31 PROCEDURE — 83735 ASSAY OF MAGNESIUM: CPT

## 2024-08-31 PROCEDURE — 2500000004 HC RX 250 GENERAL PHARMACY W/ HCPCS (ALT 636 FOR OP/ED)

## 2024-08-31 PROCEDURE — 85025 COMPLETE CBC W/AUTO DIFF WBC: CPT

## 2024-08-31 SDOH — SOCIAL STABILITY: SOCIAL INSECURITY: HAS ANYONE EVER THREATENED TO HURT YOUR FAMILY OR YOUR PETS?: NO

## 2024-08-31 SDOH — SOCIAL STABILITY: SOCIAL INSECURITY: HAVE YOU HAD ANY THOUGHTS OF HARMING ANYONE ELSE?: NO

## 2024-08-31 SDOH — SOCIAL STABILITY: SOCIAL INSECURITY: ARE YOU OR HAVE YOU BEEN THREATENED OR ABUSED PHYSICALLY, EMOTIONALLY, OR SEXUALLY BY ANYONE?: NO

## 2024-08-31 SDOH — SOCIAL STABILITY: SOCIAL INSECURITY: ARE THERE ANY APPARENT SIGNS OF INJURIES/BEHAVIORS THAT COULD BE RELATED TO ABUSE/NEGLECT?: NO

## 2024-08-31 SDOH — SOCIAL STABILITY: SOCIAL INSECURITY: HAVE YOU HAD THOUGHTS OF HARMING ANYONE ELSE?: NO

## 2024-08-31 SDOH — SOCIAL STABILITY: SOCIAL INSECURITY: DOES ANYONE TRY TO KEEP YOU FROM HAVING/CONTACTING OTHER FRIENDS OR DOING THINGS OUTSIDE YOUR HOME?: NO

## 2024-08-31 SDOH — SOCIAL STABILITY: SOCIAL INSECURITY: WERE YOU ABLE TO COMPLETE ALL THE BEHAVIORAL HEALTH SCREENINGS?: YES

## 2024-08-31 SDOH — SOCIAL STABILITY: SOCIAL INSECURITY: DO YOU FEEL ANYONE HAS EXPLOITED OR TAKEN ADVANTAGE OF YOU FINANCIALLY OR OF YOUR PERSONAL PROPERTY?: NO

## 2024-08-31 SDOH — SOCIAL STABILITY: SOCIAL INSECURITY: DO YOU FEEL UNSAFE GOING BACK TO THE PLACE WHERE YOU ARE LIVING?: NO

## 2024-08-31 SDOH — SOCIAL STABILITY: SOCIAL INSECURITY: ABUSE: ADULT

## 2024-08-31 ASSESSMENT — COGNITIVE AND FUNCTIONAL STATUS - GENERAL
PATIENT BASELINE BEDBOUND: NO
HELP NEEDED FOR BATHING: A LITTLE
CLIMB 3 TO 5 STEPS WITH RAILING: A LITTLE
MOBILITY SCORE: 23
DAILY ACTIVITIY SCORE: 23

## 2024-08-31 ASSESSMENT — PATIENT HEALTH QUESTIONNAIRE - PHQ9
1. LITTLE INTEREST OR PLEASURE IN DOING THINGS: NOT AT ALL
2. FEELING DOWN, DEPRESSED OR HOPELESS: NOT AT ALL
SUM OF ALL RESPONSES TO PHQ9 QUESTIONS 1 & 2: 0

## 2024-08-31 ASSESSMENT — LIFESTYLE VARIABLES
HOW OFTEN DO YOU HAVE A DRINK CONTAINING ALCOHOL: MONTHLY OR LESS
SKIP TO QUESTIONS 9-10: 1
HOW OFTEN DO YOU HAVE 6 OR MORE DRINKS ON ONE OCCASION: NEVER
HOW MANY STANDARD DRINKS CONTAINING ALCOHOL DO YOU HAVE ON A TYPICAL DAY: 1 OR 2
AUDIT-C TOTAL SCORE: 1
AUDIT-C TOTAL SCORE: 1

## 2024-08-31 ASSESSMENT — PAIN DESCRIPTION - LOCATION: LOCATION: BACK

## 2024-08-31 ASSESSMENT — ACTIVITIES OF DAILY LIVING (ADL)
BATHING: NEEDS ASSISTANCE
DRESSING YOURSELF: INDEPENDENT
PATIENT'S MEMORY ADEQUATE TO SAFELY COMPLETE DAILY ACTIVITIES?: YES
TOILETING: NEEDS ASSISTANCE
ADEQUATE_TO_COMPLETE_ADL: YES
HEARING - LEFT EAR: FUNCTIONAL
FEEDING YOURSELF: INDEPENDENT
JUDGMENT_ADEQUATE_SAFELY_COMPLETE_DAILY_ACTIVITIES: YES
HEARING - RIGHT EAR: FUNCTIONAL
GROOMING: INDEPENDENT
LACK_OF_TRANSPORTATION: NO
WALKS IN HOME: INDEPENDENT

## 2024-08-31 ASSESSMENT — PAIN SCALES - GENERAL
PAINLEVEL_OUTOF10: 4
PAINLEVEL_OUTOF10: 8
PAINLEVEL_OUTOF10: 0 - NO PAIN

## 2024-08-31 NOTE — SIGNIFICANT EVENT
Ms. Brittny Gordon is a 69 year old female who is currently receiving EBRT to the T12-L3, currently received 21Gy in 7 fractions out of planned 30Gy in 10 fractions. The patient has plans for the remaining 3 fractions to be treated on the following days:    Times are subject to change*:  9/3/24 9AM  9/4/24 10:15AM  9/5/24 - 9AM Final Treatment  FUV (virtual) scheduled with Dr. Ospina on 9/27/24.     The plan is for the patient to receive treatment at [facility] for the remaining fractions. If the patient is discharged prior to completion of her full 10 fractions, she can complete treatment as outpatient, provided she has adequate transportation to come for her radiation treatments. If the patient cannot make it to the above AM appointments, she is encouraged to call our office during business hours at [phone].     Regulo Eddy MD  PGY-4 Radiation Oncology

## 2024-08-31 NOTE — PROGRESS NOTES
"  Internal Medicine Progress Note     Brittny Gordon is a 69 y.o. female with stage IVB ([id number]) primary non-small cell carcinoma of RUL, involving multiple bone mets.  HTN, HLD, osteporosis, AAA s/p repair, former smoker presenting to the ED for hypotension and hypoxia. Currently being followed for encephalopathy.     Subjective     Patient was seen and evaluated at bedside. Patient reports significant improvement in mentation. Denies chest pain or abdominal pain or shortness of breath. Patient reports some tingling in her R thigh which is chronic and had concerns about being at increased risk of fracture which were addressed.     Medications     Medications:   Scheduled medications  apixaban, 10 mg, oral, BID   Followed by  [START ON 9/4/2024] apixaban, 5 mg, oral, BID  atorvastatin, 80 mg, oral, Daily  cholecalciferol, 1,000 Units, oral, Daily  [Held by provider] gabapentin, 300 mg, oral, Nightly  losartan, 100 mg, oral, Daily  metoprolol succinate XL, 100 mg, oral, Daily  OLANZapine, 5 mg, oral, Nightly  pantoprazole, 20 mg, oral, Daily before breakfast  perflutren lipid microspheres, 0.5-10 mL of dilution, intravenous, Once in imaging  perflutren protein A microsphere, 0.5 mL, intravenous, Once in imaging  polyethylene glycol, 17 g, oral, Daily  sulfur hexafluoride microsphr, 2 mL, intravenous, Once in imaging      Continuous medications       PRN medications  PRN medications: bisacodyl, cetirizine, cyclobenzaprine, OLANZapine, OLANZapine zydis, ondansetron, oxyCODONE, prochlorperazine, sennosides     Objective     Vitals  Visit Vitals  /79 (BP Location: Right arm, Patient Position: Lying)   Pulse 82   Temp 36.9 °C (98.4 °F) (Temporal)   Resp 16   Ht 1.676 m (5' 6\")   Wt 71.2 kg (157 lb)   SpO2 93%   BMI 25.34 kg/m²   OB Status Postmenopausal   Smoking Status Former   BSA 1.82 m²     No intake or output data in the 24 hours ending 08/31/24 1400      Physical Exam  Gen: well appearing, A+Ox3, in no " acute distress,   HEENT: sclera anicteric, no conjunctival injection, MMM  Resp: CTAB, normal breath sounds, no wheezing/crackles. On 3L NC  CV: normal S1/S2, slight tachycardia   GI: non-tender, non-distended, no rebound or guarding  Extremities/MSK: warm and well perfused, no edema bilateral  Neuro: A+Ox3, no focal deficits, no asterixis, strength and motor function 5/5 bilaterally of UE and LE. CN intact 2-12  Skin: warm and dry, no lesions, no rashes   Psych: Tangential speech improved, cooperative     Labs  Lab Results   Component Value Date    WBC 7.4 08/31/2024    HGB 12.4 08/31/2024    HCT 37.9 08/31/2024    MCV 89 08/31/2024     08/31/2024      Lab Results   Component Value Date    GLUCOSE 111 (H) 08/31/2024    CALCIUM 8.6 08/31/2024     08/31/2024    K 3.4 (L) 08/31/2024    CO2 28 08/31/2024     08/31/2024    BUN 9 08/31/2024    CREATININE 0.55 08/31/2024      Lab Results   Component Value Date    ALT 27 08/27/2024    AST 38 08/27/2024    ALKPHOS 78 08/27/2024    BILITOT 0.8 08/27/2024        Imaging  === 08/27/24 ===    XR CHEST 1 VIEW    - Impression -  Cephalization of the pulmonary veins and increased bronchovascular  markings with a question of small left-sided pleural effusion.  Findings are suggestive of mild interstitial pulmonary edema.    I personally reviewed the images/study and I agree with the findings  as stated by Julius Hernandez MD (resident). This study was  interpreted at [facility],  [place], [place].    Signed by: Lukasz Schuler 8/27/2024 1:14 PM  Dictation workstation:   [id number]   === 08/27/24 ===    CT CERVICAL SPINE WO IV CONTRAST    - Impression -  No evidence of an acute fracture of the cervical spine.    MACRO:  None    Signed by: Dacia Guillen 8/27/2024 5:40 PM  Dictation workstation:   [id number]         Assessment/Plan     Brittny Gordon is a 69 y.o. female with stage IVB ([id number]) primary non-small cell  carcinoma of RUL, involving multiple bone mets.  HTN, HLD, osteporosis, AAA s/p repair, former smoker admitted for for hypotension and hypoxia iso CT angio findings concerning for subsegmental pulmonary embolism likely secondary to known malignancy. Currently being followed for encephalopathy.      #Encephalopathy secondary to polypharmacy vs delirium, resolved   ::Brother expressed that she has been agitated and aggressive more since 8/25 and falling more often   :: TSH wnl   :: CTH 8/27 negative for intracranial bleed   :: MRI brain 8/29 negative for intracranial metastasis or edema   :: Patient has been having tangenital speech and visual hallucinations since 8/28. Thought could be secondary to alcohol withdrawal given hypertension and tachycardia overnight however less likely given it has been many days since her last drink.   :: EtOH levels < 10   :: Patient recently started on morphine, which could also contribute to new altered mental status.   :: Given patient started showing odd behavior 2 days after being started on Morphine 15 mg BID, it is possible this is the reason for her recent AMS change especially since MRI brain was negative, no major metabolic abnormalities, and her improvement on 8/30.     Plan:   - Continue to monitor. On Oxycodone 10 mg for pain   - NO MORPHINE for pain control. Also continue holding Ativan as this could also contribute to delirium     # Subsegmental PE, NO imaging or Biomarker's evidence of RV strain,   # Initial Hypotension, Fluid responsive   # Hypoxia  :: Has been receiving heparin, will transition to DOAC   :: Saturating well on 3L NC   :: Patient refused duplex ultrasound of lower extremities 8/28. Agreed to test 8/29 which found that there is acute non-occlusive deep vein thrombosis visualized in the gastrocnemius veins in the calf vein.     Plan:   - Eliquis (10 mg for 7 days, followed by 5 mg)         # Hyponatremia   Plan:   - Follow up RFP      # Stage IVB  ([id number]) primary non-small cell carcinoma of RUL, involving multiple bone mets    Plan:   : currently received 21Gy in 7 fractions out of planned 30Gy in 10 fractions. The patient has plans for the remaining 3 fractions to be treated on the following days:  - 9/3/24 9AM  9/4/24 10:15AM  9/5/24 - 9AM Final Treatment  FUV (virtual) scheduled with Dr. Ospina on 9/27/24.         Current outpatient pain regimen by supportive onc:     Oxy 10mg x5-6 doses   15 prior to radiation   Flexeril - unsure if helping   gabapentin 300mg at bedtime   Tylenol and NSAIDs causes GI upset/heartburn            F: Replete PRN  E: Keep Mg >2, phos >3  and K >4  N: Regular   A: PIV  O2: On  DVT ppx: Eliquis   GI ppx: None   Code Status: Full code   Contact: Juice (brother): [phone]    Patient and plan discussed with attending physician Dr. Osman .              Ayo Montanez DO  Department of Internal Medicine, PGY-1      Ayo Montanez DO

## 2024-08-31 NOTE — CARE PLAN
The patient's goals for the shift include      The clinical goals for the shift include Pt will be safe, comfortale, and HD stable overnight.      Problem: Fall/Injury  Goal: Not fall by end of shift  Outcome: Progressing  Goal: Be free from injury by end of the shift  Outcome: Progressing  Goal: Verbalize understanding of personal risk factors for fall in the hospital  Outcome: Progressing  Goal: Verbalize understanding of risk factor reduction measures to prevent injury from fall in the home  Outcome: Progressing  Goal: Use assistive devices by end of the shift  Outcome: Progressing  Goal: Pace activities to prevent fatigue by end of the shift  Outcome: Progressing     Problem: Pain  Goal: Takes deep breaths with improved pain control throughout the shift  Outcome: Progressing  Goal: Turns in bed with improved pain control throughout the shift  Outcome: Progressing  Goal: Walks with improved pain control throughout the shift  Outcome: Progressing  Goal: Free from opioid side effects throughout the shift  Outcome: Progressing  Goal: Free from acute confusion related to pain meds throughout the shift  Outcome: Progressing

## 2024-08-31 NOTE — HOSPITAL COURSE
Brittny Gordon is a 69 y.o. female with stage IVB ([id number]) primary non-small cell carcinoma of RUL, involving multiple bone mets.  HTN, HLD, osteporosis, AAA s/p repair, former smoker presenting to the ED for hypotension and hypoxia. In the ED, patient was started on fluids of 100 ml / hr given hypotension and put on NC (currently on 3L). Given hypoxia, CT angio of the chest was done which revealed  few subsegmental acute pulmonary emboli within right lower lobe and no evidence of right heart strain. Patient was then started on heparin. However, given the new information of a recent fall, heparin was stopped and patient was sent for CT scan of the head.     CT head 8/27 did not reveal evidence of intracranial hemorrhage or displaced skull fracture and CT cervical spine 8/27 did not reveal evidence of an acute fracture of the cervical spine. Because of this, heparin drip was briefly resumed and patient was started on Eliquis 10 mg for 7 days followed by 5 mg (start date 9/4). Hospital stay was then complicated by encephalopathy secondary to metabolic causes v infectious vs malignancy vs polypharmacy. Bacterial cultures, UA were collected and came back negative. MRI of brain was performed 8/29 and revealed Similar 7 mm avidly enhancing focus along the left pontomedullary junction in keeping with possible saccular aneurysm. A metastatic focus is felt to be less likely given relative stability. Otherwise, no evidence of intracranial  metastatic disease. No evidence of restricted diffusion, mass effect or new/additional abnormal enhancement. TSH wnl and mild hyponatremia was present although it was chronic. After looking further into the chart, it was determined that encephalopathy was likely secondary to polypharmacy especially given the recent addition of morphine. Because of this, AVOID MORPHINE for further pain management. Patient also instructed to avoid using Ativan as well. Given this, She is medically  stable for discharge. Her mentation is significantly improved and she now has full capacity for decision-making regarding medications, discharge, and so on. Will prescribe PO oxycodone at discharge with sufficient medications until her next Supportive Onc appointment     With regards to her radiation therapy, currently received 21Gy in 7 fractions out of planned 30Gy in 10 fractions. The patient has plans for the remaining 3 fractions to be treated on the following days: 9/3/24 9AM, 9/4/24 10:15AM, 9/5/24 - 9AM Final Treatment.     Follow Up:   [ ] AVOID MORPHINE for future pain management   [ ] Follow up outpatient radiation oncology 9/3 with Dr. Ospina at [facility]   [ ] Follow up with Neurosurgery 9/9 with Dr. Archibald at [facility]   [ ] Follow up with Palliative with VANI Leroy virtually on 9/10

## 2024-09-01 VITALS
OXYGEN SATURATION: 93 % | BODY MASS INDEX: 25.23 KG/M2 | HEART RATE: 77 BPM | DIASTOLIC BLOOD PRESSURE: 90 MMHG | SYSTOLIC BLOOD PRESSURE: 147 MMHG | TEMPERATURE: 97.3 F | WEIGHT: 157 LBS | HEIGHT: 66 IN | RESPIRATION RATE: 16 BRPM

## 2024-09-01 PROBLEM — R45.1 AGITATION REQUIRING SEDATION PROTOCOL: Status: RESOLVED | Noted: 2024-08-30 | Resolved: 2024-09-01

## 2024-09-01 PROBLEM — G93.40 ACUTE ENCEPHALOPATHY: Status: RESOLVED | Noted: 2024-08-30 | Resolved: 2024-09-01

## 2024-09-01 LAB
ALBUMIN SERPL BCP-MCNC: 3.2 G/DL (ref 3.4–5)
ANION GAP SERPL CALC-SCNC: 15 MMOL/L (ref 10–20)
BACTERIA BLD CULT: NORMAL
BACTERIA BLD CULT: NORMAL
BASOPHILS # BLD AUTO: 0.04 X10*3/UL (ref 0–0.1)
BASOPHILS NFR BLD AUTO: 0.4 %
BUN SERPL-MCNC: 8 MG/DL (ref 6–23)
CALCIUM SERPL-MCNC: 8.7 MG/DL (ref 8.6–10.6)
CHLORIDE SERPL-SCNC: 100 MMOL/L (ref 98–107)
CO2 SERPL-SCNC: 25 MMOL/L (ref 21–32)
CREAT SERPL-MCNC: 0.54 MG/DL (ref 0.5–1.05)
EGFRCR SERPLBLD CKD-EPI 2021: >90 ML/MIN/1.73M*2
EOSINOPHIL # BLD AUTO: 0.3 X10*3/UL (ref 0–0.7)
EOSINOPHIL NFR BLD AUTO: 3 %
ERYTHROCYTE [DISTWIDTH] IN BLOOD BY AUTOMATED COUNT: 12.9 % (ref 11.5–14.5)
GLUCOSE SERPL-MCNC: 76 MG/DL (ref 74–99)
HCT VFR BLD AUTO: 40.1 % (ref 36–46)
HGB BLD-MCNC: 13.2 G/DL (ref 12–16)
IMM GRANULOCYTES # BLD AUTO: 0.1 X10*3/UL (ref 0–0.7)
IMM GRANULOCYTES NFR BLD AUTO: 1 % (ref 0–0.9)
LYMPHOCYTES # BLD AUTO: 0.84 X10*3/UL (ref 1.2–4.8)
LYMPHOCYTES NFR BLD AUTO: 8.3 %
MAGNESIUM SERPL-MCNC: 1.72 MG/DL (ref 1.6–2.4)
MCH RBC QN AUTO: 29.3 PG (ref 26–34)
MCHC RBC AUTO-ENTMCNC: 32.9 G/DL (ref 32–36)
MCV RBC AUTO: 89 FL (ref 80–100)
MONOCYTES # BLD AUTO: 1.16 X10*3/UL (ref 0.1–1)
MONOCYTES NFR BLD AUTO: 11.5 %
NEUTROPHILS # BLD AUTO: 7.62 X10*3/UL (ref 1.2–7.7)
NEUTROPHILS NFR BLD AUTO: 75.8 %
NRBC BLD-RTO: 0 /100 WBCS (ref 0–0)
PHOSPHATE SERPL-MCNC: 2.8 MG/DL (ref 2.5–4.9)
PLATELET # BLD AUTO: 243 X10*3/UL (ref 150–450)
POTASSIUM SERPL-SCNC: 3.3 MMOL/L (ref 3.5–5.3)
RBC # BLD AUTO: 4.5 X10*6/UL (ref 4–5.2)
SODIUM SERPL-SCNC: 137 MMOL/L (ref 136–145)
WBC # BLD AUTO: 10.1 X10*3/UL (ref 4.4–11.3)

## 2024-09-01 PROCEDURE — 85025 COMPLETE CBC W/AUTO DIFF WBC: CPT

## 2024-09-01 PROCEDURE — 1170000001 HC PRIVATE ONCOLOGY ROOM DAILY

## 2024-09-01 PROCEDURE — 2500000002 HC RX 250 W HCPCS SELF ADMINISTERED DRUGS (ALT 637 FOR MEDICARE OP, ALT 636 FOR OP/ED)

## 2024-09-01 PROCEDURE — 2500000001 HC RX 250 WO HCPCS SELF ADMINISTERED DRUGS (ALT 637 FOR MEDICARE OP)

## 2024-09-01 PROCEDURE — 83735 ASSAY OF MAGNESIUM: CPT

## 2024-09-01 PROCEDURE — 36415 COLL VENOUS BLD VENIPUNCTURE: CPT

## 2024-09-01 PROCEDURE — 99232 SBSQ HOSP IP/OBS MODERATE 35: CPT

## 2024-09-01 PROCEDURE — 80069 RENAL FUNCTION PANEL: CPT

## 2024-09-01 PROCEDURE — 2500000005 HC RX 250 GENERAL PHARMACY W/O HCPCS

## 2024-09-01 RX ORDER — DOCUSATE SODIUM 100 MG/1
100 CAPSULE, LIQUID FILLED ORAL 2 TIMES DAILY
Qty: 60 CAPSULE | Refills: 0 | Status: SHIPPED | OUTPATIENT
Start: 2024-09-01 | End: 2024-10-01

## 2024-09-01 RX ORDER — OXYCODONE HYDROCHLORIDE 10 MG/1
10 TABLET ORAL EVERY 4 HOURS PRN
Qty: 20 TABLET | Refills: 0 | Status: SHIPPED | OUTPATIENT
Start: 2024-09-01 | End: 2024-09-01

## 2024-09-01 RX ORDER — ONDANSETRON HYDROCHLORIDE 8 MG/1
4 TABLET, FILM COATED ORAL EVERY 6 HOURS PRN
Qty: 20 TABLET | Refills: 0 | Status: SHIPPED | OUTPATIENT
Start: 2024-09-01 | End: 2024-10-01

## 2024-09-01 RX ORDER — OXYCODONE HYDROCHLORIDE 10 MG/1
10 TABLET ORAL EVERY 4 HOURS PRN
Qty: 20 TABLET | Refills: 0 | OUTPATIENT
Start: 2024-09-01

## 2024-09-01 RX ORDER — ONDANSETRON HYDROCHLORIDE 8 MG/1
4 TABLET, FILM COATED ORAL EVERY 6 HOURS PRN
Qty: 20 TABLET | Refills: 0 | OUTPATIENT
Start: 2024-09-01

## 2024-09-01 RX ORDER — OXYCODONE HYDROCHLORIDE 10 MG/1
10 TABLET ORAL EVERY 4 HOURS PRN
Qty: 20 TABLET | Refills: 0 | Status: SHIPPED | OUTPATIENT
Start: 2024-09-01 | End: 2024-10-01

## 2024-09-01 RX ORDER — DOCUSATE SODIUM 100 MG/1
100 CAPSULE, LIQUID FILLED ORAL 2 TIMES DAILY
Qty: 60 CAPSULE | Refills: 0 | OUTPATIENT
Start: 2024-09-01 | End: 2024-10-01

## 2024-09-01 SDOH — ECONOMIC STABILITY: FOOD INSECURITY: WITHIN THE PAST 12 MONTHS, YOU WORRIED THAT YOUR FOOD WOULD RUN OUT BEFORE YOU GOT THE MONEY TO BUY MORE.: NEVER TRUE

## 2024-09-01 SDOH — ECONOMIC STABILITY: FOOD INSECURITY

## 2024-09-01 SDOH — ECONOMIC STABILITY: INCOME INSECURITY: IN THE LAST 12 MONTHS, WAS THERE A TIME WHEN YOU WERE NOT ABLE TO PAY THE MORTGAGE OR RENT ON TIME?: NO

## 2024-09-01 SDOH — ECONOMIC STABILITY: FOOD INSECURITY: WITHIN THE PAST 12 MONTHS, YOU WORRIED THAT YOUR FOOD WOULD RUN OUT BEFORE YOU GOT MONEY TO BUY MORE.: NEVER TRUE

## 2024-09-01 SDOH — ECONOMIC STABILITY: GENERAL

## 2024-09-01 SDOH — ECONOMIC STABILITY: HOUSING INSECURITY
IN THE LAST 12 MONTHS, WAS THERE A TIME WHEN YOU DID NOT HAVE A STEADY PLACE TO SLEEP OR SLEPT IN A SHELTER (INCLUDING NOW)?: NO

## 2024-09-01 SDOH — ECONOMIC STABILITY: HOUSING INSECURITY

## 2024-09-01 SDOH — ECONOMIC STABILITY: FOOD INSECURITY: WITHIN THE PAST 12 MONTHS, THE FOOD YOU BOUGHT JUST DIDN'T LAST AND YOU DIDN'T HAVE MONEY TO GET MORE.: NEVER TRUE

## 2024-09-01 SDOH — ECONOMIC STABILITY: HOUSING INSECURITY: IN THE LAST 12 MONTHS, HOW MANY PLACES HAVE YOU LIVED?: 1

## 2024-09-01 SDOH — ECONOMIC STABILITY: HOUSING INSECURITY: IN THE LAST 12 MONTHS, WAS THERE A TIME WHEN YOU WERE NOT ABLE TO PAY THE MORTGAGE OR RENT ON TIME?: NO

## 2024-09-01 SDOH — ECONOMIC STABILITY: TRANSPORTATION INSECURITY

## 2024-09-01 SDOH — ECONOMIC STABILITY: TRANSPORTATION INSECURITY: IN THE PAST 12 MONTHS, HAS LACK OF TRANSPORTATION KEPT YOU FROM MEDICAL APPOINTMENTS OR FROM GETTING MEDICATIONS?: NO

## 2024-09-01 SDOH — ECONOMIC STABILITY: HOUSING INSECURITY: IN THE PAST 12 MONTHS HAS THE ELECTRIC, GAS, OIL, OR WATER COMPANY THREATENED TO SHUT OFF SERVICES IN YOUR HOME?: NO

## 2024-09-01 ASSESSMENT — SOCIAL DETERMINANTS OF HEALTH (SDOH): IN THE PAST 12 MONTHS, HAS THE ELECTRIC, GAS, OIL, OR WATER COMPANY THREATENED TO SHUT OFF SERVICE IN YOUR HOME?: NO

## 2024-09-01 ASSESSMENT — PAIN DESCRIPTION - LOCATION
LOCATION: BACK
LOCATION: BACK

## 2024-09-01 ASSESSMENT — PAIN SCALES - GENERAL
PAINLEVEL_OUTOF10: 4
PAINLEVEL_OUTOF10: 7

## 2024-09-01 ASSESSMENT — ACTIVITIES OF DAILY LIVING (ADL): LACK_OF_TRANSPORTATION: NO

## 2024-09-01 ASSESSMENT — PAIN - FUNCTIONAL ASSESSMENT
PAIN_FUNCTIONAL_ASSESSMENT: 0-10
PAIN_FUNCTIONAL_ASSESSMENT: 0-10

## 2024-09-01 NOTE — CARE PLAN
The clinical goals for the shift include Patient's pain will be controlled and rate pain 3 out of 10 throughout shift      Problem: Fall/Injury  Goal: Not fall by end of shift  Outcome: Progressing  Goal: Be free from injury by end of the shift  Outcome: Progressing  Goal: Verbalize understanding of personal risk factors for fall in the hospital  Outcome: Progressing  Goal: Verbalize understanding of risk factor reduction measures to prevent injury from fall in the home  Outcome: Progressing  Goal: Use assistive devices by end of the shift  Outcome: Progressing  Goal: Pace activities to prevent fatigue by end of the shift  Outcome: Progressing     Problem: Pain  Goal: Takes deep breaths with improved pain control throughout the shift  Outcome: Progressing  Goal: Turns in bed with improved pain control throughout the shift  Outcome: Progressing  Goal: Walks with improved pain control throughout the shift  Outcome: Progressing  Goal: Performs ADL's with improved pain control throughout shift  Outcome: Progressing  Goal: Participates in PT with improved pain control throughout the shift  Outcome: Progressing  Goal: Free from opioid side effects throughout the shift  Outcome: Progressing  Goal: Free from acute confusion related to pain meds throughout the shift  Outcome: Progressing

## 2024-09-01 NOTE — DISCHARGE SUMMARY
Discharge Diagnosis  Other acute pulmonary embolism, unspecified whether acute cor pulmonale present (Multi)    Issues Requiring Follow-Up  [ ] AVOID MORPHINE for future pain management   [ ] Follow up outpatient radiation oncology 9/3 with Dr. Ospina at [facility]   [ ] Follow up with Neurosurgery 9/9 with Dr. Archibald at [facility]   [ ] Follow up with Palliative with NP Susan Leroy virtually on 9/10     Test Results Pending At Discharge  Pending Labs       Order Current Status    CBC and Auto Differential Collected (08/30/24 0427)    Magnesium Collected (08/30/24 0427)    Renal function panel Collected (08/30/24 0427)    Respiratory Viral Panel In process    Blood Culture Preliminary result    Blood Culture Preliminary result            Hospital Course  Brittny Gordon is a 69 y.o. female with stage IVB ([id number]) primary non-small cell carcinoma of RUL, involving multiple bone mets.  HTN, HLD, osteporosis, AAA s/p repair, former smoker presenting to the ED for hypotension and hypoxia. In the ED, patient was started on fluids of 100 ml / hr given hypotension and put on NC (currently on 3L). Given hypoxia, CT angio of the chest was done which revealed  few subsegmental acute pulmonary emboli within right lower lobe and no evidence of right heart strain. Patient was then started on heparin. However, given the new information of a recent fall, heparin was stopped and patient was sent for CT scan of the head.     CT head 8/27 did not reveal evidence of intracranial hemorrhage or displaced skull fracture and CT cervical spine 8/27 did not reveal evidence of an acute fracture of the cervical spine. Because of this, heparin drip was briefly resumed and patient was started on Eliquis 10 mg for 7 days followed by 5 mg (start date 9/4). Hospital stay was then complicated by encephalopathy secondary to metabolic causes v infectious vs malignancy vs polypharmacy. Bacterial cultures, UA were collected and came back negative. MRI  of brain was performed 8/29 and revealed Similar 7 mm avidly enhancing focus along the left pontomedullary junction in keeping with possible saccular aneurysm. A metastatic focus is felt to be less likely given relative stability. Otherwise, no evidence of intracranial  metastatic disease. No evidence of restricted diffusion, mass effect or new/additional abnormal enhancement. TSH wnl and mild hyponatremia was present although it was chronic. After looking further into the chart, it was determined that encephalopathy was likely secondary to polypharmacy especially given the recent addition of morphine. Because of this, AVOID MORPHINE for further pain management. Patient also instructed to avoid using Ativan as well. Given this, She is medically stable for discharge. Her mentation is significantly improved and she now has full capacity for decision-making regarding medications, discharge, and so on. Will prescribe PO oxycodone at discharge with sufficient medications until her next Supportive Onc appointment     With regards to her radiation therapy, currently received 21Gy in 7 fractions out of planned 30Gy in 10 fractions. The patient has plans for the remaining 3 fractions to be treated on the following days: 9/3/24 9AM, 9/4/24 10:15AM, 9/5/24 - 9AM Final Treatment.     Follow Up:   [ ] AVOID MORPHINE for future pain management   [ ] Follow up outpatient radiation oncology 9/3 with Dr. Ospina at [facility]   [ ] Follow up with Neurosurgery 9/9 with Dr. Archibald at [facility]   [ ] Follow up with Palliative with NP Susan Leroy virtually on 9/10             Pertinent Physical Exam At Time of Discharge  Gen: well appearing, A+Ox3, in no acute distress,   HEENT: sclera anicteric, no conjunctival injection, MMM  Resp: CTAB, normal breath sounds, no wheezing/crackles. On 3L NC  CV: normal S1/S2, slight tachycardia   GI: non-tender, non-distended, no rebound or guarding  Extremities/MSK: warm and well perfused, no edema  bilateral  Neuro: A+Ox3, no focal deficits, no asterixis, strength and motor function 5/5 bilaterally of UE and LE. CN intact 2-12  Skin: warm and dry, no lesions, no rashes   Psych: Tangential speech improved, cooperative    Home Medications     Medication List      START taking these medications     * Eliquis DVT-PE Treat 30D Start 5 mg (74 tabs) tablet; Generic drug:   apixaban; Take 2 tablets (10 mg) by mouth 2 times a day for 7 days, then   take 1 tablet (5 mg) by mouth 2 times a day.   * apixaban 5 mg tablet; Commonly known as: Eliquis; Take 2 tablets (10   mg) by mouth 2 times a day for 3 days, THEN 1 tablet (5 mg) 2 times a   day.; Start taking on: September 1, 2024  * This list has 2 medication(s) that are the same as other medications   prescribed for you. Read the directions carefully, and ask your doctor or   other care provider to review them with you.     CHANGE how you take these medications     ondansetron 8 mg tablet; Commonly known as: Zofran; Take 0.5 tablets (4   mg) by mouth every 6 hours if needed for nausea or vomiting.; What   changed: Another medication with the same name was removed. Continue   taking this medication, and follow the directions you see here.   oxyCODONE 10 mg immediate release tablet; Commonly known as: Roxicodone;   Take 1 tablet (10 mg) by mouth every 4 hours if needed for severe pain (7   - 10).; What changed: medication strength, how much to take, reasons to   take this     CONTINUE taking these medications     acetaminophen-codeine 300-30 mg tablet; Commonly known as: Tylenol w/   Codeine #3   atorvastatin 80 mg tablet; Commonly known as: Lipitor   bisacodyl 5 mg EC tablet; Commonly known as: Dulcolax; Take 1 tablet (5   mg) by mouth once daily as needed for constipation. Do not crush, chew, or   split.   cetirizine 10 mg tablet; Commonly known as: ZyrTEC   cyclobenzaprine 10 mg tablet; Commonly known as: Flexeril; Take 1 tablet   (10 mg) by mouth 3 times a day as  needed for muscle spasms.   docusate sodium 100 mg capsule; Commonly known as: Colace; Take 1   capsule (100 mg) by mouth 2 times a day.   ibandronate 150 mg tablet; Commonly known as: Boniva; Take 1 tablet (150   mg) by mouth every 30 (thirty) days. Take in morning with full glass of   water on an empty stomach. No food, drink, meds, or lying down for 60   minutes after.   losartan 100 mg tablet; Commonly known as: Cozaar; TAKE 1 TABLET BY   MOUTH EVERY DAY   metoprolol succinate  mg 24 hr tablet; Commonly known as:   Toprol-XL; TAKE 1 TABLET BY MOUTH EVERY DAY   naloxone 4 mg/0.1 mL nasal spray; Commonly known as: Narcan; Administer   1 spray (4 mg) into affected nostril(s) if needed for opioid reversal or   respiratory depression. May repeat every 2-3 minutes if needed,   alternating nostrils, until medical assistance becomes available.   omeprazole OTC 20 mg EC tablet; Commonly known as: PriLOSEC OTC; Take 1   tablet (20 mg) by mouth once daily. Do not crush, chew, or split.   PRESERVISION AREDS ORAL   prochlorperazine 10 mg tablet; Commonly known as: Compazine; Take 1   tablet (10 mg) by mouth every 6 hours if needed for nausea or vomiting.   sennosides 8.6 mg tablet; Commonly known as: Senokot; Take 1 tablet (8.6   mg) by mouth once daily as needed for constipation.   Vitamin D3 25 MCG (1000 UT) tablet; Generic drug: cholecalciferol     STOP taking these medications     gabapentin 300 mg capsule; Commonly known as: Neurontin   ICY HOT ADVANCED RELIEF PATCH TOP   LORazepam 0.5 mg tablet; Commonly known as: Ativan   OLANZapine 5 mg tablet; Commonly known as: ZyPREXA   polyethylene glycol 17 gram/dose powder; Commonly known as: Glycolax,   Miralax       Outpatient Follow-Up  Future Appointments   Date Time Provider Department Center   9/3/2024  9:00 AM [facility] [id number] [facility] [address]   9/4/2024 10:15 AM [facility]1 [id number] [facility]   9/5/2024  9:00 AM [facility]1 [id number] [facility]   9/6/2024  8:00 AM  Sadia Langston, APRN-CNS [id number] [facility]   9/6/2024  8:00 AM INF 15 [facility] [id number] [facility]   9/9/2024  1:00 PM Juany Huff MD [id number] [facility]   9/10/2024  2:00 PM Susan Leroy, APRN-CNP [id number] Academic   9/27/2024  1:45 PM Nagi Ospina MD [id number] [facility]   2/18/2025  2:40 PM Cayden Buckley MD [id number] [facility]       Ayo Montanez,

## 2024-09-01 NOTE — DISCHARGE INSTRUCTIONS
Jennifer Ms. Ashley,     You were admitted to [facility] because of a clot that we found in your lungs. Your clot was initially treated with a blood thinner called heparin and was later switched onto another called Eliquis (Apixiban). This is a blood thinner medication that will help break down future clots and is important for you to take. During your hospital stay, you also experienced some behavior changes. We did imaging (MRI) of your brain, which did not show any evidence of spreading of cancer to your brain. Because of this, we believe the change in mental status was due to your medications, specifically the morphine. Because of this, it is important that you do NOT take any morphine. After your returned to your baseline mental status, we monitored you and saw your improvement in mental status.     It was a pleasure taking care of you,     [facility] Oncology Team     START TAKING THESE MEDICATIONS:   - Eliquis 10 mg (until 9/3) followed by Eliquis 5 mg DAILY     AVOID THESE MEDICATIONS:   - Morphine   - Ativan     Follow Up:   [ ] AVOID MORPHINE for future pain management   [ ] Follow up outpatient radiation oncology 9/3 with Dr. Ospina at [facility]   [ ] Follow up with Neurosurgery 9/9 with Dr. Archibald at [facility]   [ ] Follow up with Palliative with NP Susan Leroy virtually on 9/10

## 2024-09-01 NOTE — PROGRESS NOTES
09/01/24 1555   Discharge Planning   Living Arrangements Spouse/significant other   Support Systems Spouse/significant other   Type of Residence Private residence   Expected Discharge Disposition Home   Does the patient need discharge transport arranged? No     Patient is medically ready for discharge.  No home care needs.  Patient belongings are missing.  Staff has been updated of this issue and is assisting.

## 2024-09-01 NOTE — PROGRESS NOTES
"  Internal Medicine Progress Note     Brittny Gordon is a 69 y.o. 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  HTN, HLD, osteporosis, AAA s/p repair, former smoker presenting to the ED for hypotension and hypoxia. Currently being followed for encephalopathy.     Subjective     Patient was seen and evaluated at bedside. No acute events overnight. Patient medically ready for discharge. Can discharge on PO Oxycodone 10 mg for pain. Patient and family were updated and educated on the diagnosis and feel comfortable with plan for discharge,     Medications     Medications:   Scheduled medications  apixaban, 10 mg, oral, BID   Followed by  [START ON 9/4/2024] apixaban, 5 mg, oral, BID  atorvastatin, 80 mg, oral, Daily  cholecalciferol, 1,000 Units, oral, Daily  [Held by provider] gabapentin, 300 mg, oral, Nightly  losartan, 100 mg, oral, Daily  metoprolol succinate XL, 100 mg, oral, Daily  OLANZapine, 5 mg, oral, Nightly  pantoprazole, 20 mg, oral, Daily before breakfast  perflutren lipid microspheres, 0.5-10 mL of dilution, intravenous, Once in imaging  perflutren protein A microsphere, 0.5 mL, intravenous, Once in imaging  polyethylene glycol, 17 g, oral, Daily  sulfur hexafluoride microsphr, 2 mL, intravenous, Once in imaging      Continuous medications       PRN medications  PRN medications: bisacodyl, cetirizine, cyclobenzaprine, OLANZapine, OLANZapine zydis, ondansetron, oxyCODONE, prochlorperazine, sennosides     Objective     Vitals  Visit Vitals  /77 (BP Location: Right arm, Patient Position: Lying)   Pulse 75   Temp 36.4 °C (97.5 °F) (Temporal)   Resp 16   Ht 1.676 m (5' 6\")   Wt 71.2 kg (157 lb)   SpO2 91%   BMI 25.34 kg/m²   OB Status Postmenopausal   Smoking Status Former   BSA 1.82 m²       Intake/Output Summary (Last 24 hours) at 9/1/2024 6714  Last data filed at 9/1/2024 1605  Gross per 24 hour   Intake 393.5 ml   Output 0 ml   Net 393.5 " ml         Physical Exam  Gen: well appearing, A+Ox3, in no acute distress,   HEENT: sclera anicteric, no conjunctival injection, MMM  Resp: CTAB, normal breath sounds, no wheezing/crackles. On 3L NC  CV: normal S1/S2, slight tachycardia   GI: non-tender, non-distended, no rebound or guarding  Extremities/MSK: warm and well perfused, no edema bilateral  Neuro: A+Ox3, no focal deficits, no asterixis, strength and motor function 5/5 bilaterally of UE and LE. CN intact 2-12  Skin: warm and dry, no lesions, no rashes   Psych: Tangential speech improved, cooperative     Labs  Lab Results   Component Value Date    WBC 10.1 09/01/2024    HGB 13.2 09/01/2024    HCT 40.1 09/01/2024    MCV 89 09/01/2024     09/01/2024      Lab Results   Component Value Date    GLUCOSE 76 09/01/2024    CALCIUM 8.7 09/01/2024     09/01/2024    K 3.3 (L) 09/01/2024    CO2 25 09/01/2024     09/01/2024    BUN 8 09/01/2024    CREATININE 0.54 09/01/2024      Lab Results   Component Value Date    ALT 27 08/27/2024    AST 38 08/27/2024    ALKPHOS 78 08/27/2024    BILITOT 0.8 08/27/2024        Imaging  === 08/27/24 ===    XR CHEST 1 VIEW    - Impression -  Cephalization of the pulmonary veins and increased bronchovascular  markings with a question of small left-sided pleural effusion.  Findings are suggestive of mild interstitial pulmonary edema.    I personally reviewed the images/study and I agree with the findings  as stated by Julius Hernandez MD (resident). This study was  interpreted at [facility],  [place], [place].    Signed by: Lukasz Schuler 8/27/2024 1:14 PM  Dictation workstation:   [id number]   === 08/27/24 ===    CT CERVICAL SPINE WO IV CONTRAST    - Impression -  No evidence of an acute fracture of the cervical spine.    MACRO:  None    Signed by: Dacia Guillen 8/27/2024 5:40 PM  Dictation workstation:   [id number]         Assessment/Plan     Brittny Gordon is a 69 y.o. female  with stage IVB ([id number]) primary non-small cell carcinoma of RUL, involving multiple bone mets.  HTN, HLD, osteporosis, AAA s/p repair, former smoker admitted for for hypotension and hypoxia iso CT angio findings concerning for subsegmental pulmonary embolism likely secondary to known malignancy. Currently being followed for encephalopathy.      #Encephalopathy secondary to polypharmacy vs delirium, resolved   ::Brother expressed that she has been agitated and aggressive more since 8/25 and falling more often   :: TSH wnl   :: CTH 8/27 negative for intracranial bleed   :: MRI brain 8/29 negative for intracranial metastasis or edema   :: Patient has been having tangenital speech and visual hallucinations since 8/28. Thought could be secondary to alcohol withdrawal given hypertension and tachycardia overnight however less likely given it has been many days since her last drink.   :: EtOH levels < 10   :: Patient recently started on morphine, which could also contribute to new altered mental status.   :: Given patient started showing odd behavior 2 days after being started on Morphine 15 mg BID, it is possible this is the reason for her recent AMS change especially since MRI brain was negative, no major metabolic abnormalities, and her improvement on 8/30.     Plan:   - Continue to monitor. On Oxycodone 10 mg for pain   - NO MORPHINE for pain control. Also continue holding Ativan as this could also contribute to delirium     # Subsegmental PE, NO imaging or Biomarker's evidence of RV strain,   # Initial Hypotension, Fluid responsive   # Hypoxia  :: Has been receiving heparin, will transition to DOAC   :: Saturating well on 3L NC   :: Patient refused duplex ultrasound of lower extremities 8/28. Agreed to test 8/29 which found that there is acute non-occlusive deep vein thrombosis visualized in the gastrocnemius veins in the calf vein.     Plan:   - Eliquis (10 mg for 7 days, followed by 5 mg on 9/4)         #  Hyponatremia   Plan:   - Follow up RFP      # Stage IVB ([id number]) primary non-small cell carcinoma of RUL, involving multiple bone mets    Plan:   : currently received 21Gy in 7 fractions out of planned 30Gy in 10 fractions. The patient has plans for the remaining 3 fractions to be treated on the following days:  - 9/3/24 9AM  9/4/24 10:15AM  9/5/24 - 9AM Final Treatment  FUV (virtual) scheduled with Dr. Ospina on 9/27/24.         Current outpatient pain regimen by supportive onc:     Oxy 10mg x5-6 doses   15 prior to radiation   Flexeril - unsure if helping   gabapentin 300mg at bedtime   Tylenol and NSAIDs causes GI upset/heartburn            F: Replete PRN  E: Keep Mg >2, phos >3  and K >4  N: Regular   A: PIV  O2: On  DVT ppx: Eliquis   GI ppx: None   Code Status: Full code   Contact: Juice (brother): [phone]    Patient and plan discussed with attending physician Dr. Osman .              Ayo Montanez DO  Department of Internal Medicine, PGY-1      Ayo Montanez DO

## 2024-09-02 ENCOUNTER — PHARMACY VISIT (OUTPATIENT)
Dept: PHARMACY | Facility: CLINIC | Age: 69
End: 2024-09-02
Payer: COMMERCIAL

## 2024-09-02 VITALS
OXYGEN SATURATION: 93 % | SYSTOLIC BLOOD PRESSURE: 129 MMHG | WEIGHT: 157 LBS | TEMPERATURE: 98.1 F | HEIGHT: 66 IN | BODY MASS INDEX: 25.23 KG/M2 | DIASTOLIC BLOOD PRESSURE: 72 MMHG | RESPIRATION RATE: 16 BRPM | HEART RATE: 75 BPM

## 2024-09-02 LAB
BACTERIA BLD CULT: NORMAL
BACTERIA BLD CULT: NORMAL

## 2024-09-02 PROCEDURE — RXMED WILLOW AMBULATORY MEDICATION CHARGE

## 2024-09-02 PROCEDURE — 99233 SBSQ HOSP IP/OBS HIGH 50: CPT

## 2024-09-02 PROCEDURE — 2500000001 HC RX 250 WO HCPCS SELF ADMINISTERED DRUGS (ALT 637 FOR MEDICARE OP)

## 2024-09-02 PROCEDURE — 2500000004 HC RX 250 GENERAL PHARMACY W/ HCPCS (ALT 636 FOR OP/ED)

## 2024-09-02 PROCEDURE — 2500000002 HC RX 250 W HCPCS SELF ADMINISTERED DRUGS (ALT 637 FOR MEDICARE OP, ALT 636 FOR OP/ED)

## 2024-09-02 PROCEDURE — 2500000005 HC RX 250 GENERAL PHARMACY W/O HCPCS

## 2024-09-02 ASSESSMENT — COGNITIVE AND FUNCTIONAL STATUS - GENERAL
PERSONAL GROOMING: A LITTLE
STANDING UP FROM CHAIR USING ARMS: A LITTLE
TOILETING: A LITTLE
DRESSING REGULAR LOWER BODY CLOTHING: A LITTLE
WALKING IN HOSPITAL ROOM: A LITTLE
TURNING FROM BACK TO SIDE WHILE IN FLAT BAD: A LITTLE
MOVING TO AND FROM BED TO CHAIR: A LITTLE
DRESSING REGULAR UPPER BODY CLOTHING: A LITTLE
HELP NEEDED FOR BATHING: A LITTLE

## 2024-09-02 ASSESSMENT — PAIN SCALES - GENERAL: PAINLEVEL_OUTOF10: 0 - NO PAIN

## 2024-09-02 ASSESSMENT — PAIN SCALES - WONG BAKER: WONGBAKER_NUMERICALRESPONSE: NO HURT

## 2024-09-02 NOTE — CARE PLAN
The patient's goals for the shift include      The clinical goals for the shift include pt will remain free from injury    Problem: Pain  Goal: Takes deep breaths with improved pain control throughout the shift  Outcome: Progressing     Problem: Fall/Injury  Goal: Be free from injury by end of the shift  Outcome: Progressing

## 2024-09-02 NOTE — PROGRESS NOTES
"  Internal Medicine Progress Note     Brittny Gordon is a 69 y.o. female with stage IVB ([id number]) primary non-small cell carcinoma of RUL, involving multiple bone mets.  HTN, HLD, osteporosis, AAA s/p repair, former smoker presenting to the ED for hypotension and hypoxia. Currently being followed for encephalopathy.     Subjective     Patient was seen and evaluated at bedside. No acute events overnight. Patient medically ready for discharge. Patient had to wait for medications from [id number] given that her ID was lost in the ED and was worried about getting it at outside pharmacy. Patient has no acute complaints at this time.     Medications     Medications:   Scheduled medications  apixaban, 10 mg, oral, BID   Followed by  [START ON 9/4/2024] apixaban, 5 mg, oral, BID  atorvastatin, 80 mg, oral, Daily  cholecalciferol, 1,000 Units, oral, Daily  [Held by provider] gabapentin, 300 mg, oral, Nightly  losartan, 100 mg, oral, Daily  metoprolol succinate XL, 100 mg, oral, Daily  OLANZapine, 5 mg, oral, Nightly  pantoprazole, 20 mg, oral, Daily before breakfast  perflutren lipid microspheres, 0.5-10 mL of dilution, intravenous, Once in imaging  perflutren protein A microsphere, 0.5 mL, intravenous, Once in imaging  polyethylene glycol, 17 g, oral, Daily  sulfur hexafluoride microsphr, 2 mL, intravenous, Once in imaging      Continuous medications       PRN medications  PRN medications: bisacodyl, cetirizine, cyclobenzaprine, OLANZapine, OLANZapine zydis, ondansetron, oxyCODONE, prochlorperazine, sennosides     Objective     Vitals  Visit Vitals  /87 (BP Location: Right arm, Patient Position: Lying)   Pulse 90   Temp 36.4 °C (97.5 °F) (Temporal)   Resp 16   Ht 1.676 m (5' 6\")   Wt 71.2 kg (157 lb)   SpO2 92%   BMI 25.34 kg/m²   OB Status Postmenopausal   Smoking Status Former   BSA 1.82 m²       Intake/Output Summary (Last 24 hours) at 9/2/2024 1308  Last data filed at 9/2/2024 0824  Gross per 24 hour   Intake " 393.5 ml   Output 576 ml   Net -182.5 ml         Physical Exam  Gen: well appearing, A+Ox3, in no acute distress,   HEENT: sclera anicteric, no conjunctival injection, MMM  Resp: CTAB, normal breath sounds, no wheezing/crackles. On 3L NC  CV: normal S1/S2, slight tachycardia   GI: non-tender, non-distended, no rebound or guarding  Extremities/MSK: warm and well perfused, no edema bilateral  Neuro: A+Ox3, no focal deficits, no asterixis, strength and motor function 5/5 bilaterally of UE and LE. CN intact 2-12  Skin: warm and dry, no lesions, no rashes   Psych: Tangential speech improved, cooperative     Labs  Lab Results   Component Value Date    WBC 10.1 09/01/2024    HGB 13.2 09/01/2024    HCT 40.1 09/01/2024    MCV 89 09/01/2024     09/01/2024      Lab Results   Component Value Date    GLUCOSE 76 09/01/2024    CALCIUM 8.7 09/01/2024     09/01/2024    K 3.3 (L) 09/01/2024    CO2 25 09/01/2024     09/01/2024    BUN 8 09/01/2024    CREATININE 0.54 09/01/2024      Lab Results   Component Value Date    ALT 27 08/27/2024    AST 38 08/27/2024    ALKPHOS 78 08/27/2024    BILITOT 0.8 08/27/2024        Imaging  === 08/27/24 ===    XR CHEST 1 VIEW    - Impression -  Cephalization of the pulmonary veins and increased bronchovascular  markings with a question of small left-sided pleural effusion.  Findings are suggestive of mild interstitial pulmonary edema.    I personally reviewed the images/study and I agree with the findings  as stated by Julius Hernandez MD (resident). This study was  interpreted at [facility],  [place], [place].    Signed by: Lukasz Schuler 8/27/2024 1:14 PM  Dictation workstation:   [id number]   === 08/27/24 ===    CT CERVICAL SPINE WO IV CONTRAST    - Impression -  No evidence of an acute fracture of the cervical spine.    MACRO:  None    Signed by: Dacia Guillen 8/27/2024 5:40 PM  Dictation workstation:   [id number]         Assessment/Plan      Brittny Gordon is a 69 y.o. female with stage IVB ([id number]) primary non-small cell carcinoma of RUL, involving multiple bone mets.  HTN, HLD, osteporosis, AAA s/p repair, former smoker admitted for for hypotension and hypoxia iso CT angio findings concerning for subsegmental pulmonary embolism likely secondary to known malignancy. Currently being followed for encephalopathy.      #Encephalopathy secondary to polypharmacy vs delirium, resolved   ::Brother expressed that she has been agitated and aggressive more since 8/25 and falling more often   :: TSH wnl   :: CTH 8/27 negative for intracranial bleed   :: MRI brain 8/29 negative for intracranial metastasis or edema   :: Patient has been having tangenital speech and visual hallucinations since 8/28. Thought could be secondary to alcohol withdrawal given hypertension and tachycardia overnight however less likely given it has been many days since her last drink.   :: EtOH levels < 10   :: Patient recently started on morphine, which could also contribute to new altered mental status.   :: Given patient started showing odd behavior 2 days after being started on Morphine 15 mg BID, it is possible this is the reason for her recent AMS change especially since MRI brain was negative, no major metabolic abnormalities, and her improvement on 8/30.     Plan:   - Continue to monitor. On Oxycodone 10 mg for pain   - NO MORPHINE for pain control. Also continue holding Ativan as this could also contribute to delirium     # Subsegmental PE, NO imaging or Biomarker's evidence of RV strain,   # Initial Hypotension, Fluid responsive   # Hypoxia  :: Has been receiving heparin, will transition to DOAC   :: Saturating well on 3L NC   :: Patient refused duplex ultrasound of lower extremities 8/28. Agreed to test 8/29 which found that there is acute non-occlusive deep vein thrombosis visualized in the gastrocnemius veins in the calf vein.     Plan:   - Eliquis (10 mg for 7 days,  followed by 5 mg on 9/4)         # Hyponatremia   Plan:   - Follow up RFP      # Stage IVB ([id number]) primary non-small cell carcinoma of RUL, involving multiple bone mets    Plan:   : currently received 21Gy in 7 fractions out of planned 30Gy in 10 fractions. The patient has plans for the remaining 3 fractions to be treated on the following days:  - 9/3/24 9AM  9/4/24 10:15AM  9/5/24 - 9AM Final Treatment  FUV (virtual) scheduled with Dr. Ospina on 9/27/24.         Current outpatient pain regimen by supportive onc:     Oxy 10mg x5-6 doses   15 prior to radiation   Flexeril - unsure if helping   gabapentin 300mg at bedtime   Tylenol and NSAIDs causes GI upset/heartburn            F: Replete PRN  E: Keep Mg >2, phos >3  and K >4  N: Regular   A: PIV  O2: On  DVT ppx: Eliquis   GI ppx: None   Code Status: Full code   Contact: Juice (brother): [phone]    Patient and plan discussed with attending physician Dr. Espinoza .              Ayo Montanez DO  Department of Internal Medicine, PGY-1      Ayo Montanez DO

## 2024-09-03 ENCOUNTER — APPOINTMENT (OUTPATIENT)
Dept: RADIATION ONCOLOGY | Facility: HOSPITAL | Age: 69
End: 2024-09-03
Payer: MEDICARE

## 2024-09-03 ENCOUNTER — TELEPHONE (OUTPATIENT)
Dept: HEMATOLOGY/ONCOLOGY | Facility: CLINIC | Age: 69
End: 2024-09-03
Payer: MEDICARE

## 2024-09-03 ENCOUNTER — TELEPHONE (OUTPATIENT)
Dept: PALLIATIVE MEDICINE | Facility: HOSPITAL | Age: 69
End: 2024-09-03
Payer: MEDICARE

## 2024-09-03 DIAGNOSIS — G89.3 CANCER RELATED PAIN: Primary | ICD-10-CM

## 2024-09-03 DIAGNOSIS — G89.3 CANCER RELATED PAIN: ICD-10-CM

## 2024-09-03 RX ORDER — OXYCODONE HCL 10 MG/1
10 TABLET, FILM COATED, EXTENDED RELEASE ORAL EVERY 12 HOURS
Qty: 14 TABLET | Refills: 0 | Status: SHIPPED | OUTPATIENT
Start: 2024-09-03 | End: 2024-09-03 | Stop reason: SDUPTHER

## 2024-09-03 RX ORDER — OXYCODONE HCL 10 MG/1
10 TABLET, FILM COATED, EXTENDED RELEASE ORAL EVERY 12 HOURS
COMMUNITY
End: 2024-09-03 | Stop reason: SDUPTHER

## 2024-09-03 NOTE — TELEPHONE ENCOUNTER
[facility] will be able to fill until Thursday. Brittny will be at [facility] tomorrow. We will send the Oxycontin to [facility] for her.

## 2024-09-03 NOTE — TELEPHONE ENCOUNTER
Patient called after supportive oncology contacted regarding patient still having complaints of pain since discharge from hospital.  Patient has been unable to tolerate Morphine, flexeril, and gabapentin in the past.  Discussed with Susan Leroy NP and prescribed Oxycontin 10 mg BID 1 week supply for patient.  Patient to use Oxycontin with oxycodone IR every 4 hours as needed to help pain control.  Patient will follow up in 1 week virtually.  Patient will call office with any further questions or concerns.

## 2024-09-04 ENCOUNTER — HOSPITAL ENCOUNTER (OUTPATIENT)
Dept: RADIATION ONCOLOGY | Facility: HOSPITAL | Age: 69
Setting detail: RADIATION/ONCOLOGY SERIES
Discharge: HOME | End: 2024-09-04
Payer: MEDICARE

## 2024-09-04 ENCOUNTER — PHARMACY VISIT (OUTPATIENT)
Dept: PHARMACY | Facility: CLINIC | Age: 69
End: 2024-09-04
Payer: COMMERCIAL

## 2024-09-04 DIAGNOSIS — C34.11 MALIGNANT NEOPLASM OF UPPER LOBE, RIGHT BRONCHUS OR LUNG (MULTI): ICD-10-CM

## 2024-09-04 DIAGNOSIS — C79.51 SECONDARY MALIGNANT NEOPLASM OF BONE (MULTI): ICD-10-CM

## 2024-09-04 DIAGNOSIS — Z51.0 ENCOUNTER FOR ANTINEOPLASTIC RADIATION THERAPY: ICD-10-CM

## 2024-09-04 DIAGNOSIS — G89.3 CANCER ASSOCIATED PAIN: Primary | ICD-10-CM

## 2024-09-04 LAB
ADENOVIRUS RVP, VIRC: NOT DETECTED
ENTEROVIRUS/RHINOVIRUS RVP, VIRC: NOT DETECTED
HUMAN BOCAVIRUS RVP, VIRC: NOT DETECTED
HUMAN CORONAVIRUS RVP, VIRC: NOT DETECTED
INFLUENZA A , VIRC: NOT DETECTED
INFLUENZA A H1N1-09 , VIRC: NOT DETECTED
INFLUENZA B PCR, VIRC: NOT DETECTED
METAPNEUMOVIRUS , VIRC: NOT DETECTED
PARAINFLUENZA PCR, VIRC: NOT DETECTED
RAD ONC MSQ ACTUAL FRACTIONS DELIVERED: 8
RAD ONC MSQ ACTUAL SESSION DELIVERED DOSE: 300 CGRAY
RAD ONC MSQ ACTUAL TOTAL DOSE: 2400 CGRAY
RAD ONC MSQ ELAPSED DAYS: 16
RAD ONC MSQ LAST DATE: NORMAL
RAD ONC MSQ PRESCRIBED FRACTIONAL DOSE: 300 CGRAY
RAD ONC MSQ PRESCRIBED NUMBER OF FRACTIONS: 10
RAD ONC MSQ PRESCRIBED TECHNIQUE: NORMAL
RAD ONC MSQ PRESCRIBED TOTAL DOSE: 3000 CGRAY
RAD ONC MSQ PRESCRIPTION PATTERN COMMENT: NORMAL
RAD ONC MSQ START DATE: NORMAL
RAD ONC MSQ TREATMENT COURSE NUMBER: 1
RAD ONC MSQ TREATMENT SITE: NORMAL
RSV PCR, RVP, VIRC: NOT DETECTED

## 2024-09-04 PROCEDURE — 77412 RADIATION TX DELIVERY LVL 3: CPT | Performed by: STUDENT IN AN ORGANIZED HEALTH CARE EDUCATION/TRAINING PROGRAM

## 2024-09-04 PROCEDURE — RXMED WILLOW AMBULATORY MEDICATION CHARGE

## 2024-09-04 PROCEDURE — 77336 RADIATION PHYSICS CONSULT: CPT | Performed by: STUDENT IN AN ORGANIZED HEALTH CARE EDUCATION/TRAINING PROGRAM

## 2024-09-04 RX ORDER — OXYCODONE HCL 10 MG/1
10 TABLET, FILM COATED, EXTENDED RELEASE ORAL EVERY 12 HOURS
Qty: 20 TABLET | Refills: 0 | Status: SHIPPED | OUTPATIENT
Start: 2024-09-04 | End: 2024-09-14

## 2024-09-05 ENCOUNTER — HOSPITAL ENCOUNTER (OUTPATIENT)
Dept: RADIATION ONCOLOGY | Facility: HOSPITAL | Age: 69
Setting detail: RADIATION/ONCOLOGY SERIES
Discharge: HOME | End: 2024-09-05
Payer: MEDICARE

## 2024-09-05 DIAGNOSIS — C34.11 MALIGNANT NEOPLASM OF UPPER LOBE, RIGHT BRONCHUS OR LUNG (MULTI): ICD-10-CM

## 2024-09-05 DIAGNOSIS — C79.51 SECONDARY MALIGNANT NEOPLASM OF BONE (MULTI): ICD-10-CM

## 2024-09-05 DIAGNOSIS — Z51.0 ENCOUNTER FOR ANTINEOPLASTIC RADIATION THERAPY: ICD-10-CM

## 2024-09-05 LAB
RAD ONC MSQ ACTUAL FRACTIONS DELIVERED: 9
RAD ONC MSQ ACTUAL SESSION DELIVERED DOSE: 300 CGRAY
RAD ONC MSQ ACTUAL TOTAL DOSE: 2700 CGRAY
RAD ONC MSQ ELAPSED DAYS: 17
RAD ONC MSQ LAST DATE: NORMAL
RAD ONC MSQ PRESCRIBED FRACTIONAL DOSE: 300 CGRAY
RAD ONC MSQ PRESCRIBED NUMBER OF FRACTIONS: 10
RAD ONC MSQ PRESCRIBED TECHNIQUE: NORMAL
RAD ONC MSQ PRESCRIBED TOTAL DOSE: 3000 CGRAY
RAD ONC MSQ PRESCRIPTION PATTERN COMMENT: NORMAL
RAD ONC MSQ START DATE: NORMAL
RAD ONC MSQ TREATMENT COURSE NUMBER: 1
RAD ONC MSQ TREATMENT SITE: NORMAL

## 2024-09-05 PROCEDURE — 77412 RADIATION TX DELIVERY LVL 3: CPT | Performed by: STUDENT IN AN ORGANIZED HEALTH CARE EDUCATION/TRAINING PROGRAM

## 2024-09-06 ENCOUNTER — TELEPHONE (OUTPATIENT)
Dept: HEMATOLOGY/ONCOLOGY | Facility: HOSPITAL | Age: 69
End: 2024-09-06
Payer: MEDICARE

## 2024-09-06 ENCOUNTER — HOSPITAL ENCOUNTER (OUTPATIENT)
Dept: RADIATION ONCOLOGY | Facility: HOSPITAL | Age: 69
Setting detail: RADIATION/ONCOLOGY SERIES
Discharge: HOME | End: 2024-09-06
Payer: MEDICARE

## 2024-09-06 ENCOUNTER — APPOINTMENT (OUTPATIENT)
Dept: HEMATOLOGY/ONCOLOGY | Facility: HOSPITAL | Age: 69
End: 2024-09-06
Payer: MEDICARE

## 2024-09-06 ENCOUNTER — DOCUMENTATION (OUTPATIENT)
Dept: RADIATION ONCOLOGY | Facility: CLINIC | Age: 69
End: 2024-09-06
Payer: MEDICARE

## 2024-09-06 DIAGNOSIS — C34.91 PRIMARY MALIGNANT NEOPLASM OF RIGHT LUNG METASTATIC TO OTHER SITE (MULTI): Primary | ICD-10-CM

## 2024-09-06 DIAGNOSIS — C34.11 MALIGNANT NEOPLASM OF UPPER LOBE, RIGHT BRONCHUS OR LUNG (MULTI): ICD-10-CM

## 2024-09-06 DIAGNOSIS — C79.51 SECONDARY MALIGNANT NEOPLASM OF BONE (MULTI): ICD-10-CM

## 2024-09-06 DIAGNOSIS — Z51.0 ENCOUNTER FOR ANTINEOPLASTIC RADIATION THERAPY: ICD-10-CM

## 2024-09-06 LAB
RAD ONC MSQ ACTUAL FRACTIONS DELIVERED: 10
RAD ONC MSQ ACTUAL SESSION DELIVERED DOSE: 300 CGRAY
RAD ONC MSQ ACTUAL TOTAL DOSE: 3000 CGRAY
RAD ONC MSQ ELAPSED DAYS: 18
RAD ONC MSQ LAST DATE: NORMAL
RAD ONC MSQ PRESCRIBED FRACTIONAL DOSE: 300 CGRAY
RAD ONC MSQ PRESCRIBED NUMBER OF FRACTIONS: 10
RAD ONC MSQ PRESCRIBED TECHNIQUE: NORMAL
RAD ONC MSQ PRESCRIBED TOTAL DOSE: 3000 CGRAY
RAD ONC MSQ PRESCRIPTION PATTERN COMMENT: NORMAL
RAD ONC MSQ START DATE: NORMAL
RAD ONC MSQ TREATMENT COURSE NUMBER: 1
RAD ONC MSQ TREATMENT SITE: NORMAL

## 2024-09-06 PROCEDURE — 77412 RADIATION TX DELIVERY LVL 3: CPT | Performed by: STUDENT IN AN ORGANIZED HEALTH CARE EDUCATION/TRAINING PROGRAM

## 2024-09-06 NOTE — TELEPHONE ENCOUNTER
"RN called Brittny in regards to her clinic appointment and infusion appointment that was scheduled for today. Brittny informed RN that she was not feeling well and said that she could not make it today. She mentioned how she was \"thrown into radiation\" and had no idea what to expect from that and does not want to go through chemotherapy while going through radiation. RN tried to explain to Brittny that chemotherapy and radiation go hand in hand, but Brittny said that she will not like to do both at the same time and said that today is the last day of radiation.     Brittny asked that she talk with someone about chemotherapy and what will happen while receiving the medications. She does not want to experience what she did while going through radiation. RN told Brittny that she will talk with VANI Mccullough and see if Sadia is able to call her later today.     Brittny expressed appreciation for the call and was apologetic about missing her appointments and had no further questions at this time.    "

## 2024-09-09 ENCOUNTER — PHARMACY VISIT (OUTPATIENT)
Dept: PHARMACY | Facility: CLINIC | Age: 69
End: 2024-09-09
Payer: COMMERCIAL

## 2024-09-09 ENCOUNTER — NURSE TRIAGE (OUTPATIENT)
Dept: ADMISSION | Facility: HOSPITAL | Age: 69
End: 2024-09-09
Payer: MEDICARE

## 2024-09-09 ENCOUNTER — APPOINTMENT (OUTPATIENT)
Dept: NEUROSURGERY | Facility: CLINIC | Age: 69
End: 2024-09-09
Payer: MEDICARE

## 2024-09-09 ENCOUNTER — TELEPHONE (OUTPATIENT)
Dept: RADIATION ONCOLOGY | Facility: CLINIC | Age: 69
End: 2024-09-09
Payer: MEDICARE

## 2024-09-09 DIAGNOSIS — G89.3 CANCER RELATED PAIN: ICD-10-CM

## 2024-09-09 DIAGNOSIS — C34.91 PRIMARY MALIGNANT NEOPLASM OF RIGHT LUNG METASTATIC TO OTHER SITE (MULTI): ICD-10-CM

## 2024-09-09 PROCEDURE — RXMED WILLOW AMBULATORY MEDICATION CHARGE

## 2024-09-09 RX ORDER — OXYCODONE HYDROCHLORIDE 5 MG/1
5-10 TABLET ORAL EVERY 4 HOURS PRN
Qty: 120 TABLET | Refills: 0 | Status: SHIPPED | OUTPATIENT
Start: 2024-09-09 | End: 2024-09-19

## 2024-09-09 RX ORDER — PROCHLORPERAZINE MALEATE 10 MG
10 TABLET ORAL EVERY 6 HOURS PRN
Qty: 30 TABLET | Refills: 1 | Status: SHIPPED | OUTPATIENT
Start: 2024-09-09

## 2024-09-09 NOTE — TELEPHONE ENCOUNTER
Patient sister called in because she has concern about the nausea and vomiting related to Susans radiation treatments. The patient is also weak and very fatigued. She is asking to speak with Dr Ospina in regard to the radiation the patient received. Her contact information was taken. Phone number for return call is [phone]. Phone call was ended.      Spoke to family regarding her symptoms.  The family will check on medications available for nausea.  The patient is seeing supportive oncology tomorrow; however, if the patient requires refill and antiemetics.  They will call and refill prescription will be sent.

## 2024-09-09 NOTE — TELEPHONE ENCOUNTER
I spoke with Lance. Case was discussed with BOWEN Leroy. She would like patient to alternate zofran and compazine. We will send in a prescription for compazine as they cannot find theirs. BOWEN Leroy would like patient to take either senna 2 tabs or bisacodyl tonight and drink fluids. OARRS reviewed and no aberrancy noted. Patient last filled oxycodone 10mg #20/4 days on 9/2. Per BOWEN Leroy, a prescription for oxycodone 5-10mg q4h PRN #120/10 days will be submitted. Prescription pended to provider to approve.

## 2024-09-09 NOTE — TELEPHONE ENCOUNTER
"Pt's brother called regarding vomiting.  He also notes that pt will run out of oxycodone 10mg today.  Per her report, she is only taking 1/2 tab (5mg ) q4 prn.    Pt joined the call via speaker phone to provide more detail.  She states the sight and smell of Oxycontin causes her to vomit and she has been unable to tolerate it.  She continues to have pain from \"hip to hip.\"  Pain improves with oxycodone which only lasts 4 hrs.     She's vomited 4x in the past 24 hrs.  She took zofran about 45 minutes ago and is tolerating small amounts of water and coke. She's voiding dark urine every few hours. Her last BM was ~9/5 after taking bisacodyl.  She has not taken any further prn medications for constipation  Per pt, she hasn't been eating much and that is why she hasn't had to have a BM.    She has follow up tomorrow.  Preferred pharmacy is [facility] in [place].    Additional Information   Are you taking any new pain medicines or other new medicines?     Recently started oxycontin, states just looking at it or the smell make her vomit   Commented on: If yes to belly pain or tenderness, on a scale of 0 to 10 (0 being no pain and 10 being the worst possible) how would  you rate your pain?     Pain across pelvis from \"hip to hip\"   Commented on: What are you eating or drinking? How often?     Drinking water and Coke   Commented on: When was the last time you had a bowel movement? Is this/what is normal for you?     Thursday last BM after bisacodyl.  Pt is insistent that she has not had a BM because she hasn't been eating much and this has nothing to do with nausea/vomiting   Commented on: What are you taking? how often are you taking it?     Just took zofran about 45 minutes ago, unclear if she has been taking this all along or just started today    Protocols used: Nausea/Vomiting    "

## 2024-09-10 ENCOUNTER — TELEMEDICINE (OUTPATIENT)
Dept: PALLIATIVE MEDICINE | Facility: HOSPITAL | Age: 69
End: 2024-09-10
Payer: MEDICARE

## 2024-09-10 ENCOUNTER — TELEPHONE (OUTPATIENT)
Dept: HEMATOLOGY/ONCOLOGY | Facility: HOSPITAL | Age: 69
End: 2024-09-10
Payer: MEDICARE

## 2024-09-10 DIAGNOSIS — K59.03 CONSTIPATION DUE TO PAIN MEDICATION THERAPY: ICD-10-CM

## 2024-09-10 DIAGNOSIS — G89.3 CANCER RELATED PAIN: ICD-10-CM

## 2024-09-10 DIAGNOSIS — R63.0 DECREASED APPETITE: ICD-10-CM

## 2024-09-10 DIAGNOSIS — R11.0 NAUSEA: Primary | ICD-10-CM

## 2024-09-10 PROCEDURE — 1111F DSCHRG MED/CURRENT MED MERGE: CPT | Performed by: NURSE PRACTITIONER

## 2024-09-10 PROCEDURE — 1157F ADVNC CARE PLAN IN RCRD: CPT | Performed by: NURSE PRACTITIONER

## 2024-09-10 PROCEDURE — 99214 OFFICE O/P EST MOD 30 MIN: CPT | Performed by: NURSE PRACTITIONER

## 2024-09-10 RX ORDER — OLANZAPINE 2.5 MG/1
2.5 TABLET ORAL NIGHTLY
Qty: 30 TABLET | Refills: 2 | Status: SHIPPED | OUTPATIENT
Start: 2024-09-10 | End: 2024-12-09

## 2024-09-10 NOTE — PROGRESS NOTES
"SUPPORTIVE AND PALLIATIVE ONCOLOGY CONSULT - OUTPATIENT FOLLOW UP      SERVICE DATE: 9/10/2024    Referred by:  Camila Chaudhary MD MPH   Medical Oncologist: Camila Chaudhary MD MPH  Felicia Osman MD   Radiation Oncologist: No care team member to display  Primary Physician: Camila Chaudhary  [phone]    REASON FOR CONSULT/CHIEF CONSULT COMPLAINT: Pain management and Introduction to Supportive and Palliative Oncology Services    Subjective   HISTORY OF PRESENT ILLNESS: Brittny Gordon is a 69 y.o. female who presents with  likely metastatic NSCLC (lymph nodes, bone with pathologic fracture of L2, L3, T12) s/p bronch/EBUS 8/5 not yet started on systemic therapy pending final pathology, plan for palliative RT to spine.      PMH significant for HTN, HLD, osteporosis, AAA s/p repair, former smoker.     9/10/24: Recent hospitalization for acute PE. Also with acute encephalopathy. Done with radiation. Oxycontin denied by insurance. Xtampza caused more nausea. Currently using oxycodone 5mg q4 and content with this. Stopped gabapentin because she also felt this contributed to confusion She is also avoiding flexeril. Having issues with nausea, appetite, constipation.     Symptom Assessment:    Pain:very much     T / L spine   Sharp, stabbing intermittently  Constant deep ache  Worse with movement   \"Muscles from hip bones to breasts all the way around are just so sore\"  Had spasms earlier in the week     Numbness or tingling in hands/feet/other: numbness right lateral hip to knee  Lack of appetite: very much - tolerating bland foods   Weight loss: a little   Nausea/early satiety: somewhat  Vomiting: none  Constipation: none senna 1 daily prn   Diarrhea: none  Lack of energy: very much  Difficulty sleeping: none   Anxiety: a little  Depression: a little  Shortness of breath: none  Other: heartburn with meds at times     Information obtained from: interview of patient and interview of " family  ______________________________________________________________________     Oncology History   Primary malignant neoplasm of right lung metastatic to other site (Multi)   8/9/2024 Initial Diagnosis    Primary malignant neoplasm of right lung metastatic to other site (Multi)     8/23/2024 Cancer Staged    Staging form: Lung, AJCC 8th Edition, Clinical: Stage IVB (cT1b, cN2, pM1c) - Signed by Camila Chaudhary MD MPH on 8/23/2024 9/13/2024 -  Chemotherapy    Pembrolizumab + PACLitaxel / CARBOplatin, 21 Day Cycles         Past Medical History:   Diagnosis Date    Hypercholesteremia     Hypertension     Lung nodule seen on imaging study     lung nodules concerning for metastatic lung cancer to the bone    Saccular aneurysm ([facility])     Saccular aneurysm of left AICA, 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 Lives with cousin's granddaughter.   Social History:  reports that she quit smoking about 11 years ago. Her smoking use included cigarettes. She started smoking about 51 years ago. She has a 20 pack-year smoking history. She has never used smokeless tobacco. She reports current alcohol use. She reports that she does not currently use drugs.      Review of systems negative unless noted in HPI.       Objective       Current Outpatient Medications   Medication Instructions    acetaminophen-codeine (Tylenol w/ Codeine #3) 300-30 mg tablet 1 tablet, oral, Every 8 hours PRN    apixaban (Eliquis) 5 mg (74 tabs) tablet  Take 2 tablets (10 mg) by mouth 2 times a day for 7 days, then take 1 tablet (5 mg) by mouth 2 times a day.    apixaban (Eliquis) 5 mg tablet Take 2 tablets (10 mg) by mouth 2 times a day for 3 days, THEN 1 tablet (5 mg) 2 times a day.    atorvastatin (Lipitor) 80 mg tablet Take 1 tablet (80 mg) by mouth once daily.    bisacodyl (DULCOLAX) 5 mg, oral, Daily PRN, Do not crush, chew, or split.    cetirizine (ZYRTEC) 10 mg, oral, Daily PRN    cholecalciferol (VITAMIN D3) 1,000 Units, oral, Daily    cyclobenzaprine (FLEXERIL) 10 mg, oral, 3 times daily PRN    docusate sodium (COLACE) 100 mg, oral, 2 times daily    ibandronate (BONIVA) 150 mg, oral, Every 30 days, Take in morning with full glass of water on an empty stomach. No food, drink, meds, or lying down for 60 minutes after.    losartan (COZAAR) 100 mg, oral, Daily    metoprolol succinate XL (TOPROL-XL) 100 mg, oral, Daily    naloxone (NARCAN) 4 mg, nasal, As needed, May repeat every 2-3 minutes if needed, alternating nostrils, until medical assistance becomes available.    omeprazole OTC (PRILOSEC OTC) 20 mg, oral, Daily, Do not crush, chew, or split.    ondansetron (ZOFRAN) 4 mg, oral, Every 6 hours PRN    oxyCODONE (ROXICODONE) 5-10 mg, oral, Every 4 hours PRN    oxyCODONE ER (OXYCONTIN) 10 mg, oral, Every 12 hours, Do not crush, chew, or split    prochlorperazine (COMPAZINE) 10 mg, oral, Every 6 hours PRN    sennosides (SENOKOT) 8.6 mg, oral, Daily PRN    vit A/vit C/vit E/zinc/copper (PRESERVISION AREDS ORAL) 1 tablet, oral, Daily    Xtampza ER 9 mg, oral, 2 times daily, Must be taken with food. Do not chew or crush       Allergies:   Allergies   Allergen Reactions    Morphine Psychosis     Severe agitation, hallucinations     Acetaminophen GI Upset    Epinephrine Nausea/vomiting and Palpitations             Creatinine   Date Value Ref Range Status   09/01/2024 0.54 0.50 - 1.05 mg/dL Final     AST   Date Value Ref Range Status   08/27/2024 38 9 - 39 U/L Final      ALT   Date Value Ref Range Status   08/27/2024 27 7 - 45 U/L Final     Comment:     Patients treated with Sulfasalazine may generate falsely decreased results for ALT.     Hemoglobin   Date Value Ref Range Status   09/01/2024 13.2 12.0 - 16.0 g/dL Final     Platelets   Date Value Ref Range Status   09/01/2024 243 150 - 450 x10*3/uL Final          PHYSICAL EXAMINATION  Vital Signs:   No VS due to VV     Physical Exam  HENT:      Head: Normocephalic and atraumatic.   Eyes:      Extraocular Movements: Extraocular movements intact.      Conjunctiva/sclera: Conjunctivae normal.   Pulmonary:      Effort: Pulmonary effort is normal.   Abdominal:      General: Abdomen is flat.   Musculoskeletal:         General: Normal range of motion.   Neurological:      General: No focal deficit present.      Mental Status: She is alert.   Psychiatric:         Mood and Affect: Mood normal.         Thought Content: Thought content normal.         ASSESSMENT/PLAN    Pain  Pain is: cancer related pain  Type: somatic and neuropathic  -continue oxycodone 10mg q4 PRN   -continue flexeril 10mg TID PRN (rare use)   -she does not wish to make any adjustment currently   -she did not tolerate mscontin and gabapentin (confusion), xtampza (nausea), APAP/NSAIDs (GI upset), oxycontin denied by insurance       Opioid Use  Medication Management:   - OARRS report reviewed with no aberrant behavior; consistent with  prescriptions/records and patient history  - MED 90.  Overdose Risk Score 180.   This has been discussed with patient.   - We will continue to closely monitor the patient for signs of prescription misuse including UDS, OARRS review and subjective reports at each visit.  - Concurrent benzodiazepine use   - I am a provider who either is or has consulted and collaborated with a provider certified in Hospice and Palliative Medicine and have conducted a face-face visit and examination for this patient.  - Routine Urine Drug Screen complete at  next in person visit.   - Controlled Substance Agreement complete at next in person visit.   - Specifically discussed that controlled substance prescriptions will only be provided by our group as outlined in the completed agreement  - Prescribed naloxone Rx 8/23/24  - Red Flags: None    Constipation   At risk for constipation related to opioids  -continue senna 1 tab daily prn and miralax 17g daily PRN  -advised to schedule senna 1-2 tabs daily as constipation may also contribute to nausea     Sleeping Difficulty  Impaired sleep related to pain   -see above     Decreased appetite  Nausea   Related to  pain  , malignancy, opioids   -Continue ondansetron 8mg q8 PRN   -Continue prochlorperazine 10mg q6 PRN  -Start zyprexa 2.5mg at bedtime and titrate as needed/tolerated     Heartburn  -continue omeprazole 20mg daily 30 min prior to breakfast as needed     Medical Decision Making/Goals of Care/Advance Care Planning:  Patient's current clinical condition, including diagnosis, prognosis, and management plan, and goals of care were discussed.   Life limiting disease: metastatic malignancy  Goals: symptom control and cancer directed therapy  She and brother understand palliative intent of treatment     Advance Directives  Existence of Advance Directives:Yes, documentation or copy in medical record  Decision maker: TOMMIE is brother Juice Ivan     Next Follow-Up Visit:  Return to clinic in 4 weeks, RN call in one week     Signature and billing  Thank you for allowing us to participate in the care of this patient. Recommendations will be communicated back to the consulting service by way of shared electronic medical record or face-to-face.    Medical complexity was moderate level due to due to complexity of problems, extensive data review, and high risk of management/treatment.  Time was spent on the following: Prep Time, Time Directly with Patient/Family/Caregiver, Documentation Time. Total time spent: 30      DATA    Diagnostic tests and information reviewed for today's visit:  Most recent labs and imaging results, Medications       Some elements copied from Supportive Oncology note on 8/23/24, the elements have been updated and all reflect current decision making from today, 9/10/2024.      Plan of Care discussed with: Patient, Family/Significant Other: brother, Juice, and RN      SIGNATURE: Susan Leroy, APRN-CNP    Contact information:  Supportive and Palliative Oncology  Monday-Friday 8 AM-5 PM  Phone:  [phone], press option #5, then option #1.   Or Epic Secure Chat

## 2024-09-10 NOTE — TELEPHONE ENCOUNTER
RN called and left message for Brittny informing her that her first chemotherapy treatment will start on 9/16 at 9:30am at [facility]. RN wanted to know if Brittny is able to come on Friday 9/13 for a clinic appointment to see Dr. Chaudhary before treatment started. Clinic contact information was left for Brittny to call back.

## 2024-09-12 ENCOUNTER — HOSPITAL ENCOUNTER (EMERGENCY)
Facility: HOSPITAL | Age: 69
Discharge: HOME | End: 2024-09-12
Attending: STUDENT IN AN ORGANIZED HEALTH CARE EDUCATION/TRAINING PROGRAM
Payer: MEDICARE

## 2024-09-12 ENCOUNTER — NURSE TRIAGE (OUTPATIENT)
Dept: ADMISSION | Facility: HOSPITAL | Age: 69
End: 2024-09-12
Payer: MEDICARE

## 2024-09-12 ENCOUNTER — APPOINTMENT (OUTPATIENT)
Dept: CARDIOLOGY | Facility: HOSPITAL | Age: 69
End: 2024-09-12
Payer: MEDICARE

## 2024-09-12 ENCOUNTER — APPOINTMENT (OUTPATIENT)
Dept: RADIOLOGY | Facility: HOSPITAL | Age: 69
End: 2024-09-12
Payer: MEDICARE

## 2024-09-12 VITALS
SYSTOLIC BLOOD PRESSURE: 133 MMHG | HEIGHT: 66 IN | WEIGHT: 160 LBS | OXYGEN SATURATION: 96 % | RESPIRATION RATE: 16 BRPM | TEMPERATURE: 97.3 F | BODY MASS INDEX: 25.71 KG/M2 | HEART RATE: 88 BPM | DIASTOLIC BLOOD PRESSURE: 89 MMHG

## 2024-09-12 DIAGNOSIS — E78.5 HYPERLIPIDEMIA, UNSPECIFIED: ICD-10-CM

## 2024-09-12 DIAGNOSIS — R11.2 NAUSEA AND VOMITING, UNSPECIFIED VOMITING TYPE: Primary | ICD-10-CM

## 2024-09-12 DIAGNOSIS — E87.6 HYPOKALEMIA: ICD-10-CM

## 2024-09-12 DIAGNOSIS — R10.84 GENERALIZED ABDOMINAL PAIN: ICD-10-CM

## 2024-09-12 DIAGNOSIS — K56.41 FECAL IMPACTION (MULTI): ICD-10-CM

## 2024-09-12 DIAGNOSIS — N30.00 ACUTE CYSTITIS WITHOUT HEMATURIA: ICD-10-CM

## 2024-09-12 DIAGNOSIS — I25.10 ATHEROSCLEROSIS OF CORONARY ARTERY, UNSPECIFIED VESSEL OR LESION TYPE, UNSPECIFIED WHETHER ANGINA PRESENT, UNSPECIFIED WHETHER NATIVE OR TRANSPLANTED HEART: Primary | ICD-10-CM

## 2024-09-12 LAB
ALBUMIN SERPL BCP-MCNC: 3.7 G/DL (ref 3.4–5)
ALP SERPL-CCNC: 76 U/L (ref 33–136)
ALT SERPL W P-5'-P-CCNC: 18 U/L (ref 7–45)
ANION GAP SERPL CALC-SCNC: 16 MMOL/L (ref 10–20)
APPEARANCE UR: ABNORMAL
AST SERPL W P-5'-P-CCNC: 17 U/L (ref 9–39)
BACTERIA #/AREA URNS AUTO: ABNORMAL /HPF
BASOPHILS # BLD AUTO: 0.05 X10*3/UL (ref 0–0.1)
BASOPHILS NFR BLD AUTO: 0.7 %
BILIRUB SERPL-MCNC: 0.7 MG/DL (ref 0–1.2)
BILIRUB UR STRIP.AUTO-MCNC: NEGATIVE MG/DL
BUN SERPL-MCNC: 11 MG/DL (ref 6–23)
CALCIUM SERPL-MCNC: 8.7 MG/DL (ref 8.6–10.3)
CARDIAC TROPONIN I PNL SERPL HS: 9 NG/L (ref 0–13)
CHLORIDE SERPL-SCNC: 101 MMOL/L (ref 98–107)
CO2 SERPL-SCNC: 24 MMOL/L (ref 21–32)
COLOR UR: YELLOW
CREAT SERPL-MCNC: 0.51 MG/DL (ref 0.5–1.05)
EGFRCR SERPLBLD CKD-EPI 2021: >90 ML/MIN/1.73M*2
EOSINOPHIL # BLD AUTO: 0.04 X10*3/UL (ref 0–0.7)
EOSINOPHIL NFR BLD AUTO: 0.5 %
ERYTHROCYTE [DISTWIDTH] IN BLOOD BY AUTOMATED COUNT: 13.1 % (ref 11.5–14.5)
GLUCOSE SERPL-MCNC: 113 MG/DL (ref 74–99)
GLUCOSE UR STRIP.AUTO-MCNC: NORMAL MG/DL
HCT VFR BLD AUTO: 42.5 % (ref 36–46)
HGB BLD-MCNC: 14.6 G/DL (ref 12–16)
IMM GRANULOCYTES # BLD AUTO: 0.02 X10*3/UL (ref 0–0.7)
IMM GRANULOCYTES NFR BLD AUTO: 0.3 % (ref 0–0.9)
INR PPP: 2 (ref 0.9–1.1)
KETONES UR STRIP.AUTO-MCNC: ABNORMAL MG/DL
LACTATE SERPL-SCNC: 0.8 MMOL/L (ref 0.4–2)
LEUKOCYTE ESTERASE UR QL STRIP.AUTO: ABNORMAL
LIPASE SERPL-CCNC: 6 U/L (ref 9–82)
LYMPHOCYTES # BLD AUTO: 0.85 X10*3/UL (ref 1.2–4.8)
LYMPHOCYTES NFR BLD AUTO: 11.4 %
MCH RBC QN AUTO: 29.4 PG (ref 26–34)
MCHC RBC AUTO-ENTMCNC: 34.4 G/DL (ref 32–36)
MCV RBC AUTO: 86 FL (ref 80–100)
MONOCYTES # BLD AUTO: 0.89 X10*3/UL (ref 0.1–1)
MONOCYTES NFR BLD AUTO: 11.9 %
MUCOUS THREADS #/AREA URNS AUTO: ABNORMAL /LPF
NEUTROPHILS # BLD AUTO: 5.61 X10*3/UL (ref 1.2–7.7)
NEUTROPHILS NFR BLD AUTO: 75.2 %
NITRITE UR QL STRIP.AUTO: ABNORMAL
NRBC BLD-RTO: 0 /100 WBCS (ref 0–0)
PH UR STRIP.AUTO: 6.5 [PH]
PLATELET # BLD AUTO: 216 X10*3/UL (ref 150–450)
POTASSIUM SERPL-SCNC: 3.1 MMOL/L (ref 3.5–5.3)
PROT SERPL-MCNC: 7.1 G/DL (ref 6.4–8.2)
PROT UR STRIP.AUTO-MCNC: ABNORMAL MG/DL
PROTHROMBIN TIME: 22.3 SECONDS (ref 9.8–12.8)
RBC # BLD AUTO: 4.97 X10*6/UL (ref 4–5.2)
RBC # UR STRIP.AUTO: ABNORMAL /UL
RBC #/AREA URNS AUTO: ABNORMAL /HPF
RENAL EPI CELLS #/AREA UR COMP ASSIST: ABNORMAL /HPF
SODIUM SERPL-SCNC: 138 MMOL/L (ref 136–145)
SP GR UR STRIP.AUTO: 1.05
UROBILINOGEN UR STRIP.AUTO-MCNC: NORMAL MG/DL
WBC # BLD AUTO: 7.5 X10*3/UL (ref 4.4–11.3)
WBC #/AREA URNS AUTO: >50 /HPF
WBC CLUMPS #/AREA URNS AUTO: ABNORMAL /HPF

## 2024-09-12 PROCEDURE — 81001 URINALYSIS AUTO W/SCOPE: CPT | Performed by: NURSE PRACTITIONER

## 2024-09-12 PROCEDURE — 93005 ELECTROCARDIOGRAM TRACING: CPT

## 2024-09-12 PROCEDURE — 84075 ASSAY ALKALINE PHOSPHATASE: CPT | Performed by: NURSE PRACTITIONER

## 2024-09-12 PROCEDURE — 83690 ASSAY OF LIPASE: CPT | Performed by: NURSE PRACTITIONER

## 2024-09-12 PROCEDURE — 2500000005 HC RX 250 GENERAL PHARMACY W/O HCPCS: Performed by: NURSE PRACTITIONER

## 2024-09-12 PROCEDURE — 87086 URINE CULTURE/COLONY COUNT: CPT | Mod: PORLAB | Performed by: NURSE PRACTITIONER

## 2024-09-12 PROCEDURE — 2500000001 HC RX 250 WO HCPCS SELF ADMINISTERED DRUGS (ALT 637 FOR MEDICARE OP): Performed by: NURSE PRACTITIONER

## 2024-09-12 PROCEDURE — 71275 CT ANGIOGRAPHY CHEST: CPT | Mod: FOREIGN READ | Performed by: RADIOLOGY

## 2024-09-12 PROCEDURE — 85025 COMPLETE CBC W/AUTO DIFF WBC: CPT | Performed by: NURSE PRACTITIONER

## 2024-09-12 PROCEDURE — 2500000002 HC RX 250 W HCPCS SELF ADMINISTERED DRUGS (ALT 637 FOR MEDICARE OP, ALT 636 FOR OP/ED): Performed by: NURSE PRACTITIONER

## 2024-09-12 PROCEDURE — 85610 PROTHROMBIN TIME: CPT | Performed by: NURSE PRACTITIONER

## 2024-09-12 PROCEDURE — 96374 THER/PROPH/DIAG INJ IV PUSH: CPT | Mod: 59

## 2024-09-12 PROCEDURE — 99285 EMERGENCY DEPT VISIT HI MDM: CPT

## 2024-09-12 PROCEDURE — 2500000004 HC RX 250 GENERAL PHARMACY W/ HCPCS (ALT 636 FOR OP/ED): Performed by: NURSE PRACTITIONER

## 2024-09-12 PROCEDURE — 96361 HYDRATE IV INFUSION ADD-ON: CPT

## 2024-09-12 PROCEDURE — 71275 CT ANGIOGRAPHY CHEST: CPT

## 2024-09-12 PROCEDURE — 74177 CT ABD & PELVIS W/CONTRAST: CPT | Mod: FOREIGN READ | Performed by: RADIOLOGY

## 2024-09-12 PROCEDURE — 83605 ASSAY OF LACTIC ACID: CPT | Performed by: NURSE PRACTITIONER

## 2024-09-12 PROCEDURE — 74177 CT ABD & PELVIS W/CONTRAST: CPT

## 2024-09-12 PROCEDURE — 36415 COLL VENOUS BLD VENIPUNCTURE: CPT | Performed by: NURSE PRACTITIONER

## 2024-09-12 PROCEDURE — 84484 ASSAY OF TROPONIN QUANT: CPT | Performed by: NURSE PRACTITIONER

## 2024-09-12 PROCEDURE — 2550000001 HC RX 255 CONTRASTS: Performed by: STUDENT IN AN ORGANIZED HEALTH CARE EDUCATION/TRAINING PROGRAM

## 2024-09-12 RX ORDER — FAMOTIDINE 10 MG/ML
20 INJECTION INTRAVENOUS ONCE
Status: COMPLETED | OUTPATIENT
Start: 2024-09-12 | End: 2024-09-12

## 2024-09-12 RX ORDER — SODIUM CHLORIDE 9 MG/ML
125 INJECTION, SOLUTION INTRAVENOUS CONTINUOUS
Status: DISCONTINUED | OUTPATIENT
Start: 2024-09-12 | End: 2024-09-12 | Stop reason: HOSPADM

## 2024-09-12 RX ORDER — CEPHALEXIN 500 MG/1
500 CAPSULE ORAL 4 TIMES DAILY
Qty: 40 CAPSULE | Refills: 0 | Status: SHIPPED | OUTPATIENT
Start: 2024-09-12 | End: 2024-09-20

## 2024-09-12 RX ORDER — POTASSIUM CHLORIDE 750 MG/1
40 TABLET, FILM COATED, EXTENDED RELEASE ORAL ONCE
Status: COMPLETED | OUTPATIENT
Start: 2024-09-12 | End: 2024-09-12

## 2024-09-12 RX ORDER — POLYETHYLENE GLYCOL 3350 17 G/17G
17 POWDER, FOR SOLUTION ORAL DAILY
Qty: 170 G | Refills: 0 | Status: SHIPPED | OUTPATIENT
Start: 2024-09-12 | End: 2024-09-22

## 2024-09-12 RX ORDER — OXYCODONE HYDROCHLORIDE 5 MG/1
5 TABLET ORAL ONCE
Status: COMPLETED | OUTPATIENT
Start: 2024-09-12 | End: 2024-09-12

## 2024-09-12 RX ORDER — SODIUM CHLORIDE 9 MG/ML
125 INJECTION, SOLUTION INTRAVENOUS CONTINUOUS
Status: DISCONTINUED | OUTPATIENT
Start: 2024-09-12 | End: 2024-09-12

## 2024-09-12 RX ORDER — ONDANSETRON HYDROCHLORIDE 2 MG/ML
4 INJECTION, SOLUTION INTRAVENOUS ONCE
Status: COMPLETED | OUTPATIENT
Start: 2024-09-12 | End: 2024-09-12

## 2024-09-12 RX ORDER — ATORVASTATIN CALCIUM 80 MG/1
80 TABLET, FILM COATED ORAL DAILY
Qty: 90 TABLET | Refills: 3 | Status: SHIPPED | OUTPATIENT
Start: 2024-09-12

## 2024-09-12 RX ADMIN — IOHEXOL 75 ML: 350 INJECTION, SOLUTION INTRAVENOUS at 18:35

## 2024-09-12 RX ADMIN — ONDANSETRON 4 MG: 2 INJECTION INTRAMUSCULAR; INTRAVENOUS at 17:03

## 2024-09-12 RX ADMIN — SODIUM PHOSPHATE 1 ENEMA: 7; 19 ENEMA RECTAL at 20:11

## 2024-09-12 RX ADMIN — OXYCODONE HYDROCHLORIDE 5 MG: 5 TABLET ORAL at 19:04

## 2024-09-12 RX ADMIN — POTASSIUM CHLORIDE 40 MEQ: 750 TABLET, FILM COATED, EXTENDED RELEASE ORAL at 20:09

## 2024-09-12 RX ADMIN — SODIUM CHLORIDE 500 ML: 9 INJECTION, SOLUTION INTRAVENOUS at 16:57

## 2024-09-12 RX ADMIN — SODIUM CHLORIDE 125 ML/HR: 9 INJECTION, SOLUTION INTRAVENOUS at 16:31

## 2024-09-12 RX ADMIN — FAMOTIDINE 20 MG: 10 INJECTION, SOLUTION INTRAVENOUS at 16:31

## 2024-09-12 ASSESSMENT — LIFESTYLE VARIABLES
EVER HAD A DRINK FIRST THING IN THE MORNING TO STEADY YOUR NERVES TO GET RID OF A HANGOVER: NO
TOTAL SCORE: 0
HAVE PEOPLE ANNOYED YOU BY CRITICIZING YOUR DRINKING: NO
HAVE YOU EVER FELT YOU SHOULD CUT DOWN ON YOUR DRINKING: NO
EVER FELT BAD OR GUILTY ABOUT YOUR DRINKING: NO

## 2024-09-12 ASSESSMENT — PAIN SCALES - GENERAL: PAINLEVEL_OUTOF10: 3

## 2024-09-12 ASSESSMENT — PAIN - FUNCTIONAL ASSESSMENT: PAIN_FUNCTIONAL_ASSESSMENT: 0-10

## 2024-09-12 NOTE — ED PROVIDER NOTES
"Emergency Department Encounter  [facility] EMERGENCY MEDICINE    Patient: Brittny Gordon  MRN: [id number]  : 1955  Date of Evaluation: 2024  ED Provider: PARVIN Napoles    ED care was supervised by Dr. Hodges who independently examined and evaluated the patient. Please see their attestation note for further details.    Limitations to history: none  Independent Historian: self  Records reviewed: Care everywhere, paper chart, Inpatient and outpatient notes    Chief Complaint       Chief Complaint   Patient presents with   • Vomiting     Since last week.  Finished chemo last week for lung ca with bone mets     [place]    (Location/Symptom, Timing/Onset, Context/Setting, Quality, Duration, Modifying Factors, Severity) Note limiting factors.   Brittny Gordon is a 69 y.o. female who presents to the emergency department complaining of decreased output, abdominal pain, nausea, vomiting, patient with metastatic lung cancer undergoing radiation therapy, has metastasis to her bones.  Sent in from [facility] for evaluation for abdominal pain, decreased oral intake, has known metastasis to her spine, has been undergoing radiation therapy and symptoms began after initiating radiation therapy.  Not undergoing chemotherapy at this time.  States that she is having bowel movements but states they have been decreased and \"it is like mud\".  Reports persistent nausea and vomiting, states that every time she looks at something she gets nauseated.  Has been taking oxycodone at home for her pain.  Denies any urinary symptoms.  Also endorsing right lower extremity weakness but states this has been ongoing since prior to starting radiation therapy.    ROS:     Review of Systems  14 systems reviewed and otherwise acutely negative except as in the [place].          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   • Wedge compression fracture of t11-T12 vertebra, sequela 2020    Compression fracture of T11 vertebra, sequela     Past Surgical History:   Procedure Laterality Date   • ARTERIAL ANEURYSM REPAIR      Thoracic aortic aneurysm repair   • COLONOSCOPY     • RADIOFREQUENCY ABLATION      L3,4,5   • WISDOM TOOTH EXTRACTION       Social History     Socioeconomic History   • Marital status: Single   Tobacco Use   • Smoking status: Former     Current packs/day: 0.00     Average packs/day: 0.5 packs/day for 40.0 years (20.0 ttl pk-yrs)     Types: Cigarettes     Start date:      Quit date:      Years since quittin.7   • Smokeless tobacco: Never   Vaping Use   • Vaping status: Never Used   Substance and Sexual Activity   • Alcohol use: Yes     Comment: social   • Drug use: Not Currently   • Sexual activity: Defer     Social Determinants of Health     Financial Resource Strain: Low Risk  (2024)    Overall Financial Resource Strain (CARDIA)    • Difficulty of Paying Living Expenses: Not very hard   Food Insecurity: No Food Insecurity (2024)    Hunger Vital Sign    • Worried About Running Out of Food in the Last Year: Never true    • Ran Out of Food in the Last Year: Never true   Transportation Needs: No Transportation Needs (2024)    PRAPARE - Transportation    • Lack of Transportation (Medical): No    • Lack of Transportation (Non-Medical): No   Physical Activity: Unknown (2024)    Exercise Vital Sign    • Days of Exercise per Week: 0 days   Stress: Stress Concern Present (2024)    [employer] [place] [employer] - Occupational Stress Questionnaire    • Feeling of Stress : To some extent   Intimate Partner Violence: Unknown (2024)    Humiliation, Afraid, Rape, and Kick questionnaire    • Fear of Current or  Ex-Partner: No    • Emotionally Abused: No    • Physically Abused: No   Housing Stability: Low Risk  (9/1/2024)    Housing Stability Vital Sign    • Unable to Pay for Housing in the Last Year: No    • Number of Times Moved in the Last Year: 0    • Homeless in the Last Year: No       Medications/Allergies     Previous Medications    APIXABAN (ELIQUIS) 5 MG (74 TABS) TABLET    Take 2 tablets (10 mg) by mouth 2 times a day for 7 days, then take 1 tablet (5 mg) by mouth 2 times a day.    APIXABAN (ELIQUIS) 5 MG TABLET    Take 2 tablets (10 mg) by mouth 2 times a day for 3 days, THEN 1 tablet (5 mg) 2 times a day.    BISACODYL (DULCOLAX) 5 MG EC TABLET    Take 1 tablet (5 mg) by mouth once daily as needed for constipation. Do not crush, chew, or split.    CETIRIZINE (ZYRTEC) 10 MG TABLET    Take 1 tablet (10 mg) by mouth once daily as needed for allergies.    CHOLECALCIFEROL (VITAMIN D3) 25 MCG (1000 UT) TABLET    Take 1 tablet (1,000 Units) by mouth once daily.    CYCLOBENZAPRINE (FLEXERIL) 10 MG TABLET    Take 1 tablet (10 mg) by mouth 3 times a day as needed for muscle spasms.    DOCUSATE SODIUM (COLACE) 100 MG CAPSULE    Take 1 capsule (100 mg) by mouth 2 times a day.    IBANDRONATE (BONIVA) 150 MG TABLET    Take 1 tablet (150 mg) by mouth every 30 (thirty) days. Take in morning with full glass of water on an empty stomach. No food, drink, meds, or lying down for 60 minutes after.    LOSARTAN (COZAAR) 100 MG TABLET    TAKE 1 TABLET BY MOUTH EVERY DAY    METOPROLOL SUCCINATE XL (TOPROL-XL) 100 MG 24 HR TABLET    TAKE 1 TABLET BY MOUTH EVERY DAY    NALOXONE (NARCAN) 4 MG/0.1 ML NASAL SPRAY    Administer 1 spray (4 mg) into affected nostril(s) if needed for opioid reversal or respiratory depression. May repeat every 2-3 minutes if needed, alternating nostrils, until medical assistance becomes available.    OLANZAPINE (ZYPREXA) 2.5 MG TABLET    Take 1 tablet (2.5 mg) by mouth once daily at bedtime.    OMEPRAZOLE OTC  (PRILOSEC OTC) 20 MG EC TABLET    Take 1 tablet (20 mg) by mouth once daily. Do not crush, chew, or split.    ONDANSETRON (ZOFRAN) 8 MG TABLET    Take 0.5 tablets (4 mg) by mouth every 6 hours if needed for nausea or vomiting.    OXYCODONE (ROXICODONE) 5 MG IMMEDIATE RELEASE TABLET    Take 1-2 tablets (5-10 mg) by mouth every 4 hours if needed for severe pain (7 - 10) for up to 10 days.    PROCHLORPERAZINE (COMPAZINE) 10 MG TABLET    Take 1 tablet (10 mg) by mouth every 6 hours if needed for nausea or vomiting.    SENNOSIDES (SENOKOT) 8.6 MG TABLET    Take 1 tablet (8.6 mg) by mouth once daily as needed for constipation.    VIT A/VIT C/VIT E/ZINC/COPPER (PRESERVISION AREDS ORAL)    Take 1 tablet by mouth early in the morning..     Allergies   Allergen Reactions   • Morphine Psychosis     Severe agitation, hallucinations    • Acetaminophen GI Upset   • Epinephrine Nausea/vomiting and Palpitations        Physical Exam       ED Triage Vitals [09/12/24 1526]   Temperature Heart Rate Respirations BP   36.3 °C (97.3 °F) (!) 112 16 133/89      Pulse Ox Temp src Heart Rate Source Patient Position   96 % -- -- --      BP Location FiO2 (%)     -- --         Physical Exam    GENERAL:  The patient appears nourished and normally developed. Vital signs as documented.     HEENT:  Head normocephalic, atraumatic, EOMs intact, PERRLA, Mucous membranes moist. Nares patent without copious rhinorrhea.  No lymphadenopathy.    PULMONARY:  Lungs are clear to auscultation, without any respiratory distress. Able to speak full sentences, no accessory muscle use    CARDIAC:   Normal rate. No murmurs, rubs or gallops    ABDOMEN:  Soft, non-distended, non-tender, BS positive x 4 quadrants, No rebound or guarding, no peritoneal signs, no CVA tenderness, no masses or organomegaly    MUSCULOSKELETAL:   Able to ambulate, Non edematous, with no obvious deformities. Pulses intact distal    SKIN:   Good color, with no significant rashes.  No  pallor.    NEURO:  No obvious neurological deficits, normal sensation and strength bilaterally.  Able to follow commands, NIH 0, CN 2-12 intact.      Diagnostics   Labs:  Results for orders placed or performed during the hospital encounter of 09/12/24   CBC and Auto Differential   Result Value Ref Range    WBC 7.5 4.4 - 11.3 x10*3/uL    nRBC 0.0 0.0 - 0.0 /100 WBCs    RBC 4.97 4.00 - 5.20 x10*6/uL    Hemoglobin 14.6 12.0 - 16.0 g/dL    Hematocrit 42.5 36.0 - 46.0 %    MCV 86 80 - 100 fL    MCH 29.4 26.0 - 34.0 pg    MCHC 34.4 32.0 - 36.0 g/dL    RDW 13.1 11.5 - 14.5 %    Platelets 216 150 - 450 x10*3/uL    Neutrophils % 75.2 40.0 - 80.0 %    Immature Granulocytes %, Automated 0.3 0.0 - 0.9 %    Lymphocytes % 11.4 13.0 - 44.0 %    Monocytes % 11.9 2.0 - 10.0 %    Eosinophils % 0.5 0.0 - 6.0 %    Basophils % 0.7 0.0 - 2.0 %    Neutrophils Absolute 5.61 1.20 - 7.70 x10*3/uL    Immature Granulocytes Absolute, Automated 0.02 0.00 - 0.70 x10*3/uL    Lymphocytes Absolute 0.85 (L) 1.20 - 4.80 x10*3/uL    Monocytes Absolute 0.89 0.10 - 1.00 x10*3/uL    Eosinophils Absolute 0.04 0.00 - 0.70 x10*3/uL    Basophils Absolute 0.05 0.00 - 0.10 x10*3/uL   Comprehensive metabolic panel   Result Value Ref Range    Glucose 113 (H) 74 - 99 mg/dL    Sodium 138 136 - 145 mmol/L    Potassium 3.1 (L) 3.5 - 5.3 mmol/L    Chloride 101 98 - 107 mmol/L    Bicarbonate 24 21 - 32 mmol/L    Anion Gap 16 10 - 20 mmol/L    Urea Nitrogen 11 6 - 23 mg/dL    Creatinine 0.51 0.50 - 1.05 mg/dL    eGFR >90 >60 mL/min/1.73m*2    Calcium 8.7 8.6 - 10.3 mg/dL    Albumin 3.7 3.4 - 5.0 g/dL    Alkaline Phosphatase 76 33 - 136 U/L    Total Protein 7.1 6.4 - 8.2 g/dL    AST 17 9 - 39 U/L    Bilirubin, Total 0.7 0.0 - 1.2 mg/dL    ALT 18 7 - 45 U/L   Lactate   Result Value Ref Range    Lactate 0.8 0.4 - 2.0 mmol/L   Lipase   Result Value Ref Range    Lipase 6 (L) 9 - 82 U/L   Protime-INR   Result Value Ref Range    Protime 22.3 (H) 9.8 - 12.8 seconds    INR 2.0  (H) 0.9 - 1.1   Troponin I, High Sensitivity   Result Value Ref Range    Troponin I, High Sensitivity 9 0 - 13 ng/L     Radiographs:  CT angio chest for pulmonary embolism   Final Result   CHEST:   1.  No evidence of a pulmonary embolus.   2.  1.5 cm nodule in the left upper lobe, and spiculated 8mm nodule in   the right upper lobe, suspicious for malignancy.   3.  Mild aneurysmal dilatation of the ascending aorta.  There is   probable evidence of prior surgical repair.  Correlation with past   surgical history is recommended.   4.  Compression deformities of T7 and T12.  Sclerosis in the T12   vertebral body, suspicious for metastatic disease.   ABDOMEN/PELVIS:   1.  No evidence for a bowel obstruction.   2.  Mild fecal impaction.   3.  Colonic diverticulosis, with no evidence of diverticulitis.   4.  No evidence for acute appendicitis.   5.  Compression deformities of L1 and L3.   6.  Osseous metastatic disease as described above.   7.  Hypodense lesions in the liver, most likely tiny cysts.   Signed by Jerod Eddy MD      CT abdomen pelvis w IV contrast   Final Result   CHEST:   1.  No evidence of a pulmonary embolus.   2.  1.5 cm nodule in the left upper lobe, and spiculated 8mm nodule in   the right upper lobe, suspicious for malignancy.   3.  Mild aneurysmal dilatation of the ascending aorta.  There is   probable evidence of prior surgical repair.  Correlation with past   surgical history is recommended.   4.  Compression deformities of T7 and T12.  Sclerosis in the T12   vertebral body, suspicious for metastatic disease.   ABDOMEN/PELVIS:   1.  No evidence for a bowel obstruction.   2.  Mild fecal impaction.   3.  Colonic diverticulosis, with no evidence of diverticulitis.   4.  No evidence for acute appendicitis.   5.  Compression deformities of L1 and L3.   6.  Osseous metastatic disease as described above.   7.  Hypodense lesions in the liver, most likely tiny cysts.   Signed by Jerod Eddy MD     "      Procedures:   Procedures     EKG: Independently reviewed by this provider at 1638  Indication: weakness  Rate: 113  Rhythm: sinus tachycardia  Interval: normal  Axis: normal  ST Segment: no st elevation      Assessment   In brief, Brittny Gordon is a 69 y.o. female who presented to the emergency department with abdominal pain, nausea, vomiting, decreased output, undergoing radiation therapy for metastatic lung cancer    Plan   IV, lab work, EKG, imaging    Differentials   Progression of metastatic disease  Colitis  Complication of radiation therapy  Obstruction  Acute abdomen  Dehydration    ED Course     ED Course as of 09/14/24 1714   u Sep 12, 2024   2126 Sent in prescription for keflex to patient's pharmacy [facility] for her UTI.  Also sent in prescription for Miralax, left patient a VM to return call to notify. [ML]      ED Course User Index  [ML] BOWEN Napoles-CNP         Diagnoses as of 09/14/24 1714   Nausea and vomiting, unspecified vomiting type   Generalized abdominal pain   Fecal impaction (Multi)   Hypokalemia   Acute cystitis without hematuria       Visit Vitals  /89   Pulse (!) 112   Temp 36.3 °C (97.3 °F)   Resp 16   Ht 1.676 m (5' 6\")   Wt 72.6 kg (160 lb)   SpO2 96%   BMI 25.82 kg/m²   OB Status Postmenopausal   Smoking Status Former   BSA 1.84 m²       Medications   sodium chloride 0.9% infusion (125 mL/hr intravenous New Bag 9/12/24 1631)   sodium phosphates (Fleets) 19-7 gram/118 mL enema 1 enema (has no administration in time range)   potassium chloride CR (Klor-Con) ER tablet 40 mEq (has no administration in time range)   famotidine PF (Pepcid) injection 20 mg (20 mg intravenous Given 9/12/24 1631)   ondansetron (Zofran) injection 4 mg (4 mg intravenous Given 9/12/24 1703)   sodium chloride 0.9 % bolus 500 mL (0 mL intravenous Stopped 9/12/24 1705)   iohexol (OMNIPaque) 350 mg iodine/mL solution 75 mL (75 mL intravenous Given 9/12/24 1835)   oxyCODONE (Roxicodone) immediate " release tablet 5 mg (5 mg oral Given 9/12/24 1904)       Plan of care discussed, patient was given fluids, Zofran, Pepcid, oxycodone.  Lab work significant for hypokalemia with a potassium of 3.1, potassium replacement was ordered.  Sent for multiple images, CT PE study was obtained due to patient not being able to take her anticoagulation for the last 6 days, CT abdomen pelvis show mild fecal impaction, no evidence of bowel obstruction, colonic diverticulosis, compression deformities of L1 and L3, osseous metastatic disease.  Patient was offered soapsuds enema which patient declined and states that she would like to perform her own enema at home, was also offered admission for fluids which patient declined.  Patient will be discharged home in stable condition, educated on any worsening signs and symptoms to return to the emergency department      Final Impression      1. Nausea and vomiting, unspecified vomiting type    2. Generalized abdominal pain    3. Fecal impaction (Multi)    4. Hypokalemia          DISPOSITION  Disposition: Discharge to home  Patient condition is stable    Comment: Please note this report has been produced using speech recognition software and may contain errors related to that system including errors in grammar, punctuation, and spelling, as well as words and phrases that may be inappropriate.  If there are any questions or concerns please feel free to contact the dictating provider for clarification.    PARVIN Napoles APRN-CNP  09/12/24 2001       PARVIN Napoles  09/14/24 8268

## 2024-09-12 NOTE — TELEPHONE ENCOUNTER
Patient is agreeable to present to [facility] ED for workup and hydration. Susan Leroy aware.

## 2024-09-12 NOTE — TELEPHONE ENCOUNTER
"Pt called asking if IVF can be ordered for her which she said was mentioned at her 9/10 appt.     She continues to vomit any time she looks at food or medication.  She's had approximately 9 episodes of clear emesis in the past 24 hrs. She's drinking sips of Coke, approximately 4 oz in past day and has not been able to eat since 9/6.  She's voiding dark urine 2-3 times/day and has dry mouth.  She has abdominal tenderness rated as 9/10 and has been having small amounts of \"muddy\" consistency stool.  She has not been able to take any of her medications.    Additional Information   Commented on: When was the last time you had a bowel movement? Is this/what is normal for you?     Small, \"like mud\"   Commented on: What are you taking? how often are you taking it?     Not able to tolerate any of her medications    Protocols used: Nausea/Vomiting    "

## 2024-09-13 ENCOUNTER — TELEPHONE (OUTPATIENT)
Dept: HEMATOLOGY/ONCOLOGY | Facility: CLINIC | Age: 69
End: 2024-09-13
Payer: MEDICARE

## 2024-09-13 LAB — HOLD SPECIMEN: NORMAL

## 2024-09-13 NOTE — TELEPHONE ENCOUNTER
I reviewed ED notes from yesterday. Brittny was offered an enema in ED but opted to self administer at home, not completed. She was also prescribed Miralax, not yet started. I encouraged her to perform the enema now and start taking the Miralax. She is asking this office about more fluids and I advised she should report back to ED should she feel the need to receive more IV hydration. She verbalized agreement to this plan.

## 2024-09-13 NOTE — PROGRESS NOTES
Radiation Oncology Treatment Summary    Patient Name:  Brittny Gordon  MRN:  [id number]  :  1955    Referring Provider: Tommy Hammonds MD  Primary Care Provider: Camila Chaudhary MD MPH    Brief History: Please see the radiation oncology consultation note.  Briefly, Brittny Gordon is a 69 y.o. female with Primary malignant neoplasm of right lung metastatic to other site (Multi), Clinical: Stage IVB (cT1b, cN2, pM1c).  The patient completed radiotherapy as outlined below.    The radiation planning and treatment procedures were explained to the patient in advance, and all questions were answered. The benefits and goals of treatment, options and alternatives, limitations, side effects and risks of radiation were also explained. The patient provided informed consent.    Technical Summary:  Region(s) Treated: Spine T12-L3  Radiation Dose Prescribed: 3000 cGy in 10 fractions  Radiation Technique/Machine/Energy Used: 3DRT / Versa / 10 MV photons  Additional radiation technical details available in our radiation oncology EMR MOSAIQ and our treatment planning system.    Radiation Treatment Summary:    3D CRT: Not Applicable Spine    Treatment Period Technique Fraction Dose Fractions Total Dose   Course 1 2024-2024  (days elapsed: 18)         T12-L3 2024-2024 AP/ / 300 cGy 10 / 10 3000 / 3,000 cGy       Please see the patient's Mos[facility]q chart for further details regarding the radiation plan, including beam energy.    Concurrent Chemotherapy:  Treatment Plans       Name Type Plan Dates Plan Provider         Active    Pembrolizumab + PACLitaxel / CARBOplatin, 21 Day Cycles Oncology Treatment  2024 - Present Camila Chaudhary MD MPH                    Clinical Summary:  The patient tolerated this course of radiation therapy relatively well, with no unusual events or unanticipated toxicities. Symptoms during treatment included significant weight loss of greater than 10% body weight, anxiety,  dehydration requiring hospitalization, grade 1 fatigue improved with rest, moderate pain treated with long and short acting opiate medications and muscle relaxers, found to have hypoxia and hypotension with clinical significant pulmonary embolism requiring hospitalization.    CTCAE Toxicity Overview:   Toxicity Assessment          8/20/2024    11:28 8/27/2024    16:09   Toxicity Assessment   Adverse Events Reviewed (WDL) No (Exceptions to WDL) Yes (Within Defined Limits)   Treatment Site General General   Anorexia Grade 1       10-15 lbs over last few weeks Grade 2   Anxiety Grade 0 Grade 1   Dehydration Grade 0 Grade 2   Depression Grade 0 Grade 0   Dermatitis Radiation Grade 0 Grade 0   Diarrhea Grade 0 Grade 0   Fatigue Grade 1 Grade 1   Fibrosis Deep Connective Tissue Grade 0 Grade 0   Fracture Grade 0 Grade 0   Nausea Grade 0 Grade 0   Pain Grade 3 Grade 2   Treatment Related Secondary Malignancy Grade 0 Grade 0   Tumor Pain Grade 3 Grade 2   Vomiting Grade 0 Grade 0   Alopecia Grade 0 Grade 0   Erythroderma Grade 0 Grade 0   Pain of Skin Grade 0 Grade 0   Pruritus Grade 0 Grade 0   Rash Acneiform Grade 0 Grade 0   Skin Hyperpigmentation Grade 0 Grade 0   Skin Hypopigmentation Grade 0 Grade 0   Skin Induration Grade 0 Grade 0   Skin Ulceration Grade 0 Grade 0   Telangiectasia Grade 0 Grade 0     Patient Disposition:  The patient will be scheduled for virtual follow-up at our clinic on 9/27/24 @ 6560. The patient was encouraged to contact us for any questions or concerns in the interim.    Nagi Ospina MD  [facility] [place]  [facility]  - Department of Radiation Oncology  Phone: [phone]  Fax: [phone]  Epic secure chat preferred

## 2024-09-14 LAB — BACTERIA UR CULT: ABNORMAL

## 2024-09-15 LAB
ATRIAL RATE: 112 BPM
P AXIS: 54 DEGREES
PR INTERVAL: 163 MS
Q ONSET: 252 MS
QRS COUNT: 18 BEATS
QRS DURATION: 72 MS
QT INTERVAL: 377 MS
QTC CALCULATION(BAZETT): 517 MS
QTC FREDERICIA: 465 MS
R AXIS: -7 DEGREES
T AXIS: 15 DEGREES
T OFFSET: 441 MS
VENTRICULAR RATE: 113 BPM

## 2024-09-16 ENCOUNTER — HOSPITAL ENCOUNTER (EMERGENCY)
Facility: HOSPITAL | Age: 69
Discharge: HOME | End: 2024-09-17
Attending: EMERGENCY MEDICINE
Payer: MEDICARE

## 2024-09-16 ENCOUNTER — APPOINTMENT (OUTPATIENT)
Dept: HEMATOLOGY/ONCOLOGY | Facility: HOSPITAL | Age: 69
End: 2024-09-16
Payer: MEDICARE

## 2024-09-16 DIAGNOSIS — N39.0 URINARY TRACT INFECTION WITHOUT HEMATURIA, SITE UNSPECIFIED: ICD-10-CM

## 2024-09-16 DIAGNOSIS — K59.00 CONSTIPATION, UNSPECIFIED CONSTIPATION TYPE: Primary | ICD-10-CM

## 2024-09-16 LAB
ALBUMIN SERPL BCP-MCNC: 4 G/DL (ref 3.4–5)
ALP SERPL-CCNC: 88 U/L (ref 33–136)
ALT SERPL W P-5'-P-CCNC: 20 U/L (ref 7–45)
ANION GAP SERPL CALC-SCNC: 17 MMOL/L (ref 10–20)
APPEARANCE UR: ABNORMAL
AST SERPL W P-5'-P-CCNC: 25 U/L (ref 9–39)
BACTERIA #/AREA URNS AUTO: ABNORMAL /HPF
BASOPHILS # BLD AUTO: 0.06 X10*3/UL (ref 0–0.1)
BASOPHILS NFR BLD AUTO: 0.7 %
BILIRUB SERPL-MCNC: 0.8 MG/DL (ref 0–1.2)
BILIRUB UR STRIP.AUTO-MCNC: NEGATIVE MG/DL
BUN SERPL-MCNC: 11 MG/DL (ref 6–23)
CALCIUM SERPL-MCNC: 9.3 MG/DL (ref 8.6–10.3)
CHLORIDE SERPL-SCNC: 95 MMOL/L (ref 98–107)
CO2 SERPL-SCNC: 26 MMOL/L (ref 21–32)
COLOR UR: YELLOW
CREAT SERPL-MCNC: 0.62 MG/DL (ref 0.5–1.05)
EGFRCR SERPLBLD CKD-EPI 2021: >90 ML/MIN/1.73M*2
EOSINOPHIL # BLD AUTO: 0.08 X10*3/UL (ref 0–0.7)
EOSINOPHIL NFR BLD AUTO: 1 %
ERYTHROCYTE [DISTWIDTH] IN BLOOD BY AUTOMATED COUNT: 13.2 % (ref 11.5–14.5)
GLUCOSE SERPL-MCNC: 103 MG/DL (ref 74–99)
GLUCOSE UR STRIP.AUTO-MCNC: NORMAL MG/DL
HCT VFR BLD AUTO: 46.6 % (ref 36–46)
HGB BLD-MCNC: 16.1 G/DL (ref 12–16)
HYALINE CASTS #/AREA URNS AUTO: ABNORMAL /LPF
IMM GRANULOCYTES # BLD AUTO: 0.03 X10*3/UL (ref 0–0.7)
IMM GRANULOCYTES NFR BLD AUTO: 0.4 % (ref 0–0.9)
KETONES UR STRIP.AUTO-MCNC: ABNORMAL MG/DL
LEUKOCYTE ESTERASE UR QL STRIP.AUTO: ABNORMAL
LYMPHOCYTES # BLD AUTO: 1.05 X10*3/UL (ref 1.2–4.8)
LYMPHOCYTES NFR BLD AUTO: 12.7 %
MAGNESIUM SERPL-MCNC: 1.5 MG/DL (ref 1.6–2.4)
MCH RBC QN AUTO: 29.5 PG (ref 26–34)
MCHC RBC AUTO-ENTMCNC: 34.5 G/DL (ref 32–36)
MCV RBC AUTO: 86 FL (ref 80–100)
MONOCYTES # BLD AUTO: 0.95 X10*3/UL (ref 0.1–1)
MONOCYTES NFR BLD AUTO: 11.4 %
MUCOUS THREADS #/AREA URNS AUTO: ABNORMAL /LPF
NEUTROPHILS # BLD AUTO: 6.13 X10*3/UL (ref 1.2–7.7)
NEUTROPHILS NFR BLD AUTO: 73.8 %
NITRITE UR QL STRIP.AUTO: ABNORMAL
NRBC BLD-RTO: 0 /100 WBCS (ref 0–0)
PH UR STRIP.AUTO: 6 [PH]
PLATELET # BLD AUTO: 195 X10*3/UL (ref 150–450)
POTASSIUM SERPL-SCNC: 4.3 MMOL/L (ref 3.5–5.3)
PROT SERPL-MCNC: 7.8 G/DL (ref 6.4–8.2)
PROT UR STRIP.AUTO-MCNC: ABNORMAL MG/DL
RBC # BLD AUTO: 5.45 X10*6/UL (ref 4–5.2)
RBC # UR STRIP.AUTO: ABNORMAL /UL
RBC #/AREA URNS AUTO: ABNORMAL /HPF
SODIUM SERPL-SCNC: 134 MMOL/L (ref 136–145)
SP GR UR STRIP.AUTO: 1.01
UROBILINOGEN UR STRIP.AUTO-MCNC: NORMAL MG/DL
WBC # BLD AUTO: 8.3 X10*3/UL (ref 4.4–11.3)
WBC #/AREA URNS AUTO: >50 /HPF

## 2024-09-16 PROCEDURE — 36415 COLL VENOUS BLD VENIPUNCTURE: CPT | Performed by: EMERGENCY MEDICINE

## 2024-09-16 PROCEDURE — 83735 ASSAY OF MAGNESIUM: CPT | Performed by: EMERGENCY MEDICINE

## 2024-09-16 PROCEDURE — 80053 COMPREHEN METABOLIC PANEL: CPT | Performed by: EMERGENCY MEDICINE

## 2024-09-16 PROCEDURE — 87086 URINE CULTURE/COLONY COUNT: CPT | Mod: PORLAB | Performed by: EMERGENCY MEDICINE

## 2024-09-16 PROCEDURE — 99284 EMERGENCY DEPT VISIT MOD MDM: CPT | Mod: 25

## 2024-09-16 PROCEDURE — 85025 COMPLETE CBC W/AUTO DIFF WBC: CPT | Performed by: EMERGENCY MEDICINE

## 2024-09-16 PROCEDURE — 2500000005 HC RX 250 GENERAL PHARMACY W/O HCPCS: Performed by: EMERGENCY MEDICINE

## 2024-09-16 PROCEDURE — 96365 THER/PROPH/DIAG IV INF INIT: CPT

## 2024-09-16 PROCEDURE — 96361 HYDRATE IV INFUSION ADD-ON: CPT

## 2024-09-16 PROCEDURE — 2500000004 HC RX 250 GENERAL PHARMACY W/ HCPCS (ALT 636 FOR OP/ED): Performed by: EMERGENCY MEDICINE

## 2024-09-16 PROCEDURE — 81001 URINALYSIS AUTO W/SCOPE: CPT | Performed by: EMERGENCY MEDICINE

## 2024-09-16 PROCEDURE — 96366 THER/PROPH/DIAG IV INF ADDON: CPT

## 2024-09-16 RX ORDER — MAGNESIUM SULFATE HEPTAHYDRATE 40 MG/ML
2 INJECTION, SOLUTION INTRAVENOUS ONCE
Status: COMPLETED | OUTPATIENT
Start: 2024-09-16 | End: 2024-09-16

## 2024-09-16 RX ORDER — LIDOCAINE 560 MG/1
1 PATCH PERCUTANEOUS; TOPICAL; TRANSDERMAL DAILY
Status: DISCONTINUED | OUTPATIENT
Start: 2024-09-16 | End: 2024-09-17 | Stop reason: HOSPADM

## 2024-09-16 RX ORDER — LIDOCAINE 560 MG/1
1 PATCH PERCUTANEOUS; TOPICAL; TRANSDERMAL DAILY
Status: DISCONTINUED | OUTPATIENT
Start: 2024-09-17 | End: 2024-09-16

## 2024-09-16 ASSESSMENT — PAIN SCALES - GENERAL: PAINLEVEL_OUTOF10: 6

## 2024-09-16 ASSESSMENT — PAIN - FUNCTIONAL ASSESSMENT: PAIN_FUNCTIONAL_ASSESSMENT: 0-10

## 2024-09-16 NOTE — ED PROVIDER NOTES
HPI   Chief Complaint   Patient presents with    Constipation       Patient presents emergency department secondary to constipation.  Patient was seen here earlier this week for similar symptoms.  At that time patient had a negative CT scan.  Urinalysis showed evidence of infection and she was placed on Keflex.  She tried a fleets enema at home and did get some stool yesterday but continues to feel full and distended and feels like she needs to have further bowel movement and has not.  She is receiving radiation therapy to the pelvis secondary to metastatic lung cancer.  She has had nausea and vomiting.  She is anticoagulated on Eliquis.  She has been taking it regularly since she was seen in the emergency department last.  No chest pain.  No shortness of breath.  She has chronic pelvic and back pain related to her metastatic lesions.  She does take oxycodone sparingly.                          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    Wedge compression fracture of t11-T12 vertebra, sequela 07/30/2020    Compression fracture of T11 vertebra, sequela     Past Surgical History:   Procedure Laterality Date    ARTERIAL ANEURYSM REPAIR      Thoracic aortic aneurysm repair    COLONOSCOPY      RADIOFREQUENCY ABLATION      L3,4,5    WISDOM TOOTH EXTRACTION       Family History   Problem Relation Name Age of Onset    Lymphoma Father      Polycythemia Father      Breast cancer Neg Hx      Colon cancer Neg Hx       Social History     Tobacco Use    Smoking status: Former     Current packs/day: 0.00     Average packs/day: 0.5 packs/day for 40.0 years (20.0 ttl pk-yrs)     Types: Cigarettes     Start date: 1973     Quit date:  2013     Years since quittin.7    Smokeless tobacco: Never   Vaping Use    Vaping status: Never Used   Substance Use Topics    Alcohol use: Not Currently     Comment: occassionally    Drug use: Not Currently       Physical Exam   ED Triage Vitals [24 1603]   Temperature Heart Rate Respirations BP   36.5 °C (97.7 °F) (!) 114 18 (!) 158/102      Pulse Ox Temp Source Heart Rate Source Patient Position   94 % Temporal Monitor --      BP Location FiO2 (%)     -- --       Physical Exam  Vitals and nursing note reviewed.   Constitutional:       Appearance: Normal appearance.   HENT:      Head: Normocephalic.      Nose: Nose normal.      Mouth/Throat:      Mouth: Mucous membranes are moist.   Eyes:      Extraocular Movements: Extraocular movements intact.      Pupils: Pupils are equal, round, and reactive to light.   Cardiovascular:      Rate and Rhythm: Normal rate and regular rhythm.      Pulses: Normal pulses.      Heart sounds: Normal heart sounds.   Pulmonary:      Effort: Pulmonary effort is normal.      Breath sounds: Normal breath sounds. No wheezing, rhonchi or rales.   Abdominal:      General: Abdomen is flat. Bowel sounds are normal.      Palpations: Abdomen is soft.      Tenderness: There is abdominal tenderness. There is no guarding or rebound.      Comments: Mild lower abdominal tenderness to palpation.  No peritoneal signs.   Genitourinary:     Comments: Rectal exam shows brown stool present.  There is no evidence of impaction.  Scant stool was present in the rectal vault.  Musculoskeletal:         General: No tenderness or deformity. Normal range of motion.      Cervical back: Normal range of motion.      Right lower leg: No edema.      Left lower leg: No edema.   Skin:     General: Skin is warm and dry.      Capillary Refill: Capillary refill takes less than 2 seconds.   Neurological:      General: No focal deficit present.      Mental Status: She is alert and oriented to person, place, and time.    Psychiatric:         Mood and Affect: Mood normal.         Behavior: Behavior normal.       Labs Reviewed   CBC WITH AUTO DIFFERENTIAL - Abnormal       Result Value    WBC 8.3      nRBC 0.0      RBC 5.45 (*)     Hemoglobin 16.1 (*)     Hematocrit 46.6 (*)     MCV 86      MCH 29.5      MCHC 34.5      RDW 13.2      Platelets 195      Neutrophils % 73.8      Immature Granulocytes %, Automated 0.4      Lymphocytes % 12.7      Monocytes % 11.4      Eosinophils % 1.0      Basophils % 0.7      Neutrophils Absolute 6.13      Immature Granulocytes Absolute, Automated 0.03      Lymphocytes Absolute 1.05 (*)     Monocytes Absolute 0.95      Eosinophils Absolute 0.08      Basophils Absolute 0.06     MAGNESIUM - Abnormal    Magnesium 1.50 (*)    COMPREHENSIVE METABOLIC PANEL - Abnormal    Glucose 103 (*)     Sodium 134 (*)     Potassium 4.3      Chloride 95 (*)     Bicarbonate 26      Anion Gap 17      Urea Nitrogen 11      Creatinine 0.62      eGFR >90      Calcium 9.3      Albumin 4.0      Alkaline Phosphatase 88      Total Protein 7.8      AST 25      Bilirubin, Total 0.8      ALT 20     URINALYSIS WITH REFLEX CULTURE AND MICROSCOPIC    Narrative:     The following orders were created for panel order Urinalysis with Reflex Culture and Microscopic.  Procedure                               Abnormality         Status                     ---------                               -----------         ------                     Urinalysis with Reflex C...[[id number]]                                                 Extra Urine Gray Tube[[id number]]                                                         Please view results for these tests on the individual orders.   URINALYSIS WITH REFLEX CULTURE AND MICROSCOPIC   EXTRA URINE GRAY TUBE     Pain Management Panel           No data to display              No orders to display     ED Course & MDM   Diagnoses as of 09/17/24 0005   Constipation, unspecified constipation type   Urinary tract  infection without hematuria, site unspecified       Medical Decision Making  Patient presents emergency department secondary to abdominal pain constipation nausea and vomiting.  Patient was seen here earlier this week for similar symptoms.  CT scan showed chronic changes.  Patient was offered a soapsuds enema at that time and declined.  She did try a fleets enema at home with partial relief of symptoms.  Patient presents because of ongoing abdominal discomfort and nausea.  Patient is evaluated in the emergency department with laboratory workup and given IV fluids.  Patient did have some response to enema in the emergency department.  Laboratory workup does not show remarkable abnormality.  No evidence of acute electrolyte abnormality, or kidney dysfunction.  Urinalysis still shows evidence of infection.  I went back and discussed with the patient whether or not she had picked up her antibiotics that were sent after her last visit.  She states that she did not know that they were sent and did not pick them up and has not been taking them.  Prescription for Keflex is also sent to the pharmacy for her.  Patient is stable for discharge at this time she is tolerating oral fluids.  She will follow-up with her oncologist or return here for worsening or concerning symptoms.        Procedure  Procedures     Carmencita Jones MD  09/17/24 0005

## 2024-09-16 NOTE — ED TRIAGE NOTES
"Pt to ed via private vehicle from home c/o constipation X over a week. Pt states recently seen here r/t radiation side effects, given IV fluids and sent hoe. Pt states IV fluids see to help with symptoms, requesting that those be given again. Pt states attempted to do fleet enema at home with no success, states used to be able to pass gas but \"feels blocked up again\". Pt states receiving radiation but no chemo as of now.  " Contacted radiology department at [place].  They would like patient to hold Warfarin for at least 3 days prior to injection for CT arthrogram.      Contacted patient to let him know that he will need to discuss this with his cardiologist and get approval before holding Warfarin.  Patient was under the impression that Dr. Bridges was going to personally speak with his cardiologist about this to see whether Lovenox bridging was required.      Will confirm with Dr. Bridges.

## 2024-09-17 ENCOUNTER — TELEPHONE (OUTPATIENT)
Dept: PALLIATIVE MEDICINE | Facility: HOSPITAL | Age: 69
End: 2024-09-17
Payer: MEDICARE

## 2024-09-17 ENCOUNTER — TELEPHONE (OUTPATIENT)
Dept: PHARMACY | Facility: HOSPITAL | Age: 69
End: 2024-09-17
Payer: MEDICARE

## 2024-09-17 VITALS
OXYGEN SATURATION: 95 % | HEIGHT: 66 IN | SYSTOLIC BLOOD PRESSURE: 152 MMHG | RESPIRATION RATE: 20 BRPM | DIASTOLIC BLOOD PRESSURE: 96 MMHG | BODY MASS INDEX: 25.71 KG/M2 | WEIGHT: 160 LBS | HEART RATE: 105 BPM | TEMPERATURE: 97.7 F

## 2024-09-17 LAB — HOLD SPECIMEN: NORMAL

## 2024-09-17 RX ORDER — METOCLOPRAMIDE 10 MG/1
10 TABLET ORAL EVERY 6 HOURS PRN
Qty: 28 TABLET | Refills: 0 | Status: SHIPPED | OUTPATIENT
Start: 2024-09-16 | End: 2024-09-23

## 2024-09-17 RX ORDER — CEPHALEXIN 500 MG/1
500 CAPSULE ORAL 3 TIMES DAILY
Qty: 30 CAPSULE | Refills: 0 | Status: SHIPPED | OUTPATIENT
Start: 2024-09-17 | End: 2024-09-27

## 2024-09-17 NOTE — PROGRESS NOTES
EDPD Note: Duration Adjust    Contacted Brittny CORNEL Evan regarding contaminated urine culture/result that was taken during their recent emergency room visit. I completed education with  patient . The patient is being treated with the proper medication; however, the duration of the discharge prescription is incorrect. I gave verbal education about the new medication duration found below. No further follow up needed from EDPD Team.     EDPD Note: Dose Change    Contacted Brittny UNGER Evan regarding contaminated urine culture/result that was taken during their recent emergency room visit. I completed education with patient. The patient is being treated with the proper medication; however, the dose of the discharge prescription is incorrect. I gave verbal education about the new medication dose found below. No further follow up needed from EDPD Team.     Patient presented to the ED for N/V and abdominal pain, she did not endorse urinary symptoms in the ED. She was discharged on Keflex 500mg QID x10. She then presented to the ED 9/16 for constipation. She had not picked up the antibiotics at that time, and did not report urinary symptoms. At this time, repeat urine sample was collected. Patient aware that she will receive another call if that sample is positive. At time of call, patient is unsure if she is experiencing any urinary symptoms, due to other current health issues. Advised patient to follow up with PCP or ED if symptoms develop and not resolved by antibiotics.    Drug: keflex 500mg  Sig: BID x5  Days Supply: 5    Urine Culture Multiple organisms present, probable contamination. Repeat culture if clinically indicated. Abnormal         If there are any other questions for the ED Post-Discharge Follow Up Team, please contact [phone]. Fax: [phone].     Jazmin Souza, BudD

## 2024-09-17 NOTE — TELEPHONE ENCOUNTER
Called patient to see how she was tolerating Zyprexa since prescribed by Susan Leroy NP with [facility].  Patient stated she was not taking the medication.  Patient was admitted to the ED yesterday due to constipation, nausea, and vomiting.  Patient stated she has been taking oxycodone as needed for pain.  She is only able to eat yogurt and drink some water.  Patient received fluids in ED which did make her feel a lot better.  Patient encouraged to take Miralax and Colace while taking oxycodone.  Patient was prescribed Reglan in ED for nausea.  She has not started taking the medication yet but she was instructed to not take Zyprexa if she takes Reglan.  Office will call patient in a couple days to check on nausea and constipation.

## 2024-09-18 ENCOUNTER — TELEPHONE (OUTPATIENT)
Dept: HEMATOLOGY/ONCOLOGY | Facility: HOSPITAL | Age: 69
End: 2024-09-18
Payer: MEDICARE

## 2024-09-19 ENCOUNTER — TELEPHONE (OUTPATIENT)
Dept: HEMATOLOGY/ONCOLOGY | Facility: HOSPITAL | Age: 69
End: 2024-09-19

## 2024-09-19 DIAGNOSIS — G89.3 CANCER ASSOCIATED PAIN: Primary | ICD-10-CM

## 2024-09-19 LAB — BACTERIA UR CULT: ABNORMAL

## 2024-09-19 RX ORDER — LIDOCAINE 50 MG/G
1 PATCH TOPICAL DAILY
Qty: 30 PATCH | Refills: 1 | Status: ON HOLD | OUTPATIENT
Start: 2024-09-19

## 2024-09-19 NOTE — TELEPHONE ENCOUNTER
Brittny is asking about getting a prescription for lidocaine patches 5%. She is aware these will not be covered by insurance and is agreeable.

## 2024-09-19 NOTE — TELEPHONE ENCOUNTER
We're working on getting this patient in at [facility].     Dr. Artis agreed to see her at [facility] as a back up.     Dr. Chaudhary is aware and agreeable to plan.

## 2024-09-20 ENCOUNTER — DOCUMENTATION (OUTPATIENT)
Dept: PHARMACY | Facility: HOSPITAL | Age: 69
End: 2024-09-20
Payer: MEDICARE

## 2024-09-20 ENCOUNTER — TELEPHONE (OUTPATIENT)
Dept: PALLIATIVE MEDICINE | Facility: CLINIC | Age: 69
End: 2024-09-20
Payer: MEDICARE

## 2024-09-20 ENCOUNTER — TELEPHONE (OUTPATIENT)
Dept: PRIMARY CARE | Facility: CLINIC | Age: 69
End: 2024-09-20
Payer: MEDICARE

## 2024-09-20 ENCOUNTER — TELEPHONE (OUTPATIENT)
Dept: PHARMACY | Facility: HOSPITAL | Age: 69
End: 2024-09-20
Payer: MEDICARE

## 2024-09-20 DIAGNOSIS — N39.0 UTI (URINARY TRACT INFECTION), UNCOMPLICATED: Primary | ICD-10-CM

## 2024-09-20 RX ORDER — NITROFURANTOIN 25; 75 MG/1; MG/1
100 CAPSULE ORAL 2 TIMES DAILY
Qty: 10 CAPSULE | Refills: 0 | Status: SHIPPED | OUTPATIENT
Start: 2024-09-20 | End: 2024-09-25

## 2024-09-20 NOTE — TELEPHONE ENCOUNTER
Patient called and stated that she is in a lot of pain and would like a referral for a podiatrist.

## 2024-09-20 NOTE — TELEPHONE ENCOUNTER
Patient called to check to see how her constipation and nausea were doing since last phone call on Tues 9/17.  Patient stated she was prescribed Reglan but does not take it all the time.  She still has decreased appetite but tries to drink more water.  Patient stated her constipation and nausea remain about the same.  Patient will follow up virtually with Susan Leroy NP on 10/15.  Will [facility] office with any further questions on concerns.

## 2024-09-20 NOTE — PROGRESS NOTES
EDPD Note: Bug-Drug Mismatch    Contacted Mr./Mrs./Ms. Brittny Gordon regarding a positive E. Faecalis urine culture/result that was taken during their recent emergency room visit. I completed education with patient. The patient is not being treated appropriately with Keflex 500 mg tid for 10 days.     The following prescription was sent to the patient's preferred pharmacy. No further follow up needed from EDPD Team.   Drug: Nitrofurantoin 100 mg  Sig: take I capsule bid  Days Supply: 5     Patient presented to the ED for constipation and endorsed N/V. Patient has chronic pelvic and back pain related to metastatic lesions. Patient did not endorse any urinary symptoms but was discharged home on Keflex 500 mg tid for 10 day for UTI. Of note, Patient did endorse urinary symptoms at previous ED visit on 9/12.Since antibiotic doesn't cover pathogen writer recommends Nitrofurantoin 100 mg bid for 5 days. During phone conversation patient did endorse urinary symptoms. Patient was educated on antibiotic.      Susceptibility data from last 90 days.  Collected Specimen Info Organism Ampicillin Ciprofloxacin Levofloxacin Nitrofurantoin Penicillin Trimethoprim/Sulfamethoxazole Vancomycin   09/16/24 Urine from Clean Catch/Voided Enterococcus faecalis  S  S  S  S  S  R  S       SHIRLEY S SAHAL-GREEN

## 2024-09-23 DIAGNOSIS — M79.673 PAIN OF FOOT, UNSPECIFIED LATERALITY: Primary | ICD-10-CM

## 2024-09-24 ENCOUNTER — OFFICE VISIT (OUTPATIENT)
Dept: HEMATOLOGY/ONCOLOGY | Facility: CLINIC | Age: 69
End: 2024-09-24
Payer: MEDICARE

## 2024-09-24 VITALS
HEIGHT: 64 IN | WEIGHT: 138.45 LBS | TEMPERATURE: 97.3 F | RESPIRATION RATE: 16 BRPM | HEART RATE: 70 BPM | BODY MASS INDEX: 23.64 KG/M2 | OXYGEN SATURATION: 96 % | DIASTOLIC BLOOD PRESSURE: 110 MMHG | SYSTOLIC BLOOD PRESSURE: 157 MMHG

## 2024-09-24 DIAGNOSIS — C34.91 PRIMARY MALIGNANT NEOPLASM OF RIGHT LUNG METASTATIC TO OTHER SITE (MULTI): Primary | ICD-10-CM

## 2024-09-24 LAB
ALBUMIN SERPL BCP-MCNC: 3.6 G/DL (ref 3.4–5)
ALP SERPL-CCNC: 89 U/L (ref 33–136)
ALT SERPL W P-5'-P-CCNC: 18 U/L (ref 7–45)
ANION GAP SERPL CALC-SCNC: 14 MMOL/L (ref 10–20)
AST SERPL W P-5'-P-CCNC: 21 U/L (ref 9–39)
BASOPHILS # BLD AUTO: 0.01 X10*3/UL (ref 0–0.1)
BASOPHILS NFR BLD AUTO: 0.1 %
BILIRUB SERPL-MCNC: 0.7 MG/DL (ref 0–1.2)
BUN SERPL-MCNC: 8 MG/DL (ref 6–23)
CALCIUM SERPL-MCNC: 9.3 MG/DL (ref 8.6–10.3)
CHLORIDE SERPL-SCNC: 94 MMOL/L (ref 98–107)
CO2 SERPL-SCNC: 30 MMOL/L (ref 21–32)
CORTIS AM PEAK SERPL-MSCNC: 15.6 UG/DL (ref 5–20)
CREAT SERPL-MCNC: 0.53 MG/DL (ref 0.5–1.05)
EGFRCR SERPLBLD CKD-EPI 2021: >90 ML/MIN/1.73M*2
EOSINOPHIL # BLD AUTO: 0.16 X10*3/UL (ref 0–0.7)
EOSINOPHIL NFR BLD AUTO: 2.3 %
ERYTHROCYTE [DISTWIDTH] IN BLOOD BY AUTOMATED COUNT: 13.3 % (ref 11.5–14.5)
GLUCOSE SERPL-MCNC: 113 MG/DL (ref 74–99)
HBV CORE AB SER QL: NONREACTIVE
HBV SURFACE AB SER-ACNC: <3.1 MIU/ML
HBV SURFACE AG SERPL QL IA: NONREACTIVE
HCT VFR BLD AUTO: 41.3 % (ref 36–46)
HGB BLD-MCNC: 13.8 G/DL (ref 12–16)
IMM GRANULOCYTES # BLD AUTO: 0.02 X10*3/UL (ref 0–0.7)
IMM GRANULOCYTES NFR BLD AUTO: 0.3 % (ref 0–0.9)
LYMPHOCYTES # BLD AUTO: 1.01 X10*3/UL (ref 1.2–4.8)
LYMPHOCYTES NFR BLD AUTO: 14.7 %
MCH RBC QN AUTO: 28.8 PG (ref 26–34)
MCHC RBC AUTO-ENTMCNC: 33.4 G/DL (ref 32–36)
MCV RBC AUTO: 86 FL (ref 80–100)
MONOCYTES # BLD AUTO: 0.91 X10*3/UL (ref 0.1–1)
MONOCYTES NFR BLD AUTO: 13.2 %
NEUTROPHILS # BLD AUTO: 4.77 X10*3/UL (ref 1.2–7.7)
NEUTROPHILS NFR BLD AUTO: 69.4 %
PLATELET # BLD AUTO: 166 X10*3/UL (ref 150–450)
POTASSIUM SERPL-SCNC: 2.9 MMOL/L (ref 3.5–5.3)
PROT SERPL-MCNC: 7 G/DL (ref 6.4–8.2)
RBC # BLD AUTO: 4.8 X10*6/UL (ref 4–5.2)
SODIUM SERPL-SCNC: 135 MMOL/L (ref 136–145)
TSH SERPL-ACNC: 1.36 MIU/L (ref 0.44–3.98)
WBC # BLD AUTO: 6.9 X10*3/UL (ref 4.4–11.3)

## 2024-09-24 PROCEDURE — 86704 HEP B CORE ANTIBODY TOTAL: CPT | Mod: PORLAB | Performed by: INTERNAL MEDICINE

## 2024-09-24 PROCEDURE — 99214 OFFICE O/P EST MOD 30 MIN: CPT | Performed by: INTERNAL MEDICINE

## 2024-09-24 PROCEDURE — 36415 COLL VENOUS BLD VENIPUNCTURE: CPT | Performed by: INTERNAL MEDICINE

## 2024-09-24 PROCEDURE — 84443 ASSAY THYROID STIM HORMONE: CPT | Performed by: INTERNAL MEDICINE

## 2024-09-24 PROCEDURE — 3008F BODY MASS INDEX DOCD: CPT | Performed by: INTERNAL MEDICINE

## 2024-09-24 PROCEDURE — 1125F AMNT PAIN NOTED PAIN PRSNT: CPT | Performed by: INTERNAL MEDICINE

## 2024-09-24 PROCEDURE — 86706 HEP B SURFACE ANTIBODY: CPT | Mod: PORLAB | Performed by: INTERNAL MEDICINE

## 2024-09-24 PROCEDURE — 82024 ASSAY OF ACTH: CPT | Performed by: INTERNAL MEDICINE

## 2024-09-24 PROCEDURE — 3077F SYST BP >= 140 MM HG: CPT | Performed by: INTERNAL MEDICINE

## 2024-09-24 PROCEDURE — 80053 COMPREHEN METABOLIC PANEL: CPT | Performed by: INTERNAL MEDICINE

## 2024-09-24 PROCEDURE — 82533 TOTAL CORTISOL: CPT | Mod: PORLAB | Performed by: INTERNAL MEDICINE

## 2024-09-24 PROCEDURE — 99204 OFFICE O/P NEW MOD 45 MIN: CPT | Performed by: INTERNAL MEDICINE

## 2024-09-24 PROCEDURE — 1160F RVW MEDS BY RX/DR IN RCRD: CPT | Performed by: INTERNAL MEDICINE

## 2024-09-24 PROCEDURE — 3080F DIAST BP >= 90 MM HG: CPT | Performed by: INTERNAL MEDICINE

## 2024-09-24 PROCEDURE — 1159F MED LIST DOCD IN RCRD: CPT | Performed by: INTERNAL MEDICINE

## 2024-09-24 PROCEDURE — 87340 HEPATITIS B SURFACE AG IA: CPT | Mod: PORLAB | Performed by: INTERNAL MEDICINE

## 2024-09-24 PROCEDURE — 1111F DSCHRG MED/CURRENT MED MERGE: CPT | Performed by: INTERNAL MEDICINE

## 2024-09-24 PROCEDURE — 1157F ADVNC CARE PLAN IN RCRD: CPT | Performed by: INTERNAL MEDICINE

## 2024-09-24 PROCEDURE — 85025 COMPLETE CBC W/AUTO DIFF WBC: CPT | Performed by: INTERNAL MEDICINE

## 2024-09-24 RX ORDER — ALBUTEROL SULFATE 0.83 MG/ML
3 SOLUTION RESPIRATORY (INHALATION) AS NEEDED
OUTPATIENT
Start: 2024-11-12

## 2024-09-24 RX ORDER — PROCHLORPERAZINE EDISYLATE 5 MG/ML
10 INJECTION INTRAMUSCULAR; INTRAVENOUS EVERY 6 HOURS PRN
OUTPATIENT
Start: 2024-12-03

## 2024-09-24 RX ORDER — DIPHENHYDRAMINE HYDROCHLORIDE 50 MG/ML
50 INJECTION INTRAMUSCULAR; INTRAVENOUS AS NEEDED
OUTPATIENT
Start: 2024-10-01

## 2024-09-24 RX ORDER — EPINEPHRINE 0.3 MG/.3ML
0.3 INJECTION SUBCUTANEOUS EVERY 5 MIN PRN
OUTPATIENT
Start: 2024-11-12

## 2024-09-24 RX ORDER — PROCHLORPERAZINE MALEATE 10 MG
10 TABLET ORAL EVERY 6 HOURS PRN
OUTPATIENT
Start: 2025-01-14

## 2024-09-24 RX ORDER — FAMOTIDINE 10 MG/ML
20 INJECTION INTRAVENOUS ONCE AS NEEDED
OUTPATIENT
Start: 2024-10-22

## 2024-09-24 RX ORDER — ALBUTEROL SULFATE 0.83 MG/ML
3 SOLUTION RESPIRATORY (INHALATION) AS NEEDED
OUTPATIENT
Start: 2024-10-22

## 2024-09-24 RX ORDER — PROCHLORPERAZINE EDISYLATE 5 MG/ML
10 INJECTION INTRAMUSCULAR; INTRAVENOUS EVERY 6 HOURS PRN
OUTPATIENT
Start: 2024-11-12

## 2024-09-24 RX ORDER — FAMOTIDINE 10 MG/ML
20 INJECTION INTRAVENOUS ONCE AS NEEDED
OUTPATIENT
Start: 2024-12-24

## 2024-09-24 RX ORDER — ALBUTEROL SULFATE 0.83 MG/ML
3 SOLUTION RESPIRATORY (INHALATION) AS NEEDED
OUTPATIENT
Start: 2024-12-24

## 2024-09-24 RX ORDER — EPINEPHRINE 0.3 MG/.3ML
0.3 INJECTION SUBCUTANEOUS EVERY 5 MIN PRN
OUTPATIENT
Start: 2024-10-22

## 2024-09-24 RX ORDER — ALBUTEROL SULFATE 0.83 MG/ML
3 SOLUTION RESPIRATORY (INHALATION) AS NEEDED
OUTPATIENT
Start: 2024-10-01

## 2024-09-24 RX ORDER — EPINEPHRINE 0.3 MG/.3ML
0.3 INJECTION SUBCUTANEOUS EVERY 5 MIN PRN
OUTPATIENT
Start: 2024-12-24

## 2024-09-24 RX ORDER — PROCHLORPERAZINE EDISYLATE 5 MG/ML
10 INJECTION INTRAMUSCULAR; INTRAVENOUS EVERY 6 HOURS PRN
OUTPATIENT
Start: 2024-12-24

## 2024-09-24 RX ORDER — PROCHLORPERAZINE MALEATE 10 MG
10 TABLET ORAL EVERY 6 HOURS PRN
OUTPATIENT
Start: 2024-12-03

## 2024-09-24 RX ORDER — PROCHLORPERAZINE EDISYLATE 5 MG/ML
10 INJECTION INTRAMUSCULAR; INTRAVENOUS EVERY 6 HOURS PRN
OUTPATIENT
Start: 2025-01-14

## 2024-09-24 RX ORDER — PROCHLORPERAZINE MALEATE 10 MG
10 TABLET ORAL EVERY 6 HOURS PRN
OUTPATIENT
Start: 2024-12-24

## 2024-09-24 RX ORDER — FAMOTIDINE 10 MG/ML
20 INJECTION INTRAVENOUS ONCE AS NEEDED
OUTPATIENT
Start: 2024-12-03

## 2024-09-24 RX ORDER — PROCHLORPERAZINE EDISYLATE 5 MG/ML
10 INJECTION INTRAMUSCULAR; INTRAVENOUS EVERY 6 HOURS PRN
OUTPATIENT
Start: 2024-10-22

## 2024-09-24 RX ORDER — PROCHLORPERAZINE MALEATE 10 MG
10 TABLET ORAL EVERY 6 HOURS PRN
OUTPATIENT
Start: 2024-10-01

## 2024-09-24 RX ORDER — PROCHLORPERAZINE MALEATE 10 MG
10 TABLET ORAL EVERY 6 HOURS PRN
OUTPATIENT
Start: 2024-11-12

## 2024-09-24 RX ORDER — EPINEPHRINE 0.3 MG/.3ML
0.3 INJECTION SUBCUTANEOUS EVERY 5 MIN PRN
OUTPATIENT
Start: 2025-01-14

## 2024-09-24 RX ORDER — DIPHENHYDRAMINE HYDROCHLORIDE 50 MG/ML
50 INJECTION INTRAMUSCULAR; INTRAVENOUS AS NEEDED
OUTPATIENT
Start: 2024-10-22

## 2024-09-24 RX ORDER — ALBUTEROL SULFATE 0.83 MG/ML
3 SOLUTION RESPIRATORY (INHALATION) AS NEEDED
OUTPATIENT
Start: 2024-12-03

## 2024-09-24 RX ORDER — DIPHENHYDRAMINE HYDROCHLORIDE 50 MG/ML
50 INJECTION INTRAMUSCULAR; INTRAVENOUS AS NEEDED
OUTPATIENT
Start: 2024-12-24

## 2024-09-24 RX ORDER — PROCHLORPERAZINE EDISYLATE 5 MG/ML
10 INJECTION INTRAMUSCULAR; INTRAVENOUS EVERY 6 HOURS PRN
OUTPATIENT
Start: 2024-10-01

## 2024-09-24 RX ORDER — PROCHLORPERAZINE MALEATE 10 MG
10 TABLET ORAL EVERY 6 HOURS PRN
OUTPATIENT
Start: 2024-10-22

## 2024-09-24 RX ORDER — EPINEPHRINE 0.3 MG/.3ML
0.3 INJECTION SUBCUTANEOUS EVERY 5 MIN PRN
OUTPATIENT
Start: 2024-12-03

## 2024-09-24 RX ORDER — FAMOTIDINE 10 MG/ML
20 INJECTION INTRAVENOUS ONCE AS NEEDED
OUTPATIENT
Start: 2025-01-14

## 2024-09-24 RX ORDER — DIPHENHYDRAMINE HYDROCHLORIDE 50 MG/ML
50 INJECTION INTRAMUSCULAR; INTRAVENOUS AS NEEDED
OUTPATIENT
Start: 2024-12-03

## 2024-09-24 RX ORDER — DIPHENHYDRAMINE HYDROCHLORIDE 50 MG/ML
50 INJECTION INTRAMUSCULAR; INTRAVENOUS AS NEEDED
OUTPATIENT
Start: 2025-01-14

## 2024-09-24 RX ORDER — FAMOTIDINE 10 MG/ML
20 INJECTION INTRAVENOUS ONCE AS NEEDED
OUTPATIENT
Start: 2024-10-01

## 2024-09-24 RX ORDER — DIPHENHYDRAMINE HYDROCHLORIDE 50 MG/ML
50 INJECTION INTRAMUSCULAR; INTRAVENOUS AS NEEDED
OUTPATIENT
Start: 2024-11-12

## 2024-09-24 RX ORDER — ALBUTEROL SULFATE 0.83 MG/ML
3 SOLUTION RESPIRATORY (INHALATION) AS NEEDED
OUTPATIENT
Start: 2025-01-14

## 2024-09-24 RX ORDER — EPINEPHRINE 0.3 MG/.3ML
0.3 INJECTION SUBCUTANEOUS EVERY 5 MIN PRN
OUTPATIENT
Start: 2024-10-01

## 2024-09-24 RX ORDER — FAMOTIDINE 10 MG/ML
20 INJECTION INTRAVENOUS ONCE AS NEEDED
OUTPATIENT
Start: 2024-11-12

## 2024-09-24 ASSESSMENT — NCCN CANCER DISTRESS MANAGEMENT
NCCN PHYSICAL CONCERNS: 1
NCCN PHYSICAL CONCERNS: 9
NCCN PHYSICAL CONCERNS: 8

## 2024-09-24 ASSESSMENT — PAIN SCALES - GENERAL: PAINLEVEL: 10-WORST PAIN EVER

## 2024-09-24 NOTE — PATIENT INSTRUCTIONS
Appointment with Dr. Feliz to establish care for non small cell lung cancer.     You were consented for treatment with immunotherapy medication called pembrolizumab.     Treatment will be given every 3 weeks.

## 2024-09-24 NOTE — PROGRESS NOTES
Patient Name: Brittny Gordon                     : 1955                                    MRN: [id number]                                    Age: 69 y.o.                               Gender: female  Referring Provider:   Dr. 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 Liquid biopsy at diagnosis 2024 positive for KRAS G12V, TP53 M243T, JAK2 and KEAP 1.     EBUS 2024 of the PDL-1 TPS 55%; NGS (in-house): KEAP1 p.G419W ([id number] c.1255G>T);   KRAS p.G12V ([id number] c.35G>T)   Treatment Summary:  - 2024 - 2024: 3000 cGy to T12-L3 spine (Dr. Ospina)    Presenting History        At time of initial presentation a 69 y.o. female, former smoker (started at age 17, 0.5 pack per day, quitted at age 56) with a past medical history of HTN, HLD and osteoporosis, aortic aneurysm s/p repairment in  referred for management of newly diagnosed lung cancer.      2019: CT chest (+): Noncalcified nodule in the left upper lobe adjacent to the aortic arch, nodule measuring 1.3 x 1.3 x 0.8cm.     2019: Pet/CT: 1. Mild hypermetabolic tracer activity corresponding to the known posteromedial left upper lobe lung nodule.     2021: CT chest (-): stable URMILA nodule 1.1x1.0cm. Again stable in 2021, 2022, 2022.      However on the CT chest on 2023 the URMILA nodule measured 1.6x1.4cm, which has been growing slowly compared to before, There is also a irregular shape solid-appearing nodule in the RUL measuring up to 0.8cm.      2024: CT chest (-) showed interval increase of the RUL nodule measuring 1.1cm. Multiple new nodules in both lower lobes measuring 0.4cm or smaller.      2024: Pet/CT: Enlarging FDG avid 1.1 cm spiculated nodule in the right upper lobe, as well as FDG avid mediastinal and bilateral hilar nodes is suspicious for a primary lung neoplasm  with teresa metastases. Mid FDG avid left upper lobe paravertebral nodule, grossly stable in comparison to prior CT in 2019, favored to be benign. Multiple FDG avid bone lesions throughout the axial and appendicular skeleton, likely representing widespread bone metastases. FDG avidity within the right ribs as well as lower thoracic and lumbar vertebral bodies as described above, could represent pathologic fractures in the setting of metastatic disease.        7/19/2024: MRI brain (+/-):  7 mm avidly enhancing extra-axial appearing nodule along the left pontine medullary junction adjacent to the left vertebral artery. 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 Clinical correlation and attention on follow-up suggested. No additional abnormal enhancement identified.     8/5/2024: Underwent EBUS with FNA of 11Rs, 3R, 10R and RUL nodule: path showed non small cell carcinoma. IHC showed positive for AE1/AE3, CK7 and negative for TTF1, p40 and INSM1. PDL-1 TPS 55%. Additional immunostain show the tumor cells are negative for TRPS1, PAX8, and CDX2. SMARCA4 immunostain shows retained nuclear expression. these findings argue against breast, GYN, and colorectal primaries, respectively. Overall, findings could be compatible with a lung primary non-small cell carcinoma in appropriate clinical and radiologic settings. PDL-1 TPS 55%; NGS (in-house): KEAP1 p.G419W ([id number] c.1255G>T); KRAS p.G12V ([id number] c.35G>T)     She was evaluated at [facility] and offered palliative systemic therapy including chemotherapy and immunotherapy.  Carboplatin, Taxol and pembrolizumab        PMHx: HTN, HLD, and osteoporosis  Family history: father had lymphoma. Bother and sister passed away at early 40s. Mother had skin cancer (rare type but unknown type); brother has prostate cancer.   Shx: Occasional drinker. CEPA.                Interval History    9/24/24    She presents for a follow up visit accompanied by  her brother.  Status post palliative radiotherapy to L-spine.    Patient is very anxious complaining of weakness fatigue patient has persistent lower back pain and mid thoracic pain.  Patient not very active ECOG 3, patient also complaining anorexia not eating very well losing weight approximately she lost 15 pounds during past 4 to 6 weeks.  No shortness of breath nonspecific chest pain no hemoptysis.        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    Wedge compression fracture of t11-T12 vertebra, sequela 07/30/2020     Compression fracture of T11 vertebra, sequela            Surgical History:   Surgical History         Past Surgical History:   Procedure Laterality Date    ARTERIAL ANEURYSM REPAIR         Thoracic aortic aneurysm repair    COLONOSCOPY        RADIOFREQUENCY ABLATION         L3,4,5    WISDOM TOOTH EXTRACTION                Family History:  Family History          Family History   Problem Relation Name Age of Onset    Lymphoma Father        Polycythemia Father        Breast cancer Neg Hx        Colon cancer Neg Hx                Allergies:  Allergies        Allergies   Allergen Reactions    Acetaminophen GI Upset    Epinephrine Nausea/vomiting and Palpitations            Meds (Current):    Current Medications      Current Outpatient Medications:     atorvastatin (Lipitor) 80 mg tablet, Take 1 tablet (80 mg) by mouth once daily., Disp: , Rfl:     bisacodyl (Dulcolax) 5 mg EC tablet, Take 1 tablet (5 mg) by mouth once daily as needed for constipation. Do not crush, chew, or  split., Disp: 30 tablet, Rfl: 0    bisacodyl (Dulcolax) 5 mg EC tablet, Take 1 tablet (5 mg) by mouth once daily as needed for constipation. Do not crush, chew, or split., Disp: 30 tablet, Rfl: 0    cetirizine (ZyrTEC) 10 mg tablet, Take 1 tablet (10 mg) by mouth once daily as needed for allergies., Disp: , Rfl:     cholecalciferol (Vitamin D3) 25 MCG (1000 UT) tablet, Take 1 tablet (1,000 Units) by mouth once daily., Disp: , Rfl:     cyclobenzaprine (Flexeril) 10 mg tablet, Take 1 tablet (10 mg) by mouth 3 times a day as needed for muscle spasms., Disp: 90 tablet, Rfl: 2    docusate sodium (Colace) 100 mg capsule, Take 1 capsule (100 mg) by mouth 2 times a day., Disp: 60 capsule, Rfl: 0    docusate sodium (Colace) 100 mg capsule, Take 1 capsule (100 mg) by mouth 2 times a day., Disp: 30 capsule, Rfl: 1    gabapentin (Neurontin) 300 mg capsule, Take 1 capsule (300 mg) by mouth once daily at bedtime., Disp: 30 capsule, Rfl: 2    ibandronate (Boniva) 150 mg tablet, Take 1 tablet (150 mg) by mouth every 30 (thirty) days. Take in morning with full glass of water on an empty stomach. No food, drink, meds, or lying down for 60 minutes after., Disp: 3 tablet, Rfl: 3    LORazepam (Ativan) 0.5 mg tablet, Take 1 tablet (0.5 mg) by mouth once daily as needed for anxiety (PRIOR to RADIATION) for up to 10 days., Disp: 10 tablet, Rfl: 0    losartan (Cozaar) 100 mg tablet, TAKE 1 TABLET BY MOUTH EVERY DAY, Disp: 90 tablet, Rfl: 3    menthol (ICY HOT ADVANCED RELIEF PATCH TOP), Apply 1 patch topically every 12 hours if needed., Disp: , Rfl:     metoprolol succinate XL (Toprol-XL) 100 mg 24 hr tablet, TAKE 1 TABLET BY MOUTH EVERY DAY, Disp: 90 tablet, Rfl: 3    morphine CR (MS Contin) 15 mg 12 hr tablet, Take 1 tablet (15 mg) by mouth 2 times a day for 15 days. Do not crush, chew, or split., Disp: 30 tablet, Rfl: 0    naloxone (Narcan) 4 mg/0.1 mL nasal spray, Administer 1 spray (4 mg) into affected nostril(s) if needed for opioid  reversal or respiratory depression. May repeat every 2-3 minutes if needed, alternating nostrils, until medical assistance becomes available., Disp: 2 each, Rfl: 0    OLANZapine (ZyPREXA) 5 mg tablet, Take 1 tablet (5 mg) by mouth once daily at bedtime. For 4 days starting the evening of treatment, Disp: 4 tablet, Rfl: 3    omeprazole OTC (PriLOSEC OTC) 20 mg EC tablet, Take 1 tablet (20 mg) by mouth once daily. Do not crush, chew, or split., Disp: 30 tablet, Rfl: 2    ondansetron (Zofran) 4 mg tablet, Take 1 tablet (4 mg) by mouth every 6 hours if needed for nausea or vomiting., Disp: 20 tablet, Rfl: 0    ondansetron (Zofran) 8 mg tablet, Take 0.5 tablets (4 mg) by mouth every 6 hours if needed for nausea or vomiting., Disp: 20 tablet, Rfl: 0    ondansetron (Zofran) 8 mg tablet, Take 1 tablet (8 mg) by mouth every 8 hours if needed for nausea or vomiting., Disp: 30 tablet, Rfl: 5    oxyCODONE (Roxicodone) 5 mg immediate release tablet, Take 2 to 3 tablets (10-15 mg) by mouth every 4 hours if needed for severe pain (7 - 10) (please take 3 tabs before radiation sessions) for up to 15 days., Disp: 270 tablet, Rfl: 0    polyethylene glycol (Glycolax, Miralax) 17 gram/dose powder, Mix 17 g of powder with 8 oz of water and drink by mouth once daily., Disp: 238 g, Rfl: 0    polyethylene glycol (Glycolax, Miralax) 17 gram/dose powder, Take 17 g by mouth once daily., Disp: 1700 g, Rfl: 1    prochlorperazine (Compazine) 10 mg tablet, Take 1 tablet (10 mg) by mouth every 6 hours if needed for nausea or vomiting., Disp: 30 tablet, Rfl: 5    sennosides (Senokot) 8.6 mg tablet, Take 1 tablet (8.6 mg) by mouth once daily as needed for constipation., Disp: , Rfl:     vit A/vit C/vit E/zinc/copper (PRESERVISION AREDS ORAL), Take 1 tablet by mouth early in the morning.., Disp: , Rfl:             Performance Status (ECOG):3        ECOGDefinition  0Fully active; no performance restrictions.  1Strenuous physical activity restricted;  fully ambulatory and able to carry out light work.  2Capable of all self-care but unable to carry out any work activities. Up and about >50% of waking hours.  3Capable of only limited self-care; confined to bed or chair >50% of waking hours.  4Completely disabled; cannot carry out any self-care; totally confined to bed or chair.        Exam:  Patient very anxious blood pressure 157/110, body weight 138 kg  General: Chronically ill  Neurology: Alert and oriented x3, nonfocal     psychology: Anxious very depressed     eyes: PERRL, EOMI  ENT: Mucus membranes moist and intact, no ulcers  Lymphatics: No palpable adenopathy  Neck: Supple, no masses  Respiratory: Lungs clear bilaterally, no wheezes, no rales, no rhonchi  Cardiovascular: Normal rate and rhythm, no murmur  Abdomen: Soft, non-tender, non-distended, normoactive, no hepatosplenomegaly, no ascites  Musculoskeletal: Normal gait and stance  Extremities: No clubbing, no cyanosis, no edema  Skin: No rash        Labs:        Results from last 7 days   Lab Units 08/18/24  0700 08/17/24  0710   WBC AUTO x10*3/uL 12.6* 10.3   HEMOGLOBIN g/dL 13.5 13.1   HEMATOCRIT % 42.3 41.4   PLATELETS AUTO x10*3/uL 207  --    NEUTROS ABS x10*3/uL 7.91* 6.58   LYMPHS ABS AUTO x10*3/uL 3.37 2.57   MONOS ABS AUTO x10*3/uL 1.09* 1.07*   EOS ABS AUTO x10*3/uL 0.10 0.05   NEUTROS PCT AUTO % 62.8 63.6   LYMPHS PCT AUTO % 26.8 24.9   MONOS PCT AUTO % 8.7 10.3   EOS PCT AUTO % 0.8 0.5            Results from last 7 days   Lab Units 08/18/24  0700 08/17/24  0710   GLUCOSE mg/dL 87 88   SODIUM mmol/L 139 138   POTASSIUM mmol/L 4.1 4.0   CHLORIDE mmol/L 104 105   CO2 mmol/L 24 24   BUN mg/dL 15 13   CREATININE mg/dL 0.59 0.56   EGFR mL/min/1.73m*2 >90 >90   CALCIUM mg/dL 9.2 8.7   PHOSPHORUS mg/dL 3.5 2.7   ALBUMIN g/dL 3.6 3.6       Assessment/Plan:  69 y.o.  newly diagnosed lung cancer       # Cancer:  Working stage IV ([id number]) cancer likely lung primary. IHC positive for CK7 but negative  for TTF1 or p40. Liquid biopsy at diagnosis showed  KRAS G12V, TP53 M243T, JAK2 and KEAP 1. Reached out to pathology - confirmed this is probably lung primary.  PD-L1 55%.     Patient has very poor performance status ECOG 3.    On my assessment I do not think so she is able to tolerate systemic chemotherapy.  I will offer single agent immunotherapy.  Possible side effect of pembrolizumab discussed with the patient and she is willing to try immunotherapy.    Consent obtained    We will start pembrolizumab in October 1, 2024.    2.  Pain management, continue same pain management I will refer to palliative oncology.    Patient is fully aware we are dealing with stage IV non-small cell lung cancer which is not curable the main goal of palliative therapy is to improve quality of life and possible life expectancy.      Plan discussed with patient and with his brother.    Time spent since 50-minute    Follow-up after 1-month

## 2024-09-26 LAB — ACTH PLAS-MCNC: 8.3 PG/ML (ref 7.2–63.3)

## 2024-09-27 ENCOUNTER — HOSPITAL ENCOUNTER (OUTPATIENT)
Dept: RADIATION ONCOLOGY | Facility: CLINIC | Age: 69
Setting detail: RADIATION/ONCOLOGY SERIES
Discharge: HOME | End: 2024-09-27
Payer: MEDICARE

## 2024-09-27 DIAGNOSIS — M25.511 ACUTE PAIN OF RIGHT SHOULDER: ICD-10-CM

## 2024-09-27 DIAGNOSIS — M25.512 ACUTE PAIN OF LEFT SHOULDER: ICD-10-CM

## 2024-09-27 DIAGNOSIS — S22.080A COMPRESSION FRACTURE OF T11 VERTEBRA, INITIAL ENCOUNTER (MULTI): ICD-10-CM

## 2024-09-27 DIAGNOSIS — C34.91 PRIMARY MALIGNANT NEOPLASM OF RIGHT LUNG METASTATIC TO OTHER SITE (MULTI): ICD-10-CM

## 2024-09-27 DIAGNOSIS — C34.91 PRIMARY MALIGNANT NEOPLASM OF RIGHT LUNG METASTATIC TO OTHER SITE (MULTI): Primary | ICD-10-CM

## 2024-09-27 PROCEDURE — 99214 OFFICE O/P EST MOD 30 MIN: CPT | Performed by: STUDENT IN AN ORGANIZED HEALTH CARE EDUCATION/TRAINING PROGRAM

## 2024-09-27 RX ORDER — PROCHLORPERAZINE MALEATE 10 MG
10 TABLET ORAL EVERY 6 HOURS PRN
Qty: 30 TABLET | Refills: 5 | Status: SHIPPED | OUTPATIENT
Start: 2024-09-27 | End: 2024-10-02 | Stop reason: WASHOUT

## 2024-09-27 RX ORDER — OXYCODONE HYDROCHLORIDE 5 MG/1
TABLET ORAL EVERY 6 HOURS PRN
COMMUNITY

## 2024-09-27 ASSESSMENT — ENCOUNTER SYMPTOMS
OCCASIONAL FEELINGS OF UNSTEADINESS: 0
LOSS OF SENSATION IN FEET: 0
FATIGUE: 1
BACK PAIN: 1
APPETITE CHANGE: 1
DEPRESSION: 0

## 2024-09-27 NOTE — PROGRESS NOTES
Spoke with this patient to get her ready for her virtual visit. She states that her left shoulder in particular is now hurting and it did not hurt prior to RT. She states that her pain in her bones is okay if she is not moving around much and much wore when up and moving. She is taking Oxycodone as needed and is being managed by palliative care. Dr. Ospina aware of patient's pain and issues. Dr. Ospina to complete the virtual visit. Per   Dr. Ospina, patient to have xray of left shoulder, referral to IR for kyphoplasty and follow up in 2 months.  to call patient. Mercedes Salinas, MSN, RN, OCN

## 2024-09-27 NOTE — PROGRESS NOTES
Radiation Oncology Nursing Note    Pain: The patient's current pain level was assessed.  They report currently having a pain of 6 out of 10.  They feel their pain is under control with the use of pain medications.    Review of Systems:  Review of Systems - Oncology

## 2024-09-27 NOTE — PROGRESS NOTES
Radiation Oncology Follow-Up    Patient Name:  Brittny Gordon  MRN:  [id number]  :  1955    Referring Provider: Nagi Ospina MD  Primary Care Provider: Camila Chaudhary MD MPH  Care Team: Patient Care Team:  Camila Chaudhary MD MPH as PCP - General (Hematology and Oncology)  Jose Rafael Hart MD as PCP - [facility] Attributed Provider  Camila Chaudhary MD MPH as Consulting Physician (Hematology and Oncology)  Felicia Osman MD as Consulting Physician (Hematology and Oncology)  Aislinn Feliz MD as Consulting Physician (Hematology and Oncology)    Date of Service: 2024    Virtual or Telephone Consent    An interactive audio and video telecommunication system which permits real time communications between the patient (at the originating site) and provider (at the distant site) was utilized to provide this telehealth service.   Verbal consent was requested and obtained from Brittny Gordon on this date, 24 for a telehealth visit.      SUBJECTIVE  History of Present Illness:  Brittny Gordon is a 69 y.o. female who was previously seen at the [facility] Department of Radiation Oncology for metastatic non-small cell lung cancer.    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 Gordon is a female with a history of aortic aneurysm replacement in  and was followed since 2019 with a left upper lobe nodule.  The patient had CT chest on 2019 which showed a noncalcified nodule in the left upper lobe adjacent to the aortic arch measuring 1.3 x 1.3 x 0.8 cm.  PET/CT completed on 2019 showed mild hypermetabolic activity.  The patient has had stable CT chest imaging until 2023.  A CT chest without contrast on 5/15/2023 showed increase in the size of the right upper lobe measuring 1.1 cm, new pulmonary nodules involving the left lower lobe, and right lower lobe.  There was noted to be vertebral compression  fractures of multiple thoracic and lumbar vertebral body.  The patient underwent PET/CT on 7/11/2024 which revealed left upper lobe hypermetabolic region, spiculated right upper lobe lesion with max SUV 5.0, no avidity of subcentimeter nodules in the lower lobes and mediastinal and hilar lymphadenopathy with hypermetabolic right paratracheal lymph node with max SUV 5.0, right hilar lymph node with hypermetabolic activity max SUV 5.0 and left hilar lymph node hypermetabolic with max SUV 4.0.  There were numerous osseous hypermetabolic lesions of the axial and appendicular skeleton.  The patient underwent brain MRI with and without contrast on 7/19/2024 which showed a enhancing nodule abutting the left lateral pontomedullary junction favoring saccular aneurysm arising likely from AICA.    The patient completed palliative radiation therapy to vertebral bodies T12-L3, with significant complications requiring hospital admission and found to have pulmonary embolism.    9/27/2024: Worsening back pains since radiation therapy, persistent pain despite oxycodone IR as needed. Left shoulder pain which is new.  The patient notes improving but poor appetite and nausea.    Labs:  None    Pathology:  8/5/2024- Malignant cells are present. Non-small cell carcinoma  B. LYMPH NODE 4 R PULMONARY FINE NEEDLE ASPIRATION: Malignant cells are present.  Non-small cell carcinoma  C. LYMPH NODE 10 R PULMONARY FINE NEEDLE ASPIRATION: Malignant cells are present.  Non-small cell carcinoma.  D. LUNG FINE NEEDLE ASPIRATION RIGHT UPPER LOBE - RUL nodule: Malignant cells are present  Non-small cell carcinoma  E. BRONCHIAL BRUSH RIGHT UPPER LOBE - RUL nodule brush: Atypical cells are present  F. LUNG FINE NEEDLE ASPIRATION RIGHT UPPER LOBE - RUL nodule: Malignant cells are present  Non-small cell carcinoma    8/5/2024-Right lung, upper lobe, transbronchial biopsy:-- Small fragment of non small cell carcinoma; -- Immunohistochemical stains performed  on formalin-fixed, paraffin embedded sections demonstrate neoplastic cells to be positive for AE1/AE3 and CK7, and negative for TTF1 (8G7G3 clone), p40 and INSM1.     Note: Additional immunostain for PD-L1 will be performed and reported in an addendum. Next generation sequencing (NGS) will be performed and reported separately.     2024 -navigational bronchoscopy/EBUS: Normal bronchial anatomy.  EBUS sampling was acquired from station 4R, 10R and 11 RS.     Disease Associated Genomics:  PD-L1 TPS 55% (high)    MICROSATELLITE STATUS: Microsatellite Instability-High (MSI-H) is NOT DETECTED.     DISEASE ASSOCIATED GENOMIC FINDINGS:   KEAP1 p.G419W ([id number] c.1255G>T)  KRAS p.G12V ([id number] c.35G>T)     DISEASE RELEVANT ALTERATIONS NOT DETECTED:   Negative for ALK fusion.  Negative for BRAF V600E.  Negative for EGFR sensitizing mutation.  Negative for ERBB2 activating mutation  Negative for KRAS G12C.  Negative for MET exon 14 skipping mutation.  Negative for NTRK fusion.  Negative for RET fusion.  Negative for ROS1 fusion.     Disease Tumor Markers:  xF Liquid Biopsy - 105 Genes: pending      Imagin2024-CT angio chest/CT A/P: 1.5 cm nodule in the left upper lobe, and spiculated 8mm nodule in the right upper lobe, suspicious for malignancy. Compression deformities of T7 and T12.  Sclerosis in the T12 vertebral body, suspicious for metastatic disease. Compression deformities of L1 and L3.  There are areas of sclerosis seen throughout the lumbar vertebral bodies as well as the left iliac wing, suspicious for metastatic disease.    2024-MRI brain w/ contrast: Similar 7 mm avidly enhancing focus along the left pontomedullary junction in keeping with possible saccular aneurysm. Vascular  follow-up may be considered. A metastatic focus is felt to be less likely given relative stability. Otherwise, no evidence intracranial metastatic disease.    2024- CT angiography of the chest: Few subsegmental acute  pulmonary emboli within right lower lobe. New extensive secretions within central and paracentral airways along with obliteration of multiple smaller bronchi within bilateral lower lobes. Resultant partial atelectasis of bilateral lower lobes, left more than right. Interval increase in size of left upper lobe solid pulmonary nodule, now measuring up to 1.9 cm, most consistent with enlarging malignancy/metastasis. Prominent to enlarged right mediastinal and right hilar lymph nodes. Osseous metastatic disease is similar to prior examination.  Stable pathological compression fractures of T7 and T12 vertebral bodies.     7/19/2024-MRI brain with and without contrast: There is a 7 x 6 mm enhancing nodule cranial to the left vertebral artery and abutting the left lateral pontomedullary junction, imaging favoring saccular aneurysm of AICA.  No evidence of additional abnormal intraparenchymal metastatic disease.     7/11/2024-PET/CT: No evidence of cervical lymphadenopathy.  Stable left upper lobe paravertebral nodule measuring 1.4 cm with max SUV 3.3.  Spiculated right upper lobe nodule measuring 1.1 cm with max SUV 5.0.  Additional subcentimeter nodules within the lower lobes demonstrate no avidity.  There are noted mediastinal and hilar lymph nodes with hypermetabolic activity including right lower paratracheal lymph node with max SUV 5.0, right hilar lymph node with max SUV 5.0 and left hilar lymph node with max SUV 4.0.  Noted prior median sternotomy and ascending aorta replacement with aneurysmal dilatation of the ascending thoracic aorta of 4.5 cm.  No evidence of abdominal pelvic hypermetabolic lymphadenopathy.  There are multiple hypermetabolic bone lesions throughout the axial and appendicular skeleton including left clavicle with max SUV 3.8, distal left clavicle with max SUV 9.4, right fifth posterior rib with max SUV 6.1, right posterior 11th rib with max SUV 4.6, T12 vertebral body with max SUV 9.4, L1  vertebral body with max SUV 9.7, L3 vertebral body with max SUV 10.7, anterior L4 vertebral body with max SUV 6.9 and bilateral hypermetabolic lesions within the iliac bones with max SUV 7 and 6 to the left and right respectively.  Lytic lesion within the left inferior pubic ramus with max SUV 7.7, lytic lesion of the proximal right femur with max SUV 9.1 and avidity of the scar now representing postsurgical changes.     6/7/2024-CT chest without contrast: Interval increase of spiculated nodule in the right upper lobe measuring 1.1 cm previously 0.8 cm, multiple new pulmonary nodules including 3 mm nodule in the left lower lobe which is new, 4 mm nodule in the left lower lobe, 4 mm nodule in the left lower lobe, 4 mm nodule on the right lower lobe posterior medially and 4 mm nodule in the left lower lobe near the major fissure.  A 1.4 cm subpleural nodule in the left upper lobe similar to prior CT.  Status post median sternotomy and ascending aorta replacement, height loss of T7 vertebral body, T11 anteriorly, T12 centrally and L4 new compared to prior studies.     5/15/2023-CT chest without contrast: Multifocal bilateral linear atelectasis, solid-appearing nodule of the posterior right upper lobe measuring 8 mm previously measuring 6 mm in 2020, irregular nodule of the left upper lobe measuring 1.6 cm, anterior medial left upper lobe 5 mm nodule stable from 2020.  Calcified granuloma of the posterior lateral left upper lobe.  Stable postsurgical changes of the ascending thoracic aorta.  No evidence of mediastinal lymphadenopathy.    Prior Systemic Therapies:  10/2024-current: Planned pembrolizumab every 3 weeks     Prior Surgeries:  None     Prior Radiation Therapy:   8/19/2024-9/6/2024: 3000 cGy in 10 fractions to T12-L3    Treatment Rendered:   3D CRT: Not Applicable Spine    Treatment Period Technique Fraction Dose Fractions Total Dose   Course 1 8/19/2024-9/6/2024  (days elapsed: 18)         T12-L3  8/19/2024-9/6/2024 AP/ / 300 cGy 10 / 10 3000 / 3,000 cGy       Review of Systems:   Review of Systems   Constitutional:  Positive for appetite change and fatigue.   Musculoskeletal:  Positive for back pain.        Shoulder pain (left)       Performance Status:   The Karnofsky performance scale today is 50, Requires considerable assistance and frequent medical care (ECOG equivalent 2).       OBJECTIVE  Vital Signs:  There were no vitals taken for this visit.  Physical Exam  Nursing note reviewed.   Constitutional:       Comments: Limited exam due to televisit   Neurological:      Mental Status: She is alert.            ASSESSMENT:   Brittny Gordon is a 69 y.o. female with Primary malignant neoplasm of right lung metastatic to other site (Multi), Clinical: Stage IVB (cT1b, cN2, pM1c).      Ms. 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 The patient completed palliative radiation therapy 3000 cGy in 10 fractions to the T12-L3.    The patient is plan to start pembrolizumab.  The patient unfortunately has worsening pain despite palliative radiation therapy which may be related to structural component with compression fractures.  The patient follows with supportive oncology for pain control regimen, referral will be placed for interventional radiology to discuss kyphoplasty.    The patient has worsening left shoulder pain with no obvious radiologic correlate on CT scan.  Left shoulder x-rays ordered.    The patient will be scheduled for 2-month follow-up virtually.    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    #) Acute toxicities  -Pain: grade 3, seeing supportive onc, referral for kyphoplasty    #) Long term side effects    A total of 20 minutes were spent face-to-face with the patient, with an additional 10 minutes spent reviewing records including imaging, pathology and physician notes.    Nagi Ospina MD  [facility]  [facility] clinical  - Department of Radiation Oncology  Phone: [phone]  Fax: [phone]  Integration Management secure chat preferred / Pager [phone]    Note: This was transcribed using Dragon voice recognition software. Attempts were made to correct any errors; however, errors or omissions may be present.

## 2024-10-01 ENCOUNTER — SOCIAL WORK (OUTPATIENT)
Dept: HEMATOLOGY/ONCOLOGY | Facility: CLINIC | Age: 69
End: 2024-10-01

## 2024-10-01 ENCOUNTER — INFUSION (OUTPATIENT)
Dept: HEMATOLOGY/ONCOLOGY | Facility: CLINIC | Age: 69
End: 2024-10-01
Payer: MEDICARE

## 2024-10-01 VITALS
TEMPERATURE: 97.5 F | WEIGHT: 138.6 LBS | BODY MASS INDEX: 23.66 KG/M2 | RESPIRATION RATE: 16 BRPM | OXYGEN SATURATION: 93 % | SYSTOLIC BLOOD PRESSURE: 153 MMHG | DIASTOLIC BLOOD PRESSURE: 96 MMHG | HEIGHT: 64 IN | HEART RATE: 106 BPM

## 2024-10-01 DIAGNOSIS — C34.91 PRIMARY MALIGNANT NEOPLASM OF RIGHT LUNG METASTATIC TO OTHER SITE (MULTI): ICD-10-CM

## 2024-10-01 PROCEDURE — 2500000004 HC RX 250 GENERAL PHARMACY W/ HCPCS (ALT 636 FOR OP/ED): Performed by: INTERNAL MEDICINE

## 2024-10-01 PROCEDURE — 96413 CHEMO IV INFUSION 1 HR: CPT

## 2024-10-01 PROCEDURE — 96375 TX/PRO/DX INJ NEW DRUG ADDON: CPT | Mod: INF

## 2024-10-01 PROCEDURE — 2500000004 HC RX 250 GENERAL PHARMACY W/ HCPCS (ALT 636 FOR OP/ED): Mod: JZ,JG | Performed by: INTERNAL MEDICINE

## 2024-10-01 RX ORDER — ONDANSETRON HYDROCHLORIDE 2 MG/ML
4 INJECTION, SOLUTION INTRAVENOUS ONCE
Status: CANCELLED | OUTPATIENT
Start: 2024-10-01

## 2024-10-01 RX ORDER — FAMOTIDINE 10 MG/ML
20 INJECTION INTRAVENOUS ONCE AS NEEDED
Status: DISCONTINUED | OUTPATIENT
Start: 2024-10-01 | End: 2024-10-01 | Stop reason: HOSPADM

## 2024-10-01 RX ORDER — PROCHLORPERAZINE EDISYLATE 5 MG/ML
10 INJECTION INTRAMUSCULAR; INTRAVENOUS EVERY 6 HOURS PRN
Status: DISCONTINUED | OUTPATIENT
Start: 2024-10-01 | End: 2024-10-01 | Stop reason: HOSPADM

## 2024-10-01 RX ORDER — ONDANSETRON HYDROCHLORIDE 2 MG/ML
4 INJECTION, SOLUTION INTRAVENOUS ONCE
Status: COMPLETED | OUTPATIENT
Start: 2024-10-01 | End: 2024-10-01

## 2024-10-01 RX ORDER — DIPHENHYDRAMINE HYDROCHLORIDE 50 MG/ML
50 INJECTION INTRAMUSCULAR; INTRAVENOUS AS NEEDED
Status: DISCONTINUED | OUTPATIENT
Start: 2024-10-01 | End: 2024-10-01 | Stop reason: HOSPADM

## 2024-10-01 RX ORDER — EPINEPHRINE 0.3 MG/.3ML
0.3 INJECTION SUBCUTANEOUS EVERY 5 MIN PRN
Status: DISCONTINUED | OUTPATIENT
Start: 2024-10-01 | End: 2024-10-01 | Stop reason: HOSPADM

## 2024-10-01 RX ORDER — PROCHLORPERAZINE MALEATE 10 MG
10 TABLET ORAL EVERY 6 HOURS PRN
Status: DISCONTINUED | OUTPATIENT
Start: 2024-10-01 | End: 2024-10-01 | Stop reason: HOSPADM

## 2024-10-01 RX ORDER — ALBUTEROL SULFATE 0.83 MG/ML
3 SOLUTION RESPIRATORY (INHALATION) AS NEEDED
Status: DISCONTINUED | OUTPATIENT
Start: 2024-10-01 | End: 2024-10-01 | Stop reason: HOSPADM

## 2024-10-01 ASSESSMENT — PAIN SCALES - GENERAL: PAINLEVEL: 0-NO PAIN

## 2024-10-01 NOTE — SIGNIFICANT EVENT

## 2024-10-01 NOTE — PROGRESS NOTES
Patient here for cycle 1 of Keytruda. Patient requesting nausea medication, Dr. Feliz ordered zofran and dexamethasone. Patient discharged in stable condition.

## 2024-10-01 NOTE — PROGRESS NOTES
(SW) reviewed chart and met with patient today for introduction, to assess needs, and offer support.  Patient was A&Ox3 with appropriate and congruent mood and affect.  Patient resides in [place] and her 2 cousins adult children are staying with her.  Patient stated that they are in there 20's.  Patient stated that another cousin has also been helping but she is leaving to go back home to [place].  Patient stated that her brother lives close by.  SW provided information on How a  Can Help and business card if she would need anything.  SW asked how she was doing emotionally.  Patient stated that she has been too tired to be anxious or depressed.  Patient stated that radiation was tough and now she is starting her new treatment.  Patient has her Advanced Directives.  Patient stated that her brother is her POA when needed.  Patient reported no needs at this time.  SW will continue to follow patient and encouraged her to reach out if she would need anything.      Alexandre Flood MSW, LSW

## 2024-10-02 ENCOUNTER — OFFICE VISIT (OUTPATIENT)
Dept: PODIATRY | Facility: CLINIC | Age: 69
End: 2024-10-02
Payer: MEDICARE

## 2024-10-02 ENCOUNTER — TELEPHONE (OUTPATIENT)
Dept: HEMATOLOGY/ONCOLOGY | Facility: CLINIC | Age: 69
End: 2024-10-02

## 2024-10-02 DIAGNOSIS — M79.672 PAIN IN BOTH FEET: ICD-10-CM

## 2024-10-02 DIAGNOSIS — L60.0 INGROWN TOENAIL: Primary | ICD-10-CM

## 2024-10-02 DIAGNOSIS — M79.671 PAIN IN BOTH FEET: ICD-10-CM

## 2024-10-02 DIAGNOSIS — B35.1 ONYCHOMYCOSIS: ICD-10-CM

## 2024-10-02 DIAGNOSIS — G57.93 NEUROPATHY OF BOTH FEET: ICD-10-CM

## 2024-10-02 DIAGNOSIS — M79.673 PAIN OF FOOT, UNSPECIFIED LATERALITY: ICD-10-CM

## 2024-10-02 PROCEDURE — 99213 OFFICE O/P EST LOW 20 MIN: CPT | Performed by: PODIATRIST

## 2024-10-02 PROCEDURE — 1111F DSCHRG MED/CURRENT MED MERGE: CPT | Performed by: PODIATRIST

## 2024-10-02 PROCEDURE — 1160F RVW MEDS BY RX/DR IN RCRD: CPT | Performed by: PODIATRIST

## 2024-10-02 PROCEDURE — 1036F TOBACCO NON-USER: CPT | Performed by: PODIATRIST

## 2024-10-02 PROCEDURE — 1159F MED LIST DOCD IN RCRD: CPT | Performed by: PODIATRIST

## 2024-10-02 PROCEDURE — 1157F ADVNC CARE PLAN IN RCRD: CPT | Performed by: PODIATRIST

## 2024-10-02 NOTE — PROGRESS NOTES
Chief Complaint   Patient presents with    Foot Pain     New PT here today for nail care KATINA hallux ingrown is also a concern. Was diagnosed with primary lung cancer in 08/2024. Not doing darling but doing immuno therapy.      HPI: Patient is complaining of ingrown toenails of multiple digits.  Hallux nails are the worst.  Denies any drainage.  Patient is currently in radiation and immunotherapy for her cancer of her spine.  Denies any burning or tingling sensations in the toes.  Sometimes does have some radiating burning pain coming down the lateral right thigh.  Patient is ambulating with a cane.    PMH, PSx, Medications and allergies reviewed.  ROS negative except for what is stated in HPI.    Physical Exam  Patient alert, oriented, no acute distress  Respiratory: non labored breathing  Psychiatric: Mood and affect normal/baseline  HEENT: sclera clear    VASC: +2/4 pedal pulses B/L.  CFT brisk all digits.  Skin temperature is warm to warm proximal distal B/L.  (+)hair growth B/L.   No edema noted B/L    NEURO: Vibratory absent first MPJ right, diminished left   Light touch intact B/L.   5.07 [place] monofilament intact B/L.    DERM: Multiple nails  are thickened, discolored, crumbly, painful and elongated with subungual debris.  Most fungal nails are at the hallux bilaterally.  Pain on palpation to the nails. IGTN along the medial and lateral borders of digits 1 2 and 5.  Pain on palpation of these nail borders.  The most incurvated out of the hallux nails.  No acute paronychias noted.  No ulcerations no calluses noted bilateral foot.       MUSCULOSKEL: +5/5 muscle strength B/L.    HAV R>L noted.  Very limited range of motion first MPJ R    Assessment and Plan  #1 IGTN multiple digits  Discussed pathology and treatment options  Slant back debridement of offending nail borders  No local wound care needed  Follow-up 2 months    #2 onychomycosis  Discussed pathology and treatment options  Debrided all nails  without complications  Follow-up 2 months    #3  Neuropathy  No current pain component  This is likely secondary to her back pathology

## 2024-10-03 ENCOUNTER — TELEPHONE (OUTPATIENT)
Dept: HEMATOLOGY/ONCOLOGY | Facility: CLINIC | Age: 69
End: 2024-10-03

## 2024-10-03 ENCOUNTER — TELEPHONE (OUTPATIENT)
Dept: HEMATOLOGY/ONCOLOGY | Facility: CLINIC | Age: 69
End: 2024-10-03
Payer: MEDICARE

## 2024-10-04 ENCOUNTER — TELEPHONE (OUTPATIENT)
Dept: ADMISSION | Facility: HOSPITAL | Age: 69
End: 2024-10-04
Payer: MEDICARE

## 2024-10-04 DIAGNOSIS — G89.3 CANCER ASSOCIATED PAIN: Primary | ICD-10-CM

## 2024-10-04 RX ORDER — OXYCODONE HYDROCHLORIDE 10 MG/1
10 TABLET ORAL EVERY 4 HOURS PRN
Qty: 75 TABLET | Refills: 0 | Status: SHIPPED | OUTPATIENT
Start: 2024-10-04 | End: 2024-10-19

## 2024-10-04 NOTE — TELEPHONE ENCOUNTER
Brittny reports taking oxycodone 10mg mg 5 times per day. I will discuss refill with provider to approve and send. No issues per [facility].

## 2024-10-04 NOTE — TELEPHONE ENCOUNTER
Spoke with Brittny who stated before she started treatment she began having trouble with her right eye being grainy and having a dark spot in the center. She stated the dark spot is gone now but if she covers her left eye she can't see out of it very well. Pt stated she wasn't sure if it was related to tx. Explained since it began prior to tx it does not seem it would be related. Explained that she should schedule and eye exam with an ophthalmologist. Pt voiced understanding.

## 2024-10-04 NOTE — TELEPHONE ENCOUNTER
Refill request Oxycodone 10mg every 6hrs, PRN  [facility] [place]  Patient last seen by Susan Leroy on 9/10/24

## 2024-10-08 ENCOUNTER — TELEPHONE (OUTPATIENT)
Dept: HEMATOLOGY/ONCOLOGY | Facility: CLINIC | Age: 69
End: 2024-10-08
Payer: MEDICARE

## 2024-10-08 DIAGNOSIS — C34.90 MALIGNANT NEOPLASM OF LUNG METASTATIC TO BONE (MULTI): ICD-10-CM

## 2024-10-08 DIAGNOSIS — C79.51 MALIGNANT NEOPLASM OF LUNG METASTATIC TO BONE (MULTI): ICD-10-CM

## 2024-10-08 NOTE — TELEPHONE ENCOUNTER
"Phone call to Brittny who stated she is miserable since receiving Pembrolizumab. She stated she is in so much pain that she can't get up from chair or barely move around. Pain in back and legs, her muscles hurt. She is refusing to take Oxycodone because it constipates her. Instructed her to take miralax, she refused because she stated it tastes bad. She stated she can't eat because she has no appetite but she won't take compazine or zofran because they made her sick, she stated \"I threw those out\"  She won't take Tylenol because it makes her sick.  Instructed her to try apple juice or prune juice for constipation, add miralax to that. Explained that miralax doesn't have a flavor, she refused. Instructed her to try to increase fluids and get up and move as much as possible to prevent stiffness of joints, she stated she is to full to drink and in too much pain to move. She is passing flatus.  Instructed her to come to the office tomorrow 10/9 at 1430 to see dr. Feliz and discuss her plan. She agreed.  "

## 2024-10-09 ENCOUNTER — SOCIAL WORK (OUTPATIENT)
Dept: HEMATOLOGY/ONCOLOGY | Facility: CLINIC | Age: 69
End: 2024-10-09

## 2024-10-09 ENCOUNTER — HOSPITAL ENCOUNTER (OUTPATIENT)
Dept: RADIOLOGY | Facility: HOSPITAL | Age: 69
Discharge: HOME | End: 2024-10-09
Payer: MEDICARE

## 2024-10-09 ENCOUNTER — OFFICE VISIT (OUTPATIENT)
Dept: HEMATOLOGY/ONCOLOGY | Facility: CLINIC | Age: 69
End: 2024-10-09
Payer: MEDICARE

## 2024-10-09 VITALS
TEMPERATURE: 97.3 F | OXYGEN SATURATION: 96 % | SYSTOLIC BLOOD PRESSURE: 142 MMHG | HEIGHT: 64 IN | DIASTOLIC BLOOD PRESSURE: 95 MMHG | BODY MASS INDEX: 23.02 KG/M2 | WEIGHT: 134.81 LBS | RESPIRATION RATE: 16 BRPM | HEART RATE: 110 BPM

## 2024-10-09 DIAGNOSIS — M25.512 ACUTE PAIN OF LEFT SHOULDER: ICD-10-CM

## 2024-10-09 DIAGNOSIS — C34.90 MALIGNANT NEOPLASM OF LUNG METASTATIC TO BONE (MULTI): ICD-10-CM

## 2024-10-09 DIAGNOSIS — C79.51 MALIGNANT NEOPLASM OF LUNG METASTATIC TO BONE (MULTI): ICD-10-CM

## 2024-10-09 DIAGNOSIS — C34.91 PRIMARY MALIGNANT NEOPLASM OF RIGHT LUNG METASTATIC TO OTHER SITE (MULTI): ICD-10-CM

## 2024-10-09 PROCEDURE — 3077F SYST BP >= 140 MM HG: CPT | Performed by: INTERNAL MEDICINE

## 2024-10-09 PROCEDURE — 99214 OFFICE O/P EST MOD 30 MIN: CPT | Performed by: INTERNAL MEDICINE

## 2024-10-09 PROCEDURE — 3080F DIAST BP >= 90 MM HG: CPT | Performed by: INTERNAL MEDICINE

## 2024-10-09 PROCEDURE — 1159F MED LIST DOCD IN RCRD: CPT | Performed by: INTERNAL MEDICINE

## 2024-10-09 PROCEDURE — 73030 X-RAY EXAM OF SHOULDER: CPT | Mod: LEFT SIDE | Performed by: RADIOLOGY

## 2024-10-09 PROCEDURE — 1126F AMNT PAIN NOTED NONE PRSNT: CPT | Performed by: INTERNAL MEDICINE

## 2024-10-09 PROCEDURE — 3008F BODY MASS INDEX DOCD: CPT | Performed by: INTERNAL MEDICINE

## 2024-10-09 PROCEDURE — 1157F ADVNC CARE PLAN IN RCRD: CPT | Performed by: INTERNAL MEDICINE

## 2024-10-09 PROCEDURE — 73030 X-RAY EXAM OF SHOULDER: CPT | Mod: LT

## 2024-10-09 RX ORDER — DEXAMETHASONE 4 MG/1
4 TABLET ORAL DAILY
Qty: 30 TABLET | Refills: 1 | Status: SHIPPED | OUTPATIENT
Start: 2024-10-09

## 2024-10-09 ASSESSMENT — NCCN CANCER DISTRESS MANAGEMENT
NCCN PHYSICAL CONCERNS: 9
NCCN PRACTICAL CONCERNS: 1
NCCN PHYSICAL CONCERNS: 1

## 2024-10-09 ASSESSMENT — PAIN SCALES - GENERAL: PAINLEVEL: 0-NO PAIN

## 2024-10-09 NOTE — PATIENT INSTRUCTIONS
Called pt with information from below,verbalizes understanding.   Continue treatment every 21 days with pembrolizumab.     Start steroid - dexamethasone daily.     Follow up with Dr. Feliz in 2 months.

## 2024-10-09 NOTE — PROGRESS NOTES
Patient Name: Brittny Gordon                     : 1955                                    MRN: [id number]                                    Age: 69 y.o.                               Gender: female  Referring Provider:   Dr. Naun Hammonds (Thoracic Surgery)     CC:   Management of newly diagnosed stage IVB ([id number]) primary non-small cell carcinoma of RUL, involving multiple bone mets. Liquid biopsy at diagnosis 2024 positive for KRAS G12V, TP53 M243T, JAK2 and KEAP 1.     EBUS 2024 of the PDL-1 TPS 55%; NGS (in-house): KEAP1 p.G419W ([id number] c.1255G>T);   KRAS p.G12V ([id number] c.35G>T)   Treatment Summary:  - 2024 - 2024: 3000 cGy to T12-L3 spine (Dr. Ospina)  Systemic therapy, pembrolizumab  10/01/24 ,c1  10/20/24 ,c2     Presenting History    Patient is 69 y.o. female, former smoker (started at age 17, 0.5 pack per day, quitted at age 56) with a past medical history of HTN, HLD and osteoporosis, aortic aneurysm s/p repairment in  referred for management of newly diagnosed lung cancer.      2019: CT chest (+): Noncalcified nodule in the left upper lobe adjacent to the aortic arch, nodule measuring 1.3 x 1.3 x 0.8cm.     2019: Pet/CT: 1. Mild hypermetabolic tracer activity corresponding to the known posteromedial left upper lobe lung nodule.     2021: CT chest (-): stable URMILA nodule 1.1x1.0cm. Again stable in 2021, 2022, 2022.      However on the CT chest on 2023 the URMILA nodule measured 1.6x1.4cm, which has been growing slowly compared to before, There is also a irregular shape solid-appearing nodule in the RUL measuring up to 0.8cm.      2024: CT chest (-) showed interval increase of the RUL nodule measuring 1.1cm. Multiple new nodules in both lower lobes measuring 0.4cm or smaller.      2024: Pet/CT: Enlarging FDG avid 1.1 cm spiculated nodule in the right upper lobe, as well as FDG avid mediastinal and bilateral hilar nodes is  suspicious for a primary lung neoplasm with teresa metastases. Mid FDG avid left upper lobe paravertebral nodule, grossly stable in comparison to prior CT in 2019, favored to be benign. Multiple FDG avid bone lesions throughout the axial and appendicular skeleton, likely representing widespread bone metastases. FDG avidity within the right ribs as well as lower thoracic and lumbar vertebral bodies as described above, could represent pathologic fractures in the setting of metastatic disease.        7/19/2024: MRI brain (+/-):  7 mm avidly enhancing extra-axial appearing nodule along the left pontine medullary junction adjacent to the left vertebral artery. 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 Clinical correlation and attention on follow-up suggested. No additional abnormal enhancement identified.     8/5/2024: Underwent EBUS with FNA of 11Rs, 3R, 10R and RUL nodule: path showed non small cell carcinoma. IHC showed positive for AE1/AE3, CK7 and negative for TTF1, p40 and INSM1. PDL-1 TPS 55%. Additional immunostain show the tumor cells are negative for TRPS1, PAX8, and CDX2. SMARCA4 immunostain shows retained nuclear expression. these findings argue against breast, GYN, and colorectal primaries, respectively. Overall, findings could be compatible with a lung primary non-small cell carcinoma in appropriate clinical and radiologic settings. PDL-1 TPS 55%; NGS (in-house): KEAP1 p.G419W ([id number] c.1255G>T); KRAS p.G12V ([id number] c.35G>T)     [place] [facility] [place] including chemotherapy and immunotherapy.  Carboplatin, Taxol and pembrolizumab        PMHx: HTN, HLD, and osteoporosis  Family history: father had lymphoma. Bother and sister passed away at early 40s. Mother had skin cancer (rare type but unknown type); brother has prostate cancer.   Shx: Occasional drinker. CEPA.                Interval History    10/09/24   Metastatic  non-small cell lung cancer patient receiving pembrolizumab.  Patient complaining of anorexia and diffuse body pain which is slightly controlled with oxycodone patient taking oxycodone every 8 hours sometimes she is constipated.  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    Wedge compression fracture of t11-T12 vertebra, sequela 07/30/2020     Compression fracture of T11 vertebra, sequela            Surgical History:   Surgical History         Past Surgical History:   Procedure Laterality Date    ARTERIAL ANEURYSM REPAIR         Thoracic aortic aneurysm repair    COLONOSCOPY        RADIOFREQUENCY ABLATION         L3,4,5    WISDOM TOOTH EXTRACTION                Family History:  Family History          Family History   Problem Relation Name Age of Onset    Lymphoma Father        Polycythemia Father        Breast cancer Neg Hx        Colon cancer Neg Hx                Allergies:  Allergies        Allergies   Allergen Reactions    Acetaminophen GI Upset    Epinephrine Nausea/vomiting and Palpitations            Meds (Current):    Current Medications      Current Outpatient Medications:     atorvastatin (Lipitor) 80 mg tablet, Take 1 tablet (80 mg) by mouth once daily., Disp: , Rfl:     bisacodyl (Dulcolax) 5 mg EC tablet, Take 1 tablet (5 mg) by mouth once daily as needed for constipation. Do not crush, chew, or split., Disp: 30 tablet, Rfl: 0    bisacodyl (Dulcolax) 5 mg EC tablet, Take 1 tablet (5 mg) by mouth once daily as needed for  constipation. Do not crush, chew, or split., Disp: 30 tablet, Rfl: 0    cetirizine (ZyrTEC) 10 mg tablet, Take 1 tablet (10 mg) by mouth once daily as needed for allergies., Disp: , Rfl:     cholecalciferol (Vitamin D3) 25 MCG (1000 UT) tablet, Take 1 tablet (1,000 Units) by mouth once daily., Disp: , Rfl:     cyclobenzaprine (Flexeril) 10 mg tablet, Take 1 tablet (10 mg) by mouth 3 times a day as needed for muscle spasms., Disp: 90 tablet, Rfl: 2    docusate sodium (Colace) 100 mg capsule, Take 1 capsule (100 mg) by mouth 2 times a day., Disp: 60 capsule, Rfl: 0    docusate sodium (Colace) 100 mg capsule, Take 1 capsule (100 mg) by mouth 2 times a day., Disp: 30 capsule, Rfl: 1    gabapentin (Neurontin) 300 mg capsule, Take 1 capsule (300 mg) by mouth once daily at bedtime., Disp: 30 capsule, Rfl: 2    ibandronate (Boniva) 150 mg tablet, Take 1 tablet (150 mg) by mouth every 30 (thirty) days. Take in morning with full glass of water on an empty stomach. No food, drink, meds, or lying down for 60 minutes after., Disp: 3 tablet, Rfl: 3    LORazepam (Ativan) 0.5 mg tablet, Take 1 tablet (0.5 mg) by mouth once daily as needed for anxiety (PRIOR to RADIATION) for up to 10 days., Disp: 10 tablet, Rfl: 0    losartan (Cozaar) 100 mg tablet, TAKE 1 TABLET BY MOUTH EVERY DAY, Disp: 90 tablet, Rfl: 3    menthol (ICY HOT ADVANCED RELIEF PATCH TOP), Apply 1 patch topically every 12 hours if needed., Disp: , Rfl:     metoprolol succinate XL (Toprol-XL) 100 mg 24 hr tablet, TAKE 1 TABLET BY MOUTH EVERY DAY, Disp: 90 tablet, Rfl: 3    morphine CR (MS Contin) 15 mg 12 hr tablet, Take 1 tablet (15 mg) by mouth 2 times a day for 15 days. Do not crush, chew, or split., Disp: 30 tablet, Rfl: 0    naloxone (Narcan) 4 mg/0.1 mL nasal spray, Administer 1 spray (4 mg) into affected nostril(s) if needed for opioid reversal or respiratory depression. May repeat every 2-3 minutes if needed, alternating nostrils, until medical assistance  becomes available., Disp: 2 each, Rfl: 0    OLANZapine (ZyPREXA) 5 mg tablet, Take 1 tablet (5 mg) by mouth once daily at bedtime. For 4 days starting the evening of treatment, Disp: 4 tablet, Rfl: 3    omeprazole OTC (PriLOSEC OTC) 20 mg EC tablet, Take 1 tablet (20 mg) by mouth once daily. Do not crush, chew, or split., Disp: 30 tablet, Rfl: 2    ondansetron (Zofran) 4 mg tablet, Take 1 tablet (4 mg) by mouth every 6 hours if needed for nausea or vomiting., Disp: 20 tablet, Rfl: 0    ondansetron (Zofran) 8 mg tablet, Take 0.5 tablets (4 mg) by mouth every 6 hours if needed for nausea or vomiting., Disp: 20 tablet, Rfl: 0    ondansetron (Zofran) 8 mg tablet, Take 1 tablet (8 mg) by mouth every 8 hours if needed for nausea or vomiting., Disp: 30 tablet, Rfl: 5    oxyCODONE (Roxicodone) 5 mg immediate release tablet, Take 2 to 3 tablets (10-15 mg) by mouth every 4 hours if needed for severe pain (7 - 10) (please take 3 tabs before radiation sessions) for up to 15 days., Disp: 270 tablet, Rfl: 0    polyethylene glycol (Glycolax, Miralax) 17 gram/dose powder, Mix 17 g of powder with 8 oz of water and drink by mouth once daily., Disp: 238 g, Rfl: 0    polyethylene glycol (Glycolax, Miralax) 17 gram/dose powder, Take 17 g by mouth once daily., Disp: 1700 g, Rfl: 1    prochlorperazine (Compazine) 10 mg tablet, Take 1 tablet (10 mg) by mouth every 6 hours if needed for nausea or vomiting., Disp: 30 tablet, Rfl: 5    sennosides (Senokot) 8.6 mg tablet, Take 1 tablet (8.6 mg) by mouth once daily as needed for constipation., Disp: , Rfl:     vit A/vit C/vit E/zinc/copper (PRESERVISION AREDS ORAL), Take 1 tablet by mouth early in the morning.., Disp: , Rfl:             Performance Status (ECOG):3        ECOGDefinition  0Fully active; no performance restrictions.  1Strenuous physical activity restricted; fully ambulatory and able to carry out light work.  2Capable of all self-care but unable to carry out any work activities. Up  and about >50% of waking hours.  3Capable of only limited self-care; confined to bed or chair >50% of waking hours.  4Completely disabled; cannot carry out any self-care; totally confined to bed or chair.        Exam:  Patient very anxious blood pressure 157/110, body weight 138 kg  General: Chronically ill  Neurology: Alert and oriented x3, nonfocal     psychology: Anxious very depressed     eyes: PERRL, EOMI  ENT: Mucus membranes moist and intact, no ulcers  Lymphatics: No palpable adenopathy  Neck: Supple, no masses  Respiratory: Lungs clear bilaterally, no wheezes, no rales, no rhonchi  Cardiovascular: Normal rate and rhythm, no murmur  Abdomen: Soft, non-tender, non-distended, normoactive, no hepatosplenomegaly, no ascites  Musculoskeletal: Normal gait and stance  Extremities: No clubbing, no cyanosis, no edema  Skin: No rash        Labs:        Results from last 7 days   Lab Units 08/18/24  0700 08/17/24  0710   WBC AUTO x10*3/uL 12.6* 10.3   HEMOGLOBIN g/dL 13.5 13.1   HEMATOCRIT % 42.3 41.4   PLATELETS AUTO x10*3/uL 207  --    NEUTROS ABS x10*3/uL 7.91* 6.58   LYMPHS ABS AUTO x10*3/uL 3.37 2.57   MONOS ABS AUTO x10*3/uL 1.09* 1.07*   EOS ABS AUTO x10*3/uL 0.10 0.05   NEUTROS PCT AUTO % 62.8 63.6   LYMPHS PCT AUTO % 26.8 24.9   MONOS PCT AUTO % 8.7 10.3   EOS PCT AUTO % 0.8 0.5            Results from last 7 days   Lab Units 08/18/24  0700 08/17/24  0710   GLUCOSE mg/dL 87 88   SODIUM mmol/L 139 138   POTASSIUM mmol/L 4.1 4.0   CHLORIDE mmol/L 104 105   CO2 mmol/L 24 24   BUN mg/dL 15 13   CREATININE mg/dL 0.59 0.56   EGFR mL/min/1.73m*2 >90 >90   CALCIUM mg/dL 9.2 8.7   PHOSPHORUS mg/dL 3.5 2.7   ALBUMIN g/dL 3.6 3.6       Assessment/Plan:  69 y.o.  newly diagnosed lung cancer       #1,  stage IV ([id number]) cancer likely lung primary. IHC positive for CK7 but negative for TTF1 or p40. Liquid biopsy at diagnosis showed  KRAS G12V, TP53 M243T, JAK2 and KEAP 1. Reached out to pathology - confirmed this is  probably lung primary.  PD-L1 55%.     Patient has very poor performance status ECOG 3.    On my assessment I do not think so she is able to tolerate systemic chemotherapy.  I will offer single agent immunotherapy.  Possible side effect of pembrolizumab discussed with the patient and she is willing to try immunotherapy.    Consent obtained    We will start pembrolizumab in October 1, 2024.    2.  Pain management, continue same pain management I will refer to palliative oncology.    Patient is fully aware we are dealing with stage IV non-small cell lung cancer which is not curable the main goal of palliative therapy is to improve quality of life and possible life expectancy.      Plan discussed with patient and with his brother.   10/09/24  #1 metastatic non-small cell lung cancer    2.  Pain management    3.  Anorexia    Pembrolizumab results tolerated.  I will continue oxygen codon for pain control and dexamethasone 4 mg p.o. daily for pain control and anorexia, continue immunotherapy    Follow-up after 2-month

## 2024-10-09 NOTE — PROGRESS NOTES
(SW) met with patient and her brother today to assess needs and offer support.  Patient was A&Ox3 with appropriate and congruent mood and affect.  Patient had her follow-up appointment with Dr. Feliz.  She requested to speak with SW and scored a 7 on her distress screen.  Patient is having a hard time and wanted to know about a private duty aid.  SW provided information on [facility] and [facility] to look into.  Patient's brother also stated that he knows of a gentleman that has an aide that comes to their area in [place].  SW encouraged patient to reach out if she would need anything else.  SW will continue to follow patient.    Alexandre Flood MSW, LSW

## 2024-10-10 ENCOUNTER — TELEPHONE (OUTPATIENT)
Dept: RADIATION ONCOLOGY | Facility: CLINIC | Age: 69
End: 2024-10-10

## 2024-10-15 ENCOUNTER — TELEMEDICINE (OUTPATIENT)
Dept: PALLIATIVE MEDICINE | Facility: HOSPITAL | Age: 69
End: 2024-10-15
Payer: MEDICARE

## 2024-10-15 DIAGNOSIS — K59.03 CONSTIPATION DUE TO PAIN MEDICATION THERAPY: ICD-10-CM

## 2024-10-15 DIAGNOSIS — G89.3 CANCER ASSOCIATED PAIN: Primary | ICD-10-CM

## 2024-10-15 DIAGNOSIS — R63.0 DECREASED APPETITE: ICD-10-CM

## 2024-10-15 DIAGNOSIS — T45.1X5A CHEMOTHERAPY INDUCED NAUSEA AND VOMITING: ICD-10-CM

## 2024-10-15 DIAGNOSIS — R11.2 CHEMOTHERAPY INDUCED NAUSEA AND VOMITING: ICD-10-CM

## 2024-10-15 PROCEDURE — 99214 OFFICE O/P EST MOD 30 MIN: CPT | Performed by: NURSE PRACTITIONER

## 2024-10-15 PROCEDURE — 1157F ADVNC CARE PLAN IN RCRD: CPT | Performed by: NURSE PRACTITIONER

## 2024-10-15 RX ORDER — OXYCODONE HYDROCHLORIDE 5 MG/1
5-10 TABLET ORAL EVERY 4 HOURS PRN
Qty: 240 TABLET | Refills: 0 | Status: SHIPPED | OUTPATIENT
Start: 2024-10-19 | End: 2024-10-15 | Stop reason: SDUPTHER

## 2024-10-15 RX ORDER — OXYCODONE HYDROCHLORIDE 5 MG/1
10 TABLET ORAL EVERY 4 HOURS PRN
Qty: 360 TABLET | Refills: 0 | Status: SHIPPED | OUTPATIENT
Start: 2024-10-19 | End: 2024-11-18

## 2024-10-15 NOTE — PROGRESS NOTES
SUPPORTIVE AND PALLIATIVE ONCOLOGY CONSULT - OUTPATIENT FOLLOW UP      SERVICE DATE: 10/15/2024    Referred by:  Camila Chaudhary MD MPH   Medical Oncologist: Camila Chaudhary MD MPH  MD Aislinn Schwarz MD   Radiation Oncologist: No care team member to display  Primary Physician: Camila Chaudhary  [phone]    REASON FOR CONSULT/CHIEF CONSULT COMPLAINT: Pain management and Introduction to Supportive and Palliative Oncology Services    Subjective   HISTORY OF PRESENT ILLNESS: Brittny Gordon is a 69 y.o. female who presents with  likely metastatic NSCLC (lymph nodes, bone with pathologic fracture of L2, L3, T12) s/p bronch/EBUS 8/5 not yet started on systemic therapy pending final pathology, plan for palliative RT to spine.      PMH significant for HTN, HLD, osteporosis, AAA s/p repair, former smoker.     9/10/24: Recent hospitalization for acute PE. Also with acute encephalopathy. Done with radiation. Oxycontin denied by insurance. Xtampza caused more nausea. Currently using oxycodone 5mg q4 and content with this. Stopped gabapentin because she also felt this contributed to confusion She is also avoiding flexeril. Having issues with nausea, appetite, constipation.     10/15/24: Completed RT. Referral made already for kyphoplasty. She is being treated with single agent keytruda. Pain controlled on oxycodone 10mg q4 scheduled. She has not tolerated many medications. She did not start zyprexa but is eating better and staying hydrated. Bowels are regular. She prefers the oxycodone from [facility] and does not like the oxycodone that she gets from her local [facility]. We discussed that unfortunately, I am not aware of what supplier each pharmacy uses.      Symptom Assessment:    Pain:somewhat    T / L spine   Sharp, stabbing intermittently  Constant deep ache  Worse with movement     Numbness or tingling in hands/feet/other: numbness right lateral hip to knee - mild  Lack of appetite: a little   Weight loss: a little  - stabilized per patient   Nausea/early satiety: none  Vomiting: none  Constipation: none   Diarrhea: none  Lack of energy: a little  Difficulty sleeping: none   Anxiety: a little  Depression: a little  Shortness of breath: none  Other: none     Information obtained from: interview of patient  ______________________________________________________________________     Oncology History   Primary malignant neoplasm of right lung metastatic to other site (Multi)   8/9/2024 Initial Diagnosis    Primary malignant neoplasm of right lung metastatic to other site (Multi)     8/23/2024 Cancer Staged    Staging form: Lung, AJCC 8th Edition, Clinical: Stage IVB (cT1b, cN2, pM1c) - Signed by Camila Chaudhary MD MPH on 8/23/2024     10/1/2024 - 10/1/2024 Chemotherapy    Pembrolizumab + PACLitaxel / CARBOplatin, 21 Day Cycles     10/1/2024 -  Chemotherapy    Pembrolizumab, 21 Day Cycles         Past Medical History:   Diagnosis Date    Hypercholesteremia     Hypertension     Lung cancer (Multi)     Lung nodule seen on imaging study     lung nodules concerning for metastatic lung cancer to the bone    Personal history of irradiation     Saccular aneurysm (HHS-HCC)     Saccular aneurysm of left AICA, 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 Lives with cousin's granddaughter.   Social History:  reports that she quit smoking about 11 years ago. Her smoking use included cigarettes. She started smoking about 51 years ago. She has a 20  pack-year smoking history. She has never used smokeless tobacco. She reports that she does not currently use alcohol. She reports that she does not currently use drugs.      Review of systems negative unless noted in HPI.       Objective       Current Outpatient Medications   Medication Instructions    apixaban (Eliquis) 5 mg (74 tabs) tablet Take 2 tablets (10 mg) by mouth 2 times a day for 7 days, then take 1 tablet (5 mg) by mouth 2 times a day.    atorvastatin (LIPITOR) 80 mg, oral, Daily    cetirizine (ZYRTEC) 10 mg, oral, Daily PRN    cholecalciferol (VITAMIN D3) 1,000 Units, oral, Daily    dexAMETHasone (DECADRON) 4 mg, oral, Daily    ibandronate (BONIVA) 150 mg, oral, Every 30 days, Take in morning with full glass of water on an empty stomach. No food, drink, meds, or lying down for 60 minutes after.    lidocaine (Lidoderm) 5 % patch 1 patch, transdermal, Daily, Remove & discard patch within 12 hours or as directed by MD.    losartan (COZAAR) 100 mg, oral, Daily    metoprolol succinate XL (TOPROL-XL) 100 mg, oral, Daily    omeprazole OTC (PRILOSEC OTC) 20 mg, oral, Daily, Do not crush, chew, or split.    oxyCODONE (ROXICODONE) 10 mg, oral, Every 4 hours PRN    vit A/vit C/vit E/zinc/copper (PRESERVISION AREDS ORAL) 1 tablet, oral, Daily       Allergies:   Allergies   Allergen Reactions    Morphine Psychosis     Severe agitation, hallucinations     Acetaminophen GI Upset    Epinephrine Nausea/vomiting and Palpitations             Creatinine   Date Value Ref Range Status   09/24/2024 0.53 0.50 - 1.05 mg/dL Final     AST   Date Value Ref Range Status   09/24/2024 21 9 - 39 U/L Final     ALT   Date Value Ref Range Status   09/24/2024 18 7 - 45 U/L Final     Comment:     Patients treated with Sulfasalazine may generate falsely decreased results for ALT.     Hemoglobin   Date Value Ref Range Status   09/24/2024 13.8 12.0 - 16.0 g/dL Final     Platelets   Date Value Ref Range Status   09/24/2024 166 150 - 450  x10*3/uL Final          PHYSICAL EXAMINATION  Vital Signs:   No VS due to VV     Physical Exam  Eyes:      Extraocular Movements: Extraocular movements intact.      Conjunctiva/sclera: Conjunctivae normal.   Neurological:      Mental Status: She is alert.   Psychiatric:         Mood and Affect: Mood normal.         Thought Content: Thought content normal.         ASSESSMENT/PLAN    Pain  Pain is: cancer related pain  Type: somatic and neuropathic  -continue oxycodone 10mg q4 PRN (will Rx 5mg tabs to [facility] pharmacy per her specific request)   -she does not wish to make any adjustments at this time  -she did not tolerate mscontin and gabapentin (confusion), xtampza (nausea), APAP/NSAIDs (GI upset), oxycontin denied by insurance       Opioid Use  Medication Management:   - OARRS report reviewed with no aberrant behavior; consistent with  prescriptions/records and patient history  - MED 90.  Overdose Risk Score 320.   This has been discussed with patient.   - We will continue to closely monitor the patient for signs of prescription misuse including UDS, OARRS review and subjective reports at each visit.  - No concurrent benzodiazepine use   - I am a provider who either is or has consulted and collaborated with a provider certified in Hospice and Palliative Medicine and have conducted a face-face visit and examination for this patient.  - Routine Urine Drug Screen complete at next in person visit.   - Controlled Substance Agreement complete at next in person visit.   - Specifically discussed that controlled substance prescriptions will only be provided by our group as outlined in the completed agreement  - Prescribed naloxone Rx 8/23/24  - Red Flags: None    Constipation   At risk for constipation related to opioids  -continue senna 1-2 tabs daily PRN   -continue adequate hydration   -she prefers to avoid miralax    Decreased appetite  Nausea   Related to  pain  , malignancy, opioids   -Continue ondansetron 8mg q8  PRN   -Continue prochlorperazine 10mg q6 PRN  -She opted not to start olanzapine       Medical Decision Making/Goals of Care/Advance Care Planning:  Patient's current clinical condition, including diagnosis, prognosis, and management plan, and goals of care were discussed.   Life limiting disease: metastatic malignancy  Goals: symptom control and cancer directed therapy  She and brother understand palliative intent of treatment     Advance Directives  Existence of Advance Directives:Yes, documentation or copy in medical record  Decision maker: HCPOA is gilerJuice     Next Follow-Up Visit:  Return to clinic in 4 weeks at [place] due to proximity to home. She is due for CSA and UDS as well.     Signature and billing  Thank you for allowing us to participate in the care of this patient. Recommendations will be communicated back to the consulting service by way of shared electronic medical record or face-to-face.    Medical complexity was moderate level due to due to complexity of problems, extensive data review, and high risk of management/treatment.  Time was spent on the following: Prep Time, Time Directly with Patient/Family/Caregiver, Documentation Time. Total time spent: 30      DATA   Diagnostic tests and information reviewed for today's visit:  Most recent labs and imaging results, Medications       Some elements copied from Supportive Oncology note on 9/10/24, the elements have been updated and all reflect current decision making from today, 10/15/2024.      Plan of Care discussed with: Patient, Family/Significant Other: brothJuice ventura, and RN      SIGNATURE: Susan Leroy, APRN-CNP    Contact information:  Supportive and Palliative Oncology  Monday-Friday 8 AM-5 PM  Phone:  [phone], press option #5, then option #1.   Or Epic Secure Chat

## 2024-10-17 ENCOUNTER — TELEPHONE (OUTPATIENT)
Dept: HEMATOLOGY/ONCOLOGY | Facility: CLINIC | Age: 69
End: 2024-10-17

## 2024-10-18 ENCOUNTER — TELEPHONE (OUTPATIENT)
Dept: HEMATOLOGY/ONCOLOGY | Facility: CLINIC | Age: 69
End: 2024-10-18
Payer: MEDICARE

## 2024-10-18 NOTE — TELEPHONE ENCOUNTER
Brittny states she can't get up and move around.  She states she took an extra 1/2 pill of her Dexamethasone thinking it would help.

## 2024-10-22 ENCOUNTER — APPOINTMENT (OUTPATIENT)
Dept: RADIOLOGY | Facility: HOSPITAL | Age: 69
End: 2024-10-22
Payer: MEDICARE

## 2024-10-22 ENCOUNTER — APPOINTMENT (OUTPATIENT)
Dept: HEMATOLOGY/ONCOLOGY | Facility: CLINIC | Age: 69
End: 2024-10-22
Payer: MEDICARE

## 2024-10-22 ENCOUNTER — APPOINTMENT (OUTPATIENT)
Dept: CARDIOLOGY | Facility: HOSPITAL | Age: 69
End: 2024-10-22
Payer: MEDICARE

## 2024-10-22 ENCOUNTER — INFUSION (OUTPATIENT)
Dept: HEMATOLOGY/ONCOLOGY | Facility: CLINIC | Age: 69
End: 2024-10-22
Payer: MEDICARE

## 2024-10-22 ENCOUNTER — HOSPITAL ENCOUNTER (EMERGENCY)
Facility: HOSPITAL | Age: 69
Discharge: OTHER INSTITUTION | End: 2024-10-23
Attending: EMERGENCY MEDICINE
Payer: MEDICARE

## 2024-10-22 DIAGNOSIS — M84.551A PATHOLOGICAL FRACTURE IN NEOPLASTIC DISEASE, RIGHT FEMUR, INITIAL ENCOUNTER FOR FRACTURE: Primary | ICD-10-CM

## 2024-10-22 LAB
ALBUMIN SERPL BCP-MCNC: 3.2 G/DL (ref 3.4–5)
ALP SERPL-CCNC: 105 U/L (ref 33–136)
ALT SERPL W P-5'-P-CCNC: 13 U/L (ref 7–45)
ANION GAP SERPL CALC-SCNC: 13 MMOL/L (ref 10–20)
AST SERPL W P-5'-P-CCNC: 13 U/L (ref 9–39)
BASOPHILS # BLD AUTO: 0.01 X10*3/UL (ref 0–0.1)
BASOPHILS NFR BLD AUTO: 0.1 %
BILIRUB SERPL-MCNC: 0.6 MG/DL (ref 0–1.2)
BUN SERPL-MCNC: 25 MG/DL (ref 6–23)
CALCIUM SERPL-MCNC: 8.1 MG/DL (ref 8.6–10.3)
CHLORIDE SERPL-SCNC: 98 MMOL/L (ref 98–107)
CO2 SERPL-SCNC: 25 MMOL/L (ref 21–32)
CREAT SERPL-MCNC: 0.73 MG/DL (ref 0.5–1.05)
EGFRCR SERPLBLD CKD-EPI 2021: 90 ML/MIN/1.73M*2
EOSINOPHIL # BLD AUTO: 0.01 X10*3/UL (ref 0–0.7)
EOSINOPHIL NFR BLD AUTO: 0.1 %
ERYTHROCYTE [DISTWIDTH] IN BLOOD BY AUTOMATED COUNT: 13.2 % (ref 11.5–14.5)
GLUCOSE SERPL-MCNC: 120 MG/DL (ref 74–99)
HCT VFR BLD AUTO: 37.6 % (ref 36–46)
HGB BLD-MCNC: 13.2 G/DL (ref 12–16)
IMM GRANULOCYTES # BLD AUTO: 0.09 X10*3/UL (ref 0–0.7)
IMM GRANULOCYTES NFR BLD AUTO: 0.8 % (ref 0–0.9)
INR PPP: 1.2 (ref 0.9–1.1)
LYMPHOCYTES # BLD AUTO: 0.47 X10*3/UL (ref 1.2–4.8)
LYMPHOCYTES NFR BLD AUTO: 4.1 %
MCH RBC QN AUTO: 29.4 PG (ref 26–34)
MCHC RBC AUTO-ENTMCNC: 35.1 G/DL (ref 32–36)
MCV RBC AUTO: 84 FL (ref 80–100)
MONOCYTES # BLD AUTO: 0.83 X10*3/UL (ref 0.1–1)
MONOCYTES NFR BLD AUTO: 7.2 %
NEUTROPHILS # BLD AUTO: 10.13 X10*3/UL (ref 1.2–7.7)
NEUTROPHILS NFR BLD AUTO: 87.7 %
NRBC BLD-RTO: 0 /100 WBCS (ref 0–0)
PLATELET # BLD AUTO: 210 X10*3/UL (ref 150–450)
POTASSIUM SERPL-SCNC: 4 MMOL/L (ref 3.5–5.3)
PROT SERPL-MCNC: 6.1 G/DL (ref 6.4–8.2)
PROTHROMBIN TIME: 13.3 SECONDS (ref 9.8–12.8)
RBC # BLD AUTO: 4.49 X10*6/UL (ref 4–5.2)
SODIUM SERPL-SCNC: 132 MMOL/L (ref 136–145)
WBC # BLD AUTO: 11.5 X10*3/UL (ref 4.4–11.3)

## 2024-10-22 PROCEDURE — 2500000004 HC RX 250 GENERAL PHARMACY W/ HCPCS (ALT 636 FOR OP/ED): Performed by: EMERGENCY MEDICINE

## 2024-10-22 PROCEDURE — 99285 EMERGENCY DEPT VISIT HI MDM: CPT | Mod: 25

## 2024-10-22 PROCEDURE — 73502 X-RAY EXAM HIP UNI 2-3 VIEWS: CPT | Mod: RIGHT SIDE | Performed by: RADIOLOGY

## 2024-10-22 PROCEDURE — 85025 COMPLETE CBC W/AUTO DIFF WBC: CPT | Performed by: EMERGENCY MEDICINE

## 2024-10-22 PROCEDURE — 96374 THER/PROPH/DIAG INJ IV PUSH: CPT

## 2024-10-22 PROCEDURE — 73502 X-RAY EXAM HIP UNI 2-3 VIEWS: CPT | Mod: RT

## 2024-10-22 PROCEDURE — 85610 PROTHROMBIN TIME: CPT | Performed by: EMERGENCY MEDICINE

## 2024-10-22 PROCEDURE — 96376 TX/PRO/DX INJ SAME DRUG ADON: CPT

## 2024-10-22 PROCEDURE — 93005 ELECTROCARDIOGRAM TRACING: CPT

## 2024-10-22 PROCEDURE — 84075 ASSAY ALKALINE PHOSPHATASE: CPT | Performed by: EMERGENCY MEDICINE

## 2024-10-22 PROCEDURE — 36415 COLL VENOUS BLD VENIPUNCTURE: CPT | Performed by: EMERGENCY MEDICINE

## 2024-10-22 ASSESSMENT — PAIN SCALES - GENERAL
PAINLEVEL_OUTOF10: 10 - WORST POSSIBLE PAIN
PAINLEVEL_OUTOF10: 7
PAINLEVEL_OUTOF10: 8
PAINLEVEL_OUTOF10: 4
PAINLEVEL_OUTOF10: 6
PAINLEVEL_OUTOF10: 8
PAINLEVEL_OUTOF10: 9
PAINLEVEL_OUTOF10: 9
PAINLEVEL_OUTOF10: 8
PAINLEVEL_OUTOF10: 0 - NO PAIN

## 2024-10-22 ASSESSMENT — PAIN DESCRIPTION - DESCRIPTORS: DESCRIPTORS: ACHING

## 2024-10-22 ASSESSMENT — PAIN - FUNCTIONAL ASSESSMENT: PAIN_FUNCTIONAL_ASSESSMENT: 0-10

## 2024-10-22 ASSESSMENT — PAIN DESCRIPTION - LOCATION
LOCATION: HIP

## 2024-10-22 ASSESSMENT — LIFESTYLE VARIABLES
EVER FELT BAD OR GUILTY ABOUT YOUR DRINKING: NO
TOTAL SCORE: 0
EVER HAD A DRINK FIRST THING IN THE MORNING TO STEADY YOUR NERVES TO GET RID OF A HANGOVER: NO
HAVE YOU EVER FELT YOU SHOULD CUT DOWN ON YOUR DRINKING: NO
HAVE PEOPLE ANNOYED YOU BY CRITICIZING YOUR DRINKING: NO

## 2024-10-22 ASSESSMENT — PAIN DESCRIPTION - FREQUENCY: FREQUENCY: CONSTANT/CONTINUOUS

## 2024-10-22 ASSESSMENT — PAIN DESCRIPTION - PAIN TYPE: TYPE: ACUTE PAIN

## 2024-10-22 ASSESSMENT — COLUMBIA-SUICIDE SEVERITY RATING SCALE - C-SSRS
1. IN THE PAST MONTH, HAVE YOU WISHED YOU WERE DEAD OR WISHED YOU COULD GO TO SLEEP AND NOT WAKE UP?: NO
2. HAVE YOU ACTUALLY HAD ANY THOUGHTS OF KILLING YOURSELF?: NO
6. HAVE YOU EVER DONE ANYTHING, STARTED TO DO ANYTHING, OR PREPARED TO DO ANYTHING TO END YOUR LIFE?: NO

## 2024-10-22 ASSESSMENT — PAIN DESCRIPTION - ORIENTATION
ORIENTATION: RIGHT
ORIENTATION: RIGHT

## 2024-10-22 NOTE — ED NOTES
Pt arrives to ED via ambulance from home after attempting to sit in seat in car and felt snap in right hip. Pt has significant hx of cancer.    Code Status:  Full Code    HPI     Chief Complaint   Patient presents with    right hip pain/ sat into seat of car felt snap       BP (!) 144/101 (BP Location: Left arm, Patient Position: Sitting)   Pulse 85   Temp 36.6 °C (97.8 °F) (Temporal)   Resp (!) 22   Wt 61.2 kg (135 lb)   SpO2 98%     Sherry Coma Scale Score: 15      LDA:   Peripheral IV 10/22/24 Right Antecubital (Active)   Placement Date/Time: 10/22/24 1545   Orientation: Right  Location: Antecubital   Number of days: 0       External Urinary Catheter Female (Active)   Placement Date/Time: 10/22/24 1615   Placed by: Arleen French RN  Hand Hygiene Completed: Yes  External Catheter Type: Female   Number of days: 0        BACKGROUND  Past Medical History:   Diagnosis Date    Hypercholesteremia     Hypertension     Lung cancer (Multi)     Lung nodule seen on imaging study     lung nodules concerning for metastatic lung cancer to the bone    Personal history of irradiation     Saccular aneurysm ([facility])     Saccular aneurysm of left AICA, 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     Current Outpatient Medications on File Prior to Encounter   Medication Sig Dispense Refill    apixaban (Eliquis) 5 mg (74 tabs) tablet Take 2 tablets (10 mg) by mouth 2 times a day for 7 days, then take 1 tablet (5 mg) by mouth 2 times a day. 74 tablet 0    atorvastatin (Lipitor) 80 mg tablet TAKE 1 TABLET BY MOUTH EVERY DAY 90 tablet 3     cetirizine (ZyrTEC) 10 mg tablet Take 1 tablet (10 mg) by mouth once daily as needed for allergies.      cholecalciferol (Vitamin D3) 25 MCG (1000 UT) tablet Take 1 tablet (1,000 Units) by mouth once daily.      dexAMETHasone (Decadron) 4 mg tablet Take 1 tablet (4 mg) by mouth once daily. 30 tablet 1    ibandronate (Boniva) 150 mg tablet Take 1 tablet (150 mg) by mouth every 30 (thirty) days. Take in morning with full glass of water on an empty stomach. No food, drink, meds, or lying down for 60 minutes after. 3 tablet 3    lidocaine (Lidoderm) 5 % patch Place 1 patch over 12 hours on the skin once daily. Remove & discard patch within 12 hours or as directed by MD. 30 patch 1    losartan (Cozaar) 100 mg tablet TAKE 1 TABLET BY MOUTH EVERY DAY 90 tablet 3    metoprolol succinate XL (Toprol-XL) 100 mg 24 hr tablet TAKE 1 TABLET BY MOUTH EVERY DAY 90 tablet 3    omeprazole OTC (PriLOSEC OTC) 20 mg EC tablet Take 1 tablet (20 mg) by mouth once daily. Do not crush, chew, or split. 30 tablet 2    oxyCODONE (Roxicodone) 5 mg immediate release tablet Take 2 tablets (10 mg) by mouth every 4 hours if needed for severe pain (7 - 10). Do not fill before October 19, 2024. 360 tablet 0    vit A/vit C/vit E/zinc/copper (PRESERVISION AREDS ORAL) Take 1 tablet by mouth early in the morning..          ASSESSMENT       Medications Currently Running:       Medications Given:  ED Medication Administration from 10/22/2024 1534 to 10/22/2024 1901         Date/Time Order Dose Route Action Action by     10/22/2024 1624 EDT HYDROmorphone (Dilaudid) injection 0.5 mg 0.5 mg intravenous Given HOA French     10/22/2024 1723 EDT HYDROmorphone (Dilaudid) injection 0.5 mg 0.5 mg intravenous Given CORNEL Ngo                 RESULTS    Imaging:  XR hip right with pelvis when performed 2 or 3 views   Final Result   Transverse right subtrochanteric fracture of the proximal right femur   with 90 degrees apex lateral angulation.        Signed by: Richard  Sindi 10/22/2024 4:45 PM   Dictation workstation:   [id number]         }  Labs ::99  Abnormal Labs Reviewed   CBC WITH AUTO DIFFERENTIAL - Abnormal; Notable for the following components:       Result Value    WBC 11.5 (*)     Neutrophils Absolute 10.13 (*)     Lymphocytes Absolute 0.47 (*)     All other components within normal limits   COMPREHENSIVE METABOLIC PANEL - Abnormal; Notable for the following components:    Glucose 120 (*)     Sodium 132 (*)     Urea Nitrogen 25 (*)     Calcium 8.1 (*)     Albumin 3.2 (*)     Total Protein 6.1 (*)     All other components within normal limits   PROTIME-INR - Abnormal; Notable for the following components:    Protime 13.3 (*)     INR 1.2 (*)     All other components within normal limits                 Arleen French RN  10/22/24 2164

## 2024-10-22 NOTE — ED PROVIDER NOTES
HPI   Chief Complaint   Patient presents with    right hip pain/ sat into seat of car felt snap       Patient presents secondary to right hip pain.  Patient sat into the front seat of a car and felt a snap.  She actually feels like it was a week before that.  She has a known history of metastatic lung cancer.  A recent PET scan did show a hypermetabolic focus in the right femur.  Patient has had pain since then is unable to stand or move.  No chest pain or shortness of breath.  No abdominal pain.  No nausea or vomiting.  No change in bowel movements.  No change in urination.  Patient is receiving Keytruda.  She was actually supposed to receive her second infusion today.                          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, noted 24 on Brain MRI    Wedge compression fracture of t11-T12 vertebra, sequela 2020    Compression fracture of T11 vertebra, sequela     Past Surgical History:   Procedure Laterality Date    ARTERIAL ANEURYSM REPAIR      Thoracic aortic aneurysm repair    COLONOSCOPY      RADIOFREQUENCY ABLATION      L3,4,5    WISDOM TOOTH EXTRACTION       Family History   Problem Relation Name Age of Onset    Lymphoma Father      Polycythemia Father      Breast cancer Neg Hx      Colon cancer Neg Hx       Social History     Tobacco Use    Smoking status: Former     Current packs/day: 0.00     Average packs/day: 0.5 packs/day for 40.0 years (20.0 ttl pk-yrs)     Types: Cigarettes     Start date:      Quit date:      Years since quittin.8    Smokeless tobacco: Never   Vaping Use    Vaping status: Never Used   Substance Use Topics    Alcohol use:  Not Currently     Comment: occassionally    Drug use: Not Currently       Physical Exam   ED Triage Vitals [10/22/24 1555]   Temperature Heart Rate Respirations BP   36.6 °C (97.8 °F) 85 18 (!) 174/98      Pulse Ox Temp Source Heart Rate Source Patient Position   97 % Temporal Monitor Sitting      BP Location FiO2 (%)     Left arm --       Physical Exam  Constitutional:       General: She is not in acute distress.     Appearance: Normal appearance. She is not ill-appearing.   HENT:      Head: Normocephalic.      Nose: Nose normal.      Mouth/Throat:      Mouth: Mucous membranes are moist.   Eyes:      Extraocular Movements: Extraocular movements intact.      Pupils: Pupils are equal, round, and reactive to light.   Cardiovascular:      Rate and Rhythm: Normal rate and regular rhythm.      Pulses: Normal pulses.      Heart sounds: Normal heart sounds.   Pulmonary:      Effort: Pulmonary effort is normal.      Breath sounds: Normal breath sounds. No wheezing, rhonchi or rales.   Abdominal:      General: Abdomen is flat. Bowel sounds are normal.      Palpations: Abdomen is soft.      Tenderness: There is no abdominal tenderness. There is no guarding or rebound.   Musculoskeletal:      Cervical back: Normal range of motion.      Comments: Very limited range of motion and pain on palpation of the right hip.  Intact toe movement.  Normal pulses in bilateral lower extremities.  Cap refills intact.   Skin:     General: Skin is warm and dry.      Capillary Refill: Capillary refill takes less than 2 seconds.   Neurological:      General: No focal deficit present.      Mental Status: She is alert and oriented to person, place, and time.   Psychiatric:         Mood and Affect: Mood normal.         Behavior: Behavior normal.       Labs Reviewed   CBC WITH AUTO DIFFERENTIAL   COMPREHENSIVE METABOLIC PANEL   PROTIME-INR     Pain Management Panel           No data to display              XR hip right with pelvis when performed 2 or  3 views    (Results Pending)     ED Course & MDM   Diagnoses as of 10/22/24 2118   Pathological fracture in neoplastic disease, right femur, initial encounter for fracture       Medical Decision Making  Patient presents secondary to right hip pain.  Patient has known metastatic cancer to bone.  X-rays performed to evaluate for fracture.  X-ray shows likely pathologic fracture to the proximal femur.  Patient was given 0.5 mg of Dilaudid for pain control.  Patient at this time requires transfer to higher level of care for repair of pathologic fracture.  EKG was done by nursing staff at 4:11 PM.  It is sinus rhythm rate of 86.  No acute ST elevation.  OK interval is 148 and QTc is 460.  Patient was discussed with our orthopedic physician who recommends transfer.  Patient was discussed with the orthopedic physician at [facility] and will be an ED to ED transfer for management of a pathologic right proximal femur fracture.  Patient is given additional dose of Dilaudid for pain control pending transfer.        Procedure  Procedures     Carmencita Jones MD  10/22/24 2117       Carmencita Jones MD  10/22/24 2118

## 2024-10-23 ENCOUNTER — APPOINTMENT (OUTPATIENT)
Dept: RADIOLOGY | Facility: HOSPITAL | Age: 69
DRG: 478 | End: 2024-10-23
Payer: MEDICARE

## 2024-10-23 ENCOUNTER — CLINICAL SUPPORT (OUTPATIENT)
Dept: EMERGENCY MEDICINE | Facility: HOSPITAL | Age: 69
DRG: 478 | End: 2024-10-23
Payer: MEDICARE

## 2024-10-23 ENCOUNTER — HOSPITAL ENCOUNTER (INPATIENT)
Facility: HOSPITAL | Age: 69
DRG: 478 | End: 2024-10-23
Attending: STUDENT IN AN ORGANIZED HEALTH CARE EDUCATION/TRAINING PROGRAM | Admitting: INTERNAL MEDICINE
Payer: MEDICARE

## 2024-10-23 VITALS
TEMPERATURE: 97.8 F | BODY MASS INDEX: 21.69 KG/M2 | SYSTOLIC BLOOD PRESSURE: 142 MMHG | DIASTOLIC BLOOD PRESSURE: 80 MMHG | RESPIRATION RATE: 20 BRPM | WEIGHT: 135 LBS | HEIGHT: 66 IN | OXYGEN SATURATION: 97 % | HEART RATE: 78 BPM

## 2024-10-23 DIAGNOSIS — I26.99 OTHER ACUTE PULMONARY EMBOLISM, UNSPECIFIED WHETHER ACUTE COR PULMONALE PRESENT (MULTI): ICD-10-CM

## 2024-10-23 DIAGNOSIS — M84.451A PATHOLOGICAL FRACTURE, RIGHT FEMUR, INITIAL ENCOUNTER FOR FRACTURE: Primary | ICD-10-CM

## 2024-10-23 DIAGNOSIS — R07.9 CHEST PAIN, UNSPECIFIED TYPE: ICD-10-CM

## 2024-10-23 DIAGNOSIS — S72.23XA: ICD-10-CM

## 2024-10-23 LAB
25(OH)D3 SERPL-MCNC: 30 NG/ML (ref 30–100)
ABO GROUP (TYPE) IN BLOOD: NORMAL
ALBUMIN SERPL BCP-MCNC: 3.1 G/DL (ref 3.4–5)
ALP SERPL-CCNC: 114 U/L (ref 33–136)
ALT SERPL W P-5'-P-CCNC: 12 U/L (ref 7–45)
ANION GAP SERPL CALC-SCNC: 16 MMOL/L (ref 10–20)
ANTIBODY SCREEN: NORMAL
APTT PPP: 24 SECONDS (ref 27–38)
AST SERPL W P-5'-P-CCNC: 16 U/L (ref 9–39)
ATRIAL RATE: 85 BPM
ATRIAL RATE: 88 BPM
BASOPHILS # BLD AUTO: 0.01 X10*3/UL (ref 0–0.1)
BASOPHILS NFR BLD AUTO: 0.1 %
BILIRUB SERPL-MCNC: 0.6 MG/DL (ref 0–1.2)
BUN SERPL-MCNC: 20 MG/DL (ref 6–23)
CALCIUM SERPL-MCNC: 8.4 MG/DL (ref 8.6–10.6)
CHLORIDE SERPL-SCNC: 99 MMOL/L (ref 98–107)
CO2 SERPL-SCNC: 25 MMOL/L (ref 21–32)
CREAT SERPL-MCNC: 0.49 MG/DL (ref 0.5–1.05)
CRP SERPL-MCNC: 0.56 MG/DL
EGFRCR SERPLBLD CKD-EPI 2021: >90 ML/MIN/1.73M*2
EOSINOPHIL # BLD AUTO: 0 X10*3/UL (ref 0–0.7)
EOSINOPHIL NFR BLD AUTO: 0 %
ERYTHROCYTE [DISTWIDTH] IN BLOOD BY AUTOMATED COUNT: 13.3 % (ref 11.5–14.5)
ERYTHROCYTE [SEDIMENTATION RATE] IN BLOOD BY WESTERGREN METHOD: 31 MM/H (ref 0–30)
GLUCOSE SERPL-MCNC: 90 MG/DL (ref 74–99)
HCT VFR BLD AUTO: 40.3 % (ref 36–46)
HGB BLD-MCNC: 13.7 G/DL (ref 12–16)
IMM GRANULOCYTES # BLD AUTO: 0.11 X10*3/UL (ref 0–0.7)
IMM GRANULOCYTES NFR BLD AUTO: 1.1 % (ref 0–0.9)
INR PPP: 1.1 (ref 0.9–1.1)
LYMPHOCYTES # BLD AUTO: 0.65 X10*3/UL (ref 1.2–4.8)
LYMPHOCYTES NFR BLD AUTO: 6.2 %
MCH RBC QN AUTO: 29.2 PG (ref 26–34)
MCHC RBC AUTO-ENTMCNC: 34 G/DL (ref 32–36)
MCV RBC AUTO: 86 FL (ref 80–100)
MONOCYTES # BLD AUTO: 1.01 X10*3/UL (ref 0.1–1)
MONOCYTES NFR BLD AUTO: 9.7 %
NEUTROPHILS # BLD AUTO: 8.67 X10*3/UL (ref 1.2–7.7)
NEUTROPHILS NFR BLD AUTO: 82.9 %
NRBC BLD-RTO: 0 /100 WBCS (ref 0–0)
P AXIS: 63 DEGREES
P AXIS: 70 DEGREES
P OFFSET: 202 MS
P ONSET: 145 MS
PLATELET # BLD AUTO: 219 X10*3/UL (ref 150–450)
POTASSIUM SERPL-SCNC: 3.7 MMOL/L (ref 3.5–5.3)
PR INTERVAL: 148 MS
PR INTERVAL: 156 MS
PROT SERPL-MCNC: 5.9 G/DL (ref 6.4–8.2)
PROTHROMBIN TIME: 12.4 SECONDS (ref 9.8–12.8)
Q ONSET: 223 MS
Q ONSET: 249 MS
QRS COUNT: 14 BEATS
QRS COUNT: 14 BEATS
QRS DURATION: 80 MS
QRS DURATION: 89 MS
QT INTERVAL: 384 MS
QT INTERVAL: 392 MS
QTC CALCULATION(BAZETT): 460 MS
QTC CALCULATION(BAZETT): 474 MS
QTC FREDERICIA: 433 MS
QTC FREDERICIA: 445 MS
R AXIS: 17 DEGREES
R AXIS: 34 DEGREES
RBC # BLD AUTO: 4.69 X10*6/UL (ref 4–5.2)
RH FACTOR (ANTIGEN D): NORMAL
SODIUM SERPL-SCNC: 136 MMOL/L (ref 136–145)
T AXIS: 58 DEGREES
T AXIS: 61 DEGREES
T OFFSET: 419 MS
T OFFSET: 441 MS
VENTRICULAR RATE: 86 BPM
VENTRICULAR RATE: 88 BPM
WBC # BLD AUTO: 10.5 X10*3/UL (ref 4.4–11.3)

## 2024-10-23 PROCEDURE — 2500000004 HC RX 250 GENERAL PHARMACY W/ HCPCS (ALT 636 FOR OP/ED)

## 2024-10-23 PROCEDURE — 2500000004 HC RX 250 GENERAL PHARMACY W/ HCPCS (ALT 636 FOR OP/ED): Performed by: PHYSICIAN ASSISTANT

## 2024-10-23 PROCEDURE — 2500000004 HC RX 250 GENERAL PHARMACY W/ HCPCS (ALT 636 FOR OP/ED): Performed by: EMERGENCY MEDICINE

## 2024-10-23 PROCEDURE — 73700 CT LOWER EXTREMITY W/O DYE: CPT | Mod: RT

## 2024-10-23 PROCEDURE — 85025 COMPLETE CBC W/AUTO DIFF WBC: CPT | Performed by: PHYSICIAN ASSISTANT

## 2024-10-23 PROCEDURE — 73552 X-RAY EXAM OF FEMUR 2/>: CPT | Mod: RT

## 2024-10-23 PROCEDURE — 93010 ELECTROCARDIOGRAM REPORT: CPT | Performed by: PHYSICIAN ASSISTANT

## 2024-10-23 PROCEDURE — 71045 X-RAY EXAM CHEST 1 VIEW: CPT | Mod: FOREIGN READ | Performed by: RADIOLOGY

## 2024-10-23 PROCEDURE — 85652 RBC SED RATE AUTOMATED: CPT | Performed by: PHYSICIAN ASSISTANT

## 2024-10-23 PROCEDURE — 2500000001 HC RX 250 WO HCPCS SELF ADMINISTERED DRUGS (ALT 637 FOR MEDICARE OP)

## 2024-10-23 PROCEDURE — 82306 VITAMIN D 25 HYDROXY: CPT | Performed by: PHYSICIAN ASSISTANT

## 2024-10-23 PROCEDURE — 73060 X-RAY EXAM OF HUMERUS: CPT | Mod: BILATERAL PROCEDURE | Performed by: RADIOLOGY

## 2024-10-23 PROCEDURE — 73564 X-RAY EXAM KNEE 4 OR MORE: CPT | Mod: RT

## 2024-10-23 PROCEDURE — 86901 BLOOD TYPING SEROLOGIC RH(D): CPT | Performed by: PHYSICIAN ASSISTANT

## 2024-10-23 PROCEDURE — 73551 X-RAY EXAM OF FEMUR 1: CPT | Mod: LT

## 2024-10-23 PROCEDURE — 86140 C-REACTIVE PROTEIN: CPT | Performed by: PHYSICIAN ASSISTANT

## 2024-10-23 PROCEDURE — 99285 EMERGENCY DEPT VISIT HI MDM: CPT | Mod: 25

## 2024-10-23 PROCEDURE — 2500000005 HC RX 250 GENERAL PHARMACY W/O HCPCS

## 2024-10-23 PROCEDURE — 36415 COLL VENOUS BLD VENIPUNCTURE: CPT | Performed by: PHYSICIAN ASSISTANT

## 2024-10-23 PROCEDURE — 1210000001 HC SEMI-PRIVATE ROOM DAILY

## 2024-10-23 PROCEDURE — 80053 COMPREHEN METABOLIC PANEL: CPT | Performed by: PHYSICIAN ASSISTANT

## 2024-10-23 PROCEDURE — 96374 THER/PROPH/DIAG INJ IV PUSH: CPT

## 2024-10-23 PROCEDURE — 93005 ELECTROCARDIOGRAM TRACING: CPT

## 2024-10-23 PROCEDURE — 71045 X-RAY EXAM CHEST 1 VIEW: CPT

## 2024-10-23 PROCEDURE — 73700 CT LOWER EXTREMITY W/O DYE: CPT | Mod: RIGHT SIDE | Performed by: RADIOLOGY

## 2024-10-23 PROCEDURE — 96376 TX/PRO/DX INJ SAME DRUG ADON: CPT

## 2024-10-23 PROCEDURE — 85610 PROTHROMBIN TIME: CPT | Performed by: PHYSICIAN ASSISTANT

## 2024-10-23 PROCEDURE — 2500000004 HC RX 250 GENERAL PHARMACY W/ HCPCS (ALT 636 FOR OP/ED): Performed by: STUDENT IN AN ORGANIZED HEALTH CARE EDUCATION/TRAINING PROGRAM

## 2024-10-23 PROCEDURE — 73060 X-RAY EXAM OF HUMERUS: CPT | Mod: 50

## 2024-10-23 PROCEDURE — 99285 EMERGENCY DEPT VISIT HI MDM: CPT | Performed by: PHYSICIAN ASSISTANT

## 2024-10-23 RX ORDER — SENNOSIDES 8.6 MG/1
2 TABLET ORAL 2 TIMES DAILY
Status: DISCONTINUED | OUTPATIENT
Start: 2024-10-23 | End: 2024-10-26

## 2024-10-23 RX ORDER — ACETAMINOPHEN 325 MG/1
975 TABLET ORAL EVERY 8 HOURS PRN
Status: DISCONTINUED | OUTPATIENT
Start: 2024-10-23 | End: 2024-10-29 | Stop reason: HOSPADM

## 2024-10-23 RX ORDER — OXYCODONE HYDROCHLORIDE 10 MG/1
1 TABLET ORAL EVERY 4 HOURS PRN
COMMUNITY
End: 2024-10-29 | Stop reason: HOSPADM

## 2024-10-23 RX ORDER — LOSARTAN POTASSIUM 50 MG/1
100 TABLET ORAL DAILY
Status: DISCONTINUED | OUTPATIENT
Start: 2024-10-23 | End: 2024-10-29 | Stop reason: HOSPADM

## 2024-10-23 RX ORDER — CHOLECALCIFEROL (VITAMIN D3) 25 MCG
1000 TABLET ORAL DAILY
Status: DISCONTINUED | OUTPATIENT
Start: 2024-10-23 | End: 2024-10-29 | Stop reason: HOSPADM

## 2024-10-23 RX ORDER — PANTOPRAZOLE SODIUM 40 MG/1
40 TABLET, DELAYED RELEASE ORAL
Status: DISCONTINUED | OUTPATIENT
Start: 2024-10-24 | End: 2024-10-29 | Stop reason: HOSPADM

## 2024-10-23 RX ORDER — ATORVASTATIN CALCIUM 80 MG/1
80 TABLET, FILM COATED ORAL DAILY
Status: DISCONTINUED | OUTPATIENT
Start: 2024-10-23 | End: 2024-10-29 | Stop reason: HOSPADM

## 2024-10-23 RX ORDER — HYDROMORPHONE HYDROCHLORIDE 1 MG/ML
0.5 INJECTION, SOLUTION INTRAMUSCULAR; INTRAVENOUS; SUBCUTANEOUS ONCE
Status: COMPLETED | OUTPATIENT
Start: 2024-10-23 | End: 2024-10-23

## 2024-10-23 RX ORDER — LIDOCAINE 560 MG/1
1 PATCH PERCUTANEOUS; TOPICAL; TRANSDERMAL EVERY 24 HOURS
Status: DISCONTINUED | OUTPATIENT
Start: 2024-10-23 | End: 2024-10-29 | Stop reason: HOSPADM

## 2024-10-23 RX ORDER — HYDROMORPHONE HYDROCHLORIDE 1 MG/ML
0.4 INJECTION, SOLUTION INTRAMUSCULAR; INTRAVENOUS; SUBCUTANEOUS
Status: DISCONTINUED | OUTPATIENT
Start: 2024-10-23 | End: 2024-10-23

## 2024-10-23 RX ORDER — HYDROMORPHONE HYDROCHLORIDE 1 MG/ML
0.4 INJECTION, SOLUTION INTRAMUSCULAR; INTRAVENOUS; SUBCUTANEOUS
Status: DISCONTINUED | OUTPATIENT
Start: 2024-10-23 | End: 2024-10-29 | Stop reason: HOSPADM

## 2024-10-23 RX ORDER — POLYETHYLENE GLYCOL 3350 17 G/17G
17 POWDER, FOR SOLUTION ORAL DAILY
Status: DISCONTINUED | OUTPATIENT
Start: 2024-10-23 | End: 2024-10-26

## 2024-10-23 RX ORDER — METOPROLOL SUCCINATE 100 MG/1
100 TABLET, EXTENDED RELEASE ORAL DAILY
Status: DISCONTINUED | OUTPATIENT
Start: 2024-10-23 | End: 2024-10-29 | Stop reason: HOSPADM

## 2024-10-23 RX ORDER — OXYCODONE HYDROCHLORIDE 5 MG/1
5 TABLET ORAL EVERY 6 HOURS PRN
Status: DISCONTINUED | OUTPATIENT
Start: 2024-10-23 | End: 2024-10-29 | Stop reason: HOSPADM

## 2024-10-23 RX ORDER — HYDROMORPHONE HYDROCHLORIDE 1 MG/ML
1 INJECTION, SOLUTION INTRAMUSCULAR; INTRAVENOUS; SUBCUTANEOUS ONCE
Status: COMPLETED | OUTPATIENT
Start: 2024-10-23 | End: 2024-10-23

## 2024-10-23 RX ORDER — DEXAMETHASONE 4 MG/1
4 TABLET ORAL DAILY
Status: DISCONTINUED | OUTPATIENT
Start: 2024-10-23 | End: 2024-10-24

## 2024-10-23 ASSESSMENT — PAIN DESCRIPTION - LOCATION
LOCATION: LEG
LOCATION: HIP

## 2024-10-23 ASSESSMENT — PAIN SCALES - GENERAL
PAINLEVEL_OUTOF10: 8
PAINLEVEL_OUTOF10: 9
PAINLEVEL_OUTOF10: 3
PAINLEVEL_OUTOF10: 9
PAINLEVEL_OUTOF10: 8
PAINLEVEL_OUTOF10: 7
PAINLEVEL_OUTOF10: 10 - WORST POSSIBLE PAIN
PAINLEVEL_OUTOF10: 5 - MODERATE PAIN
PAINLEVEL_OUTOF10: 9
PAINLEVEL_OUTOF10: 6
PAINLEVEL_OUTOF10: 8
PAINLEVEL_OUTOF10: 4
PAINLEVEL_OUTOF10: 9

## 2024-10-23 ASSESSMENT — PAIN DESCRIPTION - DESCRIPTORS: DESCRIPTORS: ACHING

## 2024-10-23 ASSESSMENT — LIFESTYLE VARIABLES
EVER HAD A DRINK FIRST THING IN THE MORNING TO STEADY YOUR NERVES TO GET RID OF A HANGOVER: NO
TOTAL SCORE: 0
HAVE YOU EVER FELT YOU SHOULD CUT DOWN ON YOUR DRINKING: NO
HAVE PEOPLE ANNOYED YOU BY CRITICIZING YOUR DRINKING: NO
EVER FELT BAD OR GUILTY ABOUT YOUR DRINKING: NO

## 2024-10-23 ASSESSMENT — PAIN DESCRIPTION - ORIENTATION
ORIENTATION: RIGHT

## 2024-10-23 ASSESSMENT — PAIN - FUNCTIONAL ASSESSMENT: PAIN_FUNCTIONAL_ASSESSMENT: 0-10

## 2024-10-23 NOTE — HOSPITAL COURSE
Brittny Gordon is a 69 y.o. female with PMHx of stage 4 NSCLC (dx 8/2024, s/p radiation, currently on Keytruda), PE in 9/2024, AAA (s/p repair in 2012) HTN and HLD who is presenting with right hip pain.     She initially presented to [facility] after feeling her right leg fracture while getting into her car on 10/22/24. X-rays revealed a transverse angulated and displaced fracture of the subtrochanteric region of the right proximal femur. Previous PET scan on 7/11/24 revealed a lytic lesion within the proximal right femur. The patient was transferred to [facility] for further management.     The patient underwent surgery on 10/24/24 for her pathologic fracture. Because of the patient's history of pulmonary embolism, cancer, and likely reduced mobility after discharge, vascular medicine was consulted to provide recommendations for long-term anticoagulation. Based on their recommendations, the patient was started on a low intensity heparin drip before switching to Eliquis. She will be discharged to a SNF with Eliquis. She will restart her pembrolizumab after leaving the SNF.     Things to Follow Up:  -Surgery: Underwent right femur CMN for a pathologic fracture on 10/24/24  -Radiation Oncology: Follow up on postop radiation for R femur fracture  -Oncology: Will follow up with Dr. Feliz regarding when to restart her pembrolizumab  -Physical Therapy: Will require physical therapy following surgery  -Vascular Medicine/Cardiology: Follow up on Eliquis for patient's history of PE, DVT, cancer, and recent surgery. 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 Would benefit from ophthalmologic evaluation  -Consider genetic testing based on family history of MI, saccular aneurysm, pontine aneurysm, and aortic aneurysms (Described in vascular medicine note)    Appointments:   Ortho surgery 11/11/2024  Heme onc Dr. Feliz 11/12/2024  Rad onc Dr. Ospina 11/13/2024

## 2024-10-23 NOTE — CONSULTS
[facility] Department of Orthopaedic Surgery   Initial Consult Note  10/23/24    HPI:   Orthopaedic Problems/Injuries: Right pathologic subtrochanteric femur fracture    69F (HTN, HLD, Osteoporosis (on bisphosphonates since 2017), AAA s/p Repair (2012), NSCLC (dx 08/2024) w/ known metastases to bone on Keytruda, s/p Radiation) p/a mGLF. Patient states she was getting into her car yesterday when she felt a snap in her femur and fell. She was seen at [facility] where x-rays identified her fracture and she was transferred to [facility] for definitive management. Patient states she had antecedent pain in her right hip with ambulation for 3 months prior to injury. States she completed radiation and 1 infusion of Keytruda for her cancer.     Location: Painful at right hip  Duration: Pain has been persistent since injury 10/22  Severity: 9 /10  Worsened by movement/Palpation, improved with rest and pain medication  Open/Closed: closed, NVI: yes  Associated symptoms: no associated numbness/tingling/weakness    ROS  12-point review of systems is negative other than what is mentioned above.    Physical Exam:  Gen: AOx3, NAD  HEENT: normocephalic atraumatic  Psych: appropriate mood and affect  Resp: nonlabored breathing  Cardiac: Extremities WWP, RRR to peripheral palpation  Neuro: CN 2-12 grossly intact  Skin: no rashes  MSK:   RLE:   - skin intact, shortened and internally rotated  - Tender at site of injury with painful ROM.  - Motor intact in DF/PF/EHL/FHL  - SILT in saph/sural/SPN/DPN distributions  - Foot wwp, 2+ DP/PT pulse, brisk cap refill  - Compartments soft and compressible, no pain with passive dorsiflexion    A full secondary exam was performed and all relevant findings discussed and noted above.    Imaging:    XR/CT w/ pathologic transverse subtrochanteric femur fracture with osteolytic lesion concerning for metastatic cancer.     Assessment:  Orthopaedic Problems/Injuries: Right pathologic  subtrochanteric femur fracture    69F (HTN, HLD, Osteoporosis (on bisphosphonates since 2017), AAA s/p Repair (2012), NSCLC (dx 08/2024) w/ known metastases to bone on Keytruda) p/a mGLF. No biopsy proven bone metastases. Closed, NVI. XR/CT w/ pathologic transverse subtroch femur fx. PET scan with polyostotic disease.       Plan:  - Dispo: Heme Onc Primary  - OR: C/p R Femur CMN w/ Dr. Reed 10/23  - Verbally clear per Heme/Onc team, appreciate documentation when able  - WB: NWB RLE  - Abx: Periop ancef  - Diet: NPO for OR today  - Please obtain all preop labs (CBC,BMP,EKG, CXR, Coags, Type and Screen)  - Fontanez: recommend fontanez catheter placement preoperatively    This patient was seen within 30 minutes of initial consult and staffed with attending physician Dr. Reed.     This patient will be followed by the ORTHO TRAUMA service while in-house. Please contact the following team members for any additional questions/concerns.     Ortho Trauma  Adolfo Gaspar MD PGY2  Kelly Ramirez MD PGY3     Please page [phone] (ortho on-call) after 6pm and on weekends.  _________________________________________________________________________________    Adolfo Gaspar MD PGY2  Orthopaedic Surgery  On-call Resident  Epic Chat Preferred

## 2024-10-23 NOTE — ED PROVIDER NOTES
Emergency Department Provider Note        History of Present Illness     History provided by: Patient  Limitations to History: None  External Records Reviewed with Brief Summary:  note and imaging from [place]    HPI:  Brittny Gordon is a 69 y.o. female Patient is a 69-year-old female with history of metastatic colon cancer who presents as a transfer from [place] for orthopedic evaluation.  Patient does have a history of bone metastases and yesterday while getting into her car she felt a snap in her right hip and sudden onset pain.  Patient had an outlying x-ray that showed a proximal femur fracture consistent with a pathologic fracture.  She was accepted by shin Kam for transfer to [facility].      Physical Exam   Triage vitals:  T      BP (!) 157/111  RR 18  O2 96 %      Physical Exam  Vitals and nursing note reviewed.   Constitutional:       General: She is not in acute distress.     Appearance: Normal appearance. She is not toxic-appearing.   HENT:      Head: Normocephalic and atraumatic.      Nose: Nose normal.   Eyes:      Extraocular Movements: Extraocular movements intact.   Cardiovascular:      Rate and Rhythm: Normal rate and regular rhythm.   Pulmonary:      Effort: Pulmonary effort is normal.      Breath sounds: Normal breath sounds.   Abdominal:      Palpations: Abdomen is soft.   Musculoskeletal:         General: Normal range of motion.      Cervical back: Normal range of motion and neck supple.      Comments: R sided hip ttp, lidocaine patches In place  2+ DP and PT pulses bilaterally, sensation intact.   Skin:     General: Skin is warm and dry.   Neurological:      General: No focal deficit present.      Mental Status: She is alert.   Psychiatric:         Mood and Affect: Mood normal.         Thought Content: Thought content normal.        XR knee right 4+ views   Final Result   Normal radiographs right knee.        Signed by: Sekou Stein 10/23/2024 11:31 AM   Dictation  workstation:   [id number]      CT femur right wo IV contrast    (Results Pending)   XR femur right 2+ views    (Results Pending)   XR chest 1 view    (Results Pending)   XR humerus bilateral    (Results Pending)   XR femur left 1 view    (Results Pending)     Labs Reviewed   SEDIMENTATION RATE, AUTOMATED - Abnormal       Result Value    Sedimentation Rate 31 (*)    CBC WITH AUTO DIFFERENTIAL - Abnormal    WBC 10.5      nRBC 0.0      RBC 4.69      Hemoglobin 13.7      Hematocrit 40.3      MCV 86      MCH 29.2      MCHC 34.0      RDW 13.3      Platelets 219      Neutrophils % 82.9      Immature Granulocytes %, Automated 1.1 (*)     Lymphocytes % 6.2      Monocytes % 9.7      Eosinophils % 0.0      Basophils % 0.1      Neutrophils Absolute 8.67 (*)     Immature Granulocytes Absolute, Automated 0.11      Lymphocytes Absolute 0.65 (*)     Monocytes Absolute 1.01 (*)     Eosinophils Absolute 0.00      Basophils Absolute 0.01     COMPREHENSIVE METABOLIC PANEL - Abnormal    Glucose 90      Sodium 136      Potassium 3.7      Chloride 99      Bicarbonate 25      Anion Gap 16      Urea Nitrogen 20      Creatinine 0.49 (*)     eGFR >90      Calcium 8.4 (*)     Albumin 3.1 (*)     Alkaline Phosphatase 114      Total Protein 5.9 (*)     AST 16      Bilirubin, Total 0.6      ALT 12     COAGULATION SCREEN - Abnormal    Protime 12.4      INR 1.1      aPTT 24 (*)     Narrative:     The APTT is no longer used for monitoring Unfractionated Heparin Therapy. For monitoring Heparin Therapy, use the Heparin Assay.   C-REACTIVE PROTEIN - Normal    C-Reactive Protein 0.56     VITAMIN D 25-HYDROXY,TOTAL   TYPE AND SCREEN     ED Course as of 10/23/24 1133   Wed Oct 23, 2024   0941 I independently interpreted  EKG obtained at 938, SR with HR 87 un remarkable intervals, axis, no SAGE, isolated TWI in V1  [NORI]   1128 Patient is a very adamant that she in the past has had hallucinations from polypharmacy, she is adamant that she does not want  anything besides oxycodone and Dilaudid for pain so that this does not happen again.  Patient also states she has had poor experiences with previous oncologist, states that she follows with a doctor in [place], she does not want her cancer regimen changed at this time.  I notified patient that I would include this in her note.  She also does not want to be given any additional medications like Zofran, Tylenol, anything for anxiety, etc. [MK]      ED Course User Index  [NORI] Rebecca Gambino MD  [MK] Didi Hyatt PA-C         Diagnoses as of 10/23/24 1133   Pathological fracture, right femur, initial encounter for fracture         Medical Decision Making & ED Course   Medical Decision Making:    On arrival here, patient states that the road was walking and she is having some recurrence of pain, patient states in the past she has had bad experiences with polypharmacy and would like to keep her pain medications the same, will be ordered Dilaudid.  She is neurovascularly intact with 2+ PT DP pulses to bilateral lower extremities.  Orthopedics was consulted, they requested additional x-rays as well as a CT of the right femur, I also obtained preop labs as well as vitamin D levels, ESR and CRP.  I spoke with trauma however given there was no traumatic mechanism, they felt that patient would be better served on the medicine or oncology team.  Patient [facility] to oncology team admission with planned by Ortho for OR either today or tomorrow.  Patient remained hemodynamically stable throughout entirety of ED visit.  ----      Differential diagnoses considered include but are not limited to: fracture, pathologic fracture     Social Determinants of Health which Significantly Impact Care: None identified     EKG Independent Interpretation: EKG interpreted by myself. Please see ED Course for full interpretation.    Independent Result Review and Interpretation: Relevant laboratory and  radiographic results were reviewed and independently interpreted by myself.  As necessary, they are commented on in the ED Course.    Chronic conditions affecting the patient's care: As documented above in [facility]    The patient was discussed with the following consultants/services:  ortho, [facility] admission coordinators who accepted to oncology service    Care Considerations: As documented above in [facility]    ED Course:  ED Course as of 10/23/24 1133   Wed Oct 23, 2024   0941 I independently interpreted  EKG obtained at 938, SR with HR 87 un remarkable intervals, axis, no SAGE, isolated TWI in V1  [NORI]   1128 Patient is a very adamant that she in the past has had hallucinations from polypharmacy, she is adamant that she does not want anything besides oxycodone and Dilaudid for pain so that this does not happen again.  Patient also states she has had poor experiences with previous oncologist, states that she follows with a doctor in [place], she does not want her cancer regimen changed at this time.  I notified patient that I would include this in her note.  She also does not want to be given any additional medications like Zofran, Tylenol, anything for anxiety, etc. [MK]      ED Course User Index  [NORI] Rebecca Gambino MD  [MK] Didi Hyatt PA-C         Diagnoses as of 10/23/24 1133   Pathological fracture, right femur, initial encounter for fracture     Disposition   As a result of their workup, the patient will require admission to the hospital.  The patient was informed of her diagnosis.  The patient was given the opportunity to ask questions and I answered them. The patient agreed to be admitted to the hospital.    Procedures   Procedures    This was a shared visit with an ED attending.  The patient was seen and discussed with the ED attending    Didi Hyatt PA-C  Emergency Medicine       Didi Hyatt PA-C  10/23/24 1050       Didi Hyatt PA-C  10/23/24 1133

## 2024-10-23 NOTE — SIGNIFICANT EVENT
Resident Staffing note:     Brittny Gordon is a 69-year-old female with past medical history of stage IV non-small cell lung cancer status post radiation therapy, status post 1 cycle of Keytruda, hypertension, hyperlipidemia, PE in 9/2024 who is admitted following right hip pain, found to have pathologic fracture of her right femur likely due to metastatic disease.  Patient follows with Dr. Feliz at [facility] for her oncologic care.  Most recent cycle of Keytruda with cycle 2 starting 10/20/2024.    Overall, seems that patient is medically compensated and optimized at this time for urgent procedure for surgical fixation of right femur fracture.    #Pathologic R femur fx  - ortho planning OR today  - management per ortho team    #Medical optimization  - patient appears well compensated medically  - CXR without gross abnormality, ECG NSR  - Saturating well on room air  - No concern for ACS prior to admission (STEMI, NSTEMI, unstable angina)  - LVEF normal on echo 8/29/2024  - RCRI score 0 points, 3.9% risk of 30 day death, MI, cardiac arrest    #Stage IVB ([id number]) primary non-small cell carcinoma of RUL, involving multiple bone mets   :: S/p radiation therapy w/ Dr. Nagi Ospina  - follows w/ Dr. Aislinn Feliz at [facility]  - C2 keytruda 10/20/2024  - Will email Dr. Feliz to make aware of admission  - pain regimen w/ tylenol, oxy for severe, dilaudid for breakthrough     #Recent PE 9/2024  - patient discharged on eliquis, though she says that she has stopped taking this  - Given patient's cancer as well as immobilized state, extremely high risk of recurrent/worsening PE  - Hold on anticoagulation per ortho prior to surgery, but will need to restart as soon as safely possible post op  - heparin gtt post op     #HTN  #HLD  - home metoprolol succinate 100mg daily, atorvastatin 80mg daily continued  - hold home losartan 100mg daily pre-op    F: PRN  E: PRN  N: NPO until after surgery  A: PIV  DVT: held, hep gtt post  op  GI: not indicated    NOK: Brother/POA Juice Love [phone]  Code status: Full code (confirmed on admission)    Tray Yeager MD  Internal Medicine PGY-2

## 2024-10-23 NOTE — H&P
History Of Present Illness  Brittny Gordon is a 69 y.o. female with PMHx of stage 4 NSCLC (dx 8/2024, s/p radiation, currently on Keytruda), PE in 9/2024, AAA (s/p repair in 2012) HTN and HLD who is presenting with right hip pain.    The patient reports that she had been experiencing right hip pain for the last few weeks. Yesterday, she was getting in her car when she felt her right leg fracture. She initially presented to [facility] where imaging demonstrated a pathologic fracture of the right femur. Her previous PET scan showed a hypermetabolic focus in her right femur suggestive of a metastatic lesion. She was transferred to [facility] for further management.     In the [facility] ED, she does not report any dizziness, vision changes, nausea, vomiting or pain outside of her right hip. She had no concerns regarding undergoing her procedure with ortho for her right femur fracture.     Oncologic History  Oncology History   Primary malignant neoplasm of right lung metastatic to other site (Multi)   8/9/2024 Initial Diagnosis    Primary malignant neoplasm of right lung metastatic to other site (Multi)     8/23/2024 Cancer Staged    Staging form: Lung, AJCC 8th Edition, Clinical: Stage IVB (cT1b, cN2, pM1c) - Signed by Camila Chaudhary MD MPH on 8/23/2024     10/1/2024 - 10/1/2024 Chemotherapy    Pembrolizumab + PACLitaxel / CARBOplatin, 21 Day Cycles     10/1/2024 -  Chemotherapy    Pembrolizumab, 21 Day Cycles           Past Medical History  She has a past medical history of Hypercholesteremia, Hypertension, Lung cancer (Multi), Lung nodule seen on imaging study, Personal history of irradiation, Saccular aneurysm (HHS-HCC), and Wedge compression fracture of t11-T12 vertebra, sequela (07/30/2020).    Surgical History  She has a past surgical history that includes Radiofrequency ablation; Colonoscopy; [place] tooth extraction; and Arterial aneurysm repair.     Social History  She reports that she quit smoking about 11 years ago. Her  smoking use included cigarettes. She started smoking about 51 years ago. She has a 20 pack-year smoking history. She has never used smokeless tobacco. She reports that she does not currently use alcohol. She reports that she does not currently use drugs.    Family History  Family History   Problem Relation Name Age of Onset    Lymphoma Father      Polycythemia Father      Breast cancer Neg Hx      Colon cancer Neg Hx          Allergies  Morphine, Acetaminophen, and Epinephrine    Review of Systems  As stated in HPI     Physical Exam  Constitutional:       Appearance: Normal appearance.   HENT:      Head: Normocephalic and atraumatic.      Mouth/Throat:      Mouth: Mucous membranes are moist.      Pharynx: Oropharynx is clear.   Eyes:      Extraocular Movements: Extraocular movements intact.      Conjunctiva/sclera: Conjunctivae normal.      Pupils: Pupils are equal, round, and reactive to light.   Cardiovascular:      Rate and Rhythm: Normal rate and regular rhythm.      Pulses: Normal pulses.   Pulmonary:      Effort: Pulmonary effort is normal. No respiratory distress.      Breath sounds: Normal breath sounds. No rhonchi.   Abdominal:      General: Bowel sounds are normal.      Palpations: Abdomen is soft.   Musculoskeletal:      Comments: Right leg brace; No peripheral edema   Skin:     General: Skin is warm and dry.   Neurological:      General: No focal deficit present.      Mental Status: She is alert and oriented to person, place, and time.          Last Recorded Vitals  BP (!) 156/105   Pulse 99   Temp 37 °C (98.6 °F) (Oral)   Resp (!) 22   Wt 61.2 kg (135 lb)   SpO2 98%     Relevant Results  XR femur right 2+ views    Result Date: 10/23/2024  STUDY: Bilateral Femur Radiographs; 10/23/2024 11:17 AM INDICATION: Right femur fracture.  Evaluate for pathologic lesion or fracture. COMPARISON: XR right hip/pelvis 10/22/2024. ACCESSION NUMBER(S): [id number], [id number] ORDERING CLINICIAN: EPHRAIM MENON  TECHNIQUE:  Four view(s) of the right femur; two views of the left femur. FINDINGS:  Right femur:  There is a transverse angulated and displaced fracture of the subtrochanteric region of the right proximal femur.  There are degenerative changes of the hip and knee joint.  No soft tissue abnormality is seen. There are intramedullary groundglass like changes in the proximal femur extending from the lesser trochanteric region to the proximal femoral diaphysis. There is no cortical thickening. No other fracture. Left femur:  There is no displaced fracture.  The alignment is anatomic.  No soft tissue abnormality is seen.    Transverse angulated displaced fracture of the subtrochanteric region of the right proximal femur with intramedullary groundglass like changes superior and inferior to the fracture site. Suggest further evaluation with contrast-enhanced MRI. Degenerative change of both hip joints. Signed by Jesus Manuel Pearson MD    XR femur left 1 view    Result Date: 10/23/2024  STUDY: Bilateral Femur Radiographs; 10/23/2024 11:17 AM INDICATION: Right femur fracture.  Evaluate for pathologic lesion or fracture. COMPARISON: XR right hip/pelvis 10/22/2024. ACCESSION NUMBER(S): [id number], [id number] ORDERING CLINICIAN: EPHRAIM MENON TECHNIQUE:  Four view(s) of the right femur; two views of the left femur. FINDINGS:  Right femur:  There is a transverse angulated and displaced fracture of the subtrochanteric region of the right proximal femur.  There are degenerative changes of the hip and knee joint.  No soft tissue abnormality is seen. There are intramedullary groundglass like changes in the proximal femur extending from the lesser trochanteric region to the proximal femoral diaphysis. There is no cortical thickening. No other fracture. Left femur:  There is no displaced fracture.  The alignment is anatomic.  No soft tissue abnormality is seen.    Transverse angulated displaced fracture of the subtrochanteric region  of the right proximal femur with intramedullary groundglass like changes superior and inferior to the fracture site. Suggest further evaluation with contrast-enhanced MRI. Degenerative change of both hip joints. Signed by Jesus Maunel Pearson MD    XR humerus bilateral    Result Date: 10/23/2024  STUDY: Humerus Radiographs; 10/23/2024 11:17AM INDICATION: The patient has pain on the exam. Evaluate for bone lesion or pathologic fracture. COMPARISON: 10/9/2024 XR Shoulder Left. ACCESSION NUMBER(S): [id number] ORDERING CLINICIAN: RAJAN MELGAR TECHNIQUE:  Two view(s) of the left humerus and two view(s) of the right humerus. FINDINGS:  Left Humerus: There is no acute fracture.  Visualized joint spaces are unremarkable. Right Humerus: There are findings consistent with an old fracture of the humeral neck.  Visualized joint spaces are unremarkable.    No acute osseous abnormalities. Signed by Samuel Flood MD    XR chest 1 view    Result Date: 10/23/2024  STUDY: Chest Radiograph;  10/23/2024 11:17AM INDICATION: Pre-operative testing. COMPARISON: CTA chest 09/12/2024, XR Chest 08/29/2024, 08/27/2024 ACCESSION NUMBER(S): [id number] ORDERING CLINICIAN: EPHRAIM MENON TECHNIQUE:  Frontal chest was obtained at 11:16 hours. FINDINGS: The patient status post median sternotomy. CARDIOMEDIASTINAL SILHOUETTE: Cardiomediastinal silhouette is normal in size and configuration.  LUNGS: There are linear densities in both lung bases most likely representing atelectasis or fibrosis.  ABDOMEN: No remarkable upper abdominal findings.  BONES: No acute osseous changes.    No evidence consolidating infiltrate or effusion. Signed by Samuel Flood MD    CT femur right wo IV contrast    Result Date: 10/23/2024  STUDY: CT Extremity; Completed Time:  10/23/2024 10:39 AM INDICATION: Femur fracture, pre-operative assessment. COMPARISON: None Available. ACCESSION NUMBER(S): [id number] ORDERING CLINICIAN: EPHRAIM MENON TECHNIQUE:  Thin section axial  images were obtained through the right femur without intravenous contrast.  Orthogonal reconstructed images were obtained and reviewed.  Automated mA/kV exposure control was utilized and patient examination was performed in strict accordance with principles of ALARA. FINDINGS:  There is a linear minimally comminuted angulated fracture through the right subtrochanteric region with associated angulation and rotation. Question subtle underlying intramedullary lucent lesion involving the proximal femoral diaphysis, although not definite.    Linear minimally comminuted angulated fracture through the right subtrochanteric region with associated angulation and rotation. Question subtle underlying intramedullary lucent lesion involving the proximal femoral diaphysis, although not definite.  Consider further evaluation with MRI. Signed by Dustin Bernabe MD    XR knee right 4+ views    Result Date: 10/23/2024  Interpreted By:  Sekou Stein, STUDY: XR KNEE RIGHT 4+ VIEWS   INDICATION: Signs/Symptoms:preop.   COMPARISON: None   ACCESSION NUMBER(S): [id number]   ORDERING CLINICIAN: EPHRAIM MENON   FINDINGS: Four views right knee.   No osseous, articular, or soft tissue abnormality.       Normal radiographs right knee.   Signed by: Sekou Stein 10/23/2024 11:31 AM Dictation workstation:   [id number]    ECG 12 lead    Result Date: 10/23/2024  Sinus rhythm Probable left atrial enlargement    XR hip right with pelvis when performed 2 or 3 views    Result Date: 10/22/2024  Interpreted By:  Richard Delong, STUDY: XR HIP RIGHT WITH PELVIS WHEN PERFORMED 2 OR 3 VIEWS; ;  10/22/2024 4:36 pm   INDICATION: Signs/Symptoms:hip pain, history of metastatic cancer.   COMPARISON: None.   ACCESSION NUMBER(S): [id number]   ORDERING CLINICIAN: CHERRY GUILLAUME   FINDINGS: There is subtrochanteric fracture of the right proximal femur with 90 degrees apex lateral angulation. There is associated soft tissue swelling. Both femoral heads are  appropriately located within the acetabula.       Transverse right subtrochanteric fracture of the proximal right femur with 90 degrees apex lateral angulation.   Signed by: Richard Delong 10/22/2024 4:45 PM Dictation workstation:   [id number]           Assessment/Plan   Brittny Gordon is a 69 y.o. female with PMHx of stage 4 NSCLC (dx 8/2024, s/p radiation, currently on Keytruda), PE in 9/2024, AAA (s/p repair in 2012) HTN and HLD who is presenting with right hip pain. Imaging supports diagnosis of pathologic fracture in the right femur. Currently awaiting surgery.     #Pathologic right femur fracture  ::Seen on admission x-ray  -Currently planning to go to the OR today  -Pain reg: Received dilaudid in ED, oxycodone 5 mg q6h PRN, Tylenol 975 mg q8h PRN    #Stage 4B NSCLC c/b bone metastases  ::S/p radiation therapy  ::Currently on Keytruda  ::Primary oncologist: Dr. Feliz  -Will notify Dr. Feliz of admission      #History of PE  ::Recent PE in 9/2024  ::Was prescribed Eliquis but patient reports not taking  -Plan to restart anticoagulation following procedure when cleared by ortho     #HTN  -Holding home losartan   -Metoprolol succinate 100 mg daily    #HLD  -Atorvastatin 80 mg daily        F: PRN  E: PRN  N: NPO for procedure  A: PIV  DVT ppx: Holding for procedure  GI ppx: None  Bowel ppx: Miralax, senna     NOK: Juice Love (brother) [phone]  Code status: Full code (confirmed on admission)       Queen CORNEL Connolly MD

## 2024-10-23 NOTE — PROGRESS NOTES
Pharmacy Medication History Review    Brittny Gordon is a 69 y.o. female admitted for Pathological fracture, right femur, initial encounter for fracture. Pharmacy reviewed the patient's [id number] medications and allergies for accuracy.    Medications ADDED:  N/A  Medications CHANGED:  Lidocaine patch- patient use as needed  Prilosec- patient takes as needed   Oxycodone- changed from 5 mg to 10 mg  Medications REMOVED/NO LONGER TAKING:   Eliquis   Zyrtec     The list below reflects the updated PTA list.   Prior to Admission Medications   Prescriptions Last Dose Informant   apixaban (Eliquis) 5 mg (74 tabs) tablet Not Taking Self   Sig: Take 2 tablets (10 mg) by mouth 2 times a day for 7 days, then take 1 tablet (5 mg) by mouth 2 times a day.   Patient not taking: Reported on 10/23/2024   atorvastatin (Lipitor) 80 mg tablet 10/21/2024 Morning Self   Sig: TAKE 1 TABLET BY MOUTH EVERY DAY   cholecalciferol (Vitamin D3) 25 MCG (1000 UT) tablet 10/21/2024 Morning Self   Sig: Take 1 tablet (1,000 Units) by mouth once daily.   dexAMETHasone (Decadron) 4 mg tablet 10/21/2024 Evening Self   Sig: Take 1 tablet (4 mg) by mouth once daily.   ibandronate (Boniva) 150 mg tablet  Self   Sig: Take 1 tablet (150 mg) by mouth every 30 (thirty) days. Take in morning with full glass of water on an empty stomach. No food, drink, meds, or lying down for 60 minutes after.   lidocaine (Lidoderm) 5 % patch 10/21/2024 Morning Self   Sig: Place 1 patch over 12 hours on the skin once daily. Remove & discard patch within 12 hours or as directed by MD.   Patient taking differently: Place 1 patch on the skin once daily as needed. Remove & discard patch within 12 hours or as directed by MD.   losartan (Cozaar) 100 mg tablet 10/21/2024 Evening Self   Sig: TAKE 1 TABLET BY MOUTH EVERY DAY   metoprolol succinate XL (Toprol-XL) 100 mg 24 hr tablet 10/21/2024 Morning Self   Sig: TAKE 1 TABLET BY MOUTH EVERY DAY   omeprazole OTC (PriLOSEC OTC)  "20 mg EC tablet Past Week Self   Sig: Take 1 tablet (20 mg) by mouth once daily. Do not crush, chew, or split.   Patient taking differently: Take 1 tablet (20 mg) by mouth once daily as needed. Do not crush, chew, or split.   oxyCODONE (Roxicodone) 10 mg immediate release tablet 10/22/2024 Morning Self   Sig: Take 1 tablet (10 mg) by mouth every 4 hours if needed for severe pain (7 - 10). Do not crush, chew, or split.   vit A/vit C/vit E/zinc/copper (PRESERVISION AREDS ORAL) 10/21/2024 Morning Self   Sig: Take 1 tablet by mouth early in the morning..      Facility-Administered Medications: None        The list below reflects the updated allergy list. Please review each documented allergy for additional clarification and justification.  Allergies  Reviewed by Breanna Phoenix on 10/23/2024        Severity Reactions Comments    Morphine High Psychosis Severe agitation, hallucinations     Acetaminophen Medium GI Upset     Epinephrine Low Nausea/vomiting, Palpitations             Patient declines M2B at discharge.     Sources:   Sources included out patient fill history, OARRS, and patient interview (patient was a moderate med historian.).    Additional Comments:  Patient reported that she is still taking Cozaar and Toprol-XL, last refill for both was on 5/21/24.   Patient reported that she needs a new script for Decadron.       CAILIN JOHNSONSelect Medical TriHealth Rehabilitation HospitalHANNY  Pharmacy Technician  10/23/24     Secure Chat preferred   If no response call [phone] or [employer] \"Med Rec\"   "

## 2024-10-23 NOTE — ED TRIAGE NOTES
Transfer from [place] for Ortho Consult for subtrochanteric R femur Fx. R LE +2 pedal pulses palpable.  HX:Lung cx w/ bone metastatic cx. & AAA repair

## 2024-10-23 NOTE — PROGRESS NOTES
Brief Transfer Note    70 yo F with PMH of metastatic lung cancer on Keytruda  Sustained R femur fx while getting into the car today  Neurovascularly intact, not open  Spoke with ortho who will evaluate in ED at [facility]    Kamila Duggan MD

## 2024-10-23 NOTE — PROGRESS NOTES
This progress note represents a emergency department transition note for signout of care.    Patient care was signed out to me. Please see the previous provider's notes for the full history and physical. Briefly, Brittny Gordon is 69 y.o. female who had presented to the emergency department hip pain and found to have a pathologic fracture.  Patient is awaiting transfer to [place].  During my ED course, there is no acute events.  Patient was signed out pending transfer to [place] for further care of her pathologic fracture.  Transfer was set to be shortly after signout.    Charlie Gutiérrez DO  Emergency Medicine    Diagnoses as of 10/23/24 0128   Pathological fracture in neoplastic disease, right femur, initial encounter for fracture

## 2024-10-24 ENCOUNTER — OFFICE VISIT (OUTPATIENT)
Dept: HEMATOLOGY/ONCOLOGY | Facility: HOSPITAL | Age: 69
DRG: 478 | End: 2024-10-24
Payer: MEDICARE

## 2024-10-24 ENCOUNTER — APPOINTMENT (OUTPATIENT)
Dept: RADIOLOGY | Facility: HOSPITAL | Age: 69
DRG: 478 | End: 2024-10-24
Payer: MEDICARE

## 2024-10-24 ENCOUNTER — ANESTHESIA EVENT (OUTPATIENT)
Dept: OPERATING ROOM | Facility: HOSPITAL | Age: 69
End: 2024-10-24
Payer: MEDICARE

## 2024-10-24 ENCOUNTER — ANESTHESIA (OUTPATIENT)
Dept: OPERATING ROOM | Facility: HOSPITAL | Age: 69
End: 2024-10-24
Payer: MEDICARE

## 2024-10-24 LAB
ALBUMIN SERPL BCP-MCNC: 2.9 G/DL (ref 3.4–5)
ANION GAP SERPL CALC-SCNC: 16 MMOL/L (ref 10–20)
BASOPHILS # BLD AUTO: 0.02 X10*3/UL (ref 0–0.1)
BASOPHILS NFR BLD AUTO: 0.1 %
BUN SERPL-MCNC: 20 MG/DL (ref 6–23)
CALCIUM SERPL-MCNC: 8 MG/DL (ref 8.6–10.6)
CHLORIDE SERPL-SCNC: 100 MMOL/L (ref 98–107)
CO2 SERPL-SCNC: 24 MMOL/L (ref 21–32)
CREAT SERPL-MCNC: 0.54 MG/DL (ref 0.5–1.05)
EGFRCR SERPLBLD CKD-EPI 2021: >90 ML/MIN/1.73M*2
EOSINOPHIL # BLD AUTO: 0 X10*3/UL (ref 0–0.7)
EOSINOPHIL NFR BLD AUTO: 0 %
ERYTHROCYTE [DISTWIDTH] IN BLOOD BY AUTOMATED COUNT: 13.6 % (ref 11.5–14.5)
GLUCOSE SERPL-MCNC: 149 MG/DL (ref 74–99)
HCT VFR BLD AUTO: 37.2 % (ref 36–46)
HGB BLD-MCNC: 12.5 G/DL (ref 12–16)
IMM GRANULOCYTES # BLD AUTO: 0.1 X10*3/UL (ref 0–0.7)
IMM GRANULOCYTES NFR BLD AUTO: 0.7 % (ref 0–0.9)
LYMPHOCYTES # BLD AUTO: 0.78 X10*3/UL (ref 1.2–4.8)
LYMPHOCYTES NFR BLD AUTO: 5.6 %
MAGNESIUM SERPL-MCNC: 1.89 MG/DL (ref 1.6–2.4)
MCH RBC QN AUTO: 29 PG (ref 26–34)
MCHC RBC AUTO-ENTMCNC: 33.6 G/DL (ref 32–36)
MCV RBC AUTO: 86 FL (ref 80–100)
MONOCYTES # BLD AUTO: 0.73 X10*3/UL (ref 0.1–1)
MONOCYTES NFR BLD AUTO: 5.2 %
NEUTROPHILS # BLD AUTO: 12.29 X10*3/UL (ref 1.2–7.7)
NEUTROPHILS NFR BLD AUTO: 88.4 %
NRBC BLD-RTO: 0 /100 WBCS (ref 0–0)
PHOSPHATE SERPL-MCNC: 4 MG/DL (ref 2.5–4.9)
PLATELET # BLD AUTO: 182 X10*3/UL (ref 150–450)
POTASSIUM SERPL-SCNC: 3.8 MMOL/L (ref 3.5–5.3)
RBC # BLD AUTO: 4.31 X10*6/UL (ref 4–5.2)
SODIUM SERPL-SCNC: 136 MMOL/L (ref 136–145)
WBC # BLD AUTO: 13.9 X10*3/UL (ref 4.4–11.3)

## 2024-10-24 PROCEDURE — 27506 TREATMENT OF THIGH FRACTURE: CPT | Performed by: ORTHOPAEDIC SURGERY

## 2024-10-24 PROCEDURE — 0QB60ZX EXCISION OF RIGHT UPPER FEMUR, OPEN APPROACH, DIAGNOSTIC: ICD-10-PCS | Performed by: ORTHOPAEDIC SURGERY

## 2024-10-24 PROCEDURE — 7100000002 HC RECOVERY ROOM TIME - EACH INCREMENTAL 1 MINUTE: Performed by: ORTHOPAEDIC SURGERY

## 2024-10-24 PROCEDURE — 2500000005 HC RX 250 GENERAL PHARMACY W/O HCPCS

## 2024-10-24 PROCEDURE — 2500000001 HC RX 250 WO HCPCS SELF ADMINISTERED DRUGS (ALT 637 FOR MEDICARE OP): Performed by: PHYSICIAN ASSISTANT

## 2024-10-24 PROCEDURE — 93005 ELECTROCARDIOGRAM TRACING: CPT

## 2024-10-24 PROCEDURE — 2780000003 HC OR 278 NO HCPCS: Performed by: ORTHOPAEDIC SURGERY

## 2024-10-24 PROCEDURE — 2500000005 HC RX 250 GENERAL PHARMACY W/O HCPCS: Performed by: ORTHOPAEDIC SURGERY

## 2024-10-24 PROCEDURE — 83735 ASSAY OF MAGNESIUM: CPT

## 2024-10-24 PROCEDURE — 2720000007 HC OR 272 NO HCPCS: Performed by: ORTHOPAEDIC SURGERY

## 2024-10-24 PROCEDURE — C1713 ANCHOR/SCREW BN/BN,TIS/BN: HCPCS | Performed by: ORTHOPAEDIC SURGERY

## 2024-10-24 PROCEDURE — 2500000004 HC RX 250 GENERAL PHARMACY W/ HCPCS (ALT 636 FOR OP/ED): Mod: JZ | Performed by: PHYSICIAN ASSISTANT

## 2024-10-24 PROCEDURE — 0QS636Z REPOSITION RIGHT UPPER FEMUR WITH INTRAMEDULLARY INTERNAL FIXATION DEVICE, PERCUTANEOUS APPROACH: ICD-10-PCS | Performed by: ORTHOPAEDIC SURGERY

## 2024-10-24 PROCEDURE — 3600000009 HC OR TIME - EACH INCREMENTAL 1 MINUTE - PROCEDURE LEVEL FOUR: Performed by: ORTHOPAEDIC SURGERY

## 2024-10-24 PROCEDURE — 3700000001 HC GENERAL ANESTHESIA TIME - INITIAL BASE CHARGE: Performed by: ORTHOPAEDIC SURGERY

## 2024-10-24 PROCEDURE — 3700000002 HC GENERAL ANESTHESIA TIME - EACH INCREMENTAL 1 MINUTE: Performed by: ORTHOPAEDIC SURGERY

## 2024-10-24 PROCEDURE — 80069 RENAL FUNCTION PANEL: CPT

## 2024-10-24 PROCEDURE — 2500000004 HC RX 250 GENERAL PHARMACY W/ HCPCS (ALT 636 FOR OP/ED)

## 2024-10-24 PROCEDURE — 93010 ELECTROCARDIOGRAM REPORT: CPT | Performed by: INTERNAL MEDICINE

## 2024-10-24 PROCEDURE — 99233 SBSQ HOSP IP/OBS HIGH 50: CPT | Performed by: STUDENT IN AN ORGANIZED HEALTH CARE EDUCATION/TRAINING PROGRAM

## 2024-10-24 PROCEDURE — 7100000001 HC RECOVERY ROOM TIME - INITIAL BASE CHARGE: Performed by: ORTHOPAEDIC SURGERY

## 2024-10-24 PROCEDURE — 2500000005 HC RX 250 GENERAL PHARMACY W/O HCPCS: Performed by: PHYSICIAN ASSISTANT

## 2024-10-24 PROCEDURE — 2500000001 HC RX 250 WO HCPCS SELF ADMINISTERED DRUGS (ALT 637 FOR MEDICARE OP)

## 2024-10-24 PROCEDURE — 27506 TREATMENT OF THIGH FRACTURE: CPT | Performed by: PHYSICIAN ASSISTANT

## 2024-10-24 PROCEDURE — 1170000001 HC PRIVATE ONCOLOGY ROOM DAILY

## 2024-10-24 PROCEDURE — 3600000004 HC OR TIME - INITIAL BASE CHARGE - PROCEDURE LEVEL FOUR: Performed by: ORTHOPAEDIC SURGERY

## 2024-10-24 PROCEDURE — 88307 TISSUE EXAM BY PATHOLOGIST: CPT | Mod: TC,SUR | Performed by: ORTHOPAEDIC SURGERY

## 2024-10-24 PROCEDURE — 85025 COMPLETE CBC W/AUTO DIFF WBC: CPT

## 2024-10-24 PROCEDURE — 2500000004 HC RX 250 GENERAL PHARMACY W/ HCPCS (ALT 636 FOR OP/ED): Performed by: STUDENT IN AN ORGANIZED HEALTH CARE EDUCATION/TRAINING PROGRAM

## 2024-10-24 PROCEDURE — 36415 COLL VENOUS BLD VENIPUNCTURE: CPT

## 2024-10-24 DEVICE — LAG SCREW, RECON
Type: IMPLANTABLE DEVICE | Site: HIP | Status: FUNCTIONAL
Brand: T2

## 2024-10-24 DEVICE — LOCKING SCREW
Type: IMPLANTABLE DEVICE | Site: HIP | Status: FUNCTIONAL
Brand: T2 ALPHA

## 2024-10-24 DEVICE — FEMORAL NAIL GT, RIGHT
Type: IMPLANTABLE DEVICE | Site: HIP | Status: FUNCTIONAL
Brand: T2 ALPHA

## 2024-10-24 DEVICE — DRILL-TIP RECON K-WIRE: Type: IMPLANTABLE DEVICE | Site: HIP | Status: NON-FUNCTIONAL

## 2024-10-24 DEVICE — SET SCREW GT
Type: IMPLANTABLE DEVICE | Site: HIP | Status: FUNCTIONAL
Brand: T2 ALPHA

## 2024-10-24 RX ORDER — ROCURONIUM BROMIDE 10 MG/ML
INJECTION, SOLUTION INTRAVENOUS AS NEEDED
Status: DISCONTINUED | OUTPATIENT
Start: 2024-10-24 | End: 2024-10-24

## 2024-10-24 RX ORDER — SODIUM CHLORIDE 0.9 G/100ML
IRRIGANT IRRIGATION AS NEEDED
Status: DISCONTINUED | OUTPATIENT
Start: 2024-10-24 | End: 2024-10-24 | Stop reason: HOSPADM

## 2024-10-24 RX ORDER — TRANEXAMIC ACID 100 MG/ML
INJECTION, SOLUTION INTRAVENOUS AS NEEDED
Status: DISCONTINUED | OUTPATIENT
Start: 2024-10-24 | End: 2024-10-24

## 2024-10-24 RX ORDER — DROPERIDOL 2.5 MG/ML
0.62 INJECTION, SOLUTION INTRAMUSCULAR; INTRAVENOUS ONCE AS NEEDED
Status: DISCONTINUED | OUTPATIENT
Start: 2024-10-24 | End: 2024-10-24

## 2024-10-24 RX ORDER — HYDROMORPHONE HYDROCHLORIDE 1 MG/ML
0.5 INJECTION, SOLUTION INTRAMUSCULAR; INTRAVENOUS; SUBCUTANEOUS EVERY 5 MIN PRN
Status: DISCONTINUED | OUTPATIENT
Start: 2024-10-24 | End: 2024-10-24

## 2024-10-24 RX ORDER — DROPERIDOL 2.5 MG/ML
0.62 INJECTION, SOLUTION INTRAMUSCULAR; INTRAVENOUS ONCE AS NEEDED
Status: DISCONTINUED | OUTPATIENT
Start: 2024-10-24 | End: 2024-10-24 | Stop reason: HOSPADM

## 2024-10-24 RX ORDER — HYDROMORPHONE HYDROCHLORIDE 1 MG/ML
INJECTION, SOLUTION INTRAMUSCULAR; INTRAVENOUS; SUBCUTANEOUS AS NEEDED
Status: DISCONTINUED | OUTPATIENT
Start: 2024-10-24 | End: 2024-10-24

## 2024-10-24 RX ORDER — CEFAZOLIN 1 G/1
INJECTION, POWDER, FOR SOLUTION INTRAVENOUS AS NEEDED
Status: DISCONTINUED | OUTPATIENT
Start: 2024-10-24 | End: 2024-10-24

## 2024-10-24 RX ORDER — LIDOCAINE HCL/PF 100 MG/5ML
SYRINGE (ML) INTRAVENOUS AS NEEDED
Status: DISCONTINUED | OUTPATIENT
Start: 2024-10-24 | End: 2024-10-24

## 2024-10-24 RX ORDER — ONDANSETRON HYDROCHLORIDE 2 MG/ML
4 INJECTION, SOLUTION INTRAVENOUS ONCE AS NEEDED
Status: DISCONTINUED | OUTPATIENT
Start: 2024-10-24 | End: 2024-10-24

## 2024-10-24 RX ORDER — HYDROMORPHONE HYDROCHLORIDE 1 MG/ML
0.2 INJECTION, SOLUTION INTRAMUSCULAR; INTRAVENOUS; SUBCUTANEOUS EVERY 5 MIN PRN
Status: DISCONTINUED | OUTPATIENT
Start: 2024-10-24 | End: 2024-10-24

## 2024-10-24 RX ORDER — OXYCODONE HYDROCHLORIDE 5 MG/1
5 TABLET ORAL EVERY 4 HOURS PRN
Status: DISCONTINUED | OUTPATIENT
Start: 2024-10-24 | End: 2024-10-24 | Stop reason: HOSPADM

## 2024-10-24 RX ORDER — CEFAZOLIN SODIUM 2 G/100ML
2 INJECTION, SOLUTION INTRAVENOUS EVERY 8 HOURS
Status: COMPLETED | OUTPATIENT
Start: 2024-10-24 | End: 2024-10-25

## 2024-10-24 RX ORDER — HYDROMORPHONE HYDROCHLORIDE 1 MG/ML
0.5 INJECTION, SOLUTION INTRAMUSCULAR; INTRAVENOUS; SUBCUTANEOUS EVERY 5 MIN PRN
Status: DISCONTINUED | OUTPATIENT
Start: 2024-10-24 | End: 2024-10-24 | Stop reason: HOSPADM

## 2024-10-24 RX ORDER — ONDANSETRON HYDROCHLORIDE 2 MG/ML
4 INJECTION, SOLUTION INTRAVENOUS ONCE AS NEEDED
Status: DISCONTINUED | OUTPATIENT
Start: 2024-10-24 | End: 2024-10-24 | Stop reason: HOSPADM

## 2024-10-24 RX ORDER — MIDAZOLAM HYDROCHLORIDE 1 MG/ML
INJECTION, SOLUTION INTRAMUSCULAR; INTRAVENOUS CONTINUOUS PRN
Status: DISCONTINUED | OUTPATIENT
Start: 2024-10-24 | End: 2024-10-24

## 2024-10-24 RX ORDER — PHENYLEPHRINE 10 MG/250 ML(40 MCG/ML)IN 0.9 % SOD.CHLORIDE INTRAVENOUS
CONTINUOUS PRN
Status: DISCONTINUED | OUTPATIENT
Start: 2024-10-24 | End: 2024-10-24

## 2024-10-24 RX ORDER — PROPOFOL 10 MG/ML
INJECTION, EMULSION INTRAVENOUS AS NEEDED
Status: DISCONTINUED | OUTPATIENT
Start: 2024-10-24 | End: 2024-10-24

## 2024-10-24 RX ORDER — ACETAMINOPHEN 325 MG/1
650 TABLET ORAL EVERY 4 HOURS PRN
Status: DISCONTINUED | OUTPATIENT
Start: 2024-10-24 | End: 2024-10-24 | Stop reason: HOSPADM

## 2024-10-24 RX ORDER — METHOCARBAMOL 100 MG/ML
INJECTION, SOLUTION INTRAMUSCULAR; INTRAVENOUS AS NEEDED
Status: DISCONTINUED | OUTPATIENT
Start: 2024-10-24 | End: 2024-10-24

## 2024-10-24 RX ORDER — OXYCODONE HYDROCHLORIDE 5 MG/1
5 TABLET ORAL EVERY 4 HOURS PRN
Status: DISCONTINUED | OUTPATIENT
Start: 2024-10-24 | End: 2024-10-24

## 2024-10-24 RX ORDER — ACETAMINOPHEN 325 MG/1
650 TABLET ORAL EVERY 4 HOURS PRN
Status: DISCONTINUED | OUTPATIENT
Start: 2024-10-24 | End: 2024-10-24

## 2024-10-24 RX ORDER — OXYCODONE HYDROCHLORIDE 5 MG/1
10 TABLET ORAL EVERY 4 HOURS PRN
Status: DISCONTINUED | OUTPATIENT
Start: 2024-10-24 | End: 2024-10-24 | Stop reason: HOSPADM

## 2024-10-24 RX ORDER — ONDANSETRON HYDROCHLORIDE 2 MG/ML
INJECTION, SOLUTION INTRAVENOUS AS NEEDED
Status: DISCONTINUED | OUTPATIENT
Start: 2024-10-24 | End: 2024-10-24

## 2024-10-24 RX ORDER — HYDROMORPHONE HYDROCHLORIDE 1 MG/ML
0.2 INJECTION, SOLUTION INTRAMUSCULAR; INTRAVENOUS; SUBCUTANEOUS EVERY 5 MIN PRN
Status: DISCONTINUED | OUTPATIENT
Start: 2024-10-24 | End: 2024-10-24 | Stop reason: HOSPADM

## 2024-10-24 RX ORDER — ALBUTEROL SULFATE 0.83 MG/ML
2.5 SOLUTION RESPIRATORY (INHALATION) ONCE AS NEEDED
Status: DISCONTINUED | OUTPATIENT
Start: 2024-10-24 | End: 2024-10-24 | Stop reason: HOSPADM

## 2024-10-24 RX ORDER — SODIUM CHLORIDE, SODIUM LACTATE, POTASSIUM CHLORIDE, CALCIUM CHLORIDE 600; 310; 30; 20 MG/100ML; MG/100ML; MG/100ML; MG/100ML
INJECTION, SOLUTION INTRAVENOUS CONTINUOUS PRN
Status: DISCONTINUED | OUTPATIENT
Start: 2024-10-24 | End: 2024-10-24

## 2024-10-24 RX ORDER — ALBUTEROL SULFATE 0.83 MG/ML
2.5 SOLUTION RESPIRATORY (INHALATION) ONCE AS NEEDED
Status: DISCONTINUED | OUTPATIENT
Start: 2024-10-24 | End: 2024-10-24

## 2024-10-24 RX ORDER — FENTANYL CITRATE 50 UG/ML
INJECTION, SOLUTION INTRAMUSCULAR; INTRAVENOUS AS NEEDED
Status: DISCONTINUED | OUTPATIENT
Start: 2024-10-24 | End: 2024-10-24

## 2024-10-24 RX ORDER — OXYCODONE HYDROCHLORIDE 5 MG/1
10 TABLET ORAL EVERY 4 HOURS PRN
Status: DISCONTINUED | OUTPATIENT
Start: 2024-10-24 | End: 2024-10-24

## 2024-10-24 RX ORDER — PHENYLEPHRINE HCL IN 0.9% NACL 0.4MG/10ML
SYRINGE (ML) INTRAVENOUS AS NEEDED
Status: DISCONTINUED | OUTPATIENT
Start: 2024-10-24 | End: 2024-10-24

## 2024-10-24 SDOH — SOCIAL STABILITY: SOCIAL INSECURITY: ABUSE: ADULT

## 2024-10-24 SDOH — HEALTH STABILITY: MENTAL HEALTH
DO YOU FEEL STRESS - TENSE, RESTLESS, NERVOUS, OR ANXIOUS, OR UNABLE TO SLEEP AT NIGHT BECAUSE YOUR MIND IS TROUBLED ALL THE TIME - THESE DAYS?: NOT AT ALL

## 2024-10-24 SDOH — SOCIAL STABILITY: SOCIAL INSECURITY: ARE THERE ANY APPARENT SIGNS OF INJURIES/BEHAVIORS THAT COULD BE RELATED TO ABUSE/NEGLECT?: NO

## 2024-10-24 SDOH — SOCIAL STABILITY: SOCIAL INSECURITY
WITHIN THE LAST YEAR, HAVE YOU BEEN KICKED, HIT, SLAPPED, OR OTHERWISE PHYSICALLY HURT BY YOUR PARTNER OR EX-PARTNER?: NO

## 2024-10-24 SDOH — SOCIAL STABILITY: SOCIAL INSECURITY
WITHIN THE LAST YEAR, HAVE YOU BEEN RAPED OR FORCED TO HAVE ANY KIND OF SEXUAL ACTIVITY BY YOUR PARTNER OR EX-PARTNER?: NO

## 2024-10-24 SDOH — SOCIAL STABILITY: SOCIAL INSECURITY: DOES ANYONE TRY TO KEEP YOU FROM HAVING/CONTACTING OTHER FRIENDS OR DOING THINGS OUTSIDE YOUR HOME?: NO

## 2024-10-24 SDOH — ECONOMIC STABILITY: FOOD INSECURITY: WITHIN THE PAST 12 MONTHS, THE FOOD YOU BOUGHT JUST DIDN'T LAST AND YOU DIDN'T HAVE MONEY TO GET MORE.: NEVER TRUE

## 2024-10-24 SDOH — ECONOMIC STABILITY: INCOME INSECURITY: IN THE PAST 12 MONTHS HAS THE ELECTRIC, GAS, OIL, OR WATER COMPANY THREATENED TO SHUT OFF SERVICES IN YOUR HOME?: NO

## 2024-10-24 SDOH — SOCIAL STABILITY: SOCIAL INSECURITY: DO YOU FEEL ANYONE HAS EXPLOITED OR TAKEN ADVANTAGE OF YOU FINANCIALLY OR OF YOUR PERSONAL PROPERTY?: NO

## 2024-10-24 SDOH — ECONOMIC STABILITY: HOUSING INSECURITY: IN THE PAST 12 MONTHS, HOW MANY TIMES HAVE YOU MOVED WHERE YOU WERE LIVING?: 1

## 2024-10-24 SDOH — SOCIAL STABILITY: SOCIAL INSECURITY: HAVE YOU HAD ANY THOUGHTS OF HARMING ANYONE ELSE?: NO

## 2024-10-24 SDOH — ECONOMIC STABILITY: FOOD INSECURITY: WITHIN THE PAST 12 MONTHS, YOU WORRIED THAT YOUR FOOD WOULD RUN OUT BEFORE YOU GOT THE MONEY TO BUY MORE.: NEVER TRUE

## 2024-10-24 SDOH — SOCIAL STABILITY: SOCIAL INSECURITY: WITHIN THE LAST YEAR, HAVE YOU BEEN HUMILIATED OR EMOTIONALLY ABUSED IN OTHER WAYS BY YOUR PARTNER OR EX-PARTNER?: NO

## 2024-10-24 SDOH — SOCIAL STABILITY: SOCIAL INSECURITY: WITHIN THE LAST YEAR, HAVE YOU BEEN AFRAID OF YOUR PARTNER OR EX-PARTNER?: NO

## 2024-10-24 SDOH — SOCIAL STABILITY: SOCIAL INSECURITY: WERE YOU ABLE TO COMPLETE ALL THE BEHAVIORAL HEALTH SCREENINGS?: YES

## 2024-10-24 SDOH — SOCIAL STABILITY: SOCIAL INSECURITY: ARE YOU OR HAVE YOU BEEN THREATENED OR ABUSED PHYSICALLY, EMOTIONALLY, OR SEXUALLY BY ANYONE?: NO

## 2024-10-24 SDOH — SOCIAL STABILITY: SOCIAL INSECURITY: DO YOU FEEL UNSAFE GOING BACK TO THE PLACE WHERE YOU ARE LIVING?: NO

## 2024-10-24 SDOH — SOCIAL STABILITY: SOCIAL INSECURITY: HAVE YOU HAD THOUGHTS OF HARMING ANYONE ELSE?: NO

## 2024-10-24 SDOH — ECONOMIC STABILITY: HOUSING INSECURITY: IN THE LAST 12 MONTHS, WAS THERE A TIME WHEN YOU WERE NOT ABLE TO PAY THE MORTGAGE OR RENT ON TIME?: NO

## 2024-10-24 SDOH — SOCIAL STABILITY: SOCIAL INSECURITY: HAS ANYONE EVER THREATENED TO HURT YOUR FAMILY OR YOUR PETS?: NO

## 2024-10-24 SDOH — ECONOMIC STABILITY: FOOD INSECURITY: HOW HARD IS IT FOR YOU TO PAY FOR THE VERY BASICS LIKE FOOD, HOUSING, MEDICAL CARE, AND HEATING?: NOT HARD AT ALL

## 2024-10-24 SDOH — ECONOMIC STABILITY: HOUSING INSECURITY: AT ANY TIME IN THE PAST 12 MONTHS, WERE YOU HOMELESS OR LIVING IN A SHELTER (INCLUDING NOW)?: NO

## 2024-10-24 SDOH — ECONOMIC STABILITY: TRANSPORTATION INSECURITY: IN THE PAST 12 MONTHS, HAS LACK OF TRANSPORTATION KEPT YOU FROM MEDICAL APPOINTMENTS OR FROM GETTING MEDICATIONS?: NO

## 2024-10-24 ASSESSMENT — ACTIVITIES OF DAILY LIVING (ADL)
LACK_OF_TRANSPORTATION: NO
FEEDING YOURSELF: INDEPENDENT
ASSISTIVE_DEVICE: EYEGLASSES;WALKER
TOILETING: NEEDS ASSISTANCE
PATIENT'S MEMORY ADEQUATE TO SAFELY COMPLETE DAILY ACTIVITIES?: YES
DRESSING YOURSELF: NEEDS ASSISTANCE
BATHING: NEEDS ASSISTANCE
JUDGMENT_ADEQUATE_SAFELY_COMPLETE_DAILY_ACTIVITIES: YES
HEARING - LEFT EAR: FUNCTIONAL
LACK_OF_TRANSPORTATION: NO
HEARING - RIGHT EAR: FUNCTIONAL
GROOMING: INDEPENDENT
WALKS IN HOME: NEEDS ASSISTANCE
ADEQUATE_TO_COMPLETE_ADL: YES

## 2024-10-24 ASSESSMENT — PAIN SCALES - GENERAL
PAINLEVEL_OUTOF10: 8
PAINLEVEL_OUTOF10: 0 - NO PAIN
PAINLEVEL_OUTOF10: 0 - NO PAIN
PAINLEVEL_OUTOF10: 8
PAINLEVEL_OUTOF10: 5 - MODERATE PAIN
PAINLEVEL_OUTOF10: 8
PAINLEVEL_OUTOF10: 8

## 2024-10-24 ASSESSMENT — COGNITIVE AND FUNCTIONAL STATUS - GENERAL
MOBILITY SCORE: 19
PERSONAL GROOMING: A LITTLE
HELP NEEDED FOR BATHING: A LOT
DRESSING REGULAR LOWER BODY CLOTHING: A LOT
MOVING TO AND FROM BED TO CHAIR: A LITTLE
DRESSING REGULAR UPPER BODY CLOTHING: A LITTLE
DAILY ACTIVITIY SCORE: 16
TOILETING: A LOT
STANDING UP FROM CHAIR USING ARMS: A LITTLE
CLIMB 3 TO 5 STEPS WITH RAILING: A LOT
WALKING IN HOSPITAL ROOM: A LITTLE

## 2024-10-24 ASSESSMENT — COLUMBIA-SUICIDE SEVERITY RATING SCALE - C-SSRS
1. IN THE PAST MONTH, HAVE YOU WISHED YOU WERE DEAD OR WISHED YOU COULD GO TO SLEEP AND NOT WAKE UP?: NO
6. HAVE YOU EVER DONE ANYTHING, STARTED TO DO ANYTHING, OR PREPARED TO DO ANYTHING TO END YOUR LIFE?: NO
6. HAVE YOU EVER DONE ANYTHING, STARTED TO DO ANYTHING, OR PREPARED TO DO ANYTHING TO END YOUR LIFE?: NO
1. IN THE PAST MONTH, HAVE YOU WISHED YOU WERE DEAD OR WISHED YOU COULD GO TO SLEEP AND NOT WAKE UP?: NO
2. HAVE YOU ACTUALLY HAD ANY THOUGHTS OF KILLING YOURSELF?: NO
2. HAVE YOU ACTUALLY HAD ANY THOUGHTS OF KILLING YOURSELF?: NO

## 2024-10-24 ASSESSMENT — PAIN - FUNCTIONAL ASSESSMENT
PAIN_FUNCTIONAL_ASSESSMENT: 0-10
PAIN_FUNCTIONAL_ASSESSMENT: UNABLE TO SELF-REPORT
PAIN_FUNCTIONAL_ASSESSMENT: 0-10
PAIN_FUNCTIONAL_ASSESSMENT: UNABLE TO SELF-REPORT
PAIN_FUNCTIONAL_ASSESSMENT: 0-10

## 2024-10-24 ASSESSMENT — LIFESTYLE VARIABLES
AUDIT-C TOTAL SCORE: 2
HOW MANY STANDARD DRINKS CONTAINING ALCOHOL DO YOU HAVE ON A TYPICAL DAY: 1 OR 2
HOW OFTEN DO YOU HAVE 6 OR MORE DRINKS ON ONE OCCASION: NEVER
AUDIT-C TOTAL SCORE: 2
HOW OFTEN DO YOU HAVE A DRINK CONTAINING ALCOHOL: 2-4 TIMES A MONTH
SKIP TO QUESTIONS 9-10: 1

## 2024-10-24 NOTE — ANESTHESIA POSTPROCEDURE EVALUATION
Patient: Brittny Gordon    Procedure Summary       Date: 10/24/24 Room / Location: [facility] OR 01 / [facility]    Anesthesia Start: 0742 Anesthesia Stop: 1033    Procedure: Insertion Intramedullary Nail Femur (Right: Hip) Diagnosis:       Closed subtrochanteric fracture of femur, initial encounter (Multi)      (Closed subtrochanteric fracture of femur, initial encounter (Multi) [S72.23XA])    Surgeons: Jose Rafael Reed MD Responsible Provider: Sulaiman Howell DO    Anesthesia Type: general ASA Status: 3            Anesthesia Type: general    Vitals Value Taken Time   /77 10/24/24 1215   Temp 36.4 °C (97.5 °F) 10/24/24 1215   Pulse 88 10/24/24 1215   Resp 20 10/24/24 1215   SpO2 98 % 10/24/24 1215       Anesthesia Post Evaluation    Patient location during evaluation: PACU  Patient participation: complete - patient participated  Level of consciousness: awake  Pain management: adequate  Multimodal analgesia pain management approach  Airway patency: patent  Two or more strategies used to mitigate risk of obstructive sleep apnea  Cardiovascular status: acceptable  Respiratory status: face mask  Hydration status: acceptable  Postoperative Nausea and Vomiting: none        There were no known notable events for this encounter.

## 2024-10-24 NOTE — ANESTHESIA PREPROCEDURE EVALUATION
Patient: Brittny Gordon    Procedure Information       Anesthesia Start Date/Time: 10/24/24 0742    Procedure: Insertion Intramedullary Nail Femur (Right: Hip) - R femur CMN    Location: [facility] OR 01 / [facility] OR    Surgeons: Jose Rafael Reed MD            Relevant Problems   Anesthesia (within normal limits)      Cardiac   (+) Chest pain   (+) Coronary atherosclerosis   (+) Hyperlipidemia   (+) Hypertension   (+) Rib pain on right side   (+) Thoracic ascending aortic aneurysm (CMS-HCC)      Pulmonary   (+) Other acute pulmonary embolism, unspecified whether acute cor pulmonale present (Multi)   (+) Primary malignant neoplasm of right lung metastatic to other site (Multi)      Neuro   (+) Anxiety      Musculoskeletal   (+) Lumbar spondylosis       Clinical information reviewed:   Tobacco  Allergies  Meds   Med Hx  Surg Hx   Fam Hx  Soc Hx        NPO Detail:  NPO/Void Status  Carbohydrate Drink Given Prior to Surgery? : N  Date of Last Liquid: 10/23/24  Time of Last Liquid: 2359  Date of Last Solid: 10/23/24  Time of Last Solid: 2359  Last Intake Type: Clear fluids  Time of Last Void: 0657         Physical Exam    Airway  Mallampati: II  TM distance: >3 FB  Neck ROM: full     Cardiovascular - normal exam     Dental - normal exam     Pulmonary - normal exam  Breath sounds clear to auscultation     Abdominal - normal exam             Anesthesia Plan    History of general anesthesia?: yes  History of complications of general anesthesia?: no    ASA 3     general     intravenous induction   Postoperative administration of opioids is intended.  Trial extubation is planned.  Anesthetic plan and risks discussed with patient.    Plan discussed with CRNA.

## 2024-10-24 NOTE — OP NOTE
Insertion Intramedullary Nail Femur (R) Operative Note     Date: 10/23/2024 - 10/24/2024  OR Location: [facility] OR    Name: Brittny Gordon, : 1955, Age: 69 y.o., MRN: [id number], Sex: female    Diagnosis  Pre-op Diagnosis      * Closed subtrochanteric fracture of femur, initial encounter (Multi) [S72.23XA] Post-op Diagnosis     * Closed subtrochanteric fracture of femur, initial encounter (Multi) [S72.23XA]     Procedures  Insertion Intramedullary Nail Femur  63199 - HI TX INTER/HI/SUBTRCHNTRIC FEM FX IMED IMPLTSCREW      Surgeons      * Jose Rafael Reed - Primary    Resident/Fellow/Other Assistant:  Surgeons and Role:     * Alexy García DO - Resident - Assisting     * Adolfo Gaspar MD - Resident - Assisting     * SYLVIE Saleh-C - ISAIAH First Assist    Procedure Summary  Anesthesia: Anesthesia type not filed in the log.  ASA: ASA status not filed in the log.  Anesthesia Staff: Anesthesiologist: Sulaiman Howell DO  CRNA: BOWEN Coon-CRNA  C-AA: GLENDA Kam  SRNA: Kt Tejada  Estimated Blood Loss: 150mL  Intra-op Medications:   Administrations occurring from 0650 to 0935 on 10/24/24:   Medication Name Total Dose   sodium chloride 0.9 % irrigation solution 4,000 mL   acetaminophen (Tylenol) tablet 975 mg Cannot be calculated   atorvastatin (Lipitor) tablet 80 mg Cannot be calculated   ceFAZolin (Ancef) vial 1 g 2 g   cholecalciferol (Vitamin D-3) tablet 1,000 Units Cannot be calculated   dexAMETHasone (Decadron) injection 4 mg/mL 6 mg   dexAMETHasone (Decadron) tablet 4 mg Cannot be calculated   fentaNYL PF 0.05 mg/mL 100 mcg   HYDROmorphone (Dilaudid) injection 0.4 mg Cannot be calculated   HYDROmorphone (Dilaudid) injection 1 mg/mL 0.4 mg   lactated Ringer's infusion Cannot be calculated   lidocaine (cardiac) injection 2% prefilled syringe 100 mg   lidocaine 4 % patch 1 patch Cannot be calculated   losartan (Cozaar) tablet 100 mg Cannot be calculated    metoprolol succinate XL (Toprol-XL) 24 hr tablet 100 mg Cannot be calculated   oxyCODONE (Roxicodone) immediate release tablet 5 mg Cannot be calculated   pantoprazole (ProtoNix) EC tablet 40 mg Cannot be calculated   phenylephrine (Suleiman-Synephrine) 10 mg in sodium chloride 0.9% 250 mL (0.04 mg/mL) infusion (premix) 0.06 mg   phenylephrine 40 mcg/mL syringe 10 mL 280 mcg   polyethylene glycol (Glycolax, Miralax) packet 17 g Cannot be calculated   propofol (Diprivan) injection 10 mg/mL 150 mg   rocuronium (ZeMuron) 50 mg/5 mL injection 70 mg   sennosides (Senokot) tablet 17.2 mg Cannot be calculated   tranexamic acid (Cyklokapron) injection 1,000 mg              Anesthesia Record               Intraprocedure I/O Totals          Intake    Tranexamic Acid 0.00 mL    The total shown is the total volume documented since Anesthesia Start was filed.    Phenylephrine Drip 0.00 mL    The total shown is the total volume documented since Anesthesia Start was filed.    Total Intake 0 mL          Specimen:   ID Type Source Tests Collected by Time   1 : Right Femur Tissue Tissue BONE CURETTINGS (NO DECAL) SURGICAL PATHOLOGY EXAM Alexy DURAN Ushakoffi,  10/24/2024 0851        Staff:   Circulator: Jani  Circulator: Lorna Fernandezub Person:   Scrub Person: Catina         Drains and/or Catheters: * None in log *    Tourniquet Times:         Implants:  Implants       Type Name Action Serial No.      Screw DRILL TIP, RECON K-WIRE, [id number] - [id number] Used, Not Implanted      Joint NAIL, FEMORAL GT, [id number], RIGHT - [id number] Implanted      Screw SCREW, LAG RECON 6.5 X 95 T2 - [id number] Implanted      Screw SCREW, LAG RECON 6.5 X 90 T2 - [id number] Implanted      Screw SCREW, LOCKING, 5 X 42MM - [id number] Implanted      Screw SCREW SET, GT 8 - [id number] Implanted      Screw SCREW, LOCKING, 5 X 45MM - [id number] Implanted      Screw SCREW, LOCKING, 5 X 47.5MM - [id number] Implanted      Screw SCREW, LOCKING, 5 X 57.5MM -  [id number] Implanted               Findings: Pathologic right proximal subtrochanteric femur fracture, biopsy showed carcinoma    Indications: Brittny Gordon is an 69 y.o. female who is having surgery for Closed subtrochanteric fracture of femur, initial encounter (Multi) [S72.23XA].     The patient was seen in the preoperative area. The risks, benefits, complications, treatment options, non-operative alternatives, expected recovery and outcomes were discussed with the patient. The possibilities of reaction to medication, pulmonary aspiration, injury to surrounding structures, bleeding, recurrent infection, the need for additional procedures, failure to diagnose a condition, and creating a complication requiring transfusion or operation were discussed with the patient. The patient concurred with the proposed plan, giving informed consent.  The site of surgery was properly noted/marked if necessary per policy. The patient has been actively warmed in preoperative area. Preoperative antibiotics have been ordered and given within 1 hours of incision. Venous thrombosis prophylaxis have been ordered including chemical prophylaxis    Procedure Details operative procedure    Preoperative diagnosis pathologic right subtrochanteric femur fracture, most likely lung metastases.    Postop diagnosis same with frozen section positive for carcinoma after biopsy.    Procedure anterior medullary reconstruction trochanteric nail of the right proximal femur complete subtrochanteric pathologic fracture.  Biopsy right proximal femur.    Surgeon Derek first Assistant Chavo MERCER, please note that Janell was required for the entire case and at the beginning of the case the orthopedic residents had academic obligations.  Second assistant  Adolfo Keane.    Anesthesia General  ml   Operative indications this is a 69-year-old female with known history of lung cancer and metastatic disease particularly to her  left distal clavicle.  She also had a known lesion in the proximal femur she has had radiation in the proximal femur as well as radiation to the shoulder.  She was transferred from [facility] because she had pain in her right femur and the femur snapped 2 days ago at the subtrochanteric level.  She also was on biphosphonate's.  The fracture appeared to have a lesion in the center with permeation it was approximately 2 cm below the lesser trochanter.  I talked her about the risks and benefits of stabilizing the right femur and particular with a reconstruction nail to stabilize the femoral neck as well the risks and benefits of surgery were discussed with her and she did wish to proceed.    Operative procedure after full huddle was performed in the operating room patient had adequate general anesthesia.  Patient was given 2 g of Ancef IV and appropriate TXA.  Patient was transferred to a Marshal radiolucent table on a beanbag in the lateral position.  We prepped and draped the entire right hemipelvis and right lower extremity.    Surgical pause was then performed.  A we attempted reduction of the hip with gentle traction and we could easily get the reduction.  This would be the best.  We made a incision over the right buttocks in line with the anatomic axis of the femur.  We placed a sharp Steinmann pin into the of the medial tip of the greater trochanter and drilled the Steinmann pin to the level of the fracture.  We then used the starter reamer from [employer] to open up the proximal femur.  We then placed a long large pituitary grabber into the canal to the fracture site and grabs several large pieces of soft tissue.  This biopsy was sent to pathology.  Approximately 25 minutes later it came back with carcinoma positive.  We then in the meantime we did place a ball-tipped guidewire across the fracture site reduced to the distal femur.  We used the CURLY reamers because we wanted to suck what ever tumor we could from the  canal rather than deposited distally.  We used a 10.5 reamer, an 11.5 reamer, and then a 12.5 reamer.  We measured the length of the dasha between 420 and 400.  We then placed a 400 mm x 12 mm Jaroso reconstruction piriformis nail over the guidewire into the proximal femur across the reduced fracture to the distal femur.  We removed the ball-tipped guidewire and placed 2 parallel screws through the nail into the femoral neck and into the femoral head lined up on the AP and lateral fluoroscopic image.  We then placed a supplementary screw from lateral to medial across the the area of the lesser trochanter for added stability.  We then actually internally rotated the distal segment as this produced a much better reduction in appropriate anteversion at the hip in this position we locked 3 screws distally using the perfect [place] technique through the nail.  Lastly we placed a locking bolt on top of the nail locking in the proximal screw in a fixed ankle position.  We then copiously irrigated all incisions.  We closed with interrupted 2-0 Vicryl suture and staples.  Sterile dressings were applied.    Please note that patient would be weightbearing as tolerated.  We would recommend no radiation for 2 weeks just so the surgical incisions can heal.  She may in fact not be a candidate for radiation due to her recent radiation treatments.  Patient tolerated this procedure well.  Complications:  None; patient tolerated the procedure well.    Disposition: PACU - hemodynamically stable.  Condition: stable operative indications        Additional Details:     Attending Attestation: I was present for the entire procedure.    Jose Rafael Reed  Phone Number: [phone]

## 2024-10-24 NOTE — H&P
H&P reviewed. The patient was examined and there are no changes to the H&P.     Adolph Moreland MD  Orthopaedic Surgery, PGY2

## 2024-10-24 NOTE — ANESTHESIA PROCEDURE NOTES
Airway  Date/Time: 10/24/2024 7:57 AM  Urgency: elective    Airway not difficult    Staffing  Performed: ANIL   Authorized by: Sulaiman Hwoell DO    Performed by: Kt Tejada  Patient location during procedure: OR    Indications and Patient Condition  Indications for airway management: anesthesia  Spontaneous Ventilation: absent  Sedation level: deep  Preoxygenated: yes  Patient position: sniffing  MILS maintained throughout  Mask difficulty assessment: 1 - vent by mask  Planned trial extubation    Final Airway Details  Final airway type: endotracheal airway      Successful airway: ETT  Cuffed: yes   Successful intubation technique: direct laryngoscopy  Facilitating devices/methods: intubating stylet and anterior pressure/BURP  Endotracheal tube insertion site: oral  Blade: Brenda  Blade size: #3  ETT size (mm): 7.0  Cormack-Lehane Classification: grade IIa - partial view of glottis  Placement verified by: chest auscultation and capnometry   Cuff volume (mL): 8  Measured from: lips  ETT to lips (cm): 21  Number of attempts at approach: 1  Number of other approaches attempted: 0    Additional Comments  Lips and teeth in preanesthetic condition after intubation.

## 2024-10-24 NOTE — ANESTHESIA PROCEDURE NOTES
Peripheral IV  Date/Time: 10/24/2024 8:02 AM      Placement  Needle size: 20 G  Laterality: left  Location: hand  Local anesthetic: none  Site prep: chlorhexidine  Technique: anatomical landmarks  Attempts: 1

## 2024-10-24 NOTE — PROGRESS NOTES
"Orthopaedic Surgery Progress Note    Subjective:    Patient remains in ED awaiting bed on RNF w/ Heme/Onc primary. Still struggling with pain control. NPO since midnight for OR today. Denies CP, SOB, fever or chills. Still amenable to plan for OR.     Objective:  /86   Pulse 75   Temp 37 °C (98.6 °F) (Oral)   Resp 16   Ht 1.676 m (5' 6\")   Wt 61.2 kg (135 lb)   SpO2 94%   BMI 21.79 kg/m²     Gen: arousable, NAD, appropriately conversational  Cardiac: RRR to peripheral palpation  Resp: nonlabored on RA  GI: soft, non-distended  MSK:  RLE:   - skin intact, knee immobilizer in place  - Tender at site of injury with painful ROM.  - Motor intact in DF/PF/EHL/FHL  - SILT in saph/sural/SPN/DPN distributions  - Foot wwp, 2+ DP/PT pulse, brisk cap refill  - Compartments soft and compressible, no pain with passive dorsiflexion      Results for orders placed or performed during the hospital encounter of 10/23/24 (from the past 24 hours)   Vitamin D 25-Hydroxy,Total (for eval of Vitamin D levels)   Result Value Ref Range    Vitamin D, 25-Hydroxy, Total 30 30 - 100 ng/mL   Sedimentation rate, automated   Result Value Ref Range    Sedimentation Rate 31 (H) 0 - 30 mm/h   C-reactive protein   Result Value Ref Range    C-Reactive Protein 0.56 <1.00 mg/dL   CBC and Auto Differential   Result Value Ref Range    WBC 10.5 4.4 - 11.3 x10*3/uL    nRBC 0.0 0.0 - 0.0 /100 WBCs    RBC 4.69 4.00 - 5.20 x10*6/uL    Hemoglobin 13.7 12.0 - 16.0 g/dL    Hematocrit 40.3 36.0 - 46.0 %    MCV 86 80 - 100 fL    MCH 29.2 26.0 - 34.0 pg    MCHC 34.0 32.0 - 36.0 g/dL    RDW 13.3 11.5 - 14.5 %    Platelets 219 150 - 450 x10*3/uL    Neutrophils % 82.9 40.0 - 80.0 %    Immature Granulocytes %, Automated 1.1 (H) 0.0 - 0.9 %    Lymphocytes % 6.2 13.0 - 44.0 %    Monocytes % 9.7 2.0 - 10.0 %    Eosinophils % 0.0 0.0 - 6.0 %    Basophils % 0.1 0.0 - 2.0 %    Neutrophils Absolute 8.67 (H) 1.20 - 7.70 x10*3/uL    Immature Granulocytes Absolute, " Automated 0.11 0.00 - 0.70 x10*3/uL    Lymphocytes Absolute 0.65 (L) 1.20 - 4.80 x10*3/uL    Monocytes Absolute 1.01 (H) 0.10 - 1.00 x10*3/uL    Eosinophils Absolute 0.00 0.00 - 0.70 x10*3/uL    Basophils Absolute 0.01 0.00 - 0.10 x10*3/uL   Comprehensive metabolic panel   Result Value Ref Range    Glucose 90 74 - 99 mg/dL    Sodium 136 136 - 145 mmol/L    Potassium 3.7 3.5 - 5.3 mmol/L    Chloride 99 98 - 107 mmol/L    Bicarbonate 25 21 - 32 mmol/L    Anion Gap 16 10 - 20 mmol/L    Urea Nitrogen 20 6 - 23 mg/dL    Creatinine 0.49 (L) 0.50 - 1.05 mg/dL    eGFR >90 >60 mL/min/1.73m*2    Calcium 8.4 (L) 8.6 - 10.6 mg/dL    Albumin 3.1 (L) 3.4 - 5.0 g/dL    Alkaline Phosphatase 114 33 - 136 U/L    Total Protein 5.9 (L) 6.4 - 8.2 g/dL    AST 16 9 - 39 U/L    Bilirubin, Total 0.6 0.0 - 1.2 mg/dL    ALT 12 7 - 45 U/L   Coagulation Screen   Result Value Ref Range    Protime 12.4 9.8 - 12.8 seconds    INR 1.1 0.9 - 1.1    aPTT 24 (L) 27 - 38 seconds   Type and Screen   Result Value Ref Range    ABO TYPE O     Rh TYPE NEG     ANTIBODY SCREEN NEG    ECG 12 lead   Result Value Ref Range    Ventricular Rate 88 BPM    Atrial Rate 88 BPM    NE Interval 156 ms    QRS Duration 80 ms    QT Interval 392 ms    QTC Calculation(Bazett) 474 ms    P Axis 70 degrees    R Axis 34 degrees    T Axis 61 degrees    QRS Count 14 beats    Q Onset 223 ms    P Onset 145 ms    P Offset 202 ms    T Offset 419 ms    QTC Fredericia 445 ms     *Note: Due to a large number of results and/or encounters for the requested time period, some results have not been displayed. A complete set of results can be found in Results Review.       XR knee right 4+ views   Final Result   Normal radiographs right knee.        Signed by: Sekou Stein 10/23/2024 11:31 AM   Dictation workstation:   [id number]      XR femur right 2+ views   Final Result   Transverse angulated displaced fracture of the subtrochanteric region   of the right proximal femur with intramedullary  groundglass like   changes superior and inferior to the fracture site. Suggest further   evaluation with contrast-enhanced MRI.   Degenerative change of both hip joints.   Signed by Jesus Manuel Pearson MD      XR chest 1 view   Final Result   No evidence consolidating infiltrate or effusion.   Signed by Samuel Flood MD      XR humerus bilateral   Final Result   No acute osseous abnormalities.   Signed by Samuel Flood MD      XR femur left 1 view   Final Result   Transverse angulated displaced fracture of the subtrochanteric region   of the right proximal femur with intramedullary groundglass like   changes superior and inferior to the fracture site. Suggest further   evaluation with contrast-enhanced MRI.   Degenerative change of both hip joints.   Signed by Jesus Manuel Pearson MD      CT femur right wo IV contrast   Final Result   Linear minimally comminuted angulated fracture through the right   subtrochanteric region with associated angulation and rotation.   Question subtle underlying intramedullary lucent lesion involving the   proximal femoral diaphysis, although not definite.  Consider further   evaluation with MRI.   Signed by Dustin Bernabe MD      FL less than 1 hour    (Results Pending)   FL less than 1 hour    (Results Pending)       Assessment/Plan: 69 y.o. female c/p R femur CMN on 10/24 with Dr. Reed.      Plan:  - Weight bearing: NWB RLE  - DVT ppx: SCDs, please hold chemoppx for OR  - Diet: NPO for OR today  - Pain: multimodal pain control per primary  - Antibiotics: perioperative ancef 2g q8hr x2 doses postop  - PT/OT: recommend consult postop  - Fontanez: EUC, fontanez catheter recommended, patient refusing without sedation  - Drains: none     Dispo: Pending OR today    Adolfo Gaspar MD, MD  Orthopedic Surgery PGY-2  [facility]  Available by Epic Chat    While inpatient, this patient will be followed by the Orthopaedic Trauma team. Please see contact information below:    Ortho Trauma  Adolfo  MD Hubert PGY2  Kelly Ramirez MD PGY3    Please page [phone] (ortho on-call) after 6pm and on weekends.

## 2024-10-24 NOTE — PROGRESS NOTES
"Orthopaedic Surgery Progress Note    Subjective:    Patient remains in ED awaiting bed on RNF w/ Heme/Onc primary. Still struggling with pain control. NPO since midnight for OR today. Denies CP, SOB, fever or chills. Still amenable to plan for OR.     Objective:  /80 (BP Location: Right arm, Patient Position: Lying)   Pulse 76   Temp 36.1 °C (97 °F) (Temporal)   Resp 16   Ht 1.676 m (5' 6\")   Wt 61.2 kg (135 lb)   SpO2 95%   BMI 21.79 kg/m²     Gen: arousable, NAD, appropriately conversational  Cardiac: RRR to peripheral palpation  Resp: nonlabored on RA  GI: soft, non-distended  MSK:  RLE:   - mepilex x3 in place without strikethrough  - appropriately warm, tender postoperative extremity   - wiggles toes, SILT distally  - Foot wwp, 2+ DP/PT pulse, brisk cap refill  - Compartments soft and compressible, no pain with passive dorsiflexion      Results for orders placed or performed during the hospital encounter of 10/23/24 (from the past 24 hours)   Electrocardiogram, 12-lead PRN ACS symptoms   Result Value Ref Range    Ventricular Rate 82 BPM    Atrial Rate 82 BPM    TN Interval 148 ms    QRS Duration 76 ms    QT Interval 408 ms    QTC Calculation(Bazett) 476 ms    P Axis 68 degrees    R Axis 17 degrees    T Axis 63 degrees    QRS Count 13 beats    Q Onset 221 ms    P Onset 147 ms    P Offset 201 ms    T Offset 425 ms    QTC Fredericia 452 ms     *Note: Due to a large number of results and/or encounters for the requested time period, some results have not been displayed. A complete set of results can be found in Results Review.       FL fluoro images no charge   Final Result      XR knee right 4+ views   Final Result   Normal radiographs right knee.        Signed by: Sekou Stein 10/23/2024 11:31 AM   Dictation workstation:   [id number]      XR femur right 2+ views   Final Result   Transverse angulated displaced fracture of the subtrochanteric region   of the right proximal femur with intramedullary " groundglass like   changes superior and inferior to the fracture site. Suggest further   evaluation with contrast-enhanced MRI.   Degenerative change of both hip joints.   Signed by Jesus Manuel Pearson MD      XR chest 1 view   Final Result   No evidence consolidating infiltrate or effusion.   Signed by Samuel Flood MD      XR humerus bilateral   Final Result   No acute osseous abnormalities.   Signed by Samuel Flood MD      XR femur left 1 view   Final Result   Transverse angulated displaced fracture of the subtrochanteric region   of the right proximal femur with intramedullary groundglass like   changes superior and inferior to the fracture site. Suggest further   evaluation with contrast-enhanced MRI.   Degenerative change of both hip joints.   Signed by Jesus Manuel Pearson MD      CT femur right wo IV contrast   Final Result   Linear minimally comminuted angulated fracture through the right   subtrochanteric region with associated angulation and rotation.   Question subtle underlying intramedullary lucent lesion involving the   proximal femoral diaphysis, although not definite.  Consider further   evaluation with MRI.   Signed by Dustin Bernabe MD          Assessment/Plan: 69 y.o. female s/p R femur CMN on 10/24 with Dr. Reed.      Plan:  - Biopsy intra-op confirmed carcinoma   - Weight bearing: WBAT RLE  - DVT ppx: SCDs, recommend holding chemoppx till POD1 (10/25)anticoagulation   - If starting therapeutic Hep GTT, recommend holding loading dose   - Diet: OK for regular diet from an Orthopaedic perspective  - Pain: multimodal pain control per primary  - Antibiotics: perioperative ancef 2g q8hr x2 doses (ordered)  - PT/OT consulted  - Fontanez: s/p straight cath intra-op, fontanez catheter deferred d/t vaginal discharge c/f infection   - Drains: none     Dispo: Pending PT/OT and pain control    Adolfo Gaspar MD, MD  Orthopedic Surgery PGY-2  [facility]  Available by Epic Chat    While inpatient, this patient  will be followed by the Orthopaedic Trauma team. Please see contact information below:    Ortho Trauma  Adolfo Gaspar MD PGY2  Kelly Ramirez MD PGY3    Please page [phone] (ortho on-call) after 6pm and on weekends.

## 2024-10-24 NOTE — PROGRESS NOTES
Brittny Gordon is a 69 y.o. female on day 1 of admission presenting with Pathological fracture, right femur, initial encounter for fracture.      Subjective   Patient reports feeling okay after her procedure. She does not report significant pain nor nausea, dizziness, or difficulty breathing.        Objective     Last Recorded Vitals  /77   Pulse 88   Temp 36.4 °C (97.5 °F) (Axillary)   Resp 20   Wt 61.2 kg (135 lb)   SpO2 98%   Intake/Output last 3 Shifts:    Intake/Output Summary (Last 24 hours) at 10/24/2024 1419  Last data filed at 10/24/2024 1215  Gross per 24 hour   Intake 760 ml   Output 1350 ml   Net -590 ml       Admission Weight  Weight: 61.2 kg (135 lb) (10/23/24 0913)    Daily Weight  10/24/24 : 61.2 kg (135 lb)    Image Results  Electrocardiogram, 12-lead PRN ACS symptoms  Normal sinus rhythm  Possible Inferior infarct (cited on or before 24-OCT-2024)  Cannot rule out Anterior infarct (cited on or before 23-OCT-2024)  Abnormal ECG  When compared with ECG of 24-OCT-2024 12:07,  No significant change was found  FL fluoro images no charge  These images are not reportable by radiology and will not be interpreted   by  Radiologists.      Physical Exam  Constitutional:       Appearance: Normal appearance.   HENT:      Head: Normocephalic and atraumatic.      Mouth/Throat:      Mouth: Mucous membranes are moist.      Pharynx: Oropharynx is clear.   Eyes:      Extraocular Movements: Extraocular movements intact.      Conjunctiva/sclera: Conjunctivae normal.      Pupils: Pupils are equal, round, and reactive to light.   Cardiovascular:      Rate and Rhythm: Normal rate and regular rhythm.      Pulses: Normal pulses.   Pulmonary:      Effort: Pulmonary effort is normal. No respiratory distress.      Breath sounds: Normal breath sounds. No rhonchi.   Abdominal:      General: Bowel sounds are normal.      Palpations: Abdomen is soft.   Skin:     General: Skin is warm and dry.      Findings: No  bruising, erythema or rash.      Comments: Bandaging present on right hip   Neurological:      General: No focal deficit present.      Mental Status: She is alert and oriented to person, place, and time.   Psychiatric:         Mood and Affect: Mood normal.         Behavior: Behavior normal.         Relevant Results  Scheduled medications  atorvastatin, 80 mg, oral, Daily  ceFAZolin, 2 g, intravenous, q8h  cholecalciferol, 1,000 Units, oral, Daily  lidocaine, 1 patch, transdermal, q24h  [Held by provider] losartan, 100 mg, oral, Daily  metoprolol succinate XL, 100 mg, oral, Daily  pantoprazole, 40 mg, oral, Daily before breakfast  polyethylene glycol, 17 g, oral, Daily  sennosides, 2 tablet, oral, BID      Continuous medications     PRN medications  PRN medications: acetaminophen, HYDROmorphone, oxyCODONE     Assessment/Plan      Brittny Gordon is a 69 y.o. female with PMHx of stage 4 NSCLC (dx 8/2024, s/p radiation, currently on Keytruda), PE in 9/2024, AAA (s/p repair in 2012) HTN and HLD who is presenting with right hip pain 2/2 to pathologic femur fracture.    Patient underwent surgery for pathologic fracture of the right femur. Following surgery, the patient reports feeling okay. She states that her pain is well managed.     Updates 10/24:  -Post op PT/OT  -Monitoring post op     #Pathologic right femur fracture  ::Seen on admission x-ray  -Pain reg: Received dilaudid in ED, oxycodone 5 mg q6h PRN, Tylenol 975 mg q8h PRN  -Post op PT/OT  -Monitoring post op     #Stage 4B NSCLC c/b bone metastases  ::S/p radiation therapy  ::Currently on Keytruda  ::Primary oncologist: Dr. Feliz  -Will notify Dr. Feliz of admission      #History of PE  ::Recent PE in 9/2024  ::Was prescribed Eliquis but patient reports not taking  -Plan to restart anticoagulation following procedure when cleared by ortho     #HTN  -Holding home losartan   -Metoprolol succinate 100 mg daily     #HLD  -Atorvastatin 80 mg daily         F:  PRN  E: PRN  N: Regular diet  A: PIV  DVT ppx: Held for procedure  GI ppx: Panto 40  Bowel ppx: Miralax, senna     NOK: Juice Love (brother) [phone]  Code status: Full code (confirmed on admission)            Queen CORNEL Connolly MD

## 2024-10-25 LAB
ALBUMIN SERPL BCP-MCNC: 2.9 G/DL (ref 3.4–5)
ANION GAP SERPL CALC-SCNC: 13 MMOL/L (ref 10–20)
ATRIAL RATE: 82 BPM
BASOPHILS # BLD AUTO: 0.02 X10*3/UL (ref 0–0.1)
BASOPHILS NFR BLD AUTO: 0.2 %
BUN SERPL-MCNC: 20 MG/DL (ref 6–23)
CALCIUM SERPL-MCNC: 8.2 MG/DL (ref 8.6–10.6)
CHLORIDE SERPL-SCNC: 99 MMOL/L (ref 98–107)
CO2 SERPL-SCNC: 25 MMOL/L (ref 21–32)
CREAT SERPL-MCNC: 0.47 MG/DL (ref 0.5–1.05)
EGFRCR SERPLBLD CKD-EPI 2021: >90 ML/MIN/1.73M*2
EOSINOPHIL # BLD AUTO: 0.03 X10*3/UL (ref 0–0.7)
EOSINOPHIL NFR BLD AUTO: 0.3 %
ERYTHROCYTE [DISTWIDTH] IN BLOOD BY AUTOMATED COUNT: 13.5 % (ref 11.5–14.5)
GLUCOSE SERPL-MCNC: 104 MG/DL (ref 74–99)
HCT VFR BLD AUTO: 35.5 % (ref 36–46)
HGB BLD-MCNC: 11.7 G/DL (ref 12–16)
IMM GRANULOCYTES # BLD AUTO: 0.08 X10*3/UL (ref 0–0.7)
IMM GRANULOCYTES NFR BLD AUTO: 0.7 % (ref 0–0.9)
LYMPHOCYTES # BLD AUTO: 1.27 X10*3/UL (ref 1.2–4.8)
LYMPHOCYTES NFR BLD AUTO: 11.7 %
MAGNESIUM SERPL-MCNC: 1.88 MG/DL (ref 1.6–2.4)
MCH RBC QN AUTO: 28.7 PG (ref 26–34)
MCHC RBC AUTO-ENTMCNC: 33 G/DL (ref 32–36)
MCV RBC AUTO: 87 FL (ref 80–100)
MONOCYTES # BLD AUTO: 0.89 X10*3/UL (ref 0.1–1)
MONOCYTES NFR BLD AUTO: 8.2 %
NEUTROPHILS # BLD AUTO: 8.54 X10*3/UL (ref 1.2–7.7)
NEUTROPHILS NFR BLD AUTO: 78.9 %
NRBC BLD-RTO: 0 /100 WBCS (ref 0–0)
P AXIS: 68 DEGREES
P OFFSET: 201 MS
P ONSET: 147 MS
PHOSPHATE SERPL-MCNC: 2.3 MG/DL (ref 2.5–4.9)
PLATELET # BLD AUTO: 181 X10*3/UL (ref 150–450)
POTASSIUM SERPL-SCNC: 3.4 MMOL/L (ref 3.5–5.3)
PR INTERVAL: 148 MS
Q ONSET: 221 MS
QRS COUNT: 13 BEATS
QRS DURATION: 76 MS
QT INTERVAL: 408 MS
QTC CALCULATION(BAZETT): 476 MS
QTC FREDERICIA: 452 MS
R AXIS: 17 DEGREES
RBC # BLD AUTO: 4.07 X10*6/UL (ref 4–5.2)
SODIUM SERPL-SCNC: 134 MMOL/L (ref 136–145)
T AXIS: 63 DEGREES
T OFFSET: 425 MS
VENTRICULAR RATE: 82 BPM
WBC # BLD AUTO: 10.8 X10*3/UL (ref 4.4–11.3)

## 2024-10-25 PROCEDURE — 85025 COMPLETE CBC W/AUTO DIFF WBC: CPT | Performed by: STUDENT IN AN ORGANIZED HEALTH CARE EDUCATION/TRAINING PROGRAM

## 2024-10-25 PROCEDURE — 97166 OT EVAL MOD COMPLEX 45 MIN: CPT | Mod: GO

## 2024-10-25 PROCEDURE — 2500000001 HC RX 250 WO HCPCS SELF ADMINISTERED DRUGS (ALT 637 FOR MEDICARE OP)

## 2024-10-25 PROCEDURE — 2500000001 HC RX 250 WO HCPCS SELF ADMINISTERED DRUGS (ALT 637 FOR MEDICARE OP): Performed by: PHYSICIAN ASSISTANT

## 2024-10-25 PROCEDURE — 2500000004 HC RX 250 GENERAL PHARMACY W/ HCPCS (ALT 636 FOR OP/ED): Mod: JZ | Performed by: PHYSICIAN ASSISTANT

## 2024-10-25 PROCEDURE — 2500000002 HC RX 250 W HCPCS SELF ADMINISTERED DRUGS (ALT 637 FOR MEDICARE OP, ALT 636 FOR OP/ED)

## 2024-10-25 PROCEDURE — 1170000001 HC PRIVATE ONCOLOGY ROOM DAILY

## 2024-10-25 PROCEDURE — 99233 SBSQ HOSP IP/OBS HIGH 50: CPT | Performed by: STUDENT IN AN ORGANIZED HEALTH CARE EDUCATION/TRAINING PROGRAM

## 2024-10-25 PROCEDURE — 97161 PT EVAL LOW COMPLEX 20 MIN: CPT | Mod: GP

## 2024-10-25 PROCEDURE — 2500000004 HC RX 250 GENERAL PHARMACY W/ HCPCS (ALT 636 FOR OP/ED)

## 2024-10-25 PROCEDURE — 2500000005 HC RX 250 GENERAL PHARMACY W/O HCPCS

## 2024-10-25 PROCEDURE — 83735 ASSAY OF MAGNESIUM: CPT | Performed by: STUDENT IN AN ORGANIZED HEALTH CARE EDUCATION/TRAINING PROGRAM

## 2024-10-25 PROCEDURE — 2500000001 HC RX 250 WO HCPCS SELF ADMINISTERED DRUGS (ALT 637 FOR MEDICARE OP): Performed by: STUDENT IN AN ORGANIZED HEALTH CARE EDUCATION/TRAINING PROGRAM

## 2024-10-25 PROCEDURE — 97535 SELF CARE MNGMENT TRAINING: CPT | Mod: GO

## 2024-10-25 PROCEDURE — 99222 1ST HOSP IP/OBS MODERATE 55: CPT | Performed by: NURSE PRACTITIONER

## 2024-10-25 PROCEDURE — 97530 THERAPEUTIC ACTIVITIES: CPT | Mod: GP

## 2024-10-25 PROCEDURE — 97530 THERAPEUTIC ACTIVITIES: CPT | Mod: GO

## 2024-10-25 PROCEDURE — 36415 COLL VENOUS BLD VENIPUNCTURE: CPT | Performed by: STUDENT IN AN ORGANIZED HEALTH CARE EDUCATION/TRAINING PROGRAM

## 2024-10-25 PROCEDURE — 2500000005 HC RX 250 GENERAL PHARMACY W/O HCPCS: Performed by: PHYSICIAN ASSISTANT

## 2024-10-25 PROCEDURE — 80069 RENAL FUNCTION PANEL: CPT | Performed by: STUDENT IN AN ORGANIZED HEALTH CARE EDUCATION/TRAINING PROGRAM

## 2024-10-25 RX ORDER — HEPARIN SODIUM 5000 [USP'U]/ML
5000 INJECTION, SOLUTION INTRAVENOUS; SUBCUTANEOUS EVERY 8 HOURS
Status: DISCONTINUED | OUTPATIENT
Start: 2024-10-25 | End: 2024-10-26

## 2024-10-25 RX ORDER — CALCIUM CARBONATE 200(500)MG
500 TABLET,CHEWABLE ORAL ONCE
Status: COMPLETED | OUTPATIENT
Start: 2024-10-25 | End: 2024-10-25

## 2024-10-25 RX ORDER — POTASSIUM CHLORIDE 20 MEQ/1
40 TABLET, EXTENDED RELEASE ORAL ONCE
Status: COMPLETED | OUTPATIENT
Start: 2024-10-25 | End: 2024-10-25

## 2024-10-25 RX ORDER — TALC
3 POWDER (GRAM) TOPICAL NIGHTLY PRN
Status: DISCONTINUED | OUTPATIENT
Start: 2024-10-25 | End: 2024-10-29 | Stop reason: HOSPADM

## 2024-10-25 RX ORDER — DIPHENHYDRAMINE HCL 25 MG
25 CAPSULE ORAL NIGHTLY PRN
Status: DISCONTINUED | OUTPATIENT
Start: 2024-10-25 | End: 2024-10-29 | Stop reason: HOSPADM

## 2024-10-25 SDOH — ECONOMIC STABILITY: HOUSING INSECURITY: IN THE PAST 12 MONTHS HAS THE ELECTRIC, GAS, OIL, OR WATER COMPANY THREATENED TO SHUT OFF SERVICES IN YOUR HOME?: NO

## 2024-10-25 SDOH — ECONOMIC STABILITY: FOOD INSECURITY: WITHIN THE PAST 12 MONTHS, YOU WORRIED THAT YOUR FOOD WOULD RUN OUT BEFORE YOU GOT MONEY TO BUY MORE.: NEVER TRUE

## 2024-10-25 SDOH — ECONOMIC STABILITY: TRANSPORTATION INSECURITY

## 2024-10-25 SDOH — ECONOMIC STABILITY: FOOD INSECURITY: WITHIN THE PAST 12 MONTHS, THE FOOD YOU BOUGHT JUST DIDN'T LAST AND YOU DIDN'T HAVE MONEY TO GET MORE.: NEVER TRUE

## 2024-10-25 SDOH — ECONOMIC STABILITY: INCOME INSECURITY: IN THE LAST 12 MONTHS, WAS THERE A TIME WHEN YOU WERE NOT ABLE TO PAY THE MORTGAGE OR RENT ON TIME?: NO

## 2024-10-25 SDOH — ECONOMIC STABILITY: HOUSING INSECURITY: IN THE LAST 12 MONTHS, HOW MANY PLACES HAVE YOU LIVED?: 1

## 2024-10-25 SDOH — ECONOMIC STABILITY: TRANSPORTATION INSECURITY: IN THE PAST 12 MONTHS, HAS LACK OF TRANSPORTATION KEPT YOU FROM MEDICAL APPOINTMENTS OR FROM GETTING MEDICATIONS?: NO

## 2024-10-25 SDOH — ECONOMIC STABILITY: HOUSING INSECURITY: IN THE LAST 12 MONTHS, WAS THERE A TIME WHEN YOU WERE NOT ABLE TO PAY THE MORTGAGE OR RENT ON TIME?: NO

## 2024-10-25 SDOH — ECONOMIC STABILITY: GENERAL

## 2024-10-25 SDOH — ECONOMIC STABILITY: FOOD INSECURITY: WITHIN THE PAST 12 MONTHS, YOU WORRIED THAT YOUR FOOD WOULD RUN OUT BEFORE YOU GOT THE MONEY TO BUY MORE.: NEVER TRUE

## 2024-10-25 SDOH — ECONOMIC STABILITY: TRANSPORTATION INSECURITY
IN THE PAST 12 MONTHS, HAS LACK OF TRANSPORTATION KEPT YOU FROM MEETINGS, WORK, OR FROM GETTING THINGS NEEDED FOR DAILY LIVING?: PATIENT DECLINED

## 2024-10-25 SDOH — ECONOMIC STABILITY: FOOD INSECURITY

## 2024-10-25 SDOH — ECONOMIC STABILITY: HOUSING INSECURITY

## 2024-10-25 ASSESSMENT — COGNITIVE AND FUNCTIONAL STATUS - GENERAL
EATING MEALS: A LITTLE
DRESSING REGULAR UPPER BODY CLOTHING: A LITTLE
STANDING UP FROM CHAIR USING ARMS: A LITTLE
MOBILITY SCORE: 15
MOVING TO AND FROM BED TO CHAIR: A LITTLE
STANDING UP FROM CHAIR USING ARMS: A LITTLE
DRESSING REGULAR LOWER BODY CLOTHING: A LOT
DAILY ACTIVITIY SCORE: 20
CLIMB 3 TO 5 STEPS WITH RAILING: A LOT
TURNING FROM BACK TO SIDE WHILE IN FLAT BAD: A LOT
WALKING IN HOSPITAL ROOM: A LOT
STANDING UP FROM CHAIR USING ARMS: A LITTLE
TURNING FROM BACK TO SIDE WHILE IN FLAT BAD: A LITTLE
PERSONAL GROOMING: A LITTLE
TOILETING: TOTAL
HELP NEEDED FOR BATHING: A LITTLE
MOVING TO AND FROM BED TO CHAIR: A LITTLE
MOVING FROM LYING ON BACK TO SITTING ON SIDE OF FLAT BED WITH BEDRAILS: A LOT
MOBILITY SCORE: 14
TOILETING: A LITTLE
DRESSING REGULAR LOWER BODY CLOTHING: A LITTLE
DAILY ACTIVITIY SCORE: 18
MOVING TO AND FROM BED TO CHAIR: A LOT
PATIENT BASELINE BEDBOUND: NO
CLIMB 3 TO 5 STEPS WITH RAILING: A LOT
TOILETING: A LITTLE
DRESSING REGULAR UPPER BODY CLOTHING: A LITTLE
WALKING IN HOSPITAL ROOM: A LITTLE
HELP NEEDED FOR BATHING: A LITTLE
DRESSING REGULAR LOWER BODY CLOTHING: TOTAL
CLIMB 3 TO 5 STEPS WITH RAILING: TOTAL
DAILY ACTIVITIY SCORE: 14
PERSONAL GROOMING: A LITTLE
MOVING FROM LYING ON BACK TO SITTING ON SIDE OF FLAT BED WITH BEDRAILS: A LITTLE
WALKING IN HOSPITAL ROOM: A LITTLE
HELP NEEDED FOR BATHING: A LOT
MOBILITY SCORE: 19

## 2024-10-25 ASSESSMENT — PAIN - FUNCTIONAL ASSESSMENT
PAIN_FUNCTIONAL_ASSESSMENT: 0-10

## 2024-10-25 ASSESSMENT — ENCOUNTER SYMPTOMS
BRUISES/BLEEDS EASILY: 0
FEVER: 0
SHORTNESS OF BREATH: 0
WOUND: 1
LIGHT-HEADEDNESS: 0
CHILLS: 0
ACTIVITY CHANGE: 1
APPETITE CHANGE: 1
HEADACHES: 0
UNEXPECTED WEIGHT CHANGE: 0
TREMORS: 0
APNEA: 0
NUMBNESS: 0
ABDOMINAL PAIN: 1
JOINT SWELLING: 0
SORE THROAT: 0
HEMATURIA: 0
CONSTIPATION: 1
ADENOPATHY: 0
EYE REDNESS: 0
ALLERGIC/IMMUNOLOGIC NEGATIVE: 1
BACK PAIN: 0
WHEEZING: 0
PALPITATIONS: 0
SEIZURES: 0
DIFFICULTY URINATING: 0

## 2024-10-25 ASSESSMENT — PAIN SCALES - GENERAL
PAINLEVEL_OUTOF10: 7
PAINLEVEL_OUTOF10: 6
PAINLEVEL_OUTOF10: 8
PAINLEVEL_OUTOF10: 7
PAINLEVEL_OUTOF10: 9
PAINLEVEL_OUTOF10: 9
PAINLEVEL_OUTOF10: 6

## 2024-10-25 ASSESSMENT — ACTIVITIES OF DAILY LIVING (ADL)
BATHING_ASSISTANCE: MODERATE
LACK_OF_TRANSPORTATION: PATIENT DECLINED
HOME_MANAGEMENT_TIME_ENTRY: 10
ADL_ASSISTANCE: INDEPENDENT
ADL_ASSISTANCE: INDEPENDENT

## 2024-10-25 ASSESSMENT — PAIN DESCRIPTION - DESCRIPTORS
DESCRIPTORS: BURNING
DESCRIPTORS: THROBBING;SHARP;SHOOTING

## 2024-10-25 ASSESSMENT — SOCIAL DETERMINANTS OF HEALTH (SDOH): IN THE PAST 12 MONTHS, HAS THE ELECTRIC, GAS, OIL, OR WATER COMPANY THREATENED TO SHUT OFF SERVICE IN YOUR HOME?: NO

## 2024-10-25 ASSESSMENT — PAIN DESCRIPTION - ORIENTATION: ORIENTATION: RIGHT

## 2024-10-25 ASSESSMENT — PAIN DESCRIPTION - LOCATION: LOCATION: HIP

## 2024-10-25 NOTE — PROGRESS NOTES
"Orthopaedic Surgery Progress Note    Subjective:    Procedure yesterday without complications. NAEO. AFVSS. Resting comfortably in bed this AM. Patient expressed concerns regarding pain in left shoulder and risk of fracture. Patient advised to continue to work with PT/OT on safe mobility.     Objective:  /69   Pulse 78   Temp 36.7 °C (98.1 °F) (Temporal)   Resp 16   Ht 1.676 m (5' 6\")   Wt 61.2 kg (135 lb)   SpO2 93%   BMI 21.79 kg/m²     Gen: arousable, NAD, appropriately conversational  Cardiac: RRR to peripheral palpation  Resp: nonlabored on RA  GI: soft, non-distended  MSK:  RLE:   - mepilex x3 in place without strikethrough  - appropriately warm, tender postoperative extremity   - wiggles toes, SILT distally  - Foot wwp, 2+ DP/PT pulse, brisk cap refill  - Compartments soft and compressible, no pain with passive dorsiflexion      Results for orders placed or performed during the hospital encounter of 10/23/24 (from the past 24 hours)   Electrocardiogram, 12-lead PRN ACS symptoms   Result Value Ref Range    Ventricular Rate 82 BPM    Atrial Rate 82 BPM    LA Interval 148 ms    QRS Duration 76 ms    QT Interval 408 ms    QTC Calculation(Bazett) 476 ms    P Axis 68 degrees    R Axis 17 degrees    T Axis 63 degrees    QRS Count 13 beats    Q Onset 221 ms    P Onset 147 ms    P Offset 201 ms    T Offset 425 ms    QTC Fredericia 452 ms   CBC and Auto Differential   Result Value Ref Range    WBC 13.9 (H) 4.4 - 11.3 x10*3/uL    nRBC 0.0 0.0 - 0.0 /100 WBCs    RBC 4.31 4.00 - 5.20 x10*6/uL    Hemoglobin 12.5 12.0 - 16.0 g/dL    Hematocrit 37.2 36.0 - 46.0 %    MCV 86 80 - 100 fL    MCH 29.0 26.0 - 34.0 pg    MCHC 33.6 32.0 - 36.0 g/dL    RDW 13.6 11.5 - 14.5 %    Platelets 182 150 - 450 x10*3/uL    Neutrophils % 88.4 40.0 - 80.0 %    Immature Granulocytes %, Automated 0.7 0.0 - 0.9 %    Lymphocytes % 5.6 13.0 - 44.0 %    Monocytes % 5.2 2.0 - 10.0 %    Eosinophils % 0.0 0.0 - 6.0 %    Basophils % 0.1 0.0 - " 2.0 %    Neutrophils Absolute 12.29 (H) 1.20 - 7.70 x10*3/uL    Immature Granulocytes Absolute, Automated 0.10 0.00 - 0.70 x10*3/uL    Lymphocytes Absolute 0.78 (L) 1.20 - 4.80 x10*3/uL    Monocytes Absolute 0.73 0.10 - 1.00 x10*3/uL    Eosinophils Absolute 0.00 0.00 - 0.70 x10*3/uL    Basophils Absolute 0.02 0.00 - 0.10 x10*3/uL   Renal function panel   Result Value Ref Range    Glucose 149 (H) 74 - 99 mg/dL    Sodium 136 136 - 145 mmol/L    Potassium 3.8 3.5 - 5.3 mmol/L    Chloride 100 98 - 107 mmol/L    Bicarbonate 24 21 - 32 mmol/L    Anion Gap 16 10 - 20 mmol/L    Urea Nitrogen 20 6 - 23 mg/dL    Creatinine 0.54 0.50 - 1.05 mg/dL    eGFR >90 >60 mL/min/1.73m*2    Calcium 8.0 (L) 8.6 - 10.6 mg/dL    Phosphorus 4.0 2.5 - 4.9 mg/dL    Albumin 2.9 (L) 3.4 - 5.0 g/dL   Magnesium   Result Value Ref Range    Magnesium 1.89 1.60 - 2.40 mg/dL       FL fluoro images no charge   Final Result      XR knee right 4+ views   Final Result   Normal radiographs right knee.        Signed by: Sekou Stein 10/23/2024 11:31 AM   Dictation workstation:   [id number]      XR femur right 2+ views   Final Result   Transverse angulated displaced fracture of the subtrochanteric region   of the right proximal femur with intramedullary groundglass like   changes superior and inferior to the fracture site. Suggest further   evaluation with contrast-enhanced MRI.   Degenerative change of both hip joints.   Signed by Jesus Manuel Pearson MD      XR chest 1 view   Final Result   No evidence consolidating infiltrate or effusion.   Signed by Samuel Flood MD      XR humerus bilateral   Final Result   No acute osseous abnormalities.   Signed by Samuel Flood MD      XR femur left 1 view   Final Result   Transverse angulated displaced fracture of the subtrochanteric region   of the right proximal femur with intramedullary groundglass like   changes superior and inferior to the fracture site. Suggest further   evaluation with contrast-enhanced MRI.    Degenerative change of both hip joints.   Signed by Jesus Manuel Pearson MD      CT femur right wo IV contrast   Final Result   Linear minimally comminuted angulated fracture through the right   subtrochanteric region with associated angulation and rotation.   Question subtle underlying intramedullary lucent lesion involving the   proximal femoral diaphysis, although not definite.  Consider further   evaluation with MRI.   Signed by Dustin Bernabe MD          Assessment/Plan: 69 y.o. female s/p R femur CMN on 10/24 with Dr. Reed.      Plan:  - C/o left shoulder pain, XR bilateral humerus on admission w/o acute fracture requiring urgent surgical intervention, continue to monitor   - Biopsy intra-op confirmed carcinoma   - Weight bearing: WBAT RLE  - DVT ppx: SCDs, recommend holding chemoppx till POD1 (10/25) anticoagulation   - If starting therapeutic Hep GTT, recommend holding loading dose   - Diet: OK for regular diet from an Orthopaedic perspective  - Pain: multimodal pain control per primary  - Antibiotics: perioperative ancef 2g q8hr x2 doses (ordered)  - PT/OT consulted  - Fontanez: s/p straight cath intra-op, fontanez catheter deferred d/t vaginal discharge c/f infection   - Drains: none     Dispo: Per Primary    We will follow peripherally while patient is in house. Pt should be weightbearing as tolerated until first follow up appointment. Patient will require 6 weeks total of DVT prophylaxis from an orthopedic standpoint, if ok per primary team. Patient currently has mepilex on surgical site. Dressing should be removed POD7. Please send home with Calcium/Vitamin D 500mg-400IU BID for 6 weeks. Patient should follow up w/ Dr. Reed 2 weeks after surgery for post-operative appointment (patient may call [phone] to schedule). Please page with questions.      Adolfo Gaspar MD, MD  Orthopedic Surgery PGY-2  [facility]  Available by Epic Chat    While inpatient, this patient will be followed by the  Orthopaedic Trauma team. Please see contact information below:    Ortho Trauma  Adolfo Gaspar MD PGY2  Kelly Ramirez MD PGY3    Please page [phone] (ortho on-call) after 6pm and on weekends.

## 2024-10-25 NOTE — PROGRESS NOTES
10/25/24 1400   Discharge Planning   Living Arrangements Family members   Support Systems Family members   Type of Residence Private residence   Number of Stairs to Enter Residence 1   Do you have animals or pets at home? Yes   Type of Animals or Pets 1 cat and 1 dog   Who is requesting discharge planning? Provider     Care Transitions Note  SW met with pt to complete admission assessment and discuss potential SNF recommendation.  Pt lives in her own 2 story home with her cousin and cousin's girlfriend.  There are 2 SAGE.  Pt stay on the first floor and her cousins on the second floor.  Pt has one dog and one cat.  Pts primary support/emergency contact is her brother Lance.  Pt is a retired .  Pt lives on her [employer] funds and denies any financial concerns.  Pt was independent with ADL's prior to admission.  .  Pt has not driven in the past 3 months.  Pts brother, counsins or friends drive if pt needs transportation.  Pt primary oncologist is Aislinn Fernandez.  Pts preferred pharmacy is [facility] or [facility] in [place].  Discussed possible SNF rec and provided pt with a choice list.  SW to follow up for choices.    Elizabeth Gunsalus LISW-S  Care Transitions Supervisor

## 2024-10-25 NOTE — DISCHARGE INSTRUCTIONS
Dear Ms. Brittny Gordon,    You were admitted to [facility] on 10/23/2024 for a fracture of your right leg. Your leg fracture was due to a known metastasis from your cancer to your right femur. You underwent surgery to repair your broken femur then observed you for any issues following your surgery. While you were here, the vascular medicine team gave recommendations regarding what blood thinners you will need when you leave the hospital based on your history of blood clots. They recommended that you take Eliquis long-term to prevent you from suffering future blood clots.     Once you were cleared by the surgery team, you were discharged from the hospital with plans to follow up with your oncologist, Dr. Feliz, and the orthopedic surgery clinic for future care. Please remember to take your medications after leaving the hospital.     If you develop any new or concerning symptoms, please feel free to come back to the emergency room and we would be happy to treat you.    It was a pleasure taking care of you,   Oncology Team

## 2024-10-25 NOTE — PROGRESS NOTES
Physical Therapy    Physical Therapy Evaluation & Treatment    Patient Name: Brittny Gordon  MRN: [id number]  Department: [facility]  Room: [address] [place] [address]  Today's Date: 10/25/2024   Time Calculation  Start Time: 1015  Stop Time: 1106  Time Calculation (min): 51 min    Assessment/Plan   PT Assessment  PT Assessment Results: Decreased strength, Decreased range of motion, Decreased endurance, Impaired balance, Decreased mobility, Pain  Rehab Prognosis: Good  Barriers to Discharge: none  Evaluation/Treatment Tolerance: Patient limited by fatigue, Patient limited by pain  Medical Staff Made Aware: Yes  Strengths: Attitude of self, Coping skills  Barriers to Participation: Comorbidities  End of Session Communication: Bedside nurse  Assessment Comment: The pt presented with unsteady and unsafe mobility using a wheeled walker due decreased strength, increased pain, and decreased confidence.  End of Session Patient Position: Up in chair, Alarm on   IP OR SWING BED PT PLAN  Inpatient or Swing Bed: Inpatient  PT Plan  Treatment/Interventions: Bed mobility, Transfer training, Gait training, Balance training, Strengthening, Therapeutic activity  PT Plan: Ongoing PT  PT Frequency: 3 times per week  PT Discharge Recommendations: Moderate intensity level of continued care  Equipment Recommended upon Discharge:  (none)  PT Recommended Transfer Status: Assist x1  PT - OK to Discharge: Yes      Subjective     General Visit Information:  General  Reason for Referral: Closed subtrochanteric fracture of femur s/p insertion intramedullary nail femur  Past Medical History Relevant to Rehab: Stage 4 NSCLC (dx 8/2024, s/p radiation, currently on Keytruda), PE in 9/2024, AAA (s/p repair in 2012) HTN and HLD  Co-Treatment: OT  Co-Treatment Reason: PT/OT for increased pt safety and participation.  Prior to Session Communication: Bedside nurse  Patient Position Received: Bed, 3 rail up, Alarm on  Preferred Learning Style: verbal, visual,  written  General Comment: The pt was pleasant, cooperative and willing to participate in therapy.  Home Living:  Home Living  Type of Home: House  Lives With: Other (Comment) (2 Cousins)  Home Adaptive Equipment: Walker rolling or standard, Cane  Home Layout: Multi-level, Full bath main level, Stairs to alternate level with rails  Alternate Level Stairs-Rails: Both  Alternate Level Stairs-Number of Steps: 25  Home Access: Stairs to enter with rails  Entrance Stairs-Rails: Left  Entrance Stairs-Number of Steps: 1  Bathroom Shower/Tub: Walk-in shower  Bathroom Toilet: Adaptive toilet seating  Bathroom Equipment: Shower chair with back  Home Living Comments: Pt does not go on 2nd floor.  Prior Level of Function:  Prior Function Per Pt/Caregiver Report  Level of [place]: Independent with ADLs and functional transfers, Independent with homemaking with ambulation  Receives Help From: Family  ADL Assistance: Independent  Homemaking Assistance: Independent  Ambulatory Assistance: Independent  Vocational: Retired  Leisure: Play with dog  Hand Dominance: Right  Prior Function Comments: The pt was independent with all mobility using a wheeled walker in the household and in the community.  Precautions:  Precautions  Hearing/Visual Limitations: Hearing and vision WFL with glasses.  LE Weight Bearing Status: Weight Bearing as Tolerated (R LE)  Medical Precautions: Fall precautions  Precautions Comment: Pt in compliance with precaution throughout therapy session.    Vital Signs (Past 2hrs)        Date/Time Vitals Session Patient Position Pulse Resp SpO2 BP MAP (mmHg)    10/25/24 1144 --  --  92  20  95 %  100/66  --                   Vital Signs Comment: vitals stable    Objective   Pain:  Pain Assessment  Pain Assessment: 0-10  0-10 (Numeric) Pain Score: 6  Pain Type: Acute pain, Surgical pain  Pain Location: Leg  Pain Orientation: Right, Proximal (Thigh)  Pain Descriptors: Throbbing, Sharp, Shooting  Pain Frequency:  Constant/continuous  Pain Onset: Ongoing  Clinical Progression: Gradually worsening (with standing)  Effect of Pain on Daily Activities: Decreased and unsteady mobility  Patient's Stated Pain Goal: No pain  Pain Interventions: Therapeutic presence  Response to Interventions: Pain slightly increased  Multiple Pain Sites: Two  Pain 2  Pain Score 2: 7  Pain Type 2: Acute pain  Pain Location 2: Shoulder  Pain Orientation 2: Left  Pain Descriptors 2: Burning  Pain Frequency 2: Constant/continuous  Pain Onset 2: On-going  Clinical Progression 2: Not changed  Patient's Stated Pain Goal 2: No pain  Pain Interventions 2: Therapeutic presence  Response to Interventions 2: Pain stable  Cognition:  Cognition  Overall Cognitive Status: Within Functional Limits  Orientation Level: Oriented X4  Following Commands: Follows all commands and directions without difficulty    General Assessments:  General Observation  General Observation: The pt required instructions to perform a safe sit to stand transfer with painful, weak R LE and L shoulder using a wheeled walker and step-to gait using a wheeled walker during a bed to chair transfer.     Activity Tolerance  Endurance: Decreased tolerance for upright activites    Sensation  Light Touch: No apparent deficits    Strength  Strength Comments: R LE, L hip, and L shoulder decreased strength  Perception  Inattention/Neglect: Appears intact    Coordination  Movements are Fluid and Coordinated: Yes  Coordination Comment: WFL    Postural Control  Postural Control: Within Functional Limits  Posture Comment: Pt presented with good sitting and standing posture using a wheeled walker.    Static Sitting Balance  Static Sitting-Balance Support: Bilateral upper extremity supported, Feet supported  Static Sitting-Level of Assistance: Close supervision  Static Sitting-Comment/Number of Minutes: Sitting EOB  Dynamic Sitting Balance  Dynamic Sitting-Balance Support: Bilateral upper extremity supported,  Feet supported  Dynamic Sitting-Level of Assistance: Close supervision  Dynamic Sitting-Comments: Sitting EOB    Static Standing Balance  Static Standing-Balance Support: Bilateral upper extremity supported  Static Standing-Level of Assistance: Minimum assistance  Static Standing-Comment/Number of Minutes: using a wheeled walker  Dynamic Standing Balance  Dynamic Standing-Balance Support: Bilateral upper extremity supported  Dynamic Standing-Level of Assistance: Minimum assistance  Dynamic Standing-Comments: using a wheeled walker  Functional Assessments:  Bed Mobility  Bed Mobility: Yes  Bed Mobility 1  Bed Mobility 1: Supine to sitting  Level of Assistance 1: Moderate assistance, +2  Bed Mobility Comments 1: HOB elevated    Transfers  Transfer: Yes  Transfer 1  Transfer From 1: Sit to  Transfer to 1: Stand  Transfer Device 1: Walker  Transfer Level of Assistance 1: Minimum assistance  Transfers 2  Transfer From 2: Stand to  Transfer to 2: Sit  Transfer Device 2: Walker  Transfer Level of Assistance 2: Minimum assistance  Transfers 3  Transfer From 3: Bed to  Transfer to 3: Chair with arms  Transfer Device 3: Walker  Transfer Level of Assistance 3: Minimum assistance    Ambulation/Gait Training  Ambulation/Gait Training Performed: No    Stairs  Stairs: No  Extremity/Trunk Assessments:  Cervical Spine   Cervical Spine: Within Functional Limits  Lumbar Spine   Lumbar Spine : Within Functional Limits    RUE   RUE : Within Functional Limits  LUE   LUE: Exceptions to WFL  LUE AROM (degrees)  LUE AROM Comment: Decreased shoulder flexion ~90 deg  LUE Strength  L Shoulder Flexion: 3/5  L Elbow Flexion: 3+/5  L Elbow Extension: 3+/5  L Wrist Flexion: 4/5  L Wrist Extension: 4/5  RLE   RLE : Exceptions to WFL  AROM RLE (degrees)  RLE AROM Comment: WFL  Strength RLE  R Hip Flexion: 3-/5  R Knee Flexion: 3-/5  R Knee Extension: 3-/5  R Ankle Dorsiflexion: 4/5  R Ankle Plantar Flexion: 4/5  LLE   LLE : Within Functional  Limits  Treatments:     Bed Mobility  Bed Mobility: Yes  Bed Mobility 1  Bed Mobility 1: Supine to sitting  Level of Assistance 1: Moderate assistance, +2  Bed Mobility Comments 1: HOB elevated    Ambulation/Gait Training  Ambulation/Gait Training Performed: No  Transfers  Transfer: Yes  Transfer 1  Transfer From 1: Sit to  Transfer to 1: Stand  Transfer Device 1: Walker  Transfer Level of Assistance 1: Minimum assistance  Transfers 2  Transfer From 2: Stand to  Transfer to 2: Sit  Transfer Device 2: Walker  Transfer Level of Assistance 2: Minimum assistance  Transfers 3  Transfer From 3: Bed to  Transfer to 3: Chair with arms  Transfer Device 3: Walker  Transfer Level of Assistance 3: Minimum assistance    Stairs  Stairs: No  Outcome Measures:  [facility] Basic Mobility  Turning from your back to your side while in a flat bed without using bedrails: A lot  Moving from lying on your back to sitting on the side of a flat bed without using bedrails: A lot  Moving to and from bed to chair (including a wheelchair): A little  Standing up from a chair using your arms (e.g. wheelchair or bedside chair): A little  To walk in hospital room: A little  Climbing 3-5 steps with railing: Total  Basic Mobility - Total Score: 14    Encounter Problems       Encounter Problems (Active)       Balance       STG - Maintains SBA dynamic standing balance with upper extremity support using a wheeled walker. (Progressing)       Start:  10/25/24    Expected End:  11/08/24            STG - Maintains SBA static standing balance with upper extremity support using a wheeled walker. (Progressing)       Start:  10/25/24    Expected End:  11/08/24               Mobility       STG - Patient will ambulate 75ft with SBA using a wheeled walker. (Progressing)       Start:  10/25/24    Expected End:  11/08/24               PT Transfers       STG - Transfer from bed to chair with SBA using a wheeled walker. (Progressing)       Start:  10/25/24    Expected End:  " 11/08/24            STG - Patient to transfer to and from sit to supine with SBA. (Progressing)       Start:  10/25/24    Expected End:  11/08/24            STG - Patient will transfer sit to and from stand with SBA using a wheeled walker. (Progressing)       Start:  10/25/24    Expected End:  11/08/24                   Education Documentation  Handouts, taught by Tray Mo PT at 10/25/2024  1:04 PM.  Learner: Patient  Readiness: Acceptance  Method: Explanation, Demonstration  Response: Verbalizes Understanding, Demonstrated Understanding  Comment: Pt received education on performing a safe sit to stand transfer using a wheeled walker, step-to gait and pivot using a wheeled walker, and increased left shoulder mobility using the \"move in the tube\" concept.    Home Exercise Program, taught by Tray Mo PT at 10/25/2024  1:04 PM.  Learner: Patient  Readiness: Acceptance  Method: Explanation, Demonstration  Response: Verbalizes Understanding, Demonstrated Understanding  Comment: Pt received education on performing a safe sit to stand transfer using a wheeled walker, step-to gait and pivot using a wheeled walker, and increased left shoulder mobility using the \"move in the tube\" concept.    Precautions, taught by Tray Mo PT at 10/25/2024  1:04 PM.  Learner: Patient  Readiness: Acceptance  Method: Explanation, Demonstration  Response: Verbalizes Understanding, Demonstrated Understanding  Comment: Pt received education on performing a safe sit to stand transfer using a wheeled walker, step-to gait and pivot using a wheeled walker, and increased left shoulder mobility using the \"move in the tube\" concept.    Body Mechanics, taught by Tray Mo PT at 10/25/2024  1:04 PM.  Learner: Patient  Readiness: Acceptance  Method: Explanation, Demonstration  Response: Verbalizes Understanding, Demonstrated Understanding  Comment: Pt received education on performing a safe sit to stand transfer " "using a wheeled walker, step-to gait and pivot using a wheeled walker, and increased left shoulder mobility using the \"move in the tube\" concept.    Mobility Training, taught by Tray Mo PT at 10/25/2024  1:04 PM.  Learner: Patient  Readiness: Acceptance  Method: Explanation, Demonstration  Response: Verbalizes Understanding, Demonstrated Understanding  Comment: Pt received education on performing a safe sit to stand transfer using a wheeled walker, step-to gait and pivot using a wheeled walker, and increased left shoulder mobility using the \"move in the tube\" concept.    Education Comments  No comments found.      "

## 2024-10-25 NOTE — CONSULTS
"Nutrition Initial Assessment:   Nutrition Assessment    The patient is a 69 y.o. female who is hospital day #2.  s/p R femur CMN on 10/24     PMHx: stage 4 NSCLC (dx 8/2024, s/p radiation, currently on Keytruda), PE in 9/2024, AAA (s/p repair in 2012) HTN and HLD    Reason for Assessment: Admission nursing screening  Malnutrition Screening Tool (MST)  Have you recently lost weight without trying?: Yes  If yes, how much weight have you lost?: Lost 24 - 33 pounds  Weight Loss Score: 3  Have you been eating poorly because of a decreased appetite?: Yes  Malnutrition Score: 4      Nutrition History:  Food and Nutrient History: Pt reports her appetite is down due to the cancer. Has not eaten yet after her surgery. Waiting for a salad to arrive for dinner around 6pm. States at home she was eating ok. Eating maybe 3 snacks a day. When asked about what she usually eats she states pretzels, granola, yoghurt. Asked pt about protein sources pt reports eating chicken, salmon, hamburgers. States her portion sizes are the normal for a 61 year old. Does not drink ONS as she dislikes the flavor - taste too artificial and she does not like artificial things. States it would not be hard for her to increase calories if she wanted to. Only eats PB&J when hungry.      Anthropometrics:  Start of admission anthropometrics:  Height: 167.6 cm (5' 6\")  Weight: 61.2 kg (135 lb)  BMI (Calculated): 21.8  IBW/kg (Dietitian Calculated): 59.09 kg  Percent of IBW: 104 %       Wt Hx   10/24/24 61.2 kg (135 lb) - stated   10/01/24 62.9 kg (138 lb 9.6 oz)   09/24/24 62.8 kg (138 lb 7.2 oz) - 2% wt loss x1 month    08/23/24 72.6 kg (160 lb 0.9 oz) - ? Accuracy -> 18# wt gain from the month prior and 22# wt loss compared to the next month.    07/25/24 64.5 kg (142 lb 4.9 oz) - 5% wt loss x3 months   06/25/24 79.1 kg (174 lb 5 oz) - 22% wt loss x4 months    03/21/24 80.6 kg (177 lb 12.8 oz)     Weight History / % Weight Change: Pt reports wt loss from " 160# to 134#. States she is ok with this wt as this was her UBW in the past. Per pt, 134# was her UBW many years ago. Difficult to assess wt trend given variability in weights. Non significant wt loss x3 months.    Nutrition Focused Physical Exam Findings:  Limited NFPE  - pt with pain @clavicle / deltoid area w/ recent surgery @hip and compression @lower leg.   Subcutaneous Fat Loss:   Orbital Fat Pads: Mild-Moderate (slight dark circles and slight hollowing)  Buccal Fat Pads: Mild-Moderate (flat cheeks, minimal bounce)  Triceps: Defer  Muscle Wasting:  Temporalis: Well nourished (well-defined muscle)  Pectoralis (Clavicular Region): Defer  Deltoid/Trapezius: Defer  Interosseous: Well nourished (muscle bulges)  Quadriceps: Defer  Gastrocnemius: Defer  Edema:  Edema: none  Physical Findings:  Hair: Negative  Eyes: Negative  Mouth: Negative  Nails: Negative  Skin:  (surgical incision @leg)    Objective Data:    Last BM Date: 10/21/24    Nutrition Significant Labs:    Results from last 7 days   Lab Units 10/25/24  0731 10/25/24  0730 10/24/24  1617 10/23/24  0926 10/23/24  0926   HEMOGLOBIN g/dL 11.7*  --  12.5  --  13.7   MCV fL 87  --  86  --  86   GLUCOSE mg/dL  --  104* 149*  --  90   POTASSIUM mmol/L  --  3.4* 3.8  --  3.7   SODIUM mmol/L  --  134* 136  --  136   PHOSPHORUS mg/dL  --  2.3* 4.0   < >  --    MAGNESIUM mg/dL  --  1.88 1.89   < >  --    CREATININE mg/dL  --  0.47* 0.54  --  0.49*   BUN mg/dL  --  20 20  --  20   ALT U/L  --   --   --   --  12   AST U/L  --   --   --   --  16   ALK PHOS U/L  --   --   --   --  114    < > = values in this interval not displayed.     Lab Results   Component Value Date    VITD25 30 10/23/2024         Nutrition Specific Medications:  atorvastatin, 80 mg, oral, Daily  cholecalciferol, 1,000 Units, oral, Daily  pantoprazole, 40 mg, oral, Daily before breakfast  polyethylene glycol, 17 g, oral, Daily  sennosides, 2 tablet, oral, BID      I/O:   I/O last 2 completed  shifts:  In: 860 (14 mL/kg) [P.O.:60; I.V.:700 (11.4 mL/kg); IV Piggyback:100]  Out: 650 (10.6 mL/kg) [Urine:550 (0.4 mL/kg/hr); Blood:100]  Weight: 61.2 kg     Dietary Orders (From admission, onward)       Start     Ordered    10/24/24 1405  Adult diet Regular  Diet effective now        Question:  Diet type  Answer:  Regular    10/24/24 1405    10/24/24 1328  May Participate in Room Service  ( ROOM SERVICE MAY PARTICIPATE)  Once        Question:  .  Answer:  Yes    10/24/24 1327                     Estimated Needs:   Total Energy Estimated Needs (kCal): 1770 kCal  Method for Estimating Needs: 30 kcal/kg IBW    Total Protein Estimated Needs (g): 77 g  Method for Estimating Needs: 1.3 g/kg IBW    Method for Estimating Needs: 1 ml/kcal or per team          Nutrition Diagnosis   Malnutrition Diagnosis  Patient has Malnutrition Diagnosis: No (PO intake decreasd. Wt loss appears to have slowed down and now wt loss is non significant x3 months. Limited NFPE.)    Nutrition Diagnosis  Patient has Nutrition Diagnosis: Yes  Diagnosis Status (1): New  Nutrition Diagnosis 1: Increased nutrient needs  Related to (1): increased metabolic demand  As Evidenced by (1): cancer dx       Nutrition Interventions/Recommendations   Individualized Nutrition Prescription Provided for : 1750 kcal and 80g protein via oral diet      Interventions: Meals and snacks  Meals and Snacks: General healthful diet      Nutrition Education: Discussed importance of adequate nutrient intake. Pt not interested in any ONS or addition of high kcal/pro snacks.        Nutrition Monitoring and Evaluation   Monitoring and Evaluation Plan: Energy intake  Energy Intake: Estimated energy intake  Criteria: >50% of nutr needs    Monitoring and Evaluation Plan: Weight  Weight: Weight change  Criteria: no wt change                   Time Spent (min): 60 minutes

## 2024-10-25 NOTE — PROGRESS NOTES
Brittny Gordon is a 69 y.o. female on day 2 of admission presenting with Pathological fracture, right femur, initial encounter for fracture.      Subjective   Patient reports difficulty urinating this morning. No significant pain at her surgical site. No nausea or vomiting.        Objective     Last Recorded Vitals  /81 (BP Location: Left arm, Patient Position: Sitting)   Pulse 90   Temp 36.1 °C (97 °F) (Temporal)   Resp 17   Wt 61.2 kg (135 lb)   SpO2 92%   Intake/Output last 3 Shifts:    Intake/Output Summary (Last 24 hours) at 10/25/2024 1018  Last data filed at 10/25/2024 0914  Gross per 24 hour   Intake 920 ml   Output 0 ml   Net 920 ml       Admission Weight  Weight: 61.2 kg (135 lb) (10/23/24 0913)    Daily Weight  10/24/24 : 61.2 kg (135 lb)    Image Results  Electrocardiogram, 12-lead PRN ACS symptoms  Normal sinus rhythm  Possible Inferior infarct (cited on or before 24-OCT-2024)  Cannot rule out Anterior infarct (cited on or before 23-OCT-2024)  Abnormal ECG  When compared with ECG of 24-OCT-2024 12:07,  No significant change was found  FL fluoro images no charge  These images are not reportable by radiology and will not be interpreted   by  Radiologists.      Physical Exam  Constitutional:       Appearance: Normal appearance.   HENT:      Head: Normocephalic and atraumatic.      Mouth/Throat:      Mouth: Mucous membranes are moist.      Pharynx: Oropharynx is clear.   Eyes:      Extraocular Movements: Extraocular movements intact.      Conjunctiva/sclera: Conjunctivae normal.      Pupils: Pupils are equal, round, and reactive to light.   Cardiovascular:      Rate and Rhythm: Normal rate and regular rhythm.      Pulses: Normal pulses.   Pulmonary:      Effort: Pulmonary effort is normal. No respiratory distress.      Breath sounds: Normal breath sounds. No rhonchi.   Abdominal:      General: Bowel sounds are normal.      Palpations: Abdomen is soft.   Skin:     General: Skin is warm and  dry.      Findings: No bruising, erythema or rash.      Comments: Bandaging present on right hip   Neurological:      General: No focal deficit present.      Mental Status: She is alert and oriented to person, place, and time.   Psychiatric:         Mood and Affect: Mood normal.         Behavior: Behavior normal.         Relevant Results  Scheduled medications  atorvastatin, 80 mg, oral, Daily  cholecalciferol, 1,000 Units, oral, Daily  heparin (porcine), 5,000 Units, subcutaneous, q8h  lidocaine, 1 patch, transdermal, q24h  losartan, 100 mg, oral, Daily  metoprolol succinate XL, 100 mg, oral, Daily  pantoprazole, 40 mg, oral, Daily before breakfast  polyethylene glycol, 17 g, oral, Daily  sennosides, 2 tablet, oral, BID      Continuous medications     PRN medications  PRN medications: acetaminophen, HYDROmorphone, oxyCODONE     Assessment/Plan      Brittny Gordon is a 69 y.o. female with PMHx of stage 4 NSCLC (dx 8/2024, s/p radiation, currently on Keytruda), PE in 9/2024, AAA (s/p repair in 2012) HTN and HLD who is presenting with right hip pain 2/2 to pathologic femur fracture.    The patient reports that she is feeling well after surgery. She is having some urinary retention in the setting of her recent surgery. She reports a willingness to try straight cath today. Will work with PT/OT today.    Updates 10/25:  -Post op PT/OT  -Monitoring post op  -[facility] consulted regarding initiation of long-term anticoagulation     #Pathologic right femur fracture  ::Seen on admission x-ray  -Pain reg: Received dilaudid in ED, oxycodone 5 mg q6h PRN, Tylenol 975 mg q8h PRN  -Post op PT/OT  -Monitoring post op     #Stage 4B NSCLC c/b bone metastases  ::S/p radiation therapy  ::Currently on Keytruda  ::Primary oncologist: Dr. Feliz  -Will notify Dr. Feliz of admission      #History of PE  ::Recent PE in 9/2024  ::Was prescribed Eliquis but patient reports not taking  -Plan to restart anticoagulation following  procedure when cleared by ortho, likely POD1  -[facility] consulted regarding initiation of long-term anticoagulation     #HTN  -Restarted losartan 100 mg daily  -Metoprolol succinate 100 mg daily     #HLD  -Atorvastatin 80 mg daily         F: PRN  E: PRN  N: Regular diet  A: PIV  DVT ppx: Heparin subq  GI ppx: Panto 40  Bowel ppx: Miralax, senna     NOK: Juice Love (brother) [phone]  Code status: Full code (confirmed on admission)            Queen CORNEL Connolly MD

## 2024-10-25 NOTE — CARE PLAN
The patient's goals for the shift include      The clinical goals for the shift include pt to remain safe

## 2024-10-26 LAB
ALBUMIN SERPL BCP-MCNC: 2.8 G/DL (ref 3.4–5)
ANION GAP SERPL CALC-SCNC: 14 MMOL/L (ref 10–20)
BUN SERPL-MCNC: 15 MG/DL (ref 6–23)
CALCIUM SERPL-MCNC: 8.1 MG/DL (ref 8.6–10.6)
CHLORIDE SERPL-SCNC: 101 MMOL/L (ref 98–107)
CO2 SERPL-SCNC: 25 MMOL/L (ref 21–32)
CREAT SERPL-MCNC: 0.39 MG/DL (ref 0.5–1.05)
EGFRCR SERPLBLD CKD-EPI 2021: >90 ML/MIN/1.73M*2
GLUCOSE SERPL-MCNC: 143 MG/DL (ref 74–99)
MAGNESIUM SERPL-MCNC: 1.79 MG/DL (ref 1.6–2.4)
PHOSPHATE SERPL-MCNC: 2.2 MG/DL (ref 2.5–4.9)
POTASSIUM SERPL-SCNC: 3.6 MMOL/L (ref 3.5–5.3)
SODIUM SERPL-SCNC: 136 MMOL/L (ref 136–145)
UFH PPP CHRO-ACNC: 0.3 IU/ML
UFH PPP CHRO-ACNC: 0.4 IU/ML

## 2024-10-26 PROCEDURE — 99233 SBSQ HOSP IP/OBS HIGH 50: CPT | Performed by: STUDENT IN AN ORGANIZED HEALTH CARE EDUCATION/TRAINING PROGRAM

## 2024-10-26 PROCEDURE — 84100 ASSAY OF PHOSPHORUS: CPT | Performed by: STUDENT IN AN ORGANIZED HEALTH CARE EDUCATION/TRAINING PROGRAM

## 2024-10-26 PROCEDURE — 2500000005 HC RX 250 GENERAL PHARMACY W/O HCPCS: Performed by: PHYSICIAN ASSISTANT

## 2024-10-26 PROCEDURE — 85520 HEPARIN ASSAY: CPT

## 2024-10-26 PROCEDURE — 2500000001 HC RX 250 WO HCPCS SELF ADMINISTERED DRUGS (ALT 637 FOR MEDICARE OP): Performed by: PHYSICIAN ASSISTANT

## 2024-10-26 PROCEDURE — 2500000004 HC RX 250 GENERAL PHARMACY W/ HCPCS (ALT 636 FOR OP/ED)

## 2024-10-26 PROCEDURE — 2500000001 HC RX 250 WO HCPCS SELF ADMINISTERED DRUGS (ALT 637 FOR MEDICARE OP): Performed by: STUDENT IN AN ORGANIZED HEALTH CARE EDUCATION/TRAINING PROGRAM

## 2024-10-26 PROCEDURE — 36415 COLL VENOUS BLD VENIPUNCTURE: CPT

## 2024-10-26 PROCEDURE — 36415 COLL VENOUS BLD VENIPUNCTURE: CPT | Performed by: STUDENT IN AN ORGANIZED HEALTH CARE EDUCATION/TRAINING PROGRAM

## 2024-10-26 PROCEDURE — 1170000001 HC PRIVATE ONCOLOGY ROOM DAILY

## 2024-10-26 PROCEDURE — 83735 ASSAY OF MAGNESIUM: CPT | Performed by: STUDENT IN AN ORGANIZED HEALTH CARE EDUCATION/TRAINING PROGRAM

## 2024-10-26 RX ORDER — HEPARIN SODIUM 10000 [USP'U]/100ML
0-4000 INJECTION, SOLUTION INTRAVENOUS CONTINUOUS
Status: DISPENSED | OUTPATIENT
Start: 2024-10-26 | End: 2024-10-29

## 2024-10-26 RX ORDER — POLYETHYLENE GLYCOL 3350 17 G/17G
17 POWDER, FOR SOLUTION ORAL DAILY PRN
Status: DISCONTINUED | OUTPATIENT
Start: 2024-10-26 | End: 2024-10-29 | Stop reason: HOSPADM

## 2024-10-26 RX ORDER — SENNOSIDES 8.6 MG/1
2 TABLET ORAL NIGHTLY PRN
Status: DISCONTINUED | OUTPATIENT
Start: 2024-10-26 | End: 2024-10-29 | Stop reason: HOSPADM

## 2024-10-26 RX ORDER — LOPERAMIDE HYDROCHLORIDE 2 MG/1
2 CAPSULE ORAL 3 TIMES DAILY PRN
Status: DISCONTINUED | OUTPATIENT
Start: 2024-10-26 | End: 2024-10-29 | Stop reason: HOSPADM

## 2024-10-26 ASSESSMENT — COGNITIVE AND FUNCTIONAL STATUS - GENERAL
MOVING FROM LYING ON BACK TO SITTING ON SIDE OF FLAT BED WITH BEDRAILS: A LITTLE
DRESSING REGULAR LOWER BODY CLOTHING: A LOT
TURNING FROM BACK TO SIDE WHILE IN FLAT BAD: A LITTLE
WALKING IN HOSPITAL ROOM: A LOT
CLIMB 3 TO 5 STEPS WITH RAILING: A LOT
DAILY ACTIVITIY SCORE: 19
TOILETING: A LITTLE
STANDING UP FROM CHAIR USING ARMS: A LITTLE
MOVING TO AND FROM BED TO CHAIR: A LITTLE
MOBILITY SCORE: 16
PERSONAL GROOMING: A LITTLE
HELP NEEDED FOR BATHING: A LITTLE

## 2024-10-26 ASSESSMENT — PAIN SCALES - GENERAL
PAINLEVEL_OUTOF10: 0 - NO PAIN
PAINLEVEL_OUTOF10: 8
PAINLEVEL_OUTOF10: 0 - NO PAIN
PAINLEVEL_OUTOF10: 8
PAINLEVEL_OUTOF10: 7

## 2024-10-26 ASSESSMENT — PAIN - FUNCTIONAL ASSESSMENT
PAIN_FUNCTIONAL_ASSESSMENT: 0-10

## 2024-10-26 NOTE — CARE PLAN
The patient's goals for the shift include  increase activity    The clinical goals for the shift include pt will amublate in room and use bedside comode 10/26/24  [id number]    Over the shift, the patient did not make progress toward the following goals. Barriers to progression include pain and weakness. Recommendations to address these barriers include premedicate before activity.

## 2024-10-26 NOTE — PROGRESS NOTES
Brittny Gordon is a 69 y.o. female on day 3 of admission presenting with Pathological fracture, right femur, initial encounter for fracture.      Subjective   Patient reports diarrhea this morning after getting laxatives. She states that she would prefer to follow up with her cardiologist regarding anticoagulation as opposed to outpatient vascular medicine. No new pain or bruising at her surgical site.       Objective     Last Recorded Vitals  /70 (BP Location: Left arm, Patient Position: Lying)   Pulse 73   Temp 37.1 °C (98.8 °F) (Temporal)   Resp 16   Wt 61.2 kg (135 lb)   SpO2 94%   Intake/Output last 3 Shifts:    Intake/Output Summary (Last 24 hours) at 10/26/2024 1443  Last data filed at 10/26/2024 0832  Gross per 24 hour   Intake --   Output 450 ml   Net -450 ml       Admission Weight  Weight: 61.2 kg (135 lb) (10/23/24 0913)    Daily Weight  10/24/24 : 61.2 kg (135 lb)    Image Results  Electrocardiogram, 12-lead PRN ACS symptoms  Normal sinus rhythm  Possible Inferior infarct (cited on or before 24-OCT-2024)  Cannot rule out Anterior infarct (cited on or before 23-OCT-2024)  Abnormal ECG  When compared with ECG of 24-OCT-2024 12:07,  No significant change was found  Confirmed by Luis Alberto Bustos ([id number]) on 10/25/2024 12:23:08 PM      Physical Exam  Constitutional:       Appearance: Normal appearance.   HENT:      Head: Normocephalic and atraumatic.      Mouth/Throat:      Mouth: Mucous membranes are moist.      Pharynx: Oropharynx is clear.   Eyes:      Extraocular Movements: Extraocular movements intact.      Conjunctiva/sclera: Conjunctivae normal.      Pupils: Pupils are equal, round, and reactive to light.   Cardiovascular:      Rate and Rhythm: Normal rate and regular rhythm.      Pulses: Normal pulses.   Pulmonary:      Effort: Pulmonary effort is normal. No respiratory distress.      Breath sounds: Normal breath sounds. No rhonchi.   Abdominal:      General: Bowel sounds are normal.       Palpations: Abdomen is soft.   Skin:     General: Skin is warm and dry.      Findings: No bruising, erythema or rash.      Comments: Bandaging present on right hip   Neurological:      General: No focal deficit present.      Mental Status: She is alert and oriented to person, place, and time.   Psychiatric:         Mood and Affect: Mood normal.         Behavior: Behavior normal.         Relevant Results  Scheduled medications  atorvastatin, 80 mg, oral, Daily  cholecalciferol, 1,000 Units, oral, Daily  lidocaine, 1 patch, transdermal, q24h  losartan, 100 mg, oral, Daily  metoprolol succinate XL, 100 mg, oral, Daily  pantoprazole, 40 mg, oral, Daily before breakfast      Continuous medications  heparin, 0-4,000 Units/hr, Last Rate: 700 Units/hr (10/26/24 1018)      PRN medications  PRN medications: acetaminophen, diphenhydrAMINE, heparin, HYDROmorphone, loperamide, melatonin, oxyCODONE, polyethylene glycol, sennosides     Assessment/Plan      Brittny Gordon is a 69 y.o. female with PMHx of stage 4 NSCLC (dx 8/2024, s/p radiation, currently on Keytruda), PE in 9/2024, AAA (s/p repair in 2012) HTN and HLD who is presenting with right hip pain 2/2 to pathologic femur fracture.    The patient reports that her right hip is still feeling well after her surgery. She stated that she would prefer to speak to her cardiologist about home anticoagulation rather than outpatient vascular medicine.     Updates 10/26:  -Monitoring post op  -On heparin drip     #Pathologic right femur fracture  ::Seen on admission x-ray  -Pain reg: Received dilaudid in ED, oxycodone 5 mg q6h PRN, Tylenol 975 mg q8h PRN  -Post op PT/OT  -Monitoring post op     #Stage 4B NSCLC c/b bone metastases  ::S/p radiation therapy  ::Currently on Keytruda  ::Primary oncologist: Dr. Feliz      #History of PE  ::Recent PE in 9/2024  ::Was prescribed Eliquis but patient reports not taking  -Plan to restart anticoagulation following procedure when cleared by  ortho, likely POD1  -Vascular medicine recs: low intensity heparin infusion today followed by Eliquis if hemoglobin remains stable     #HTN  -Restarted losartan 100 mg daily  -Metoprolol succinate 100 mg daily     #HLD  -Atorvastatin 80 mg daily         F: PRN  E: PRN  N: Regular diet  A: PIV  DVT ppx: Heparin gtt  GI ppx: Panto 40  Bowel ppx: Miralax, senna PRN     NOK: Juice Love (brother) [phone]  Code status: Full code (confirmed on admission)            Queen CORNEL Connolly MD

## 2024-10-27 VITALS
HEIGHT: 66 IN | HEART RATE: 79 BPM | RESPIRATION RATE: 18 BRPM | DIASTOLIC BLOOD PRESSURE: 73 MMHG | WEIGHT: 135 LBS | TEMPERATURE: 97 F | SYSTOLIC BLOOD PRESSURE: 110 MMHG | BODY MASS INDEX: 21.69 KG/M2 | OXYGEN SATURATION: 94 %

## 2024-10-27 LAB
ALBUMIN SERPL BCP-MCNC: 2.8 G/DL (ref 3.4–5)
ANION GAP SERPL CALC-SCNC: 13 MMOL/L (ref 10–20)
BUN SERPL-MCNC: 13 MG/DL (ref 6–23)
CALCIUM SERPL-MCNC: 7.9 MG/DL (ref 8.6–10.6)
CHLORIDE SERPL-SCNC: 100 MMOL/L (ref 98–107)
CO2 SERPL-SCNC: 25 MMOL/L (ref 21–32)
CREAT SERPL-MCNC: 0.37 MG/DL (ref 0.5–1.05)
EGFRCR SERPLBLD CKD-EPI 2021: >90 ML/MIN/1.73M*2
ERYTHROCYTE [DISTWIDTH] IN BLOOD BY AUTOMATED COUNT: 13.7 % (ref 11.5–14.5)
GLUCOSE SERPL-MCNC: 107 MG/DL (ref 74–99)
HCT VFR BLD AUTO: 34 % (ref 36–46)
HGB BLD-MCNC: 11.1 G/DL (ref 12–16)
MAGNESIUM SERPL-MCNC: 1.83 MG/DL (ref 1.6–2.4)
MCH RBC QN AUTO: 29.5 PG (ref 26–34)
MCHC RBC AUTO-ENTMCNC: 32.6 G/DL (ref 32–36)
MCV RBC AUTO: 90 FL (ref 80–100)
NRBC BLD-RTO: 0 /100 WBCS (ref 0–0)
PHOSPHATE SERPL-MCNC: 2.6 MG/DL (ref 2.5–4.9)
PLATELET # BLD AUTO: 162 X10*3/UL (ref 150–450)
POTASSIUM SERPL-SCNC: 3.5 MMOL/L (ref 3.5–5.3)
RBC # BLD AUTO: 3.76 X10*6/UL (ref 4–5.2)
SODIUM SERPL-SCNC: 134 MMOL/L (ref 136–145)
UFH PPP CHRO-ACNC: 0.4 IU/ML
WBC # BLD AUTO: 8.7 X10*3/UL (ref 4.4–11.3)

## 2024-10-27 PROCEDURE — 2500000001 HC RX 250 WO HCPCS SELF ADMINISTERED DRUGS (ALT 637 FOR MEDICARE OP)

## 2024-10-27 PROCEDURE — 83735 ASSAY OF MAGNESIUM: CPT | Performed by: STUDENT IN AN ORGANIZED HEALTH CARE EDUCATION/TRAINING PROGRAM

## 2024-10-27 PROCEDURE — 80069 RENAL FUNCTION PANEL: CPT | Performed by: STUDENT IN AN ORGANIZED HEALTH CARE EDUCATION/TRAINING PROGRAM

## 2024-10-27 PROCEDURE — 2500000001 HC RX 250 WO HCPCS SELF ADMINISTERED DRUGS (ALT 637 FOR MEDICARE OP): Performed by: PHYSICIAN ASSISTANT

## 2024-10-27 PROCEDURE — 36415 COLL VENOUS BLD VENIPUNCTURE: CPT | Performed by: STUDENT IN AN ORGANIZED HEALTH CARE EDUCATION/TRAINING PROGRAM

## 2024-10-27 PROCEDURE — 36415 COLL VENOUS BLD VENIPUNCTURE: CPT

## 2024-10-27 PROCEDURE — 1170000001 HC PRIVATE ONCOLOGY ROOM DAILY

## 2024-10-27 PROCEDURE — 99233 SBSQ HOSP IP/OBS HIGH 50: CPT | Performed by: STUDENT IN AN ORGANIZED HEALTH CARE EDUCATION/TRAINING PROGRAM

## 2024-10-27 PROCEDURE — 85027 COMPLETE CBC AUTOMATED: CPT | Performed by: STUDENT IN AN ORGANIZED HEALTH CARE EDUCATION/TRAINING PROGRAM

## 2024-10-27 PROCEDURE — 2500000004 HC RX 250 GENERAL PHARMACY W/ HCPCS (ALT 636 FOR OP/ED)

## 2024-10-27 PROCEDURE — 85520 HEPARIN ASSAY: CPT

## 2024-10-27 RX ORDER — CALCIUM CARBONATE 200(500)MG
500 TABLET,CHEWABLE ORAL DAILY
Status: DISCONTINUED | OUTPATIENT
Start: 2024-10-28 | End: 2024-10-29 | Stop reason: HOSPADM

## 2024-10-27 ASSESSMENT — COGNITIVE AND FUNCTIONAL STATUS - GENERAL
STANDING UP FROM CHAIR USING ARMS: A LITTLE
WALKING IN HOSPITAL ROOM: A LOT
MOBILITY SCORE: 16
MOVING FROM LYING ON BACK TO SITTING ON SIDE OF FLAT BED WITH BEDRAILS: A LITTLE
TOILETING: A LOT
HELP NEEDED FOR BATHING: A LITTLE
MOVING TO AND FROM BED TO CHAIR: A LITTLE
DAILY ACTIVITIY SCORE: 18
DRESSING REGULAR LOWER BODY CLOTHING: A LOT
TURNING FROM BACK TO SIDE WHILE IN FLAT BAD: A LITTLE
CLIMB 3 TO 5 STEPS WITH RAILING: A LOT
PERSONAL GROOMING: A LITTLE

## 2024-10-27 ASSESSMENT — PAIN SCALES - WONG BAKER: WONGBAKER_NUMERICALRESPONSE: HURTS WHOLE LOT

## 2024-10-27 ASSESSMENT — PAIN SCALES - GENERAL
PAINLEVEL_OUTOF10: 9
PAINLEVEL_OUTOF10: 8

## 2024-10-27 ASSESSMENT — PAIN DESCRIPTION - ORIENTATION: ORIENTATION: RIGHT

## 2024-10-27 NOTE — PROGRESS NOTES
Brittny Gordon is a 69 y.o. female on day 4 of admission presenting with Pathological fracture, right femur, initial encounter for fracture.      Subjective   The patient reports no recurrence of her diarrhea from yesterday morning. She states that she still has some pain in her right hip from her recent surgery. She states that she and her family have been working on finding SNFs and getting her home set up for her return.          Objective     Last Recorded Vitals  /70   Pulse 91   Temp 36.5 °C (97.7 °F) (Temporal)   Resp 18   Wt 61.2 kg (135 lb)   SpO2 92%   Intake/Output last 3 Shifts:  No intake or output data in the 24 hours ending 10/27/24 1605      Admission Weight  Weight: 61.2 kg (135 lb) (10/23/24 0913)    Daily Weight  10/24/24 : 61.2 kg (135 lb)    Image Results  Electrocardiogram, 12-lead PRN ACS symptoms  Normal sinus rhythm  Possible Inferior infarct (cited on or before 24-OCT-2024)  Cannot rule out Anterior infarct (cited on or before 23-OCT-2024)  Abnormal ECG  When compared with ECG of 24-OCT-2024 12:07,  No significant change was found  Confirmed by Luis Alberto Bustos ([id number]) on 10/25/2024 12:23:08 PM      Physical Exam  Constitutional:       Appearance: Normal appearance.   HENT:      Head: Normocephalic and atraumatic.      Mouth/Throat:      Mouth: Mucous membranes are moist.      Pharynx: Oropharynx is clear.   Eyes:      Extraocular Movements: Extraocular movements intact.      Conjunctiva/sclera: Conjunctivae normal.      Pupils: Pupils are equal, round, and reactive to light.   Cardiovascular:      Rate and Rhythm: Normal rate and regular rhythm.      Pulses: Normal pulses.   Pulmonary:      Effort: Pulmonary effort is normal. No respiratory distress.      Breath sounds: Normal breath sounds. No rhonchi.   Abdominal:      General: Bowel sounds are normal.      Palpations: Abdomen is soft.   Skin:     General: Skin is warm and dry.      Findings: No bruising, erythema or rash.       Comments: Bandaging present on right hip   Neurological:      General: No focal deficit present.      Mental Status: She is alert and oriented to person, place, and time.   Psychiatric:         Mood and Affect: Mood normal.         Behavior: Behavior normal.         Relevant Results  Scheduled medications  atorvastatin, 80 mg, oral, Daily  cholecalciferol, 1,000 Units, oral, Daily  lidocaine, 1 patch, transdermal, q24h  losartan, 100 mg, oral, Daily  metoprolol succinate XL, 100 mg, oral, Daily  pantoprazole, 40 mg, oral, Daily before breakfast      Continuous medications  heparin, 0-4,000 Units/hr, Last Rate: 700 Units/hr (10/27/24 1600)      PRN medications  PRN medications: acetaminophen, diphenhydrAMINE, heparin, HYDROmorphone, loperamide, melatonin, oxyCODONE, polyethylene glycol, sennosides     Assessment/Plan      Brittny Gordon is a 69 y.o. female with PMHx of stage 4 NSCLC (dx 8/2024, s/p radiation, currently on Keytruda), PE in 9/2024, AAA (s/p repair in 2012) HTN and HLD who is presenting with right hip pain 2/2 to pathologic femur fracture.    Patient currently planning for her discharge. She reports some pain in her right hip when ambulating, but nothing acutely concerning.     Updates 10/27:  -Monitoring post op  -On heparin drip with plans to switch to oral anticoagulation for home     #Pathologic right femur fracture  ::Seen on admission x-ray  -Pain reg: Received dilaudid in ED, oxycodone 5 mg q6h PRN, Tylenol 975 mg q8h PRN  -Post op PT/OT  -Monitoring post op     #Stage 4B NSCLC c/b bone metastases  ::S/p radiation therapy  ::Currently on Keytruda  ::Primary oncologist: Dr. Feliz      #History of PE  ::Recent PE in 9/2024  ::Was prescribed Eliquis but patient reports not taking  -Vascular medicine recs: low intensity heparin infusion today followed by Eliquis if hemoglobin remains stable  -On heparin drip with plans to switch to oral anticoagulation for home     #HTN  -Restarted losartan 100 mg  daily  -Metoprolol succinate 100 mg daily     #HLD  -Atorvastatin 80 mg daily         F: PRN  E: PRN  N: Regular diet  A: PIV  DVT ppx: Heparin gtt  GI ppx: Panto 40  Bowel ppx: Miralax, senna PRN     NOK: Juice Love (brother) [phone]  Code status: Full code (confirmed on admission)            Queen CORNEL Connolly MD

## 2024-10-27 NOTE — PROGRESS NOTES
Attempted to reach patient via phone today to follow up on SNF choices - patient did not answer call.  LSW will attempt later this afternoon.  Will continue to follow

## 2024-10-27 NOTE — CARE PLAN
Problem: Safety - Adult  Goal: Free from fall injury  Outcome: Progressing     Problem: Discharge Planning  Goal: Discharge to home or other facility with appropriate resources  Outcome: Progressing     Problem: Chronic Conditions and Co-morbidities  Goal: Patient's chronic conditions and co-morbidity symptoms are monitored and maintained or improved  Outcome: Progressing       The clinical goals for the shift include Patient will ambulate in her room and will remain free from injury on 10/26 and 10/27 until 0700    Patient has tolerated treatment and was able to get up to go to the bedside commode with a pivot. Patient has had their pain treated with oxycodone and has remained free from injury.

## 2024-10-28 LAB
ALBUMIN SERPL BCP-MCNC: 2.8 G/DL (ref 3.4–5)
ANION GAP SERPL CALC-SCNC: 13 MMOL/L (ref 10–20)
BUN SERPL-MCNC: 10 MG/DL (ref 6–23)
CALCIUM SERPL-MCNC: 8 MG/DL (ref 8.6–10.6)
CHLORIDE SERPL-SCNC: 98 MMOL/L (ref 98–107)
CO2 SERPL-SCNC: 26 MMOL/L (ref 21–32)
CREAT SERPL-MCNC: 0.42 MG/DL (ref 0.5–1.05)
EGFRCR SERPLBLD CKD-EPI 2021: >90 ML/MIN/1.73M*2
ERYTHROCYTE [DISTWIDTH] IN BLOOD BY AUTOMATED COUNT: 13.6 % (ref 11.5–14.5)
GLUCOSE SERPL-MCNC: 100 MG/DL (ref 74–99)
HCT VFR BLD AUTO: 33.5 % (ref 36–46)
HGB BLD-MCNC: 11.1 G/DL (ref 12–16)
MAGNESIUM SERPL-MCNC: 1.66 MG/DL (ref 1.6–2.4)
MCH RBC QN AUTO: 29.1 PG (ref 26–34)
MCHC RBC AUTO-ENTMCNC: 33.1 G/DL (ref 32–36)
MCV RBC AUTO: 88 FL (ref 80–100)
NRBC BLD-RTO: 0 /100 WBCS (ref 0–0)
PHOSPHATE SERPL-MCNC: 2.6 MG/DL (ref 2.5–4.9)
PLATELET # BLD AUTO: 192 X10*3/UL (ref 150–450)
POTASSIUM SERPL-SCNC: 3.1 MMOL/L (ref 3.5–5.3)
RBC # BLD AUTO: 3.81 X10*6/UL (ref 4–5.2)
SODIUM SERPL-SCNC: 134 MMOL/L (ref 136–145)
UFH PPP CHRO-ACNC: 0.3 IU/ML
WBC # BLD AUTO: 10.5 X10*3/UL (ref 4.4–11.3)

## 2024-10-28 PROCEDURE — 2500000002 HC RX 250 W HCPCS SELF ADMINISTERED DRUGS (ALT 637 FOR MEDICARE OP, ALT 636 FOR OP/ED): Performed by: STUDENT IN AN ORGANIZED HEALTH CARE EDUCATION/TRAINING PROGRAM

## 2024-10-28 PROCEDURE — 1170000001 HC PRIVATE ONCOLOGY ROOM DAILY

## 2024-10-28 PROCEDURE — 2500000001 HC RX 250 WO HCPCS SELF ADMINISTERED DRUGS (ALT 637 FOR MEDICARE OP): Performed by: PHYSICIAN ASSISTANT

## 2024-10-28 PROCEDURE — 85027 COMPLETE CBC AUTOMATED: CPT | Performed by: STUDENT IN AN ORGANIZED HEALTH CARE EDUCATION/TRAINING PROGRAM

## 2024-10-28 PROCEDURE — 83735 ASSAY OF MAGNESIUM: CPT | Performed by: STUDENT IN AN ORGANIZED HEALTH CARE EDUCATION/TRAINING PROGRAM

## 2024-10-28 PROCEDURE — 2500000001 HC RX 250 WO HCPCS SELF ADMINISTERED DRUGS (ALT 637 FOR MEDICARE OP)

## 2024-10-28 PROCEDURE — 84100 ASSAY OF PHOSPHORUS: CPT | Performed by: STUDENT IN AN ORGANIZED HEALTH CARE EDUCATION/TRAINING PROGRAM

## 2024-10-28 PROCEDURE — 36415 COLL VENOUS BLD VENIPUNCTURE: CPT | Performed by: STUDENT IN AN ORGANIZED HEALTH CARE EDUCATION/TRAINING PROGRAM

## 2024-10-28 PROCEDURE — 99232 SBSQ HOSP IP/OBS MODERATE 35: CPT | Performed by: STUDENT IN AN ORGANIZED HEALTH CARE EDUCATION/TRAINING PROGRAM

## 2024-10-28 PROCEDURE — 85520 HEPARIN ASSAY: CPT

## 2024-10-28 RX ORDER — POTASSIUM CHLORIDE 20 MEQ/1
40 TABLET, EXTENDED RELEASE ORAL ONCE
Status: COMPLETED | OUTPATIENT
Start: 2024-10-28 | End: 2024-10-28

## 2024-10-28 RX ORDER — DICLOFENAC SODIUM 10 MG/G
4 GEL TOPICAL 4 TIMES DAILY PRN
Status: DISCONTINUED | OUTPATIENT
Start: 2024-10-28 | End: 2024-10-29 | Stop reason: HOSPADM

## 2024-10-28 ASSESSMENT — COGNITIVE AND FUNCTIONAL STATUS - GENERAL
DRESSING REGULAR LOWER BODY CLOTHING: A LITTLE
CLIMB 3 TO 5 STEPS WITH RAILING: A LOT
DAILY ACTIVITIY SCORE: 20
WALKING IN HOSPITAL ROOM: A LOT
DRESSING REGULAR LOWER BODY CLOTHING: A LITTLE
STANDING UP FROM CHAIR USING ARMS: A LITTLE
CLIMB 3 TO 5 STEPS WITH RAILING: A LOT
DAILY ACTIVITIY SCORE: 20
MOVING TO AND FROM BED TO CHAIR: A LITTLE
HELP NEEDED FOR BATHING: A LITTLE
TOILETING: A LOT
TURNING FROM BACK TO SIDE WHILE IN FLAT BAD: A LITTLE
WALKING IN HOSPITAL ROOM: A LOT
TURNING FROM BACK TO SIDE WHILE IN FLAT BAD: A LITTLE
MOVING TO AND FROM BED TO CHAIR: A LITTLE
TOILETING: A LOT
MOBILITY SCORE: 17
MOBILITY SCORE: 17
STANDING UP FROM CHAIR USING ARMS: A LITTLE
HELP NEEDED FOR BATHING: A LITTLE

## 2024-10-28 ASSESSMENT — PAIN SCALES - GENERAL
PAINLEVEL_OUTOF10: 9
PAINLEVEL_OUTOF10: 8
PAINLEVEL_OUTOF10: 0 - NO PAIN
PAINLEVEL_OUTOF10: 9
PAINLEVEL_OUTOF10: 7

## 2024-10-28 ASSESSMENT — PAIN - FUNCTIONAL ASSESSMENT
PAIN_FUNCTIONAL_ASSESSMENT: 0-10
PAIN_FUNCTIONAL_ASSESSMENT: 0-10

## 2024-10-28 NOTE — PROGRESS NOTES
KAMILLA met with the patient and provided updates regarding the discharge plan. KAMILLA shared that [facility] wanted to know if the patient will be receiving any treatment while she is a the SNF. SW shared she had contacted the team and they will speak with her oncologist to determine the plan going forward and get back to . SW asked the patient if she had additional SNF choices in case [facility] can not accept but she said this was her first choice, The patient shared her brother has visited the facility and it is close to his home.   KAMILLA updated SNF in [facility] and stated will provide updates as soon as the team determines the treatment plan going forward. KAMILLA also shared with SNF facility that they are [facility]. Will continue to follow for support and planning for the patient's care at discharge.  MITCHELL Beaulieu

## 2024-10-28 NOTE — PROGRESS NOTES
SW met with the patient and shared that [facility] in [place] inquired if the apteint would be receiving any treatment when

## 2024-10-28 NOTE — CARE PLAN
The patient's goals for the shift include      The clinical goals for the shift include pt will ambulate and remain free of injury and HDS

## 2024-10-28 NOTE — PROGRESS NOTES
Brittny Gordon is a 69 y.o. female on day 5 of admission presenting with Pathological fracture, right femur, initial encounter for fracture.      Subjective   Patient did not report worsening pain nor bleeding today. She contacted her radiation oncologist regarding follow up appointments.          Objective     Last Recorded Vitals  /76 (BP Location: Right arm, Patient Position: Lying)   Pulse 81   Temp 36.5 °C (97.7 °F) (Temporal)   Resp 16   Wt 61.2 kg (135 lb)   SpO2 98%   Intake/Output last 3 Shifts:    Intake/Output Summary (Last 24 hours) at 10/28/2024 1625  Last data filed at 10/28/2024 0600  Gross per 24 hour   Intake 273.24 ml   Output 250 ml   Net 23.24 ml         Admission Weight  Weight: 61.2 kg (135 lb) (10/23/24 0913)    Daily Weight  10/24/24 : 61.2 kg (135 lb)    Image Results  Electrocardiogram, 12-lead PRN ACS symptoms  Normal sinus rhythm  Possible Inferior infarct (cited on or before 24-OCT-2024)  Cannot rule out Anterior infarct (cited on or before 23-OCT-2024)  Abnormal ECG  When compared with ECG of 24-OCT-2024 12:07,  No significant change was found  Confirmed by Luis Alberto Bustos ([id number]) on 10/25/2024 12:23:08 PM      Physical Exam  Constitutional:       Appearance: Normal appearance.   HENT:      Head: Normocephalic and atraumatic.      Mouth/Throat:      Mouth: Mucous membranes are moist.      Pharynx: Oropharynx is clear.   Eyes:      Extraocular Movements: Extraocular movements intact.      Conjunctiva/sclera: Conjunctivae normal.      Pupils: Pupils are equal, round, and reactive to light.   Cardiovascular:      Rate and Rhythm: Normal rate and regular rhythm.      Pulses: Normal pulses.   Pulmonary:      Effort: Pulmonary effort is normal. No respiratory distress.      Breath sounds: Normal breath sounds. No rhonchi.   Abdominal:      General: Bowel sounds are normal.      Palpations: Abdomen is soft.   Skin:     General: Skin is warm and dry.      Findings: No bruising,  erythema or rash.      Comments: Bandaging present on right hip   Neurological:      General: No focal deficit present.      Mental Status: She is alert and oriented to person, place, and time.   Psychiatric:         Mood and Affect: Mood normal.         Behavior: Behavior normal.         Relevant Results  Scheduled medications  atorvastatin, 80 mg, oral, Daily  calcium carbonate, 500 mg, oral, Daily  cholecalciferol, 1,000 Units, oral, Daily  lidocaine, 1 patch, transdermal, q24h  losartan, 100 mg, oral, Daily  metoprolol succinate XL, 100 mg, oral, Daily  pantoprazole, 40 mg, oral, Daily before breakfast      Continuous medications  heparin, 0-4,000 Units/hr, Last Rate: 700 Units/hr (10/28/24 1028)      PRN medications  PRN medications: acetaminophen, diclofenac sodium, diphenhydrAMINE, heparin, HYDROmorphone, loperamide, melatonin, oxyCODONE, polyethylene glycol, sennosides     Assessment/Plan      Brittny Gordon is a 69 y.o. female with PMHx of stage 4 NSCLC (dx 8/2024, s/p radiation, currently on Keytruda), PE in 9/2024, AAA (s/p repair in 2012) HTN and HLD who is presenting with right hip pain 2/2 to pathologic femur fracture.    Patient currently planning for her discharge. She reports some pain in her right hip when ambulating, but nothing acutely concerning.     Updates 10/28:  -On heparin drip with plans to switch to oral anticoagulation for home  -Will restart Pembro after SNF stay     #Pathologic right femur fracture  ::Seen on admission x-ray  -Pain reg: Received dilaudid in ED, oxycodone 5 mg q6h PRN, Tylenol 975 mg q8h PRN  -Post op PT/OT  -Monitoring post op     #Stage 4B NSCLC c/b bone metastases  ::S/p radiation therapy  ::Currently on Keytruda  ::Primary oncologist: Dr. Feliz  -Will restart Pembro after SNF stay      #History of PE  ::Recent PE in 9/2024  ::Was prescribed Eliquis but patient reports not taking  -Vascular medicine recs: low intensity heparin infusion today followed by Eliquis if  hemoglobin remains stable  -On heparin drip with plans to switch to oral anticoagulation for home     #HTN  -Restarted losartan 100 mg daily  -Metoprolol succinate 100 mg daily     #HLD  -Atorvastatin 80 mg daily         F: PRN  E: PRN  N: Regular diet  A: PIV  DVT ppx: Heparin gtt  GI ppx: Panto 40  Bowel ppx: Miralax, senna PRN     NOK: Juice Love (brother) [phone]  Code status: Full code (confirmed on admission)            Queen CORNEL Connolly MD

## 2024-10-28 NOTE — PROGRESS NOTES
10/28/24 1400   Discharge Planning   Living Arrangements Family members   Support Systems Family members   Type of Residence Private residence   Number of Stairs to Enter Residence 1   Do you have animals or pets at home? Yes   Type of Animals or Pets 1 cat and 1 dog   Who is requesting discharge planning? Provider     Care Transitions Note  SW following for discharge planning.  Pt medically ready for discharge to [facility].  Referral previously sent and they inquired on treatment plan. Per med team chemo would start after pts SNF stay.  SW advised SNF.  Will advise of updates when known.  Elizabeth Gunsalus LISW-S  Care Transitions Supervisor

## 2024-10-28 NOTE — PROGRESS NOTES
LSW spoke with patient via phone today to follow up on choices for SNF placement. Patient provided choice as [facility].  She states that she has friends/family that will be visiting additional facilities tomorrow to view and she will be able to provide additional choices then.  [facility] was sent to [facility] for [facility] review.  Patient inquired about services/equipment she may need at home once she is finished with rehab.  She also voiced concerns about how she would complete PT/OT given the concern with using her shoulder and her current broken leg.  Encouraged patient to discuss accommodations and rehab expectations with the PT/OT therapist that is currently working with her.  Patient stated she would.  No other issues or concerns noted at this time.  Social work will continue to follow.

## 2024-10-29 VITALS
HEART RATE: 83 BPM | WEIGHT: 135 LBS | OXYGEN SATURATION: 96 % | DIASTOLIC BLOOD PRESSURE: 68 MMHG | RESPIRATION RATE: 16 BRPM | TEMPERATURE: 98.1 F | HEIGHT: 66 IN | SYSTOLIC BLOOD PRESSURE: 104 MMHG | BODY MASS INDEX: 21.69 KG/M2

## 2024-10-29 PROBLEM — M84.451A: Status: RESOLVED | Noted: 2024-10-23 | Resolved: 2024-10-29

## 2024-10-29 PROBLEM — S72.23XA: Status: RESOLVED | Noted: 2024-10-22 | Resolved: 2024-10-29

## 2024-10-29 LAB
ALBUMIN SERPL BCP-MCNC: 2.6 G/DL (ref 3.4–5)
ANION GAP SERPL CALC-SCNC: 12 MMOL/L (ref 10–20)
BASOPHILS # BLD AUTO: 0.02 X10*3/UL (ref 0–0.1)
BASOPHILS NFR BLD AUTO: 0.2 %
BUN SERPL-MCNC: 11 MG/DL (ref 6–23)
CALCIUM SERPL-MCNC: 8.2 MG/DL (ref 8.6–10.6)
CHLORIDE SERPL-SCNC: 101 MMOL/L (ref 98–107)
CO2 SERPL-SCNC: 25 MMOL/L (ref 21–32)
CREAT SERPL-MCNC: 0.44 MG/DL (ref 0.5–1.05)
EGFRCR SERPLBLD CKD-EPI 2021: >90 ML/MIN/1.73M*2
EOSINOPHIL # BLD AUTO: 0.11 X10*3/UL (ref 0–0.7)
EOSINOPHIL NFR BLD AUTO: 1.4 %
ERYTHROCYTE [DISTWIDTH] IN BLOOD BY AUTOMATED COUNT: 13.8 % (ref 11.5–14.5)
GLUCOSE SERPL-MCNC: 136 MG/DL (ref 74–99)
HCT VFR BLD AUTO: 31.3 % (ref 36–46)
HGB BLD-MCNC: 10.4 G/DL (ref 12–16)
IMM GRANULOCYTES # BLD AUTO: 0.08 X10*3/UL (ref 0–0.7)
IMM GRANULOCYTES NFR BLD AUTO: 1 % (ref 0–0.9)
LABORATORY COMMENT REPORT: NORMAL
LYMPHOCYTES # BLD AUTO: 1.04 X10*3/UL (ref 1.2–4.8)
LYMPHOCYTES NFR BLD AUTO: 12.9 %
Lab: NORMAL
MAGNESIUM SERPL-MCNC: 1.6 MG/DL (ref 1.6–2.4)
MCH RBC QN AUTO: 28.7 PG (ref 26–34)
MCHC RBC AUTO-ENTMCNC: 33.2 G/DL (ref 32–36)
MCV RBC AUTO: 87 FL (ref 80–100)
MONOCYTES # BLD AUTO: 0.71 X10*3/UL (ref 0.1–1)
MONOCYTES NFR BLD AUTO: 8.8 %
NEUTROPHILS # BLD AUTO: 6.11 X10*3/UL (ref 1.2–7.7)
NEUTROPHILS NFR BLD AUTO: 75.7 %
NRBC BLD-RTO: 0 /100 WBCS (ref 0–0)
PATH REPORT.FINAL DX SPEC: NORMAL
PATH REPORT.GROSS SPEC: NORMAL
PATH REPORT.RELEVANT HX SPEC: NORMAL
PATH REPORT.TOTAL CANCER: NORMAL
PHOSPHATE SERPL-MCNC: 2.5 MG/DL (ref 2.5–4.9)
PLATELET # BLD AUTO: 203 X10*3/UL (ref 150–450)
POTASSIUM SERPL-SCNC: 2.9 MMOL/L (ref 3.5–5.3)
RBC # BLD AUTO: 3.62 X10*6/UL (ref 4–5.2)
SODIUM SERPL-SCNC: 135 MMOL/L (ref 136–145)
WBC # BLD AUTO: 8.1 X10*3/UL (ref 4.4–11.3)

## 2024-10-29 PROCEDURE — 2500000001 HC RX 250 WO HCPCS SELF ADMINISTERED DRUGS (ALT 637 FOR MEDICARE OP): Performed by: PHYSICIAN ASSISTANT

## 2024-10-29 PROCEDURE — 2500000004 HC RX 250 GENERAL PHARMACY W/ HCPCS (ALT 636 FOR OP/ED)

## 2024-10-29 PROCEDURE — 2500000001 HC RX 250 WO HCPCS SELF ADMINISTERED DRUGS (ALT 637 FOR MEDICARE OP)

## 2024-10-29 PROCEDURE — 80069 RENAL FUNCTION PANEL: CPT

## 2024-10-29 PROCEDURE — 85025 COMPLETE CBC W/AUTO DIFF WBC: CPT

## 2024-10-29 PROCEDURE — 83735 ASSAY OF MAGNESIUM: CPT

## 2024-10-29 PROCEDURE — 2500000002 HC RX 250 W HCPCS SELF ADMINISTERED DRUGS (ALT 637 FOR MEDICARE OP, ALT 636 FOR OP/ED)

## 2024-10-29 PROCEDURE — 99238 HOSP IP/OBS DSCHRG MGMT 30/<: CPT | Performed by: STUDENT IN AN ORGANIZED HEALTH CARE EDUCATION/TRAINING PROGRAM

## 2024-10-29 PROCEDURE — 36415 COLL VENOUS BLD VENIPUNCTURE: CPT

## 2024-10-29 RX ORDER — POTASSIUM CHLORIDE 20 MEQ/1
40 TABLET, EXTENDED RELEASE ORAL ONCE
Status: COMPLETED | OUTPATIENT
Start: 2024-10-29 | End: 2024-10-29

## 2024-10-29 RX ORDER — CALCIUM CARBONATE 200(500)MG
500 TABLET,CHEWABLE ORAL DAILY
Start: 2024-10-30

## 2024-10-29 RX ORDER — SENNOSIDES 8.6 MG/1
2 TABLET ORAL NIGHTLY PRN
Start: 2024-10-29

## 2024-10-29 RX ORDER — MAGNESIUM SULFATE HEPTAHYDRATE 40 MG/ML
2 INJECTION, SOLUTION INTRAVENOUS ONCE
Status: DISCONTINUED | OUTPATIENT
Start: 2024-10-29 | End: 2024-10-29 | Stop reason: HOSPADM

## 2024-10-29 RX ORDER — OXYCODONE HYDROCHLORIDE 5 MG/1
5 TABLET ORAL EVERY 6 HOURS PRN
Qty: 10 TABLET | Refills: 0 | Status: SHIPPED | OUTPATIENT
Start: 2024-10-29

## 2024-10-29 RX ORDER — POTASSIUM CHLORIDE 14.9 MG/ML
20 INJECTION INTRAVENOUS
Status: COMPLETED | OUTPATIENT
Start: 2024-10-29 | End: 2024-10-29

## 2024-10-29 RX ORDER — DICLOFENAC SODIUM 10 MG/G
4 GEL TOPICAL 4 TIMES DAILY PRN
Start: 2024-10-29

## 2024-10-29 RX ORDER — TALC
3 POWDER (GRAM) TOPICAL NIGHTLY PRN
Start: 2024-10-29

## 2024-10-29 RX ORDER — ACETAMINOPHEN 325 MG/1
975 TABLET ORAL EVERY 8 HOURS PRN
Start: 2024-10-29

## 2024-10-29 RX ORDER — POLYETHYLENE GLYCOL 3350 17 G/17G
17 POWDER, FOR SOLUTION ORAL DAILY
Start: 2024-10-29

## 2024-10-29 ASSESSMENT — PAIN DESCRIPTION - LOCATION
LOCATION: HIP
LOCATION: HIP

## 2024-10-29 ASSESSMENT — PAIN SCALES - GENERAL
PAINLEVEL_OUTOF10: 9
PAINLEVEL_OUTOF10: 7
PAINLEVEL_OUTOF10: 7
PAINLEVEL_OUTOF10: 9
PAINLEVEL_OUTOF10: 9

## 2024-10-29 ASSESSMENT — PAIN SCALES - WONG BAKER: WONGBAKER_NUMERICALRESPONSE: NO HURT

## 2024-10-29 ASSESSMENT — PAIN DESCRIPTION - DESCRIPTORS: DESCRIPTORS: ACHING;BURNING

## 2024-10-29 ASSESSMENT — PAIN - FUNCTIONAL ASSESSMENT
PAIN_FUNCTIONAL_ASSESSMENT: 0-10
PAIN_FUNCTIONAL_ASSESSMENT: 0-10
PAIN_FUNCTIONAL_ASSESSMENT: WONG-BAKER FACES
PAIN_FUNCTIONAL_ASSESSMENT: 0-10

## 2024-10-29 ASSESSMENT — PAIN DESCRIPTION - ORIENTATION
ORIENTATION: RIGHT
ORIENTATION: RIGHT

## 2024-10-29 NOTE — PROGRESS NOTES
10/29/24 1200   Discharge Planning   Who is requesting discharge planning? Provider   Home or Post Acute Services Post acute facilities (Rehab/SNF/etc)   Type of Post Acute Facility Services Skilled nursing   Expected Discharge Disposition SNF   Does the patient need discharge transport arranged? Yes   RoundTrip coordination needed? Yes   Has discharge transport been arranged? Yes   What day is the transport expected? 10/29/24   What time is the transport expected? 1600       Care Transitions - Discharge Transfer to Facility Note  10/29/2024   Patient will discharge today to: SNF  Facility name: [facility]  Bedside nurse: Rosalinda Newman RN  Report # given to RN: [phone]  Transport arranged: Yes  Transport company name: [facility],  [phone]   time: 4:00pm   7000 completed in [facility]. IMM needed and signed by patient 10/28. Patient and team aware of plan.     Transport pushed so patient can receive potassium. Nurse called to adjust transport. Facility aware.  OPAL Sanchez

## 2024-10-29 NOTE — NURSING NOTE
Contacted [facility] team and spoke with Florecita to clarify discharge due to critically low potassium of 2.9. OK for discharge.  1400: called [phone] and gave report to RN.   1500:  Called [facility] to delay transport in order to complete potassium infusion d/t critically low potassium level. Agreed to a later  time in order to allow for the allotted time needed for the infusion to finish.  1827: Patient picked up by [facility] and taken to SNF with all belongs.

## 2024-10-29 NOTE — DISCHARGE SUMMARY
Discharge Diagnosis  Pathological fracture, right femur, initial encounter for fracture    Issues Requiring Follow-Up  R femur fracture  NSCLC    Discharge Meds     Medication List      START taking these medications     acetaminophen 325 mg tablet; Commonly known as: Tylenol; Take 3 tablets   (975 mg) by mouth every 8 hours if needed for mild pain (1 - 3) or   moderate pain (4 - 6).   apixaban 5 mg tablet; Commonly known as: Eliquis; Take 1 tablet (5 mg)   by mouth 2 times a day.; Replaces: Eliquis DVT-PE Treat 30D Start 5 mg (74   tabs) tablet   calcium carbonate 200 mg calcium chewable tablet; Commonly known as:   Tums; Chew 1 tablet (500 mg) once daily.; Start taking on: October 30, 2024   diclofenac sodium 1 % gel; Commonly known as: Voltaren; Apply 4.5 inches   (4 g) topically 4 times a day as needed (L shoulder pain).   melatonin 3 mg tablet; Take 1 tablet (3 mg) by mouth as needed at   bedtime for sleep.   polyethylene glycol 17 gram packet; Commonly known as: Glycolax,   Miralax; Take 17 g by mouth once daily.   sennosides 8.6 mg tablet; Commonly known as: Senokot; Take 2 tablets   (17.2 mg) by mouth as needed at bedtime for constipation.     CHANGE how you take these medications     lidocaine 5 % patch; Commonly known as: Lidoderm; Place 1 patch over 12   hours on the skin once daily. Remove & discard patch within 12 hours or as   directed by MD.   omeprazole OTC 20 mg EC tablet; Commonly known as: PriLOSEC OTC; Take 1   tablet (20 mg) by mouth once daily. Do not crush, chew, or split.; What   changed: when to take this, reasons to take this   oxyCODONE 5 mg immediate release tablet; Commonly known as: Roxicodone;   Take 1 tablet (5 mg) by mouth every 6 hours if needed for severe pain (7 -   10).; What changed: medication strength, how much to take, when to take   this, additional instructions     CONTINUE taking these medications     atorvastatin 80 mg tablet; Commonly known as: Lipitor; TAKE 1 TABLET  BY   MOUTH EVERY DAY   ibandronate 150 mg tablet; Commonly known as: Boniva; Take 1 tablet (150   mg) by mouth every 30 (thirty) days. Take in morning with full glass of   water on an empty stomach. No food, drink, meds, or lying down for 60   minutes after.   losartan 100 mg tablet; Commonly known as: Cozaar; TAKE 1 TABLET BY   MOUTH EVERY DAY   metoprolol succinate  mg 24 hr tablet; Commonly known as:   Toprol-XL; TAKE 1 TABLET BY MOUTH EVERY DAY   PRESERVISION AREDS ORAL   Vitamin D3 25 MCG (1000 UT) tablet; Generic drug: cholecalciferol     STOP taking these medications     dexAMETHasone 4 mg tablet; Commonly known as: Decadron   Eliquis DVT-PE Treat 30D Start 5 mg (74 tabs) tablet; Generic drug:   apixaban; Replaced by: apixaban 5 mg tablet       Test Results Pending At Discharge  Pending Labs       Order Current Status    CBC and Auto Differential In process    Magnesium In process    Renal function panel In process    Surgical Pathology Exam In process            Hospital Course  Brittny Gordon is a 69 y.o. female with PMHx of stage 4 NSCLC (dx 8/2024, s/p radiation, currently on Keytruda), PE in 9/2024, AAA (s/p repair in 2012) HTN and HLD who is presenting with right hip pain.     She initially presented to  [facility] after feeling her right leg fracture while getting into her car on 10/22/24. X-rays revealed a transverse angulated and displaced fracture of the subtrochanteric region of the right proximal femur. Previous PET scan on 7/11/24 revealed a lytic lesion within the proximal right femur. The patient was transferred to [facility] for further management.     The patient underwent surgery on 10/24/24 for her pathologic fracture. Because of the patient's history of pulmonary embolism, cancer, and likely reduced mobility after discharge, vascular medicine was consulted to provide recommendations for long-term anticoagulation. Based on their recommendations, the patient was started on a low intensity  heparin drip before switching to Eliquis. She will be discharged to a SNF with Eliquis. She will restart her pembrolizumab after leaving the SNF.     Things to Follow Up:  -Surgery: Underwent right femur CMN for a pathologic fracture on 10/24/24  -Radiation Oncology: Follow up on postop radiation for R femur fracture  -Oncology: Will follow up with Dr. Feliz regarding when to restart her pembrolizumab  -Physical Therapy: Will require physical therapy following surgery  -Vascular Medicine/Cardiology: Follow up on Eliquis for patient's history of PE, DVT, cancer, and recent surgery. 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 Would benefit from ophthalmologic evaluation  -Consider genetic testing based on family history of MI, saccular aneurysm, pontine aneurysm, and aortic aneurysms (Described in vascular medicine note)    Appointments:   Ortho surgery 11/11/2024  Heme onc Dr. Feliz 11/12/2024  Rad onc Dr. Ospina 11/13/2024    Pertinent Physical Exam At Time of Discharge  Physical Exam  Constitutional:       Appearance: Normal appearance.   HENT:      Head: Normocephalic and atraumatic.      Mouth/Throat:      Mouth: Mucous membranes are moist.      Pharynx: Oropharynx is clear.   Eyes:      Extraocular Movements: Extraocular movements intact.      Conjunctiva/sclera: Conjunctivae normal.      Pupils: Pupils are equal, round, and reactive to light.   Cardiovascular:      Rate and Rhythm: Normal rate and regular rhythm.      Pulses: Normal pulses.      Heart sounds: Normal heart sounds.   Pulmonary:      Effort: Pulmonary effort is normal. No respiratory distress.      Breath sounds: Normal breath sounds.   Abdominal:      General: Bowel sounds are normal. There is no distension.      Palpations: Abdomen is soft.   Musculoskeletal:      Comments: Bandage on R hip. No erythema, rash, or swelling at surgical site   Skin:     General: Skin is warm and dry.       Comments: Small faint bruise on left arm from IV   Neurological:      General: No focal deficit present.      Mental Status: She is alert and oriented to person, place, and time.         Outpatient Follow-Up  Future Appointments   Date Time Provider Department Center   11/11/2024  2:40 PM Janell Araiza PA-C [id number] [facility]   11/12/2024  9:30 AM Aislinn Feliz MD [id number] [facility]   11/12/2024 10:30 AM INF [address] [place] [id number] [facility]   11/13/2024  8:00 AM Nagi Ospina MD [id number] [facility]   11/22/2024  2:00 PM Brannon Preston APRN-CNS [id number] [facility]   12/3/2024 10:30 AM INF 03 [place] [id number] [facility]   12/4/2024  2:15 PM Jennifer Ramirez DPM [id number] [place]   12/24/2024 10:30 AM INF 02 [place] [id number] [facility]   1/14/2025 10:00 AM Aislinn Feliz MD [id number] [facility]   1/14/2025 10:30 AM INF 03 [place] [id number] [facility]   2/18/2025  2:40 PM Cayden Buckley MD [address]         Queen CORNEL Connolly MD

## 2024-11-01 NOTE — PROGRESS NOTES
SUPPORTIVE AND PALLIATIVE ONCOLOGY - OUTPATIENT FOLLOW UP      SERVICE DATE: 11/22/2024    Referred by:  Camila Chaudhary MD MPH   Medical Oncologist: Camila Chaudhary MD MPH  MD Aislinn Schwarz MD   Radiation Oncologist: No care team member to display  Primary Physician: No Assigned PCP Generic Provider  None    REASON FOR CONSULT/CHIEF CONSULT COMPLAINT: Pain management and Introduction to Supportive and Palliative Oncology Services    Subjective   HISTORY OF PRESENT ILLNESS: Brittny Gordon is a 69 y.o. female who presents with  likely metastatic NSCLC (lymph nodes, bone with pathologic fracture of L2, L3, T12) s/p bronch/EBUS 8/5 not yet started on systemic therapy pending final pathology, plan for palliative RT to spine.      PMH significant for HTN, HLD, osteporosis, AAA s/p repair, former smoker.     9/10/24: Recent hospitalization for acute PE. Also with acute encephalopathy. Done with radiation. Oxycontin denied by insurance. Xtampza caused more nausea. Currently using oxycodone 5mg q4 and content with this. Stopped gabapentin because she also felt this contributed to confusion She is also avoiding flexeril. Having issues with nausea, appetite, constipation.     10/15/24: Completed RT. Referral made already for kyphoplasty. She is being treated with single agent keytruda. Pain controlled on oxycodone 10mg q4 scheduled. She has not tolerated many medications. She did not start zyprexa but is eating better and staying hydrated. Bowels are regular. She prefers the oxycodone from [facility] and does not like the oxycodone that she gets from her local [facility]. We discussed that unfortunately, I am not aware of what supplier each pharmacy uses.        Symptom Assessment:    Patient reports that she is having terrible pain as she has a broken leg, broken arm, and broken back. She was not able to get up to produce a urine. She states that she had a hard time getting to the appointment. She states that she  is worried that she is not going to be able to tolerate radiation. She states that she felt great for the first 5 days after her pembro and asked to get her pembro more often. We discussed that this is not an option. She states that she knows that she was not a candidate for chemotherapy. We discussed starting along acting pain medication and she states that she cannot tolerate them and is not interested in them. She is not interested in anxiety medication and is not interested in any nausea medication.     GOALS of CARE  She states that she is not  and does not have children. She states that she has a living brother. She reports that it was very difficult to make it into the appointment today as the road was very bumpy and would like for her appointments to be virtual when possible. We discussed that every 3rd visit needed to be in person. She states that she is aware that she will die from her cancer, we discussed if she has been thinking about hospice care and she states that she wants to keep fighting. We discussed code status and she states that she wants to remain a full code     Pain:somewhat    T / L spine   Sharp, stabbing intermittently  Constant deep ache  Worse with movement       Information obtained from: interview of patient  ______________________________________________________________________     Oncology History   Primary malignant neoplasm of right lung metastatic to other site (Multi)   8/9/2024 Initial Diagnosis    Primary malignant neoplasm of right lung metastatic to other site (Multi)     8/23/2024 Cancer Staged    Staging form: Lung, AJCC 8th Edition, Clinical: Stage IVB (cT1b, cN2, pM1c) - Signed by Camila Chaudhary MD MPH on 8/23/2024     10/1/2024 - 10/1/2024 Chemotherapy    Pembrolizumab + PACLitaxel / CARBOplatin, 21 Day Cycles     10/1/2024 -  Chemotherapy    Pembrolizumab, 21 Day Cycles         Past Medical History:   Diagnosis Date    Hypercholesteremia     Hypertension     Lung  cancer (Multi)     Lung nodule seen on imaging study     lung nodules concerning for metastatic lung cancer to the bone    Personal history of irradiation     Saccular aneurysm ([facility]-HCC)     Saccular aneurysm of left AICA, 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 Lives with cousin's granddaughter.   Social History:  reports that she quit smoking about 11 years ago. Her smoking use included cigarettes. She started smoking about 51 years ago. She has a 20 pack-year smoking history. She has never used smokeless tobacco. She reports that she does not currently use alcohol. She reports that she does not currently use drugs.      Review of systems negative unless noted in HPI.       Objective       Current Outpatient Medications   Medication Instructions    apixaban (ELIQUIS) 5 mg, oral, 2 times daily    atorvastatin (LIPITOR) 80 mg, oral, Daily    calcium carbonate (TUMS) 500 mg, oral, Daily    cholecalciferol (VITAMIN D3) 1,000 Units, Daily    diclofenac sodium (VOLTAREN) 4 g, Topical, 4 times daily PRN    ibandronate (BONIVA) 150 mg, oral, Every 30 days, Take in morning with full glass of water on an empty stomach. No food, drink, meds, or lying down for 60 minutes after.    lidocaine (Lidoderm) 5 % patch 1 patch, transdermal, Daily, Remove & discard patch within 12 hours or as directed by MD.    losartan (COZAAR) 100 mg, oral, Daily    melatonin 3 mg, oral, Nightly PRN    metoprolol succinate XL  (TOPROL-XL) 100 mg, oral, Daily    omeprazole OTC (PRILOSEC OTC) 20 mg, oral, Daily, Do not crush, chew, or split.    oxyCODONE (ROXICODONE) 5-10 mg, oral, Every 3 hours PRN, Do not crush, chew, or split.    polyethylene glycol (GLYCOLAX, MIRALAX) 17 g, oral, Daily    sennosides (SENOKOT) 17.2 mg, oral, Nightly PRN    vit A/vit C/vit E/zinc/copper (PRESERVISION AREDS ORAL) 1 tablet, Daily       Allergies:   Allergies   Allergen Reactions    Morphine Psychosis     Severe agitation, hallucinations     Acetaminophen GI Upset    Epinephrine Nausea/vomiting and Palpitations             Creatinine   Date Value Ref Range Status   11/12/2024 0.41 (L) 0.50 - 1.05 mg/dL Final     AST   Date Value Ref Range Status   11/12/2024 36 9 - 39 U/L Final     ALT   Date Value Ref Range Status   11/12/2024 36 7 - 45 U/L Final     Comment:     Patients treated with Sulfasalazine may generate falsely decreased results for ALT.     Hemoglobin   Date Value Ref Range Status   11/12/2024 9.7 (L) 12.0 - 16.0 g/dL Final     Platelets   Date Value Ref Range Status   11/12/2024 420 150 - 450 x10*3/uL Final          PHYSICAL EXAMINATION  Vital Signs:   No VS due to VV     Physical Exam  Eyes:      Extraocular Movements: Extraocular movements intact.      Conjunctiva/sclera: Conjunctivae normal.   Neurological:      Mental Status: She is alert.   Psychiatric:         Mood and Affect: Mood normal.         Thought Content: Thought content normal.         ASSESSMENT/PLAN    Pain  Pain is: cancer related pain  Type: somatic and neuropathic  -Increase oxycodone 5mg-10mg to Q3 PRN   -she does not wish to make any additional adjustments at this time  -she did not tolerate mscontin and gabapentin (confusion), xtampza (nausea), APAP/NSAIDs (GI upset), oxycontin denied by insurance       Opioid Use  Medication Management:   - OARRS report reviewed with no aberrant behavior; consistent with  prescriptions/records and patient history  - MED 90.  Overdose  Risk Score 320.   This has been discussed with patient.   - We will continue to closely monitor the patient for signs of prescription misuse including UDS, OARRS review and subjective reports at each visit.  - No concurrent benzodiazepine use   - I am a provider who either is or has consulted and collaborated with a provider certified in Hospice and Palliative Medicine and have conducted a face-face visit and examination for this patient.  - Routine Urine Drug Screen: not able to complete    - Controlled Substance Agreement 11/22/2024  - Specifically discussed that controlled substance prescriptions will only be provided by our group as outlined in the completed agreement  - Prescribed naloxone Rx 8/23/24  - Red Flags: None    Constipation   At risk for constipation related to opioids  -continue senna 1-2 tabs daily PRN   -continue adequate hydration   -she prefers to avoid miralax    Decreased appetite  Nausea   Related to  pain  , malignancy, opioids   -Continue ondansetron 8mg q8 PRN   -Continue prochlorperazine 10mg q6 PRN  -She opted not to start olanzapine       Medical Decision Making/Goals of Care/Advance Care Planning:  Patient's current clinical condition, including diagnosis, prognosis, and management plan, and goals of care were discussed.   Life limiting disease: metastatic malignancy  Goals: symptom control and cancer directed therapy  She and brother understand palliative intent of treatment     Advance Directives  Existence of Advance Directives:Yes, documentation or copy in medical record  Decision maker: TOMMIE is brother, Juice Love     Next Follow-Up Visit:  Return to clinic in 4 weeks at [facility] due to proximity to home. She is due for CSA and UDS as well.     Signature and billing  Thank you for allowing us to participate in the care of this patient. Recommendations will be communicated back to the consulting service by way of shared electronic medical record or face-to-face.    Medical  complexity was high level due to due to complexity of problems, extensive data review, and high risk of management/treatment.  Time was spent on the following: Prep Time, Time Directly with Patient/Family/Caregiver, Documentation Time. Total time spent: 30      DATA   Diagnostic tests and information reviewed for today's visit:  Most recent labs and imaging results, Medications       Some elements copied from previous Supportive Oncology notes and the elements have been updated and all reflect current decision making from today, 11/22/2024.      Plan of Care discussed with: Patient      SIGNATURE: BOWEN Rick-SULEMA    Contact information:  Supportive and Palliative Oncology  Monday-Friday 8 AM-5 PM  Phone:  [phone], press option #5, then option #1.   Or Epic Secure Chat

## 2024-11-02 LAB
ATRIAL RATE: 85 BPM
P AXIS: 63 DEGREES
PR INTERVAL: 148 MS
Q ONSET: 249 MS
QRS COUNT: 14 BEATS
QRS DURATION: 89 MS
QT INTERVAL: 384 MS
QTC CALCULATION(BAZETT): 460 MS
QTC FREDERICIA: 433 MS
R AXIS: 17 DEGREES
T AXIS: 58 DEGREES
T OFFSET: 441 MS
VENTRICULAR RATE: 86 BPM

## 2024-11-06 ENCOUNTER — OFFICE VISIT (OUTPATIENT)
Dept: ORTHOPEDIC SURGERY | Age: 69
End: 2024-11-06
Payer: MEDICARE

## 2024-11-06 VITALS — BODY MASS INDEX: 21.21 KG/M2 | HEIGHT: 66 IN | WEIGHT: 132 LBS

## 2024-11-06 DIAGNOSIS — M84.412D: Primary | ICD-10-CM

## 2024-11-06 DIAGNOSIS — M25.512 LEFT SHOULDER PAIN, UNSPECIFIED CHRONICITY: ICD-10-CM

## 2024-11-06 DIAGNOSIS — S49.92XA SHOULDER INJURY, LEFT, INITIAL ENCOUNTER: ICD-10-CM

## 2024-11-06 PROCEDURE — 1160F RVW MEDS BY RX/DR IN RCRD: CPT | Performed by: STUDENT IN AN ORGANIZED HEALTH CARE EDUCATION/TRAINING PROGRAM

## 2024-11-06 PROCEDURE — 1125F AMNT PAIN NOTED PAIN PRSNT: CPT | Performed by: STUDENT IN AN ORGANIZED HEALTH CARE EDUCATION/TRAINING PROGRAM

## 2024-11-06 PROCEDURE — 1111F DSCHRG MED/CURRENT MED MERGE: CPT | Performed by: STUDENT IN AN ORGANIZED HEALTH CARE EDUCATION/TRAINING PROGRAM

## 2024-11-06 PROCEDURE — 1036F TOBACCO NON-USER: CPT | Performed by: STUDENT IN AN ORGANIZED HEALTH CARE EDUCATION/TRAINING PROGRAM

## 2024-11-06 PROCEDURE — 3008F BODY MASS INDEX DOCD: CPT | Performed by: STUDENT IN AN ORGANIZED HEALTH CARE EDUCATION/TRAINING PROGRAM

## 2024-11-06 PROCEDURE — 99214 OFFICE O/P EST MOD 30 MIN: CPT | Performed by: STUDENT IN AN ORGANIZED HEALTH CARE EDUCATION/TRAINING PROGRAM

## 2024-11-06 PROCEDURE — 1157F ADVNC CARE PLAN IN RCRD: CPT | Performed by: STUDENT IN AN ORGANIZED HEALTH CARE EDUCATION/TRAINING PROGRAM

## 2024-11-06 PROCEDURE — 1159F MED LIST DOCD IN RCRD: CPT | Performed by: STUDENT IN AN ORGANIZED HEALTH CARE EDUCATION/TRAINING PROGRAM

## 2024-11-06 ASSESSMENT — PAIN - FUNCTIONAL ASSESSMENT: PAIN_FUNCTIONAL_ASSESSMENT: 0-10

## 2024-11-06 ASSESSMENT — PAIN SCALES - GENERAL: PAINLEVEL_OUTOF10: 6

## 2024-11-06 NOTE — PROGRESS NOTES
PRIMARY CARE PHYSICIAN: No Assigned PCP Generic Provider, MD  REFERRING PROVIDER: No referring provider defined for this encounter.     CONSULT ORTHOPAEDIC: Shoulder Evaluation    ASSESSMENT & PLAN    Impression: 69 y.o. 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    Plan:   I explained to the patient the nature of their diagnosis.  I reviewed their imaging studies with them.    Based on the history, physical exam and imaging studies above, the patient's presentation is consistent with the above diagnosis.  I had a long discussion with the patient regarding their presentation and the treatment options.  We discussed initial nonoperative versus operative management options as well as potential further diagnostic imaging.  I reviewed her x-ray findings with her.  I recommend nonoperative management for her distal clavicle pathologic fracture.  I provided her with a sling today.  She can come out of the sling to work on gentle range of motion of the shoulder within the limitations of her pain.  She can lift without arm as she tolerates.  She will minimize impact or cross body adduction.  I recommend follow-up with her oncology team.    Follow-Up: Patient will follow-up with me as needed    At the end of the visit, all questions were answered in full. The patient is in agreement with the plan and recommendations. They will call the office with any questions/concerns.    Note dictated with ShelfFlip software. Completed without full typed error editing and sent to avoid delay.       SUBJECTIVE  CHIEF COMPLAINT: Left shoulder pain    HPI: Brittny Gordon is a 69 y.o. patient who presents today with left shoulder pain. X-rays done today and 10/09/24 and 10/23/24. She reports metastatic cancer to the bone. She states her pain started weeks ago with no known injury. She is currently in a rehab facility for her femur fracture.  She has pain at the  distal clavicle of the left shoulder.  She denies any specific traumatic injury that she recalls.  She is currently in a sling.    They deny any associated neck pain.  No numbness, tingling, or paresthesias.    REVIEW OF SYSTEMS  Constitutional: See HPI for pain assessment, No significant weight loss, recent trauma  Cardiovascular: No chest pain, shortness of breath  Respiratory: No difficulty breathing, cough  Gastrointestinal: No nausea, vomiting, diarrhea, constipation  Musculoskeletal: Noted in HPI, positive for pain, restricted motion, stiffness  Integumentary: No rashes, easy bruising, redness   Neurological: no numbness or tingling in extremities, no gait disturbances   Psychiatric: No mood changes, memory changes, social issues  Heme/Lymph: no excessive swelling, easy bruising, excessive bleeding  ENT: No hearing changes  Eyes: No vision changes    Past Medical History:   Diagnosis Date    Hypercholesteremia     Hypertension     Lung cancer (Multi)     Lung nodule seen on imaging study     lung nodules concerning for metastatic lung cancer to the bone    Personal history of irradiation     Saccular aneurysm ([facility])     Saccular aneurysm of left AICA, 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, 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    Vision loss     Wedge compression fracture of t11-T12 vertebra, sequela 07/30/2020    Compression fracture of T11 vertebra, sequela        Allergies   Allergen Reactions    Morphine Psychosis     Severe agitation, hallucinations     Acetaminophen GI Upset    Epinephrine Nausea/vomiting and Palpitations        Past Surgical History:   Procedure Laterality Date    ARTERIAL ANEURYSM REPAIR      Thoracic aortic aneurysm repair    COLONOSCOPY      RADIOFREQUENCY ABLATION      L3,4,5    WISDOM TOOTH EXTRACTION          Family History   Problem Relation Name Age of Onset    Lymphoma Father      Polycythemia Father      Breast cancer Neg Hx      Colon cancer Neg Hx          Social History     Socioeconomic History    Marital status:  Single     Spouse name: Not on file    Number of children: Not on file    Years of education: Not on file    Highest education level: Not on file   Occupational History    Not on file   Tobacco Use    Smoking status: Former     Current packs/day: 0.00     Average packs/day: 0.5 packs/day for 40.0 years (20.0 ttl pk-yrs)     Types: Cigarettes     Start date:      Quit date:      Years since quittin.8    Smokeless tobacco: Never   Vaping Use    Vaping status: Never Used   Substance and Sexual Activity    Alcohol use: Not Currently     Comment: occassionally    Drug use: Not Currently    Sexual activity: Defer   Other Topics Concern    Not on file   Social History Narrative    Not on file     Social Drivers of Health     Financial Resource Strain: Low Risk  (10/24/2024)    Overall Financial Resource Strain (CARDIA)     Difficulty of Paying Living Expenses: Not hard at all   Food Insecurity: No Food Insecurity (10/24/2024)    Hunger Vital Sign     Worried About Running Out of Food in the Last Year: Never true     Ran Out of Food in the Last Year: Never true   Transportation Needs: No Transportation Needs (10/24/2024)    PRAPARE - Transportation     Lack of Transportation (Medical): No     Lack of Transportation (Non-Medical): No   Physical Activity: Unknown (2024)    Exercise Vital Sign     Days of Exercise per Week: 0 days     Minutes of Exercise per Session: Not on file   Stress: No Stress Concern Present (10/24/2024)    [employer] - Occupational Stress Questionnaire     Feeling of Stress : Not at all   Social Connections: Not on file   Intimate Partner Violence: Not At Risk (10/24/2024)    Humiliation, Afraid, Rape, and Kick questionnaire     Fear of Current or Ex-Partner: No     Emotionally Abused: No     Physically Abused: No     Sexually Abused: No   Housing Stability: Low Risk  (10/25/2024)    Housing Stability Vital Sign     Unable to Pay for Housing in the Last  Year: No     Number of Times Moved in the Last Year: 1     Homeless in the Last Year: No        CURRENT MEDICATIONS:   Current Outpatient Medications   Medication Sig Dispense Refill    acetaminophen (Tylenol) 325 mg tablet Take 3 tablets (975 mg) by mouth every 8 hours if needed for mild pain (1 - 3) or moderate pain (4 - 6).      apixaban (Eliquis) 5 mg tablet Take 1 tablet (5 mg) by mouth 2 times a day.      atorvastatin (Lipitor) 80 mg tablet TAKE 1 TABLET BY MOUTH EVERY DAY 90 tablet 3    calcium carbonate (Tums) 200 mg calcium chewable tablet Chew 1 tablet (500 mg) once daily.      cholecalciferol (Vitamin D3) 25 MCG (1000 UT) tablet Take 1 tablet (1,000 Units) by mouth once daily.      diclofenac sodium (Voltaren) 1 % gel Apply 4.5 inches (4 g) topically 4 times a day as needed (L shoulder pain).      ibandronate (Boniva) 150 mg tablet Take 1 tablet (150 mg) by mouth every 30 (thirty) days. Take in morning with full glass of water on an empty stomach. No food, drink, meds, or lying down for 60 minutes after. 3 tablet 3    lidocaine (Lidoderm) 5 % patch Place 1 patch over 12 hours on the skin once daily. Remove & discard patch within 12 hours or as directed by MD. 30 patch 1    losartan (Cozaar) 100 mg tablet TAKE 1 TABLET BY MOUTH EVERY DAY 90 tablet 3    melatonin 3 mg tablet Take 1 tablet (3 mg) by mouth as needed at bedtime for sleep.      metoprolol succinate XL (Toprol-XL) 100 mg 24 hr tablet TAKE 1 TABLET BY MOUTH EVERY DAY 90 tablet 3    omeprazole OTC (PriLOSEC OTC) 20 mg EC tablet Take 1 tablet (20 mg) by mouth once daily. Do not crush, chew, or split. 30 tablet 2    oxyCODONE (Roxicodone) 5 mg immediate release tablet Take 1 tablet (5 mg) by mouth every 6 hours if needed for severe pain (7 - 10). 10 tablet 0    polyethylene glycol (Glycolax, Miralax) 17 gram packet Take 17 g by mouth once daily.      sennosides (Senokot) 8.6 mg tablet Take 2 tablets (17.2 mg) by mouth as needed at bedtime for  constipation.      vit A/vit C/vit E/zinc/copper (PRESERVISION AREDS ORAL) Take 1 tablet by mouth early in the morning..       No current facility-administered medications for this visit.        OBJECTIVE    PHYSICAL EXAM      10/28/2024     8:17 PM 10/28/2024     8:33 PM 10/29/2024    12:15 AM 10/29/2024     5:00 AM 10/29/2024     8:38 AM 10/29/2024    11:55 AM 10/29/2024     4:21 PM   Vitals   Systolic 100 110 102 105 94 101 104   Diastolic 68 65 58 63 64 68 68   Heart Rate 84  84 82 88 82 83   Temp 36.6 °C (97.9 °F)  36.4 °C (97.5 °F) 36.5 °C (97.7 °F) 36.6 °C (97.9 °F) 36.5 °C (97.7 °F) 36.7 °C (98.1 °F)   Resp 16  16 13 14 16 16      There is no height or weight on file to calculate BMI.    GENERAL: A/Ox3, NAD. Appears healthy, well nourished  PSYCHIATRIC: Mood stable, appropriate memory recall  EYES: EOM intact, no scleral icterus  CARDIOVASCULAR: Palpable peripheral pulses  LUNGS: Breathing non-labored on room air  SKIN: no erythema, rashes, or ecchymosis     MUSCULOSKELETAL:  Laterality: left Shoulder Exam  - Negative Spurlings, full painless neck ROM  - Skin intact  - No erythema or warmth  - No ecchymosis or soft tissue swelling  - Shoulder ROM: Forward flexion to 90, ER to 30, IR to back pocket  - Strength: Able to resist with Jobes, ER and belly press testing; weakness secondary to generalized deconditioning  - Palpation: Positive tenderness of the distal clavicle without obvious deformity  - Pickering and Neers equivocal  - Speeds and O'Briens equivocal  - Stability: Anterior/Posterior load and shift stable  - Special Tests: Positive pain with cross body adduction    NEUROVASCULAR:  - Neurovascular Status: sensation intact to light touch distally, upper extremity motor grossly intact  - Capillary refill brisk at extremities, Bilateral peripheral pulses 2+    Imaging: Multiple views of the affected left shoulder(s) demonstrate: Nondisplaced pathologic distal clavicle fracture.   X-rays were personally  reviewed and interpreted by me.  Radiology reports were reviewed by me as well, if readily available at the time.      Elijah Lopes MD  Attending Surgeon    Sports Medicine Orthopaedic Surgery  [facility] [employer]

## 2024-11-11 ENCOUNTER — HOSPITAL ENCOUNTER (OUTPATIENT)
Dept: RADIOLOGY | Facility: HOSPITAL | Age: 69
Discharge: HOME | End: 2024-11-11
Payer: MEDICARE

## 2024-11-11 ENCOUNTER — OFFICE VISIT (OUTPATIENT)
Dept: ORTHOPEDIC SURGERY | Facility: HOSPITAL | Age: 69
End: 2024-11-11
Payer: MEDICARE

## 2024-11-11 DIAGNOSIS — M84.553A: Primary | ICD-10-CM

## 2024-11-11 DIAGNOSIS — S72.23XA: ICD-10-CM

## 2024-11-11 PROCEDURE — 73552 X-RAY EXAM OF FEMUR 2/>: CPT | Mod: RT

## 2024-11-11 PROCEDURE — 1125F AMNT PAIN NOTED PAIN PRSNT: CPT | Performed by: PHYSICIAN ASSISTANT

## 2024-11-11 PROCEDURE — 1157F ADVNC CARE PLAN IN RCRD: CPT | Performed by: PHYSICIAN ASSISTANT

## 2024-11-11 PROCEDURE — 1111F DSCHRG MED/CURRENT MED MERGE: CPT | Performed by: PHYSICIAN ASSISTANT

## 2024-11-11 PROCEDURE — 73552 X-RAY EXAM OF FEMUR 2/>: CPT | Mod: RIGHT SIDE | Performed by: STUDENT IN AN ORGANIZED HEALTH CARE EDUCATION/TRAINING PROGRAM

## 2024-11-11 PROCEDURE — 1159F MED LIST DOCD IN RCRD: CPT | Performed by: PHYSICIAN ASSISTANT

## 2024-11-11 PROCEDURE — 99211 OFF/OP EST MAY X REQ PHY/QHP: CPT | Performed by: PHYSICIAN ASSISTANT

## 2024-11-11 ASSESSMENT — PAIN SCALES - GENERAL: PAINLEVEL_OUTOF10: 9

## 2024-11-11 ASSESSMENT — PAIN - FUNCTIONAL ASSESSMENT: PAIN_FUNCTIONAL_ASSESSMENT: 0-10

## 2024-11-11 ASSESSMENT — PAIN DESCRIPTION - DESCRIPTORS: DESCRIPTORS: ACHING;SORE

## 2024-11-12 ENCOUNTER — OFFICE VISIT (OUTPATIENT)
Dept: HEMATOLOGY/ONCOLOGY | Facility: CLINIC | Age: 69
End: 2024-11-12
Payer: MEDICARE

## 2024-11-12 ENCOUNTER — SOCIAL WORK (OUTPATIENT)
Dept: HEMATOLOGY/ONCOLOGY | Facility: CLINIC | Age: 69
End: 2024-11-12

## 2024-11-12 ENCOUNTER — TELEPHONE (OUTPATIENT)
Dept: HOME HEALTH SERVICES | Facility: HOME HEALTH | Age: 69
End: 2024-11-12

## 2024-11-12 ENCOUNTER — INFUSION (OUTPATIENT)
Dept: HEMATOLOGY/ONCOLOGY | Facility: CLINIC | Age: 69
End: 2024-11-12
Payer: MEDICARE

## 2024-11-12 VITALS
BODY MASS INDEX: 22.45 KG/M2 | DIASTOLIC BLOOD PRESSURE: 73 MMHG | SYSTOLIC BLOOD PRESSURE: 115 MMHG | HEIGHT: 64 IN | TEMPERATURE: 97.2 F | OXYGEN SATURATION: 94 % | HEART RATE: 101 BPM | RESPIRATION RATE: 16 BRPM

## 2024-11-12 DIAGNOSIS — C34.91 PRIMARY MALIGNANT NEOPLASM OF RIGHT LUNG METASTATIC TO OTHER SITE (MULTI): ICD-10-CM

## 2024-11-12 LAB
ALBUMIN SERPL BCP-MCNC: 2.8 G/DL (ref 3.4–5)
ALP SERPL-CCNC: 252 U/L (ref 33–136)
ALT SERPL W P-5'-P-CCNC: 36 U/L (ref 7–45)
ANION GAP SERPL CALC-SCNC: 16 MMOL/L (ref 10–20)
AST SERPL W P-5'-P-CCNC: 36 U/L (ref 9–39)
BASOPHILS # BLD AUTO: 0.04 X10*3/UL (ref 0–0.1)
BASOPHILS NFR BLD AUTO: 0.6 %
BILIRUB SERPL-MCNC: 0.5 MG/DL (ref 0–1.2)
BUN SERPL-MCNC: 7 MG/DL (ref 6–23)
CALCIUM SERPL-MCNC: 8.1 MG/DL (ref 8.6–10.3)
CHLORIDE SERPL-SCNC: 90 MMOL/L (ref 98–107)
CO2 SERPL-SCNC: 26 MMOL/L (ref 21–32)
CREAT SERPL-MCNC: 0.41 MG/DL (ref 0.5–1.05)
EGFRCR SERPLBLD CKD-EPI 2021: >90 ML/MIN/1.73M*2
EOSINOPHIL # BLD AUTO: 0.04 X10*3/UL (ref 0–0.7)
EOSINOPHIL NFR BLD AUTO: 0.6 %
ERYTHROCYTE [DISTWIDTH] IN BLOOD BY AUTOMATED COUNT: 14.9 % (ref 11.5–14.5)
GLUCOSE SERPL-MCNC: 156 MG/DL (ref 74–99)
HCT VFR BLD AUTO: 30.3 % (ref 36–46)
HGB BLD-MCNC: 9.7 G/DL (ref 12–16)
IMM GRANULOCYTES # BLD AUTO: 0.07 X10*3/UL (ref 0–0.7)
IMM GRANULOCYTES NFR BLD AUTO: 1 % (ref 0–0.9)
LYMPHOCYTES # BLD AUTO: 0.78 X10*3/UL (ref 1.2–4.8)
LYMPHOCYTES NFR BLD AUTO: 11 %
MCH RBC QN AUTO: 28.2 PG (ref 26–34)
MCHC RBC AUTO-ENTMCNC: 32 G/DL (ref 32–36)
MCV RBC AUTO: 88 FL (ref 80–100)
MONOCYTES # BLD AUTO: 0.82 X10*3/UL (ref 0.1–1)
MONOCYTES NFR BLD AUTO: 11.6 %
NEUTROPHILS # BLD AUTO: 5.32 X10*3/UL (ref 1.2–7.7)
NEUTROPHILS NFR BLD AUTO: 75.2 %
NRBC BLD-RTO: ABNORMAL /100{WBCS}
PLATELET # BLD AUTO: 420 X10*3/UL (ref 150–450)
POTASSIUM SERPL-SCNC: 3.8 MMOL/L (ref 3.5–5.3)
PROT SERPL-MCNC: 6.4 G/DL (ref 6.4–8.2)
RBC # BLD AUTO: 3.44 X10*6/UL (ref 4–5.2)
SODIUM SERPL-SCNC: 128 MMOL/L (ref 136–145)
WBC # BLD AUTO: 7.1 X10*3/UL (ref 4.4–11.3)

## 2024-11-12 PROCEDURE — 96413 CHEMO IV INFUSION 1 HR: CPT

## 2024-11-12 PROCEDURE — 2500000004 HC RX 250 GENERAL PHARMACY W/ HCPCS (ALT 636 FOR OP/ED): Performed by: INTERNAL MEDICINE

## 2024-11-12 PROCEDURE — 85025 COMPLETE CBC W/AUTO DIFF WBC: CPT | Performed by: INTERNAL MEDICINE

## 2024-11-12 PROCEDURE — 1126F AMNT PAIN NOTED NONE PRSNT: CPT | Performed by: INTERNAL MEDICINE

## 2024-11-12 PROCEDURE — 36415 COLL VENOUS BLD VENIPUNCTURE: CPT | Performed by: INTERNAL MEDICINE

## 2024-11-12 PROCEDURE — 80053 COMPREHEN METABOLIC PANEL: CPT | Performed by: INTERNAL MEDICINE

## 2024-11-12 PROCEDURE — 99215 OFFICE O/P EST HI 40 MIN: CPT | Performed by: INTERNAL MEDICINE

## 2024-11-12 PROCEDURE — 1160F RVW MEDS BY RX/DR IN RCRD: CPT | Performed by: INTERNAL MEDICINE

## 2024-11-12 PROCEDURE — 2500000001 HC RX 250 WO HCPCS SELF ADMINISTERED DRUGS (ALT 637 FOR MEDICARE OP): Performed by: INTERNAL MEDICINE

## 2024-11-12 PROCEDURE — 3078F DIAST BP <80 MM HG: CPT | Performed by: INTERNAL MEDICINE

## 2024-11-12 PROCEDURE — 1111F DSCHRG MED/CURRENT MED MERGE: CPT | Performed by: INTERNAL MEDICINE

## 2024-11-12 PROCEDURE — 3074F SYST BP LT 130 MM HG: CPT | Performed by: INTERNAL MEDICINE

## 2024-11-12 PROCEDURE — 1159F MED LIST DOCD IN RCRD: CPT | Performed by: INTERNAL MEDICINE

## 2024-11-12 PROCEDURE — 1157F ADVNC CARE PLAN IN RCRD: CPT | Performed by: INTERNAL MEDICINE

## 2024-11-12 RX ORDER — HEPARIN SODIUM,PORCINE/PF 10 UNIT/ML
50 SYRINGE (ML) INTRAVENOUS AS NEEDED
OUTPATIENT
Start: 2024-11-12

## 2024-11-12 RX ORDER — ALBUTEROL SULFATE 0.83 MG/ML
3 SOLUTION RESPIRATORY (INHALATION) AS NEEDED
Status: DISCONTINUED | OUTPATIENT
Start: 2024-11-12 | End: 2024-11-12 | Stop reason: HOSPADM

## 2024-11-12 RX ORDER — EPINEPHRINE 0.3 MG/.3ML
0.3 INJECTION SUBCUTANEOUS EVERY 5 MIN PRN
Status: DISCONTINUED | OUTPATIENT
Start: 2024-11-12 | End: 2024-11-12 | Stop reason: HOSPADM

## 2024-11-12 RX ORDER — PROCHLORPERAZINE MALEATE 10 MG
10 TABLET ORAL EVERY 6 HOURS PRN
Status: DISCONTINUED | OUTPATIENT
Start: 2024-11-12 | End: 2024-11-12 | Stop reason: HOSPADM

## 2024-11-12 RX ORDER — FAMOTIDINE 10 MG/ML
20 INJECTION INTRAVENOUS ONCE AS NEEDED
Status: DISCONTINUED | OUTPATIENT
Start: 2024-11-12 | End: 2024-11-12 | Stop reason: HOSPADM

## 2024-11-12 RX ORDER — PROCHLORPERAZINE EDISYLATE 5 MG/ML
10 INJECTION INTRAMUSCULAR; INTRAVENOUS EVERY 6 HOURS PRN
Status: DISCONTINUED | OUTPATIENT
Start: 2024-11-12 | End: 2024-11-12 | Stop reason: HOSPADM

## 2024-11-12 RX ORDER — DIPHENHYDRAMINE HYDROCHLORIDE 50 MG/ML
50 INJECTION INTRAMUSCULAR; INTRAVENOUS AS NEEDED
Status: DISCONTINUED | OUTPATIENT
Start: 2024-11-12 | End: 2024-11-12 | Stop reason: HOSPADM

## 2024-11-12 RX ORDER — OXYCODONE HYDROCHLORIDE 5 MG/1
5 CAPSULE ORAL ONCE
Status: CANCELLED | OUTPATIENT
Start: 2024-11-12 | End: 2024-11-12

## 2024-11-12 RX ORDER — OXYCODONE HYDROCHLORIDE 5 MG/1
5 TABLET ORAL ONCE
Status: COMPLETED | OUTPATIENT
Start: 2024-11-12 | End: 2024-11-12

## 2024-11-12 RX ORDER — HEPARIN 100 UNIT/ML
500 SYRINGE INTRAVENOUS AS NEEDED
OUTPATIENT
Start: 2024-11-12

## 2024-11-12 ASSESSMENT — PAIN SCALES - GENERAL
PAINLEVEL_OUTOF10: 0-NO PAIN
PAINLEVEL_OUTOF10: 8

## 2024-11-12 NOTE — TELEPHONE ENCOUNTER
Hello,  Home care received a referral for this patient, unfortunately we do not have a an aide available in her area. Please let us know if you would like us to move forward without the aide by 11/15/24.    Thank you,   [facility] Intake

## 2024-11-12 NOTE — PATIENT INSTRUCTIONS
Follow up visit for history of non small cell lung cancer.     Referral to home care entered. Covering social work will contact you to follow up.     Continue immunotherapy every 3 weeks.     Follow up with Dr. Feliz in 5 weeks.

## 2024-11-12 NOTE — SIGNIFICANT EVENT
11/12/24 1047   Prechemo Checklist   Has the patient been in the hospital, ED, or urgent care since last date of service Yes  (Dr. Feliz aware)   Chemo/Immuno Consent Completed and Signed Yes   Protocol/Indications Verified Yes   Confirmed to previous date/time of medication Yes   Compared to previous dose Yes   All medications are dated accurately Yes   Pregnancy Test Negative Not applicable   Parameters Met Yes   BSA/Weight-Height Verified Yes   Dose Calculations Verified (current, total, cumulative) Yes

## 2024-11-12 NOTE — PROGRESS NOTES
Patient Name: Brittny Gordon                     : 1955                                    MRN: [id number]                                    Age: 69 y.o.                               Gender: female  Referring Provider:   Dr. Naun Hammonds (Thoracic Surgery)     CC:   Management of newly diagnosed stage IVB ([id number]) primary non-small cell carcinoma of RUL, involving multiple bone mets. Liquid biopsy at diagnosis 2024 positive for KRAS G12V, TP53 M243T, JAK2 and KEAP 1.     EBUS 2024 of the PDL-1 TPS 55%; NGS (in-house): KEAP1 p.G419W ([id number] c.1255G>T);   KRAS p.G12V ([id number] c.35G>T)   Treatment Summary:  - 2024 - 2024: 3000 cGy to T12-L3 spine (Dr. Ospina)  Systemic therapy, pembrolizumab  10/01/24 ,c1  10/20/24 ,c2   10/22/24 , diagnosed  pathological fracture of right femur, status post right femur CMN on 2024    Presenting History    Patient is 69 y.o. female, former smoker (started at age 17, 0.5 pack per day, quitted at age 56) with a past medical history of HTN, HLD and osteoporosis, aortic aneurysm s/p repairment in  referred for management of newly diagnosed lung cancer.      2019: CT chest (+): Noncalcified nodule in the left upper lobe adjacent to the aortic arch, nodule measuring 1.3 x 1.3 x 0.8cm.     2019: Pet/CT: 1. Mild hypermetabolic tracer activity corresponding to the known posteromedial left upper lobe lung nodule.     2021: CT chest (-): stable URMILA nodule 1.1x1.0cm. Again stable in 2021, 2022, 2022.      However on the CT chest on 2023 the URMILA nodule measured 1.6x1.4cm, which has been growing slowly compared to before, There is also a irregular shape solid-appearing nodule in the RUL measuring up to 0.8cm.      2024: CT chest (-) showed interval increase of the RUL nodule measuring 1.1cm. Multiple new nodules in both lower lobes measuring 0.4cm or smaller.      2024: Pet/CT: Enlarging FDG avid 1.1  cm spiculated nodule in the right upper lobe, as well as FDG avid mediastinal and bilateral hilar nodes is suspicious for a primary lung neoplasm with teresa metastases. Mid FDG avid left upper lobe paravertebral nodule, grossly stable in comparison to prior CT in 2019, favored to be benign. Multiple FDG avid bone lesions throughout the axial and appendicular skeleton, likely representing widespread bone metastases. FDG avidity within the right ribs as well as lower thoracic and lumbar vertebral bodies as described above, could represent pathologic fractures in the setting of metastatic disease.        7/19/2024: MRI brain (+/-):  7 mm avidly enhancing extra-axial appearing nodule along the left pontine medullary junction adjacent to the left vertebral artery. 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 Clinical correlation and attention on follow-up suggested. No additional abnormal enhancement identified.     8/5/2024: Underwent EBUS with FNA of 11Rs, 3R, 10R and RUL nodule: path showed non small cell carcinoma. IHC showed positive for AE1/AE3, CK7 and negative for TTF1, p40 and INSM1. PDL-1 TPS 55%. Additional immunostain show the tumor cells are negative for TRPS1, PAX8, and CDX2. SMARCA4 immunostain shows retained nuclear expression. these findings argue against breast, GYN, and colorectal primaries, respectively. Overall, findings could be compatible with a lung primary non-small cell carcinoma in appropriate clinical and radiologic settings. PDL-1 TPS 55%; NGS (in-house): KEAP1 p.G419W ([id number] c.1255G>T); KRAS p.G12V ([id number] c.35G>T)     She was [place] [facility] [place] and offered palliative systemic therapy including chemotherapy and immunotherapy.  Carboplatin, Taxol and pembrolizumab        PMHx: HTN, HLD, and osteoporosis  Family history: father had lymphoma. Bother and sister passed away at early 40s. Mother had skin cancer (rare type but unknown type); brother has  prostate cancer.   Shx: Occasional drinker. CEPA.                Interval History    11/12/24   Metastatic non-small cell lung cancer , patient was receiving pembrolizumab.   Patient developed pathological fracture of right femur, status post CMN and complaining right hip pain and right femur pain left shoulder pain.  X-ray did show bony lytic lesion of left clavicle had.  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    Wedge compression fracture of t11-T12 vertebra, sequela 07/30/2020     Compression fracture of T11 vertebra, sequela            Surgical History:   Surgical History         Past Surgical History:   Procedure Laterality Date    ARTERIAL ANEURYSM REPAIR         Thoracic aortic aneurysm repair    COLONOSCOPY        RADIOFREQUENCY ABLATION         L3,4,5    WISDOM TOOTH EXTRACTION                Family History:  Family History          Family History   Problem Relation Name Age of Onset    Lymphoma Father        Polycythemia Father        Breast cancer Neg Hx        Colon cancer Neg Hx                Allergies:  Allergies        Allergies   Allergen Reactions    Acetaminophen GI Upset    Epinephrine Nausea/vomiting and Palpitations            Meds (Current):    Current Medications      Current Outpatient Medications:     atorvastatin (Lipitor) 80 mg tablet, Take 1 tablet (80 mg) by mouth once daily., Disp: , Rfl:     bisacodyl (Dulcolax) 5 mg EC  tablet, Take 1 tablet (5 mg) by mouth once daily as needed for constipation. Do not crush, chew, or split., Disp: 30 tablet, Rfl: 0    bisacodyl (Dulcolax) 5 mg EC tablet, Take 1 tablet (5 mg) by mouth once daily as needed for constipation. Do not crush, chew, or split., Disp: 30 tablet, Rfl: 0    cetirizine (ZyrTEC) 10 mg tablet, Take 1 tablet (10 mg) by mouth once daily as needed for allergies., Disp: , Rfl:     cholecalciferol (Vitamin D3) 25 MCG (1000 UT) tablet, Take 1 tablet (1,000 Units) by mouth once daily., Disp: , Rfl:     cyclobenzaprine (Flexeril) 10 mg tablet, Take 1 tablet (10 mg) by mouth 3 times a day as needed for muscle spasms., Disp: 90 tablet, Rfl: 2    docusate sodium (Colace) 100 mg capsule, Take 1 capsule (100 mg) by mouth 2 times a day., Disp: 60 capsule, Rfl: 0    docusate sodium (Colace) 100 mg capsule, Take 1 capsule (100 mg) by mouth 2 times a day., Disp: 30 capsule, Rfl: 1    gabapentin (Neurontin) 300 mg capsule, Take 1 capsule (300 mg) by mouth once daily at bedtime., Disp: 30 capsule, Rfl: 2    ibandronate (Boniva) 150 mg tablet, Take 1 tablet (150 mg) by mouth every 30 (thirty) days. Take in morning with full glass of water on an empty stomach. No food, drink, meds, or lying down for 60 minutes after., Disp: 3 tablet, Rfl: 3    LORazepam (Ativan) 0.5 mg tablet, Take 1 tablet (0.5 mg) by mouth once daily as needed for anxiety (PRIOR to RADIATION) for up to 10 days., Disp: 10 tablet, Rfl: 0    losartan (Cozaar) 100 mg tablet, TAKE 1 TABLET BY MOUTH EVERY DAY, Disp: 90 tablet, Rfl: 3    menthol (ICY HOT ADVANCED RELIEF PATCH TOP), Apply 1 patch topically every 12 hours if needed., Disp: , Rfl:     metoprolol succinate XL (Toprol-XL) 100 mg 24 hr tablet, TAKE 1 TABLET BY MOUTH EVERY DAY, Disp: 90 tablet, Rfl: 3    morphine CR (MS Contin) 15 mg 12 hr tablet, Take 1 tablet (15 mg) by mouth 2 times a day for 15 days. Do not crush, chew, or split., Disp: 30 tablet, Rfl: 0    naloxone  (Narcan) 4 mg/0.1 mL nasal spray, Administer 1 spray (4 mg) into affected nostril(s) if needed for opioid reversal or respiratory depression. May repeat every 2-3 minutes if needed, alternating nostrils, until medical assistance becomes available., Disp: 2 each, Rfl: 0    OLANZapine (ZyPREXA) 5 mg tablet, Take 1 tablet (5 mg) by mouth once daily at bedtime. For 4 days starting the evening of treatment, Disp: 4 tablet, Rfl: 3    omeprazole OTC (PriLOSEC OTC) 20 mg EC tablet, Take 1 tablet (20 mg) by mouth once daily. Do not crush, chew, or split., Disp: 30 tablet, Rfl: 2    ondansetron (Zofran) 4 mg tablet, Take 1 tablet (4 mg) by mouth every 6 hours if needed for nausea or vomiting., Disp: 20 tablet, Rfl: 0    ondansetron (Zofran) 8 mg tablet, Take 0.5 tablets (4 mg) by mouth every 6 hours if needed for nausea or vomiting., Disp: 20 tablet, Rfl: 0    ondansetron (Zofran) 8 mg tablet, Take 1 tablet (8 mg) by mouth every 8 hours if needed for nausea or vomiting., Disp: 30 tablet, Rfl: 5    oxyCODONE (Roxicodone) 5 mg immediate release tablet, Take 2 to 3 tablets (10-15 mg) by mouth every 4 hours if needed for severe pain (7 - 10) (please take 3 tabs before radiation sessions) for up to 15 days., Disp: 270 tablet, Rfl: 0    polyethylene glycol (Glycolax, Miralax) 17 gram/dose powder, Mix 17 g of powder with 8 oz of water and drink by mouth once daily., Disp: 238 g, Rfl: 0    polyethylene glycol (Glycolax, Miralax) 17 gram/dose powder, Take 17 g by mouth once daily., Disp: 1700 g, Rfl: 1    prochlorperazine (Compazine) 10 mg tablet, Take 1 tablet (10 mg) by mouth every 6 hours if needed for nausea or vomiting., Disp: 30 tablet, Rfl: 5    sennosides (Senokot) 8.6 mg tablet, Take 1 tablet (8.6 mg) by mouth once daily as needed for constipation., Disp: , Rfl:     vit A/vit C/vit E/zinc/copper (PRESERVISION AREDS ORAL), Take 1 tablet by mouth early in the morning.., Disp: , Rfl:             Performance Status (ECOG):3         ECOGDefinition  0Fully active; no performance restrictions.  1Strenuous physical activity restricted; fully ambulatory and able to carry out light work.  2Capable of all self-care but unable to carry out any work activities. Up and about >50% of waking hours.  3Capable of only limited self-care; confined to bed or chair >50% of waking hours.  4Completely disabled; cannot carry out any self-care; totally confined to bed or chair.        Exam:  Patient very anxious blood pressure 157/110, body weight 138 kg  General: Chronically ill  Neurology: Alert and oriented x3, nonfocal     psychology: Anxious very depressed     eyes: PERRL, EOMI  ENT: Mucus membranes moist and intact, no ulcers  Lymphatics: No palpable adenopathy  Neck: Supple, no masses  Respiratory: Lungs clear bilaterally, no wheezes, no rales, no rhonchi  Cardiovascular: Normal rate and rhythm, no murmur  Abdomen: Soft, non-tender, non-distended, normoactive, no hepatosplenomegaly, no ascites  Musculoskeletal: Normal gait and stance  Extremities: No clubbing, no cyanosis, no edema  Skin: No rash        Labs:       /12/24) 2 wk ago  (10/29/24) 2 wk ago  (10/28/24) 2 wk ago  (10/27/24) 2 wk ago  (10/25/24) 2 wk ago  (10/24/24) 2 wk ago  (10/23/24)    WBC  4.4 - 11.3 x10*3/uL 7.1 8.1 10.5 8.7 10.8 13.9 High  10.5   nRBC  0.0 R 0.0 R 0.0 R 0.0 R 0.0 R 0.0 R   Comment: Not Measured   RBC  4.00 - 5.20 x10*6/uL 3.44 Low  3.62 Low  3.81 Low  3.76 Low  4.07 4.31 4.69   Hemoglobin  12.0 - 16.0 g/dL 9.7 Low  10.4 Low  11.1 Low  11.1 Low  11.7 Low  12.5 13.7   Hematocrit  36.0 - 46.0 % 30.3 Low  31.3 Low  33.5 Low  34.0 Low  35.5 Low  37.2 40.3   MCV  80 - 100 fL 88 87 88 90 87 86 86   MCH  26.0 - 34.0 pg 28.2 28.7 29.1 29.5 28.7 29.0 29.2   MCHC  32.0 - 36.0 g/dL 32.0 33.2 33.1 32.6 33.0 33.6 34.0   RDW  11.5 - 14.5 % 14.9 High  13.8 13.6 13.7 13.5 13.6 13.3   Platelets  150 - 450 x10*3/uL 420         Calcium 8.1, corrected calcium 9.3  Assessment/Plan:  69 y.o.   newly diagnosed lung cancer       #1,  stage IV ([id number]) cancer likely lung primary. IHC positive for CK7 but negative for TTF1 or p40. Liquid biopsy at diagnosis showed  KRAS G12V, TP53 M243T, JAK2 and KEAP 1. Reached out to pathology - confirmed this is probably lung primary.  PD-L1 55%.     Patient has very poor performance status ECOG 3.    On my assessment I do not think so she is able to tolerate systemic chemotherapy.  I will offer single agent immunotherapy.  Possible side effect of pembrolizumab discussed with the patient and she is willing to try immunotherapy.    Consent obtained    We will start pembrolizumab in October 1, 2024.    2.  Pain management, continue same pain management I will refer to palliative oncology.    Patient is fully aware we are dealing with stage IV non-small cell lung cancer which is not curable the main goal of palliative therapy is to improve quality of life and possible life expectancy.    Plan discussed with patient and with his brother.    11/12/24  #1 metastatic non-small cell lung cancer, was on immunotherapy    2.  Pain management    3.  Bony metastatic disease, left shoulder pain due to metastatic disease, right femur fracture     Radiation oncology evaluation for possible palliative radiotherapy    Continue immunotherapy    Continue oxycodone for pain control.    Patient is coming back to home after rehab center will make arrangement for visiting nurse evaluation.    Follow-up after 1-month

## 2024-11-12 NOTE — PROGRESS NOTES
Patient is here for Cycle 2 of Pembrolizumab. Dr. Feliz is aware of patient's low sodium. No new orders at this time. Patient educated on s/s of low sodium. Patient tolerated treatment well, is aware of next appointment and was discharged in stable condition.

## 2024-11-13 ENCOUNTER — HOSPITAL ENCOUNTER (OUTPATIENT)
Dept: RADIATION ONCOLOGY | Facility: CLINIC | Age: 69
Setting detail: RADIATION/ONCOLOGY SERIES
Discharge: HOME | End: 2024-11-13
Payer: MEDICARE

## 2024-11-13 ENCOUNTER — TELEPHONE (OUTPATIENT)
Dept: HEMATOLOGY/ONCOLOGY | Facility: CLINIC | Age: 69
End: 2024-11-13

## 2024-11-13 DIAGNOSIS — M54.9 SPINE PAIN: ICD-10-CM

## 2024-11-13 DIAGNOSIS — C79.52: Primary | ICD-10-CM

## 2024-11-13 DIAGNOSIS — C34.91 PRIMARY MALIGNANT NEOPLASM OF RIGHT LUNG METASTATIC TO OTHER SITE (MULTI): ICD-10-CM

## 2024-11-13 DIAGNOSIS — R91.1 LUNG NODULE: ICD-10-CM

## 2024-11-13 PROCEDURE — 99214 OFFICE O/P EST MOD 30 MIN: CPT | Mod: 95 | Performed by: STUDENT IN AN ORGANIZED HEALTH CARE EDUCATION/TRAINING PROGRAM

## 2024-11-13 PROCEDURE — 99417 PROLNG OP E/M EACH 15 MIN: CPT | Mod: 95 | Performed by: STUDENT IN AN ORGANIZED HEALTH CARE EDUCATION/TRAINING PROGRAM

## 2024-11-13 PROCEDURE — 99214 OFFICE O/P EST MOD 30 MIN: CPT | Performed by: STUDENT IN AN ORGANIZED HEALTH CARE EDUCATION/TRAINING PROGRAM

## 2024-11-13 ASSESSMENT — ENCOUNTER SYMPTOMS
APPETITE CHANGE: 1
FATIGUE: 1
BACK PAIN: 1

## 2024-11-13 NOTE — PROGRESS NOTES
Radiation Oncology Follow-Up    Patient Name:  Brittny Gordon  MRN:  [id number]  :  1955    Referring Provider: Nagi Ospina MD  Primary Care Provider: No Assigned PCP Generic Provider, MD  Care Team: Patient Care Team:  No Assigned Pcp Generic Provider, MD as PCP - General (General Practice)  Jose Rafael Hart MD [facility] PCP [facility]ributed [facility]  Camila Chaudhary MD MPH as Consulting Physician (Hematology and Oncology)  Felicia Osman MD as Consulting Physician (Hematology and Oncology)  Aislinn Feliz MD as Consulting Physician (Hematology and Oncology)  Kyunghee Burkitt, DO as On Call Attending Physician (Hematology and Oncology)  OPAL Mcdaniel as  ()    Date of Service: 2024    Virtual or Telephone Consent    An interactive audio and video telecommunication system which permits real time communications between the patient (at the originating site) and provider (at the distant site) was utilized to provide this telehealth service.     Verbal consent was requested and obtained from Brittny Gordon on this date, 24 for a telehealth visit.      SUBJECTIVE  History of Present Illness:  Brittny Gordon is a 69 y.o. female who was previously seen at the [facility] Department of Radiation Oncology for metastatic non-small cell lung cancer.    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 Gordon is a female with a history of aortic aneurysm replacement in  and was followed since 2019 with a left upper lobe nodule.  The patient had CT chest on 2019 which showed a noncalcified nodule in the left upper lobe adjacent to the aortic arch measuring 1.3 x 1.3  x 0.8 cm.  PET/CT completed on 9/30/2019 showed mild hypermetabolic activity.  The patient has had stable CT chest imaging until 5/2023.  A CT chest without contrast on 5/15/2023 showed increase in the size of the right upper lobe measuring 1.1 cm, new pulmonary nodules involving the left lower lobe, and right lower lobe.  There was noted to be vertebral compression fractures of multiple thoracic and lumbar vertebral body.  The patient underwent PET/CT on 7/11/2024 which revealed left upper lobe hypermetabolic region, spiculated right upper lobe lesion with max SUV 5.0, no avidity of subcentimeter nodules in the lower lobes and mediastinal and hilar lymphadenopathy with hypermetabolic right paratracheal lymph node with max SUV 5.0, right hilar lymph node with hypermetabolic activity max SUV 5.0 and left hilar lymph node hypermetabolic with max SUV 4.0.  There were numerous osseous hypermetabolic lesions of the axial and appendicular skeleton.  The patient underwent brain MRI with and without contrast on 7/19/2024 which showed a enhancing nodule abutting the left lateral pontomedullary junction favoring saccular aneurysm arising likely from AICA.    The patient completed palliative radiation therapy to vertebral bodies T12-L3, with significant complications requiring hospital admission and found to have pulmonary embolism.    The patient was found on 10/9/2024 to have on x-ray of the left shoulder a destructive lesion of the distal clavicle with displaced pathologic fracture with maintained AC joint.  The patient was referred to orthopedic surgery and underwent evaluation with imaging of the right femur on 10/23/2024 which showed a right angulated and displaced subtrochanteric fracture of the femur.  The patient underwent right femur fixation with IMN.    11/13/2024: Persistent back pain despite oxycodone IR as needed, 7/10, following with supportive oncology. Left shoulder pain consistent with area of fracture.  The  patient notes improving but poor appetite and nausea. Diffuse aches at night.    Labs:  None    Pathology:  10/24/2024- Right femoral mass, biopsy: -- Metastatic carcinoma involving bone and fibrous tissue; Note: In a patient with a known history, the morphologic features are consistent with spread from a pulmonary primary. Correlation with clinical and radiologic features is needed for further evaluation.    8/5/2024- Malignant cells are present. Non-small cell carcinoma  B. LYMPH NODE 4 R PULMONARY FINE NEEDLE ASPIRATION: Malignant cells are present.  Non-small cell carcinoma  C. LYMPH NODE 10 R PULMONARY FINE NEEDLE ASPIRATION: Malignant cells are present.  Non-small cell carcinoma.  D. LUNG FINE NEEDLE ASPIRATION RIGHT UPPER LOBE - RUL nodule: Malignant cells are present  Non-small cell carcinoma  E. BRONCHIAL BRUSH RIGHT UPPER LOBE - RUL nodule brush: Atypical cells are present  F. LUNG FINE NEEDLE ASPIRATION RIGHT UPPER LOBE - RUL nodule: Malignant cells are present  Non-small cell carcinoma    8/5/2024-Right lung, upper lobe, transbronchial biopsy:-- Small fragment of non small cell carcinoma; -- Immunohistochemical stains performed on formalin-fixed, paraffin embedded sections demonstrate neoplastic cells to be positive for AE1/AE3 and CK7, and negative for TTF1 (8G7G3 clone), p40 and INSM1.     Note: Additional immunostain for PD-L1 will be performed and reported in an addendum. Next generation sequencing (NGS) will be performed and reported separately.     8/5/2024 -navigational bronchoscopy/EBUS: Normal bronchial anatomy.  EBUS sampling was acquired from station 4R, 10R and 11 RS.     Disease Associated Genomics:  PD-L1 TPS 55% (high)    MICROSATELLITE STATUS: Microsatellite Instability-High (MSI-H) is NOT DETECTED.     DISEASE ASSOCIATED GENOMIC FINDINGS:   KEAP1 p.G419W ([id number] c.1255G>T)  KRAS p.G12V ([id number] c.35G>T)     DISEASE RELEVANT ALTERATIONS NOT DETECTED:   Negative for ALK  fusion.  Negative for BRAF V600E.  Negative for EGFR sensitizing mutation.  Negative for ERBB2 activating mutation  Negative for KRAS G12C.  Negative for MET exon 14 skipping mutation.  Negative for NTRK fusion.  Negative for RET fusion.  Negative for ROS1 fusion.     Disease Tumor Markers:  xF Liquid Biopsy - 105 Genes: pending      Imaging:  10/23/2024-x-ray of left femur: Transverse angulated and displaced fracture of the strobe trochanteric region of the right proximal femur.    10/9/2024-x-ray left shoulder: Lytic destructive lesion of the distal clavicle with displaced pathologic fracture with maintained AC joint and glenohumeral joint.    9/12/2024-CT angio chest/CT A/P: 1.5 cm nodule in the left upper lobe, and spiculated 8mm nodule in the right upper lobe, suspicious for malignancy. Compression deformities of T7 and T12.  Sclerosis in the T12 vertebral body, suspicious for metastatic disease. Compression deformities of L1 and L3.  There are areas of sclerosis seen throughout the lumbar vertebral bodies as well as the left iliac wing, suspicious for metastatic disease.    8/28/2024-MRI brain w/ contrast: Similar 7 mm avidly enhancing focus along the left pontomedullary junction in keeping with possible saccular aneurysm. Vascular  follow-up may be considered. A metastatic focus is felt to be less likely given relative stability. Otherwise, no evidence intracranial metastatic disease.    8/27/2024- CT angiography of the chest: Few subsegmental acute pulmonary emboli within right lower lobe. New extensive secretions within central and paracentral airways along with obliteration of multiple smaller bronchi within bilateral lower lobes. Resultant partial atelectasis of bilateral lower lobes, left more than right. Interval increase in size of left upper lobe solid pulmonary nodule, now measuring up to 1.9 cm, most consistent with enlarging malignancy/metastasis. Prominent to enlarged right mediastinal and right  hilar lymph nodes. Osseous metastatic disease is similar to prior examination.  Stable pathological compression fractures of T7 and T12 vertebral bodies.     7/19/2024-MRI brain with and without contrast: There is a 7 x 6 mm enhancing nodule cranial to the left vertebral artery and abutting the left lateral pontomedullary junction, imaging favoring saccular aneurysm of AICA.  No evidence of additional abnormal intraparenchymal metastatic disease.     7/11/2024-PET/CT: No evidence of cervical lymphadenopathy.  Stable left upper lobe paravertebral nodule measuring 1.4 cm with max SUV 3.3.  Spiculated right upper lobe nodule measuring 1.1 cm with max SUV 5.0.  Additional subcentimeter nodules within the lower lobes demonstrate no avidity.  There are noted mediastinal and hilar lymph nodes with hypermetabolic activity including right lower paratracheal lymph node with max SUV 5.0, right hilar lymph node with max SUV 5.0 and left hilar lymph node with max SUV 4.0.  Noted prior median sternotomy and ascending aorta replacement with aneurysmal dilatation of the ascending thoracic aorta of 4.5 cm.  No evidence of abdominal pelvic hypermetabolic lymphadenopathy.  There are multiple hypermetabolic bone lesions throughout the axial and appendicular skeleton including left clavicle with max SUV 3.8, distal left clavicle with max SUV 9.4, right fifth posterior rib with max SUV 6.1, right posterior 11th rib with max SUV 4.6, T12 vertebral body with max SUV 9.4, L1 vertebral body with max SUV 9.7, L3 vertebral body with max SUV 10.7, anterior L4 vertebral body with max SUV 6.9 and bilateral hypermetabolic lesions within the iliac bones with max SUV 7 and 6 to the left and right respectively.  Lytic lesion within the left inferior pubic ramus with max SUV 7.7, lytic lesion of the proximal right femur with max SUV 9.1 and avidity of the scar now representing postsurgical changes.     6/7/2024-CT chest without contrast: Interval  increase of spiculated nodule in the right upper lobe measuring 1.1 cm previously 0.8 cm, multiple new pulmonary nodules including 3 mm nodule in the left lower lobe which is new, 4 mm nodule in the left lower lobe, 4 mm nodule in the left lower lobe, 4 mm nodule on the right lower lobe posterior medially and 4 mm nodule in the left lower lobe near the major fissure.  A 1.4 cm subpleural nodule in the left upper lobe similar to prior CT.  Status post median sternotomy and ascending aorta replacement, height loss of T7 vertebral body, T11 anteriorly, T12 centrally and L4 new compared to prior studies.     5/15/2023-CT chest without contrast: Multifocal bilateral linear atelectasis, solid-appearing nodule of the posterior right upper lobe measuring 8 mm previously measuring 6 mm in 2020, irregular nodule of the left upper lobe measuring 1.6 cm, anterior medial left upper lobe 5 mm nodule stable from 2020.  Calcified granuloma of the posterior lateral left upper lobe.  Stable postsurgical changes of the ascending thoracic aorta.  No evidence of mediastinal lymphadenopathy.    Prior Systemic Therapies:  10/2024-current: pembrolizumab every 3 weeks     Prior Surgeries:  None     Prior Radiation Therapy:   8/19/2024-9/6/2024: 3000 cGy in 10 fractions to T12-L3    Treatment Rendered:   3D CRT: Not Applicable Spine (Resolved)    Treatment Period Technique Fraction Dose Fractions Total Dose   Course 1 8/19/2024-9/6/2024  (days elapsed: 18)         T12-L3 8/19/2024-9/6/2024 AP/ / 300 cGy 10 / 10 3000 / 3,000 cGy       Review of Systems:   Review of Systems   Constitutional:  Positive for appetite change and fatigue.   Musculoskeletal:  Positive for back pain.        Shoulder pain (left)       Performance Status:   The Karnofsky performance scale today is 50, Requires considerable assistance and frequent medical care (ECOG equivalent 2).       OBJECTIVE  Vital Signs:  There were no vitals taken for this visit.  Physical  Exam  Nursing note reviewed.   Constitutional:       Comments: Limited exam due to televisit   Neurological:      Mental Status: She is alert.            ASSESSMENT:   Brittny Gordon is a 69 y.o. female with Primary malignant neoplasm of right lung metastatic to other site (Multi), Clinical: Stage IVB (cT1b, cN2, pM1c).      Ms. 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 The patient completed palliative radiation therapy 3000 cGy in 10 fractions to the T12-L3.    The patient is on pembrolizumab. The patient follows with supportive oncology for pain control regimen, referral will be placed for interventional radiology to discuss kyphoplasty.    The patient has worsening left shoulder pain with no obvious radiologic correlate on CT scan.  Left clavicle shows osseous metastatic lesion with pathologic fracture.    The patient was additionally found to have right femoral fracture and underwent fixation with IMN of the right femur.    I discussed with the patient regarding the acute and long term effects of palliative radiation therapy to the right femur and left clavicle.  I discussed with the patient regarding the benefit of radiation therapy for palliation and improvement of tumor related pain and decrease the risk of post hardware fixation failure of the right femur.    The patient will continue to follow with medical oncology for systemic therapy.  The patient will continue to work with physical therapy and rehab.    PLAN:    #) Osseous metastatic disease of non-small cell lung cancer to the left clavicle and right femur status post IMN fixation of the right femur  -Plan for postoperative radiation therapy to the right femur to prevent hardware failure 3000 cGy in 10 fractions  -Plan for 3000 cGy in 10 fractions To the left clavicle for pain  -Plan for radiation therapy at [facility] due to patient location preferences, transfer initiated    #) Stage IVB, right  upper lobe lung cancer to the bone, cT1b cN3 cM1c  -2 month virtual follow up  -Following with medical oncology in 10/2024, on pembrolizumab  -Following with supportive oncology in 10/2024  -Status post palliative radiation therapy to the T12-L3 vertebral bodies    #) Saccular aneurysm of left AICA, 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    #) Acute toxicities  -Pain: grade 3, seeing supportive onc, referral for kyphoplasty to spine - on hold per patient preference  -Right femur fracture: Grade 3, requiring IMN fixation    #) Long term side effects    A total of 15 minutes were spent face-to-face with the patient, with an additional 15 minutes spent reviewing records including imaging, pathology and physician notes.    Nagi Ospina MD  [facility]  [facility] clinical  - Department of Radiation Oncology  Phone: [phone]  Fax: [phone]  [employer] secure chat preferred / Pager [phone]    Note: This was transcribed using Dragon voice recognition software. Attempts were made to correct any errors; however, errors or omissions may be present.

## 2024-11-13 NOTE — PROGRESS NOTES
Spoke to patient on the phone prior to virtual visit. Her pain is 7/10. She is taking oxycodone and feels that helps. She reports being able to move around some but is working on getting stronger. She takes stool softeners as necessary and verbalized understanding of how pain medication can effect bowel movements.

## 2024-11-14 NOTE — PROGRESS NOTES
his LSW, covering for OPAL Garcia, received referral from medical team stating that her physician placed an order for homecare services (for RN, VIOLETAA and PT/OT) LSW reviewed patient's zip code and found that  homecare does service the area that patient lives in.  Referral placed.     Later in the day, W received a call from patient's friend Edwardo who states that he is looking for resources to help the patient once she transitions to home.  No medical information was discussed with patient's friend as he was not cleared by the patient to receive information.  General information about community resources were discussed though. Per Edwardo, the patient is currently at a SNF facility but is choosing to leave as she wants to be back at home.  Patient lives at home alone but would need significant care and she is not able to manage her own ADLs independently.  Patient is not able to walk or transfer independently either at this time.  Explained that any sort of care (outside of skilled level homecare that would be arranged by the medical team) would be private pay.  Per edwardo, the patient understands that she would need to pay out of pocket for care and is able to financially afford this.  An email was sent to Edwardo at [email or web address] with a list of private care agencies.  Explained that patient or family/friend would need to arrange private care agencies.  Encouraged Edwardo to speak with patient directly as she is the primary decision maker and will need to be aware of everything that is being arranged.  Encouraged him to also meet with the SW/ at the facility to further discuss options for a safe discharge plan.   Edwardo expressed appreciation for the information and will further speak with the patient regarding her options.  The below list of resources was emailed to the patient's friend:    [employer]  [phone] [email or web address]    [facility]  Care  [phone]  [email or web address]/    [facility]  [phone] [email or web address]    [facility]  [phone] [email or web address]  Also have Non-emergency Medical transport available    [facility] [employer]  [phone] https://[employer]/[facility]on/    [place]  [phone] [email or web address]_source=GMB&utm_campaign=SampleBoard_Local_Listing&utm_medium=referral&utm_content=[place]_[facility] Hospitalon_[place]_44718    Here are a list of some questions to ask when you interview agencies (beyond rate and services):  1. Are you bonded and insured?  2. What type of training do you require for your caregivers?  3. Do you perform background checks and drug screenings on your caregivers?  4. Is there a minimum number of hours required each week or per visit?  5. If my caregiver calls off work, will someone else be sent? Will I consistently have the same 1 or 2 caregivers?  6. Be specific about all of the tasks, skills, and schedules involved and be sure that the person you are considering is comfortable with them all.  7. (If you have a long term care policy) Can you help me utilize the coverage from my long term care insurance policy?  8. If you are working with an agency, make sure you understand what is covered. Are there additional fees that apply to specific services or add-ons? If needed, what are the procedures for termination or requesting another provider?  9. Don’t be afraid to move on if it’s not the right fit. It’s important that you feel comfortable with a home care provider who’s delivering services in the privacy of your own home. If you don’t, try  talking to the provider to see if any miscommunication can be resolved. If not, don’t be afraid to look for another    [employer] Systems  Here is a good website to review Medical Alert Systems. This website has a  pretty comprehensive list of the different products. They break down the differences (pros/cons) for each system and list the cost as well.  https://www.seniorliving.org/medical-alert-systems/best/    Non Emergency Medical Transport  This is from the [facility] Transportation Resource Guide. It provides a list of transport companies who can assist with medical transports for office and treatment visits.  [email or web address].[email or web address]/Transportation%20Guide%20Jan%202022.pdf      No other issues or concerns noted at this time.  Social work will remain available.

## 2024-11-15 ENCOUNTER — TELEPHONE (OUTPATIENT)
Dept: HEMATOLOGY/ONCOLOGY | Facility: CLINIC | Age: 69
End: 2024-11-15
Payer: MEDICARE

## 2024-11-15 NOTE — TELEPHONE ENCOUNTER
Call placed to Brittny to verify she is established with [facility] prior to canceling [facility] referral. Pt confirmed she is established with [facility] and denied additional needs at this time.

## 2024-11-18 ENCOUNTER — TELEPHONE (OUTPATIENT)
Dept: HEMATOLOGY/ONCOLOGY | Facility: CLINIC | Age: 69
End: 2024-11-18
Payer: MEDICARE

## 2024-11-18 ENCOUNTER — HOSPITAL ENCOUNTER (OUTPATIENT)
Dept: RADIATION ONCOLOGY | Facility: CLINIC | Age: 69
Setting detail: RADIATION/ONCOLOGY SERIES
End: 2024-11-18
Payer: MEDICARE

## 2024-11-18 DIAGNOSIS — I26.99 OTHER ACUTE PULMONARY EMBOLISM, UNSPECIFIED WHETHER ACUTE COR PULMONALE PRESENT (MULTI): ICD-10-CM

## 2024-11-18 PROBLEM — M84.553A: Status: ACTIVE | Noted: 2024-11-18

## 2024-11-18 NOTE — PROGRESS NOTES
Patient is a 69 y.o. female with known history of lung cancer with metastatic disease to left distal clavicle and known lesion in the R proximal femur who is 3 weeks s/p IMN  for R ST proximal femur fracture.  Date of surgery was 10/24/2024.  Frozen section sent to pathology intra-op positive for carcinoma after biopsy. Patient continues WBAT RLE at this time although she remains hesitant to put her weight down on the R leg.  Patient continues on DVT ppx. Patient continues with therapy sessions, performing exercise program in SNF. Patient denies fever or chills, N/T or calf pain.     General: Alert and oriented x 3, NAD, respirations easy and unlabored with no audible wheezes, skin warm and dry, speech and dress appropriate for noted age, affect euthymic.     Musculoskeletal: RLE  incisions c/d/I and healing well  mild swelling to lower leg  compartments soft  no calf tenderness  sensation intact to light touch  motor intact including TA/GS/EHL  palpable DP/PT pulses 2+     X-ray: Images of R femur reviewed personally by me today and reveal maintenance of alignment of R ST proximal femur fracture with hardware in position and no interval change.      IMP:  Problem List Items Addressed This Visit       Subtrochanteric fracture, closed, unspecified laterality, initial encounter (Multi)    Relevant Orders    XR femur right 2+ views (Completed)    Pathological fracture of proximal end of femur due to neoplastic disease (Multi) - Primary      PLAN:  Her staples were removed prior to the visit today by the nurses at [facility]. She may continue WBAT RLE with walker in PT sessions.  I will see patient back in 6 weeks and I need x-rays Right femur next visit. All questions were answered today.

## 2024-11-18 NOTE — TELEPHONE ENCOUNTER
Information to be discussed and reviewed by provider   Dr. Feliz will continue to prescribe Eliquis for PE and dvt

## 2024-11-22 ENCOUNTER — OFFICE VISIT (OUTPATIENT)
Dept: PALLIATIVE MEDICINE | Facility: CLINIC | Age: 69
End: 2024-11-22
Payer: MEDICARE

## 2024-11-22 VITALS
TEMPERATURE: 96.4 F | HEIGHT: 64 IN | OXYGEN SATURATION: 96 % | RESPIRATION RATE: 16 BRPM | SYSTOLIC BLOOD PRESSURE: 103 MMHG | DIASTOLIC BLOOD PRESSURE: 70 MMHG | HEART RATE: 86 BPM | BODY MASS INDEX: 22.45 KG/M2

## 2024-11-22 DIAGNOSIS — Z51.81 ENCOUNTER FOR MONITORING OPIOID MAINTENANCE THERAPY: Primary | ICD-10-CM

## 2024-11-22 DIAGNOSIS — Z79.891 ENCOUNTER FOR MONITORING OPIOID MAINTENANCE THERAPY: Primary | ICD-10-CM

## 2024-11-22 DIAGNOSIS — M84.451A PATHOLOGICAL FRACTURE, RIGHT FEMUR, INITIAL ENCOUNTER FOR FRACTURE: ICD-10-CM

## 2024-11-22 PROCEDURE — 3078F DIAST BP <80 MM HG: CPT | Performed by: CLINICAL NURSE SPECIALIST

## 2024-11-22 PROCEDURE — 3074F SYST BP LT 130 MM HG: CPT | Performed by: CLINICAL NURSE SPECIALIST

## 2024-11-22 PROCEDURE — 1157F ADVNC CARE PLAN IN RCRD: CPT | Performed by: CLINICAL NURSE SPECIALIST

## 2024-11-22 PROCEDURE — 99215 OFFICE O/P EST HI 40 MIN: CPT | Performed by: CLINICAL NURSE SPECIALIST

## 2024-11-22 PROCEDURE — 1125F AMNT PAIN NOTED PAIN PRSNT: CPT | Performed by: CLINICAL NURSE SPECIALIST

## 2024-11-22 PROCEDURE — 1111F DSCHRG MED/CURRENT MED MERGE: CPT | Performed by: CLINICAL NURSE SPECIALIST

## 2024-11-22 PROCEDURE — 1159F MED LIST DOCD IN RCRD: CPT | Performed by: CLINICAL NURSE SPECIALIST

## 2024-11-22 RX ORDER — OXYCODONE HYDROCHLORIDE 10 MG/1
5-10 TABLET ORAL
Qty: 240 TABLET | Refills: 0 | Status: SHIPPED | OUTPATIENT
Start: 2024-11-22

## 2024-11-22 ASSESSMENT — PAIN SCALES - GENERAL: PAINLEVEL_OUTOF10: 8

## 2024-11-27 ENCOUNTER — HOSPITAL ENCOUNTER (OUTPATIENT)
Dept: RADIOLOGY | Facility: HOSPITAL | Age: 69
End: 2024-11-27
Payer: MEDICARE

## 2024-11-27 ENCOUNTER — HOSPITAL ENCOUNTER (OUTPATIENT)
Dept: RADIOLOGY | Facility: HOSPITAL | Age: 69
Discharge: HOME | End: 2024-11-27
Payer: MEDICARE

## 2024-11-27 DIAGNOSIS — C34.91 PRIMARY MALIGNANT NEOPLASM OF RIGHT LUNG METASTATIC TO OTHER SITE (MULTI): ICD-10-CM

## 2024-11-27 DIAGNOSIS — C34.91 PRIMARY MALIGNANT NEOPLASM OF RIGHT LUNG METASTATIC TO OTHER SITE (MULTI): Primary | ICD-10-CM

## 2024-12-02 ENCOUNTER — APPOINTMENT (OUTPATIENT)
Dept: PRIMARY CARE | Facility: CLINIC | Age: 69
End: 2024-12-02
Payer: MEDICARE

## 2024-12-03 ENCOUNTER — HOSPITAL ENCOUNTER (INPATIENT)
Facility: HOSPITAL | Age: 69
LOS: 3 days | Discharge: HOME | End: 2024-12-07
Attending: EMERGENCY MEDICINE | Admitting: STUDENT IN AN ORGANIZED HEALTH CARE EDUCATION/TRAINING PROGRAM
Payer: MEDICARE

## 2024-12-03 ENCOUNTER — APPOINTMENT (OUTPATIENT)
Dept: RADIOLOGY | Facility: HOSPITAL | Age: 69
End: 2024-12-03
Payer: MEDICARE

## 2024-12-03 ENCOUNTER — INFUSION (OUTPATIENT)
Dept: HEMATOLOGY/ONCOLOGY | Facility: CLINIC | Age: 69
End: 2024-12-03
Payer: MEDICARE

## 2024-12-03 ENCOUNTER — HOSPITAL ENCOUNTER (OUTPATIENT)
Dept: RADIATION ONCOLOGY | Facility: CLINIC | Age: 69
Setting detail: RADIATION/ONCOLOGY SERIES
Discharge: HOME | End: 2024-12-03
Payer: MEDICARE

## 2024-12-03 ENCOUNTER — APPOINTMENT (OUTPATIENT)
Dept: CARDIOLOGY | Facility: HOSPITAL | Age: 69
End: 2024-12-03
Payer: MEDICARE

## 2024-12-03 ENCOUNTER — DOCUMENTATION (OUTPATIENT)
Dept: HEMATOLOGY/ONCOLOGY | Facility: HOSPITAL | Age: 69
End: 2024-12-03
Payer: MEDICARE

## 2024-12-03 VITALS
HEART RATE: 103 BPM | TEMPERATURE: 97.2 F | SYSTOLIC BLOOD PRESSURE: 79 MMHG | BODY MASS INDEX: 22.45 KG/M2 | OXYGEN SATURATION: 98 % | RESPIRATION RATE: 16 BRPM | DIASTOLIC BLOOD PRESSURE: 50 MMHG | HEIGHT: 64 IN

## 2024-12-03 DIAGNOSIS — R41.0 DISORIENTATION: Primary | ICD-10-CM

## 2024-12-03 DIAGNOSIS — I95.9 HYPOTENSION, UNSPECIFIED HYPOTENSION TYPE: ICD-10-CM

## 2024-12-03 DIAGNOSIS — C34.91 PRIMARY MALIGNANT NEOPLASM OF RIGHT LUNG METASTATIC TO OTHER SITE (MULTI): ICD-10-CM

## 2024-12-03 LAB
ALBUMIN SERPL BCP-MCNC: 2.2 G/DL (ref 3.4–5)
ALBUMIN SERPL BCP-MCNC: 2.7 G/DL (ref 3.4–5)
ALP SERPL-CCNC: 112 U/L (ref 33–136)
ALP SERPL-CCNC: 145 U/L (ref 33–136)
ALT SERPL W P-5'-P-CCNC: 5 U/L (ref 7–45)
ALT SERPL W P-5'-P-CCNC: 6 U/L (ref 7–45)
ANION GAP SERPL CALC-SCNC: 15 MMOL/L (ref 10–20)
ANION GAP SERPL CALC-SCNC: 18 MMOL/L (ref 10–20)
AST SERPL W P-5'-P-CCNC: 11 U/L (ref 9–39)
AST SERPL W P-5'-P-CCNC: 9 U/L (ref 9–39)
BASOPHILS # BLD AUTO: 0.03 X10*3/UL (ref 0–0.1)
BASOPHILS NFR BLD AUTO: 0.3 %
BILIRUB SERPL-MCNC: 0.4 MG/DL (ref 0–1.2)
BILIRUB SERPL-MCNC: 0.5 MG/DL (ref 0–1.2)
BUN SERPL-MCNC: 15 MG/DL (ref 6–23)
BUN SERPL-MCNC: 15 MG/DL (ref 6–23)
CALCIUM SERPL-MCNC: 10 MG/DL (ref 8.6–10.3)
CALCIUM SERPL-MCNC: 10.6 MG/DL (ref 8.6–10.3)
CHLORIDE SERPL-SCNC: 91 MMOL/L (ref 98–107)
CHLORIDE SERPL-SCNC: 96 MMOL/L (ref 98–107)
CO2 SERPL-SCNC: 24 MMOL/L (ref 21–32)
CO2 SERPL-SCNC: 25 MMOL/L (ref 21–32)
CORTIS AM PEAK SERPL-MSCNC: 31.2 UG/DL (ref 5–20)
CREAT SERPL-MCNC: 0.98 MG/DL (ref 0.5–1.05)
CREAT SERPL-MCNC: 1.2 MG/DL (ref 0.5–1.05)
EGFRCR SERPLBLD CKD-EPI 2021: 49 ML/MIN/1.73M*2
EGFRCR SERPLBLD CKD-EPI 2021: 63 ML/MIN/1.73M*2
EOSINOPHIL # BLD AUTO: 0.09 X10*3/UL (ref 0–0.7)
EOSINOPHIL NFR BLD AUTO: 0.9 %
ERYTHROCYTE [DISTWIDTH] IN BLOOD BY AUTOMATED COUNT: 15.5 % (ref 11.5–14.5)
FLUAV RNA RESP QL NAA+PROBE: NOT DETECTED
FLUBV RNA RESP QL NAA+PROBE: NOT DETECTED
GLUCOSE SERPL-MCNC: 110 MG/DL (ref 74–99)
GLUCOSE SERPL-MCNC: 93 MG/DL (ref 74–99)
HCT VFR BLD AUTO: 33.7 % (ref 36–46)
HGB BLD-MCNC: 10.5 G/DL (ref 12–16)
IMM GRANULOCYTES # BLD AUTO: 0.13 X10*3/UL (ref 0–0.7)
IMM GRANULOCYTES NFR BLD AUTO: 1.4 % (ref 0–0.9)
LACTATE SERPL-SCNC: 1.7 MMOL/L (ref 0.4–2)
LYMPHOCYTES # BLD AUTO: 1.36 X10*3/UL (ref 1.2–4.8)
LYMPHOCYTES NFR BLD AUTO: 14.2 %
MCH RBC QN AUTO: 28.1 PG (ref 26–34)
MCHC RBC AUTO-ENTMCNC: 31.2 G/DL (ref 32–36)
MCV RBC AUTO: 90 FL (ref 80–100)
MONOCYTES # BLD AUTO: 1.12 X10*3/UL (ref 0.1–1)
MONOCYTES NFR BLD AUTO: 11.7 %
NEUTROPHILS # BLD AUTO: 6.86 X10*3/UL (ref 1.2–7.7)
NEUTROPHILS NFR BLD AUTO: 71.5 %
PLATELET # BLD AUTO: 300 X10*3/UL (ref 150–450)
POTASSIUM SERPL-SCNC: 3.5 MMOL/L (ref 3.5–5.3)
POTASSIUM SERPL-SCNC: 3.6 MMOL/L (ref 3.5–5.3)
PROT SERPL-MCNC: 4.9 G/DL (ref 6.4–8.2)
PROT SERPL-MCNC: 6.1 G/DL (ref 6.4–8.2)
RBC # BLD AUTO: 3.74 X10*6/UL (ref 4–5.2)
SARS-COV-2 RNA RESP QL NAA+PROBE: NOT DETECTED
SODIUM SERPL-SCNC: 130 MMOL/L (ref 136–145)
SODIUM SERPL-SCNC: 131 MMOL/L (ref 136–145)
TSH SERPL-ACNC: 2.47 MIU/L (ref 0.44–3.98)
WBC # BLD AUTO: 9.6 X10*3/UL (ref 4.4–11.3)

## 2024-12-03 PROCEDURE — 99285 EMERGENCY DEPT VISIT HI MDM: CPT | Mod: 25 | Performed by: EMERGENCY MEDICINE

## 2024-12-03 PROCEDURE — 2500000004 HC RX 250 GENERAL PHARMACY W/ HCPCS (ALT 636 FOR OP/ED): Performed by: EMERGENCY MEDICINE

## 2024-12-03 PROCEDURE — 36415 COLL VENOUS BLD VENIPUNCTURE: CPT

## 2024-12-03 PROCEDURE — 82024 ASSAY OF ACTH: CPT

## 2024-12-03 PROCEDURE — 87040 BLOOD CULTURE FOR BACTERIA: CPT | Mod: PORLAB | Performed by: EMERGENCY MEDICINE

## 2024-12-03 PROCEDURE — 77295 3-D RADIOTHERAPY PLAN: CPT | Performed by: STUDENT IN AN ORGANIZED HEALTH CARE EDUCATION/TRAINING PROGRAM

## 2024-12-03 PROCEDURE — 85025 COMPLETE CBC W/AUTO DIFF WBC: CPT

## 2024-12-03 PROCEDURE — 70450 CT HEAD/BRAIN W/O DYE: CPT | Performed by: STUDENT IN AN ORGANIZED HEALTH CARE EDUCATION/TRAINING PROGRAM

## 2024-12-03 PROCEDURE — 77334 RADIATION TREATMENT AID(S): CPT | Performed by: STUDENT IN AN ORGANIZED HEALTH CARE EDUCATION/TRAINING PROGRAM

## 2024-12-03 PROCEDURE — 2500000004 HC RX 250 GENERAL PHARMACY W/ HCPCS (ALT 636 FOR OP/ED): Performed by: CLINICAL NURSE SPECIALIST

## 2024-12-03 PROCEDURE — 77300 RADIATION THERAPY DOSE PLAN: CPT | Performed by: STUDENT IN AN ORGANIZED HEALTH CARE EDUCATION/TRAINING PROGRAM

## 2024-12-03 PROCEDURE — 83605 ASSAY OF LACTIC ACID: CPT | Performed by: EMERGENCY MEDICINE

## 2024-12-03 PROCEDURE — 84075 ASSAY ALKALINE PHOSPHATASE: CPT

## 2024-12-03 PROCEDURE — 84075 ASSAY ALKALINE PHOSPHATASE: CPT | Performed by: EMERGENCY MEDICINE

## 2024-12-03 PROCEDURE — 93005 ELECTROCARDIOGRAM TRACING: CPT

## 2024-12-03 PROCEDURE — 96365 THER/PROPH/DIAG IV INF INIT: CPT | Mod: INF

## 2024-12-03 PROCEDURE — 87636 SARSCOV2 & INF A&B AMP PRB: CPT | Performed by: EMERGENCY MEDICINE

## 2024-12-03 PROCEDURE — 36415 COLL VENOUS BLD VENIPUNCTURE: CPT | Performed by: EMERGENCY MEDICINE

## 2024-12-03 PROCEDURE — 84443 ASSAY THYROID STIM HORMONE: CPT

## 2024-12-03 PROCEDURE — 70450 CT HEAD/BRAIN W/O DYE: CPT

## 2024-12-03 PROCEDURE — 82533 TOTAL CORTISOL: CPT | Mod: PORLAB

## 2024-12-03 RX ORDER — HEPARIN 100 UNIT/ML
500 SYRINGE INTRAVENOUS AS NEEDED
OUTPATIENT
Start: 2024-12-03

## 2024-12-03 RX ORDER — HEPARIN SODIUM,PORCINE/PF 10 UNIT/ML
50 SYRINGE (ML) INTRAVENOUS AS NEEDED
OUTPATIENT
Start: 2024-12-03

## 2024-12-03 ASSESSMENT — PAIN DESCRIPTION - PAIN TYPE: TYPE: ACUTE PAIN

## 2024-12-03 ASSESSMENT — PAIN SCALES - GENERAL
PAINLEVEL_OUTOF10: 0 - NO PAIN
PAINLEVEL_OUTOF10: 8
PAINLEVEL_OUTOF10: 10 - WORST POSSIBLE PAIN
PAINLEVEL_OUTOF10: 0 - NO PAIN

## 2024-12-03 ASSESSMENT — LIFESTYLE VARIABLES
EVER FELT BAD OR GUILTY ABOUT YOUR DRINKING: NO
EVER HAD A DRINK FIRST THING IN THE MORNING TO STEADY YOUR NERVES TO GET RID OF A HANGOVER: NO
HAVE YOU EVER FELT YOU SHOULD CUT DOWN ON YOUR DRINKING: NO
TOTAL SCORE: 0
HAVE PEOPLE ANNOYED YOU BY CRITICIZING YOUR DRINKING: NO

## 2024-12-03 ASSESSMENT — PAIN - FUNCTIONAL ASSESSMENT: PAIN_FUNCTIONAL_ASSESSMENT: 0-10

## 2024-12-03 ASSESSMENT — PAIN DESCRIPTION - LOCATION: LOCATION: GENERALIZED

## 2024-12-03 NOTE — ED TRIAGE NOTES
Pt presents from [facility] for altered mental status and hypotension. Pt A& O x's 1-2. Visitor at bedside. C/O pain all over.

## 2024-12-03 NOTE — PROGRESS NOTES
Patient is here for Cycle 3 of Keytruda. Treatment held for change in mental status, low blood pressure and high calcium. Patient transported to ED.

## 2024-12-03 NOTE — ED PROVIDER NOTES
HPI   Chief Complaint   Patient presents with    Altered Mental Status    Hypotension       HPI:  69-year-old female with lung cancer with mets to the bone sent down from the infusion center after she was noted to be hypotensive and confused.  Infusion nurse was concerned about possible infectious source contributing to her symptoms patient states that she does not want to be admitted to the hospital for very long she denies any pain denies any fevers no vomiting no diarrhea    Limitations to history: Limited due to the patient's confusion  Independent Historians: Brother at bedside and infusion nurse did not report via phone  External Records Reviewed: EMR records including infusion nurse note from today  ------------------------------------------------------------------------------------------------------------------------------------------  ROS: a ten point review of systems was performed and negative except as per HPI.  ------------------------------------------------------------------------------------------------------------------------------------------  PMH / PSH: as per HPI, reviewed in EMR and discussed with the patient []  MEDS:  reviewed in EMR and discussed with the patient []  ALLERGIES: reviewed in EMR[]  SocH:  as per HPI, otherwise reviewed in EMR []  FH:  as per HPI, otherwise reviewed in EMR []  ------------------------------------------------------------------------------------------------------------------------------------------  Physical Exam:  VS: As documented in the triage note and EMR flowsheet from this visit was reviewed  General: Well appearing. No acute distress.  Thin and frail appearing  Eyes:  Extraocular movements grossly intact. No scleral icterus.   HEENT: Atraumatic. Normocephalic.  Because membranes are dry  Neck: Supple. No gross masses  CV: RRR, audible S1/S2, 2+ symmetric peripheral pulses  Resp: Clear to auscultation bilaterally. No respiratory distress.  Non-labored  respirations  GI: Soft, non-tender, non-distended, no rebound or gaurding  MSK: Symmetric muscle bulk. No gross step offs or deformities.  Skin: Warm, dry, no obvious rash.  Neuro: Slow to respond speech fluent. Awake. Alert. Appropriate conversation.  Psych: Appropriate mood and affect for situation  ------------------------------------------------------------------------------------------------------------------------------------------  Hospital Course / Medical Decision Making:  Patient hooked up to the cardiac monitor peripheral IV access obtained labs were drawn.  Patient appears thin and weak.  She had sepsis order set utilized for looking for an infectious cause to her generalized weakness.  I explained to the patient that she needs to stay in the hospital and she was agreeable although did not want to stay in the hospital very long.  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    Vision loss     Wedge compression fracture of t11-T12 vertebra, sequela 07/30/2020    Compression fracture of T11 vertebra, sequela     Past Surgical History:   Procedure Laterality Date    ARTERIAL ANEURYSM REPAIR      Thoracic aortic aneurysm repair    COLONOSCOPY      RADIOFREQUENCY ABLATION      L3,4,5    WISDOM TOOTH EXTRACTION       Family History   Problem Relation Name Age of Onset    Lymphoma Father      Polycythemia Father      Breast cancer Neg Hx      Colon cancer Neg Hx       Social History     Tobacco Use     Smoking status: Former     Current packs/day: 0.00     Average packs/day: 0.5 packs/day for 40.0 years (20.0 ttl pk-yrs)     Types: Cigarettes     Start date:      Quit date:      Years since quittin.9    Smokeless tobacco: Never   Vaping Use    Vaping status: Never Used   Substance Use Topics    Alcohol use: Not Currently     Comment: occassionally    Drug use: Not Currently       Physical Exam   ED Triage Vitals [24 1418]   Temperature Heart Rate Respirations BP   36.2 °C (97.2 °F) 96 16 87/59      Pulse Ox Temp Source Heart Rate Source Patient Position   98 % Skin -- --      BP Location FiO2 (%)     -- --       Physical Exam      ED Course & MDM   Diagnoses as of 24 2151   Disorientation   Hypotension, unspecified hypotension type                 No data recorded     Sherry Coma Scale Score: 14 (24 1422 : Cait Madrigal RN)                           Medical Decision Making      Procedure  Procedures     Lorna Avalos MD  24 2193

## 2024-12-03 NOTE — PROGRESS NOTES
Today Brittny Gordon came in for her infusion of Keytruda.  The nurse observed that she is a very different woman than she was 3 weeks ago- she is confused, vague, and unable to give a history or easily recite her name and birthdate.  Blood pressure on arrival was 79/50, pulse 103.  She is getting palliative radiation to her broken femur and clavicle.  Blood work was obtained:  Calcium 10.6, albumin 2.7 (corrected calcium was 11.6.  Creatinine bumped from .41 to 1.20.  Brother is with her today- he has been struggling to care for her at home with aides from 6a-10a and 5p -9p.  He does not live with her.  Safety is an issue at home.  She does administer meds including oxycodone by her self.      We started an IV of normal saline, and gave report to the emergency room staff. We have concern that her confusion is related to her elevated calcium.    She was transported by wheel chair to the [place] emergency room.  Keytruda immunotherapy was held today.  Last brain MRI was in August.      BOWEN Prieto-CNS  [facility]

## 2024-12-04 ENCOUNTER — APPOINTMENT (OUTPATIENT)
Dept: PODIATRY | Facility: CLINIC | Age: 69
End: 2024-12-04
Payer: MEDICARE

## 2024-12-04 ENCOUNTER — APPOINTMENT (OUTPATIENT)
Dept: RADIOLOGY | Facility: HOSPITAL | Age: 69
End: 2024-12-04
Payer: MEDICARE

## 2024-12-04 LAB
AMMONIA PLAS-SCNC: 37 UMOL/L (ref 16–53)
ANION GAP SERPL CALC-SCNC: 15 MMOL/L (ref 10–20)
APPEARANCE UR: CLEAR
ATRIAL RATE: 90 BPM
BACTERIA #/AREA URNS AUTO: ABNORMAL /HPF
BASE EXCESS BLDV CALC-SCNC: 0.1 MMOL/L (ref -2–3)
BILIRUB UR STRIP.AUTO-MCNC: NEGATIVE MG/DL
BODY TEMPERATURE: 37 DEGREES CELSIUS
BUN SERPL-MCNC: 14 MG/DL (ref 6–23)
CALCIUM SERPL-MCNC: 10.1 MG/DL (ref 8.6–10.3)
CHLORIDE SERPL-SCNC: 97 MMOL/L (ref 98–107)
CO2 SERPL-SCNC: 22 MMOL/L (ref 21–32)
COLOR UR: ABNORMAL
CREAT SERPL-MCNC: 0.84 MG/DL (ref 0.5–1.05)
EGFRCR SERPLBLD CKD-EPI 2021: 76 ML/MIN/1.73M*2
ERYTHROCYTE [DISTWIDTH] IN BLOOD BY AUTOMATED COUNT: 15.6 % (ref 11.5–14.5)
GLUCOSE SERPL-MCNC: 97 MG/DL (ref 74–99)
GLUCOSE UR STRIP.AUTO-MCNC: NORMAL MG/DL
HCO3 BLDV-SCNC: 23.8 MMOL/L (ref 22–26)
HCT VFR BLD AUTO: 27.7 % (ref 36–46)
HGB BLD-MCNC: 9.1 G/DL (ref 12–16)
HOLD SPECIMEN: NORMAL
INHALED O2 CONCENTRATION: 24 %
KETONES UR STRIP.AUTO-MCNC: ABNORMAL MG/DL
LEUKOCYTE ESTERASE UR QL STRIP.AUTO: NEGATIVE
MCH RBC QN AUTO: 28.5 PG (ref 26–34)
MCHC RBC AUTO-ENTMCNC: 32.9 G/DL (ref 32–36)
MCV RBC AUTO: 87 FL (ref 80–100)
MUCOUS THREADS #/AREA URNS AUTO: ABNORMAL /LPF
NITRITE UR QL STRIP.AUTO: NEGATIVE
NRBC BLD-RTO: 0 /100 WBCS (ref 0–0)
OXYHGB MFR BLDV: 92.2 % (ref 45–75)
P AXIS: 19 DEGREES
PCO2 BLDV: 35 MM HG (ref 41–51)
PH BLDV: 7.44 PH (ref 7.33–7.43)
PH UR STRIP.AUTO: 5.5 [PH]
PLATELET # BLD AUTO: 251 X10*3/UL (ref 150–450)
PO2 BLDV: 70 MM HG (ref 35–45)
POTASSIUM SERPL-SCNC: 3.4 MMOL/L (ref 3.5–5.3)
PR INTERVAL: 161 MS
PROT UR STRIP.AUTO-MCNC: ABNORMAL MG/DL
Q ONSET: 249 MS
QRS COUNT: 15 BEATS
QRS DURATION: 90 MS
QT INTERVAL: 339 MS
QTC CALCULATION(BAZETT): 415 MS
QTC FREDERICIA: 388 MS
R AXIS: -11 DEGREES
RBC # BLD AUTO: 3.19 X10*6/UL (ref 4–5.2)
RBC # UR STRIP.AUTO: NEGATIVE /UL
RBC #/AREA URNS AUTO: ABNORMAL /HPF
SAO2 % BLDV: 95 % (ref 45–75)
SODIUM SERPL-SCNC: 131 MMOL/L (ref 136–145)
SP GR UR STRIP.AUTO: 1.01
T AXIS: 22 DEGREES
T OFFSET: 419 MS
UROBILINOGEN UR STRIP.AUTO-MCNC: NORMAL MG/DL
VENTRICULAR RATE: 90 BPM
WBC # BLD AUTO: 7.6 X10*3/UL (ref 4.4–11.3)
WBC #/AREA URNS AUTO: ABNORMAL /HPF

## 2024-12-04 PROCEDURE — 97161 PT EVAL LOW COMPLEX 20 MIN: CPT | Mod: GP | Performed by: PHYSICAL THERAPIST

## 2024-12-04 PROCEDURE — 71045 X-RAY EXAM CHEST 1 VIEW: CPT | Performed by: RADIOLOGY

## 2024-12-04 PROCEDURE — 1200000002 HC GENERAL ROOM WITH TELEMETRY DAILY

## 2024-12-04 PROCEDURE — 2500000005 HC RX 250 GENERAL PHARMACY W/O HCPCS: Performed by: STUDENT IN AN ORGANIZED HEALTH CARE EDUCATION/TRAINING PROGRAM

## 2024-12-04 PROCEDURE — 82374 ASSAY BLOOD CARBON DIOXIDE: CPT | Performed by: STUDENT IN AN ORGANIZED HEALTH CARE EDUCATION/TRAINING PROGRAM

## 2024-12-04 PROCEDURE — 81001 URINALYSIS AUTO W/SCOPE: CPT | Performed by: EMERGENCY MEDICINE

## 2024-12-04 PROCEDURE — 99223 1ST HOSP IP/OBS HIGH 75: CPT | Performed by: STUDENT IN AN ORGANIZED HEALTH CARE EDUCATION/TRAINING PROGRAM

## 2024-12-04 PROCEDURE — 2500000002 HC RX 250 W HCPCS SELF ADMINISTERED DRUGS (ALT 637 FOR MEDICARE OP, ALT 636 FOR OP/ED): Performed by: FAMILY MEDICINE

## 2024-12-04 PROCEDURE — 36415 COLL VENOUS BLD VENIPUNCTURE: CPT | Performed by: STUDENT IN AN ORGANIZED HEALTH CARE EDUCATION/TRAINING PROGRAM

## 2024-12-04 PROCEDURE — 99233 SBSQ HOSP IP/OBS HIGH 50: CPT | Performed by: FAMILY MEDICINE

## 2024-12-04 PROCEDURE — 2550000001 HC RX 255 CONTRASTS: Performed by: FAMILY MEDICINE

## 2024-12-04 PROCEDURE — 70553 MRI BRAIN STEM W/O & W/DYE: CPT

## 2024-12-04 PROCEDURE — 2500000001 HC RX 250 WO HCPCS SELF ADMINISTERED DRUGS (ALT 637 FOR MEDICARE OP): Performed by: STUDENT IN AN ORGANIZED HEALTH CARE EDUCATION/TRAINING PROGRAM

## 2024-12-04 PROCEDURE — 71045 X-RAY EXAM CHEST 1 VIEW: CPT

## 2024-12-04 PROCEDURE — 85027 COMPLETE CBC AUTOMATED: CPT | Performed by: STUDENT IN AN ORGANIZED HEALTH CARE EDUCATION/TRAINING PROGRAM

## 2024-12-04 PROCEDURE — 80048 BASIC METABOLIC PNL TOTAL CA: CPT | Performed by: STUDENT IN AN ORGANIZED HEALTH CARE EDUCATION/TRAINING PROGRAM

## 2024-12-04 PROCEDURE — 82140 ASSAY OF AMMONIA: CPT | Performed by: STUDENT IN AN ORGANIZED HEALTH CARE EDUCATION/TRAINING PROGRAM

## 2024-12-04 PROCEDURE — 2500000004 HC RX 250 GENERAL PHARMACY W/ HCPCS (ALT 636 FOR OP/ED): Performed by: STUDENT IN AN ORGANIZED HEALTH CARE EDUCATION/TRAINING PROGRAM

## 2024-12-04 PROCEDURE — A9575 INJ GADOTERATE MEGLUMI 0.1ML: HCPCS | Performed by: FAMILY MEDICINE

## 2024-12-04 PROCEDURE — 97165 OT EVAL LOW COMPLEX 30 MIN: CPT | Mod: GO

## 2024-12-04 PROCEDURE — 82805 BLOOD GASES W/O2 SATURATION: CPT | Performed by: STUDENT IN AN ORGANIZED HEALTH CARE EDUCATION/TRAINING PROGRAM

## 2024-12-04 PROCEDURE — 70553 MRI BRAIN STEM W/O & W/DYE: CPT | Performed by: STUDENT IN AN ORGANIZED HEALTH CARE EDUCATION/TRAINING PROGRAM

## 2024-12-04 RX ORDER — PANTOPRAZOLE SODIUM 40 MG/1
40 TABLET, DELAYED RELEASE ORAL DAILY
Status: DISCONTINUED | OUTPATIENT
Start: 2024-12-04 | End: 2024-12-07 | Stop reason: HOSPADM

## 2024-12-04 RX ORDER — SODIUM CHLORIDE 9 MG/ML
75 INJECTION, SOLUTION INTRAVENOUS CONTINUOUS
Status: ACTIVE | OUTPATIENT
Start: 2024-12-04 | End: 2024-12-04

## 2024-12-04 RX ORDER — PANTOPRAZOLE SODIUM 40 MG/10ML
40 INJECTION, POWDER, LYOPHILIZED, FOR SOLUTION INTRAVENOUS DAILY
Status: DISCONTINUED | OUTPATIENT
Start: 2024-12-04 | End: 2024-12-07 | Stop reason: HOSPADM

## 2024-12-04 RX ORDER — ONDANSETRON 4 MG/1
4 TABLET, FILM COATED ORAL EVERY 8 HOURS PRN
Status: DISCONTINUED | OUTPATIENT
Start: 2024-12-04 | End: 2024-12-07 | Stop reason: HOSPADM

## 2024-12-04 RX ORDER — POTASSIUM CHLORIDE 750 MG/1
20 TABLET, FILM COATED, EXTENDED RELEASE ORAL ONCE
Status: COMPLETED | OUTPATIENT
Start: 2024-12-04 | End: 2024-12-04

## 2024-12-04 RX ORDER — GADOTERATE MEGLUMINE 376.9 MG/ML
11 INJECTION INTRAVENOUS
Status: COMPLETED | OUTPATIENT
Start: 2024-12-04 | End: 2024-12-04

## 2024-12-04 RX ORDER — ONDANSETRON HYDROCHLORIDE 2 MG/ML
4 INJECTION, SOLUTION INTRAVENOUS EVERY 8 HOURS PRN
Status: DISCONTINUED | OUTPATIENT
Start: 2024-12-04 | End: 2024-12-07 | Stop reason: HOSPADM

## 2024-12-04 RX ORDER — OXYCODONE HYDROCHLORIDE 5 MG/1
5 TABLET ORAL
Status: DISCONTINUED | OUTPATIENT
Start: 2024-12-04 | End: 2024-12-07 | Stop reason: HOSPADM

## 2024-12-04 RX ORDER — BISACODYL 10 MG/1
10 SUPPOSITORY RECTAL DAILY PRN
Status: DISCONTINUED | OUTPATIENT
Start: 2024-12-04 | End: 2024-12-07 | Stop reason: HOSPADM

## 2024-12-04 RX ORDER — TALC
3 POWDER (GRAM) TOPICAL NIGHTLY PRN
Status: DISCONTINUED | OUTPATIENT
Start: 2024-12-04 | End: 2024-12-07 | Stop reason: HOSPADM

## 2024-12-04 RX ORDER — ATORVASTATIN CALCIUM 40 MG/1
80 TABLET, FILM COATED ORAL NIGHTLY
Status: DISCONTINUED | OUTPATIENT
Start: 2024-12-04 | End: 2024-12-07 | Stop reason: HOSPADM

## 2024-12-04 RX ORDER — GUAIFENESIN 600 MG/1
600 TABLET, EXTENDED RELEASE ORAL EVERY 12 HOURS PRN
Status: DISCONTINUED | OUTPATIENT
Start: 2024-12-04 | End: 2024-12-07 | Stop reason: HOSPADM

## 2024-12-04 RX ORDER — BISACODYL 5 MG
10 TABLET, DELAYED RELEASE (ENTERIC COATED) ORAL DAILY PRN
Status: DISCONTINUED | OUTPATIENT
Start: 2024-12-04 | End: 2024-12-07 | Stop reason: HOSPADM

## 2024-12-04 SDOH — SOCIAL STABILITY: SOCIAL INSECURITY: WITHIN THE LAST YEAR, HAVE YOU BEEN AFRAID OF YOUR PARTNER OR EX-PARTNER?: NO

## 2024-12-04 SDOH — SOCIAL STABILITY: SOCIAL INSECURITY: HAVE YOU HAD THOUGHTS OF HARMING ANYONE ELSE?: NO

## 2024-12-04 SDOH — HEALTH STABILITY: MENTAL HEALTH: HOW OFTEN DO YOU HAVE A DRINK CONTAINING ALCOHOL?: MONTHLY OR LESS

## 2024-12-04 SDOH — SOCIAL STABILITY: SOCIAL INSECURITY: WITHIN THE LAST YEAR, HAVE YOU BEEN HUMILIATED OR EMOTIONALLY ABUSED IN OTHER WAYS BY YOUR PARTNER OR EX-PARTNER?: NO

## 2024-12-04 SDOH — SOCIAL STABILITY: SOCIAL INSECURITY: DOES ANYONE TRY TO KEEP YOU FROM HAVING/CONTACTING OTHER FRIENDS OR DOING THINGS OUTSIDE YOUR HOME?: NO

## 2024-12-04 SDOH — SOCIAL STABILITY: SOCIAL INSECURITY: HAVE YOU HAD ANY THOUGHTS OF HARMING ANYONE ELSE?: NO

## 2024-12-04 SDOH — HEALTH STABILITY: MENTAL HEALTH: HOW OFTEN DO YOU HAVE SIX OR MORE DRINKS ON ONE OCCASION?: NEVER

## 2024-12-04 SDOH — SOCIAL STABILITY: SOCIAL INSECURITY: ABUSE: ADULT

## 2024-12-04 SDOH — SOCIAL STABILITY: SOCIAL INSECURITY: DO YOU FEEL ANYONE HAS EXPLOITED OR TAKEN ADVANTAGE OF YOU FINANCIALLY OR OF YOUR PERSONAL PROPERTY?: NO

## 2024-12-04 SDOH — SOCIAL STABILITY: SOCIAL INSECURITY: WERE YOU ABLE TO COMPLETE ALL THE BEHAVIORAL HEALTH SCREENINGS?: YES

## 2024-12-04 SDOH — SOCIAL STABILITY: SOCIAL INSECURITY: DO YOU FEEL UNSAFE GOING BACK TO THE PLACE WHERE YOU ARE LIVING?: NO

## 2024-12-04 SDOH — HEALTH STABILITY: MENTAL HEALTH: HOW MANY DRINKS CONTAINING ALCOHOL DO YOU HAVE ON A TYPICAL DAY WHEN YOU ARE DRINKING?: 1 OR 2

## 2024-12-04 SDOH — ECONOMIC STABILITY: INCOME INSECURITY: IN THE PAST 12 MONTHS HAS THE ELECTRIC, GAS, OIL, OR WATER COMPANY THREATENED TO SHUT OFF SERVICES IN YOUR HOME?: NO

## 2024-12-04 SDOH — ECONOMIC STABILITY: FOOD INSECURITY: HOW HARD IS IT FOR YOU TO PAY FOR THE VERY BASICS LIKE FOOD, HOUSING, MEDICAL CARE, AND HEATING?: NOT HARD AT ALL

## 2024-12-04 SDOH — SOCIAL STABILITY: SOCIAL INSECURITY: ARE THERE ANY APPARENT SIGNS OF INJURIES/BEHAVIORS THAT COULD BE RELATED TO ABUSE/NEGLECT?: NO

## 2024-12-04 SDOH — SOCIAL STABILITY: SOCIAL INSECURITY: ARE YOU OR HAVE YOU BEEN THREATENED OR ABUSED PHYSICALLY, EMOTIONALLY, OR SEXUALLY BY ANYONE?: NO

## 2024-12-04 SDOH — ECONOMIC STABILITY: HOUSING INSECURITY: IN THE LAST 12 MONTHS, WAS THERE A TIME WHEN YOU WERE NOT ABLE TO PAY THE MORTGAGE OR RENT ON TIME?: NO

## 2024-12-04 SDOH — ECONOMIC STABILITY: FOOD INSECURITY: WITHIN THE PAST 12 MONTHS, THE FOOD YOU BOUGHT JUST DIDN'T LAST AND YOU DIDN'T HAVE MONEY TO GET MORE.: NEVER TRUE

## 2024-12-04 SDOH — ECONOMIC STABILITY: FOOD INSECURITY: WITHIN THE PAST 12 MONTHS, YOU WORRIED THAT YOUR FOOD WOULD RUN OUT BEFORE YOU GOT THE MONEY TO BUY MORE.: NEVER TRUE

## 2024-12-04 SDOH — ECONOMIC STABILITY: HOUSING INSECURITY: AT ANY TIME IN THE PAST 12 MONTHS, WERE YOU HOMELESS OR LIVING IN A SHELTER (INCLUDING NOW)?: NO

## 2024-12-04 SDOH — HEALTH STABILITY: PHYSICAL HEALTH: ON AVERAGE, HOW MANY DAYS PER WEEK DO YOU ENGAGE IN MODERATE TO STRENUOUS EXERCISE (LIKE A BRISK WALK)?: 0 DAYS

## 2024-12-04 SDOH — ECONOMIC STABILITY: TRANSPORTATION INSECURITY: IN THE PAST 12 MONTHS, HAS LACK OF TRANSPORTATION KEPT YOU FROM MEDICAL APPOINTMENTS OR FROM GETTING MEDICATIONS?: NO

## 2024-12-04 SDOH — SOCIAL STABILITY: SOCIAL INSECURITY: HAS ANYONE EVER THREATENED TO HURT YOUR FAMILY OR YOUR PETS?: NO

## 2024-12-04 SDOH — ECONOMIC STABILITY: HOUSING INSECURITY: IN THE PAST 12 MONTHS, HOW MANY TIMES HAVE YOU MOVED WHERE YOU WERE LIVING?: 1

## 2024-12-04 ASSESSMENT — ACTIVITIES OF DAILY LIVING (ADL)
LACK_OF_TRANSPORTATION: NO
HEARING - LEFT EAR: FUNCTIONAL
FEEDING YOURSELF: INDEPENDENT
LACK_OF_TRANSPORTATION: NO
JUDGMENT_ADEQUATE_SAFELY_COMPLETE_DAILY_ACTIVITIES: YES
DRESSING YOURSELF: NEEDS ASSISTANCE
HEARING - RIGHT EAR: FUNCTIONAL
DRESSING YOURSELF: NEEDS ASSISTANCE
LACK_OF_TRANSPORTATION: NO
TOILETING: NEEDS ASSISTANCE
ADL_ASSISTANCE: NEEDS ASSISTANCE
BATHING_ASSISTANCE: MAXIMAL
ADEQUATE_TO_COMPLETE_ADL: YES
FEEDING YOURSELF: INDEPENDENT
BATHING: NEEDS ASSISTANCE
HEARING - LEFT EAR: FUNCTIONAL
ASSISTIVE_DEVICE: EYEGLASSES;WALKER
WALKS IN HOME: NEEDS ASSISTANCE
BATHING: NEEDS ASSISTANCE
GROOMING: NEEDS ASSISTANCE
ADEQUATE_TO_COMPLETE_ADL: YES
ASSISTIVE_DEVICE: EYEGLASSES;WALKER
PATIENT'S MEMORY ADEQUATE TO SAFELY COMPLETE DAILY ACTIVITIES?: YES
PATIENT'S MEMORY ADEQUATE TO SAFELY COMPLETE DAILY ACTIVITIES?: YES
GROOMING: NEEDS ASSISTANCE
JUDGMENT_ADEQUATE_SAFELY_COMPLETE_DAILY_ACTIVITIES: YES
TOILETING: NEEDS ASSISTANCE
HEARING - RIGHT EAR: FUNCTIONAL
WALKS IN HOME: NEEDS ASSISTANCE

## 2024-12-04 ASSESSMENT — COGNITIVE AND FUNCTIONAL STATUS - GENERAL
TOILETING: A LOT
WALKING IN HOSPITAL ROOM: TOTAL
DRESSING REGULAR LOWER BODY CLOTHING: TOTAL
PERSONAL GROOMING: A LITTLE
STANDING UP FROM CHAIR USING ARMS: A LOT
DRESSING REGULAR UPPER BODY CLOTHING: A LOT
DAILY ACTIVITIY SCORE: 12
MOVING FROM LYING ON BACK TO SITTING ON SIDE OF FLAT BED WITH BEDRAILS: A LOT
MOVING FROM LYING ON BACK TO SITTING ON SIDE OF FLAT BED WITH BEDRAILS: A LOT
WALKING IN HOSPITAL ROOM: TOTAL
HELP NEEDED FOR BATHING: A LOT
MOBILITY SCORE: 10
TOILETING: A LOT
TURNING FROM BACK TO SIDE WHILE IN FLAT BAD: A LOT
HELP NEEDED FOR BATHING: A LOT
CLIMB 3 TO 5 STEPS WITH RAILING: TOTAL
MOVING TO AND FROM BED TO CHAIR: A LOT
MOVING TO AND FROM BED TO CHAIR: A LOT
TURNING FROM BACK TO SIDE WHILE IN FLAT BAD: A LOT
EATING MEALS: A LITTLE
MOVING FROM LYING ON BACK TO SITTING ON SIDE OF FLAT BED WITH BEDRAILS: A LOT
DRESSING REGULAR LOWER BODY CLOTHING: TOTAL
EATING MEALS: A LITTLE
CLIMB 3 TO 5 STEPS WITH RAILING: TOTAL
DAILY ACTIVITIY SCORE: 13
CLIMB 3 TO 5 STEPS WITH RAILING: TOTAL
HELP NEEDED FOR BATHING: A LOT
DRESSING REGULAR LOWER BODY CLOTHING: TOTAL
PERSONAL GROOMING: A LITTLE
DRESSING REGULAR UPPER BODY CLOTHING: A LOT
EATING MEALS: A LITTLE
STANDING UP FROM CHAIR USING ARMS: A LOT
PERSONAL GROOMING: A LITTLE
WALKING IN HOSPITAL ROOM: TOTAL
TOILETING: A LOT
MOBILITY SCORE: 10
TURNING FROM BACK TO SIDE WHILE IN FLAT BAD: A LOT
DAILY ACTIVITIY SCORE: 13
STANDING UP FROM CHAIR USING ARMS: A LOT
TOILETING: TOTAL
TURNING FROM BACK TO SIDE WHILE IN FLAT BAD: A LOT
PERSONAL GROOMING: A LITTLE
HELP NEEDED FOR BATHING: A LOT
CLIMB 3 TO 5 STEPS WITH RAILING: TOTAL
STANDING UP FROM CHAIR USING ARMS: A LOT
MOVING FROM LYING ON BACK TO SITTING ON SIDE OF FLAT BED WITH BEDRAILS: A LOT
EATING MEALS: A LITTLE
MOVING TO AND FROM BED TO CHAIR: A LOT
DRESSING REGULAR UPPER BODY CLOTHING: A LOT
PATIENT BASELINE BEDBOUND: NO
MOVING TO AND FROM BED TO CHAIR: A LOT
DRESSING REGULAR UPPER BODY CLOTHING: A LOT
WALKING IN HOSPITAL ROOM: A LOT
DRESSING REGULAR LOWER BODY CLOTHING: TOTAL
MOBILITY SCORE: 11

## 2024-12-04 ASSESSMENT — ENCOUNTER SYMPTOMS
MYALGIAS: 0
FLANK PAIN: 0
HEADACHES: 0
WHEEZING: 0
SINUS PAIN: 0
ABDOMINAL DISTENTION: 0
DIZZINESS: 0
DIARRHEA: 0
APPETITE CHANGE: 0
NERVOUS/ANXIOUS: 0
FREQUENCY: 0
PALPITATIONS: 0
FEVER: 0
DIFFICULTY URINATING: 0
SORE THROAT: 0
WOUND: 0
COUGH: 0
JOINT SWELLING: 0
HEMATURIA: 0
ACTIVITY CHANGE: 0
VOMITING: 0
BACK PAIN: 0
APNEA: 0
AGITATION: 0
DYSURIA: 0
DECREASED CONCENTRATION: 0
WEAKNESS: 0
DIAPHORESIS: 0
CONSTIPATION: 0
LIGHT-HEADEDNESS: 0
NUMBNESS: 0
NAUSEA: 0
CONFUSION: 1
ABDOMINAL PAIN: 0
ARTHRALGIAS: 0
STRIDOR: 0
FATIGUE: 0
CHILLS: 0
CHEST TIGHTNESS: 0
RHINORRHEA: 0
SHORTNESS OF BREATH: 0
COLOR CHANGE: 0

## 2024-12-04 ASSESSMENT — LIFESTYLE VARIABLES
SKIP TO QUESTIONS 9-10: 1
HOW OFTEN DO YOU HAVE 6 OR MORE DRINKS ON ONE OCCASION: NEVER
AUDIT-C TOTAL SCORE: 1
HOW OFTEN DO YOU HAVE A DRINK CONTAINING ALCOHOL: MONTHLY OR LESS
AUDIT-C TOTAL SCORE: 1
SKIP TO QUESTIONS 9-10: 1
AUDIT-C TOTAL SCORE: 1
HOW MANY STANDARD DRINKS CONTAINING ALCOHOL DO YOU HAVE ON A TYPICAL DAY: 1 OR 2
AUDIT-C TOTAL SCORE: 1
SKIP TO QUESTIONS 9-10: 1

## 2024-12-04 ASSESSMENT — PAIN SCALES - GENERAL
PAINLEVEL_OUTOF10: 8
PAINLEVEL_OUTOF10: 2
PAINLEVEL_OUTOF10: 0 - NO PAIN
PAINLEVEL_OUTOF10: 2
PAINLEVEL_OUTOF10: 3
PAINLEVEL_OUTOF10: 8
PAINLEVEL_OUTOF10: 5 - MODERATE PAIN
PAINLEVEL_OUTOF10: 0 - NO PAIN

## 2024-12-04 ASSESSMENT — PAIN DESCRIPTION - DESCRIPTORS: DESCRIPTORS: ACHING

## 2024-12-04 ASSESSMENT — PAIN - FUNCTIONAL ASSESSMENT
PAIN_FUNCTIONAL_ASSESSMENT: 0-10

## 2024-12-04 ASSESSMENT — PAIN DESCRIPTION - ORIENTATION: ORIENTATION: RIGHT;LEFT

## 2024-12-04 ASSESSMENT — PATIENT HEALTH QUESTIONNAIRE - PHQ9
2. FEELING DOWN, DEPRESSED OR HOPELESS: NOT AT ALL
1. LITTLE INTEREST OR PLEASURE IN DOING THINGS: NOT AT ALL
SUM OF ALL RESPONSES TO PHQ9 QUESTIONS 1 & 2: 0

## 2024-12-04 ASSESSMENT — PAIN DESCRIPTION - LOCATION: LOCATION: SHOULDER

## 2024-12-04 NOTE — PROGRESS NOTES
Occupational Therapy    Evaluation    Patient Name: Brittny Gordon  MRN: [id number]  Department: [facility] E  Room: [facility]2329-A  Today's Date: 12/4/2024  Time Calculation  Start Time: 1311  Stop Time: 1332  Time Calculation (min): 21 min        Assessment:  OT Assessment: Pt is 69 joseluis old female admitted for disorientation and hypotension. Pt requiring 2 person assistance for mobility and ADLs. Pt would benefit from skilled OT services during hospital stay to address goals.  Prognosis: Good  Evaluation/Treatment Tolerance: Patient tolerated treatment well  End of Session Communication: Bedside nurse  End of Session Patient Position: Bed, 3 rail up, Alarm on  OT Assessment Results: Decreased ADL status, Decreased upper extremity strength, Decreased safe judgment during ADL, Decreased endurance, Decreased functional mobility  Prognosis: Good  Evaluation/Treatment Tolerance: Patient tolerated treatment well  Plan:  Treatment Interventions: ADL retraining, Functional transfer training, UE strengthening/ROM, Endurance training, Cognitive reorientation, Patient/family training, Equipment evaluation/education, Compensatory technique education  OT Frequency: 3 times per week  OT Discharge Recommendations: Moderate intensity level of continued care  OT Recommended Transfer Status: Assist of 2  OT - OK to Discharge: Yes (When medically appropriate)  Treatment Interventions: ADL retraining, Functional transfer training, UE strengthening/ROM, Endurance training, Cognitive reorientation, Patient/family training, Equipment evaluation/education, Compensatory technique education    Subjective   Current Problem:  1. Disorientation        2. Hypotension, unspecified hypotension type          General:  General  Reason for Referral: Disorienation, hypotension  Referred By: Bradley  Past Medical History Relevant to Rehab: NSCLC, HTN, HLD, osteoporosis, AAA s/p repair, pathologic fractures of L arm, L2, L3, T12 vertebrae, R  leg  Family/Caregiver Present: Yes  Caregiver Feedback: Brother at bedside, provided insight into PLOF and history  Co-Treatment: PT  Co-Treatment Reason: to optimize mobility and safety while focusing on discipline specific needs  Prior to Session Communication: Bedside nurse  Patient Position Received: Bed, 3 rail up, Alarm on  General Comment: Pt supine with HOB elevated. LUE sling in place. Pt cooperative to work with therapy.  Precautions:  LE Weight Bearing Status:  (WBAT RLE)  Medical Precautions: Fall precautions  Precautions Comment: pathologic fractures of L arm, L2, L3, T12 vertebrae, R femur    Vital Sign (Past 2hrs)        Date/Time Vitals Session Patient Position Pulse Resp SpO2 BP MAP (mmHg)    12/04/24 1434 --  --  98  17  92 %  115/71  86                         Pain:  Pain Assessment  Pain Assessment: 0-10  0-10 (Numeric) Pain Score: 5 - Moderate pain  Pain Type: Chronic pain    Objective   Cognition:  Overall Cognitive Status: Impaired  Arousal/Alertness: Delayed responses to stimuli  Orientation Level: Disoriented to time, Disoriented to situation  Following Commands: Follows one step commands with repetition  Safety Judgment: Decreased awareness of need for safety precautions  Problem Solving: Assistance required to identify errors made  Cognition Comments: Flat affect  Insight: Moderate  Processing Speed: Delayed           Home Living:  Type of Home: House  Lives With: Alone  Home Adaptive Equipment: Walker rolling or standard, Wheelchair-manual, Hospital bed (Rollator)  Home Layout: Multi-level, Able to live on main level with bedroom/bathroom  Home Access: Stairs to enter without rails  Entrance Stairs-Number of Steps: 1  Bathroom Equipment: Bedside commode  Bathroom Accessibility: Typically uses BSC and sponge bathing, does not use bathroom for hygiene tasks  Prior Function:  Level of [place]: Needs assistance with ADLs  Receives Help From: Family, Home health (HHA present 6am-10am and  "5pm to 9pm 7days/week. Brother present during the day.)  ADL Assistance: Needs assistance  Homemaking Assistance: Needs assistance  Ambulatory Assistance: Needs assistance (Mobility with wheelchair, stand pivot transfer with assistance; Pt's brother reports pt has not walked since October 2/2 femur fracture)  Prior Function Comments: (-) falls  IADL History:  IADL Comments: HHA/family assist with IADLs; (-) drive  ADL:  Eating Assistance: Minimal (Anticipated)  Grooming Assistance: Minimal (Anticipated)  Bathing Assistance: Maximal (Anticipated)  UE Dressing Assistance: Maximal (Anticipated)  LE Dressing Assistance: Total (Anticipated)  Toileting Assistance with Device: Total (Anticipated)  Activity Tolerance:  Endurance: Tolerates 10 - 20 min exercise with multiple rests  Activity Tolerance Comments: Bed mobility to sit EOB, pt reported dizziness and \"black spots\" in vision. Improved with static rest  Bed Mobility/Transfers: Bed Mobility  Bed Mobility: Yes  Bed Mobility 1  Bed Mobility 1: Supine to sitting, Sitting to supine  Level of Assistance 1: Maximum assistance, +2    Transfers  Transfer: Yes  Transfer 1  Transfer From 1: Sit to, Stand to  Transfer to 1: Sit, Stand  Technique 1: Sit to stand, Stand to sit  Transfer Device 1: Walker  Transfer Level of Assistance 1: Minimum assistance, +2      Functional Mobility:  Functional Mobility  Functional Mobility Performed:  (Lateral steps with FWW toward HOB with MIN Ax 2)  Sitting Balance:  Static Sitting Balance  Static Sitting-Balance Support: Bilateral upper extremity supported, Right upper extremity supported  Static Sitting-Level of Assistance: Minimum assistance, Contact guard  Dynamic Sitting Balance  Dynamic Sitting-Balance Support: No upper extremity supported  Dynamic Sitting-Level of Assistance: Maximum assistance  Standing Balance:  Static Standing Balance  Static Standing-Balance Support: Bilateral upper extremity supported  Static Standing-Level of " Assistance: Minimum assistance  Dynamic Standing Balance  Dynamic Standing-Balance Support: Bilateral upper extremity supported  Dynamic Standing-Level of Assistance: Minimum assistance   Modalities:     Vision:Vision - Basic Assessment  Current Vision: Wears glasses all the time  Sensation:  Light Touch: No apparent deficits  Strength:  Strength Comments: Generalized weakness in BUE; Not formally assessed 2/2 pathological fractures and pain  Perception:     Coordination:      Hand Function:  Gross Grasp: Functional      Outcome Measures:[facility] Daily Activity  Putting on and taking off regular lower body clothing: Total  Bathing (including washing, rinsing, drying): A lot  Putting on and taking off regular upper body clothing: A lot  Toileting, which includes using toilet, bedpan or urinal: Total  Taking care of personal grooming such as brushing teeth: A little  Eating Meals: A little  Daily Activity - Total Score: 12        Education Documentation  Precautions, taught by Nancy Guy OT at 12/4/2024  2:46 PM.  Learner: Patient  Readiness: Acceptance  Method: Explanation  Response: Verbalizes Understanding    ADL Training, taught by Nancy Guy OT at 12/4/2024  2:46 PM.  Learner: Patient  Readiness: Acceptance  Method: Explanation  Response: Verbalizes Understanding    Education Comments  No comments found.        OP EDUCATION:       Goals:  Encounter Problems       Encounter Problems (Active)       ADLs       Patient will perform UB and LB bathing with minimal assist  level of assistance. (Progressing)       Start:  12/04/24    Expected End:  12/18/24            Patient will complete daily grooming tasks  with supervision and set up level of assistance and PRN adaptive equipment. (Progressing)       Start:  12/04/24    Expected End:  12/18/24            Patient will complete toileting including hygiene clothing management/hygiene with minimal assist  level of assistance. (Progressing)       Start:   12/04/24    Expected End:  12/18/24               TRANSFERS       Patient will perform bed mobility minimal assist  level of assistance and bed rails in order to improve safety and independence with mobility (Progressing)       Start:  12/04/24    Expected End:  12/18/24            Patient will complete functional transfers with least restrictive device with minimal assist  level of assistance. (Progressing)       Start:  12/04/24    Expected End:  12/18/24

## 2024-12-04 NOTE — CARE PLAN
The patient's goals for the shift include      The clinical goals for the shift include Pt will remain free from falls or injuries this shift      Problem: Pain - Adult  Goal: Verbalizes/displays adequate comfort level or baseline comfort level  Outcome: Progressing     Problem: Safety - Adult  Goal: Free from fall injury  Outcome: Progressing     Problem: Discharge Planning  Goal: Discharge to home or other facility with appropriate resources  Outcome: Progressing     Problem: Chronic Conditions and Co-morbidities  Goal: Patient's chronic conditions and co-morbidity symptoms are monitored and maintained or improved  Outcome: Progressing     Problem: Fall/Injury  Goal: Not fall by end of shift  Outcome: Progressing  Goal: Be free from injury by end of the shift  Outcome: Progressing  Goal: Verbalize understanding of personal risk factors for fall in the hospital  Outcome: Progressing  Goal: Verbalize understanding of risk factor reduction measures to prevent injury from fall in the home  Outcome: Progressing  Goal: Use assistive devices by end of the shift  Outcome: Progressing  Goal: Pace activities to prevent fatigue by end of the shift  Outcome: Progressing     Problem: Skin  Goal: Decreased wound size/increased tissue granulation at next dressing change  Outcome: Progressing  Flowsheets (Taken 12/4/2024 1100)  Decreased wound size/increased tissue granulation at next dressing change: Promote sleep for wound healing  Goal: Participates in plan/prevention/treatment measures  Outcome: Progressing  Flowsheets (Taken 12/4/2024 1100)  Participates in plan/prevention/treatment measures:   Elevate heels   Increase activity/out of bed for meals  Goal: Prevent/manage excess moisture  Outcome: Progressing  Flowsheets (Taken 12/4/2024 1100)  Prevent/manage excess moisture:   Cleanse incontinence/protect with barrier cream   Moisturize dry skin   Monitor for/manage infection if present  Goal: Prevent/minimize sheer/friction  injuries  Outcome: Progressing  Flowsheets (Taken 12/4/2024 1100)  Prevent/minimize sheer/friction injuries:   Complete micro-shifts as needed if patient unable. Adjust patient position to relieve pressure points, not a full turn   HOB 30 degrees or less   Turn/reposition every 2 hours/use positioning/transfer devices   Use pull sheet  Goal: Promote/optimize nutrition  Outcome: Progressing  Flowsheets (Taken 12/4/2024 1100)  Promote/optimize nutrition:   Assist with feeding   Consume > 50% meals/supplements   Monitor/record intake including meals  Goal: Promote skin healing  Outcome: Progressing  Flowsheets (Taken 12/4/2024 1100)  Promote skin healing:   Assess skin/pad under line(s)/device(s)   Ensure correct size (line/device) and apply per  instructions   Rotate device position/do not position patient on device   Turn/reposition every 2 hours/use positioning/transfer devices     Problem: Pain  Goal: Takes deep breaths with improved pain control throughout the shift  Outcome: Progressing  Goal: Turns in bed with improved pain control throughout the shift  Outcome: Progressing  Goal: Walks with improved pain control throughout the shift  Outcome: Progressing  Goal: Performs ADL's with improved pain control throughout shift  Outcome: Progressing  Goal: Participates in PT with improved pain control throughout the shift  Outcome: Progressing  Goal: Free from opioid side effects throughout the shift  Outcome: Progressing  Goal: Free from acute confusion related to pain meds throughout the shift  Outcome: Progressing

## 2024-12-04 NOTE — PROGRESS NOTES
Physical Therapy    Physical Therapy Evaluation    Patient Name: Brittny Gordon  MRN: [id number]  Today's Date: 12/4/2024   Time Calculation  Start Time: 1312  Stop Time: 1332  Time Calculation (min): 20 min  2329/2329-A    Assessment/Plan   PT Assessment  PT Assessment Results: Decreased strength, Decreased endurance, Impaired balance, Decreased mobility, Decreased cognition, Pain, Orthopedic restrictions  Rehab Prognosis: Good  Barriers to Discharge: none  Evaluation/Treatment Tolerance: Patient limited by fatigue, Patient limited by pain  Medical Staff Made Aware: Yes  End of Session Communication: Bedside nurse  Assessment Comment: Patient requires 2 assist for mobility. Would benefit from continued therapy at discharge to improve functional independence.  End of Session Patient Position: Bed, 3 rail up, Alarm on  IP OR SWING BED PT PLAN  Inpatient or Swing Bed: Inpatient  PT Plan  Treatment/Interventions: Bed mobility, Transfer training, Gait training, Balance training, Strengthening, Endurance training, Therapeutic exercise, Therapeutic activity  PT Plan: Ongoing PT  PT Frequency: 4 times per week  PT Discharge Recommendations: Moderate intensity level of continued care  PT - OK to Discharge: Yes (when medically cleared)      General Visit Information:  General  Reason for Referral: Dx: Confusion, hypotension  Referred By: Bradley  Past Medical History Relevant to Rehab: NSCLC, HTN, HLD, osteoporosis, AAA s/p repair, pathologic fractures of L arm/clavicle, L2, L3, T12 vertebrae, R leg  Co-Treatment Reason: with OT for safety and mobility  Prior to Session Communication: Bedside nurse  Patient Position Received: Bed, 3 rail up, Alarm on  General Comment:  (Seen awake and alert but confused in room 2329, brother present; sling on L UE)    Home Living:  Home Living  Type of Home:  (Lives alone, stays on first level, 1 step to enter. Patient transfers to W/C, has hospital bed, brother stays with her during the day  (lives near by), has HHA 6-10 am and 5-9 pm daily. Patient has had decline in ability and mobility over last few weeks.)    Prior Level of Function:       Precautions:  Precautions  Precautions Comment: falls; sling (likely NWB) L UE; spinal precautions with pathological fx's    Vital Signs:     Objective     Pain:  Pain Assessment  0-10 (Numeric) Pain Score:  (moderate pain but describes vague location)    Cognition:  Cognition  Overall Cognitive Status:  (oriented to self, able to state hospital (but stated [place]), not fully oriented to situation)    General Assessments:      Activity Tolerance  Endurance: Decreased tolerance for upright activites     Strength  Strength Comments: generalized weakness throughout; KATINA LE quads grossly 4-/5        Postural Control  Postural Control:  (Sitting balance fair/fair-; standing balance fair- with AD and 2 assist)          Functional Assessments:     Bed Mobility  Bed Mobility:  (Supine to/from sit with Max assist x 2; cues for sequencing, safety, balance, posture, wt shifting)  Transfers  Transfer:  (Sit to stand with Min assist x 2 and FWW (lightly resting L UE on AD); cues for sequencing, safety, balance, posture, wt shifting, AD use, NWB L UE)  Ambulation/Gait Training  Ambulation/Gait Training Performed:  (Amb few side steps with Min assist x 2 and FWW (lightly resting L UE on AD); cues for sequencing, safety, balance, posture, wt shifting, AD use, NWB L UE)          Extremity/Trunk Assessments:                Outcome Measures:     [facility] Basic Mobility  Turning from your back to your side while in a flat bed without using bedrails: A lot  Moving from lying on your back to sitting on the side of a flat bed without using bedrails: A lot  Moving to and from bed to chair (including a wheelchair): A lot  Standing up from a chair using your arms (e.g. wheelchair or bedside chair): A lot  To walk in hospital room: A lot  Climbing 3-5 steps with railing: Total  Basic Mobility  - Total Score: 11                                                             Goals:  Encounter Problems       Encounter Problems (Active)       PT Problem       transfers       Start:  12/04/24    Expected End:  12/18/24       Patient will perform all transfers with Min assist x 1 following precautions           strengthening        Start:  12/04/24    Expected End:  12/18/24       Patient will perform 20+ reps of AROM/RROM for KATINA LE's to improve safety and functional independence           PT Goal 3       Start:  12/04/24               Pain - Adult            Education Documentation  Precautions, taught by Elo Mendez, PT at 12/4/2024  2:54 PM.  Learner: Patient  Readiness: Acceptance  Method: Explanation  Response: Needs Reinforcement    Mobility Training, taught by Elo Mendez, PT at 12/4/2024  2:54 PM.  Learner: Patient  Readiness: Acceptance  Method: Explanation  Response: Needs Reinforcement    Education Comments  No comments found.

## 2024-12-04 NOTE — CARE PLAN
The patient's goals for the shift include      The clinical goals for the shift include Pt will remain free from falls or injuries this shift

## 2024-12-04 NOTE — H&P
"[facility] - GENERAL MEDICINE HISTORY AND PHYSICAL    History Obtained From (Primary Source): Pt  Collateral History (Secondary Sources): D/w ED physician, oncology notes    History Of Present Illness (HPI):  Brittny Gordon is a 69 y.o. female with PMHx s/f NSCLC, HTN, HLD, osteoporosis, AAA s/p repair, pathologic fractures of L arm, L2, L3, T12 vertebrae, R leg presenting with confusion. She receives palliative radiation to her broken femur and clavicle.  Pt was in the oncology office earlier today for her monthly Keytruda infusion. Staff noticed that she was apparently quite confused and disoriented. Her blood pressure was notably 79/50 initially. Her brother reports that he is struggling caring for her at home. She was sent to the ER. No Keytruda was given today. Pt on interview today is A&OX4, but does have issues with conversation. She is able to give a good HPI of what happened earlier today and relates her past medical history mostly correctly. She does think that she has SCLC rather than NSCLC. She thinks Trista is the president already, and that the other david \"should be removed\". She is aware about her Keytruda infusions. Frequently jumps between topics with and seems somewhat annoyed and offended that she was sent to the ER in the first place. Does complain about pain in the aforementioned extremities, but not worse than usual. No nausea, vomiting, chest pain, fever, chills, abdominal pain or other worsening symptoms reported.    ED Course (Summary - please note all labs, imaging studies, and interventions noted below have been personally reviewed and/or interpreted on day of admission):   Vitals on presentation: 97.2 F, 96 bpm, 16 rr, 87/59, 98% on RA  Labs: CMP Na 130 -> 131, Cr 1.2 -> 0.98  Lactate 1.7  CBC WBC 9.6, Hg 10.5, Plt 300  TSH 2.47  Flu and COVID negative  EKG: NSR at 90 bpm. No ST changes.  Imaging: CT head - No acute cortical infarct or acute intracranial hemorrhage. "   Interventions:  ml bolus,  ml bolus, pt admitted for further care.    12-point ROS reviewed and found to be negative aside from aforementioned positives in HPI and/or noted in dedicated ROS section below.     ED Course (From ED Provider):  Diagnoses as of 12/04/24 0018   Disorientation   Hypotension, unspecified hypotension type     Relevant Results  Results for orders placed or performed during the hospital encounter of 12/03/24 (from the past 24 hours)   Lactate   Result Value Ref Range    Lactate 1.7 0.4 - 2.0 mmol/L   Blood Culture    Specimen: Peripheral Venipuncture; Blood culture   Result Value Ref Range    Blood Culture Loaded on Instrument - Culture in progress    Blood Culture    Specimen: Peripheral Venipuncture; Blood culture   Result Value Ref Range    Blood Culture Loaded on Instrument - Culture in progress    Sars-CoV-2 and Influenza A/B PCR   Result Value Ref Range    Flu A Result Not Detected Not Detected    Flu B Result Not Detected Not Detected    Coronavirus 2019, PCR Not Detected Not Detected   Comprehensive metabolic panel   Result Value Ref Range    Glucose 93 74 - 99 mg/dL    Sodium 131 (L) 136 - 145 mmol/L    Potassium 3.5 3.5 - 5.3 mmol/L    Chloride 96 (L) 98 - 107 mmol/L    Bicarbonate 24 21 - 32 mmol/L    Anion Gap 15 10 - 20 mmol/L    Urea Nitrogen 15 6 - 23 mg/dL    Creatinine 0.98 0.50 - 1.05 mg/dL    eGFR 63 >60 mL/min/1.73m*2    Calcium 10.0 8.6 - 10.3 mg/dL    Albumin 2.2 (L) 3.4 - 5.0 g/dL    Alkaline Phosphatase 112 33 - 136 U/L    Total Protein 4.9 (L) 6.4 - 8.2 g/dL    AST 9 9 - 39 U/L    Bilirubin, Total 0.4 0.0 - 1.2 mg/dL    ALT 5 (L) 7 - 45 U/L      CT head wo IV contrast    Result Date: 12/3/2024  Interpreted By:  Kieran Wild, STUDY: CT HEAD WO IV CONTRAST;  12/3/2024 5:57 pm   INDICATION: Signs/Symptoms:ams.   COMPARISON: Head CT 08/27/2024.   ACCESSION NUMBER(S): [id number]   ORDERING CLINICIAN: KEYONA MIRZA   TECHNIQUE: Axial noncontrast CT images  of the head.   FINDINGS: Gray-white matter differentiation is maintained. There are no extra-axial collections. No mass effect or midline shift. There is no hydrocephalus. Bilateral basal ganglia hypodensities, likely chronic ischemic..   HEMORRHAGE: No acute intracranial hemorrhage.   CALVARIUM: No depressed skull fracture.   EXTRACRANIAL SOFT TISSUES: Unremarkable.   PARANASAL SINUSES/MASTOIDS: The visualized paranasal sinuses are well aerated. Mastoid air cells are clear.   ORBITS: Grossly normal.       No acute cortical infarct or acute intracranial hemorrhage.     Signed by: Kieran Wild 12/3/2024 6:25 PM Dictation workstation:   [id number]    Scheduled medications:  apixaban, 5 mg, oral, BID  atorvastatin, 80 mg, oral, Nightly  pantoprazole, 40 mg, oral, Daily   Or  pantoprazole, 40 mg, intravenous, Daily      Continuous medications:     PRN medications:  PRN medications: bisacodyl, bisacodyl, guaiFENesin, melatonin, ondansetron **OR** ondansetron, oxyCODONE     Past Medical History  She has a past medical history of Hypercholesteremia, Hypertension, Lung cancer (Multi), Lung nodule seen on imaging study, Personal history of irradiation, Saccular aneurysm (HHS-HCC), Vision loss, and Wedge compression fracture of t11-T12 vertebra, sequela (07/30/2020).    Surgical History  She has a past surgical history that includes Radiofrequency ablation; Colonoscopy; [place] tooth extraction; and Arterial aneurysm repair.     Social History  She reports that she quit smoking about 11 years ago. Her smoking use included cigarettes. She started smoking about 51 years ago. She has a 20 pack-year smoking history. She has never used smokeless tobacco. She reports that she does not currently use alcohol. She reports that she does not currently use drugs.    Family History  Family History   Problem Relation Name Age of Onset    Lymphoma Father      Polycythemia Father      Breast cancer Neg Hx      Colon cancer Neg Hx          Allergies  Morphine, Acetaminophen, and Epinephrine    Code Status  Full Code     Review of Systems   Constitutional:  Negative for activity change, appetite change, chills, diaphoresis, fatigue and fever.   HENT:  Negative for congestion, ear pain, rhinorrhea, sinus pain and sore throat.    Respiratory:  Negative for apnea, cough, chest tightness, shortness of breath, wheezing and stridor.    Cardiovascular:  Negative for chest pain, palpitations and leg swelling.   Gastrointestinal:  Negative for abdominal distention, abdominal pain, constipation, diarrhea, nausea and vomiting.   Genitourinary:  Negative for difficulty urinating, dysuria, flank pain, frequency, hematuria and urgency.   Musculoskeletal:  Negative for arthralgias, back pain, gait problem, joint swelling and myalgias.   Skin:  Negative for color change, pallor, rash and wound.   Neurological:  Negative for dizziness, syncope, weakness, light-headedness, numbness and headaches.   Psychiatric/Behavioral:  Positive for behavioral problems and confusion. Negative for agitation and decreased concentration. The patient is not nervous/anxious.    All other systems reviewed and are negative.      Last Recorded Vitals  /61   Pulse 98   Temp 36.2 °C (97.2 °F) (Skin)   Resp 20   Wt 59 kg (130 lb)   SpO2 95%      Physical Exam:  Vital signs and nursing notes reviewed.   Constitutional: Pleasant and cooperative. Laying in bed in no acute distress. Conversant.   Skin: Warm and dry; no obvious lesions, rashes, pallor, or jaundice.   Eyes: EOMI. Anicteric sclera.   ENT: Mucous membranes moist; no obvious injury or deformity appreciated.   Head and Neck: Normocephalic, atraumatic. ROM preserved. Trachea midline. No appreciable JVD.   Respiratory: Nonlabored on RA. Lungs clear to auscultation bilaterally without obvious adventitious sounds. Chest rise is equal.  Cardiovascular: RRR. No gross murmur, gallop, or rub. Extremities are warm and  well-perfused with good capillary refill (< 3 seconds). No chest wall tenderness.   GI: Abdomen soft, nontender, nondistended. No obvious organomegaly appreciated. Bowel sounds are present.  : No CVA tenderness.   MSK: No gross abnormalities appreciated. No limitations to AROM/PROM appreciated.   Extremities: No cyanosis, edema, or clubbing evident. Neurovascularly intact.   Neuro: A&Ox3. CN 2-12 grossly intact. Able to respond to questions appropriately and clearly. No acute focal neurologic deficits appreciated.  Psych: Appropriate mood and behavior.    Assessment/Plan     69 y.o. female with PMHx s/f NSCLC, HTN, HLD, osteoporosis, AAA s/p repair, pathologic fractures of L arm, L2, L3, T12 vertebrae, R leg presenting with confusion.    Plan:  Admit to telemetry    Confusion/disorientation, hypotension:  Bp now 122/61 after fluid administration. Continue to monitor.  Check VBG, ammonia, CXR, UA for other organic causes.  Pt is A&O X4 currently. Does have some issues with conversing appropriately, but overall makes clear choices and gives a reasonable history.  CT head negative.    Hypercalcemia:  Corrected calcium is 11.4 on PM labs.  Will give gentle hydration and recheck in AM.  May be related to hypercalcemia of malignancy.  Gentle hydration overnight.    Hx DVT/PE:  Continue home Eliquis    Hld:  Continue home Lipitor.    Continue other appropriate home medications once med rec updated.    Hx NSCLC - follow-up with oncology    Diet: Regular  DVT Prophylaxis: Home Eliquis  Code Status: Full code  Case Discussed With: ED provider  Additional Sources Reviewed: oncology notes    Anticipated Length of Stay (LOS): 1-2 midnights for monitoring.     DO Thomas Covington dictation software was used to dictate this note and thus there may be minor errors in translation/transcription including garbled speech or misspellings. Please contact for clarification if needed.

## 2024-12-04 NOTE — PROGRESS NOTES
12/04/24 1426   Discharge Planning   Living Arrangements Alone   Support Systems Family members   Assistance Needed total   Type of Residence Private residence   Number of Stairs to Enter Residence 1   Home or Post Acute Services None   Expected Discharge Disposition Home   Does the patient need discharge transport arranged? Yes   RoundTrip coordination needed? Yes   Housing Stability   At any time in the past 12 months, were you homeless or living in a shelter (including now)? N   Transportation Needs   In the past 12 months, has lack of transportation kept you from meetings, work, or from getting things needed for daily living? No   Intensity of Service   Intensity of Service >30 min     I spoke with patient to introduce discharge planning and explain role of TCC. Pt agreeable to speaking with me in presence of her brother, Juice.  Pt states she lives alone in a 2 story home but lives on first floor.  Beau states that he lives nearby and is over daily.  Pt describes being essentially bedbound.  She has HHAs through [facility] [place] that come to the house 6-10a and 5-9p 7 days a week.  Juice comes over around 10 until 5.  Her aides help with personal care and assist with heating meals.  Juice purchases her groceries, usually soups and frozen foods. He states pt does not really eat very much.  Pt does have a hospital bed and a rollator but states she does not walk.  She is able to stand enough to get into a WC when she has an appt.  Juice drives her to appts for pall radiation and immunotherapy but also utilize [facility].  They have a portable ramp for the step.  She denies any falls.  Pt states Dr. Feliz has been managing her care.   PT/OT evals pending but pt states she was at [facility] a few months back and they were not able to 'understand her condition.'  She said that she does not want to go to a rehab again.  She also said that the Home care she had after her SNF stay was too much for her to participate  in.  Pt describes having cancer related fractures and prefers not to participate in rehab or home therapy.  She was resistant to hospice when I mentioned it. Pt states she just wants to be home.  Will continue to follow for needs.

## 2024-12-04 NOTE — PROGRESS NOTES
Emergency Medicine Transition of Care Note.    I received Brittny Gordon in signout from Dr. Avalos.  Please see the previous ED provider note for all HPI, PE and MDM up to the time of signout at 1600. This is in addition to the primary record.    In brief Brittny Gordon is an 69 y.o. female presenting for   Chief Complaint   Patient presents with    Altered Mental Status    Hypotension     At the time of signout we were awaiting: Labs and CT head    Diagnoses as of 12/03/24 2352   Disorientation   Hypotension, unspecified hypotension type       Medical Decision Making    This is a 69-year-old female who presents with altered mental status.  Has a history of metastatic lung cancer which has metastasized to her bones.  She was at the infusion center to get Keytruda and she was noted to be confused as well as hypotensive.  This is a significant deviation from her prior exam per oncology note that was reviewed.  Patient was pending labs and CT head at change of shift.    Per my chart review patient's sodium was 130, had been 128 approximately 3 weeks ago.  Potassium was normal.  Calcium was elevated at 10.6, they are concerned that may be the etiology of her confusion.  Creatinine was also elevated at 1.2, baseline is 0.4.  Patient was given 500 cc normal saline.  Will add another 500cc of fluids.  Confirmed that she has no history of heart failure.  Echo from 8/29/2004 showed EF of 63%.    On my re-eval: Patient remains confused.  With regards to her infusion center visit, she recalls that they were asking her a lot of questions so she got confused and frustrated at the infusion center but she is disoriented to place here now.  Blood pressure remains low normal at 100/50s.  Will discuss admission with hospitalist.    2330 Ca is 10.0. Spoke to hospitalist, agreed to admission for confusion and hypotension       Final diagnoses:   [R41.0] Disorientation   [I95.9] Hypotension, unspecified hypotension type            Freeman Madden MD MPH

## 2024-12-05 ENCOUNTER — TELEPHONE (OUTPATIENT)
Dept: RADIATION ONCOLOGY | Facility: HOSPITAL | Age: 69
End: 2024-12-05
Payer: MEDICARE

## 2024-12-05 LAB
ACTH PLAS-MCNC: 30 PG/ML (ref 7.2–63.3)
AMPHETAMINES UR QL SCN: ABNORMAL
ANION GAP SERPL CALC-SCNC: 16 MMOL/L (ref 10–20)
BARBITURATES UR QL SCN: ABNORMAL
BENZODIAZ UR QL SCN: ABNORMAL
BUN SERPL-MCNC: 12 MG/DL (ref 6–23)
BZE UR QL SCN: ABNORMAL
CALCIUM SERPL-MCNC: 10.1 MG/DL (ref 8.6–10.3)
CANNABINOIDS UR QL SCN: ABNORMAL
CHLORIDE SERPL-SCNC: 98 MMOL/L (ref 98–107)
CO2 SERPL-SCNC: 24 MMOL/L (ref 21–32)
CREAT SERPL-MCNC: 0.66 MG/DL (ref 0.5–1.05)
EGFRCR SERPLBLD CKD-EPI 2021: >90 ML/MIN/1.73M*2
ERYTHROCYTE [DISTWIDTH] IN BLOOD BY AUTOMATED COUNT: 15.7 % (ref 11.5–14.5)
FENTANYL+NORFENTANYL UR QL SCN: ABNORMAL
GLUCOSE SERPL-MCNC: 90 MG/DL (ref 74–99)
HCT VFR BLD AUTO: 29.9 % (ref 36–46)
HGB BLD-MCNC: 9.4 G/DL (ref 12–16)
MAGNESIUM SERPL-MCNC: 1.31 MG/DL (ref 1.6–2.4)
MCH RBC QN AUTO: 27.6 PG (ref 26–34)
MCHC RBC AUTO-ENTMCNC: 31.4 G/DL (ref 32–36)
MCV RBC AUTO: 88 FL (ref 80–100)
METHADONE UR QL SCN: ABNORMAL
NRBC BLD-RTO: 0 /100 WBCS (ref 0–0)
OPIATES UR QL SCN: ABNORMAL
OXYCODONE+OXYMORPHONE UR QL SCN: ABNORMAL
PCP UR QL SCN: ABNORMAL
PLATELET # BLD AUTO: 278 X10*3/UL (ref 150–450)
POTASSIUM SERPL-SCNC: 3.6 MMOL/L (ref 3.5–5.3)
RBC # BLD AUTO: 3.41 X10*6/UL (ref 4–5.2)
SODIUM SERPL-SCNC: 134 MMOL/L (ref 136–145)
WBC # BLD AUTO: 7.9 X10*3/UL (ref 4.4–11.3)

## 2024-12-05 PROCEDURE — 80307 DRUG TEST PRSMV CHEM ANLYZR: CPT | Performed by: PSYCHIATRY & NEUROLOGY

## 2024-12-05 PROCEDURE — 36415 COLL VENOUS BLD VENIPUNCTURE: CPT | Performed by: FAMILY MEDICINE

## 2024-12-05 PROCEDURE — 97530 THERAPEUTIC ACTIVITIES: CPT | Mod: GP,CQ

## 2024-12-05 PROCEDURE — 83735 ASSAY OF MAGNESIUM: CPT | Performed by: FAMILY MEDICINE

## 2024-12-05 PROCEDURE — 82374 ASSAY BLOOD CARBON DIOXIDE: CPT | Performed by: FAMILY MEDICINE

## 2024-12-05 PROCEDURE — 2500000001 HC RX 250 WO HCPCS SELF ADMINISTERED DRUGS (ALT 637 FOR MEDICARE OP): Performed by: STUDENT IN AN ORGANIZED HEALTH CARE EDUCATION/TRAINING PROGRAM

## 2024-12-05 PROCEDURE — 97530 THERAPEUTIC ACTIVITIES: CPT | Mod: GO,CO

## 2024-12-05 PROCEDURE — 2500000004 HC RX 250 GENERAL PHARMACY W/ HCPCS (ALT 636 FOR OP/ED): Performed by: FAMILY MEDICINE

## 2024-12-05 PROCEDURE — 85027 COMPLETE CBC AUTOMATED: CPT | Performed by: FAMILY MEDICINE

## 2024-12-05 PROCEDURE — 99233 SBSQ HOSP IP/OBS HIGH 50: CPT | Performed by: FAMILY MEDICINE

## 2024-12-05 PROCEDURE — 1200000002 HC GENERAL ROOM WITH TELEMETRY DAILY

## 2024-12-05 PROCEDURE — 99223 1ST HOSP IP/OBS HIGH 75: CPT | Performed by: PSYCHIATRY & NEUROLOGY

## 2024-12-05 RX ORDER — MAGNESIUM SULFATE HEPTAHYDRATE 40 MG/ML
2 INJECTION, SOLUTION INTRAVENOUS ONCE
Status: COMPLETED | OUTPATIENT
Start: 2024-12-05 | End: 2024-12-05

## 2024-12-05 ASSESSMENT — COGNITIVE AND FUNCTIONAL STATUS - GENERAL
MOVING FROM LYING ON BACK TO SITTING ON SIDE OF FLAT BED WITH BEDRAILS: A LOT
HELP NEEDED FOR BATHING: A LOT
CLIMB 3 TO 5 STEPS WITH RAILING: TOTAL
MOVING TO AND FROM BED TO CHAIR: A LOT
MOBILITY SCORE: 11
DAILY ACTIVITIY SCORE: 13
TOILETING: A LOT
EATING MEALS: A LITTLE
TOILETING: A LOT
DRESSING REGULAR LOWER BODY CLOTHING: TOTAL
CLIMB 3 TO 5 STEPS WITH RAILING: TOTAL
MOBILITY SCORE: 11
HELP NEEDED FOR BATHING: A LOT
MOVING TO AND FROM BED TO CHAIR: A LOT
DRESSING REGULAR UPPER BODY CLOTHING: A LOT
TURNING FROM BACK TO SIDE WHILE IN FLAT BAD: A LOT
DRESSING REGULAR UPPER BODY CLOTHING: A LOT
DRESSING REGULAR LOWER BODY CLOTHING: A LOT
PERSONAL GROOMING: A LITTLE
WALKING IN HOSPITAL ROOM: A LOT
MOVING FROM LYING ON BACK TO SITTING ON SIDE OF FLAT BED WITH BEDRAILS: A LOT
STANDING UP FROM CHAIR USING ARMS: A LOT
TURNING FROM BACK TO SIDE WHILE IN FLAT BAD: A LOT
DAILY ACTIVITIY SCORE: 14
WALKING IN HOSPITAL ROOM: A LOT
STANDING UP FROM CHAIR USING ARMS: A LOT
EATING MEALS: A LITTLE
PERSONAL GROOMING: A LITTLE

## 2024-12-05 ASSESSMENT — PAIN - FUNCTIONAL ASSESSMENT
PAIN_FUNCTIONAL_ASSESSMENT: 0-10

## 2024-12-05 ASSESSMENT — PAIN SCALES - GENERAL
PAINLEVEL_OUTOF10: 3
PAINLEVEL_OUTOF10: 2
PAINLEVEL_OUTOF10: 0 - NO PAIN
PAINLEVEL_OUTOF10: 3
PAINLEVEL_OUTOF10: 3
PAINLEVEL_OUTOF10: 0 - NO PAIN

## 2024-12-05 ASSESSMENT — PAIN DESCRIPTION - DESCRIPTORS: DESCRIPTORS: ACHING

## 2024-12-05 NOTE — PROGRESS NOTES
"Brittny Gordon is a 69 y.o. female on day 1 of admission presenting with Disorientation.      Subjective   69 y.o. female with PMHx s/f NSCLC, HTN, HLD, osteoporosis, AAA s/p repair, pathologic fractures of L arm, L2, L3, T12 vertebrae, R leg presenting with confusion. She receives palliative radiation to her broken femur and clavicle.  Pt was in the oncology office earlier today for her monthly Keytruda infusion. Staff noticed that she was apparently quite confused and disoriented. Her blood pressure was notably 79/50 initially. Her brother reports that he is struggling caring for her at home. She was sent to the ER. No Keytruda was given today. Pt on interview today is A&OX4, but does have issues with conversation. She is able to give a good HPI of what happened earlier today and relates her past medical history mostly correctly. She does think that she has SCLC rather than NSCLC. She thinks Trista is the president already, and that the other david \"should be removed\". She is aware about her Keytruda infusions. Frequently jumps between topics with and seems somewhat annoyed and offended that she was sent to the ER in the first place. Does complain about pain in the aforementioned extremities, but not worse than usual. No nausea, vomiting, chest pain, fever, chills, abdominal pain or other worsening symptoms reported.     ED Course (Summary - please note all labs, imaging studies, and interventions noted below have been personally reviewed and/or interpreted on day of admission):   Vitals on presentation: 97.2 F, 96 bpm, 16 rr, 87/59, 98% on RA  Labs: CMP Na 130 -> 131, Cr 1.2 -> 0.98  Lactate 1.7  CBC WBC 9.6, Hg 10.5, Plt 300  TSH 2.47  Flu and COVID negative  EKG: NSR at 90 bpm. No ST changes.  Imaging: CT head - No acute cortical infarct or acute intracranial hemorrhage.   Interventions:  ml bolus,  ml bolus      12/4:                 Today the patient is seen initially alone but later in the " afternoon in the presence of her son.  She is awake alert and interactive reasonably appropriately but does appear to have some word finding difficulties.  Also would appear to have some memory loss.  Son relates last night that she apparently did not recognize his hospital though he is brought her here several times to [facility].  She was able to correctly state the year and month as well as current president and president electronic but could not name his opponent and election.  Blood cultures remain no growth.  Mild hypercalcemia normalized today.  Discussed with son plan of obtaining MRI with and without contrast to try and rule out any brain mets.  He relates that she has basically not left her room since fracturing her femur.  He feels over the past 3 perhaps 4 days that she has appeared to weekend and become confused.  He does feel she is improved today over presentation.    12/5:                  Today the patient remains awake alert and interactive reasonably appropriately but continues to appear to have some word finding difficulties or confusion.  She continues to states she is feeling improved and does not see why she needs to be in the hospital.  Neurology evaluated the patient and describe likely encephalopathy and checking urine drug screen and B12 level.  Recommending outpatient neurosurgery evaluation for left AICA aneurysm which does not appear to be new and unchanged at least since July 2024.  At that time there does appear to be a recommendation for neurosurgery evaluation though I cannot find documentation this was done.    Review of Systems   Constitutional:  Negative for activity change, appetite change, chills, diaphoresis, fatigue and fever.   HENT:  Negative for congestion, ear pain, rhinorrhea, sinus pain and sore throat.    Respiratory:  Negative for apnea, cough, chest tightness, shortness of breath, wheezing and stridor.    Cardiovascular:  Negative for chest pain,  palpitations and leg swelling.   Gastrointestinal:  Negative for abdominal distention, abdominal pain, constipation, diarrhea, nausea and vomiting.   Genitourinary:  Negative for difficulty urinating, dysuria, flank pain, frequency, hematuria and urgency.   Musculoskeletal:  Negative for arthralgias, back pain, gait problem, joint swelling and myalgias.   Skin:  Negative for color change, pallor, rash and wound.   Neurological:  Negative for dizziness, syncope, weakness, light-headedness, numbness and headaches.   Psychiatric/Behavioral:  Positive for behavioral problems and confusion. Negative for agitation and decreased concentration. The patient is not nervous/anxious.    All other systems reviewed and are negative.         Objective     Last Recorded Vitals  /83 (BP Location: Right arm, Patient Position: Lying)   Pulse 106   Temp 36.4 °C (97.6 °F) (Temporal)   Resp 18   Wt 59 kg (130 lb)   SpO2 90%   Intake/Output last 3 Shifts:    Intake/Output Summary (Last 24 hours) at 12/5/2024 1850  Last data filed at 12/5/2024 1405  Gross per 24 hour   Intake 100 ml   Output 350 ml   Net -250 ml       Admission Weight  Weight: 59 kg (130 lb) (12/03/24 1418)    Daily Weight  12/03/24 : 59 kg (130 lb)    Image Results  MR brain w and wo IV contrast  Narrative: Interpreted By:  Star Ferraro,   STUDY:  MR BRAIN W AND WO IV CONTRAST;  12/4/2024 9:01 pm      INDICATION:  Signs/Symptoms:Altered mental status with metastatic lung cancer.          COMPARISON:  MRI 07/19/2024      ACCESSION NUMBER(S):  [id number]      ORDERING CLINICIAN:  RADHA TORRES      TECHNIQUE:  Axial T2, FLAIR, DWI, gradient echo T2 and sagittal and coronal T1  weighted images of brain were acquired. Post contrast T1 weighted  images were acquired after administration of 11 ML Dotarem gadolinium  based intravenous contrast.      FINDINGS:  CSF Spaces: The ventricles, sulci and basal cisterns are within  normal limits.      Parenchyma: There is  no diffusion restriction abnormality to suggest  acute infarct.  There is no focal parenchymal signal abnormality.  There is no mass effect or midline shift. 7 mm left-sided AICA  aneurysm at the cerebellar pontine angle. No abnormally enhancing  mass. Prominent Virchow Cervantes spaces.      Paranasal Sinuses and Mastoids: Visualized paranasal sinuses and  mastoid air cells are unremarkable.      Impression: 1.  No evidence of acute infarct, intracranial mass effect or midline  shift.  2.  7 mm left-sided AICA aneurysm at the left cerebellar pontine  angle.      MACRO:  None      Signed by: Star Ferraro 12/4/2024 9:34 PM  Dictation workstation:   [id number]  ECG 12 lead  Sinus rhythm  Probable left atrial enlargement  Inferior infarct, old  Consider anterior infarct  XR chest 1 view  Narrative: Interpreted By:  Felipe Harman,   STUDY:  XR CHEST 1 VIEW;  12/4/2024 12:33 am      INDICATION:  Signs/Symptoms:AMS hypotensive.      COMPARISON:  10/23/2024      ACCESSION NUMBER(S):  [id number]      ORDERING CLINICIAN:  DIVYA RUANO      FINDINGS:  Median sternotomy wires and mediastinal surgical clips.      Heart size is within normal limits. Low lung volumes, limiting  evaluation. Streaky bibasilar and right midlung airspace opacities.  No pleural effusion or pneumothorax. There is multilevel spinal  degenerative change. Expansile lesion of the left distal clavicle  with associated pathologic fracture, likely metastatic. Patchy  sclerosis of the right glenoid.      Impression: 1. Streaky bibasilar airspace opacities, likely subsegmental  atelectasis/scarring. Superimposed infection/inflammation could  result in a similar appearance.  2. Expansile lesion of the left distal clavicle with associated  pathologic fracture, likely metastatic.      Signed by: Felipe Harman 12/4/2024 1:11 AM  Dictation workstation:   [id number]      Physical Exam  Constitutional: Pleasant and cooperative. Laying in bed in no  acute distress. Conversant.   Skin: Warm and dry; no obvious lesions, rashes, pallor, or jaundice.   Eyes: EOMI. Anicteric sclera.   ENT: Mucous membranes moist; no obvious injury or deformity appreciated.   Head and Neck: Normocephalic, atraumatic. ROM preserved. Trachea midline. No appreciable JVD.   Respiratory: Nonlabored on RA. Lungs clear to auscultation bilaterally without obvious adventitious sounds. Chest rise is equal.  Cardiovascular: RRR. No gross murmur, gallop, or rub. Extremities are warm and well-perfused with good capillary refill (< 3 seconds). No chest wall tenderness.   GI: Abdomen soft, nontender, nondistended. No obvious organomegaly appreciated. Bowel sounds are present.  : No CVA tenderness.   MSK: No gross abnormalities appreciated. No limitations to AROM/PROM appreciated.   Extremities: No cyanosis, edema, or clubbing evident. Neurovascularly intact.   Neuro: A&Ox3. CN 2-12 grossly intact. Able to respond to questions generally appropriately and clearly.  Appears to have some word finding difficulty and perhaps short-term memory loss.  No abnormal or involuntary movements of  Psych: Appropriate mood and behavior.     Relevant Results               Assessment/Plan                  Assessment & Plan  Disorientation    Confusion/disorientation, hypotension:  Bp now 122/61 after fluid administration. Continue to monitor.  Check VBG, ammonia, CXR, UA for other organic causes.  Pt is A&O X4 currently. Does have some issues with conversing appropriately, but overall makes clear choices and gives a reasonable history.  CT head negative.  12/4: Son feels she is improved today though not at her usual baseline.  Will check MRI with and without contrast in view of her known metastatic lung CA.  12/5: MRI does not reveal any evidence for CVA or brain metastasis.  Does show 7 mm AICA likely aneurysm which was also seen July 2024 and at that time appears unchanged.  However no evidence of neurosurgical  evaluation as was recommended at this time.  Will recommend neurosurgery evaluation at discharge.     Hypercalcemia:  Corrected calcium is 11.4 on PM labs.  Will give gentle hydration and recheck in AM.  May be related to hypercalcemia of malignancy.  Gentle hydration overnight.  12/4: Calcium 10.1 today.  12/5: Calcium remains 10.1.     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     Continue other appropriate home medications once med rec updated.     Hx NSCLC - follow-up with oncology              Jesus Manuel Arredondo MD

## 2024-12-05 NOTE — PROGRESS NOTES
Physical Therapy                 Therapy Communication Note    Patient Name: Brittny Gordon  MRN: [id number]  Department: [facility]  Room: 2329/2329-A  Today's Date: 12/5/2024     Discipline: Physical Therapy    Missed Visit Reason:      Missed Time: Attempt    Comment:

## 2024-12-05 NOTE — PROGRESS NOTES
"Brittny Gordon is a 69 y.o. female on day 0 of admission presenting with Disorientation.      Subjective   69 y.o. female with PMHx s/f NSCLC, HTN, HLD, osteoporosis, AAA s/p repair, pathologic fractures of L arm, L2, L3, T12 vertebrae, R leg presenting with confusion. She receives palliative radiation to her broken femur and clavicle.  Pt was in the oncology office earlier today for her monthly Keytruda infusion. Staff noticed that she was apparently quite confused and disoriented. Her blood pressure was notably 79/50 initially. Her brother reports that he is struggling caring for her at home. She was sent to the ER. No Keytruda was given today. Pt on interview today is A&OX4, but does have issues with conversation. She is able to give a good HPI of what happened earlier today and relates her past medical history mostly correctly. She does think that she has SCLC rather than NSCLC. She thinks Trista is the president already, and that the other david \"should be removed\". She is aware about her Keytruda infusions. Frequently jumps between topics with and seems somewhat annoyed and offended that she was sent to the ER in the first place. Does complain about pain in the aforementioned extremities, but not worse than usual. No nausea, vomiting, chest pain, fever, chills, abdominal pain or other worsening symptoms reported.     ED Course (Summary - please note all labs, imaging studies, and interventions noted below have been personally reviewed and/or interpreted on day of admission):   Vitals on presentation: 97.2 F, 96 bpm, 16 rr, 87/59, 98% on RA  Labs: CMP Na 130 -> 131, Cr 1.2 -> 0.98  Lactate 1.7  CBC WBC 9.6, Hg 10.5, Plt 300  TSH 2.47  Flu and COVID negative  EKG: NSR at 90 bpm. No ST changes.  Imaging: CT head - No acute cortical infarct or acute intracranial hemorrhage.   Interventions:  ml bolus,  ml bolus      12/4:                 Today the patient is seen initially alone but later in the " afternoon in the presence of her son.  She is awake alert and interactive reasonably appropriately but does appear to have some word finding difficulties.  Also would appear to have some memory loss.  Son relates last night that she apparently did not recognize his hospital though he is brought her here several times to [facility].  She was able to correctly state the year and month as well as current president and president electronic but could not name his opponent and election.  Blood cultures remain no growth.  Mild hypercalcemia normalized today.  Discussed with son plan of obtaining MRI with and without contrast to try and rule out any brain mets.  He relates that she has basically not left her room since fracturing her femur.  He feels over the past 3 perhaps 4 days that she has appeared to weekend and become confused.  He does feel she is improved today over presentation.    Review of Systems   Constitutional:  Negative for activity change, appetite change, chills, diaphoresis, fatigue and fever.   HENT:  Negative for congestion, ear pain, rhinorrhea, sinus pain and sore throat.    Respiratory:  Negative for apnea, cough, chest tightness, shortness of breath, wheezing and stridor.    Cardiovascular:  Negative for chest pain, palpitations and leg swelling.   Gastrointestinal:  Negative for abdominal distention, abdominal pain, constipation, diarrhea, nausea and vomiting.   Genitourinary:  Negative for difficulty urinating, dysuria, flank pain, frequency, hematuria and urgency.   Musculoskeletal:  Negative for arthralgias, back pain, gait problem, joint swelling and myalgias.   Skin:  Negative for color change, pallor, rash and wound.   Neurological:  Negative for dizziness, syncope, weakness, light-headedness, numbness and headaches.   Psychiatric/Behavioral:  Positive for behavioral problems and confusion. Negative for agitation and decreased concentration. The patient is not nervous/anxious.     All other systems reviewed and are negative.         Objective     Last Recorded Vitals  /81 (BP Location: Right arm, Patient Position: Lying)   Pulse 91   Temp 36 °C (96.8 °F) (Temporal)   Resp 20   Wt 59 kg (130 lb)   SpO2 91%   Intake/Output last 3 Shifts:    Intake/Output Summary (Last 24 hours) at 12/4/2024 1919  Last data filed at 12/4/2024 1434  Gross per 24 hour   Intake 270 ml   Output 250 ml   Net 20 ml       Admission Weight  Weight: 59 kg (130 lb) (12/03/24 1418)    Daily Weight  12/03/24 : 59 kg (130 lb)    Image Results  ECG 12 lead  Sinus rhythm  Probable left atrial enlargement  Inferior infarct, old  Consider anterior infarct  XR chest 1 view  Narrative: Interpreted By:  Felipe Harman,   STUDY:  XR CHEST 1 VIEW;  12/4/2024 12:33 am      INDICATION:  Signs/Symptoms:AMS hypotensive.      COMPARISON:  10/23/2024      ACCESSION NUMBER(S):  [id number]      ORDERING CLINICIAN:  DIVYA RUANO      FINDINGS:  Median sternotomy wires and mediastinal surgical clips.      Heart size is within normal limits. Low lung volumes, limiting  evaluation. Streaky bibasilar and right midlung airspace opacities.  No pleural effusion or pneumothorax. There is multilevel spinal  degenerative change. Expansile lesion of the left distal clavicle  with associated pathologic fracture, likely metastatic. Patchy  sclerosis of the right glenoid.      Impression: 1. Streaky bibasilar airspace opacities, likely subsegmental  atelectasis/scarring. Superimposed infection/inflammation could  result in a similar appearance.  2. Expansile lesion of the left distal clavicle with associated  pathologic fracture, likely metastatic.      Signed by: Felipe Harman 12/4/2024 1:11 AM  Dictation workstation:   [id number]      Physical Exam  Constitutional: Pleasant and cooperative. Laying in bed in no acute distress. Conversant.   Skin: Warm and dry; no obvious lesions, rashes, pallor, or jaundice.   Eyes: EOMI.  Anicteric sclera.   ENT: Mucous membranes moist; no obvious injury or deformity appreciated.   Head and Neck: Normocephalic, atraumatic. ROM preserved. Trachea midline. No appreciable JVD.   Respiratory: Nonlabored on RA. Lungs clear to auscultation bilaterally without obvious adventitious sounds. Chest rise is equal.  Cardiovascular: RRR. No gross murmur, gallop, or rub. Extremities are warm and well-perfused with good capillary refill (< 3 seconds). No chest wall tenderness.   GI: Abdomen soft, nontender, nondistended. No obvious organomegaly appreciated. Bowel sounds are present.  : No CVA tenderness.   MSK: No gross abnormalities appreciated. No limitations to AROM/PROM appreciated.   Extremities: No cyanosis, edema, or clubbing evident. Neurovascularly intact.   Neuro: A&Ox3. CN 2-12 grossly intact. Able to respond to questions generally appropriately and clearly.  Appears to have some word finding difficulty and perhaps short-term memory loss.  No abnormal or involuntary movements of  Psych: Appropriate mood and behavior.     Relevant Results               Assessment/Plan                  Assessment & Plan  Disorientation    Confusion/disorientation, hypotension:  Bp now 122/61 after fluid administration. Continue to monitor.  Check VBG, ammonia, CXR, UA for other organic causes.  Pt is A&O X4 currently. Does have some issues with conversing appropriately, but overall makes clear choices and gives a reasonable history.  CT head negative.  12/4: Son feels she is improved today though not at her usual baseline.  Will check MRI with and without contrast in view of her known metastatic lung CA.     Hypercalcemia:  Corrected calcium is 11.4 on PM labs.  Will give gentle hydration and recheck in AM.  May be related to hypercalcemia of malignancy.  Gentle hydration overnight.  12/4: Calcium 10.1 today.     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     Continue other appropriate home  medications once med rec updated.     Hx NSCLC - follow-up with oncology              Jesus Manuel Arredondo MD

## 2024-12-05 NOTE — CARE PLAN
The patient's goals for the shift include      The clinical goals for the shift include Pt will remain free from falls or injuries this shift      Problem: Pain - Adult  Goal: Verbalizes/displays adequate comfort level or baseline comfort level  Outcome: Progressing     Problem: Safety - Adult  Goal: Free from fall injury  Outcome: Progressing     Problem: Discharge Planning  Goal: Discharge to home or other facility with appropriate resources  Outcome: Progressing     Problem: Chronic Conditions and Co-morbidities  Goal: Patient's chronic conditions and co-morbidity symptoms are monitored and maintained or improved  Outcome: Progressing     Problem: Fall/Injury  Goal: Not fall by end of shift  Outcome: Progressing  Goal: Be free from injury by end of the shift  Outcome: Progressing  Goal: Verbalize understanding of personal risk factors for fall in the hospital  Outcome: Progressing  Goal: Verbalize understanding of risk factor reduction measures to prevent injury from fall in the home  Outcome: Progressing  Goal: Use assistive devices by end of the shift  Outcome: Progressing  Goal: Pace activities to prevent fatigue by end of the shift  Outcome: Progressing     Problem: Skin  Goal: Decreased wound size/increased tissue granulation at next dressing change  Outcome: Progressing  Goal: Participates in plan/prevention/treatment measures  Outcome: Progressing  Goal: Prevent/manage excess moisture  Outcome: Progressing  Goal: Prevent/minimize sheer/friction injuries  Outcome: Progressing  Goal: Promote/optimize nutrition  Outcome: Progressing  Goal: Promote skin healing  Outcome: Progressing     Problem: Pain  Goal: Takes deep breaths with improved pain control throughout the shift  Outcome: Progressing  Goal: Turns in bed with improved pain control throughout the shift  Outcome: Progressing  Goal: Walks with improved pain control throughout the shift  Outcome: Progressing  Goal: Performs ADL's with improved pain  control throughout shift  Outcome: Progressing  Goal: Participates in PT with improved pain control throughout the shift  Outcome: Progressing  Goal: Free from opioid side effects throughout the shift  Outcome: Progressing  Goal: Free from acute confusion related to pain meds throughout the shift  Outcome: Progressing

## 2024-12-05 NOTE — PROGRESS NOTES
Social work consult placed for discharge planning. SW reviewed pt's chart and communicated with TCC. No SW needs foreseen at this time. SW signing off; available upon request.    Rakan Virgen, MSW, LSW ([id number])   Care Transitions

## 2024-12-05 NOTE — PROGRESS NOTES
Physical Therapy    Physical Therapy Treatment    Patient Name: Brittny Gordon  MRN: [id number]  Department: [address]  Room: [address] [place], [place] [address]-A  Today's Date: 12/5/2024  Time Calculation  Start Time: 1315  Stop Time: 1330  Time Calculation (min): 15 min         Assessment/Plan   PT Assessment  PT Assessment Results: Decreased strength, Decreased endurance, Impaired balance, Impaired judgement, Decreased safety awareness  Rehab Prognosis: Good  Evaluation/Treatment Tolerance: Patient limited by fatigue  End of Session Communication: Bedside nurse, PCT/NA/CTA  Assessment Comment: pt required edu on safety and to maintain NWB on LUE. pt stood with min assist x2. pt then too a seated rest break EOB. pt and  were persistant of wanting to get up to WC. pt stood and took steps over to WC. pt declined any further actitivty stating her  was going to push her around the hallway in WC. PCA and RN aware of pt in WC.  End of Session Patient Position:  (WC)     PT Plan  Treatment/Interventions: Bed mobility, Transfer training, Gait training, Balance training, Strengthening, Endurance training, Therapeutic exercise, Therapeutic activity  PT Plan: Ongoing PT  PT Frequency: 4 times per week  PT Discharge Recommendations: Moderate intensity level of continued care  PT - OK to Discharge: Yes (when medically cleared)      General Visit Information:   PT  Visit  PT Received On: 12/05/24  General  Family/Caregiver Present: Yes  Caregiver Feedback: pt declined sating she is in too much pain and needs pain meds. RN notified.    Subjective   Precautions:       Vital Signs (Past 2hrs)                 Objective   Pain:  Pain Assessment  0-10 (Numeric) Pain Score: 0 - No pain  Cognition:  Cognition  Arousal/Alertness: Delayed responses to stimuli  Orientation Level: Disoriented to situation  Coordination:          Treatments:       Bed Mobility 1  Bed Mobility 1: Supine to sitting  Level of Assistance 1: Minimum assistance  (x2)    Ambulation/Gait Training  Ambulation/Gait Training Performed: Yes  Ambulation/Gait Training 1  Surface 1: Level tile  Device 1:  (HHA)  Assistance 1: Minimum assistance  Quality of Gait 1: Diminished heel strike, Decreased step length, Shuffling gait  Comments/Distance (ft) 1: 3  Transfer 1  Technique 1: Sit to stand, Stand to sit  Transfer Device 1: Walker  Transfer Level of Assistance 1: Minimum assistance  Trials/Comments 1: x2    Outcome Measures:  [facility] Basic Mobility  Turning from your back to your side while in a flat bed without using bedrails: A lot  Moving from lying on your back to sitting on the side of a flat bed without using bedrails: A lot  Moving to and from bed to chair (including a wheelchair): A lot  Standing up from a chair using your arms (e.g. wheelchair or bedside chair): A lot  To walk in hospital room: A lot  Climbing 3-5 steps with railing: Total  Basic Mobility - Total Score: 11    Education Documentation  Mobility Training, taught by Norah Goldberg PTA at 12/5/2024  2:00 PM.  Learner: Patient  Readiness: Acceptance  Method: Explanation  Response: Verbalizes Understanding    Education Comments  No comments found.        OP EDUCATION:       Encounter Problems       Encounter Problems (Active)       PT Problem       transfers (Progressing)       Start:  12/04/24    Expected End:  12/18/24       Patient will perform all transfers with Min assist x 1 following precautions           strengthening  (Progressing)       Start:  12/04/24    Expected End:  12/18/24       Patient will perform 20+ reps of AROM/RROM for KATINA LE's to improve safety and functional independence           PT Goal 3 (Progressing)       Start:  12/04/24               Pain - Adult

## 2024-12-05 NOTE — PROGRESS NOTES
Occupational Therapy    OT Treatment    Patient Name: Brittny Gordon  MRN: [id number]  Department: [facility]  Room: [address] [place], [place] [address]-A  Today's Date: 12/5/2024  Time Calculation  Start Time: 1314  Stop Time: 1330  Time Calculation (min): 16 min        Assessment:  OT Assessment: Pt progressed to participating in functional transfers with min A x2. Pt requires mod to max verbal cues to remain on task, seqeuncing and transfer technique.  End of Session Communication: Bedside nurse  End of Session Patient Position:  (wheelchair, no alarm  present)     Plan:  Treatment Interventions: ADL retraining, Functional transfer training, UE strengthening/ROM, Endurance training, Cognitive reorientation, Patient/family training, Equipment evaluation/education, Compensatory technique education  OT Frequency: 3 times per week  OT Discharge Recommendations: Moderate intensity level of continued care  OT Recommended Transfer Status: Assist of 2  OT - OK to Discharge: Yes (When medically appropriate)  Treatment Interventions: ADL retraining, Functional transfer training, UE strengthening/ROM, Endurance training, Cognitive reorientation, Patient/family training, Equipment evaluation/education, Compensatory technique education    Subjective   Previous Visit Info:  OT Last Visit  OT Received On: 12/05/24  General:  General  Prior to Session Communication: Bedside nurse  Patient Position Received: Bed, 3 rail up, Alarm on  General Comment: Pt agreeable and cooperative to therapy.            Pain:  Pain Assessment  Pain Assessment: 0-10  0-10 (Numeric) Pain Score: 0 - No pain    Objective    Cognition:  Cognition  Arousal/Alertness: Delayed responses to stimuli  Orientation Level: Disoriented to situation, Disoriented to time  Following Commands: Follows one step commands with repetition  Safety Judgment: Decreased awareness of need for assistance  Problem Solving: Assistance required to identify errors made       Functional Standing  Tolerance:  Activity: Pt tolerated static standing for 30 seconds to 1 minutes with min A x2 with hand held assist.  Bed Mobility/Transfers: Bed Mobility 1  Bed Mobility 1: Supine to sitting  Level of Assistance 1: +2, Moderate verbal cues    Transfer 1  Technique 1: Sit to stand, Stand to sit  Transfer Device 1:  (hand held assist)  Transfer Level of Assistance 1: Minimum assistance, +2, Moderate verbal cues                       Therapy/Activity:      Therapeutic Activity  Therapeutic Activity Performed: Yes  Therapeutic Activity 1: Pt tolerated sitting EOB for 5 minutes with CGA.      Outcome Measures:[facility] Daily Activity  Putting on and taking off regular lower body clothing: Total  Bathing (including washing, rinsing, drying): A lot  Putting on and taking off regular upper body clothing: A lot  Toileting, which includes using toilet, bedpan or urinal: A lot  Taking care of personal grooming such as brushing teeth: A little  Eating Meals: A little  Daily Activity - Total Score: 13        Education Documentation  ADL Training, taught by ALEX Haider at 12/5/2024  3:14 PM.  Learner: Patient  Readiness: Acceptance  Method: Explanation  Response: Needs Reinforcement    Education Comments  No comments found.               Goals:  Encounter Problems       Encounter Problems (Active)       ADLs       Patient will perform UB and LB bathing with minimal assist  level of assistance. (Progressing)       Start:  12/04/24    Expected End:  12/18/24            Patient will complete daily grooming tasks  with supervision and set up level of assistance and PRN adaptive equipment. (Progressing)       Start:  12/04/24    Expected End:  12/18/24            Patient will complete toileting including hygiene clothing management/hygiene with minimal assist  level of assistance. (Progressing)       Start:  12/04/24    Expected End:  12/18/24               TRANSFERS       Patient will perform bed mobility minimal assist   level of assistance and bed rails in order to improve safety and independence with mobility (Progressing)       Start:  12/04/24    Expected End:  12/18/24            Patient will complete functional transfers with least restrictive device with minimal assist  level of assistance. (Progressing)       Start:  12/04/24    Expected End:  12/18/24

## 2024-12-05 NOTE — CONSULTS
History Of Present Illness  Brittny Gordon is a 69 y.o. female right handed admitted to [facility] on 12/3/24 presenting with altered mental status. Neurology consulted for above and AICA aneurysm.    Inpatient consult to Neurology  Consult performed by: Raghav Clemons MD  Consult ordered by: Jesus Manuel Arredondo MD      Listed history of lung cancer, bony metastases, status post left arm fracture and multiple lumbar fractures, hypertension, dyslipidemia, osteoporosis, AAA s/p repair, PE/DVT on Eliquis, former smoker.    Patient seen this morning, no family members around.  Patient able to give me some history, but of questionable accuracy.  Patient presented to outpatient [facility] on 12/3/2024 for Keytruda treatment, but staff found her to have low blood pressure (79/50, pulse 103) and change in mental status.  Patient was sent to the ED because of this.  Sodium was 130 (was 128 3 weeks ago), calcium elevated at 10.6, creatinine elevated at 1.2 (baseline 0.4).  Patient was given some fluids, and subsequently admitted to the hospital.  She appeared to be confused.  On 12/4/2024, she was seen by hospitalist, who was concerned about her still having difficulties with conversing appropriately.  It was noted that son felt patient was improved but not to her usual baseline yet.  MRI with and without contrast was ordered, which showed no acute stroke, there was incidental 7 mm left AICA aneurysm, no reported brain metastases, and nonspecific subcortical white matter disease possible small vessel ischemic disease.  Neurology was consulted.  Ammonia level normal.  TSH normal.    Patient today unable to tell me how long she may have been confused for (although notes mention probably in the last 3 to 4 days or so).  She denies any other ill symptoms at home, including cough/colds/fever.  She appeared to be somewhat irritable when being asked questions, although answers them anyway.  She tells me that she is in  "pain.    She denies any prior known history of stroke or seizure.  Per notes, she apparently lives with brother, although she herself tells me today that her brother lives a few minutes away and that she has aides that come.  She is bedbound per her.  She tells me she has not been eating well at home \"because of pain\".    Former smoker.  Denies alcohol use.  Denies street drug use.      Review of chart revealed  Review of Systems   Constitutional:  Negative for chills and fever.   HENT:  Negative for ear pain and nosebleeds.    Eyes:  Negative for discharge and itching.   Respiratory:  Negative for choking and chest tightness.    Cardiovascular:  Negative for chest pain and palpitations.   Gastrointestinal:  Negative for abdominal distention, abdominal pain and nausea.   Endocrine: Negative for cold intolerance and heat intolerance.   Genitourinary:  Negative for difficulty urinating and dysuria.   Musculoskeletal:  Negative for myalgias.   Neurological:  Negative for dizziness, seizures and numbness.     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    Vision loss     Wedge compression fracture of t11-T12 vertebra, sequela 07/30/2020    Compression fracture of T11 vertebra, sequela       Surgical History  Past Surgical History:   Procedure Laterality Date    ARTERIAL ANEURYSM REPAIR      Thoracic aortic aneurysm repair    COLONOSCOPY      RADIOFREQUENCY ABLATION      L3,4,5    WISDOM TOOTH EXTRACTION         Social History  Social History     Tobacco Use    Smoking status: Former     Current packs/day: 0.00     Average packs/day: 0.5 packs/day for 40.0 years (20.0 ttl pk-yrs)     Types: Cigarettes     " Start date:      Quit date: 2013     Years since quittin.9    Smokeless tobacco: Never   Vaping Use    Vaping status: Never Used   Substance Use Topics    Alcohol use: Not Currently     Comment: occassionally    Drug use: Not Currently       Home Meds  Medications Prior to Admission   Medication Sig Dispense Refill Last Dose/Taking    apixaban (Eliquis) 5 mg tablet Take 1 tablet (5 mg) by mouth 2 times a day. 60 tablet 3     atorvastatin (Lipitor) 80 mg tablet TAKE 1 TABLET BY MOUTH EVERY DAY 90 tablet 3     calcium carbonate (Tums) 200 mg calcium chewable tablet Chew 1 tablet (500 mg) once daily.       cholecalciferol (Vitamin D3) 25 MCG (1000 UT) tablet Take 1 tablet (1,000 Units) by mouth once daily.       diclofenac sodium (Voltaren) 1 % gel Apply 4.5 inches (4 g) topically 4 times a day as needed (L shoulder pain).       ibandronate (Boniva) 150 mg tablet Take 1 tablet (150 mg) by mouth every 30 (thirty) days. Take in morning with full glass of water on an empty stomach. No food, drink, meds, or lying down for 60 minutes after. 3 tablet 3     lidocaine (Lidoderm) 5 % patch Place 1 patch over 12 hours on the skin once daily. Remove & discard patch within 12 hours or as directed by MD. 30 patch 1     losartan (Cozaar) 100 mg tablet TAKE 1 TABLET BY MOUTH EVERY DAY 90 tablet 3     melatonin 3 mg tablet Take 1 tablet (3 mg) by mouth as needed at bedtime for sleep.       metoprolol succinate XL (Toprol-XL) 100 mg 24 hr tablet TAKE 1 TABLET BY MOUTH EVERY DAY 90 tablet 3     omeprazole OTC (PriLOSEC OTC) 20 mg EC tablet Take 1 tablet (20 mg) by mouth once daily. Do not crush, chew, or split. 30 tablet 2     oxyCODONE (Roxicodone) 10 mg immediate release tablet Take 0.5-1 tablets (5-10 mg) by mouth every 3 hours if needed for severe pain (7 - 10) or moderate pain (4 - 6). Do not crush, chew, or split. 240 tablet 0     polyethylene glycol (Glycolax, Miralax) 17 gram packet Take 17 g by mouth once daily.        "sennosides (Senokot) 8.6 mg tablet Take 2 tablets (17.2 mg) by mouth as needed at bedtime for constipation.       vit A/vit C/vit E/zinc/copper (PRESERVISION AREDS ORAL) Take 1 tablet by mouth early in the morning..          Allergies  Morphine, Acetaminophen, and Epinephrine    Current Meds  Scheduled medications  apixaban, 5 mg, oral, BID  atorvastatin, 80 mg, oral, Nightly  magnesium sulfate, 2 g, intravenous, Once  pantoprazole, 40 mg, oral, Daily   Or  pantoprazole, 40 mg, intravenous, Daily      Continuous medications     PRN medications  PRN medications: bisacodyl, bisacodyl, guaiFENesin, melatonin, ondansetron **OR** ondansetron, oxyCODONE    Last Recorded Vitals  Blood pressure (!) 149/91, pulse 110, temperature 36.2 °C (97.1 °F), temperature source Temporal, resp. rate 17, height 1.702 m (5' 7\"), weight 59 kg (130 lb), SpO2 91%.    Awake, alert, somewhat confused, in no distress  Does not know president, knew season  Slow to answer/process--does have hearing problem  Jumps from topic to topic  Well-nourished, in bed  No leg edema    Supple neck    Mental status exam as above, conversant   Fund of knowledge/memory unable to assess  Poor attention span  Disorganized thought  Pupils round reactive to light, 3-4 mm, (-) RAPD   Fundoscopic examination was attempted but fundus was not visualized bilaterally   Full EOMs intact, no nystagmus, no ptosis   V1 to V3 sensation is intact   No facial droop   Hearing grossly intact but decreased  No dysarthria  Good shoulder shrug on R (unable to check on left due to stated shoulder problem and on a sling)  Tongue is midline     Motor strength is 5/5 on RUE and BLE, tone/bulk normal   Reflexes 1+ on all 4 extremities except trace on B ankles, downgoing toes bilaterally   Sensation is intact to light touch, vibration on all 4 extremities   Finger to nose test intact on R (unable to assess on L)   Unable to have her stand or walk    Relevant Results  I have personally " reviewed the following:    Labs  Results for orders placed or performed during the hospital encounter of 12/03/24 (from the past 24 hours)   Magnesium   Result Value Ref Range    Magnesium 1.31 (L) 1.60 - 2.40 mg/dL   CBC   Result Value Ref Range    WBC 7.9 4.4 - 11.3 x10*3/uL    nRBC 0.0 0.0 - 0.0 /100 WBCs    RBC 3.41 (L) 4.00 - 5.20 x10*6/uL    Hemoglobin 9.4 (L) 12.0 - 16.0 g/dL    Hematocrit 29.9 (L) 36.0 - 46.0 %    MCV 88 80 - 100 fL    MCH 27.6 26.0 - 34.0 pg    MCHC 31.4 (L) 32.0 - 36.0 g/dL    RDW 15.7 (H) 11.5 - 14.5 %    Platelets 278 150 - 450 x10*3/uL   Basic Metabolic Panel   Result Value Ref Range    Glucose 90 74 - 99 mg/dL    Sodium 134 (L) 136 - 145 mmol/L    Potassium 3.6 3.5 - 5.3 mmol/L    Chloride 98 98 - 107 mmol/L    Bicarbonate 24 21 - 32 mmol/L    Anion Gap 16 10 - 20 mmol/L    Urea Nitrogen 12 6 - 23 mg/dL    Creatinine 0.66 0.50 - 1.05 mg/dL    eGFR >90 >60 mL/min/1.73m*2    Calcium 10.1 8.6 - 10.3 mg/dL     *Note: Due to a large number of results and/or encounters for the requested time period, some results have not been displayed. A complete set of results can be found in Results Review.       Imaging results  MR brain w and wo IV contrast    Result Date: 12/4/2024  Interpreted By:  Star Ferraro, STUDY: MR BRAIN W AND WO IV CONTRAST;  12/4/2024 9:01 pm   INDICATION: Signs/Symptoms:Altered mental status with metastatic lung cancer.     COMPARISON: MRI 07/19/2024   ACCESSION NUMBER(S): [id number]   ORDERING CLINICIAN: RADHA TORRES   TECHNIQUE: Axial T2, FLAIR, DWI, gradient echo T2 and sagittal and coronal T1 weighted images of brain were acquired. Post contrast T1 weighted images were acquired after administration of 11 ML Dotarem gadolinium based intravenous contrast.   FINDINGS: CSF Spaces: The ventricles, sulci and basal cisterns are within normal limits.   Parenchyma: There is no diffusion restriction abnormality to suggest acute infarct.  There is no focal parenchymal signal  abnormality. There is no mass effect or midline shift. 7 mm left-sided AICA aneurysm at the cerebellar pontine angle. No abnormally enhancing mass. Prominent Virchow Cervantes spaces.   Paranasal Sinuses and Mastoids: Visualized paranasal sinuses and mastoid air cells are unremarkable.       1.  No evidence of acute infarct, intracranial mass effect or midline shift. 2.  7 mm left-sided AICA aneurysm at the left cerebellar pontine angle.   MACRO: None   Signed by: Star Ferraro 12/4/2024 9:34 PM Dictation workstation:   [id number]    MR brain w and wo IV contrast    Result Date: 8/29/2024  Interpreted By:  Jacki Gray,  and Koki Ordaz STUDY: MR BRAIN W AND WO IV CONTRAST;  8/28/2024 11:34 pm   INDICATION: Signs/Symptoms:Evaluate for brain metastasis given recent mental status.     COMPARISON: CT head 08/27/2024 MRI brain 07/19/2024.   ACCESSION NUMBER(S): [id number]   ORDERING CLINICIAN: FER FIGUEROA   TECHNIQUE: Axial T2, FLAIR, DWI, gradient echo T2 and  T1 weighted images of brain were acquired. Post contrast T1 weighted images were acquired after administration of 14 ML Dotarem gadolinium based intravenous contrast.   FINDINGS: CSF Spaces: Diffuse prominence of the ventricles and sulci is noted. Basal cisterns are clear.   Parenchyma: There is no diffusion restriction abnormality to suggest acute infarct.  Scattered patchy and confluent T2 hyperintensities are noted throughout the periventricular and subcortical white matter as well as the regi, nonspecific, but likely represent chronic small vessel ischemic changes given patient's age. Prominent T2 hyperintensities within the bilateral basal ganglia and dentate nuclei likely prominent perivascular spaces. There is no mass effect or midline shift.   A similar 7 mm avidly enhancing focus is again noted at the left lateral pontomedullary junction (series 9, image 174), cranial to the left intradural vertebral artery (series 10, image 52/99).    Paranasal Sinuses and Mastoids: Mild mucosal thickening of the ethmoid air cells. Small mucous retention cyst or polyp is noted in the left maxillary sinus. Visualized paranasal sinuses and mastoid air cells are unremarkable.       1. Similar 7 mm avidly enhancing focus along the left pontomedullary junction in keeping with possible saccular aneurysm. Vascular follow-up may be considered. A metastatic focus is felt to be less likely given relative stability. Otherwise, no evidence intracranial metastatic disease. 2. No evidence of restricted diffusion, mass effect or new/additional abnormal enhancement.   I personally reviewed the images/study and I agree with the findings as stated by Sushma Telles MD. This study was interpreted at [place], [place].   MACRO: None   Signed by: Jacki Gray 8/29/2024 3:24 AM Dictation workstation:   [id number]    MR brain w and wo IV contrast    Result Date: 7/19/2024  Interpreted By:  Jacki Gray, STUDY: MR BRAIN W AND WO IV CONTRAST;  7/19/2024 7:50 pm   INDICATION: Signs/Symptoms:newly diagnosed lung cancer to r/o brain mets.   COMPARISON: None.   ACCESSION NUMBER(S): [id number]   ORDERING CLINICIAN: BARB GAMBOA   TECHNIQUE: Axial T2, FLAIR, DWI, gradient echo T2 and sagittal and coronal T1 weighted images of brain were acquired. Post contrast T1 weighted images were acquired after administration of 13 ML Dotarem gadolinium based intravenous contrast.   FINDINGS: CSF Spaces: The ventricles, sulci and basal cisterns enlarged, concordant with parenchymal volume loss.   Parenchyma: There is no diffusion restriction abnormality to suggest acute infarct.  Prominent perivascular spaces as well as probable remote lacunar infarcts. Mild-to-moderate non-specific white matter changes present. Patchy white-matter changes present within the regi. Generalized parenchymal volume loss present. No susceptibility artifact noted on gradient  echo imaging. There is no mass effect or midline shift.   There is a 7 by 6 mm homogeneous enhancing nodule, cranial to the left intradural vertebral artery with arterial phase opacifications which abuts the left lateral pontomedullary junction. Otherwise no abnormal parenchymal   Paranasal Sinuses and Mastoids: Mild mucosal thickening in the ethmoid air cells and polyp or mucous retention cyst in the left maxillary sinus.       7 mm avidly enhancing extra-axial appearing nodule along the left pontine medullary junction adjacent to the left vertebral artery. 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 Clinical correlation and attention on follow-up suggested. No additional abnormal enhancement identified.   Nonspecific white matter changes, remote lacunar infarcts and parenchymal volume loss.   MACRO: None   Signed by: Jacki Gray 7/19/2024 9:41 PM Dictation workstation:   [id number]    CT head wo IV contrast    Result Date: 12/3/2024  Interpreted By:  Kieran Wild, STUDY: CT HEAD WO IV CONTRAST;  12/3/2024 5:57 pm   INDICATION: Signs/Symptoms:ams.   COMPARISON: Head CT 08/27/2024.   ACCESSION NUMBER(S): [id number]   ORDERING CLINICIAN: KEYONA MIRZA   TECHNIQUE: Axial noncontrast CT images of the head.   FINDINGS: Gray-white matter differentiation is maintained. There are no extra-axial collections. No mass effect or midline shift. There is no hydrocephalus. Bilateral basal ganglia hypodensities, likely chronic ischemic..   HEMORRHAGE: No acute intracranial hemorrhage.   CALVARIUM: No depressed skull fracture.   EXTRACRANIAL SOFT TISSUES: Unremarkable.   PARANASAL SINUSES/MASTOIDS: The visualized paranasal sinuses are well aerated. Mastoid air cells are clear.   ORBITS: Grossly normal.       No acute cortical infarct or acute intracranial hemorrhage.     Signed by: Kieran Wild 12/3/2024 6:25 PM Dictation workstation:   [id number]    CT head wo IV  contrast    Result Date: 8/27/2024  Interpreted By:  Dacia Guillen, STUDY: CT HEAD WO IV CONTRAST;  8/27/2024 5:27 pm   INDICATION: Signs/Symptoms:fall, hit head prior to arrival.     COMPARISON: None.   ACCESSION NUMBER(S): [id number]   ORDERING CLINICIAN: MARICARMEN SHERWOOD   TECHNIQUE: Noncontrast axial CT scan of head was performed. Angled reformats in brain and bone windows were generated. The images were reviewed in bone, brain, blood and soft tissue windows.   FINDINGS: CSF Spaces: The ventricles, sulci and basal cisterns are within normal limits. There is no extraaxial fluid collection.   Parenchyma: Mild patchy nonspecific white matter hypodensity is compatible with microangiopathy. The grey-white differentiation is intact. There is no mass effect or midline shift.  There is no intracranial hemorrhage.   Calvarium: The calvarium is unremarkable. Mild posterior scalp soft tissue swelling.   Paranasal sinuses and mastoids: Visualized paranasal sinuses and mastoids are predominant clear.       No evidence of intracranial hemorrhage or displaced skull fracture.   MACRO: None   Signed by: Dacia Guillen 8/27/2024 5:36 PM Dictation workstation:   [id number]      Assessment/Plan    1.  Encephalopathy  Unknown etiology, likely metabolic encephalopathy  Patient arrived hypotensive, and with hypercalcemia  MRI brain with no acute stroke, no brain mets, no reported PRES changes, no structural explanation for encephalopathy  CNS infection/inflammation or seizures less likely  Unclear if Keytruda treatment (as a checkpoint inhibitor) itself could be contributing--defer to oncology    2.  Left AICA aneurysm, 7 mm  Stable from at least 7/2024 MRI brain  I do not believe this is contributory to current presentation    3.  Lung cancer with bony mets  4.  Hypercalcemia      Recommendations:  1.  Optimize medical situation as able  2.  Check urine drug screen and vitamin B12 level  3.  Outpatient neurosurgery consult for left AICA  aneurysm  4.  Continue supportive care  5.  Delirium precautions    Message sent to Dr. Arredondo with primary team. All questions answered. Please call with questions.    Raghav Clemons MD  12/5/2024  11:06 AM

## 2024-12-05 NOTE — TELEPHONE ENCOUNTER
Called patients brother to give him patients appointment times for their radiation therapy. Brother stated he was worried about patients ability to complete and cope with treatment. He stated that patient was currently in house with confusion and is bed bound. Stated he would discuss treatments with patient. Will call patients brother back on 12/9 to discuss further -tbt

## 2024-12-06 ENCOUNTER — APPOINTMENT (OUTPATIENT)
Dept: CARDIOLOGY | Facility: HOSPITAL | Age: 69
End: 2024-12-06
Payer: MEDICARE

## 2024-12-06 LAB
ANION GAP SERPL CALC-SCNC: 13 MMOL/L (ref 10–20)
BUN SERPL-MCNC: 10 MG/DL (ref 6–23)
CALCIUM SERPL-MCNC: 10.1 MG/DL (ref 8.6–10.3)
CHLORIDE SERPL-SCNC: 96 MMOL/L (ref 98–107)
CO2 SERPL-SCNC: 25 MMOL/L (ref 21–32)
CREAT SERPL-MCNC: 0.51 MG/DL (ref 0.5–1.05)
EGFRCR SERPLBLD CKD-EPI 2021: >90 ML/MIN/1.73M*2
GLUCOSE SERPL-MCNC: 94 MG/DL (ref 74–99)
MAGNESIUM SERPL-MCNC: 1.4 MG/DL (ref 1.6–2.4)
POTASSIUM SERPL-SCNC: 3.3 MMOL/L (ref 3.5–5.3)
SODIUM SERPL-SCNC: 131 MMOL/L (ref 136–145)
VIT B12 SERPL-MCNC: 4398 PG/ML (ref 211–911)

## 2024-12-06 PROCEDURE — 93005 ELECTROCARDIOGRAM TRACING: CPT

## 2024-12-06 PROCEDURE — 2500000004 HC RX 250 GENERAL PHARMACY W/ HCPCS (ALT 636 FOR OP/ED): Performed by: INTERNAL MEDICINE

## 2024-12-06 PROCEDURE — 97116 GAIT TRAINING THERAPY: CPT | Mod: CQ,GP

## 2024-12-06 PROCEDURE — 83735 ASSAY OF MAGNESIUM: CPT | Performed by: FAMILY MEDICINE

## 2024-12-06 PROCEDURE — 2500000004 HC RX 250 GENERAL PHARMACY W/ HCPCS (ALT 636 FOR OP/ED)

## 2024-12-06 PROCEDURE — 2500000002 HC RX 250 W HCPCS SELF ADMINISTERED DRUGS (ALT 637 FOR MEDICARE OP, ALT 636 FOR OP/ED): Performed by: FAMILY MEDICINE

## 2024-12-06 PROCEDURE — 2500000004 HC RX 250 GENERAL PHARMACY W/ HCPCS (ALT 636 FOR OP/ED): Performed by: FAMILY MEDICINE

## 2024-12-06 PROCEDURE — 99233 SBSQ HOSP IP/OBS HIGH 50: CPT | Performed by: NURSE PRACTITIONER

## 2024-12-06 PROCEDURE — 93010 ELECTROCARDIOGRAM REPORT: CPT | Performed by: STUDENT IN AN ORGANIZED HEALTH CARE EDUCATION/TRAINING PROGRAM

## 2024-12-06 PROCEDURE — 82607 VITAMIN B-12: CPT | Performed by: PSYCHIATRY & NEUROLOGY

## 2024-12-06 PROCEDURE — 80048 BASIC METABOLIC PNL TOTAL CA: CPT | Performed by: FAMILY MEDICINE

## 2024-12-06 PROCEDURE — 1200000002 HC GENERAL ROOM WITH TELEMETRY DAILY

## 2024-12-06 PROCEDURE — 99233 SBSQ HOSP IP/OBS HIGH 50: CPT | Performed by: FAMILY MEDICINE

## 2024-12-06 PROCEDURE — 36415 COLL VENOUS BLD VENIPUNCTURE: CPT | Performed by: FAMILY MEDICINE

## 2024-12-06 PROCEDURE — 2500000001 HC RX 250 WO HCPCS SELF ADMINISTERED DRUGS (ALT 637 FOR MEDICARE OP): Performed by: STUDENT IN AN ORGANIZED HEALTH CARE EDUCATION/TRAINING PROGRAM

## 2024-12-06 RX ORDER — POTASSIUM CHLORIDE 20 MEQ/1
40 TABLET, EXTENDED RELEASE ORAL ONCE
Status: COMPLETED | OUTPATIENT
Start: 2024-12-06 | End: 2024-12-06

## 2024-12-06 RX ORDER — METOPROLOL TARTRATE 1 MG/ML
5 INJECTION, SOLUTION INTRAVENOUS ONCE
Status: COMPLETED | OUTPATIENT
Start: 2024-12-06 | End: 2024-12-06

## 2024-12-06 RX ORDER — METOPROLOL TARTRATE 1 MG/ML
INJECTION, SOLUTION INTRAVENOUS
Status: COMPLETED
Start: 2024-12-06 | End: 2024-12-06

## 2024-12-06 RX ORDER — MAGNESIUM SULFATE HEPTAHYDRATE 40 MG/ML
2 INJECTION, SOLUTION INTRAVENOUS ONCE
Status: COMPLETED | OUTPATIENT
Start: 2024-12-06 | End: 2024-12-06

## 2024-12-06 ASSESSMENT — COGNITIVE AND FUNCTIONAL STATUS - GENERAL
DAILY ACTIVITIY SCORE: 16
MOBILITY SCORE: 15
MOVING FROM LYING ON BACK TO SITTING ON SIDE OF FLAT BED WITH BEDRAILS: A LITTLE
DRESSING REGULAR UPPER BODY CLOTHING: A LOT
CLIMB 3 TO 5 STEPS WITH RAILING: A LOT
TURNING FROM BACK TO SIDE WHILE IN FLAT BAD: A LITTLE
HELP NEEDED FOR BATHING: A LOT
MOVING TO AND FROM BED TO CHAIR: A LOT
MOBILITY SCORE: 13
MOVING FROM LYING ON BACK TO SITTING ON SIDE OF FLAT BED WITH BEDRAILS: A LOT
TURNING FROM BACK TO SIDE WHILE IN FLAT BAD: A LOT
CLIMB 3 TO 5 STEPS WITH RAILING: A LOT
PERSONAL GROOMING: A LITTLE
STANDING UP FROM CHAIR USING ARMS: A LOT
DRESSING REGULAR LOWER BODY CLOTHING: A LITTLE
CLIMB 3 TO 5 STEPS WITH RAILING: A LOT
DRESSING REGULAR LOWER BODY CLOTHING: A LITTLE
DRESSING REGULAR UPPER BODY CLOTHING: A LITTLE
MOVING TO AND FROM BED TO CHAIR: A LOT
HELP NEEDED FOR BATHING: A LITTLE
PERSONAL GROOMING: A LITTLE
WALKING IN HOSPITAL ROOM: A LOT
TURNING FROM BACK TO SIDE WHILE IN FLAT BAD: A LOT
TOILETING: A LOT
TOILETING: A LITTLE
DAILY ACTIVITIY SCORE: 19
MOVING FROM LYING ON BACK TO SITTING ON SIDE OF FLAT BED WITH BEDRAILS: A LITTLE
MOVING TO AND FROM BED TO CHAIR: A LITTLE
WALKING IN HOSPITAL ROOM: A LOT
STANDING UP FROM CHAIR USING ARMS: A LOT
STANDING UP FROM CHAIR USING ARMS: A LOT
MOBILITY SCORE: 12
WALKING IN HOSPITAL ROOM: A LOT

## 2024-12-06 ASSESSMENT — PAIN DESCRIPTION - LOCATION: LOCATION: BACK

## 2024-12-06 ASSESSMENT — PAIN SCALES - GENERAL
PAINLEVEL_OUTOF10: 9
PAINLEVEL_OUTOF10: 0 - NO PAIN

## 2024-12-06 ASSESSMENT — PAIN SCALES - PAIN ASSESSMENT IN ADVANCED DEMENTIA (PAINAD): TOTALSCORE: MEDICATION (SEE MAR)

## 2024-12-06 ASSESSMENT — PAIN - FUNCTIONAL ASSESSMENT
PAIN_FUNCTIONAL_ASSESSMENT: 0-10
PAIN_FUNCTIONAL_ASSESSMENT: 0-10

## 2024-12-06 NOTE — CARE PLAN
Problem: Pain - Adult  Goal: Verbalizes/displays adequate comfort level or baseline comfort level  Outcome: Progressing     Problem: Safety - Adult  Goal: Free from fall injury  Outcome: Progressing     Problem: Discharge Planning  Goal: Discharge to home or other facility with appropriate resources  Outcome: Progressing     Problem: Chronic Conditions and Co-morbidities  Goal: Patient's chronic conditions and co-morbidity symptoms are monitored and maintained or improved  Outcome: Progressing     Problem: Fall/Injury  Goal: Not fall by end of shift  Outcome: Progressing  Goal: Be free from injury by end of the shift  Outcome: Progressing  Goal: Verbalize understanding of personal risk factors for fall in the hospital  Outcome: Progressing  Goal: Verbalize understanding of risk factor reduction measures to prevent injury from fall in the home  Outcome: Progressing  Goal: Use assistive devices by end of the shift  Outcome: Progressing  Goal: Pace activities to prevent fatigue by end of the shift  Outcome: Progressing     Problem: Fall/Injury  Goal: Not fall by end of shift  Outcome: Progressing  Goal: Be free from injury by end of the shift  Outcome: Progressing  Goal: Verbalize understanding of personal risk factors for fall in the hospital  Outcome: Progressing  Goal: Verbalize understanding of risk factor reduction measures to prevent injury from fall in the home  Outcome: Progressing  Goal: Use assistive devices by end of the shift  Outcome: Progressing  Goal: Pace activities to prevent fatigue by end of the shift  Outcome: Progressing     Problem: Skin  Goal: Decreased wound size/increased tissue granulation at next dressing change  Outcome: Progressing  Goal: Participates in plan/prevention/treatment measures  Outcome: Progressing  Goal: Prevent/manage excess moisture  Outcome: Progressing  Goal: Prevent/minimize sheer/friction injuries  Outcome: Progressing  Goal: Promote/optimize nutrition  Outcome:  Progressing  Goal: Promote skin healing  Outcome: Progressing   The patient's goals for the shift include  Pt will remain safe    The clinical goals for the shift include maintain patient safety, improvement in neurological status

## 2024-12-06 NOTE — PROGRESS NOTES
Physical Therapy  Physical Therapy Treatment    Patient Name: Brittny Gordon  MRN: [id number]  Department: [facility]  Room: [facility]2329-A  Today's Date: 12/6/2024  Time Calculation  Start Time: 0855  Stop Time: 0918  Time Calculation (min): 23 min    PT Plan  Treatment/Interventions: Bed mobility, Transfer training, Gait training, Balance training, Strengthening, Endurance training, Therapeutic exercise, Therapeutic activity  PT Plan: Ongoing PT  PT Frequency: 4 times per week  PT Discharge Recommendations: Moderate intensity level of continued care  PT - OK to Discharge: Yes (when medically cleared)    General Visit Information:   PT  Visit  PT Received On: 12/06/24  Response to Previous Treatment: Patient with no complaints from previous session.  General  Reason for Referral: Dx: Confusion, hypotension    Precautions:  Falls   sling (likely NWB) L UE  spinal precautions with pathological fx's    Pain:  pt reports BLE thigh pain, as well as back pain, did not quantify     Cognition:  Mild confusion noted    Activity Tolerance:  Activity Tolerance  Endurance: Tolerates 10 - 20 min exercise with multiple rests    Treatments:  Bed Mobility  Supine to sitting: Minimum assistance  Scooting: Moderate assistance    Transfers  Sit to stand: Minimum assistance  Stand to sit: Minimum assistance    Ambulation/Gait Training  3 feet from bed to wheelchair with arm-in-arm, Jordyn          Pt remained sitting in personal wheelchair following tx. Call light in reach.        Outcome Measures:  [facility] Basic Mobility  Turning from your back to your side while in a flat bed without using bedrails: A little  Moving from lying on your back to sitting on the side of a flat bed without using bedrails: A little  Moving to and from bed to chair (including a wheelchair): A little  Standing up from a chair using your arms (e.g. wheelchair or bedside chair): A lot  To walk in hospital room: A lot  Climbing 3-5 steps with railing: A lot  Basic  Mobility - Total Score: 15    Education Documentation  Mobility Training, taught by Isael Charles PTA at 12/6/2024 10:59 AM.  Learner: Patient  Readiness: Acceptance  Method: Explanation, Demonstration  Response: Needs Reinforcement    Education Comments  No comments found.        OP EDUCATION:       Encounter Problems       Encounter Problems (Active)       PT Problem       transfers (Progressing)       Start:  12/04/24    Expected End:  12/18/24       Patient will perform all transfers with Min assist x 1 following precautions           strengthening  (Progressing)       Start:  12/04/24    Expected End:  12/18/24       Patient will perform 20+ reps of AROM/RROM for KATINA LE's to improve safety and functional independence           PT Goal 3 (Progressing)       Start:  12/04/24               Pain - Adult

## 2024-12-06 NOTE — PROGRESS NOTES
[facility] Progress Note    Brittny Gordon is a 69 y.o. female on day 2 of admission presenting with Disorientation.  Subjective   I evaluated the patient face to face in her room this afternoon.     Brittny is a 69-year-old female with a past medical history of HTN, HLD, osteoporosis, AAA s/p repair, NSCLC, DVT/PE (on Eliquis), and femur/clavicle fracture both being treated with radiation. She is former 20-pack year smoker. She denies current alcohol or illicit drug use.     Brittny presented to the ED following a visit to the [facility] for her Keytruda infusion. Staff at the oncology infusion center reported a low BP of 79/50 and altered mental status with inability to appropriately answer questions. Sodium was 130 (was 128 3 weeks ago), calcium elevated at 10.6, creatinine elevated at 1.2 (baseline 0.4). Patient was given some fluids, and subsequently admitted to the hospital. She appeared to be confused. On 12/4/2024, she was seen by hospitalist, who was concerned about her still having difficulties with conversing appropriately. It was noted that brother felt patient was improved but not to her usual baseline yet. MRI with and without contrast was ordered, which showed no acute stroke, there was incidental 7 mm left AICA aneurysm, no reported brain metastases, and nonspecific subcortical white matter disease possible small vessel ischemic disease. Neurology was consulted. Ammonia level normal. TSH normal.     On examination today patient was lying in bed and had just removed her distal L arm IV line. Nursing staff state this is the 4th IV that she has pulled today. On exam patient is agitated and confused. She jumps from subject to subject when speaking and seemed fixated on calling her brother and removing her IV lines. Despite struggling with basic conversation, she is able to recall where she is, the month, and the year correctly. She was in sinus tach at the time of examination averaging in  the 130s.          Objective     Vitals:    12/06/24 0330 12/06/24 0522 12/06/24 0959 12/06/24 1505   BP: 142/88 (!) 148/93 134/85 (!) 143/94   BP Location: Right arm Right arm Right arm Right arm   Patient Position:  Lying Lying Lying   Pulse: 88 106 76 77   Resp:  18 16 16   Temp:  36.1 °C (97 °F) 36.3 °C (97.4 °F) 36.5 °C (97.7 °F)   TempSrc:  Temporal Temporal Temporal   SpO2:  90% 94% 92%   Weight:       Height:             Physical Exam  Constitutional:       Comments: She is agitated on exam, focused on pulling IV lines out, generally frustrated but answers questions when asked.   Neurological:      Mental Status: She is oriented to person, place, and time. She is confused.      Cranial Nerves: Cranial nerves 2-12 are intact.      Motor: Motor strength is normal.Motor function is intact.      Comments: She is oriented x3, but does not respond well to questions and jumps from subject to subject.   Psychiatric:         Attention and Perception: Attention and perception normal.         Speech: Speech normal.         Behavior: Behavior is agitated.         Thought Content: Thought content normal.         Cognition and Memory: Cognition and memory normal.      Comments: Patient is frustrated on exam.       Neurological Exam  Mental Status  Awake, alert and oriented to person, place and time. Oriented to person, place and time. Oriented to person, place, and time. Memory is normal. Speech is normal.    Motor  Normal muscle bulk throughout. Normal muscle tone. No abnormal involuntary movements. Strength is 5/5 throughout all four extremities.  She is oriented x3, but does not respond well to questions and jumps from subject to subject..      Scheduled medications  apixaban, 5 mg, oral, BID  atorvastatin, 80 mg, oral, Nightly  pantoprazole, 40 mg, oral, Daily   Or  pantoprazole, 40 mg, intravenous, Daily      Continuous medications     PRN medications  PRN medications: bisacodyl, bisacodyl, guaiFENesin, melatonin,  ondansetron **OR** ondansetron, oxyCODONE     Lab Results   Component Value Date    WBC 7.9 12/05/2024    RBC 3.41 (L) 12/05/2024    HGB 9.4 (L) 12/05/2024    HCT 29.9 (L) 12/05/2024     12/05/2024     (L) 12/06/2024    K 3.3 (L) 12/06/2024    CL 96 (L) 12/06/2024    BUN 10 12/06/2024    CREATININE 0.51 12/06/2024    EGFR >90 12/06/2024    CALCIUM 10.1 12/06/2024    ALKPHOS 112 12/03/2024    AST 9 12/03/2024    ALT 5 (L) 12/03/2024    MG 1.40 (L) 12/06/2024    SNPKLMFY83 4,398 (H) 12/06/2024    VITD25 30 10/23/2024    HGBA1C 5.9 (H) 08/27/2024    LDLCALC 69 03/11/2024    CHOL 163 03/11/2024    HDL 63.4 03/11/2024    TRIG 153 (H) 03/11/2024    TSH 2.47 12/03/2024    TSH 1.36 09/24/2024    TSH 0.72 08/27/2024       Below CT and MRI images and reports have been personally reviewed in PACS, agree with interpretations.    MR brain w and wo IV contrast 12/04/2024    Narrative  Interpreted By:  Star Ferraro,  STUDY:  MR BRAIN W AND WO IV CONTRAST;  12/4/2024 9:01 pm    INDICATION:  Signs/Symptoms:Altered mental status with metastatic lung cancer.      COMPARISON:  MRI 07/19/2024    ACCESSION NUMBER(S):  [id number]    ORDERING CLINICIAN:  RADHA TORRES    TECHNIQUE:  Axial T2, FLAIR, DWI, gradient echo T2 and sagittal and coronal T1  weighted images of brain were acquired. Post contrast T1 weighted  images were acquired after administration of 11 ML Dotarem gadolinium  based intravenous contrast.    FINDINGS:  CSF Spaces: The ventricles, sulci and basal cisterns are within  normal limits.    Parenchyma: There is no diffusion restriction abnormality to suggest  acute infarct.  There is no focal parenchymal signal abnormality.  There is no mass effect or midline shift. 7 mm left-sided AICA  aneurysm at the cerebellar pontine angle. No abnormally enhancing  mass. Prominent Virchow Cervantes spaces.    Paranasal Sinuses and Mastoids: Visualized paranasal sinuses and  mastoid air cells are  unremarkable.    Impression  1.  No evidence of acute infarct, intracranial mass effect or midline  shift.  2.  7 mm left-sided AICA aneurysm at the left cerebellar pontine  angle.    MACRO:  None    Signed by: Star Ferraro 12/4/2024 9:34 PM  Dictation workstation:   [id number]      CT head wo IV contrast 12/03/2024    Narrative  Interpreted By:  Kieran Wild,  STUDY:  CT HEAD WO IV CONTRAST;  12/3/2024 5:57 pm    INDICATION:  Signs/Symptoms:ams.    COMPARISON:  Head CT 08/27/2024.    ACCESSION NUMBER(S):  [id number]    ORDERING CLINICIAN:  KEYONA MIRZA    TECHNIQUE:  Axial noncontrast CT images of the head.    FINDINGS:  Gray-white matter differentiation is maintained. There are no  extra-axial collections. No mass effect or midline shift. There is no  hydrocephalus. Bilateral basal ganglia hypodensities, likely chronic  ischemic..    HEMORRHAGE: No acute intracranial hemorrhage.    CALVARIUM: No depressed skull fracture.    EXTRACRANIAL SOFT TISSUES: Unremarkable.    PARANASAL SINUSES/MASTOIDS: The visualized paranasal sinuses are well  aerated. Mastoid air cells are clear.    ORBITS: Grossly normal.    Impression  No acute cortical infarct or acute intracranial hemorrhage.      Signed by: Kieran Wild 12/3/2024 6:25 PM  Dictation workstation:   [id number]                 Hecker Coma Scale  Best Eye Response: Spontaneous  Best Verbal Response: Oriented  Best Motor Response: Follows commands  Sherry Coma Scale Score: 15        Assessment/Plan      Principal Problem:    Disorientation    IMPRESSION:  Brittny Gordon is a 69 y.o. female with a past medical history of HTN, HLD, osteoporosis, AAA s/p repair, NSCLC, DVT/PE (on Eliquis), and femur/clavicle fracture both being treated with radiation who presented to the ED for altered mental status on 12/5.    1.  Encephalopathy  Unknown etiology, likely metabolic encephalopathy  Patient arrived hypotensive, and with hypercalcemia  MRI brain with no  acute stroke, no brain mets, no reported PRES changes, no structural explanation for encephalopathy  CNS infection/inflammation or seizures less likely  Unclear if Keytruda treatment (as a checkpoint inhibitor) itself could be contributing--defer to oncology.    2.  Left AICA aneurysm, 7 mm  Stable from at least 7/2024 MRI brain  I do not believe this is contributory to current presentation     3.  Lung cancer with bony mets  4.  Hypercalcemia    RECOMMENDATIONS:  Optimize medical situation as able  Correct hypercalcemia  3.  Outpatient neurosurgery consult for left AICA aneurysm  4.  Continue supportive care  5.  Delirium precautions  6.  Consider input/evaluation by oncology team re: neurological effects from her checkpoint inhibitor as etiology of her symptoms (cannot definitively rule this possibility out)    Discussed with patient, RN and Dr. Arredondo separately.   Discussed with Dr. Clemons.   Will sign off from neurology. Dr Clemons is on this weekend, if further input needed please reach out to him.          I personally spent 55 minutes today, exclusive of procedures, providing care for this patient, including preparation, face to face time, documentation and other services such as review of medical records, diagnostic result, patient education, counseling, coordination of care as specified in the encounter.    Dea Mabry PA-C

## 2024-12-06 NOTE — NURSING NOTE
Patient heart rate consistently tachy in the 140's. New orders received from MD with EKG to be completed and metoprolol given.

## 2024-12-06 NOTE — CARE PLAN
Problem: Skin  Goal: Decreased wound size/increased tissue granulation at next dressing change  Outcome: Progressing   The patient's goals for the shift include      The clinical goals for the shift include Pt will remain free from injury

## 2024-12-07 VITALS
TEMPERATURE: 97.3 F | OXYGEN SATURATION: 94 % | HEIGHT: 67 IN | RESPIRATION RATE: 16 BRPM | WEIGHT: 130 LBS | DIASTOLIC BLOOD PRESSURE: 79 MMHG | HEART RATE: 93 BPM | SYSTOLIC BLOOD PRESSURE: 125 MMHG | BODY MASS INDEX: 20.4 KG/M2

## 2024-12-07 PROBLEM — R41.0 DISORIENTATION: Status: RESOLVED | Noted: 2024-12-03 | Resolved: 2024-12-07

## 2024-12-07 LAB
ANION GAP SERPL CALC-SCNC: 13 MMOL/L (ref 10–20)
ATRIAL RATE: 116 BPM
ATRIAL RATE: 135 BPM
ATRIAL RATE: 48 BPM
ATRIAL RATE: 49 BPM
BACTERIA BLD CULT: NORMAL
BACTERIA BLD CULT: NORMAL
BUN SERPL-MCNC: 8 MG/DL (ref 6–23)
CALCIUM SERPL-MCNC: 9.6 MG/DL (ref 8.6–10.3)
CHLORIDE SERPL-SCNC: 96 MMOL/L (ref 98–107)
CO2 SERPL-SCNC: 26 MMOL/L (ref 21–32)
CREAT SERPL-MCNC: 0.42 MG/DL (ref 0.5–1.05)
EGFRCR SERPLBLD CKD-EPI 2021: >90 ML/MIN/1.73M*2
ERYTHROCYTE [DISTWIDTH] IN BLOOD BY AUTOMATED COUNT: 15.6 % (ref 11.5–14.5)
GLUCOSE SERPL-MCNC: 102 MG/DL (ref 74–99)
HCT VFR BLD AUTO: 29.1 % (ref 36–46)
HGB BLD-MCNC: 9.5 G/DL (ref 12–16)
MAGNESIUM SERPL-MCNC: 1.3 MG/DL (ref 1.6–2.4)
MCH RBC QN AUTO: 27.9 PG (ref 26–34)
MCHC RBC AUTO-ENTMCNC: 32.6 G/DL (ref 32–36)
MCV RBC AUTO: 85 FL (ref 80–100)
NRBC BLD-RTO: 0 /100 WBCS (ref 0–0)
P AXIS: -21 DEGREES
P AXIS: 145 DEGREES
P AXIS: 145 DEGREES
P AXIS: 66 DEGREES
PLATELET # BLD AUTO: 264 X10*3/UL (ref 150–450)
POTASSIUM SERPL-SCNC: 3.2 MMOL/L (ref 3.5–5.3)
PR INTERVAL: 122 MS
PR INTERVAL: 152 MS
PR INTERVAL: 156 MS
PR INTERVAL: 164 MS
Q ONSET: 249 MS
Q ONSET: 252 MS
Q ONSET: 253 MS
Q ONSET: 253 MS
QRS COUNT: 15 BEATS
QRS COUNT: 15 BEATS
QRS COUNT: 19 BEATS
QRS COUNT: 22 BEATS
QRS DURATION: 83 MS
QRS DURATION: 85 MS
QRS DURATION: 85 MS
QRS DURATION: 87 MS
QT INTERVAL: 298 MS
QT INTERVAL: 331 MS
QT INTERVAL: 357 MS
QT INTERVAL: 373 MS
QTC CALCULATION(BAZETT): 447 MS
QTC CALCULATION(BAZETT): 458 MS
QTC CALCULATION(BAZETT): 459 MS
QTC CALCULATION(BAZETT): 479 MS
QTC FREDERICIA: 390 MS
QTC FREDERICIA: 410 MS
QTC FREDERICIA: 421 MS
QTC FREDERICIA: 440 MS
R AXIS: -20 DEGREES
R AXIS: 0 DEGREES
R AXIS: 198 DEGREES
R AXIS: 201 DEGREES
RBC # BLD AUTO: 3.41 X10*6/UL (ref 4–5.2)
SODIUM SERPL-SCNC: 132 MMOL/L (ref 136–145)
T AXIS: 15 DEGREES
T AXIS: 173 DEGREES
T AXIS: 175 DEGREES
T AXIS: 56 DEGREES
T OFFSET: 402 MS
T OFFSET: 415 MS
T OFFSET: 431 MS
T OFFSET: 439 MS
VENTRICULAR RATE: 115 BPM
VENTRICULAR RATE: 135 BPM
VENTRICULAR RATE: 99 BPM
VENTRICULAR RATE: 99 BPM
WBC # BLD AUTO: 8.9 X10*3/UL (ref 4.4–11.3)

## 2024-12-07 PROCEDURE — 83735 ASSAY OF MAGNESIUM: CPT | Performed by: FAMILY MEDICINE

## 2024-12-07 PROCEDURE — 2500000004 HC RX 250 GENERAL PHARMACY W/ HCPCS (ALT 636 FOR OP/ED): Performed by: FAMILY MEDICINE

## 2024-12-07 PROCEDURE — 80048 BASIC METABOLIC PNL TOTAL CA: CPT | Performed by: FAMILY MEDICINE

## 2024-12-07 PROCEDURE — 85027 COMPLETE CBC AUTOMATED: CPT | Performed by: FAMILY MEDICINE

## 2024-12-07 PROCEDURE — 2500000001 HC RX 250 WO HCPCS SELF ADMINISTERED DRUGS (ALT 637 FOR MEDICARE OP): Performed by: INTERNAL MEDICINE

## 2024-12-07 PROCEDURE — 99239 HOSP IP/OBS DSCHRG MGMT >30: CPT | Performed by: FAMILY MEDICINE

## 2024-12-07 PROCEDURE — 2500000001 HC RX 250 WO HCPCS SELF ADMINISTERED DRUGS (ALT 637 FOR MEDICARE OP): Performed by: STUDENT IN AN ORGANIZED HEALTH CARE EDUCATION/TRAINING PROGRAM

## 2024-12-07 PROCEDURE — 36415 COLL VENOUS BLD VENIPUNCTURE: CPT | Performed by: FAMILY MEDICINE

## 2024-12-07 PROCEDURE — 2500000002 HC RX 250 W HCPCS SELF ADMINISTERED DRUGS (ALT 637 FOR MEDICARE OP, ALT 636 FOR OP/ED): Performed by: FAMILY MEDICINE

## 2024-12-07 RX ORDER — MAGNESIUM SULFATE HEPTAHYDRATE 40 MG/ML
2 INJECTION, SOLUTION INTRAVENOUS ONCE
Status: COMPLETED | OUTPATIENT
Start: 2024-12-07 | End: 2024-12-07

## 2024-12-07 RX ORDER — POTASSIUM CHLORIDE 750 MG/1
40 TABLET, FILM COATED, EXTENDED RELEASE ORAL ONCE
Status: COMPLETED | OUTPATIENT
Start: 2024-12-07 | End: 2024-12-07

## 2024-12-07 RX ORDER — METOPROLOL SUCCINATE 50 MG/1
100 TABLET, EXTENDED RELEASE ORAL DAILY
Status: DISCONTINUED | OUTPATIENT
Start: 2024-12-07 | End: 2024-12-07 | Stop reason: HOSPADM

## 2024-12-07 ASSESSMENT — PAIN - FUNCTIONAL ASSESSMENT
PAIN_FUNCTIONAL_ASSESSMENT: 0-10

## 2024-12-07 ASSESSMENT — PAIN SCALES - GENERAL
PAINLEVEL_OUTOF10: 8
PAINLEVEL_OUTOF10: 8
PAINLEVEL_OUTOF10: 4

## 2024-12-07 ASSESSMENT — PAIN DESCRIPTION - LOCATION
LOCATION: BACK
LOCATION: OTHER (COMMENT)

## 2024-12-07 NOTE — CARE PLAN
The patient's goals for the shift include  discharging today.     The clinical goals for the shift include patient safety.

## 2024-12-07 NOTE — PROGRESS NOTES
"Brittny Gordon is a 69 y.o. female on day 2 of admission presenting with Disorientation.      Subjective   69 y.o. female with PMHx s/f NSCLC, HTN, HLD, osteoporosis, AAA s/p repair, pathologic fractures of L arm, L2, L3, T12 vertebrae, R leg presenting with confusion. She receives palliative radiation to her broken femur and clavicle.  Pt was in the oncology office earlier today for her monthly Keytruda infusion. Staff noticed that she was apparently quite confused and disoriented. Her blood pressure was notably 79/50 initially. Her brother reports that he is struggling caring for her at home. She was sent to the ER. No Keytruda was given today. Pt on interview today is A&OX4, but does have issues with conversation. She is able to give a good HPI of what happened earlier today and relates her past medical history mostly correctly. She does think that she has SCLC rather than NSCLC. She thinks Trista is the president already, and that the other david \"should be removed\". She is aware about her Keytruda infusions. Frequently jumps between topics with and seems somewhat annoyed and offended that she was sent to the ER in the first place. Does complain about pain in the aforementioned extremities, but not worse than usual. No nausea, vomiting, chest pain, fever, chills, abdominal pain or other worsening symptoms reported.     ED Course (Summary - please note all labs, imaging studies, and interventions noted below have been personally reviewed and/or interpreted on day of admission):   Vitals on presentation: 97.2 F, 96 bpm, 16 rr, 87/59, 98% on RA  Labs: CMP Na 130 -> 131, Cr 1.2 -> 0.98  Lactate 1.7  CBC WBC 9.6, Hg 10.5, Plt 300  TSH 2.47  Flu and COVID negative  EKG: NSR at 90 bpm. No ST changes.  Imaging: CT head - No acute cortical infarct or acute intracranial hemorrhage.   Interventions:  ml bolus,  ml bolus      12/4:                 Today the patient is seen initially alone but later in the " afternoon in the presence of her son.  She is awake alert and interactive reasonably appropriately but does appear to have some word finding difficulties.  Also would appear to have some memory loss.  Son relates last night that she apparently did not recognize his hospital though he is brought her here several times to [facility].  She was able to correctly state the year and month as well as current president and president electronic but could not name his opponent and election.  Blood cultures remain no growth.  Mild hypercalcemia normalized today.  Discussed with son plan of obtaining MRI with and without contrast to try and rule out any brain mets.  He relates that she has basically not left her room since fracturing her femur.  He feels over the past 3 perhaps 4 days that she has appeared to weekend and become confused.  He does feel she is improved today over presentation.    12/5:                  Today the patient remains awake alert and interactive reasonably appropriately but continues to appear to have some word finding difficulties or confusion.  She continues to states she is feeling improved and does not see why she needs to be in the hospital.  Neurology evaluated the patient and describe likely encephalopathy and checking urine drug screen and B12 level.  Recommending outpatient neurosurgery evaluation for left AICA aneurysm which does not appear to be new and unchanged at least since July 2024.  At that time there does appear to be a recommendation for neurosurgery evaluation though I cannot find documentation this was done.    12/6:               Today the patient remains awake and alert but overall generally unchanged in her cognitive status.  Continues interactive appropriately but still appears to struggle perhaps with word finding or staying on track.  Still able to describe year and month and who won the presidential election but still cannot name his opponent.  Brother had stated  previously that she really does not like politics and does not follow that.  Neurology at this time signing off with no further recommendations.  At this point it would seem likely that her cognitive disturbance may be related to hypercalcemia and will start calcium lowering agent.  Patient was noted to have significant sinus tachycardia today.  Appeared to respond to IV metoprolol 5 mg.  Uncertain etiology.  Continue to monitor.    Review of Systems   Constitutional:  Negative for activity change, appetite change, chills, diaphoresis, fatigue and fever.   HENT:  Negative for congestion, ear pain, rhinorrhea, sinus pain and sore throat.    Respiratory:  Negative for apnea, cough, chest tightness, shortness of breath, wheezing and stridor.    Cardiovascular:  Negative for chest pain, palpitations and leg swelling.   Gastrointestinal:  Negative for abdominal distention, abdominal pain, constipation, diarrhea, nausea and vomiting.   Genitourinary:  Negative for difficulty urinating, dysuria, flank pain, frequency, hematuria and urgency.   Musculoskeletal:  Negative for arthralgias, back pain, gait problem, joint swelling and myalgias.   Skin:  Negative for color change, pallor, rash and wound.   Neurological:  Negative for dizziness, syncope, weakness, light-headedness, numbness and headaches.   Psychiatric/Behavioral:  Positive for behavioral problems and confusion. Negative for agitation and decreased concentration. The patient is not nervous/anxious.    All other systems reviewed and are negative.         Objective     Last Recorded Vitals  BP (!) 154/93   Pulse 97   Temp (!) 2.4 °C (36.4 °F) (Temporal)   Resp 16   Wt 59 kg (130 lb)   SpO2 93%   Intake/Output last 3 Shifts:    Intake/Output Summary (Last 24 hours) at 12/6/2024 2040  Last data filed at 12/6/2024 1700  Gross per 24 hour   Intake 300 ml   Output 900 ml   Net -600 ml       Admission Weight  Weight: 59 kg (130 lb) (12/03/24 1418)    Daily  Weight  12/03/24 : 59 kg (130 lb)    Image Results  Electrocardiogram, 12-lead PRN ACS symptoms  Sinus tachycardia  Inferior infarct, old  Electrocardiogram, 12-lead PRN ACS symptoms  Right and left arm electrode reversal, interpretation assumes no reversal  Sinus or ectopic atrial rhythm  Supraventricular bigeminy  Inferior infarct, old  Lateral leads are also involved  ECG 12 lead  Right and left arm electrode reversal, interpretation assumes no reversal  Sinus or ectopic atrial rhythm  Supraventricular bigeminy  Inferior infarct, old  Lateral leads are also involved  Electrocardiogram, 12-lead PRN ACS symptoms  Sinus tachycardia  Multiform ventricular premature complexes  Low voltage, extremity leads      Physical Exam  Constitutional: Pleasant and cooperative. Laying in bed in no acute distress. Conversant.   Skin: Warm and dry; no obvious lesions, rashes, pallor, or jaundice.   Eyes: EOMI. Anicteric sclera.   ENT: Mucous membranes moist; no obvious injury or deformity appreciated.   Head and Neck: Normocephalic, atraumatic. ROM preserved. Trachea midline. No appreciable JVD.   Respiratory: Nonlabored on RA. Lungs clear to auscultation bilaterally without obvious adventitious sounds. Chest rise is equal.  Cardiovascular: RRR. No gross murmur, gallop, or rub. Extremities are warm and well-perfused with good capillary refill (< 3 seconds). No chest wall tenderness.   GI: Abdomen soft, nontender, nondistended. No obvious organomegaly appreciated. Bowel sounds are present.  : No CVA tenderness.   MSK: No gross abnormalities appreciated. No limitations to AROM/PROM appreciated.   Extremities: No cyanosis, edema, or clubbing evident. Neurovascularly intact.   Neuro: A&Ox3. CN 2-12 grossly intact. Able to respond to questions generally appropriately and clearly.  Appears to have some word finding difficulty and perhaps short-term memory loss.  No abnormal or involuntary movements of  Psych: Appropriate mood and  behavior.     Relevant Results               Assessment/Plan                  Assessment & Plan  Disorientation    Confusion/disorientation, hypotension:  Bp now 122/61 after fluid administration. Continue to monitor.  Check VBG, ammonia, CXR, UA for other organic causes.  Pt is A&O X4 currently. Does have some issues with conversing appropriately, but overall makes clear choices and gives a reasonable history.  CT head negative.  12/4: Son feels she is improved today though not at her usual baseline.  Will check MRI with and without contrast in view of her known metastatic lung CA.  12/5: MRI does not reveal any evidence for CVA or brain metastasis.  Does show 7 mm AICA likely aneurysm which was also seen July 2024 and at that time appears unchanged.  However no evidence of neurosurgical evaluation as was recommended at this time.  Will recommend neurosurgery evaluation at discharge.     Hypercalcemia:  Corrected calcium is 11.4 on PM labs.  Will give gentle hydration and recheck in AM.  May be related to hypercalcemia of malignancy.  Gentle hydration overnight.  12/4: Calcium 10.1 today.  12/5: Calcium remains 10.1.  12/6: Calcium remains 10.1 though with significantly low albumin's likely is higher.     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     Continue other appropriate home medications once med rec updated.     Hx NSCLC - follow-up with oncology              Jesus Mnauel Arredondo MD

## 2024-12-07 NOTE — DISCHARGE SUMMARY
"Discharge Diagnosis  Metabolic encephalopathy.  NSCLC with metastases.  Cancer pain, hypercalcemia    Issues Requiring Follow-Up  Encephalopathy, NS CLC, metastases, pain control    This discharge took greater than 35 minutes.    Test Results Pending At Discharge  Pending Labs       Order Current Status    Blood Culture Preliminary result    Blood Culture Preliminary result            Hospital Course   69 y.o. female with PMHx s/f NSCLC, HTN, HLD, osteoporosis, AAA s/p repair, pathologic fractures of L arm, L2, L3, T12 vertebrae, R leg presenting with confusion. She receives palliative radiation to her broken femur and clavicle.  Pt was in the oncology office earlier today for her monthly Keytruda infusion. Staff noticed that she was apparently quite confused and disoriented. Her blood pressure was notably 79/50 initially. Her brother reports that he is struggling caring for her at home. She was sent to the ER. No Keytruda was given today. Pt on interview today is A&OX4, but does have issues with conversation. She is able to give a good HPI of what happened earlier today and relates her past medical history mostly correctly. She does think that she has SCLC rather than NSCLC. She thinks Trista is the president already, and that the other david \"should be removed\". She is aware about her Keytruda infusions. Frequently jumps between topics with and seems somewhat annoyed and offended that she was sent to the ER in the first place. Does complain about pain in the aforementioned extremities, but not worse than usual. No nausea, vomiting, chest pain, fever, chills, abdominal pain or other worsening symptoms reported.     ED Course (Summary - please note all labs, imaging studies, and interventions noted below have been personally reviewed and/or interpreted on day of admission):   Vitals on presentation: 97.2 F, 96 bpm, 16 rr, 87/59, 98% on RA  Labs: CMP Na 130 -> 131, Cr 1.2 -> 0.98  Lactate 1.7  CBC WBC 9.6, Hg 10.5, Plt " 300  TSH 2.47  Flu and COVID negative  EKG: NSR at 90 bpm. No ST changes.  Imaging: CT head - No acute cortical infarct or acute intracranial hemorrhage.   Interventions:  ml bolus,  ml bolus        12/4:                 Today the patient is seen initially alone but later in the afternoon in the presence of her son.  She is awake alert and interactive reasonably appropriately but does appear to have some word finding difficulties.  Also would appear to have some memory loss.  Son relates last night that she apparently did not recognize his hospital though he is brought her here several times to [facility].  She was able to correctly state the year and month as well as current president and president electronic but could not name his opponent and election.  Blood cultures remain no growth.  Mild hypercalcemia normalized today.  Discussed with son plan of obtaining MRI with and without contrast to try and rule out any brain mets.  He relates that she has basically not left her room since fracturing her femur.  He feels over the past 3 perhaps 4 days that she has appeared to weekend and become confused.  He does feel she is improved today over presentation.     12/5:                  Today the patient remains awake alert and interactive reasonably appropriately but continues to appear to have some word finding difficulties or confusion.  She continues to states she is feeling improved and does not see why she needs to be in the hospital.  Neurology evaluated the patient and describe likely encephalopathy and checking urine drug screen and B12 level.  Recommending outpatient neurosurgery evaluation for left AICA aneurysm which does not appear to be new and unchanged at least since July 2024.  At that time there does appear to be a recommendation for neurosurgery evaluation though I cannot find documentation this was done.     12/6:               Today the patient remains awake and alert but  overall generally unchanged in her cognitive status.  Continues interactive appropriately but still appears to struggle perhaps with word finding or staying on track.  Still able to describe year and month and who won the presidential election but still cannot name his opponent.  Brother had stated previously that she really does not like politics and does not follow that.  Neurology at this time signing off with no further recommendations.  At this point it would seem likely that her cognitive disturbance may be related to hypercalcemia and will start calcium lowering agent.  Patient was noted to have significant sinus tachycardia today.  Appeared to respond to IV metoprolol 5 mg.  Uncertain etiology.  Continue to monitor.    12/7:               Today the patient appears to remain stable cognitive status though still at least mildly encephalopathic.  Discussed with patient's brother present today who confirms this.  At this point after discussion of her course and all investigative measures he feels he would like to take her back home.  Most likely dealing with encephalopathy related to possibly hypercalcemia related to her metastases versus possibly paraneoplastic syndrome.  Brother states he is understanding of this.  At this point a plan to follow-up with oncologist and not certain whether they will pursue further active treatments which consist of palliative care efforts.     Review of Systems   Constitutional:  Negative for activity change, appetite change, chills, diaphoresis, fatigue and fever.   HENT:  Negative for congestion, ear pain, rhinorrhea, sinus pain and sore throat.    Respiratory:  Negative for apnea, cough, chest tightness, shortness of breath, wheezing and stridor.    Cardiovascular:  Negative for chest pain, palpitations and leg swelling.   Gastrointestinal:  Negative for abdominal distention, abdominal pain, constipation, diarrhea, nausea and vomiting.   Genitourinary:  Negative for  difficulty urinating, dysuria, flank pain, frequency, hematuria and urgency.   Musculoskeletal:  Negative for arthralgias, back pain, gait problem, joint swelling and myalgias.   Skin:  Negative for color change, pallor, rash and wound.   Neurological:  Negative for dizziness, syncope, weakness, light-headedness, numbness and headaches.   Psychiatric/Behavioral:  Positive for behavioral problems and confusion. Negative for agitation and decreased concentration. The patient is not nervous/anxious.    All other systems reviewed and are negative.        Likely metabolic encephalopathy, hypotension:  Bp now 122/61 after fluid administration. Continue to monitor.  Check VBG, ammonia, CXR, UA for other organic causes.  Pt is A&O X4 currently. Does have some issues with conversing appropriately, but overall makes clear choices and gives a reasonable history.  CT head negative.  12/4: Son feels she is improved today though not at her usual baseline.  Will check MRI with and without contrast in view of her known metastatic lung CA.  12/5: MRI does not reveal any evidence for CVA or brain metastasis.  Does show 7 mm AICA likely aneurysm which was also seen July 2024 and at that time appears unchanged.  However no evidence of neurosurgical evaluation as was recommended at this time.  Will recommend neurosurgery evaluation at discharge.  12/7: At this point no further significant improvement in her level of encephalopathy but not worsening either.  Remains uncertain etiology with consideration for hypercalcemia of malignancy or possibly a paraneoplastic syndrome.  She is to discontinue calcium supplements and vitamin D supplements and follow-up with oncologist.  Brother states they are not sure they wish to continue pursuing palliative treatments.     Hypercalcemia:  Corrected calcium is 11.4 on PM labs.  Will give gentle hydration and recheck in AM.  May be related to hypercalcemia of malignancy.  Gentle hydration  overnight.  12/4: Calcium 10.1 today.  12/5: Calcium remains 10.1.  12/6: Calcium remains 10.1 though with significantly low albumin's likely is higher.  12/7: Calcium 9.6 today though likely significantly higher if corrected for low albumin.  She will be discontinuing any calcium supplements and vitamin D supplements.     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                   Pertinent Physical Exam At Time of Discharge  Physical Exam  Constitutional: Pleasant and cooperative. Laying in bed in no acute distress. Conversant.   Skin: Warm and dry; no obvious lesions, rashes, pallor, or jaundice.   Eyes: EOMI. Anicteric sclera.   ENT: Mucous membranes moist; no obvious injury or deformity appreciated.   Head and Neck: Normocephalic, atraumatic. ROM preserved. Trachea midline. No appreciable JVD.   Respiratory: Nonlabored on RA. Lungs clear to auscultation bilaterally without obvious adventitious sounds. Chest rise is equal.  Cardiovascular: RRR. No gross murmur, gallop, or rub. Extremities are warm and well-perfused with good capillary refill (< 3 seconds). No chest wall tenderness.   GI: Abdomen soft, nontender, nondistended. No obvious organomegaly appreciated. Bowel sounds are present.  : No CVA tenderness.   MSK: No gross abnormalities appreciated. No limitations to AROM/PROM appreciated.   Extremities: No cyanosis, edema, or clubbing evident. Neurovascularly intact.   Neuro: A&Ox3. CN 2-12 grossly intact. Able to respond to questions generally appropriately and clearly.  Appears to have some word finding difficulty and perhaps short-term memory loss.  No abnormal or involuntary movements of  Psych: Appropriate mood and behavior.     Home Medications     Medication List      CONTINUE taking these medications     apixaban 5 mg tablet; Commonly known as: Eliquis; Take 1 tablet (5 mg)   by mouth 2 times a day.   atorvastatin 80 mg tablet; Commonly known as: Lipitor; TAKE 1 TABLET BY    MOUTH EVERY DAY   diclofenac sodium 1 % gel; Commonly known as: Voltaren; Apply 4.5 inches   (4 g) topically 4 times a day as needed (L shoulder pain).   ibandronate 150 mg tablet; Commonly known as: Boniva; Take 1 tablet (150   mg) by mouth every 30 (thirty) days. Take in morning with full glass of   water on an empty stomach. No food, drink, meds, or lying down for 60   minutes after.   lidocaine 5 % patch; Commonly known as: Lidoderm; Place 1 patch over 12   hours on the skin once daily. Remove & discard patch within 12 hours or as   directed by MD.   melatonin 3 mg tablet; Take 1 tablet (3 mg) by mouth as needed at   bedtime for sleep.   metoprolol succinate  mg 24 hr tablet; Commonly known as:   Toprol-XL; TAKE 1 TABLET BY MOUTH EVERY DAY   omeprazole OTC 20 mg EC tablet; Commonly known as: PriLOSEC OTC; Take 1   tablet (20 mg) by mouth once daily. Do not crush, chew, or split.   oxyCODONE 10 mg immediate release tablet; Commonly known as: Roxicodone;   Take 0.5-1 tablets (5-10 mg) by mouth every 3 hours if needed for severe   pain (7 - 10) or moderate pain (4 - 6). Do not crush, chew, or split.   polyethylene glycol 17 gram packet; Commonly known as: Glycolax,   Miralax; Take 17 g by mouth once daily.   PRESERVISION AREDS ORAL   sennosides 8.6 mg tablet; Commonly known as: Senokot; Take 2 tablets   (17.2 mg) by mouth as needed at bedtime for constipation.     STOP taking these medications     calcium carbonate 200 mg calcium chewable tablet; Commonly known as:   Tums   losartan 100 mg tablet; Commonly known as: Cozaar   Vitamin D3 25 MCG (1000 UT) tablet; Generic drug: cholecalciferol       Outpatient Follow-Up  PCP, oncologist.     Jesus Manuel Arredondo MD

## 2024-12-07 NOTE — NURSING NOTE
Patient being discharged home, [facility]. Brother (Lance) will be home to receive her. Discharge summary printed and given to brother previously; [facility] printed along with signed transport form, given to .

## 2024-12-07 NOTE — CARE PLAN
The patient's goals for the shift include  discharging home today.    The clinical goals for the shift include patient safety

## 2024-12-08 NOTE — PROGRESS NOTES
"SUPPORTIVE AND PALLIATIVE ONCOLOGY - OUTPATIENT FOLLOW UP    .  SERVICE DATE: 12/13/2024    Referred by:  Camila Chaudhary MD MPH   Medical Oncologist: Camila Chaudhary MD MPH  MD Aislinn Schwarz MD   Radiation Oncologist: No care team member to display  Primary Physician: No Assigned PCP Generic Provider  None    REASON FOR CONSULT/CHIEF CONSULT COMPLAINT: Pain management and Introduction to Supportive and Palliative Oncology Services    Subjective   HISTORY OF PRESENT ILLNESS: Brittny Gordon is a 69 y.o. female who presents with  likely metastatic NSCLC (lymph nodes, bone with pathologic fracture of L2, L3, T12) s/p bronch/EBUS 8/5 not yet started on systemic therapy pending final pathology, plan for palliative RT to spine.      PMH significant for HTN, HLD, osteporosis, AAA s/p repair, former smoker.     9/10/24: Recent hospitalization for acute PE. Also with acute encephalopathy. Done with radiation. Oxycontin denied by insurance. Xtampza caused more nausea. Currently using oxycodone 5mg q4 and content with this. Stopped gabapentin because she also felt this contributed to confusion She is also avoiding flexeril. Having issues with nausea, appetite, constipation.     10/15/24: Completed RT. Referral made already for kyphoplasty. She is being treated with single agent keytruda. Pain controlled on oxycodone 10mg q4 scheduled. She has not tolerated many medications. She did not start zyprexa but is eating better and staying hydrated. Bowels are regular. She prefers the oxycodone from [facility] and does not like the oxycodone that she gets from her local [facility]. We discussed that unfortunately, I am not aware of what supplier each pharmacy uses.        Symptom Assessment:    Patient reports that she has not been feeling well. She is difficult to understand on the phone. She continues to state \"you know what I mean\". Attempted to discuss her pain and could not discern her pain and where she was having " "pain. She did say that possibly one side was worse and possibly that one side was better. Attempted to discuss if her pain meds needed increased, but was not able to tell if her medications need adjusted.       Goals of Care  Attempted to discuss goals of care with patient and her brother. She states that she is not sure if she can come in for another appointment. Discussed hospice care with patient and the benefit of hospice care. She states that she does not want to make this decision until after the holiday. Discussed if she was thinking of stopping treatment and she stated emphatically \"NO\". She states that she is planning on coming in for her next pembro treatment as she feels that the pembro make her feel better.       Pain:somewhat    T / L spine   Sharp, stabbing intermittently  Constant deep ache  Worse with movement       Information obtained from: interview of patient  ______________________________________________________________________     Oncology History   Primary malignant neoplasm of right lung metastatic to other site (Multi)   8/9/2024 Initial Diagnosis    Primary malignant neoplasm of right lung metastatic to other site (Multi)     8/23/2024 Cancer Staged    Staging form: Lung, AJCC 8th Edition, Clinical: Stage IVB (cT1b, cN2, pM1c) - Signed by Camila Chaudhary MD MPH on 8/23/2024     10/1/2024 - 10/1/2024 Chemotherapy    Pembrolizumab + PACLitaxel / CARBOplatin, 21 Day Cycles     10/1/2024 -  Chemotherapy    Pembrolizumab, 21 Day Cycles         Past Medical History:   Diagnosis Date    Hypercholesteremia     Hypertension     Lung cancer (Multi)     Lung nodule seen on imaging study     lung nodules concerning for metastatic lung cancer to the bone    Personal history of irradiation     Saccular aneurysm ([facility])     Saccular aneurysm of left AICA, 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 Lives with cousin's granddaughter.   Social History:  reports that she quit smoking about 11 years ago. Her smoking use included cigarettes. She started smoking about 51 years ago. She has a 20 pack-year smoking history. She has never used smokeless tobacco. She reports that she does not currently use alcohol. She reports that she does not currently use drugs.      Review of systems negative unless noted in HPI.       Objective       Current Outpatient Medications   Medication Instructions    apixaban (ELIQUIS) 5 mg, oral, 2 times daily    atorvastatin (LIPITOR) 80 mg, oral, Daily    diclofenac sodium (VOLTAREN) 4 g, Topical, 4 times daily PRN    ibandronate (BONIVA) 150 mg, oral, Every 30 days, Take in morning with full glass of water on an empty stomach. No food, drink, meds, or lying down for 60 minutes after.    lidocaine (Lidoderm) 5 % patch 1 patch, transdermal, Daily, Remove & discard patch within 12 hours or as directed by MD.    melatonin 3 mg, oral, Nightly PRN    metoprolol succinate XL (TOPROL-XL) 100 mg, oral, Daily    omeprazole OTC (PRILOSEC OTC) 20 mg, oral, Daily, Do not crush, chew, or split.    oxyCODONE (ROXICODONE) 5-10 mg, oral, Every 3 hours PRN, Do not crush, chew, or split.    polyethylene glycol (GLYCOLAX, MIRALAX) 17 g, oral, Daily    sennosides (SENOKOT) 17.2 mg, oral, Nightly PRN    vit A/vit C/vit E/zinc/copper (PRESERVISION AREDS ORAL) 1 tablet, Daily       Allergies:   Allergies   Allergen Reactions    Morphine Psychosis     Severe agitation, hallucinations      Acetaminophen GI Upset    Epinephrine Nausea/vomiting and Palpitations             Creatinine   Date Value Ref Range Status   12/07/2024 0.42 (L) 0.50 - 1.05 mg/dL Final     AST   Date Value Ref Range Status   12/03/2024 9 9 - 39 U/L Final     ALT   Date Value Ref Range Status   12/03/2024 5 (L) 7 - 45 U/L Final     Comment:     Patients treated with Sulfasalazine may generate falsely decreased results for ALT.     Hemoglobin   Date Value Ref Range Status   12/07/2024 9.5 (L) 12.0 - 16.0 g/dL Final     Platelets   Date Value Ref Range Status   12/07/2024 264 150 - 450 x10*3/uL Final          PHYSICAL EXAMINATION  Vital Signs:   No VS due to VV     Physical Exam  Vitals reviewed: limited due to telephone visit.   Neurological:      Mental Status: She is disoriented.   Psychiatric:      Comments: Difficulty with word finding          ASSESSMENT/PLAN    Pain  Pain is: cancer related pain  Type: somatic and neuropathic  Continue oxycodone 5mg-10mg to Q3 PRN . Will hold off on adjusting medications as it was difficult to assess pain via phone  she did not tolerate mscontin and gabapentin (confusion), xtampza (nausea), APAP/NSAIDs (GI upset), oxycontin denied by insurance       Opioid Use  Medication Management:   - OARRS report reviewed with no aberrant behavior; consistent with  prescriptions/records and patient history  - MED 90.  Overdose Risk Score 310.   This has been discussed with patient.   - We will continue to closely monitor the patient for signs of prescription misuse including UDS, OARRS review and subjective reports at each visit.  - No concurrent benzodiazepine use   - I am a provider who either is or has consulted and collaborated with a provider certified in Hospice and Palliative Medicine and have conducted a face-face visit and examination for this patient.  - Routine Urine Drug Screen: not able to complete    - Controlled Substance Agreement 11/22/2024  - Specifically discussed that controlled  substance prescriptions will only be provided by our group as outlined in the completed agreement  - Prescribed naloxone Rx 8/23/24  - Red Flags: None    Constipation   At risk for constipation related to opioids  Continue senna 1-2 tabs daily PRN   continue adequate hydration   she prefers to avoid miralax    Decreased appetite  Nausea   Related to  pain  , malignancy, opioids   Continue ondansetron 8mg q8 PRN   Continue prochlorperazine 10mg q6 PRN  She opted not to start olanzapine       Medical Decision Making/Goals of Care/Advance Care Planning:  Patient's current clinical condition, including diagnosis, prognosis, and management plan, and goals of care were discussed.   Life limiting disease: metastatic malignancy  Goals: symptom control and cancer directed therapy  She and brother understand palliative intent of treatment     Advance Directives  Existence of Advance Directives:Yes, documentation or copy in medical record  Decision maker: ANSHUMIGUEL is brotherJuice     Next Follow-Up Visit:  Return to clinic in 2 weeks in person due to difficulty ascertaining information over the phone   She is due for UDS at her next in person visit    Signature and billing  Thank you for allowing us to participate in the care of this patient. Recommendations will be communicated back to the consulting service by way of shared electronic medical record or face-to-face.    Medical complexity was low level due to due to complexity of problems, extensive data review, and high risk of management/treatment.  Time was spent on the following: Prep Time, Time Directly with Patient/Family/Caregiver, Documentation Time. Total time spent: 30      DATA   Diagnostic tests and information reviewed for today's visit:  Most recent labs and imaging results, Medications       Some elements copied from previous Supportive Oncology notes and the elements have been updated and all reflect current decision making from today,  12/13/2024.      Plan of Care discussed with: Patient      SIGNATURE: BOWEN Rick-SULEMA    Contact information:  Supportive and Palliative Oncology  Monday-Friday 8 AM-5 PM  Phone:  [phone], press option #5, then option #1.   Or Epic Secure Chat

## 2024-12-09 ENCOUNTER — APPOINTMENT (OUTPATIENT)
Dept: RADIATION ONCOLOGY | Facility: CLINIC | Age: 69
End: 2024-12-09
Payer: MEDICARE

## 2024-12-09 ENCOUNTER — TELEPHONE (OUTPATIENT)
Dept: RADIATION ONCOLOGY | Facility: HOSPITAL | Age: 69
End: 2024-12-09
Payer: MEDICARE

## 2024-12-09 NOTE — TELEPHONE ENCOUNTER
Called and spoke with brother. At this time, they are going to have a discussion on whether or not to pursue radiation treatments at the is time due to patient's condition. They might pursue hospice. Juice will let us know.

## 2024-12-10 ENCOUNTER — TELEPHONE (OUTPATIENT)
Dept: RADIATION ONCOLOGY | Facility: HOSPITAL | Age: 69
End: 2024-12-10
Payer: MEDICARE

## 2024-12-10 ENCOUNTER — TELEPHONE (OUTPATIENT)
Dept: RADIATION ONCOLOGY | Facility: HOSPITAL | Age: 69
End: 2024-12-10

## 2024-12-10 ENCOUNTER — APPOINTMENT (OUTPATIENT)
Dept: RADIATION ONCOLOGY | Facility: CLINIC | Age: 69
End: 2024-12-10
Payer: MEDICARE

## 2024-12-10 ENCOUNTER — TELEPHONE (OUTPATIENT)
Dept: HEMATOLOGY/ONCOLOGY | Facility: CLINIC | Age: 69
End: 2024-12-10
Payer: MEDICARE

## 2024-12-10 NOTE — TELEPHONE ENCOUNTER
Informed Juice that per Dr. Feliz will keep appointments as scheduled and no need at this time to reschedule missed appointment.

## 2024-12-10 NOTE — TELEPHONE ENCOUNTER
Contacted brother again.He states they are still not sure whether to pursue radiation treatments or not.He is not the POA, unsure as to whether the patient is capable of making competent medical decisions.Would like to speak with doctor tomorrow.

## 2024-12-11 ENCOUNTER — APPOINTMENT (OUTPATIENT)
Dept: RADIATION ONCOLOGY | Facility: CLINIC | Age: 69
End: 2024-12-11
Payer: MEDICARE

## 2024-12-12 ENCOUNTER — APPOINTMENT (OUTPATIENT)
Dept: RADIATION ONCOLOGY | Facility: CLINIC | Age: 69
End: 2024-12-12
Payer: MEDICARE

## 2024-12-13 ENCOUNTER — TELEMEDICINE (OUTPATIENT)
Dept: PALLIATIVE MEDICINE | Facility: CLINIC | Age: 69
End: 2024-12-13
Payer: MEDICARE

## 2024-12-13 ENCOUNTER — APPOINTMENT (OUTPATIENT)
Dept: RADIATION ONCOLOGY | Facility: CLINIC | Age: 69
End: 2024-12-13
Payer: MEDICARE

## 2024-12-13 DIAGNOSIS — M84.451A PATHOLOGICAL FRACTURE, RIGHT FEMUR, INITIAL ENCOUNTER FOR FRACTURE: ICD-10-CM

## 2024-12-13 LAB
ATRIAL RATE: 90 BPM
P AXIS: 19 DEGREES
PR INTERVAL: 161 MS
Q ONSET: 249 MS
QRS COUNT: 15 BEATS
QRS DURATION: 90 MS
QT INTERVAL: 339 MS
QTC CALCULATION(BAZETT): 415 MS
QTC FREDERICIA: 388 MS
R AXIS: -11 DEGREES
T AXIS: 22 DEGREES
T OFFSET: 419 MS
VENTRICULAR RATE: 90 BPM

## 2024-12-13 RX ORDER — OXYCODONE HYDROCHLORIDE 10 MG/1
5-10 TABLET ORAL
Qty: 240 TABLET | Refills: 0 | Status: SHIPPED | OUTPATIENT
Start: 2024-12-22

## 2024-12-16 ENCOUNTER — APPOINTMENT (OUTPATIENT)
Dept: RADIATION ONCOLOGY | Facility: CLINIC | Age: 69
End: 2024-12-16
Payer: MEDICARE

## 2024-12-17 ENCOUNTER — APPOINTMENT (OUTPATIENT)
Dept: RADIATION ONCOLOGY | Facility: CLINIC | Age: 69
End: 2024-12-17
Payer: MEDICARE

## 2024-12-18 ENCOUNTER — APPOINTMENT (OUTPATIENT)
Dept: RADIATION ONCOLOGY | Facility: CLINIC | Age: 69
End: 2024-12-18
Payer: MEDICARE

## 2024-12-19 ENCOUNTER — APPOINTMENT (OUTPATIENT)
Dept: RADIATION ONCOLOGY | Facility: CLINIC | Age: 69
End: 2024-12-19
Payer: MEDICARE

## 2024-12-20 ENCOUNTER — APPOINTMENT (OUTPATIENT)
Dept: RADIATION ONCOLOGY | Facility: CLINIC | Age: 69
End: 2024-12-20
Payer: MEDICARE

## 2024-12-23 ENCOUNTER — APPOINTMENT (OUTPATIENT)
Dept: RADIATION ONCOLOGY | Facility: CLINIC | Age: 69
End: 2024-12-23
Payer: MEDICARE

## 2024-12-24 ENCOUNTER — APPOINTMENT (OUTPATIENT)
Dept: RADIATION ONCOLOGY | Facility: CLINIC | Age: 69
End: 2024-12-24
Payer: MEDICARE

## 2024-12-24 ENCOUNTER — SOCIAL WORK (OUTPATIENT)
Dept: HEMATOLOGY/ONCOLOGY | Facility: CLINIC | Age: 69
End: 2024-12-24
Payer: MEDICARE

## 2024-12-24 ENCOUNTER — INFUSION (OUTPATIENT)
Dept: HEMATOLOGY/ONCOLOGY | Facility: CLINIC | Age: 69
End: 2024-12-24
Payer: MEDICARE

## 2024-12-24 VITALS
OXYGEN SATURATION: 96 % | TEMPERATURE: 97.2 F | SYSTOLIC BLOOD PRESSURE: 101 MMHG | DIASTOLIC BLOOD PRESSURE: 70 MMHG | HEART RATE: 118 BPM

## 2024-12-24 DIAGNOSIS — C34.91 PRIMARY MALIGNANT NEOPLASM OF RIGHT LUNG METASTATIC TO OTHER SITE (MULTI): ICD-10-CM

## 2024-12-24 LAB
ALBUMIN SERPL BCP-MCNC: 2.7 G/DL (ref 3.4–5)
ALP SERPL-CCNC: 179 U/L (ref 33–136)
ALT SERPL W P-5'-P-CCNC: 9 U/L (ref 7–45)
ANION GAP SERPL CALC-SCNC: 21 MMOL/L (ref 10–20)
AST SERPL W P-5'-P-CCNC: 20 U/L (ref 9–39)
BASOPHILS # BLD AUTO: 0.02 X10*3/UL (ref 0–0.1)
BASOPHILS NFR BLD AUTO: 0.2 %
BILIRUB SERPL-MCNC: 0.7 MG/DL (ref 0–1.2)
BUN SERPL-MCNC: 12 MG/DL (ref 6–23)
CALCIUM SERPL-MCNC: 10 MG/DL (ref 8.6–10.3)
CHLORIDE SERPL-SCNC: 91 MMOL/L (ref 98–107)
CO2 SERPL-SCNC: 21 MMOL/L (ref 21–32)
CORTIS AM PEAK SERPL-MSCNC: 54.3 UG/DL (ref 5–20)
CREAT SERPL-MCNC: 0.62 MG/DL (ref 0.5–1.05)
EGFRCR SERPLBLD CKD-EPI 2021: >90 ML/MIN/1.73M*2
EOSINOPHIL # BLD AUTO: 0.04 X10*3/UL (ref 0–0.7)
EOSINOPHIL NFR BLD AUTO: 0.4 %
ERYTHROCYTE [DISTWIDTH] IN BLOOD BY AUTOMATED COUNT: 18.3 % (ref 11.5–14.5)
GLUCOSE SERPL-MCNC: 115 MG/DL (ref 74–99)
HCT VFR BLD AUTO: 30.8 % (ref 36–46)
HGB BLD-MCNC: 9.7 G/DL (ref 12–16)
IMM GRANULOCYTES # BLD AUTO: 0.12 X10*3/UL (ref 0–0.7)
IMM GRANULOCYTES NFR BLD AUTO: 1.1 % (ref 0–0.9)
LYMPHOCYTES # BLD AUTO: 1.64 X10*3/UL (ref 1.2–4.8)
LYMPHOCYTES NFR BLD AUTO: 15.1 %
MCH RBC QN AUTO: 28.3 PG (ref 26–34)
MCHC RBC AUTO-ENTMCNC: 31.5 G/DL (ref 32–36)
MCV RBC AUTO: 90 FL (ref 80–100)
MONOCYTES # BLD AUTO: 1.16 X10*3/UL (ref 0.1–1)
MONOCYTES NFR BLD AUTO: 10.7 %
NEUTROPHILS # BLD AUTO: 7.87 X10*3/UL (ref 1.2–7.7)
NEUTROPHILS NFR BLD AUTO: 72.5 %
PLATELET # BLD AUTO: 228 X10*3/UL (ref 150–450)
POTASSIUM SERPL-SCNC: 4.1 MMOL/L (ref 3.5–5.3)
PROT SERPL-MCNC: 5.6 G/DL (ref 6.4–8.2)
RBC # BLD AUTO: 3.43 X10*6/UL (ref 4–5.2)
SODIUM SERPL-SCNC: 129 MMOL/L (ref 136–145)
TSH SERPL-ACNC: 2.45 MIU/L (ref 0.44–3.98)
WBC # BLD AUTO: 10.9 X10*3/UL (ref 4.4–11.3)

## 2024-12-24 PROCEDURE — 82024 ASSAY OF ACTH: CPT | Performed by: REGISTERED NURSE

## 2024-12-24 PROCEDURE — 80053 COMPREHEN METABOLIC PANEL: CPT | Performed by: REGISTERED NURSE

## 2024-12-24 PROCEDURE — 96361 HYDRATE IV INFUSION ADD-ON: CPT | Mod: INF

## 2024-12-24 PROCEDURE — 84443 ASSAY THYROID STIM HORMONE: CPT | Performed by: REGISTERED NURSE

## 2024-12-24 PROCEDURE — 96360 HYDRATION IV INFUSION INIT: CPT | Mod: INF

## 2024-12-24 PROCEDURE — 82533 TOTAL CORTISOL: CPT | Mod: PORLAB | Performed by: REGISTERED NURSE

## 2024-12-24 PROCEDURE — 85025 COMPLETE CBC W/AUTO DIFF WBC: CPT | Performed by: REGISTERED NURSE

## 2024-12-24 PROCEDURE — 2500000004 HC RX 250 GENERAL PHARMACY W/ HCPCS (ALT 636 FOR OP/ED): Performed by: REGISTERED NURSE

## 2024-12-24 NOTE — PROGRESS NOTES
(KAMILLA) was on a medical leave and has not seen patient for awhile.  KAMILLA completed a chart review.  Patient has been through a lot since last visit.  KAMILLA met with patient and her brother Juice.  Patient did not get treatment today due to her sodium intake.  Patient is residing at home per her wishes at this time.  Juice stated that patient has home care aids with [facility].  Patient is also not ambulating at this time at they use [employer] for transportation.  Juice stated that he also stays with patient.  Patient appeared confused throughout conversation.  He is setting up cameras when patient may be left for a few hours.  Unfortunately, Juice has also been diagnosed with cancer.  Juice was asking about patients appointment with CAROLYN Preston this coming Friday.  Patient's last appointment was a phone appointment.  KAMILLA suggested that Brannon would probably need to see patient.  Infusion nurse will be talking with Dr. Feliz's nurse partner on Thursday to discuss patient's treatment.  Juice is also suggesting a home care nurse to come out and complete patient's labs.  KAMILLA let Juice know that Hawa would be in touch with him on Thursday.  KAMILLA provided business card.  KAMILLA will continue to follow patient.      Alexandre Flood MSW, LSW

## 2024-12-24 NOTE — PROGRESS NOTES
Patient arrived for her Keytruda infusion however her mentation is still not back to baseline. Repeated answers to same question several times. Unable to tell me what day of the week it is or the date of Dec 24. Mildly disoriented with some questions and congential talking noted. Asked RN who has seen her prior (Elizabeth) and she agreed that patient is still not back to her baseline.    Significant labs: , Anion Gap 21, Ca 10. Vital signs stable however patient appears dehydrated.  Gave 1 L NS and held Keytruda. Recommended adding extra salt to diet for next few days.  Patient did not want to be admitted to hospital.  Patient accompanied by family member who is in agreement with plan of care.    АЛЕКСАНДР Lassiter, will asked АЛЕКСАНДР Shaikh to Dr. Feliz, to call patient on Thurs to see how she is doing. She may need to be seen or get labs drawn earlier than her next appointment which is in 3 weeks.

## 2024-12-24 NOTE — PROGRESS NOTES
Pt here for keytruda infusion. Upon assessment, pt was confused, stated she was in pain due to multiple fractures. Last treatment was held and pt admitted to hospital. Pt not assessed by provider since. BOWEN Delgado assessed pt as chair side, treatment held today, 1L IVF hydration given. Will speak with nurse partner tomorrow regarding plan of care. Pt tolerated hydration well. PT brother is aware of plan of care.

## 2024-12-26 ENCOUNTER — HOSPITAL ENCOUNTER (OUTPATIENT)
Dept: RADIATION ONCOLOGY | Facility: CLINIC | Age: 69
Setting detail: RADIATION/ONCOLOGY SERIES
Discharge: HOME | End: 2024-12-26
Payer: MEDICARE

## 2024-12-27 ENCOUNTER — SOCIAL WORK (OUTPATIENT)
Dept: HEMATOLOGY/ONCOLOGY | Facility: CLINIC | Age: 69
End: 2024-12-27
Payer: MEDICARE

## 2024-12-27 ENCOUNTER — OFFICE VISIT (OUTPATIENT)
Dept: PALLIATIVE MEDICINE | Facility: CLINIC | Age: 69
End: 2024-12-27
Payer: MEDICARE

## 2024-12-27 ENCOUNTER — APPOINTMENT (OUTPATIENT)
Dept: RADIATION ONCOLOGY | Facility: CLINIC | Age: 69
End: 2024-12-27
Payer: MEDICARE

## 2024-12-27 VITALS
RESPIRATION RATE: 16 BRPM | DIASTOLIC BLOOD PRESSURE: 58 MMHG | SYSTOLIC BLOOD PRESSURE: 86 MMHG | HEART RATE: 115 BPM | TEMPERATURE: 97.7 F | OXYGEN SATURATION: 94 %

## 2024-12-27 DIAGNOSIS — C34.90 NSCLC METASTATIC TO BONE (MULTI): Primary | ICD-10-CM

## 2024-12-27 DIAGNOSIS — C79.51 NSCLC METASTATIC TO BONE (MULTI): Primary | ICD-10-CM

## 2024-12-27 LAB — ACTH PLAS-MCNC: 56.5 PG/ML (ref 7.2–63.3)

## 2024-12-27 PROCEDURE — 99215 OFFICE O/P EST HI 40 MIN: CPT | Performed by: CLINICAL NURSE SPECIALIST

## 2024-12-27 ASSESSMENT — PAIN SCALES - GENERAL: PAINLEVEL_OUTOF10: 0-NO PAIN

## 2024-12-27 NOTE — PROGRESS NOTES
"SUPPORTIVE AND PALLIATIVE ONCOLOGY - OUTPATIENT FOLLOW UP      SERVICE DATE: 12/27/2024    Referred by:  Camila Chaudhary MD MPH   Medical Oncologist: Camila Chaudhary MD MPH  MD Aislinn Schwarz MD   Radiation Oncologist: No care team member to display  Primary Physician: No Assigned PCP Generic Provider  None    REASON FOR CONSULT/CHIEF CONSULT COMPLAINT: Pain management and Introduction to Supportive and Palliative Oncology Services    Subjective   HISTORY OF PRESENT ILLNESS: Brittny Gordon is a 69 y.o. female who presents with  likely metastatic NSCLC (lymph nodes, bone with pathologic fracture of L2, L3, T12) s/p bronch/EBUS 8/5 not yet started on systemic therapy pending final pathology, plan for palliative RT to spine.      PMH significant for HTN, HLD, osteporosis, AAA s/p repair, former smoker.           Symptom Assessment:  Patient cannot provide much meaningful data, but is stating that she is having pain and requesting to leave due to the pain.     GOALS OF CARE   Patient has her surrogate decision maker/brother present for visit. Patient is not oriented and cannot make her own decisions. She states that she is having terrible pain. She continues to state that she wants to leave the visit. We discussed with her brother concerns that she is not doing well and that she is not going to be a candidate for treatment based on her mental status and overall decline. He states that she has also noticed several lumps that have \"popped up all over her back\". We discussed concerns that this may be disease progression. We discussed concerns that given her decline that she could possibly be dying and that she would be most appropriate for hospice care. Patient did continue to state that she wants treatment as it makes her feel better. We discussed why she is not a candidate for treatment. She states that she is having such bad pain. We discussed goals moving forward and what he is hoping for. He states that " his main goal is for her to be comfortable. We discussed hospice care and the philosophy of hospice care. We discussed that hospice care would provide some in home care (nursing and ADL assistance, but would not be there 24/7 or every day. We discussed that hospice care would provide medications, equipment, and 24/7 on call support. They verbalized that they would like to try hospice care. We discussed that she would not be getting active treatment while she is on hospice care, but would get treatment that would provide symptom management. We discussed the risks and benefits of CPR and intubation and he states that he is unsure of what she would want but he knows that she would not want to die in the hospital. We discussed that should she need CPR/intubation that she would have to be in a hospital. He does not feel ready to make this decision at this time, but is agreeable to hospice. Offered choice of hospice and he elected [facility] as they had met with them before. He had questions about her lab work and if they would continue to do lab work to check her sodium. We discussed that they would not and they would not intervene if she was hyponatremic.           Pain:somewhat    T / L spine   Sharp, stabbing intermittently  Constant deep ache  Worse with movement       Information obtained from: interview of patient  ______________________________________________________________________     Oncology History   Primary malignant neoplasm of right lung metastatic to other site (Multi)   8/9/2024 Initial Diagnosis    Primary malignant neoplasm of right lung metastatic to other site (Multi)     8/23/2024 Cancer Staged    Staging form: Lung, AJCC 8th Edition, Clinical: Stage IVB (cT1b, cN2, pM1c) - Signed by Camila Chaudhary MD MPH on 8/23/2024     10/1/2024 - 10/1/2024 Chemotherapy    Pembrolizumab + PACLitaxel / CARBOplatin, 21 Day Cycles     10/1/2024 -  Chemotherapy    Pembrolizumab, 21 Day Cycles         Past  Medical History:   Diagnosis Date    Hypercholesteremia     Hypertension     Lung cancer (Multi)     Lung nodule seen on imaging study     lung nodules concerning for metastatic lung cancer to the bone    Personal history of irradiation     Saccular aneurysm ([facility])     Saccular aneurysm of left AICA, 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 Lives with cousin's granddaughter.   Social History:  reports that she quit smoking about 11 years ago. Her smoking use included cigarettes. She started smoking about 52 years ago. She has a 20 pack-year smoking history. She has never used smokeless tobacco. She reports that she does not currently use alcohol. She reports that she does not currently use drugs.      Review of systems negative unless noted in HPI.       Objective       Current Outpatient Medications   Medication Instructions    apixaban (ELIQUIS) 5 mg, oral, 2 times daily    atorvastatin (LIPITOR) 80 mg, oral, Daily    diclofenac sodium (VOLTAREN) 4 g, Topical, 4 times daily PRN    ibandronate (BONIVA) 150 mg, oral, Every 30 days, Take in morning with full glass of water on an empty stomach. No food, drink, meds, or lying down for 60 minutes after.    lidocaine (Lidoderm) 5 % patch 1 patch, transdermal, Daily, Remove & discard patch within 12 hours or as directed by MD.    melatonin 3 mg, oral, Nightly PRN    metoprolol succinate XL (TOPROL-XL) 100 mg, oral, Daily    omeprazole OTC (PRILOSEC  OTC) 20 mg, oral, Daily, Do not crush, chew, or split.    oxyCODONE (ROXICODONE) 5-10 mg, oral, Every 3 hours PRN, Do not crush, chew, or split.    polyethylene glycol (GLYCOLAX, MIRALAX) 17 g, oral, Daily    sennosides (SENOKOT) 17.2 mg, oral, Nightly PRN    vit A/vit C/vit E/zinc/copper (PRESERVISION AREDS ORAL) 1 tablet, Daily       Allergies:   Allergies   Allergen Reactions    Morphine Psychosis     Severe agitation, hallucinations     Acetaminophen GI Upset    Epinephrine Nausea/vomiting and Palpitations         Creatinine   Date Value Ref Range Status   12/24/2024 0.62 0.50 - 1.05 mg/dL Final     AST   Date Value Ref Range Status   12/24/2024 20 9 - 39 U/L Final     Comment:     MILD HEMOLYSIS DETECTED. The result may be falsely elevated due to hemolysis or other interferents. Clinical correlation is recommended. Repeat testing may be considered.     ALT   Date Value Ref Range Status   12/24/2024 9 7 - 45 U/L Final     Comment:     Patients treated with Sulfasalazine may generate falsely decreased results for ALT.     Hemoglobin   Date Value Ref Range Status   12/24/2024 9.7 (L) 12.0 - 16.0 g/dL Final     Platelets   Date Value Ref Range Status   12/24/2024 228 150 - 450 x10*3/uL Final          PHYSICAL EXAMINATION  Vital Signs:        Physical Exam  Constitutional:       Appearance: Normal appearance. She is normal weight.   HENT:      Head: Normocephalic and atraumatic.      Mouth/Throat:      Mouth: Mucous membranes are dry.   Eyes:      Extraocular Movements: Extraocular movements intact.      Conjunctiva/sclera: Conjunctivae normal.   Cardiovascular:      Comments: No signs of cardiac distress  Pulmonary:      Effort: Pulmonary effort is normal.      Comments: No signs of respiratory distress  Abdominal:      General: Abdomen is flat.      Palpations: Abdomen is soft.   Musculoskeletal:         General: Normal range of motion.   Skin:     General: Skin is warm and dry.      Comments: josy    Neurological:      Mental Status: She is alert. She is disoriented.      Comments: Patient is lethargic and states that she is in [place]   Thinks that the month is Nov  Does not have a coherent thought process       ASSESSMENT/PLAN    Pain  Pain is: cancer related pain  Type: somatic and neuropathic  -Increase oxycodone 5mg-10mg to Q3 PRN   -she does not wish to make any additional adjustments at this time  -she did not tolerate mscontin and gabapentin (confusion), xtampza (nausea), APAP/NSAIDs (GI upset), oxycontin denied by insurance       Opioid Use  Medication Management:   - OARRS report reviewed with no aberrant behavior; consistent with  prescriptions/records and patient history  - MED 90.  Overdose Risk Score 320.   This has been discussed with patient.   - We will continue to closely monitor the patient for signs of prescription misuse including UDS, OARRS review and subjective reports at each visit.  - No concurrent benzodiazepine use   - I am a provider who either is or has consulted and collaborated with a provider certified in Hospice and Palliative Medicine and have conducted a face-face visit and examination for this patient.  - Routine Urine Drug Screen: not able to complete    - Controlled Substance Agreement 11/22/2024  - Specifically discussed that controlled substance prescriptions will only be provided by our group as outlined in the completed agreement  - Prescribed naloxone Rx 8/23/24  - Red Flags: None    Constipation   At risk for constipation related to opioids  -continue senna 1-2 tabs daily PRN   -continue adequate hydration   -she prefers to avoid miralax    Decreased appetite  Nausea   Related to  pain  , malignancy, opioids   -Continue ondansetron 8mg q8 PRN   -Continue prochlorperazine 10mg q6 PRN  -She opted not to start olanzapine       Medical Decision Making/Goals of Care/Advance Care Planning:  Patient's current clinical condition, including diagnosis, prognosis, and  management plan, and goals of care were discussed.   Life limiting disease: metastatic malignancy  Goals: symptom control and cancer directed therapy  She and brother understand palliative intent of treatment     Advance Directives  Existence of Advance Directives:Yes, documentation or copy in medical record  Decision maker: TOMMIE is brotherJuice     Next Follow-Up Visit:  Refer to [facility]     Time spent in advance care planning and goals of care was 30 minutes   Signature and billing  Thank you for allowing us to participate in the care of this patient. Recommendations will be communicated back to the consulting service by way of shared electronic medical record or face-to-face.    Medical complexity was high level due to due to complexity of problems, extensive data review, and high risk of management/treatment.  Time was spent on the following: Prep Time, Time Directly with Patient/Family/Caregiver, Documentation Time. Total time spent: 30      DATA   Diagnostic tests and information reviewed for today's visit:  Most recent labs and imaging results, Medications       Some elements copied from previous Supportive Oncology notes and the elements have been updated and all reflect current decision making from today, 12/27/2024.      Plan of Care discussed with: Patient      SIGNATURE: NEREYDA Rick    Contact information:  Supportive and Palliative Oncology  Monday-Friday 8 AM-5 PM  Phone:  [phone], press option #5, then option #1.   Or Epic Secure Chat

## 2024-12-27 NOTE — PROGRESS NOTES
(KAMILLA) met along with CAROLYN Preston and nurse partner Hawa Morel to meet with patient and her brother Juice.  Patient continued to be confused in not knowing where she was and the month we are in.  Patient appeared to be in more pain.  Juice stated that he was helping patient get into the wheelchair and felt more lumps on patients back.  Brannon discussed with patient and Juice that patient may be dying and hospice care would be the best option for her at this time to be able to provide comfort care.  Ju[facility]  SW let Juice know that SW is able to complete the referral for him.  SW and staff provided support and active listening.      KAMILLA completed referral to hospice care with Angelica with [facility] [phone].  KAMILLA faxed completed referral to [phone].  KAMILLA received verification that it was sent.      Alexandre Flood MSW, LSW

## 2024-12-29 ENCOUNTER — HOSPITAL ENCOUNTER (EMERGENCY)
Facility: HOSPITAL | Age: 69
Discharge: HOME | End: 2024-12-29
Attending: STUDENT IN AN ORGANIZED HEALTH CARE EDUCATION/TRAINING PROGRAM
Payer: MEDICARE

## 2024-12-29 ENCOUNTER — APPOINTMENT (OUTPATIENT)
Dept: RADIOLOGY | Facility: HOSPITAL | Age: 69
End: 2024-12-29
Payer: MEDICARE

## 2024-12-29 VITALS
SYSTOLIC BLOOD PRESSURE: 142 MMHG | DIASTOLIC BLOOD PRESSURE: 75 MMHG | BODY MASS INDEX: 20.89 KG/M2 | TEMPERATURE: 97.5 F | RESPIRATION RATE: 117 BRPM | OXYGEN SATURATION: 95 % | HEIGHT: 66 IN | HEART RATE: 98 BPM | WEIGHT: 130 LBS

## 2024-12-29 DIAGNOSIS — K59.00 CONSTIPATION, UNSPECIFIED CONSTIPATION TYPE: ICD-10-CM

## 2024-12-29 DIAGNOSIS — C79.9 METASTATIC MALIGNANT NEOPLASM, UNSPECIFIED SITE (MULTI): Primary | ICD-10-CM

## 2024-12-29 DIAGNOSIS — M48.56XA NONTRAUMATIC COMPRESSION FRACTURE OF L1 VERTEBRA, INITIAL ENCOUNTER (MULTI): ICD-10-CM

## 2024-12-29 LAB
ALBUMIN SERPL BCP-MCNC: 2.4 G/DL (ref 3.4–5)
ALP SERPL-CCNC: 199 U/L (ref 33–136)
ALT SERPL W P-5'-P-CCNC: 30 U/L (ref 7–45)
ANION GAP SERPL CALC-SCNC: 14 MMOL/L (ref 10–20)
AST SERPL W P-5'-P-CCNC: 36 U/L (ref 9–39)
BASOPHILS # BLD AUTO: 0.07 X10*3/UL (ref 0–0.1)
BASOPHILS NFR BLD AUTO: 0.8 %
BILIRUB SERPL-MCNC: 0.8 MG/DL (ref 0–1.2)
BUN SERPL-MCNC: 7 MG/DL (ref 6–23)
CALCIUM SERPL-MCNC: 9.6 MG/DL (ref 8.6–10.3)
CHLORIDE SERPL-SCNC: 95 MMOL/L (ref 98–107)
CO2 SERPL-SCNC: 27 MMOL/L (ref 21–32)
CREAT SERPL-MCNC: 0.35 MG/DL (ref 0.5–1.05)
EGFRCR SERPLBLD CKD-EPI 2021: >90 ML/MIN/1.73M*2
EOSINOPHIL # BLD AUTO: 0.08 X10*3/UL (ref 0–0.7)
EOSINOPHIL NFR BLD AUTO: 1 %
ERYTHROCYTE [DISTWIDTH] IN BLOOD BY AUTOMATED COUNT: 18.5 % (ref 11.5–14.5)
GLUCOSE SERPL-MCNC: 87 MG/DL (ref 74–99)
HCT VFR BLD AUTO: 32.4 % (ref 36–46)
HGB BLD-MCNC: 10.3 G/DL (ref 12–16)
IMM GRANULOCYTES # BLD AUTO: 0.1 X10*3/UL (ref 0–0.7)
IMM GRANULOCYTES NFR BLD AUTO: 1.2 % (ref 0–0.9)
LIPASE SERPL-CCNC: 4 U/L (ref 9–82)
LYMPHOCYTES # BLD AUTO: 1.03 X10*3/UL (ref 1.2–4.8)
LYMPHOCYTES NFR BLD AUTO: 12.4 %
MCH RBC QN AUTO: 28.4 PG (ref 26–34)
MCHC RBC AUTO-ENTMCNC: 31.8 G/DL (ref 32–36)
MCV RBC AUTO: 89 FL (ref 80–100)
MONOCYTES # BLD AUTO: 0.9 X10*3/UL (ref 0.1–1)
MONOCYTES NFR BLD AUTO: 10.8 %
NEUTROPHILS # BLD AUTO: 6.13 X10*3/UL (ref 1.2–7.7)
NEUTROPHILS NFR BLD AUTO: 73.8 %
NRBC BLD-RTO: 0 /100 WBCS (ref 0–0)
PLATELET # BLD AUTO: 209 X10*3/UL (ref 150–450)
POTASSIUM SERPL-SCNC: 3.5 MMOL/L (ref 3.5–5.3)
PROT SERPL-MCNC: 5.4 G/DL (ref 6.4–8.2)
RBC # BLD AUTO: 3.63 X10*6/UL (ref 4–5.2)
SODIUM SERPL-SCNC: 132 MMOL/L (ref 136–145)
WBC # BLD AUTO: 8.3 X10*3/UL (ref 4.4–11.3)

## 2024-12-29 PROCEDURE — 85025 COMPLETE CBC W/AUTO DIFF WBC: CPT | Performed by: STUDENT IN AN ORGANIZED HEALTH CARE EDUCATION/TRAINING PROGRAM

## 2024-12-29 PROCEDURE — 2550000001 HC RX 255 CONTRASTS: Performed by: STUDENT IN AN ORGANIZED HEALTH CARE EDUCATION/TRAINING PROGRAM

## 2024-12-29 PROCEDURE — 74177 CT ABD & PELVIS W/CONTRAST: CPT | Mod: FOREIGN READ | Performed by: RADIOLOGY

## 2024-12-29 PROCEDURE — 96374 THER/PROPH/DIAG INJ IV PUSH: CPT | Mod: 59

## 2024-12-29 PROCEDURE — 36415 COLL VENOUS BLD VENIPUNCTURE: CPT | Performed by: STUDENT IN AN ORGANIZED HEALTH CARE EDUCATION/TRAINING PROGRAM

## 2024-12-29 PROCEDURE — 2500000004 HC RX 250 GENERAL PHARMACY W/ HCPCS (ALT 636 FOR OP/ED): Performed by: STUDENT IN AN ORGANIZED HEALTH CARE EDUCATION/TRAINING PROGRAM

## 2024-12-29 PROCEDURE — 84075 ASSAY ALKALINE PHOSPHATASE: CPT | Performed by: STUDENT IN AN ORGANIZED HEALTH CARE EDUCATION/TRAINING PROGRAM

## 2024-12-29 PROCEDURE — 99285 EMERGENCY DEPT VISIT HI MDM: CPT | Mod: 25 | Performed by: STUDENT IN AN ORGANIZED HEALTH CARE EDUCATION/TRAINING PROGRAM

## 2024-12-29 PROCEDURE — 72128 CT CHEST SPINE W/O DYE: CPT

## 2024-12-29 PROCEDURE — 74177 CT ABD & PELVIS W/CONTRAST: CPT

## 2024-12-29 PROCEDURE — 83690 ASSAY OF LIPASE: CPT | Performed by: STUDENT IN AN ORGANIZED HEALTH CARE EDUCATION/TRAINING PROGRAM

## 2024-12-29 PROCEDURE — 72128 CT CHEST SPINE W/O DYE: CPT | Mod: FOREIGN READ | Performed by: RADIOLOGY

## 2024-12-29 RX ORDER — OXYCODONE HYDROCHLORIDE 5 MG/1
5 TABLET ORAL EVERY 6 HOURS PRN
Qty: 12 TABLET | Refills: 0 | Status: SHIPPED | OUTPATIENT
Start: 2024-12-29 | End: 2025-01-01

## 2024-12-29 RX ORDER — FENTANYL CITRATE 50 UG/ML
50 INJECTION, SOLUTION INTRAMUSCULAR; INTRAVENOUS ONCE
Status: COMPLETED | OUTPATIENT
Start: 2024-12-29 | End: 2024-12-29

## 2024-12-29 RX ORDER — ACETAMINOPHEN 500 MG
1000 TABLET ORAL EVERY 6 HOURS PRN
Qty: 30 TABLET | Refills: 0 | Status: SHIPPED | OUTPATIENT
Start: 2024-12-29 | End: 2025-01-08

## 2024-12-29 RX ORDER — POLYETHYLENE GLYCOL 3350 17 G/17G
17 POWDER, FOR SOLUTION ORAL DAILY
Qty: 3 PACKET | Refills: 0 | Status: SHIPPED | OUTPATIENT
Start: 2024-12-29 | End: 2025-01-01

## 2024-12-29 RX ADMIN — FENTANYL CITRATE 50 MCG: 50 INJECTION INTRAMUSCULAR; INTRAVENOUS at 10:26

## 2024-12-29 RX ADMIN — IOHEXOL 75 ML: 350 INJECTION, SOLUTION INTRAVENOUS at 11:17

## 2024-12-29 ASSESSMENT — PAIN DESCRIPTION - LOCATION
LOCATION: ABDOMEN
LOCATION: BACK

## 2024-12-29 ASSESSMENT — PAIN SCALES - GENERAL
PAINLEVEL_OUTOF10: 7
PAINLEVEL_OUTOF10: 5 - MODERATE PAIN
PAINLEVEL_OUTOF10: 7

## 2024-12-29 ASSESSMENT — LIFESTYLE VARIABLES
EVER FELT BAD OR GUILTY ABOUT YOUR DRINKING: NO
HAVE YOU EVER FELT YOU SHOULD CUT DOWN ON YOUR DRINKING: NO
HAVE PEOPLE ANNOYED YOU BY CRITICIZING YOUR DRINKING: NO
EVER HAD A DRINK FIRST THING IN THE MORNING TO STEADY YOUR NERVES TO GET RID OF A HANGOVER: NO
TOTAL SCORE: 0

## 2024-12-29 ASSESSMENT — PAIN - FUNCTIONAL ASSESSMENT: PAIN_FUNCTIONAL_ASSESSMENT: 0-10

## 2024-12-29 ASSESSMENT — PAIN DESCRIPTION - PAIN TYPE: TYPE: CHRONIC PAIN

## 2024-12-29 NOTE — ED PROVIDER NOTES
HPI   Chief Complaint   Patient presents with    Constipation    Abdominal Pain       69-year-old female with past medical history of metastatic lung cancer with metastases to multiple bones including the back presenting to the ED with concerns for back pain abdominal pain.  Patient's been having back pain she says since last Monday.  She says she has been told that she has metastases in her spine before.  She says he got to the point today where she cannot get out of bed because every time the patient tries to move her pain gets acutely worse.  Denies any weakness or numbness of legs.  No urinary tension.  Has been having constipation.  Says she has not had a bowel movement last 5 days.  No fevers or chills.  No recent falls or injuries.              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, noted 24 on Brain MRI    Vision loss     Wedge compression fracture of t11-T12 vertebra, sequela 2020    Compression fracture of T11 vertebra, sequela     Past Surgical History:   Procedure Laterality Date    ARTERIAL ANEURYSM REPAIR      Thoracic aortic aneurysm repair    COLONOSCOPY      RADIOFREQUENCY ABLATION      L3,4,5    WISDOM TOOTH EXTRACTION       Family History   Problem Relation Name Age of Onset    Lymphoma Father      Polycythemia Father      Breast cancer Neg Hx      Colon cancer Neg Hx       Social History     Tobacco Use    Smoking status: Former     Current packs/day: 0.00     Average packs/day: 0.5 packs/day for 40.0 years (20.0 ttl pk-yrs)     Types: Cigarettes     Start date:      Quit date: 2013     Years since quittin.0    Smokeless tobacco: Never   Vaping Use    Vaping status: Never Used   Substance  Use Topics    Alcohol use: Not Currently     Comment: occassionally    Drug use: Not Currently       Physical Exam   ED Triage Vitals [12/29/24 0942]   Temperature Heart Rate Respirations BP   36.4 °C (97.5 °F) 99 18 119/75      Pulse Ox Temp src Heart Rate Source Patient Position   96 % -- Monitor Sitting      BP Location FiO2 (%)     Left arm --       Physical Exam  Vitals and nursing note reviewed.   Constitutional:       General: She is not in acute distress.     Appearance: She is well-developed.   HENT:      Head: Normocephalic and atraumatic.   Eyes:      Conjunctiva/sclera: Conjunctivae normal.   Cardiovascular:      Rate and Rhythm: Normal rate and regular rhythm.      Heart sounds: No murmur heard.  Pulmonary:      Effort: Pulmonary effort is normal. No respiratory distress.      Breath sounds: Normal breath sounds.   Abdominal:      Palpations: Abdomen is soft.      Tenderness: There is no abdominal tenderness.   Musculoskeletal:         General: No swelling.      Cervical back: Neck supple.   Skin:     General: Skin is warm and dry.      Capillary Refill: Capillary refill takes less than 2 seconds.   Neurological:      Mental Status: She is alert.      Comments: No weakness or numbness to the legs.  Normal hip flexion extension bilateral.  Normal knee flexion and extension, plantar and dorsi flexion bilateral.  Normal DP and PT pulse bilaterally.  Normal patellar and Achilles reflexes bilaterally.     Psychiatric:         Mood and Affect: Mood normal.           ED Course & MDM   Diagnoses as of 12/29/24 1322   Metastatic malignant neoplasm, unspecified site (Multi)   Constipation, unspecified constipation type   Nontraumatic compression fracture of L1 vertebra, initial encounter (Multi)                 No data recorded     [place] Coma Scale Score: 15 (12/29/24 0945 : Savannah Anguiano RN)                           Medical Decision Making  HISTORIAN:  Patient    CHART REVIEW:  No pertinent  findings    PT SUMMARY:  69-year-old female presented ED with abdominal pain, back pain and constipation.  Vital signs stable.    DDX:  Bowel obstruction, enteritis, AAA, mesenteric ischemia, appendicitis, diverticulitis, UTI, gastritis, DKA      PLAN:  Obtain CBC, CMP, lipase, CT abdomen pelvis    DISPO/RE-EVAL:  Patient's blood work shows no significant change from her baseline labs previously.  CT abdomen pelvis essentially shows metastatic disease throughout her axial skeleton and ribs area.  She also has bilateral pleural effusions.  Also signs of peritoneal carcinomatosis.  CAT scan lumbar spine shows multiple compression fractures which are age-indeterminate.  I suspect that her pain is likely coming from the amount of metastatic disease she has.  Low suspicion for cauda equina syndrome as her neurological exam in her legs is normal.  Patient received a dose of pain medication on reevaluation she feels improved.  I did offer the patient admission for further pain management and possible placement into her nursing facility.  However, patient declined this.  Therefore, will discharge patient home with pain meds and recommend to follow-up with her primary care doctor and oncologist as soon as possible.  Advised to come back to the ED for any new or worsening symptoms.          Procedure  Procedures     Savage Hodges,   12/29/24 6471

## 2024-12-29 NOTE — DISCHARGE INSTRUCTIONS
Please follow-up with your oncologist and primary care doctor soon as possible.  Come back to the ED for any new or worsening symptoms.

## 2024-12-30 ENCOUNTER — APPOINTMENT (OUTPATIENT)
Dept: RADIATION ONCOLOGY | Facility: CLINIC | Age: 69
End: 2024-12-30
Payer: MEDICARE

## 2024-12-31 ENCOUNTER — APPOINTMENT (OUTPATIENT)
Dept: RADIATION ONCOLOGY | Facility: CLINIC | Age: 69
End: 2024-12-31
Payer: MEDICARE

## 2025-01-01 ENCOUNTER — APPOINTMENT (OUTPATIENT)
Dept: RADIATION ONCOLOGY | Facility: CLINIC | Age: 70
End: 2025-01-01
Payer: MEDICARE

## 2025-01-01 ENCOUNTER — APPOINTMENT (OUTPATIENT)
Dept: RADIOLOGY | Facility: CLINIC | Age: 70
End: 2025-01-01
Payer: MEDICARE

## 2025-01-01 ENCOUNTER — APPOINTMENT (OUTPATIENT)
Dept: ORTHOPEDIC SURGERY | Facility: CLINIC | Age: 70
End: 2025-01-01
Payer: MEDICARE

## 2025-01-01 DIAGNOSIS — M84.553A: ICD-10-CM

## 2025-01-02 ENCOUNTER — TELEPHONE (OUTPATIENT)
Dept: HEMATOLOGY/ONCOLOGY | Facility: CLINIC | Age: 70
End: 2025-01-02
Payer: MEDICARE

## 2025-01-02 NOTE — TELEPHONE ENCOUNTER
Dr. Feliz's nurse partner asked  (KAMILLA) about a certification of terminal illness to hospice.  SW called [facility] and spoke with Desi.  Desi stated that patient enrolled into hospice on 12/31/24.  [employer] will be sending a packet over to Dr. Feliz to sign.  SW provided update to nurse partner.    Alexandre Flood MSW, LSW

## 2025-01-09 ENCOUNTER — APPOINTMENT (OUTPATIENT)
Dept: CARDIOLOGY | Facility: CLINIC | Age: 70
End: 2025-01-09
Payer: MEDICARE

## 2025-01-14 ENCOUNTER — APPOINTMENT (OUTPATIENT)
Dept: HEMATOLOGY/ONCOLOGY | Facility: CLINIC | Age: 70
End: 2025-01-14
Payer: MEDICARE

## 2025-02-04 ENCOUNTER — APPOINTMENT (OUTPATIENT)
Dept: HEMATOLOGY/ONCOLOGY | Facility: CLINIC | Age: 70
End: 2025-02-04
Payer: MEDICARE

## 2025-02-18 ENCOUNTER — APPOINTMENT (OUTPATIENT)
Dept: CARDIOLOGY | Facility: HOSPITAL | Age: 70
End: 2025-02-18
Payer: MEDICARE

## (undated) DEVICE — COVER, BACK TABLE, 65 X 90, HVY REINFORCED

## (undated) DEVICE — Device

## (undated) DEVICE — MANIFOLD, 4 PORT NEPTUNE STANDARD

## (undated) DEVICE — DRAPE, SHEET, THREE QUARTER, FAN FOLD, 57 X 77 IN

## (undated) DEVICE — COVER, C-ARM W/CLIPS, OEC GE

## (undated) DEVICE — SUTURE, VICRYL, 1, 27 IN, CT-1, VIOLET

## (undated) DEVICE — SPONGE, LAP, XRAY DECT, 18IN X 18IN, W/LOOP, STERILE

## (undated) DEVICE — SUTURE, VICRYL, 2-0, 27 IN, FSL, UNDYED

## (undated) DEVICE — TOWEL, SURGICAL, NEURO, O/R, 16 X 26, BLUE, STERILE

## (undated) DEVICE — STAPLER, SKIN PROXIMATE, 35 WIDE

## (undated) DEVICE — DRAPE, INCISE, ANTIMICROBIAL, IOBAN 2, LARGE, 17 X 23 IN, DISPOSABLE, STERILE

## (undated) DEVICE — KIT, RIA 2 BONE HARVESTING, 520MM, STERILE

## (undated) DEVICE — DRESSING, MEPILEX BORDER, POST-OP AG, 4 X 6 IN

## (undated) DEVICE — BOLSTER, HIP

## (undated) DEVICE — DRAPE, SHEET, U, STERI DRAPE, 47 X 51 IN, DISPOSABLE, STERILE

## (undated) DEVICE — COVER, CART, 45 X 27 X 48 IN, CLEAR

## (undated) DEVICE — BANDAGE, COFLEX, 6 X 5 YDS, FOAM TAN, STERILE, LF

## (undated) DEVICE — ROD, REAMING, WITH BALL TIP, 2.5 X 950MM, STERILE

## (undated) DEVICE — PROTECTOR, NERVE, ULNAR, PINK

## (undated) DEVICE — REAMER HEAD, RIA 2, 12.5 MM, STERILE

## (undated) DEVICE — DRAPE COVER, C ARM, FLOUROSCAN IMAGING SYS

## (undated) DEVICE — DRAPE, ISOLATION, ANTIMICROBIAL, W/POUCH, IOBAN 2, STERI DRAPE, 125 X 83 IN, DISPOSABLE, STERILE

## (undated) DEVICE — DRESSING, NON-ADHERENT, OIL EMULSION, CURITY, 3 X 8 IN, STERILE

## (undated) DEVICE — APPLICATOR, CHLORAPREP, W/ORANGE TINT, 26ML